# Patient Record
Sex: MALE | Race: WHITE | Employment: OTHER | URBAN - METROPOLITAN AREA
[De-identification: names, ages, dates, MRNs, and addresses within clinical notes are randomized per-mention and may not be internally consistent; named-entity substitution may affect disease eponyms.]

---

## 2022-01-01 ENCOUNTER — APPOINTMENT (OUTPATIENT)
Dept: CT IMAGING | Age: 82
DRG: 870 | End: 2022-01-01
Payer: COMMERCIAL

## 2022-01-01 ENCOUNTER — APPOINTMENT (OUTPATIENT)
Dept: GENERAL RADIOLOGY | Age: 82
DRG: 870 | End: 2022-01-01
Payer: COMMERCIAL

## 2022-01-01 ENCOUNTER — HOSPITAL ENCOUNTER (INPATIENT)
Age: 82
LOS: 28 days | DRG: 870 | End: 2022-02-05
Attending: EMERGENCY MEDICINE | Admitting: INTERNAL MEDICINE
Payer: COMMERCIAL

## 2022-01-01 ENCOUNTER — ANESTHESIA EVENT (OUTPATIENT)
Dept: ICU | Age: 82
DRG: 870 | End: 2022-01-01
Payer: COMMERCIAL

## 2022-01-01 ENCOUNTER — ANESTHESIA (OUTPATIENT)
Dept: ICU | Age: 82
DRG: 870 | End: 2022-01-01
Payer: COMMERCIAL

## 2022-01-01 ENCOUNTER — APPOINTMENT (OUTPATIENT)
Dept: ULTRASOUND IMAGING | Age: 82
DRG: 870 | End: 2022-01-01
Payer: COMMERCIAL

## 2022-01-01 VITALS
RESPIRATION RATE: 28 BRPM | DIASTOLIC BLOOD PRESSURE: 87 MMHG | WEIGHT: 168.87 LBS | HEART RATE: 75 BPM | SYSTOLIC BLOOD PRESSURE: 145 MMHG | HEIGHT: 70 IN | BODY MASS INDEX: 24.18 KG/M2 | TEMPERATURE: 97.7 F | OXYGEN SATURATION: 93 %

## 2022-01-01 DIAGNOSIS — R77.8 ELEVATED TROPONIN: ICD-10-CM

## 2022-01-01 DIAGNOSIS — R09.02 HYPOXIA: ICD-10-CM

## 2022-01-01 DIAGNOSIS — N17.9 ACUTE KIDNEY INJURY (HCC): ICD-10-CM

## 2022-01-01 DIAGNOSIS — U07.1 COVID-19: Primary | ICD-10-CM

## 2022-01-01 LAB
-: ABNORMAL
-: NORMAL
-: NORMAL
ABO/RH: NORMAL
ABSOLUTE EOS #: 0 K/UL (ref 0–0.4)
ABSOLUTE EOS #: 0 K/UL (ref 0–0.44)
ABSOLUTE EOS #: 0 K/UL (ref 0–0.44)
ABSOLUTE EOS #: 0.03 K/UL (ref 0–0.44)
ABSOLUTE EOS #: 0.13 K/UL (ref 0–0.44)
ABSOLUTE EOS #: 0.15 K/UL (ref 0–0.44)
ABSOLUTE EOS #: 0.17 K/UL (ref 0–0.4)
ABSOLUTE EOS #: 0.22 K/UL (ref 0–0.4)
ABSOLUTE EOS #: 0.23 K/UL (ref 0–0.4)
ABSOLUTE EOS #: 0.29 K/UL (ref 0–0.4)
ABSOLUTE EOS #: 0.29 K/UL (ref 0–0.4)
ABSOLUTE EOS #: 0.31 K/UL (ref 0–0.44)
ABSOLUTE EOS #: 0.34 K/UL (ref 0–0.4)
ABSOLUTE EOS #: 0.35 K/UL (ref 0–0.4)
ABSOLUTE EOS #: 0.42 K/UL (ref 0–0.44)
ABSOLUTE EOS #: 0.44 K/UL (ref 0–0.44)
ABSOLUTE EOS #: 0.81 K/UL (ref 0–0.4)
ABSOLUTE IMMATURE GRANULOCYTE: 0 K/UL (ref 0–0.3)
ABSOLUTE IMMATURE GRANULOCYTE: 0.06 K/UL (ref 0–0.3)
ABSOLUTE IMMATURE GRANULOCYTE: 0.11 K/UL (ref 0–0.3)
ABSOLUTE IMMATURE GRANULOCYTE: 0.12 K/UL (ref 0–0.3)
ABSOLUTE IMMATURE GRANULOCYTE: 0.13 K/UL (ref 0–0.3)
ABSOLUTE IMMATURE GRANULOCYTE: 0.15 K/UL (ref 0–0.3)
ABSOLUTE IMMATURE GRANULOCYTE: 0.21 K/UL (ref 0–0.3)
ABSOLUTE IMMATURE GRANULOCYTE: 0.22 K/UL (ref 0–0.3)
ABSOLUTE IMMATURE GRANULOCYTE: 0.26 K/UL (ref 0–0.3)
ABSOLUTE IMMATURE GRANULOCYTE: 0.34 K/UL (ref 0–0.3)
ABSOLUTE IMMATURE GRANULOCYTE: 0.37 K/UL (ref 0–0.3)
ABSOLUTE IMMATURE GRANULOCYTE: 0.41 K/UL (ref 0–0.3)
ABSOLUTE IMMATURE GRANULOCYTE: 0.46 K/UL (ref 0–0.3)
ABSOLUTE IMMATURE GRANULOCYTE: 0.48 K/UL (ref 0–0.3)
ABSOLUTE IMMATURE GRANULOCYTE: 1.28 K/UL (ref 0–0.3)
ABSOLUTE IMMATURE GRANULOCYTE: ABNORMAL K/UL (ref 0–0.3)
ABSOLUTE LYMPH #: 0 K/UL (ref 1–4.8)
ABSOLUTE LYMPH #: 0.25 K/UL (ref 1.1–3.7)
ABSOLUTE LYMPH #: 0.29 K/UL (ref 1.1–3.7)
ABSOLUTE LYMPH #: 0.29 K/UL (ref 1–4.8)
ABSOLUTE LYMPH #: 0.3 K/UL (ref 1.1–3.7)
ABSOLUTE LYMPH #: 0.34 K/UL (ref 1–4.8)
ABSOLUTE LYMPH #: 0.35 K/UL (ref 1–4.8)
ABSOLUTE LYMPH #: 0.36 K/UL (ref 1–4.8)
ABSOLUTE LYMPH #: 0.37 K/UL (ref 1–4.8)
ABSOLUTE LYMPH #: 0.38 K/UL (ref 1.1–3.7)
ABSOLUTE LYMPH #: 0.41 K/UL (ref 1.1–3.7)
ABSOLUTE LYMPH #: 0.44 K/UL (ref 1.1–3.7)
ABSOLUTE LYMPH #: 0.48 K/UL (ref 1–4.8)
ABSOLUTE LYMPH #: 0.52 K/UL (ref 1.1–3.7)
ABSOLUTE LYMPH #: 0.56 K/UL (ref 1–4.8)
ABSOLUTE LYMPH #: 0.57 K/UL (ref 1–4.8)
ABSOLUTE LYMPH #: 0.64 K/UL (ref 1.1–3.7)
ABSOLUTE LYMPH #: 0.65 K/UL (ref 1–4.8)
ABSOLUTE LYMPH #: 0.9 K/UL (ref 1–4.8)
ABSOLUTE LYMPH #: 0.93 K/UL (ref 1–4.8)
ABSOLUTE MONO #: 0 K/UL (ref 0.1–0.8)
ABSOLUTE MONO #: 0.15 K/UL (ref 0.1–0.8)
ABSOLUTE MONO #: 0.23 K/UL (ref 0.1–0.8)
ABSOLUTE MONO #: 0.23 K/UL (ref 0.1–0.8)
ABSOLUTE MONO #: 0.34 K/UL (ref 0.1–0.8)
ABSOLUTE MONO #: 0.34 K/UL (ref 0.1–0.8)
ABSOLUTE MONO #: 0.37 K/UL (ref 0.1–0.8)
ABSOLUTE MONO #: 0.37 K/UL (ref 0.1–1.2)
ABSOLUTE MONO #: 0.43 K/UL (ref 0.1–0.8)
ABSOLUTE MONO #: 0.46 K/UL (ref 0.1–1.2)
ABSOLUTE MONO #: 0.51 K/UL (ref 0.1–0.8)
ABSOLUTE MONO #: 0.58 K/UL (ref 0.1–0.8)
ABSOLUTE MONO #: 0.58 K/UL (ref 0.1–0.8)
ABSOLUTE MONO #: 0.62 K/UL (ref 0.1–1.2)
ABSOLUTE MONO #: 0.62 K/UL (ref 0.1–1.2)
ABSOLUTE MONO #: 0.64 K/UL (ref 0.1–1.2)
ABSOLUTE MONO #: 0.67 K/UL (ref 0.1–0.8)
ABSOLUTE MONO #: 0.77 K/UL (ref 0.1–1.2)
ABSOLUTE MONO #: 0.82 K/UL (ref 0.1–1.2)
ABSOLUTE MONO #: 0.9 K/UL (ref 0.1–0.8)
ABSOLUTE MONO #: 0.9 K/UL (ref 0.1–1.2)
ABSOLUTE MONO #: 1.04 K/UL (ref 0.1–0.8)
ACTION: NORMAL
ALBUMIN SERPL-MCNC: 2.7 G/DL (ref 3.5–5.2)
ALBUMIN SERPL-MCNC: 3.3 G/DL (ref 3.5–5.2)
ALBUMIN/GLOBULIN RATIO: 0.8 (ref 1–2.5)
ALBUMIN/GLOBULIN RATIO: 0.8 (ref 1–2.5)
ALLEN TEST: ABNORMAL
ALLEN TEST: POSITIVE
ALLEN TEST: POSITIVE
ALP BLD-CCNC: 103 U/L (ref 40–129)
ALP BLD-CCNC: 89 U/L (ref 40–129)
ALT SERPL-CCNC: 48 U/L (ref 5–41)
ALT SERPL-CCNC: 61 U/L (ref 5–41)
AMORPHOUS: ABNORMAL
ANION GAP SERPL CALCULATED.3IONS-SCNC: 10 MMOL/L (ref 9–17)
ANION GAP SERPL CALCULATED.3IONS-SCNC: 10 MMOL/L (ref 9–17)
ANION GAP SERPL CALCULATED.3IONS-SCNC: 11 MMOL/L (ref 9–17)
ANION GAP SERPL CALCULATED.3IONS-SCNC: 12 MMOL/L (ref 9–17)
ANION GAP SERPL CALCULATED.3IONS-SCNC: 13 MMOL/L (ref 9–17)
ANION GAP SERPL CALCULATED.3IONS-SCNC: 13 MMOL/L (ref 9–17)
ANION GAP SERPL CALCULATED.3IONS-SCNC: 14 MMOL/L (ref 9–17)
ANION GAP SERPL CALCULATED.3IONS-SCNC: 15 MMOL/L (ref 9–17)
ANION GAP SERPL CALCULATED.3IONS-SCNC: 16 MMOL/L (ref 9–17)
ANION GAP SERPL CALCULATED.3IONS-SCNC: 17 MMOL/L (ref 9–17)
ANION GAP SERPL CALCULATED.3IONS-SCNC: 17 MMOL/L (ref 9–17)
ANION GAP SERPL CALCULATED.3IONS-SCNC: 18 MMOL/L (ref 9–17)
ANION GAP SERPL CALCULATED.3IONS-SCNC: 19 MMOL/L (ref 9–17)
ANION GAP SERPL CALCULATED.3IONS-SCNC: 7 MMOL/L (ref 9–17)
ANION GAP SERPL CALCULATED.3IONS-SCNC: 8 MMOL/L (ref 9–17)
ANION GAP SERPL CALCULATED.3IONS-SCNC: 9 MMOL/L (ref 9–17)
ANTI DNA DOUBLE STRANDED: 0.8 IU/ML
ANTI-NUCLEAR ANTIBODY (ANA): NEGATIVE
ANTIBODY SCREEN: NEGATIVE
ARM BAND NUMBER: NORMAL
AST SERPL-CCNC: 39 U/L
AST SERPL-CCNC: 53 U/L
BACTERIA: ABNORMAL
BASOPHILS # BLD: 0 % (ref 0–2)
BASOPHILS # BLD: 1 % (ref 0–2)
BASOPHILS # BLD: 2 % (ref 0–2)
BASOPHILS ABSOLUTE: 0 K/UL (ref 0–0.2)
BASOPHILS ABSOLUTE: 0.09 K/UL (ref 0–0.2)
BASOPHILS ABSOLUTE: 0.23 K/UL (ref 0–0.2)
BASOPHILS ABSOLUTE: <0.03 K/UL (ref 0–0.2)
BILIRUB SERPL-MCNC: 0.36 MG/DL (ref 0.3–1.2)
BILIRUB SERPL-MCNC: 0.5 MG/DL (ref 0.3–1.2)
BILIRUBIN DIRECT: 0.2 MG/DL
BILIRUBIN URINE: NEGATIVE
BILIRUBIN, INDIRECT: 0.3 MG/DL (ref 0–1)
BLD PROD TYP BPU: NORMAL
BUN BLDV-MCNC: 101 MG/DL (ref 8–23)
BUN BLDV-MCNC: 50 MG/DL (ref 8–23)
BUN BLDV-MCNC: 54 MG/DL (ref 8–23)
BUN BLDV-MCNC: 54 MG/DL (ref 8–23)
BUN BLDV-MCNC: 56 MG/DL (ref 8–23)
BUN BLDV-MCNC: 56 MG/DL (ref 8–23)
BUN BLDV-MCNC: 58 MG/DL (ref 8–23)
BUN BLDV-MCNC: 58 MG/DL (ref 8–23)
BUN BLDV-MCNC: 60 MG/DL (ref 8–23)
BUN BLDV-MCNC: 60 MG/DL (ref 8–23)
BUN BLDV-MCNC: 63 MG/DL (ref 8–23)
BUN BLDV-MCNC: 63 MG/DL (ref 8–23)
BUN BLDV-MCNC: 66 MG/DL (ref 8–23)
BUN BLDV-MCNC: 68 MG/DL (ref 8–23)
BUN BLDV-MCNC: 74 MG/DL (ref 8–23)
BUN BLDV-MCNC: 75 MG/DL (ref 8–23)
BUN BLDV-MCNC: 76 MG/DL (ref 8–23)
BUN BLDV-MCNC: 77 MG/DL (ref 8–23)
BUN BLDV-MCNC: 79 MG/DL (ref 8–23)
BUN BLDV-MCNC: 80 MG/DL (ref 8–23)
BUN BLDV-MCNC: 87 MG/DL (ref 8–23)
BUN BLDV-MCNC: 89 MG/DL (ref 8–23)
BUN BLDV-MCNC: 91 MG/DL (ref 8–23)
BUN BLDV-MCNC: 93 MG/DL (ref 8–23)
BUN BLDV-MCNC: 95 MG/DL (ref 8–23)
BUN BLDV-MCNC: 95 MG/DL (ref 8–23)
BUN BLDV-MCNC: 99 MG/DL (ref 8–23)
BUN/CREAT BLD: ABNORMAL (ref 9–20)
C-REACTIVE PROTEIN: 131.8 MG/L (ref 0–5)
C-REACTIVE PROTEIN: 160 MG/L (ref 0–5)
C-REACTIVE PROTEIN: 21.5 MG/L (ref 0–5)
C-REACTIVE PROTEIN: 52.3 MG/L (ref 0–5)
CALCIUM IONIZED: 1.21 MMOL/L (ref 1.13–1.33)
CALCIUM SERPL-MCNC: 7.3 MG/DL (ref 8.6–10.4)
CALCIUM SERPL-MCNC: 7.3 MG/DL (ref 8.6–10.4)
CALCIUM SERPL-MCNC: 7.5 MG/DL (ref 8.6–10.4)
CALCIUM SERPL-MCNC: 7.7 MG/DL (ref 8.6–10.4)
CALCIUM SERPL-MCNC: 7.8 MG/DL (ref 8.6–10.4)
CALCIUM SERPL-MCNC: 7.8 MG/DL (ref 8.6–10.4)
CALCIUM SERPL-MCNC: 7.9 MG/DL (ref 8.6–10.4)
CALCIUM SERPL-MCNC: 8 MG/DL (ref 8.6–10.4)
CALCIUM SERPL-MCNC: 8.1 MG/DL (ref 8.6–10.4)
CALCIUM SERPL-MCNC: 8.2 MG/DL (ref 8.6–10.4)
CALCIUM SERPL-MCNC: 8.3 MG/DL (ref 8.6–10.4)
CALCIUM SERPL-MCNC: 8.4 MG/DL (ref 8.6–10.4)
CALCIUM SERPL-MCNC: 8.4 MG/DL (ref 8.6–10.4)
CALCIUM SERPL-MCNC: 8.5 MG/DL (ref 8.6–10.4)
CALCIUM SERPL-MCNC: 8.7 MG/DL (ref 8.6–10.4)
CALCIUM SERPL-MCNC: 8.8 MG/DL (ref 8.6–10.4)
CALCIUM SERPL-MCNC: 9.2 MG/DL (ref 8.6–10.4)
CARBOXYHEMOGLOBIN: 0.6 % (ref 0–5)
CASTS UA: ABNORMAL /LPF
CASTS UA: ABNORMAL /LPF (ref 0–2)
CHLORIDE BLD-SCNC: 100 MMOL/L (ref 98–107)
CHLORIDE BLD-SCNC: 102 MMOL/L (ref 98–107)
CHLORIDE BLD-SCNC: 103 MMOL/L (ref 98–107)
CHLORIDE BLD-SCNC: 103 MMOL/L (ref 98–107)
CHLORIDE BLD-SCNC: 104 MMOL/L (ref 98–107)
CHLORIDE BLD-SCNC: 105 MMOL/L (ref 98–107)
CHLORIDE BLD-SCNC: 105 MMOL/L (ref 98–107)
CHLORIDE BLD-SCNC: 107 MMOL/L (ref 98–107)
CHLORIDE BLD-SCNC: 108 MMOL/L (ref 98–107)
CHLORIDE BLD-SCNC: 109 MMOL/L (ref 98–107)
CHLORIDE BLD-SCNC: 110 MMOL/L (ref 98–107)
CHLORIDE BLD-SCNC: 110 MMOL/L (ref 98–107)
CHLORIDE BLD-SCNC: 111 MMOL/L (ref 98–107)
CO2: 11 MMOL/L (ref 20–31)
CO2: 15 MMOL/L (ref 20–31)
CO2: 16 MMOL/L (ref 20–31)
CO2: 16 MMOL/L (ref 20–31)
CO2: 17 MMOL/L (ref 20–31)
CO2: 17 MMOL/L (ref 20–31)
CO2: 19 MMOL/L (ref 20–31)
CO2: 20 MMOL/L (ref 20–31)
CO2: 20 MMOL/L (ref 20–31)
CO2: 21 MMOL/L (ref 20–31)
CO2: 21 MMOL/L (ref 20–31)
CO2: 22 MMOL/L (ref 20–31)
CO2: 22 MMOL/L (ref 20–31)
CO2: 23 MMOL/L (ref 20–31)
CO2: 24 MMOL/L (ref 20–31)
CO2: 24 MMOL/L (ref 20–31)
CO2: 26 MMOL/L (ref 20–31)
CO2: 29 MMOL/L (ref 20–31)
CO2: 30 MMOL/L (ref 20–31)
CO2: 31 MMOL/L (ref 20–31)
CO2: 32 MMOL/L (ref 20–31)
COLOR: YELLOW
COMMENT UA: ABNORMAL
COMMENT UA: ABNORMAL
COMPLEMENT C3: 134 MG/DL (ref 90–180)
COMPLEMENT C4: 31 MG/DL (ref 10–40)
CREAT SERPL-MCNC: 2.04 MG/DL (ref 0.7–1.2)
CREAT SERPL-MCNC: 2.08 MG/DL (ref 0.7–1.2)
CREAT SERPL-MCNC: 2.09 MG/DL (ref 0.7–1.2)
CREAT SERPL-MCNC: 2.16 MG/DL (ref 0.7–1.2)
CREAT SERPL-MCNC: 2.18 MG/DL (ref 0.7–1.2)
CREAT SERPL-MCNC: 2.19 MG/DL (ref 0.7–1.2)
CREAT SERPL-MCNC: 2.2 MG/DL (ref 0.7–1.2)
CREAT SERPL-MCNC: 2.25 MG/DL (ref 0.7–1.2)
CREAT SERPL-MCNC: 2.29 MG/DL (ref 0.7–1.2)
CREAT SERPL-MCNC: 2.31 MG/DL (ref 0.7–1.2)
CREAT SERPL-MCNC: 2.32 MG/DL (ref 0.7–1.2)
CREAT SERPL-MCNC: 2.32 MG/DL (ref 0.7–1.2)
CREAT SERPL-MCNC: 2.35 MG/DL (ref 0.7–1.2)
CREAT SERPL-MCNC: 2.38 MG/DL (ref 0.7–1.2)
CREAT SERPL-MCNC: 2.44 MG/DL (ref 0.7–1.2)
CREAT SERPL-MCNC: 2.45 MG/DL (ref 0.7–1.2)
CREAT SERPL-MCNC: 2.52 MG/DL (ref 0.7–1.2)
CREAT SERPL-MCNC: 2.52 MG/DL (ref 0.7–1.2)
CREAT SERPL-MCNC: 2.53 MG/DL (ref 0.7–1.2)
CREAT SERPL-MCNC: 2.54 MG/DL (ref 0.7–1.2)
CREAT SERPL-MCNC: 2.56 MG/DL (ref 0.7–1.2)
CREAT SERPL-MCNC: 2.67 MG/DL (ref 0.7–1.2)
CREAT SERPL-MCNC: 2.96 MG/DL (ref 0.7–1.2)
CREAT SERPL-MCNC: 2.98 MG/DL (ref 0.7–1.2)
CREAT SERPL-MCNC: 3.21 MG/DL (ref 0.7–1.2)
CREAT SERPL-MCNC: 3.31 MG/DL (ref 0.7–1.2)
CREAT SERPL-MCNC: 3.69 MG/DL (ref 0.7–1.2)
CREATININE URINE: 90.8 MG/DL (ref 39–259)
CROSSMATCH RESULT: NORMAL
CRYSTALS, UA: ABNORMAL /HPF
CULTURE: ABNORMAL
CULTURE: NO GROWTH
CULTURE: NORMAL
DATE AND TIME: NORMAL
DATE, STOOL #1: ABNORMAL
DATE, STOOL #2: ABNORMAL
DATE, STOOL #3: ABNORMAL
DIFFERENTIAL TYPE: ABNORMAL
DIRECT EXAM: ABNORMAL
DISPENSE STATUS BLOOD BANK: NORMAL
EKG ATRIAL RATE: 106 BPM
EKG ATRIAL RATE: 129 BPM
EKG ATRIAL RATE: 33 BPM
EKG ATRIAL RATE: 85 BPM
EKG P AXIS: 14 DEGREES
EKG P AXIS: 38 DEGREES
EKG P-R INTERVAL: 124 MS
EKG P-R INTERVAL: 126 MS
EKG Q-T INTERVAL: 296 MS
EKG Q-T INTERVAL: 322 MS
EKG Q-T INTERVAL: 356 MS
EKG Q-T INTERVAL: 394 MS
EKG QRS DURATION: 104 MS
EKG QRS DURATION: 94 MS
EKG QRS DURATION: 94 MS
EKG QRS DURATION: 96 MS
EKG QTC CALCULATION (BAZETT): 438 MS
EKG QTC CALCULATION (BAZETT): 468 MS
EKG QTC CALCULATION (BAZETT): 472 MS
EKG QTC CALCULATION (BAZETT): 493 MS
EKG R AXIS: -43 DEGREES
EKG R AXIS: -46 DEGREES
EKG R AXIS: -48 DEGREES
EKG R AXIS: -53 DEGREES
EKG T AXIS: 108 DEGREES
EKG T AXIS: 123 DEGREES
EKG T AXIS: 141 DEGREES
EKG T AXIS: 40 DEGREES
EKG VENTRICULAR RATE: 106 BPM
EKG VENTRICULAR RATE: 132 BPM
EKG VENTRICULAR RATE: 141 BPM
EKG VENTRICULAR RATE: 85 BPM
ENA ANTIBODIES SCREEN: 0.3 U/ML
EOSINOPHILS RELATIVE PERCENT: 0 % (ref 1–4)
EOSINOPHILS RELATIVE PERCENT: 1 % (ref 1–4)
EOSINOPHILS RELATIVE PERCENT: 2 % (ref 1–4)
EOSINOPHILS RELATIVE PERCENT: 2 % (ref 1–4)
EOSINOPHILS RELATIVE PERCENT: 3 % (ref 1–4)
EOSINOPHILS RELATIVE PERCENT: 4 % (ref 1–4)
EOSINOPHILS RELATIVE PERCENT: 5 % (ref 1–4)
EOSINOPHILS RELATIVE PERCENT: 7 % (ref 1–4)
EPITHELIAL CELLS UA: ABNORMAL /HPF (ref 0–5)
EXPIRATION DATE: NORMAL
FERRITIN: 804 UG/L (ref 30–400)
FIO2: 30
FIO2: 40
FIO2: 50
FIO2: 60
FIO2: 60
FIO2: 90
FIO2: ABNORMAL
FIO2: ABNORMAL
GFR AFRICAN AMERICAN: 19 ML/MIN
GFR AFRICAN AMERICAN: 22 ML/MIN
GFR AFRICAN AMERICAN: 23 ML/MIN
GFR AFRICAN AMERICAN: 25 ML/MIN
GFR AFRICAN AMERICAN: 25 ML/MIN
GFR AFRICAN AMERICAN: 28 ML/MIN
GFR AFRICAN AMERICAN: 29 ML/MIN
GFR AFRICAN AMERICAN: 30 ML/MIN
GFR AFRICAN AMERICAN: 31 ML/MIN
GFR AFRICAN AMERICAN: 31 ML/MIN
GFR AFRICAN AMERICAN: 32 ML/MIN
GFR AFRICAN AMERICAN: 33 ML/MIN
GFR AFRICAN AMERICAN: 34 ML/MIN
GFR AFRICAN AMERICAN: 35 ML/MIN
GFR AFRICAN AMERICAN: 36 ML/MIN
GFR AFRICAN AMERICAN: 37 ML/MIN
GFR AFRICAN AMERICAN: 37 ML/MIN
GFR AFRICAN AMERICAN: 38 ML/MIN
GFR NON-AFRICAN AMERICAN: 16 ML/MIN
GFR NON-AFRICAN AMERICAN: 18 ML/MIN
GFR NON-AFRICAN AMERICAN: 19 ML/MIN
GFR NON-AFRICAN AMERICAN: 20 ML/MIN
GFR NON-AFRICAN AMERICAN: 21 ML/MIN
GFR NON-AFRICAN AMERICAN: 23 ML/MIN
GFR NON-AFRICAN AMERICAN: 24 ML/MIN
GFR NON-AFRICAN AMERICAN: 25 ML/MIN
GFR NON-AFRICAN AMERICAN: 26 ML/MIN
GFR NON-AFRICAN AMERICAN: 27 ML/MIN
GFR NON-AFRICAN AMERICAN: 28 ML/MIN
GFR NON-AFRICAN AMERICAN: 29 ML/MIN
GFR NON-AFRICAN AMERICAN: 30 ML/MIN
GFR NON-AFRICAN AMERICAN: 31 ML/MIN
GFR NON-AFRICAN AMERICAN: 31 ML/MIN
GFR NON-AFRICAN AMERICAN: 32 ML/MIN
GFR SERPL CREATININE-BSD FRML MDRD: ABNORMAL ML/MIN/{1.73_M2}
GLOBULIN: ABNORMAL G/DL (ref 1.5–3.8)
GLUCOSE BLD-MCNC: 100 MG/DL (ref 75–110)
GLUCOSE BLD-MCNC: 101 MG/DL (ref 75–110)
GLUCOSE BLD-MCNC: 105 MG/DL (ref 75–110)
GLUCOSE BLD-MCNC: 106 MG/DL (ref 75–110)
GLUCOSE BLD-MCNC: 106 MG/DL (ref 75–110)
GLUCOSE BLD-MCNC: 107 MG/DL (ref 75–110)
GLUCOSE BLD-MCNC: 108 MG/DL (ref 75–110)
GLUCOSE BLD-MCNC: 109 MG/DL (ref 74–100)
GLUCOSE BLD-MCNC: 110 MG/DL (ref 70–99)
GLUCOSE BLD-MCNC: 111 MG/DL (ref 75–110)
GLUCOSE BLD-MCNC: 113 MG/DL (ref 75–110)
GLUCOSE BLD-MCNC: 114 MG/DL (ref 75–110)
GLUCOSE BLD-MCNC: 116 MG/DL (ref 75–110)
GLUCOSE BLD-MCNC: 116 MG/DL (ref 75–110)
GLUCOSE BLD-MCNC: 117 MG/DL (ref 75–110)
GLUCOSE BLD-MCNC: 119 MG/DL (ref 75–110)
GLUCOSE BLD-MCNC: 120 MG/DL (ref 75–110)
GLUCOSE BLD-MCNC: 123 MG/DL (ref 75–110)
GLUCOSE BLD-MCNC: 124 MG/DL (ref 70–99)
GLUCOSE BLD-MCNC: 127 MG/DL (ref 75–110)
GLUCOSE BLD-MCNC: 129 MG/DL (ref 70–99)
GLUCOSE BLD-MCNC: 129 MG/DL (ref 70–99)
GLUCOSE BLD-MCNC: 129 MG/DL (ref 75–110)
GLUCOSE BLD-MCNC: 129 MG/DL (ref 75–110)
GLUCOSE BLD-MCNC: 130 MG/DL (ref 75–110)
GLUCOSE BLD-MCNC: 131 MG/DL (ref 75–110)
GLUCOSE BLD-MCNC: 132 MG/DL (ref 70–99)
GLUCOSE BLD-MCNC: 132 MG/DL (ref 70–99)
GLUCOSE BLD-MCNC: 133 MG/DL (ref 75–110)
GLUCOSE BLD-MCNC: 133 MG/DL (ref 75–110)
GLUCOSE BLD-MCNC: 134 MG/DL (ref 70–99)
GLUCOSE BLD-MCNC: 134 MG/DL (ref 75–110)
GLUCOSE BLD-MCNC: 134 MG/DL (ref 75–110)
GLUCOSE BLD-MCNC: 135 MG/DL (ref 70–99)
GLUCOSE BLD-MCNC: 135 MG/DL (ref 75–110)
GLUCOSE BLD-MCNC: 136 MG/DL (ref 75–110)
GLUCOSE BLD-MCNC: 137 MG/DL (ref 75–110)
GLUCOSE BLD-MCNC: 138 MG/DL (ref 75–110)
GLUCOSE BLD-MCNC: 139 MG/DL (ref 75–110)
GLUCOSE BLD-MCNC: 139 MG/DL (ref 75–110)
GLUCOSE BLD-MCNC: 140 MG/DL (ref 75–110)
GLUCOSE BLD-MCNC: 141 MG/DL (ref 75–110)
GLUCOSE BLD-MCNC: 143 MG/DL (ref 75–110)
GLUCOSE BLD-MCNC: 144 MG/DL (ref 75–110)
GLUCOSE BLD-MCNC: 144 MG/DL (ref 75–110)
GLUCOSE BLD-MCNC: 145 MG/DL (ref 70–99)
GLUCOSE BLD-MCNC: 146 MG/DL (ref 75–110)
GLUCOSE BLD-MCNC: 146 MG/DL (ref 75–110)
GLUCOSE BLD-MCNC: 147 MG/DL (ref 75–110)
GLUCOSE BLD-MCNC: 148 MG/DL (ref 70–99)
GLUCOSE BLD-MCNC: 148 MG/DL (ref 75–110)
GLUCOSE BLD-MCNC: 149 MG/DL (ref 74–100)
GLUCOSE BLD-MCNC: 150 MG/DL (ref 75–110)
GLUCOSE BLD-MCNC: 152 MG/DL (ref 75–110)
GLUCOSE BLD-MCNC: 153 MG/DL (ref 70–99)
GLUCOSE BLD-MCNC: 153 MG/DL (ref 75–110)
GLUCOSE BLD-MCNC: 155 MG/DL (ref 75–110)
GLUCOSE BLD-MCNC: 156 MG/DL (ref 75–110)
GLUCOSE BLD-MCNC: 157 MG/DL (ref 75–110)
GLUCOSE BLD-MCNC: 158 MG/DL (ref 75–110)
GLUCOSE BLD-MCNC: 159 MG/DL (ref 75–110)
GLUCOSE BLD-MCNC: 160 MG/DL (ref 70–99)
GLUCOSE BLD-MCNC: 160 MG/DL (ref 70–99)
GLUCOSE BLD-MCNC: 160 MG/DL (ref 74–100)
GLUCOSE BLD-MCNC: 160 MG/DL (ref 75–110)
GLUCOSE BLD-MCNC: 161 MG/DL (ref 70–99)
GLUCOSE BLD-MCNC: 161 MG/DL (ref 70–99)
GLUCOSE BLD-MCNC: 161 MG/DL (ref 75–110)
GLUCOSE BLD-MCNC: 163 MG/DL (ref 75–110)
GLUCOSE BLD-MCNC: 163 MG/DL (ref 75–110)
GLUCOSE BLD-MCNC: 164 MG/DL (ref 75–110)
GLUCOSE BLD-MCNC: 166 MG/DL (ref 75–110)
GLUCOSE BLD-MCNC: 166 MG/DL (ref 75–110)
GLUCOSE BLD-MCNC: 167 MG/DL (ref 70–99)
GLUCOSE BLD-MCNC: 167 MG/DL (ref 75–110)
GLUCOSE BLD-MCNC: 167 MG/DL (ref 75–110)
GLUCOSE BLD-MCNC: 168 MG/DL (ref 75–110)
GLUCOSE BLD-MCNC: 168 MG/DL (ref 75–110)
GLUCOSE BLD-MCNC: 169 MG/DL (ref 75–110)
GLUCOSE BLD-MCNC: 170 MG/DL (ref 75–110)
GLUCOSE BLD-MCNC: 170 MG/DL (ref 75–110)
GLUCOSE BLD-MCNC: 171 MG/DL (ref 75–110)
GLUCOSE BLD-MCNC: 172 MG/DL (ref 70–99)
GLUCOSE BLD-MCNC: 172 MG/DL (ref 75–110)
GLUCOSE BLD-MCNC: 174 MG/DL (ref 74–100)
GLUCOSE BLD-MCNC: 174 MG/DL (ref 74–100)
GLUCOSE BLD-MCNC: 176 MG/DL (ref 70–99)
GLUCOSE BLD-MCNC: 176 MG/DL (ref 75–110)
GLUCOSE BLD-MCNC: 177 MG/DL (ref 70–99)
GLUCOSE BLD-MCNC: 178 MG/DL (ref 70–99)
GLUCOSE BLD-MCNC: 178 MG/DL (ref 75–110)
GLUCOSE BLD-MCNC: 179 MG/DL (ref 70–99)
GLUCOSE BLD-MCNC: 181 MG/DL (ref 75–110)
GLUCOSE BLD-MCNC: 182 MG/DL (ref 75–110)
GLUCOSE BLD-MCNC: 183 MG/DL (ref 75–110)
GLUCOSE BLD-MCNC: 185 MG/DL (ref 75–110)
GLUCOSE BLD-MCNC: 186 MG/DL (ref 75–110)
GLUCOSE BLD-MCNC: 188 MG/DL (ref 70–99)
GLUCOSE BLD-MCNC: 188 MG/DL (ref 74–100)
GLUCOSE BLD-MCNC: 188 MG/DL (ref 75–110)
GLUCOSE BLD-MCNC: 189 MG/DL (ref 75–110)
GLUCOSE BLD-MCNC: 189 MG/DL (ref 75–110)
GLUCOSE BLD-MCNC: 191 MG/DL (ref 70–99)
GLUCOSE BLD-MCNC: 191 MG/DL (ref 75–110)
GLUCOSE BLD-MCNC: 192 MG/DL (ref 75–110)
GLUCOSE BLD-MCNC: 194 MG/DL (ref 70–99)
GLUCOSE BLD-MCNC: 195 MG/DL (ref 70–99)
GLUCOSE BLD-MCNC: 196 MG/DL (ref 75–110)
GLUCOSE BLD-MCNC: 199 MG/DL (ref 74–100)
GLUCOSE BLD-MCNC: 200 MG/DL (ref 75–110)
GLUCOSE BLD-MCNC: 200 MG/DL (ref 75–110)
GLUCOSE BLD-MCNC: 201 MG/DL (ref 75–110)
GLUCOSE BLD-MCNC: 201 MG/DL (ref 75–110)
GLUCOSE BLD-MCNC: 202 MG/DL (ref 74–100)
GLUCOSE BLD-MCNC: 203 MG/DL (ref 75–110)
GLUCOSE BLD-MCNC: 203 MG/DL (ref 75–110)
GLUCOSE BLD-MCNC: 204 MG/DL (ref 75–110)
GLUCOSE BLD-MCNC: 207 MG/DL (ref 74–100)
GLUCOSE BLD-MCNC: 207 MG/DL (ref 75–110)
GLUCOSE BLD-MCNC: 219 MG/DL (ref 70–99)
GLUCOSE BLD-MCNC: 229 MG/DL (ref 70–99)
GLUCOSE BLD-MCNC: 230 MG/DL (ref 75–110)
GLUCOSE BLD-MCNC: 231 MG/DL (ref 75–110)
GLUCOSE BLD-MCNC: 233 MG/DL (ref 74–100)
GLUCOSE BLD-MCNC: 244 MG/DL (ref 75–110)
GLUCOSE BLD-MCNC: 261 MG/DL (ref 75–110)
GLUCOSE BLD-MCNC: 34 MG/DL (ref 75–110)
GLUCOSE BLD-MCNC: 36 MG/DL (ref 75–110)
GLUCOSE BLD-MCNC: 36 MG/DL (ref 75–110)
GLUCOSE BLD-MCNC: 368 MG/DL (ref 70–99)
GLUCOSE BLD-MCNC: 42 MG/DL (ref 75–110)
GLUCOSE BLD-MCNC: 44 MG/DL (ref 75–110)
GLUCOSE BLD-MCNC: 48 MG/DL (ref 75–110)
GLUCOSE BLD-MCNC: 56 MG/DL (ref 75–110)
GLUCOSE BLD-MCNC: 58 MG/DL (ref 74–100)
GLUCOSE BLD-MCNC: 58 MG/DL (ref 75–110)
GLUCOSE BLD-MCNC: 63 MG/DL (ref 75–110)
GLUCOSE BLD-MCNC: 73 MG/DL (ref 75–110)
GLUCOSE BLD-MCNC: 84 MG/DL (ref 75–110)
GLUCOSE BLD-MCNC: 87 MG/DL (ref 75–110)
GLUCOSE BLD-MCNC: 89 MG/DL (ref 70–99)
GLUCOSE BLD-MCNC: 91 MG/DL (ref 75–110)
GLUCOSE BLD-MCNC: 92 MG/DL (ref 75–110)
GLUCOSE BLD-MCNC: 93 MG/DL (ref 75–110)
GLUCOSE BLD-MCNC: 94 MG/DL (ref 75–110)
GLUCOSE BLD-MCNC: 96 MG/DL (ref 75–110)
GLUCOSE BLD-MCNC: 97 MG/DL (ref 75–110)
GLUCOSE BLD-MCNC: 99 MG/DL (ref 75–110)
GLUCOSE URINE: ABNORMAL
HCO3 VENOUS: 15.4 MMOL/L (ref 24–30)
HCT VFR BLD CALC: 17.5 % (ref 40.7–50.3)
HCT VFR BLD CALC: 21.1 % (ref 40.7–50.3)
HCT VFR BLD CALC: 21.2 % (ref 40.7–50.3)
HCT VFR BLD CALC: 21.6 % (ref 40.7–50.3)
HCT VFR BLD CALC: 21.7 % (ref 40.7–50.3)
HCT VFR BLD CALC: 21.7 % (ref 40.7–50.3)
HCT VFR BLD CALC: 21.9 % (ref 40.7–50.3)
HCT VFR BLD CALC: 23.1 % (ref 40.7–50.3)
HCT VFR BLD CALC: 24 % (ref 40.7–50.3)
HCT VFR BLD CALC: 24.4 % (ref 40.7–50.3)
HCT VFR BLD CALC: 24.5 % (ref 40.7–50.3)
HCT VFR BLD CALC: 24.5 % (ref 40.7–50.3)
HCT VFR BLD CALC: 25.1 % (ref 40.7–50.3)
HCT VFR BLD CALC: 25.6 % (ref 40.7–50.3)
HCT VFR BLD CALC: 25.7 % (ref 40.7–50.3)
HCT VFR BLD CALC: 26.3 % (ref 40.7–50.3)
HCT VFR BLD CALC: 26.9 % (ref 40.7–50.3)
HCT VFR BLD CALC: 27.4 % (ref 40.7–50.3)
HCT VFR BLD CALC: 27.5 % (ref 40.7–50.3)
HCT VFR BLD CALC: 27.5 % (ref 40.7–50.3)
HCT VFR BLD CALC: 27.7 % (ref 40.7–50.3)
HCT VFR BLD CALC: 28.1 % (ref 40.7–50.3)
HCT VFR BLD CALC: 28.4 % (ref 40.7–50.3)
HCT VFR BLD CALC: 30.4 % (ref 40.7–50.3)
HCT VFR BLD CALC: 31 % (ref 40.7–50.3)
HCT VFR BLD CALC: 31.5 % (ref 40.7–50.3)
HCT VFR BLD CALC: 33 % (ref 40.7–50.3)
HCT VFR BLD CALC: 34.1 % (ref 40.7–50.3)
HCT VFR BLD CALC: 35.7 % (ref 40.7–50.3)
HCT VFR BLD CALC: 36.3 % (ref 40.7–50.3)
HCT VFR BLD CALC: 36.5 % (ref 40.7–50.3)
HCT VFR BLD CALC: 40.3 % (ref 41–53)
HEMOCCULT SP1 STL QL: POSITIVE
HEMOCCULT SP2 STL QL: ABNORMAL
HEMOCCULT SP3 STL QL: ABNORMAL
HEMOGLOBIN: 10 G/DL (ref 13–17)
HEMOGLOBIN: 10 G/DL (ref 13–17)
HEMOGLOBIN: 10.5 G/DL (ref 13–17)
HEMOGLOBIN: 10.7 G/DL (ref 13–17)
HEMOGLOBIN: 10.9 G/DL (ref 13–17)
HEMOGLOBIN: 12.5 G/DL (ref 13–17)
HEMOGLOBIN: 12.8 G/DL (ref 13–17)
HEMOGLOBIN: 13.1 G/DL (ref 13–17)
HEMOGLOBIN: 14.1 G/DL (ref 13.5–17.5)
HEMOGLOBIN: 5.7 G/DL (ref 13–17)
HEMOGLOBIN: 6.7 G/DL (ref 13–17)
HEMOGLOBIN: 6.7 G/DL (ref 13–17)
HEMOGLOBIN: 6.9 G/DL (ref 13–17)
HEMOGLOBIN: 7 G/DL (ref 13–17)
HEMOGLOBIN: 7.3 G/DL (ref 13–17)
HEMOGLOBIN: 7.4 G/DL (ref 13–17)
HEMOGLOBIN: 7.7 G/DL (ref 13–17)
HEMOGLOBIN: 7.8 G/DL (ref 13–17)
HEMOGLOBIN: 7.9 G/DL (ref 13–17)
HEMOGLOBIN: 7.9 G/DL (ref 13–17)
HEMOGLOBIN: 8.1 G/DL (ref 13–17)
HEMOGLOBIN: 8.2 G/DL (ref 13–17)
HEMOGLOBIN: 8.3 G/DL (ref 13–17)
HEMOGLOBIN: 8.4 G/DL (ref 13–17)
HEMOGLOBIN: 8.7 G/DL (ref 13–17)
HEMOGLOBIN: 8.9 G/DL (ref 13–17)
HEMOGLOBIN: 9.1 G/DL (ref 13–17)
HEMOGLOBIN: 9.3 G/DL (ref 13–17)
IMMATURE GRANULOCYTES: 0 %
IMMATURE GRANULOCYTES: 1 %
IMMATURE GRANULOCYTES: 2 %
IMMATURE GRANULOCYTES: 3 %
IMMATURE GRANULOCYTES: 4 %
IMMATURE GRANULOCYTES: 5 %
IMMATURE GRANULOCYTES: ABNORMAL %
INR BLD: 1.1
KETONES, URINE: NEGATIVE
LACTATE DEHYDROGENASE: 563 U/L (ref 135–225)
LACTIC ACID, WHOLE BLOOD: 0.9 MMOL/L (ref 0.7–2.1)
LACTIC ACID: 2.1 MMOL/L (ref 0.5–2.2)
LEUKOCYTE ESTERASE, URINE: NEGATIVE
LIPASE: 54 U/L (ref 13–60)
LV EF: 55 %
LVEF MODALITY: NORMAL
LYMPHOCYTES # BLD: 0 % (ref 24–44)
LYMPHOCYTES # BLD: 1 % (ref 24–44)
LYMPHOCYTES # BLD: 2 % (ref 24–43)
LYMPHOCYTES # BLD: 2 % (ref 24–44)
LYMPHOCYTES # BLD: 3 % (ref 24–43)
LYMPHOCYTES # BLD: 3 % (ref 24–44)
LYMPHOCYTES # BLD: 4 % (ref 24–43)
LYMPHOCYTES # BLD: 5 % (ref 24–43)
LYMPHOCYTES # BLD: 5 % (ref 24–43)
LYMPHOCYTES # BLD: 5 % (ref 24–44)
LYMPHOCYTES # BLD: 5 % (ref 24–44)
LYMPHOCYTES # BLD: 8 % (ref 24–44)
LYMPHOCYTES # BLD: 8 % (ref 24–44)
Lab: ABNORMAL
Lab: NORMAL
MAGNESIUM: 2.2 MG/DL (ref 1.6–2.6)
MAGNESIUM: 2.3 MG/DL (ref 1.6–2.6)
MAGNESIUM: 2.4 MG/DL (ref 1.6–2.6)
MAGNESIUM: 2.5 MG/DL (ref 1.6–2.6)
MAGNESIUM: 2.5 MG/DL (ref 1.6–2.6)
MAGNESIUM: 2.6 MG/DL (ref 1.6–2.6)
MAGNESIUM: 2.6 MG/DL (ref 1.6–2.6)
MCH RBC QN AUTO: 29.3 PG (ref 25.2–33.5)
MCH RBC QN AUTO: 29.8 PG (ref 25.2–33.5)
MCH RBC QN AUTO: 30.1 PG (ref 25.2–33.5)
MCH RBC QN AUTO: 30.2 PG (ref 25.2–33.5)
MCH RBC QN AUTO: 30.2 PG (ref 25.2–33.5)
MCH RBC QN AUTO: 30.3 PG (ref 25.2–33.5)
MCH RBC QN AUTO: 30.4 PG (ref 25.2–33.5)
MCH RBC QN AUTO: 30.4 PG (ref 26–34)
MCH RBC QN AUTO: 30.6 PG (ref 25.2–33.5)
MCH RBC QN AUTO: 30.8 PG (ref 25.2–33.5)
MCH RBC QN AUTO: 30.8 PG (ref 25.2–33.5)
MCH RBC QN AUTO: 30.9 PG (ref 25.2–33.5)
MCH RBC QN AUTO: 31 PG (ref 25.2–33.5)
MCH RBC QN AUTO: 31.1 PG (ref 25.2–33.5)
MCH RBC QN AUTO: 31.1 PG (ref 25.2–33.5)
MCH RBC QN AUTO: 31.2 PG (ref 25.2–33.5)
MCH RBC QN AUTO: 31.2 PG (ref 25.2–33.5)
MCH RBC QN AUTO: 31.3 PG (ref 25.2–33.5)
MCH RBC QN AUTO: 31.5 PG (ref 25.2–33.5)
MCH RBC QN AUTO: 31.5 PG (ref 25.2–33.5)
MCH RBC QN AUTO: 31.7 PG (ref 25.2–33.5)
MCH RBC QN AUTO: 32.1 PG (ref 25.2–33.5)
MCHC RBC AUTO-ENTMCNC: 30.8 G/DL (ref 28.4–34.8)
MCHC RBC AUTO-ENTMCNC: 31.2 G/DL (ref 28.4–34.8)
MCHC RBC AUTO-ENTMCNC: 31.3 G/DL (ref 28.4–34.8)
MCHC RBC AUTO-ENTMCNC: 31.4 G/DL (ref 28.4–34.8)
MCHC RBC AUTO-ENTMCNC: 31.5 G/DL (ref 28.4–34.8)
MCHC RBC AUTO-ENTMCNC: 31.6 G/DL (ref 28.4–34.8)
MCHC RBC AUTO-ENTMCNC: 31.7 G/DL (ref 28.4–34.8)
MCHC RBC AUTO-ENTMCNC: 31.7 G/DL (ref 28.4–34.8)
MCHC RBC AUTO-ENTMCNC: 31.8 G/DL (ref 28.4–34.8)
MCHC RBC AUTO-ENTMCNC: 31.8 G/DL (ref 28.4–34.8)
MCHC RBC AUTO-ENTMCNC: 32 G/DL (ref 28.4–34.8)
MCHC RBC AUTO-ENTMCNC: 32 G/DL (ref 28.4–34.8)
MCHC RBC AUTO-ENTMCNC: 32.3 G/DL (ref 28.4–34.8)
MCHC RBC AUTO-ENTMCNC: 32.3 G/DL (ref 28.4–34.8)
MCHC RBC AUTO-ENTMCNC: 32.6 G/DL (ref 28.4–34.8)
MCHC RBC AUTO-ENTMCNC: 33.2 G/DL (ref 28.4–34.8)
MCHC RBC AUTO-ENTMCNC: 33.3 G/DL (ref 28.4–34.8)
MCHC RBC AUTO-ENTMCNC: 33.6 G/DL (ref 28.4–34.8)
MCHC RBC AUTO-ENTMCNC: 34 G/DL (ref 28.4–34.8)
MCHC RBC AUTO-ENTMCNC: 34.3 G/DL (ref 28.4–34.8)
MCHC RBC AUTO-ENTMCNC: 34.4 G/DL (ref 28.4–34.8)
MCHC RBC AUTO-ENTMCNC: 34.6 G/DL (ref 28.4–34.8)
MCHC RBC AUTO-ENTMCNC: 35.1 G/DL (ref 31–37)
MCHC RBC AUTO-ENTMCNC: 35.2 G/DL (ref 28.4–34.8)
MCHC RBC AUTO-ENTMCNC: 35.9 G/DL (ref 28.4–34.8)
MCHC RBC AUTO-ENTMCNC: 35.9 G/DL (ref 28.4–34.8)
MCHC RBC AUTO-ENTMCNC: 37.1 G/DL (ref 28.4–34.8)
MCV RBC AUTO: 100.4 FL (ref 82.6–102.9)
MCV RBC AUTO: 84 FL (ref 82.6–102.9)
MCV RBC AUTO: 84.7 FL (ref 82.6–102.9)
MCV RBC AUTO: 85.4 FL (ref 82.6–102.9)
MCV RBC AUTO: 86.1 FL (ref 82.6–102.9)
MCV RBC AUTO: 86.2 FL (ref 82.6–102.9)
MCV RBC AUTO: 86.4 FL (ref 82.6–102.9)
MCV RBC AUTO: 86.6 FL (ref 80–100)
MCV RBC AUTO: 87.7 FL (ref 82.6–102.9)
MCV RBC AUTO: 91.1 FL (ref 82.6–102.9)
MCV RBC AUTO: 91.6 FL (ref 82.6–102.9)
MCV RBC AUTO: 91.7 FL (ref 82.6–102.9)
MCV RBC AUTO: 92.6 FL (ref 82.6–102.9)
MCV RBC AUTO: 94.8 FL (ref 82.6–102.9)
MCV RBC AUTO: 95 FL (ref 82.6–102.9)
MCV RBC AUTO: 95.3 FL (ref 82.6–102.9)
MCV RBC AUTO: 96.8 FL (ref 82.6–102.9)
MCV RBC AUTO: 97.2 FL (ref 82.6–102.9)
MCV RBC AUTO: 97.7 FL (ref 82.6–102.9)
MCV RBC AUTO: 98.4 FL (ref 82.6–102.9)
MCV RBC AUTO: 98.6 FL (ref 82.6–102.9)
MCV RBC AUTO: 98.9 FL (ref 82.6–102.9)
MCV RBC AUTO: 99.3 FL (ref 82.6–102.9)
MCV RBC AUTO: 99.5 FL (ref 82.6–102.9)
MCV RBC AUTO: 99.5 FL (ref 82.6–102.9)
METHEMOGLOBIN: ABNORMAL % (ref 0–1.5)
MODE: ABNORMAL
MONOCYTES # BLD: 0 % (ref 1–7)
MONOCYTES # BLD: 1 % (ref 1–7)
MONOCYTES # BLD: 2 % (ref 1–7)
MONOCYTES # BLD: 2 % (ref 3–12)
MONOCYTES # BLD: 3 % (ref 1–7)
MONOCYTES # BLD: 3 % (ref 3–12)
MONOCYTES # BLD: 3 % (ref 3–12)
MONOCYTES # BLD: 4 % (ref 1–7)
MONOCYTES # BLD: 5 % (ref 1–7)
MONOCYTES # BLD: 6 % (ref 1–7)
MONOCYTES # BLD: 6 % (ref 1–7)
MONOCYTES # BLD: 6 % (ref 3–12)
MONOCYTES # BLD: 6 % (ref 3–12)
MONOCYTES # BLD: 7 % (ref 3–12)
MONOCYTES # BLD: 7 % (ref 3–12)
MONOCYTES # BLD: 8 % (ref 3–12)
MORPHOLOGY: ABNORMAL
MORPHOLOGY: NORMAL
MRSA, DNA, NASAL: NEGATIVE
MUCUS: ABNORMAL
NEGATIVE BASE EXCESS, ART: 1 (ref 0–2)
NEGATIVE BASE EXCESS, ART: 2 (ref 0–2)
NEGATIVE BASE EXCESS, ART: 5 (ref 0–2)
NEGATIVE BASE EXCESS, ART: 9 (ref 0–2)
NEGATIVE BASE EXCESS, ART: ABNORMAL (ref 0–2)
NEGATIVE BASE EXCESS, VEN: 7.3 MMOL/L (ref 0–2)
NITRITE, URINE: NEGATIVE
NOTIFICATION TIME: ABNORMAL
NOTIFICATION: ABNORMAL
NOTIFY: NORMAL
NRBC AUTOMATED: 0 PER 100 WBC
NRBC AUTOMATED: 0.1 PER 100 WBC
NRBC AUTOMATED: 0.1 PER 100 WBC
NRBC AUTOMATED: 0.2 PER 100 WBC
NRBC AUTOMATED: 0.8 PER 100 WBC
NRBC AUTOMATED: 1.3 PER 100 WBC
NRBC AUTOMATED: 1.7 PER 100 WBC
NRBC AUTOMATED: ABNORMAL PER 100 WBC
NUCLEATED RED BLOOD CELLS: 1 PER 100 WBC
O2 DEVICE/FLOW/%: ABNORMAL
O2 SAT, VEN: 81.6 % (ref 60–85)
OTHER OBSERVATIONS UA: ABNORMAL
OXYHEMOGLOBIN: ABNORMAL % (ref 95–98)
PARTIAL THROMBOPLASTIN TIME: 117.9 SEC (ref 20.5–30.5)
PARTIAL THROMBOPLASTIN TIME: 22.1 SEC (ref 20.5–30.5)
PARTIAL THROMBOPLASTIN TIME: 23 SEC (ref 20.5–30.5)
PARTIAL THROMBOPLASTIN TIME: 24 SEC (ref 20.5–30.5)
PARTIAL THROMBOPLASTIN TIME: 25.2 SEC (ref 20.5–30.5)
PARTIAL THROMBOPLASTIN TIME: 31.8 SEC (ref 20.5–30.5)
PARTIAL THROMBOPLASTIN TIME: 37.8 SEC (ref 20.5–30.5)
PARTIAL THROMBOPLASTIN TIME: 38.9 SEC (ref 20.5–30.5)
PARTIAL THROMBOPLASTIN TIME: 46.5 SEC (ref 20.5–30.5)
PARTIAL THROMBOPLASTIN TIME: 47.2 SEC (ref 20.5–30.5)
PARTIAL THROMBOPLASTIN TIME: 48.8 SEC (ref 20.5–30.5)
PARTIAL THROMBOPLASTIN TIME: 51.1 SEC (ref 20.5–30.5)
PARTIAL THROMBOPLASTIN TIME: 57.7 SEC (ref 20.5–30.5)
PARTIAL THROMBOPLASTIN TIME: 62.7 SEC (ref 20.5–30.5)
PARTIAL THROMBOPLASTIN TIME: 62.8 SEC (ref 20.5–30.5)
PARTIAL THROMBOPLASTIN TIME: 66.4 SEC (ref 20.5–30.5)
PARTIAL THROMBOPLASTIN TIME: 67.6 SEC (ref 20.5–30.5)
PARTIAL THROMBOPLASTIN TIME: 69.8 SEC (ref 20.5–30.5)
PARTIAL THROMBOPLASTIN TIME: 71.4 SEC (ref 20.5–30.5)
PARTIAL THROMBOPLASTIN TIME: 74.9 SEC (ref 20.5–30.5)
PARTIAL THROMBOPLASTIN TIME: 75.8 SEC (ref 20.5–30.5)
PARTIAL THROMBOPLASTIN TIME: 89 SEC (ref 20.5–30.5)
PARTIAL THROMBOPLASTIN TIME: 94.8 SEC (ref 20.5–30.5)
PATIENT TEMP: 34.9
PATIENT TEMP: 37
PATIENT TEMP: ABNORMAL
PCO2, VEN, TEMP ADJ: ABNORMAL MMHG (ref 39–55)
PCO2, VEN: 24.5 (ref 39–55)
PDW BLD-RTO: 13.2 % (ref 11.8–14.4)
PDW BLD-RTO: 13.3 % (ref 11.8–14.4)
PDW BLD-RTO: 13.3 % (ref 11.8–14.4)
PDW BLD-RTO: 13.4 % (ref 11.8–14.4)
PDW BLD-RTO: 13.5 % (ref 11.8–14.4)
PDW BLD-RTO: 14.1 % (ref 12.5–15.4)
PDW BLD-RTO: 14.5 % (ref 11.8–14.4)
PDW BLD-RTO: 15.2 % (ref 11.8–14.4)
PDW BLD-RTO: 16 % (ref 11.8–14.4)
PDW BLD-RTO: 16.1 % (ref 11.8–14.4)
PDW BLD-RTO: 16.3 % (ref 11.8–14.4)
PDW BLD-RTO: 16.7 % (ref 11.8–14.4)
PDW BLD-RTO: 16.8 % (ref 11.8–14.4)
PDW BLD-RTO: 17 % (ref 11.8–14.4)
PDW BLD-RTO: 17.2 % (ref 11.8–14.4)
PDW BLD-RTO: 17.3 % (ref 11.8–14.4)
PDW BLD-RTO: 17.4 % (ref 11.8–14.4)
PDW BLD-RTO: 17.6 % (ref 11.8–14.4)
PDW BLD-RTO: 17.6 % (ref 11.8–14.4)
PDW BLD-RTO: 17.7 % (ref 11.8–14.4)
PDW BLD-RTO: 17.8 % (ref 11.8–14.4)
PDW BLD-RTO: 18 % (ref 11.8–14.4)
PDW BLD-RTO: 18.2 % (ref 11.8–14.4)
PDW BLD-RTO: 18.3 % (ref 11.8–14.4)
PDW BLD-RTO: 18.4 % (ref 11.8–14.4)
PEEP/CPAP: ABNORMAL
PH UA: 5 (ref 5–8)
PH VENOUS: 7.42 (ref 7.32–7.42)
PH, VEN, TEMP ADJ: ABNORMAL (ref 7.32–7.42)
PHOSPHORUS: 5.3 MG/DL (ref 2.5–4.5)
PLATELET # BLD: 103 K/UL (ref 138–453)
PLATELET # BLD: 130 K/UL (ref 138–453)
PLATELET # BLD: 135 K/UL (ref 138–453)
PLATELET # BLD: 139 K/UL (ref 138–453)
PLATELET # BLD: 139 K/UL (ref 138–453)
PLATELET # BLD: 143 K/UL (ref 138–453)
PLATELET # BLD: 144 K/UL (ref 138–453)
PLATELET # BLD: 153 K/UL (ref 138–453)
PLATELET # BLD: 175 K/UL (ref 138–453)
PLATELET # BLD: 190 K/UL (ref 138–453)
PLATELET # BLD: 211 K/UL (ref 138–453)
PLATELET # BLD: 233 K/UL (ref 138–453)
PLATELET # BLD: 237 K/UL (ref 138–453)
PLATELET # BLD: 253 K/UL (ref 138–453)
PLATELET # BLD: 256 K/UL (ref 138–453)
PLATELET # BLD: 273 K/UL (ref 138–453)
PLATELET # BLD: 284 K/UL (ref 138–453)
PLATELET # BLD: 295 K/UL (ref 140–450)
PLATELET # BLD: 302 K/UL (ref 138–453)
PLATELET # BLD: 309 K/UL (ref 138–453)
PLATELET # BLD: 348 K/UL (ref 138–453)
PLATELET # BLD: 406 K/UL (ref 138–453)
PLATELET # BLD: 81 K/UL (ref 138–453)
PLATELET # BLD: 82 K/UL (ref 138–453)
PLATELET # BLD: 85 K/UL (ref 138–453)
PLATELET # BLD: 89 K/UL (ref 138–453)
PLATELET # BLD: 89 K/UL (ref 138–453)
PLATELET ESTIMATE: ABNORMAL
PMV BLD AUTO: 10.1 FL (ref 8.1–13.5)
PMV BLD AUTO: 10.4 FL (ref 8.1–13.5)
PMV BLD AUTO: 10.6 FL (ref 8.1–13.5)
PMV BLD AUTO: 10.8 FL (ref 8.1–13.5)
PMV BLD AUTO: 10.9 FL (ref 8.1–13.5)
PMV BLD AUTO: 11 FL (ref 8.1–13.5)
PMV BLD AUTO: 11.1 FL (ref 8.1–13.5)
PMV BLD AUTO: 11.1 FL (ref 8.1–13.5)
PMV BLD AUTO: 11.4 FL (ref 8.1–13.5)
PMV BLD AUTO: 11.4 FL (ref 8.1–13.5)
PMV BLD AUTO: 8.4 FL (ref 6–12)
PO2, VEN, TEMP ADJ: ABNORMAL MMHG (ref 30–50)
PO2, VEN: 44.1 (ref 30–50)
POC HCO3: 15.5 MMOL/L (ref 21–28)
POC HCO3: 22.1 MMOL/L (ref 21–28)
POC HCO3: 23.7 MMOL/L (ref 21–28)
POC HCO3: 23.7 MMOL/L (ref 21–28)
POC HCO3: 24.3 MMOL/L (ref 21–28)
POC HCO3: 25.5 MMOL/L (ref 21–28)
POC HCO3: 25.9 MMOL/L (ref 21–28)
POC HCO3: 26.9 MMOL/L (ref 21–28)
POC HCO3: 29.7 MMOL/L (ref 21–28)
POC HCO3: 31.5 MMOL/L (ref 21–28)
POC HCO3: 31.8 MMOL/L (ref 21–28)
POC HCO3: 32.3 MMOL/L (ref 21–28)
POC HCO3: 32.4 MMOL/L (ref 21–28)
POC HCO3: 32.7 MMOL/L (ref 21–28)
POC HCO3: 32.7 MMOL/L (ref 21–28)
POC HCO3: 34.3 MMOL/L (ref 21–28)
POC HCO3: 35.1 MMOL/L (ref 21–28)
POC LACTIC ACID: 0.6 MMOL/L (ref 0.56–1.39)
POC O2 SATURATION: 86 % (ref 94–98)
POC O2 SATURATION: 88 % (ref 94–98)
POC O2 SATURATION: 89 % (ref 94–98)
POC O2 SATURATION: 91 % (ref 94–98)
POC O2 SATURATION: 91 % (ref 94–98)
POC O2 SATURATION: 92 % (ref 94–98)
POC O2 SATURATION: 92 % (ref 94–98)
POC O2 SATURATION: 93 % (ref 94–98)
POC O2 SATURATION: 94 % (ref 94–98)
POC O2 SATURATION: 95 % (ref 94–98)
POC O2 SATURATION: 95 % (ref 94–98)
POC O2 SATURATION: 97 % (ref 94–98)
POC O2 SATURATION: 99 % (ref 94–98)
POC PCO2 TEMP: 32 MM HG
POC PCO2 TEMP: ABNORMAL MM HG
POC PCO2: 24.9 MM HG (ref 35–48)
POC PCO2: 30.3 MM HG (ref 35–48)
POC PCO2: 31 MM HG (ref 35–48)
POC PCO2: 35.3 MM HG (ref 35–48)
POC PCO2: 37.8 MM HG (ref 35–48)
POC PCO2: 39.9 MM HG (ref 35–48)
POC PCO2: 40.8 MM HG (ref 35–48)
POC PCO2: 43.4 MM HG (ref 35–48)
POC PCO2: 43.9 MM HG (ref 35–48)
POC PCO2: 44.4 MM HG (ref 35–48)
POC PCO2: 45.6 MM HG (ref 35–48)
POC PCO2: 46.4 MM HG (ref 35–48)
POC PCO2: 48.7 MM HG (ref 35–48)
POC PCO2: 49.4 MM HG (ref 35–48)
POC PCO2: 49.6 MM HG (ref 35–48)
POC PCO2: 53.1 MM HG (ref 35–48)
POC PCO2: 79.9 MM HG (ref 35–48)
POC PH TEMP: 7.44
POC PH TEMP: ABNORMAL
POC PH: 7.11 (ref 7.35–7.45)
POC PH: 7.39 (ref 7.35–7.45)
POC PH: 7.4 (ref 7.35–7.45)
POC PH: 7.4 (ref 7.35–7.45)
POC PH: 7.41 (ref 7.35–7.45)
POC PH: 7.42 (ref 7.35–7.45)
POC PH: 7.42 (ref 7.35–7.45)
POC PH: 7.43 (ref 7.35–7.45)
POC PH: 7.44 (ref 7.35–7.45)
POC PH: 7.45 (ref 7.35–7.45)
POC PH: 7.47 (ref 7.35–7.45)
POC PH: 7.5 (ref 7.35–7.45)
POC PH: 7.5 (ref 7.35–7.45)
POC PH: 7.51 (ref 7.35–7.45)
POC PH: 7.51 (ref 7.35–7.45)
POC PO2 TEMP: 129 MM HG
POC PO2 TEMP: ABNORMAL MM HG
POC PO2: 141.3 MM HG (ref 83–108)
POC PO2: 48.6 MM HG (ref 83–108)
POC PO2: 49.3 MM HG (ref 83–108)
POC PO2: 51.3 MM HG (ref 83–108)
POC PO2: 53 MM HG (ref 83–108)
POC PO2: 54.5 MM HG (ref 83–108)
POC PO2: 55.3 MM HG (ref 83–108)
POC PO2: 56.8 MM HG (ref 83–108)
POC PO2: 57.7 MM HG (ref 83–108)
POC PO2: 61.3 MM HG (ref 83–108)
POC PO2: 64 MM HG (ref 83–108)
POC PO2: 68.2 MM HG (ref 83–108)
POC PO2: 69.3 MM HG (ref 83–108)
POC PO2: 71.5 MM HG (ref 83–108)
POC PO2: 71.7 MM HG (ref 83–108)
POC PO2: 73.3 MM HG (ref 83–108)
POC PO2: 86.2 MM HG (ref 83–108)
POSITIVE BASE EXCESS, ART: 1 (ref 0–3)
POSITIVE BASE EXCESS, ART: 10 (ref 0–3)
POSITIVE BASE EXCESS, ART: 11 (ref 0–3)
POSITIVE BASE EXCESS, ART: 2 (ref 0–3)
POSITIVE BASE EXCESS, ART: 4 (ref 0–3)
POSITIVE BASE EXCESS, ART: 7 (ref 0–3)
POSITIVE BASE EXCESS, ART: 8 (ref 0–3)
POSITIVE BASE EXCESS, ART: ABNORMAL (ref 0–3)
POSITIVE BASE EXCESS, VEN: ABNORMAL MMOL/L (ref 0–2)
POTASSIUM SERPL-SCNC: 3.3 MMOL/L (ref 3.7–5.3)
POTASSIUM SERPL-SCNC: 3.3 MMOL/L (ref 3.7–5.3)
POTASSIUM SERPL-SCNC: 3.5 MMOL/L (ref 3.7–5.3)
POTASSIUM SERPL-SCNC: 3.7 MMOL/L (ref 3.7–5.3)
POTASSIUM SERPL-SCNC: 3.7 MMOL/L (ref 3.7–5.3)
POTASSIUM SERPL-SCNC: 3.8 MMOL/L (ref 3.7–5.3)
POTASSIUM SERPL-SCNC: 3.9 MMOL/L (ref 3.7–5.3)
POTASSIUM SERPL-SCNC: 4 MMOL/L (ref 3.7–5.3)
POTASSIUM SERPL-SCNC: 4.1 MMOL/L (ref 3.7–5.3)
POTASSIUM SERPL-SCNC: 4.2 MMOL/L (ref 3.7–5.3)
POTASSIUM SERPL-SCNC: 4.4 MMOL/L (ref 3.7–5.3)
POTASSIUM SERPL-SCNC: 4.5 MMOL/L (ref 3.7–5.3)
POTASSIUM SERPL-SCNC: 4.6 MMOL/L (ref 3.7–5.3)
POTASSIUM SERPL-SCNC: 4.7 MMOL/L (ref 3.7–5.3)
POTASSIUM SERPL-SCNC: 4.8 MMOL/L (ref 3.7–5.3)
POTASSIUM SERPL-SCNC: 4.8 MMOL/L (ref 3.7–5.3)
POTASSIUM SERPL-SCNC: 4.9 MMOL/L (ref 3.7–5.3)
POTASSIUM SERPL-SCNC: 4.9 MMOL/L (ref 3.7–5.3)
POTASSIUM SERPL-SCNC: 5 MMOL/L (ref 3.7–5.3)
POTASSIUM SERPL-SCNC: 5.1 MMOL/L (ref 3.7–5.3)
POTASSIUM SERPL-SCNC: 5.1 MMOL/L (ref 3.7–5.3)
POTASSIUM SERPL-SCNC: 5.3 MMOL/L (ref 3.7–5.3)
PROTEIN UA: ABNORMAL
PROTHROMBIN TIME: 11.6 SEC (ref 9.1–12.3)
PSV: ABNORMAL
PT. POSITION: ABNORMAL
RBC # BLD: 1.89 M/UL (ref 4.21–5.77)
RBC # BLD: 2.13 M/UL (ref 4.21–5.77)
RBC # BLD: 2.18 M/UL (ref 4.21–5.77)
RBC # BLD: 2.22 M/UL (ref 4.21–5.77)
RBC # BLD: 2.22 M/UL (ref 4.21–5.77)
RBC # BLD: 2.37 M/UL (ref 4.21–5.77)
RBC # BLD: 2.37 M/UL (ref 4.21–5.77)
RBC # BLD: 2.52 M/UL (ref 4.21–5.77)
RBC # BLD: 2.62 M/UL (ref 4.21–5.77)
RBC # BLD: 2.62 M/UL (ref 4.21–5.77)
RBC # BLD: 2.71 M/UL (ref 4.21–5.77)
RBC # BLD: 2.79 M/UL (ref 4.21–5.77)
RBC # BLD: 2.81 M/UL (ref 4.21–5.77)
RBC # BLD: 2.83 M/UL (ref 4.21–5.77)
RBC # BLD: 2.84 M/UL (ref 4.21–5.77)
RBC # BLD: 2.86 M/UL (ref 4.21–5.77)
RBC # BLD: 2.89 M/UL (ref 4.21–5.77)
RBC # BLD: 2.93 M/UL (ref 4.21–5.77)
RBC # BLD: 3.19 M/UL (ref 4.21–5.77)
RBC # BLD: 3.2 M/UL (ref 4.21–5.77)
RBC # BLD: 3.41 M/UL (ref 4.21–5.77)
RBC # BLD: 3.53 M/UL (ref 4.21–5.77)
RBC # BLD: 3.58 M/UL (ref 4.21–5.77)
RBC # BLD: 4.2 M/UL (ref 4.21–5.77)
RBC # BLD: 4.25 M/UL (ref 4.21–5.77)
RBC # BLD: 4.31 M/UL (ref 4.21–5.77)
RBC # BLD: 4.66 M/UL (ref 4.5–5.9)
RBC # BLD: ABNORMAL 10*6/UL
RBC UA: ABNORMAL /HPF (ref 0–2)
READ BACK: YES
REASON FOR REJECTION: NORMAL
REASON FOR REJECTION: NORMAL
RENAL EPITHELIAL, UA: ABNORMAL /HPF
RESPIRATORY RATE: ABNORMAL
SAMPLE SITE: ABNORMAL
SARS-COV-2, RAPID: DETECTED
SEG NEUTROPHILS: 77 % (ref 36–66)
SEG NEUTROPHILS: 79 % (ref 36–65)
SEG NEUTROPHILS: 80 % (ref 36–66)
SEG NEUTROPHILS: 81 % (ref 36–65)
SEG NEUTROPHILS: 81 % (ref 36–66)
SEG NEUTROPHILS: 83 % (ref 36–65)
SEG NEUTROPHILS: 89 % (ref 36–65)
SEG NEUTROPHILS: 89 % (ref 36–66)
SEG NEUTROPHILS: 90 % (ref 36–65)
SEG NEUTROPHILS: 92 % (ref 36–65)
SEG NEUTROPHILS: 92 % (ref 36–66)
SEG NEUTROPHILS: 92 % (ref 36–66)
SEG NEUTROPHILS: 93 % (ref 36–65)
SEG NEUTROPHILS: 93 % (ref 36–66)
SEG NEUTROPHILS: 94 % (ref 36–65)
SEG NEUTROPHILS: 95 % (ref 36–66)
SEG NEUTROPHILS: 96 % (ref 36–66)
SEG NEUTROPHILS: 99 % (ref 36–66)
SEGMENTED NEUTROPHILS ABSOLUTE COUNT: 10.15 K/UL (ref 1.8–7.7)
SEGMENTED NEUTROPHILS ABSOLUTE COUNT: 10.49 K/UL (ref 1.8–7.7)
SEGMENTED NEUTROPHILS ABSOLUTE COUNT: 11.39 K/UL (ref 1.5–8.1)
SEGMENTED NEUTROPHILS ABSOLUTE COUNT: 11.49 K/UL (ref 1.5–8.1)
SEGMENTED NEUTROPHILS ABSOLUTE COUNT: 11.66 K/UL (ref 1.5–8.1)
SEGMENTED NEUTROPHILS ABSOLUTE COUNT: 13.34 K/UL (ref 1.8–7.7)
SEGMENTED NEUTROPHILS ABSOLUTE COUNT: 14.14 K/UL (ref 1.5–8.1)
SEGMENTED NEUTROPHILS ABSOLUTE COUNT: 14.75 K/UL (ref 1.8–7.7)
SEGMENTED NEUTROPHILS ABSOLUTE COUNT: 15.91 K/UL (ref 1.8–7.7)
SEGMENTED NEUTROPHILS ABSOLUTE COUNT: 16.35 K/UL (ref 1.8–7.7)
SEGMENTED NEUTROPHILS ABSOLUTE COUNT: 16.64 K/UL (ref 1.8–7.7)
SEGMENTED NEUTROPHILS ABSOLUTE COUNT: 17.67 K/UL (ref 1.8–7.7)
SEGMENTED NEUTROPHILS ABSOLUTE COUNT: 17.76 K/UL (ref 1.8–7.7)
SEGMENTED NEUTROPHILS ABSOLUTE COUNT: 20.18 K/UL (ref 1.8–7.7)
SEGMENTED NEUTROPHILS ABSOLUTE COUNT: 29.34 K/UL (ref 1.5–8.1)
SEGMENTED NEUTROPHILS ABSOLUTE COUNT: 32.76 K/UL (ref 1.8–7.7)
SEGMENTED NEUTROPHILS ABSOLUTE COUNT: 6.95 K/UL (ref 1.5–8.1)
SEGMENTED NEUTROPHILS ABSOLUTE COUNT: 7.77 K/UL (ref 1.8–7.7)
SEGMENTED NEUTROPHILS ABSOLUTE COUNT: 8.35 K/UL (ref 1.5–8.1)
SEGMENTED NEUTROPHILS ABSOLUTE COUNT: 8.63 K/UL (ref 1.5–8.1)
SEGMENTED NEUTROPHILS ABSOLUTE COUNT: 8.94 K/UL (ref 1.8–7.7)
SEGMENTED NEUTROPHILS ABSOLUTE COUNT: 8.95 K/UL (ref 1.8–7.7)
SET RATE: ABNORMAL
SODIUM BLD-SCNC: 130 MMOL/L (ref 135–144)
SODIUM BLD-SCNC: 134 MMOL/L (ref 135–144)
SODIUM BLD-SCNC: 134 MMOL/L (ref 135–144)
SODIUM BLD-SCNC: 135 MMOL/L (ref 135–144)
SODIUM BLD-SCNC: 136 MMOL/L (ref 135–144)
SODIUM BLD-SCNC: 137 MMOL/L (ref 135–144)
SODIUM BLD-SCNC: 138 MMOL/L (ref 135–144)
SODIUM BLD-SCNC: 140 MMOL/L (ref 135–144)
SODIUM BLD-SCNC: 140 MMOL/L (ref 135–144)
SODIUM BLD-SCNC: 141 MMOL/L (ref 135–144)
SODIUM BLD-SCNC: 142 MMOL/L (ref 135–144)
SODIUM BLD-SCNC: 142 MMOL/L (ref 135–144)
SODIUM BLD-SCNC: 143 MMOL/L (ref 135–144)
SODIUM BLD-SCNC: 144 MMOL/L (ref 135–144)
SODIUM BLD-SCNC: 144 MMOL/L (ref 135–144)
SODIUM BLD-SCNC: 145 MMOL/L (ref 135–144)
SODIUM BLD-SCNC: 146 MMOL/L (ref 135–144)
SODIUM BLD-SCNC: 147 MMOL/L (ref 135–144)
SODIUM BLD-SCNC: 148 MMOL/L (ref 135–144)
SODIUM BLD-SCNC: 149 MMOL/L (ref 135–144)
SODIUM BLD-SCNC: 150 MMOL/L (ref 135–144)
SODIUM,UR: 56 MMOL/L
SPECIFIC GRAVITY UA: 1.02 (ref 1–1.03)
SPECIMEN DESCRIPTION: ABNORMAL
SPECIMEN DESCRIPTION: NORMAL
TCO2 (CALC), ART: ABNORMAL MMOL/L (ref 22–29)
TEXT FOR RESPIRATORY: ABNORMAL
TIME, STOOL #1: ABNORMAL
TIME, STOOL #2: ABNORMAL
TIME, STOOL #3: ABNORMAL
TOTAL HB: ABNORMAL G/DL (ref 12–16)
TOTAL PROTEIN, URINE: 57 MG/DL
TOTAL PROTEIN: 6.1 G/DL (ref 6.4–8.3)
TOTAL PROTEIN: 7.3 G/DL (ref 6.4–8.3)
TOTAL RATE: ABNORMAL
TRANSFUSION STATUS: NORMAL
TRICHOMONAS: ABNORMAL
TRIGL SERPL-MCNC: 177 MG/DL
TROPONIN INTERP: ABNORMAL
TROPONIN T: ABNORMAL NG/ML
TROPONIN, HIGH SENSITIVITY: 131 NG/L (ref 0–22)
TROPONIN, HIGH SENSITIVITY: 138 NG/L (ref 0–22)
TROPONIN, HIGH SENSITIVITY: 148 NG/L (ref 0–22)
TROPONIN, HIGH SENSITIVITY: 189 NG/L (ref 0–22)
TROPONIN, HIGH SENSITIVITY: 194 NG/L (ref 0–22)
TROPONIN, HIGH SENSITIVITY: 64 NG/L (ref 0–22)
TURBIDITY: CLEAR
UNIT DIVISION: 0
UNIT NUMBER: NORMAL
URINE HGB: ABNORMAL
UROBILINOGEN, URINE: NORMAL
VT: ABNORMAL
WBC # BLD: 10.3 K/UL (ref 3.5–11.3)
WBC # BLD: 10.4 K/UL (ref 3.5–11.3)
WBC # BLD: 11.2 K/UL (ref 3.5–11.3)
WBC # BLD: 11.4 K/UL (ref 3.5–11.3)
WBC # BLD: 11.4 K/UL (ref 3.5–11.3)
WBC # BLD: 11.6 K/UL (ref 3.5–11.3)
WBC # BLD: 12.4 K/UL (ref 3.5–11.3)
WBC # BLD: 12.8 K/UL (ref 3.5–11.3)
WBC # BLD: 12.8 K/UL (ref 3.5–11.3)
WBC # BLD: 14.5 K/UL (ref 3.5–11.3)
WBC # BLD: 14.9 K/UL (ref 3.5–11.3)
WBC # BLD: 15.2 K/UL (ref 3.5–11.3)
WBC # BLD: 17.1 K/UL (ref 3.5–11.3)
WBC # BLD: 17.2 K/UL (ref 3.5–11.3)
WBC # BLD: 17.4 K/UL (ref 3.5–11.3)
WBC # BLD: 17.9 K/UL (ref 3.5–11)
WBC # BLD: 18.5 K/UL (ref 3.5–11.3)
WBC # BLD: 18.6 K/UL (ref 3.5–11.3)
WBC # BLD: 19.8 K/UL (ref 3.5–11.3)
WBC # BLD: 20.6 K/UL (ref 3.5–11.3)
WBC # BLD: 21.7 K/UL (ref 3.5–11.3)
WBC # BLD: 27.8 K/UL (ref 3.5–11.3)
WBC # BLD: 30.5 K/UL (ref 3.5–11.3)
WBC # BLD: 31.9 K/UL (ref 3.5–11.3)
WBC # BLD: 34.5 K/UL (ref 3.5–11.3)
WBC # BLD: 8.8 K/UL (ref 3.5–11.3)
WBC # BLD: 9.6 K/UL (ref 3.5–11.3)
WBC # BLD: ABNORMAL 10*3/UL
WBC UA: ABNORMAL /HPF (ref 0–5)
YEAST: ABNORMAL
ZZ NTE CLEAN UP: ORDERED TEST: NORMAL
ZZ NTE CLEAN UP: ORDERED TEST: NORMAL
ZZ NTE WITH NAME CLEAN UP: SPECIMEN SOURCE: NORMAL
ZZ NTE WITH NAME CLEAN UP: SPECIMEN SOURCE: NORMAL

## 2022-01-01 PROCEDURE — 6360000002 HC RX W HCPCS: Performed by: INTERNAL MEDICINE

## 2022-01-01 PROCEDURE — 6360000002 HC RX W HCPCS: Performed by: EMERGENCY MEDICINE

## 2022-01-01 PROCEDURE — 2060000000 HC ICU INTERMEDIATE R&B

## 2022-01-01 PROCEDURE — 97110 THERAPEUTIC EXERCISES: CPT

## 2022-01-01 PROCEDURE — 6370000000 HC RX 637 (ALT 250 FOR IP): Performed by: INTERNAL MEDICINE

## 2022-01-01 PROCEDURE — 36600 WITHDRAWAL OF ARTERIAL BLOOD: CPT

## 2022-01-01 PROCEDURE — 82947 ASSAY GLUCOSE BLOOD QUANT: CPT

## 2022-01-01 PROCEDURE — 6360000002 HC RX W HCPCS: Performed by: HOSPITALIST

## 2022-01-01 PROCEDURE — 94761 N-INVAS EAR/PLS OXIMETRY MLT: CPT

## 2022-01-01 PROCEDURE — 99291 CRITICAL CARE FIRST HOUR: CPT | Performed by: INTERNAL MEDICINE

## 2022-01-01 PROCEDURE — 99222 1ST HOSP IP/OBS MODERATE 55: CPT | Performed by: INTERNAL MEDICINE

## 2022-01-01 PROCEDURE — 99233 SBSQ HOSP IP/OBS HIGH 50: CPT | Performed by: INTERNAL MEDICINE

## 2022-01-01 PROCEDURE — 36620 INSERTION CATHETER ARTERY: CPT | Performed by: NURSE ANESTHETIST, CERTIFIED REGISTERED

## 2022-01-01 PROCEDURE — 85027 COMPLETE CBC AUTOMATED: CPT

## 2022-01-01 PROCEDURE — 86920 COMPATIBILITY TEST SPIN: CPT

## 2022-01-01 PROCEDURE — 87070 CULTURE OTHR SPECIMN AEROBIC: CPT

## 2022-01-01 PROCEDURE — 82803 BLOOD GASES ANY COMBINATION: CPT

## 2022-01-01 PROCEDURE — 80076 HEPATIC FUNCTION PANEL: CPT

## 2022-01-01 PROCEDURE — 6360000002 HC RX W HCPCS: Performed by: NURSE PRACTITIONER

## 2022-01-01 PROCEDURE — 71260 CT THORAX DX C+: CPT

## 2022-01-01 PROCEDURE — 87205 SMEAR GRAM STAIN: CPT

## 2022-01-01 PROCEDURE — 2500000003 HC RX 250 WO HCPCS: Performed by: STUDENT IN AN ORGANIZED HEALTH CARE EDUCATION/TRAINING PROGRAM

## 2022-01-01 PROCEDURE — 6370000000 HC RX 637 (ALT 250 FOR IP): Performed by: NURSE PRACTITIONER

## 2022-01-01 PROCEDURE — 99232 SBSQ HOSP IP/OBS MODERATE 35: CPT | Performed by: INTERNAL MEDICINE

## 2022-01-01 PROCEDURE — 2000000000 HC ICU R&B

## 2022-01-01 PROCEDURE — 6370000000 HC RX 637 (ALT 250 FOR IP): Performed by: FAMILY MEDICINE

## 2022-01-01 PROCEDURE — 76937 US GUIDE VASCULAR ACCESS: CPT

## 2022-01-01 PROCEDURE — 6370000000 HC RX 637 (ALT 250 FOR IP): Performed by: STUDENT IN AN ORGANIZED HEALTH CARE EDUCATION/TRAINING PROGRAM

## 2022-01-01 PROCEDURE — 74176 CT ABD & PELVIS W/O CONTRAST: CPT

## 2022-01-01 PROCEDURE — 82805 BLOOD GASES W/O2 SATURATION: CPT

## 2022-01-01 PROCEDURE — 6360000002 HC RX W HCPCS: Performed by: FAMILY MEDICINE

## 2022-01-01 PROCEDURE — 93306 TTE W/DOPPLER COMPLETE: CPT

## 2022-01-01 PROCEDURE — 80048 BASIC METABOLIC PNL TOTAL CA: CPT

## 2022-01-01 PROCEDURE — 86038 ANTINUCLEAR ANTIBODIES: CPT

## 2022-01-01 PROCEDURE — 2580000003 HC RX 258: Performed by: INTERNAL MEDICINE

## 2022-01-01 PROCEDURE — 97530 THERAPEUTIC ACTIVITIES: CPT

## 2022-01-01 PROCEDURE — 6360000002 HC RX W HCPCS: Performed by: STUDENT IN AN ORGANIZED HEALTH CARE EDUCATION/TRAINING PROGRAM

## 2022-01-01 PROCEDURE — A4216 STERILE WATER/SALINE, 10 ML: HCPCS | Performed by: FAMILY MEDICINE

## 2022-01-01 PROCEDURE — 99211 OFF/OP EST MAY X REQ PHY/QHP: CPT

## 2022-01-01 PROCEDURE — C9113 INJ PANTOPRAZOLE SODIUM, VIA: HCPCS | Performed by: FAMILY MEDICINE

## 2022-01-01 PROCEDURE — 99232 SBSQ HOSP IP/OBS MODERATE 35: CPT | Performed by: FAMILY MEDICINE

## 2022-01-01 PROCEDURE — 86901 BLOOD TYPING SEROLOGIC RH(D): CPT

## 2022-01-01 PROCEDURE — 85730 THROMBOPLASTIN TIME PARTIAL: CPT

## 2022-01-01 PROCEDURE — 96374 THER/PROPH/DIAG INJ IV PUSH: CPT

## 2022-01-01 PROCEDURE — 2580000003 HC RX 258: Performed by: STUDENT IN AN ORGANIZED HEALTH CARE EDUCATION/TRAINING PROGRAM

## 2022-01-01 PROCEDURE — 97161 PT EVAL LOW COMPLEX 20 MIN: CPT

## 2022-01-01 PROCEDURE — 2580000003 HC RX 258: Performed by: NURSE PRACTITIONER

## 2022-01-01 PROCEDURE — 37799 UNLISTED PX VASCULAR SURGERY: CPT

## 2022-01-01 PROCEDURE — 84484 ASSAY OF TROPONIN QUANT: CPT

## 2022-01-01 PROCEDURE — 87635 SARS-COV-2 COVID-19 AMP PRB: CPT

## 2022-01-01 PROCEDURE — 36415 COLL VENOUS BLD VENIPUNCTURE: CPT

## 2022-01-01 PROCEDURE — 86140 C-REACTIVE PROTEIN: CPT

## 2022-01-01 PROCEDURE — 93971 EXTREMITY STUDY: CPT

## 2022-01-01 PROCEDURE — 85025 COMPLETE CBC W/AUTO DIFF WBC: CPT

## 2022-01-01 PROCEDURE — 2700000000 HC OXYGEN THERAPY PER DAY

## 2022-01-01 PROCEDURE — 2580000003 HC RX 258: Performed by: FAMILY MEDICINE

## 2022-01-01 PROCEDURE — 99233 SBSQ HOSP IP/OBS HIGH 50: CPT | Performed by: STUDENT IN AN ORGANIZED HEALTH CARE EDUCATION/TRAINING PROGRAM

## 2022-01-01 PROCEDURE — 71045 X-RAY EXAM CHEST 1 VIEW: CPT

## 2022-01-01 PROCEDURE — 82330 ASSAY OF CALCIUM: CPT

## 2022-01-01 PROCEDURE — 05HY33Z INSERTION OF INFUSION DEVICE INTO UPPER VEIN, PERCUTANEOUS APPROACH: ICD-10-PCS | Performed by: FAMILY MEDICINE

## 2022-01-01 PROCEDURE — 94003 VENT MGMT INPAT SUBQ DAY: CPT

## 2022-01-01 PROCEDURE — 93005 ELECTROCARDIOGRAM TRACING: CPT | Performed by: STUDENT IN AN ORGANIZED HEALTH CARE EDUCATION/TRAINING PROGRAM

## 2022-01-01 PROCEDURE — 2500000003 HC RX 250 WO HCPCS: Performed by: INTERNAL MEDICINE

## 2022-01-01 PROCEDURE — 83690 ASSAY OF LIPASE: CPT

## 2022-01-01 PROCEDURE — 83735 ASSAY OF MAGNESIUM: CPT

## 2022-01-01 PROCEDURE — 83605 ASSAY OF LACTIC ACID: CPT

## 2022-01-01 PROCEDURE — 86900 BLOOD TYPING SEROLOGIC ABO: CPT

## 2022-01-01 PROCEDURE — 82270 OCCULT BLOOD FECES: CPT

## 2022-01-01 PROCEDURE — 85018 HEMOGLOBIN: CPT

## 2022-01-01 PROCEDURE — 85014 HEMATOCRIT: CPT

## 2022-01-01 PROCEDURE — G0378 HOSPITAL OBSERVATION PER HR: HCPCS

## 2022-01-01 PROCEDURE — 81001 URINALYSIS AUTO W/SCOPE: CPT

## 2022-01-01 PROCEDURE — 99233 SBSQ HOSP IP/OBS HIGH 50: CPT | Performed by: FAMILY MEDICINE

## 2022-01-01 PROCEDURE — 94660 CPAP INITIATION&MGMT: CPT

## 2022-01-01 PROCEDURE — 86850 RBC ANTIBODY SCREEN: CPT

## 2022-01-01 PROCEDURE — 76770 US EXAM ABDO BACK WALL COMP: CPT

## 2022-01-01 PROCEDURE — 2580000003 HC RX 258: Performed by: EMERGENCY MEDICINE

## 2022-01-01 PROCEDURE — 31500 INSERT EMERGENCY AIRWAY: CPT | Performed by: NURSE ANESTHETIST, CERTIFIED REGISTERED

## 2022-01-01 PROCEDURE — 84156 ASSAY OF PROTEIN URINE: CPT

## 2022-01-01 PROCEDURE — 5A1955Z RESPIRATORY VENTILATION, GREATER THAN 96 CONSECUTIVE HOURS: ICD-10-PCS | Performed by: INTERNAL MEDICINE

## 2022-01-01 PROCEDURE — 99222 1ST HOSP IP/OBS MODERATE 55: CPT | Performed by: SURGERY

## 2022-01-01 PROCEDURE — 87040 BLOOD CULTURE FOR BACTERIA: CPT

## 2022-01-01 PROCEDURE — 93010 ELECTROCARDIOGRAM REPORT: CPT | Performed by: INTERNAL MEDICINE

## 2022-01-01 PROCEDURE — 6370000000 HC RX 637 (ALT 250 FOR IP): Performed by: SURGERY

## 2022-01-01 PROCEDURE — 80053 COMPREHEN METABOLIC PANEL: CPT

## 2022-01-01 PROCEDURE — 1200000000 HC SEMI PRIVATE

## 2022-01-01 PROCEDURE — 96375 TX/PRO/DX INJ NEW DRUG ADDON: CPT

## 2022-01-01 PROCEDURE — 2500000003 HC RX 250 WO HCPCS: Performed by: NURSE PRACTITIONER

## 2022-01-01 PROCEDURE — 99232 SBSQ HOSP IP/OBS MODERATE 35: CPT | Performed by: STUDENT IN AN ORGANIZED HEALTH CARE EDUCATION/TRAINING PROGRAM

## 2022-01-01 PROCEDURE — 71250 CT THORAX DX C-: CPT

## 2022-01-01 PROCEDURE — 93005 ELECTROCARDIOGRAM TRACING: CPT | Performed by: INTERNAL MEDICINE

## 2022-01-01 PROCEDURE — P9016 RBC LEUKOCYTES REDUCED: HCPCS

## 2022-01-01 PROCEDURE — 02HV33Z INSERTION OF INFUSION DEVICE INTO SUPERIOR VENA CAVA, PERCUTANEOUS APPROACH: ICD-10-PCS | Performed by: INTERNAL MEDICINE

## 2022-01-01 PROCEDURE — 99222 1ST HOSP IP/OBS MODERATE 55: CPT | Performed by: STUDENT IN AN ORGANIZED HEALTH CARE EDUCATION/TRAINING PROGRAM

## 2022-01-01 PROCEDURE — 87641 MR-STAPH DNA AMP PROBE: CPT

## 2022-01-01 PROCEDURE — 87086 URINE CULTURE/COLONY COUNT: CPT

## 2022-01-01 PROCEDURE — 99238 HOSP IP/OBS DSCHRG MGMT 30/<: CPT | Performed by: NURSE PRACTITIONER

## 2022-01-01 PROCEDURE — 94002 VENT MGMT INPAT INIT DAY: CPT

## 2022-01-01 PROCEDURE — 36430 TRANSFUSION BLD/BLD COMPNT: CPT

## 2022-01-01 PROCEDURE — 86160 COMPLEMENT ANTIGEN: CPT

## 2022-01-01 PROCEDURE — 6360000002 HC RX W HCPCS

## 2022-01-01 PROCEDURE — 2500000003 HC RX 250 WO HCPCS

## 2022-01-01 PROCEDURE — 84100 ASSAY OF PHOSPHORUS: CPT

## 2022-01-01 PROCEDURE — 89220 SPUTUM SPECIMEN COLLECTION: CPT

## 2022-01-01 PROCEDURE — 0BH18EZ INSERTION OF ENDOTRACHEAL AIRWAY INTO TRACHEA, VIA NATURAL OR ARTIFICIAL OPENING ENDOSCOPIC: ICD-10-PCS | Performed by: ANESTHESIOLOGY

## 2022-01-01 PROCEDURE — C1751 CATH, INF, PER/CENT/MIDLINE: HCPCS

## 2022-01-01 PROCEDURE — 99223 1ST HOSP IP/OBS HIGH 75: CPT | Performed by: INTERNAL MEDICINE

## 2022-01-01 PROCEDURE — 82570 ASSAY OF URINE CREATININE: CPT

## 2022-01-01 PROCEDURE — 85610 PROTHROMBIN TIME: CPT

## 2022-01-01 PROCEDURE — 86225 DNA ANTIBODY NATIVE: CPT

## 2022-01-01 PROCEDURE — 2580000003 HC RX 258: Performed by: HOSPITALIST

## 2022-01-01 PROCEDURE — 83615 LACTATE (LD) (LDH) ENZYME: CPT

## 2022-01-01 PROCEDURE — 74018 RADEX ABDOMEN 1 VIEW: CPT

## 2022-01-01 PROCEDURE — XW033H5 INTRODUCTION OF TOCILIZUMAB INTO PERIPHERAL VEIN, PERCUTANEOUS APPROACH, NEW TECHNOLOGY GROUP 5: ICD-10-PCS | Performed by: INTERNAL MEDICINE

## 2022-01-01 PROCEDURE — 36591 DRAW BLOOD OFF VENOUS DEVICE: CPT

## 2022-01-01 PROCEDURE — 2500000003 HC RX 250 WO HCPCS: Performed by: EMERGENCY MEDICINE

## 2022-01-01 PROCEDURE — 96361 HYDRATE IV INFUSION ADD-ON: CPT

## 2022-01-01 PROCEDURE — 84300 ASSAY OF URINE SODIUM: CPT

## 2022-01-01 PROCEDURE — 93005 ELECTROCARDIOGRAM TRACING: CPT | Performed by: EMERGENCY MEDICINE

## 2022-01-01 PROCEDURE — 99285 EMERGENCY DEPT VISIT HI MDM: CPT

## 2022-01-01 PROCEDURE — 93931 UPPER EXTREMITY STUDY: CPT

## 2022-01-01 PROCEDURE — 6360000004 HC RX CONTRAST MEDICATION: Performed by: EMERGENCY MEDICINE

## 2022-01-01 PROCEDURE — 84478 ASSAY OF TRIGLYCERIDES: CPT

## 2022-01-01 PROCEDURE — 82728 ASSAY OF FERRITIN: CPT

## 2022-01-01 PROCEDURE — 36592 COLLECT BLOOD FROM PICC: CPT

## 2022-01-01 RX ORDER — SODIUM CHLORIDE 9 MG/ML
INJECTION, SOLUTION INTRAVENOUS PRN
Status: DISCONTINUED | OUTPATIENT
Start: 2022-01-01 | End: 2022-01-01 | Stop reason: HOSPADM

## 2022-01-01 RX ORDER — MORPHINE SULFATE 2 MG/ML
2 INJECTION, SOLUTION INTRAMUSCULAR; INTRAVENOUS ONCE
Status: COMPLETED | OUTPATIENT
Start: 2022-01-01 | End: 2022-01-01

## 2022-01-01 RX ORDER — FUROSEMIDE 10 MG/ML
40 INJECTION INTRAMUSCULAR; INTRAVENOUS 2 TIMES DAILY
Status: DISCONTINUED | OUTPATIENT
Start: 2022-01-01 | End: 2022-01-01

## 2022-01-01 RX ORDER — METOCLOPRAMIDE HYDROCHLORIDE 5 MG/ML
10 INJECTION INTRAMUSCULAR; INTRAVENOUS EVERY 6 HOURS PRN
Status: DISCONTINUED | OUTPATIENT
Start: 2022-01-01 | End: 2022-01-01 | Stop reason: HOSPADM

## 2022-01-01 RX ORDER — POTASSIUM CHLORIDE 20 MEQ/1
40 TABLET, EXTENDED RELEASE ORAL PRN
Status: DISCONTINUED | OUTPATIENT
Start: 2022-01-01 | End: 2022-01-01

## 2022-01-01 RX ORDER — HEPARIN SODIUM 1000 [USP'U]/ML
4000 INJECTION, SOLUTION INTRAVENOUS; SUBCUTANEOUS PRN
Status: DISCONTINUED | OUTPATIENT
Start: 2022-01-01 | End: 2022-01-01

## 2022-01-01 RX ORDER — HEPARIN SODIUM 1000 [USP'U]/ML
4000 INJECTION, SOLUTION INTRAVENOUS; SUBCUTANEOUS ONCE
Status: COMPLETED | OUTPATIENT
Start: 2022-01-01 | End: 2022-01-01

## 2022-01-01 RX ORDER — FUROSEMIDE 10 MG/ML
80 INJECTION INTRAMUSCULAR; INTRAVENOUS ONCE
Status: COMPLETED | OUTPATIENT
Start: 2022-01-01 | End: 2022-01-01

## 2022-01-01 RX ORDER — ASPIRIN 325 MG
81 TABLET ORAL DAILY
COMMUNITY
Start: 2021-01-01

## 2022-01-01 RX ORDER — DEXTROSE MONOHYDRATE 50 MG/ML
INJECTION, SOLUTION INTRAVENOUS CONTINUOUS
Status: DISCONTINUED | OUTPATIENT
Start: 2022-01-01 | End: 2022-01-01

## 2022-01-01 RX ORDER — CLOPIDOGREL BISULFATE 75 MG/1
75 TABLET ORAL DAILY
Status: DISCONTINUED | OUTPATIENT
Start: 2022-01-01 | End: 2022-01-01

## 2022-01-01 RX ORDER — DEXMEDETOMIDINE HYDROCHLORIDE 4 UG/ML
.2-1.4 INJECTION, SOLUTION INTRAVENOUS CONTINUOUS
Status: DISCONTINUED | OUTPATIENT
Start: 2022-01-01 | End: 2022-01-01

## 2022-01-01 RX ORDER — SODIUM CHLORIDE 9 MG/ML
INJECTION, SOLUTION INTRAVENOUS PRN
Status: DISCONTINUED | OUTPATIENT
Start: 2022-01-01 | End: 2022-01-01

## 2022-01-01 RX ORDER — SENNA PLUS 8.6 MG/1
2 TABLET ORAL NIGHTLY
Status: DISCONTINUED | OUTPATIENT
Start: 2022-01-01 | End: 2022-01-01 | Stop reason: HOSPADM

## 2022-01-01 RX ORDER — LABETALOL HYDROCHLORIDE 5 MG/ML
5 INJECTION, SOLUTION INTRAVENOUS ONCE
Status: DISCONTINUED | OUTPATIENT
Start: 2022-01-01 | End: 2022-01-01

## 2022-01-01 RX ORDER — FAMOTIDINE 20 MG/1
20 TABLET, FILM COATED ORAL DAILY
Status: DISCONTINUED | OUTPATIENT
Start: 2022-01-01 | End: 2022-01-01

## 2022-01-01 RX ORDER — SODIUM CHLORIDE 9 MG/ML
25 INJECTION, SOLUTION INTRAVENOUS PRN
Status: DISCONTINUED | OUTPATIENT
Start: 2022-01-01 | End: 2022-01-01

## 2022-01-01 RX ORDER — SODIUM CHLORIDE 0.9 % (FLUSH) 0.9 %
5-40 SYRINGE (ML) INJECTION EVERY 12 HOURS SCHEDULED
Status: DISCONTINUED | OUTPATIENT
Start: 2022-01-01 | End: 2022-01-01

## 2022-01-01 RX ORDER — MORPHINE SULFATE 2 MG/ML
INJECTION, SOLUTION INTRAMUSCULAR; INTRAVENOUS
Status: COMPLETED
Start: 2022-01-01 | End: 2022-01-01

## 2022-01-01 RX ORDER — DEXTROSE MONOHYDRATE 50 MG/ML
100 INJECTION, SOLUTION INTRAVENOUS PRN
Status: DISCONTINUED | OUTPATIENT
Start: 2022-01-01 | End: 2022-01-01 | Stop reason: HOSPADM

## 2022-01-01 RX ORDER — SODIUM CHLORIDE 9 MG/ML
10 INJECTION INTRAVENOUS DAILY
Status: DISCONTINUED | OUTPATIENT
Start: 2022-01-01 | End: 2022-01-01

## 2022-01-01 RX ORDER — METOPROLOL TARTRATE 50 MG/1
50 TABLET, FILM COATED ORAL DAILY
COMMUNITY
Start: 2021-01-01

## 2022-01-01 RX ORDER — CLONIDINE HYDROCHLORIDE 0.1 MG/1
0.1 TABLET ORAL ONCE
Status: COMPLETED | OUTPATIENT
Start: 2022-01-01 | End: 2022-01-01

## 2022-01-01 RX ORDER — FAMOTIDINE 20 MG/1
20 TABLET, FILM COATED ORAL DAILY
Status: DISCONTINUED | OUTPATIENT
Start: 2022-01-01 | End: 2022-01-01 | Stop reason: HOSPADM

## 2022-01-01 RX ORDER — HYDRALAZINE HYDROCHLORIDE 20 MG/ML
10 INJECTION INTRAMUSCULAR; INTRAVENOUS EVERY 6 HOURS PRN
Status: DISCONTINUED | OUTPATIENT
Start: 2022-01-01 | End: 2022-01-01 | Stop reason: HOSPADM

## 2022-01-01 RX ORDER — LISINOPRIL 30 MG/1
30 TABLET ORAL DAILY
COMMUNITY
Start: 2021-01-01

## 2022-01-01 RX ORDER — POLYETHYLENE GLYCOL 3350 17 G/17G
17 POWDER, FOR SOLUTION ORAL DAILY PRN
Status: DISCONTINUED | OUTPATIENT
Start: 2022-01-01 | End: 2022-01-01 | Stop reason: HOSPADM

## 2022-01-01 RX ORDER — CILOSTAZOL 100 MG/1
100 TABLET ORAL 2 TIMES DAILY
Status: DISCONTINUED | OUTPATIENT
Start: 2022-01-01 | End: 2022-01-01

## 2022-01-01 RX ORDER — DEXTROSE MONOHYDRATE 25 G/50ML
12.5 INJECTION, SOLUTION INTRAVENOUS PRN
Status: DISCONTINUED | OUTPATIENT
Start: 2022-01-01 | End: 2022-01-01 | Stop reason: HOSPADM

## 2022-01-01 RX ORDER — SODIUM BICARBONATE 650 MG/1
1300 TABLET ORAL 2 TIMES DAILY
Status: DISCONTINUED | OUTPATIENT
Start: 2022-01-01 | End: 2022-01-01

## 2022-01-01 RX ORDER — AMLODIPINE BESYLATE 10 MG/1
10 TABLET ORAL DAILY
Status: DISCONTINUED | OUTPATIENT
Start: 2022-01-01 | End: 2022-01-01 | Stop reason: HOSPADM

## 2022-01-01 RX ORDER — SODIUM CHLORIDE 450 MG/100ML
INJECTION, SOLUTION INTRAVENOUS CONTINUOUS
Status: DISCONTINUED | OUTPATIENT
Start: 2022-01-01 | End: 2022-01-01

## 2022-01-01 RX ORDER — HYDRALAZINE HYDROCHLORIDE 20 MG/ML
10 INJECTION INTRAMUSCULAR; INTRAVENOUS ONCE
Status: COMPLETED | OUTPATIENT
Start: 2022-01-01 | End: 2022-01-01

## 2022-01-01 RX ORDER — LORAZEPAM 2 MG/ML
0.5 INJECTION INTRAMUSCULAR ONCE
Status: COMPLETED | OUTPATIENT
Start: 2022-01-01 | End: 2022-01-01

## 2022-01-01 RX ORDER — ACETAMINOPHEN 325 MG/1
650 TABLET ORAL EVERY 6 HOURS PRN
Status: DISCONTINUED | OUTPATIENT
Start: 2022-01-01 | End: 2022-01-01 | Stop reason: HOSPADM

## 2022-01-01 RX ORDER — METOPROLOL TARTRATE 5 MG/5ML
5 INJECTION INTRAVENOUS ONCE
Status: COMPLETED | OUTPATIENT
Start: 2022-01-01 | End: 2022-01-01

## 2022-01-01 RX ORDER — ACETAMINOPHEN 650 MG/1
650 SUPPOSITORY RECTAL EVERY 6 HOURS PRN
Status: DISCONTINUED | OUTPATIENT
Start: 2022-01-01 | End: 2022-01-01 | Stop reason: HOSPADM

## 2022-01-01 RX ORDER — METOPROLOL TARTRATE 50 MG/1
50 TABLET, FILM COATED ORAL 2 TIMES DAILY
Status: DISCONTINUED | OUTPATIENT
Start: 2022-01-01 | End: 2022-01-01

## 2022-01-01 RX ORDER — AMIODARONE HYDROCHLORIDE 200 MG/1
200 TABLET ORAL 2 TIMES DAILY
Status: COMPLETED | OUTPATIENT
Start: 2022-01-01 | End: 2022-01-01

## 2022-01-01 RX ORDER — DEXAMETHASONE SODIUM PHOSPHATE 4 MG/ML
4 INJECTION, SOLUTION INTRA-ARTICULAR; INTRALESIONAL; INTRAMUSCULAR; INTRAVENOUS; SOFT TISSUE ONCE
Status: DISCONTINUED | OUTPATIENT
Start: 2022-01-01 | End: 2022-01-01

## 2022-01-01 RX ORDER — ALPRAZOLAM 0.25 MG/1
0.25 TABLET ORAL ONCE
Status: COMPLETED | OUTPATIENT
Start: 2022-01-01 | End: 2022-01-01

## 2022-01-01 RX ORDER — 0.9 % SODIUM CHLORIDE 0.9 %
1000 INTRAVENOUS SOLUTION INTRAVENOUS ONCE
Status: COMPLETED | OUTPATIENT
Start: 2022-01-01 | End: 2022-01-01

## 2022-01-01 RX ORDER — AMIODARONE HYDROCHLORIDE 200 MG/1
200 TABLET ORAL DAILY
Status: DISCONTINUED | OUTPATIENT
Start: 2022-01-01 | End: 2022-01-01

## 2022-01-01 RX ORDER — 0.9 % SODIUM CHLORIDE 0.9 %
500 INTRAVENOUS SOLUTION INTRAVENOUS ONCE
Status: COMPLETED | OUTPATIENT
Start: 2022-01-01 | End: 2022-01-01

## 2022-01-01 RX ORDER — POTASSIUM CHLORIDE 20 MEQ/1
40 TABLET, EXTENDED RELEASE ORAL 2 TIMES DAILY WITH MEALS
Status: COMPLETED | OUTPATIENT
Start: 2022-01-01 | End: 2022-01-01

## 2022-01-01 RX ORDER — FENTANYL CITRATE 50 UG/ML
INJECTION, SOLUTION INTRAMUSCULAR; INTRAVENOUS
Status: COMPLETED
Start: 2022-01-01 | End: 2022-01-01

## 2022-01-01 RX ORDER — AMLODIPINE BESYLATE 10 MG/1
10 TABLET ORAL DAILY
Status: DISCONTINUED | OUTPATIENT
Start: 2022-01-01 | End: 2022-01-01

## 2022-01-01 RX ORDER — POTASSIUM CHLORIDE 7.45 MG/ML
10 INJECTION INTRAVENOUS PRN
Status: DISCONTINUED | OUTPATIENT
Start: 2022-01-01 | End: 2022-01-01

## 2022-01-01 RX ORDER — INSULIN GLARGINE 100 [IU]/ML
20 INJECTION, SOLUTION SUBCUTANEOUS EVERY MORNING
Status: DISCONTINUED | OUTPATIENT
Start: 2022-01-01 | End: 2022-01-01

## 2022-01-01 RX ORDER — ONDANSETRON 4 MG/1
4 TABLET, ORALLY DISINTEGRATING ORAL EVERY 8 HOURS PRN
Status: DISCONTINUED | OUTPATIENT
Start: 2022-01-01 | End: 2022-01-01 | Stop reason: HOSPADM

## 2022-01-01 RX ORDER — BUSPIRONE HYDROCHLORIDE 10 MG/1
10 TABLET ORAL 3 TIMES DAILY
Status: DISCONTINUED | OUTPATIENT
Start: 2022-01-01 | End: 2022-01-01 | Stop reason: HOSPADM

## 2022-01-01 RX ORDER — FUROSEMIDE 10 MG/ML
20 INJECTION INTRAMUSCULAR; INTRAVENOUS ONCE
Status: COMPLETED | OUTPATIENT
Start: 2022-01-01 | End: 2022-01-01

## 2022-01-01 RX ORDER — FENTANYL CITRATE 50 UG/ML
50 INJECTION, SOLUTION INTRAMUSCULAR; INTRAVENOUS
Status: DISCONTINUED | OUTPATIENT
Start: 2022-01-01 | End: 2022-01-01

## 2022-01-01 RX ORDER — ATORVASTATIN CALCIUM 10 MG/1
20 TABLET, FILM COATED ORAL NIGHTLY
Status: DISCONTINUED | OUTPATIENT
Start: 2022-01-01 | End: 2022-01-01 | Stop reason: HOSPADM

## 2022-01-01 RX ORDER — PANTOPRAZOLE SODIUM 40 MG/1
40 TABLET, DELAYED RELEASE ORAL
Status: DISCONTINUED | OUTPATIENT
Start: 2022-01-01 | End: 2022-01-01

## 2022-01-01 RX ORDER — METOPROLOL TARTRATE 5 MG/5ML
5 INJECTION INTRAVENOUS EVERY 6 HOURS PRN
Status: DISCONTINUED | OUTPATIENT
Start: 2022-01-01 | End: 2022-01-01 | Stop reason: HOSPADM

## 2022-01-01 RX ORDER — DEXAMETHASONE SODIUM PHOSPHATE 10 MG/ML
6 INJECTION INTRAMUSCULAR; INTRAVENOUS ONCE
Status: COMPLETED | OUTPATIENT
Start: 2022-01-01 | End: 2022-01-01

## 2022-01-01 RX ORDER — POTASSIUM CHLORIDE 29.8 MG/ML
20 INJECTION INTRAVENOUS ONCE
Status: COMPLETED | OUTPATIENT
Start: 2022-01-01 | End: 2022-01-01

## 2022-01-01 RX ORDER — LORAZEPAM 2 MG/ML
1 INJECTION INTRAMUSCULAR
Status: DISCONTINUED | OUTPATIENT
Start: 2022-01-01 | End: 2022-01-01 | Stop reason: HOSPADM

## 2022-01-01 RX ORDER — OXYCODONE HYDROCHLORIDE 5 MG/1
7.5 TABLET ORAL EVERY 6 HOURS
Status: DISCONTINUED | OUTPATIENT
Start: 2022-01-01 | End: 2022-01-01

## 2022-01-01 RX ORDER — MORPHINE SULFATE 2 MG/ML
2 INJECTION, SOLUTION INTRAMUSCULAR; INTRAVENOUS
Status: DISCONTINUED | OUTPATIENT
Start: 2022-01-01 | End: 2022-01-01 | Stop reason: HOSPADM

## 2022-01-01 RX ORDER — HYDROCORTISONE 10 MG/1
20 TABLET ORAL 2 TIMES DAILY
Status: DISCONTINUED | OUTPATIENT
Start: 2022-01-01 | End: 2022-01-01

## 2022-01-01 RX ORDER — CILOSTAZOL 100 MG/1
100 TABLET ORAL 2 TIMES DAILY
COMMUNITY
Start: 2021-01-01

## 2022-01-01 RX ORDER — DILTIAZEM HYDROCHLORIDE 5 MG/ML
10 INJECTION INTRAVENOUS ONCE
Status: DISCONTINUED | OUTPATIENT
Start: 2022-01-01 | End: 2022-01-01

## 2022-01-01 RX ORDER — ONDANSETRON 2 MG/ML
4 INJECTION INTRAMUSCULAR; INTRAVENOUS EVERY 6 HOURS PRN
Status: DISCONTINUED | OUTPATIENT
Start: 2022-01-01 | End: 2022-01-01 | Stop reason: HOSPADM

## 2022-01-01 RX ORDER — SODIUM CHLORIDE 9 MG/ML
INJECTION, SOLUTION INTRAVENOUS CONTINUOUS
Status: DISCONTINUED | OUTPATIENT
Start: 2022-01-01 | End: 2022-01-01

## 2022-01-01 RX ORDER — TORSEMIDE 20 MG/1
40 TABLET ORAL DAILY
Status: DISCONTINUED | OUTPATIENT
Start: 2022-01-01 | End: 2022-01-01

## 2022-01-01 RX ORDER — GUAIFENESIN 600 MG/1
600 TABLET, EXTENDED RELEASE ORAL 2 TIMES DAILY
Status: DISCONTINUED | OUTPATIENT
Start: 2022-01-01 | End: 2022-01-01

## 2022-01-01 RX ORDER — PROPOFOL 10 MG/ML
5-50 INJECTION, EMULSION INTRAVENOUS
Status: DISCONTINUED | OUTPATIENT
Start: 2022-01-01 | End: 2022-01-01 | Stop reason: HOSPADM

## 2022-01-01 RX ORDER — FENTANYL CITRATE 50 UG/ML
25 INJECTION, SOLUTION INTRAMUSCULAR; INTRAVENOUS
Status: DISCONTINUED | OUTPATIENT
Start: 2022-01-01 | End: 2022-01-01

## 2022-01-01 RX ORDER — DIGOXIN 0.25 MG/ML
250 INJECTION INTRAMUSCULAR; INTRAVENOUS EVERY 4 HOURS
Status: COMPLETED | OUTPATIENT
Start: 2022-01-01 | End: 2022-01-01

## 2022-01-01 RX ORDER — HEPARIN SODIUM 5000 [USP'U]/ML
5000 INJECTION, SOLUTION INTRAVENOUS; SUBCUTANEOUS EVERY 8 HOURS SCHEDULED
Status: DISCONTINUED | OUTPATIENT
Start: 2022-01-01 | End: 2022-01-01

## 2022-01-01 RX ORDER — SODIUM BICARBONATE 650 MG/1
650 TABLET ORAL 2 TIMES DAILY
Status: DISCONTINUED | OUTPATIENT
Start: 2022-01-01 | End: 2022-01-01

## 2022-01-01 RX ORDER — CLOPIDOGREL BISULFATE 75 MG/1
75 TABLET ORAL DAILY
COMMUNITY
Start: 2021-01-01

## 2022-01-01 RX ORDER — HYDRALAZINE HYDROCHLORIDE 20 MG/ML
10 INJECTION INTRAMUSCULAR; INTRAVENOUS EVERY 6 HOURS PRN
Status: DISCONTINUED | OUTPATIENT
Start: 2022-01-01 | End: 2022-01-01

## 2022-01-01 RX ORDER — POLYETHYLENE GLYCOL 3350 17 G/17G
17 POWDER, FOR SOLUTION ORAL DAILY PRN
Status: DISCONTINUED | OUTPATIENT
Start: 2022-01-01 | End: 2022-01-01

## 2022-01-01 RX ORDER — NOREPINEPHRINE BIT/0.9 % NACL 16MG/250ML
2-100 INFUSION BOTTLE (ML) INTRAVENOUS CONTINUOUS
Status: DISCONTINUED | OUTPATIENT
Start: 2022-01-01 | End: 2022-01-01

## 2022-01-01 RX ORDER — FENTANYL CITRATE 50 UG/ML
100 INJECTION, SOLUTION INTRAMUSCULAR; INTRAVENOUS
Status: DISCONTINUED | OUTPATIENT
Start: 2022-01-01 | End: 2022-01-01

## 2022-01-01 RX ORDER — BUSPIRONE HYDROCHLORIDE 10 MG/1
10 TABLET ORAL 3 TIMES DAILY
Status: DISCONTINUED | OUTPATIENT
Start: 2022-01-01 | End: 2022-01-01

## 2022-01-01 RX ORDER — OXYCODONE HYDROCHLORIDE 5 MG/1
7.5 TABLET ORAL EVERY 6 HOURS
Status: DISCONTINUED | OUTPATIENT
Start: 2022-01-01 | End: 2022-01-01 | Stop reason: HOSPADM

## 2022-01-01 RX ORDER — HYDRALAZINE HYDROCHLORIDE 20 MG/ML
5 INJECTION INTRAMUSCULAR; INTRAVENOUS ONCE
Status: DISCONTINUED | OUTPATIENT
Start: 2022-01-01 | End: 2022-01-01

## 2022-01-01 RX ORDER — SODIUM CHLORIDE 0.9 % (FLUSH) 0.9 %
5-40 SYRINGE (ML) INJECTION PRN
Status: DISCONTINUED | OUTPATIENT
Start: 2022-01-01 | End: 2022-01-01 | Stop reason: HOSPADM

## 2022-01-01 RX ORDER — LIDOCAINE HYDROCHLORIDE 10 MG/ML
5 INJECTION, SOLUTION INFILTRATION; PERINEURAL ONCE
Status: DISCONTINUED | OUTPATIENT
Start: 2022-01-01 | End: 2022-01-01

## 2022-01-01 RX ORDER — 0.9 % SODIUM CHLORIDE 0.9 %
80 INTRAVENOUS SOLUTION INTRAVENOUS ONCE
Status: COMPLETED | OUTPATIENT
Start: 2022-01-01 | End: 2022-01-01

## 2022-01-01 RX ORDER — BENZONATATE 100 MG/1
200 CAPSULE ORAL 3 TIMES DAILY PRN
Status: DISCONTINUED | OUTPATIENT
Start: 2022-01-01 | End: 2022-01-01

## 2022-01-01 RX ORDER — ATORVASTATIN CALCIUM 10 MG/1
20 TABLET, FILM COATED ORAL NIGHTLY
Status: DISCONTINUED | OUTPATIENT
Start: 2022-01-01 | End: 2022-01-01

## 2022-01-01 RX ORDER — NITROGLYCERIN 0.4 MG/1
0.4 TABLET SUBLINGUAL ONCE
Status: DISCONTINUED | OUTPATIENT
Start: 2022-01-01 | End: 2022-01-01

## 2022-01-01 RX ORDER — LANOLIN ALCOHOL/MO/W.PET/CERES
3 CREAM (GRAM) TOPICAL NIGHTLY PRN
Status: DISCONTINUED | OUTPATIENT
Start: 2022-01-01 | End: 2022-01-01 | Stop reason: HOSPADM

## 2022-01-01 RX ORDER — ASPIRIN 81 MG/1
81 TABLET, CHEWABLE ORAL DAILY
Status: DISCONTINUED | OUTPATIENT
Start: 2022-01-01 | End: 2022-01-01

## 2022-01-01 RX ORDER — ATORVASTATIN CALCIUM 40 MG/1
20 TABLET, FILM COATED ORAL NIGHTLY
COMMUNITY
Start: 2021-01-01

## 2022-01-01 RX ORDER — 0.9 % SODIUM CHLORIDE 0.9 %
1000 INTRAVENOUS SOLUTION INTRAVENOUS ONCE
Status: DISCONTINUED | OUTPATIENT
Start: 2022-01-01 | End: 2022-01-01

## 2022-01-01 RX ORDER — HEPARIN SODIUM 1000 [USP'U]/ML
2000 INJECTION, SOLUTION INTRAVENOUS; SUBCUTANEOUS PRN
Status: DISCONTINUED | OUTPATIENT
Start: 2022-01-01 | End: 2022-01-01

## 2022-01-01 RX ORDER — SODIUM CHLORIDE 0.9 % (FLUSH) 0.9 %
10 SYRINGE (ML) INJECTION PRN
Status: DISCONTINUED | OUTPATIENT
Start: 2022-01-01 | End: 2022-01-01 | Stop reason: HOSPADM

## 2022-01-01 RX ORDER — MORPHINE SULFATE 4 MG/ML
4 INJECTION, SOLUTION INTRAMUSCULAR; INTRAVENOUS
Status: DISCONTINUED | OUTPATIENT
Start: 2022-01-01 | End: 2022-01-01 | Stop reason: HOSPADM

## 2022-01-01 RX ORDER — LORAZEPAM 2 MG/ML
0.5 INJECTION INTRAMUSCULAR
Status: DISCONTINUED | OUTPATIENT
Start: 2022-01-01 | End: 2022-01-01

## 2022-01-01 RX ORDER — DEXAMETHASONE SODIUM PHOSPHATE 10 MG/ML
6 INJECTION INTRAMUSCULAR; INTRAVENOUS EVERY 24 HOURS
Status: COMPLETED | OUTPATIENT
Start: 2022-01-01 | End: 2022-01-01

## 2022-01-01 RX ORDER — PANTOPRAZOLE SODIUM 40 MG/10ML
40 INJECTION, POWDER, LYOPHILIZED, FOR SOLUTION INTRAVENOUS DAILY
Status: DISCONTINUED | OUTPATIENT
Start: 2022-01-01 | End: 2022-01-01

## 2022-01-01 RX ORDER — HEPARIN SODIUM 10000 [USP'U]/100ML
5-30 INJECTION, SOLUTION INTRAVENOUS CONTINUOUS
Status: DISCONTINUED | OUTPATIENT
Start: 2022-01-01 | End: 2022-01-01

## 2022-01-01 RX ORDER — DIPHENHYDRAMINE HYDROCHLORIDE 50 MG/ML
25 INJECTION INTRAMUSCULAR; INTRAVENOUS ONCE
Status: COMPLETED | OUTPATIENT
Start: 2022-01-01 | End: 2022-01-01

## 2022-01-01 RX ORDER — HYDROCODONE BITARTRATE AND ACETAMINOPHEN 5; 325 MG/1; MG/1
0.5 TABLET ORAL ONCE
Status: COMPLETED | OUTPATIENT
Start: 2022-01-01 | End: 2022-01-01

## 2022-01-01 RX ORDER — FUROSEMIDE 10 MG/ML
80 INJECTION INTRAMUSCULAR; INTRAVENOUS EVERY 12 HOURS
Status: DISCONTINUED | OUTPATIENT
Start: 2022-01-01 | End: 2022-01-01

## 2022-01-01 RX ORDER — POTASSIUM CHLORIDE 20 MEQ/1
20 TABLET, EXTENDED RELEASE ORAL ONCE
Status: COMPLETED | OUTPATIENT
Start: 2022-01-01 | End: 2022-01-01

## 2022-01-01 RX ORDER — SODIUM CHLORIDE 9 MG/ML
25 INJECTION, SOLUTION INTRAVENOUS PRN
Status: DISCONTINUED | OUTPATIENT
Start: 2022-01-01 | End: 2022-01-01 | Stop reason: HOSPADM

## 2022-01-01 RX ORDER — CLOPIDOGREL BISULFATE 75 MG/1
75 TABLET ORAL DAILY
Status: DISCONTINUED | OUTPATIENT
Start: 2022-01-01 | End: 2022-01-01 | Stop reason: HOSPADM

## 2022-01-01 RX ORDER — SODIUM CHLORIDE 0.9 % (FLUSH) 0.9 %
5-40 SYRINGE (ML) INJECTION PRN
Status: DISCONTINUED | OUTPATIENT
Start: 2022-01-01 | End: 2022-01-01

## 2022-01-01 RX ORDER — ECHINACEA PURPUREA EXTRACT 125 MG
1 TABLET ORAL PRN
Status: DISCONTINUED | OUTPATIENT
Start: 2022-01-01 | End: 2022-01-01 | Stop reason: HOSPADM

## 2022-01-01 RX ORDER — METOPROLOL TARTRATE 50 MG/1
50 TABLET, FILM COATED ORAL DAILY
Status: DISCONTINUED | OUTPATIENT
Start: 2022-01-01 | End: 2022-01-01

## 2022-01-01 RX ORDER — SENNA PLUS 8.6 MG/1
2 TABLET ORAL NIGHTLY
Status: DISCONTINUED | OUTPATIENT
Start: 2022-01-01 | End: 2022-01-01

## 2022-01-01 RX ORDER — LANOLIN ALCOHOL/MO/W.PET/CERES
325 CREAM (GRAM) TOPICAL
Status: DISCONTINUED | OUTPATIENT
Start: 2022-01-01 | End: 2022-01-01

## 2022-01-01 RX ORDER — TORSEMIDE 20 MG/1
40 TABLET ORAL EVERY OTHER DAY
Status: DISCONTINUED | OUTPATIENT
Start: 2022-01-01 | End: 2022-01-01 | Stop reason: HOSPADM

## 2022-01-01 RX ORDER — AMIODARONE HYDROCHLORIDE 200 MG/1
200 TABLET ORAL DAILY
Status: DISCONTINUED | OUTPATIENT
Start: 2022-01-01 | End: 2022-01-01 | Stop reason: HOSPADM

## 2022-01-01 RX ORDER — LANOLIN ALCOHOL/MO/W.PET/CERES
1000 CREAM (GRAM) TOPICAL DAILY
COMMUNITY

## 2022-01-01 RX ORDER — BISACODYL 10 MG
10 SUPPOSITORY, RECTAL RECTAL DAILY PRN
Status: DISCONTINUED | OUTPATIENT
Start: 2022-01-01 | End: 2022-01-01 | Stop reason: HOSPADM

## 2022-01-01 RX ORDER — MAGNESIUM SULFATE 1 G/100ML
1000 INJECTION INTRAVENOUS ONCE
Status: COMPLETED | OUTPATIENT
Start: 2022-01-01 | End: 2022-01-01

## 2022-01-01 RX ORDER — NICOTINE POLACRILEX 4 MG
15 LOZENGE BUCCAL PRN
Status: DISCONTINUED | OUTPATIENT
Start: 2022-01-01 | End: 2022-01-01 | Stop reason: HOSPADM

## 2022-01-01 RX ORDER — CHOLECALCIFEROL (VITAMIN D3) 125 MCG
1000 CAPSULE ORAL DAILY
Status: DISCONTINUED | OUTPATIENT
Start: 2022-01-01 | End: 2022-01-01

## 2022-01-01 RX ADMIN — INSULIN LISPRO 1 UNITS: 100 INJECTION, SOLUTION INTRAVENOUS; SUBCUTANEOUS at 01:05

## 2022-01-01 RX ADMIN — INSULIN LISPRO 1 UNITS: 100 INJECTION, SOLUTION INTRAVENOUS; SUBCUTANEOUS at 12:22

## 2022-01-01 RX ADMIN — OXYCODONE HYDROCHLORIDE 7.5 MG: 5 TABLET ORAL at 17:59

## 2022-01-01 RX ADMIN — METOPROLOL TARTRATE 50 MG: 25 TABLET ORAL at 20:07

## 2022-01-01 RX ADMIN — BUSPIRONE HYDROCHLORIDE 10 MG: 10 TABLET ORAL at 13:45

## 2022-01-01 RX ADMIN — AMIODARONE HYDROCHLORIDE 200 MG: 200 TABLET ORAL at 09:09

## 2022-01-01 RX ADMIN — ATORVASTATIN CALCIUM 20 MG: 10 TABLET, FILM COATED ORAL at 20:15

## 2022-01-01 RX ADMIN — POTASSIUM CHLORIDE 20 MEQ: 29.8 INJECTION, SOLUTION INTRAVENOUS at 06:12

## 2022-01-01 RX ADMIN — Medication 3 MG: at 20:09

## 2022-01-01 RX ADMIN — DEXAMETHASONE SODIUM PHOSPHATE 6 MG: 10 INJECTION INTRAMUSCULAR; INTRAVENOUS at 00:51

## 2022-01-01 RX ADMIN — BUSPIRONE HYDROCHLORIDE 10 MG: 10 TABLET ORAL at 20:09

## 2022-01-01 RX ADMIN — SODIUM CHLORIDE, PRESERVATIVE FREE 10 ML: 5 INJECTION INTRAVENOUS at 20:59

## 2022-01-01 RX ADMIN — ANTI-FUNGAL POWDER MICONAZOLE NITRATE TALC FREE: 1.42 POWDER TOPICAL at 12:11

## 2022-01-01 RX ADMIN — HYDRALAZINE HYDROCHLORIDE 10 MG: 20 INJECTION INTRAMUSCULAR; INTRAVENOUS at 15:56

## 2022-01-01 RX ADMIN — INSULIN LISPRO 1 UNITS: 100 INJECTION, SOLUTION INTRAVENOUS; SUBCUTANEOUS at 16:36

## 2022-01-01 RX ADMIN — BUSPIRONE HYDROCHLORIDE 10 MG: 10 TABLET ORAL at 17:15

## 2022-01-01 RX ADMIN — ATORVASTATIN CALCIUM 20 MG: 10 TABLET, FILM COATED ORAL at 21:01

## 2022-01-01 RX ADMIN — SODIUM CHLORIDE, PRESERVATIVE FREE 10 ML: 5 INJECTION INTRAVENOUS at 20:07

## 2022-01-01 RX ADMIN — METOPROLOL TARTRATE 75 MG: 25 TABLET ORAL at 08:13

## 2022-01-01 RX ADMIN — SODIUM BICARBONATE: 84 INJECTION, SOLUTION INTRAVENOUS at 20:19

## 2022-01-01 RX ADMIN — INSULIN LISPRO 2 UNITS: 100 INJECTION, SOLUTION INTRAVENOUS; SUBCUTANEOUS at 16:41

## 2022-01-01 RX ADMIN — GUAIFENESIN 600 MG: 600 TABLET, EXTENDED RELEASE ORAL at 08:36

## 2022-01-01 RX ADMIN — FUROSEMIDE 40 MG: 10 INJECTION, SOLUTION INTRAMUSCULAR; INTRAVENOUS at 10:10

## 2022-01-01 RX ADMIN — AMIODARONE HYDROCHLORIDE 200 MG: 200 TABLET ORAL at 08:41

## 2022-01-01 RX ADMIN — SODIUM CHLORIDE 80 ML: 9 INJECTION, SOLUTION INTRAVENOUS at 12:10

## 2022-01-01 RX ADMIN — ATORVASTATIN CALCIUM 20 MG: 10 TABLET, FILM COATED ORAL at 21:05

## 2022-01-01 RX ADMIN — MEROPENEM 500 MG: 500 INJECTION, POWDER, FOR SOLUTION INTRAVENOUS at 02:37

## 2022-01-01 RX ADMIN — SODIUM CHLORIDE, PRESERVATIVE FREE 10 ML: 5 INJECTION INTRAVENOUS at 09:18

## 2022-01-01 RX ADMIN — CLOPIDOGREL 75 MG: 75 TABLET, FILM COATED ORAL at 09:03

## 2022-01-01 RX ADMIN — APIXABAN 2.5 MG: 2.5 TABLET, FILM COATED ORAL at 08:28

## 2022-01-01 RX ADMIN — Medication 8 MG/HR: at 10:51

## 2022-01-01 RX ADMIN — INSULIN LISPRO 1 UNITS: 100 INJECTION, SOLUTION INTRAVENOUS; SUBCUTANEOUS at 12:16

## 2022-01-01 RX ADMIN — SODIUM CHLORIDE, PRESERVATIVE FREE 10 ML: 5 INJECTION INTRAVENOUS at 08:47

## 2022-01-01 RX ADMIN — INSULIN LISPRO 1 UNITS: 100 INJECTION, SOLUTION INTRAVENOUS; SUBCUTANEOUS at 12:38

## 2022-01-01 RX ADMIN — MORPHINE SULFATE 2 MG: 2 INJECTION, SOLUTION INTRAMUSCULAR; INTRAVENOUS at 03:15

## 2022-01-01 RX ADMIN — BUSPIRONE HYDROCHLORIDE 10 MG: 10 TABLET ORAL at 20:01

## 2022-01-01 RX ADMIN — Medication 3 MG: at 20:30

## 2022-01-01 RX ADMIN — FAMOTIDINE 20 MG: 20 TABLET, FILM COATED ORAL at 10:10

## 2022-01-01 RX ADMIN — FENTANYL CITRATE 50 MCG: 50 INJECTION, SOLUTION INTRAMUSCULAR; INTRAVENOUS at 11:36

## 2022-01-01 RX ADMIN — INSULIN LISPRO 1 UNITS: 100 INJECTION, SOLUTION INTRAVENOUS; SUBCUTANEOUS at 01:46

## 2022-01-01 RX ADMIN — FAMOTIDINE 20 MG: 20 TABLET, FILM COATED ORAL at 08:18

## 2022-01-01 RX ADMIN — METOPROLOL TARTRATE 50 MG: 50 TABLET, FILM COATED ORAL at 20:41

## 2022-01-01 RX ADMIN — ATORVASTATIN CALCIUM 20 MG: 10 TABLET, FILM COATED ORAL at 19:41

## 2022-01-01 RX ADMIN — POLYETHYLENE GLYCOL 3350 17 G: 17 POWDER, FOR SOLUTION ORAL at 09:12

## 2022-01-01 RX ADMIN — Medication 1000 MCG: at 09:03

## 2022-01-01 RX ADMIN — FAMOTIDINE 20 MG: 20 TABLET, FILM COATED ORAL at 08:41

## 2022-01-01 RX ADMIN — APIXABAN 2.5 MG: 2.5 TABLET, FILM COATED ORAL at 20:17

## 2022-01-01 RX ADMIN — LORAZEPAM 0.5 MG: 2 INJECTION INTRAMUSCULAR at 22:33

## 2022-01-01 RX ADMIN — METOPROLOL TARTRATE 50 MG: 25 TABLET ORAL at 20:53

## 2022-01-01 RX ADMIN — Medication 1000 MCG: at 08:47

## 2022-01-01 RX ADMIN — ANTI-FUNGAL POWDER MICONAZOLE NITRATE TALC FREE: 1.42 POWDER TOPICAL at 21:20

## 2022-01-01 RX ADMIN — METOPROLOL TARTRATE 75 MG: 25 TABLET ORAL at 21:18

## 2022-01-01 RX ADMIN — BENZONATATE 200 MG: 100 CAPSULE ORAL at 23:39

## 2022-01-01 RX ADMIN — Medication 50 MCG/HR: at 00:33

## 2022-01-01 RX ADMIN — FENTANYL CITRATE 100 MCG: 50 INJECTION, SOLUTION INTRAMUSCULAR; INTRAVENOUS at 18:29

## 2022-01-01 RX ADMIN — OXYCODONE 7.5 MG: 5 TABLET ORAL at 17:45

## 2022-01-01 RX ADMIN — HYDRALAZINE HYDROCHLORIDE 10 MG: 20 INJECTION INTRAMUSCULAR; INTRAVENOUS at 19:51

## 2022-01-01 RX ADMIN — SODIUM BICARBONATE 1300 MG: 648 TABLET ORAL at 20:30

## 2022-01-01 RX ADMIN — PROPOFOL 20 MCG/KG/MIN: 10 INJECTION, EMULSION INTRAVENOUS at 17:12

## 2022-01-01 RX ADMIN — INSULIN LISPRO 2 UNITS: 100 INJECTION, SOLUTION INTRAVENOUS; SUBCUTANEOUS at 03:39

## 2022-01-01 RX ADMIN — METOPROLOL TARTRATE 50 MG: 25 TABLET ORAL at 20:29

## 2022-01-01 RX ADMIN — INSULIN LISPRO 1 UNITS: 100 INJECTION, SOLUTION INTRAVENOUS; SUBCUTANEOUS at 20:59

## 2022-01-01 RX ADMIN — ASPIRIN 81 MG 81 MG: 81 TABLET ORAL at 08:30

## 2022-01-01 RX ADMIN — INSULIN LISPRO 2 UNITS: 100 INJECTION, SOLUTION INTRAVENOUS; SUBCUTANEOUS at 08:31

## 2022-01-01 RX ADMIN — METOPROLOL TARTRATE 50 MG: 50 TABLET, FILM COATED ORAL at 08:24

## 2022-01-01 RX ADMIN — ACETAMINOPHEN 650 MG: 325 TABLET ORAL at 16:59

## 2022-01-01 RX ADMIN — HEPARIN SODIUM 2000 UNITS: 1000 INJECTION INTRAVENOUS; SUBCUTANEOUS at 06:05

## 2022-01-01 RX ADMIN — BUSPIRONE HYDROCHLORIDE 10 MG: 10 TABLET ORAL at 10:29

## 2022-01-01 RX ADMIN — CILOSTAZOL 100 MG: 100 TABLET ORAL at 08:24

## 2022-01-01 RX ADMIN — METOPROLOL TARTRATE 50 MG: 50 TABLET, FILM COATED ORAL at 21:16

## 2022-01-01 RX ADMIN — CLOPIDOGREL 75 MG: 75 TABLET, FILM COATED ORAL at 09:08

## 2022-01-01 RX ADMIN — SODIUM BICARBONATE 650 MG: 648 TABLET ORAL at 20:01

## 2022-01-01 RX ADMIN — APIXABAN 2.5 MG: 2.5 TABLET, FILM COATED ORAL at 20:27

## 2022-01-01 RX ADMIN — CLONIDINE HYDROCHLORIDE 0.1 MG: 0.1 TABLET ORAL at 00:00

## 2022-01-01 RX ADMIN — SODIUM CHLORIDE 25 ML/HR: 234 INJECTION INTRAMUSCULAR; INTRAVENOUS; SUBCUTANEOUS at 22:40

## 2022-01-01 RX ADMIN — BUSPIRONE HYDROCHLORIDE 10 MG: 10 TABLET ORAL at 14:27

## 2022-01-01 RX ADMIN — OXYCODONE 7.5 MG: 5 TABLET ORAL at 00:35

## 2022-01-01 RX ADMIN — SODIUM CHLORIDE, PRESERVATIVE FREE 10 ML: 5 INJECTION INTRAVENOUS at 20:53

## 2022-01-01 RX ADMIN — ASPIRIN 81 MG 81 MG: 81 TABLET ORAL at 08:05

## 2022-01-01 RX ADMIN — ATORVASTATIN CALCIUM 20 MG: 10 TABLET, FILM COATED ORAL at 20:20

## 2022-01-01 RX ADMIN — AMIODARONE HYDROCHLORIDE 200 MG: 200 TABLET ORAL at 09:02

## 2022-01-01 RX ADMIN — METOPROLOL TARTRATE 75 MG: 25 TABLET ORAL at 20:45

## 2022-01-01 RX ADMIN — HYDROCORTISONE SODIUM SUCCINATE 100 MG: 100 INJECTION, POWDER, FOR SOLUTION INTRAMUSCULAR; INTRAVENOUS at 12:00

## 2022-01-01 RX ADMIN — Medication 1000 MCG: at 08:57

## 2022-01-01 RX ADMIN — AMIODARONE HYDROCHLORIDE 200 MG: 200 TABLET ORAL at 20:15

## 2022-01-01 RX ADMIN — ANTI-FUNGAL POWDER MICONAZOLE NITRATE TALC FREE: 1.42 POWDER TOPICAL at 20:57

## 2022-01-01 RX ADMIN — SODIUM BICARBONATE 650 MG: 648 TABLET ORAL at 08:36

## 2022-01-01 RX ADMIN — SODIUM CHLORIDE, PRESERVATIVE FREE 10 ML: 5 INJECTION INTRAVENOUS at 20:09

## 2022-01-01 RX ADMIN — PANTOPRAZOLE SODIUM 40 MG: 40 TABLET, DELAYED RELEASE ORAL at 09:10

## 2022-01-01 RX ADMIN — INSULIN LISPRO 1 UNITS: 100 INJECTION, SOLUTION INTRAVENOUS; SUBCUTANEOUS at 21:15

## 2022-01-01 RX ADMIN — BUSPIRONE HYDROCHLORIDE 10 MG: 10 TABLET ORAL at 21:18

## 2022-01-01 RX ADMIN — METOPROLOL TARTRATE 50 MG: 50 TABLET, FILM COATED ORAL at 20:58

## 2022-01-01 RX ADMIN — OXYCODONE HYDROCHLORIDE 7.5 MG: 5 TABLET ORAL at 00:07

## 2022-01-01 RX ADMIN — HEPARIN SODIUM 4000 UNITS: 1000 INJECTION INTRAVENOUS; SUBCUTANEOUS at 18:07

## 2022-01-01 RX ADMIN — INSULIN LISPRO 1 UNITS: 100 INJECTION, SOLUTION INTRAVENOUS; SUBCUTANEOUS at 04:17

## 2022-01-01 RX ADMIN — FAMOTIDINE 20 MG: 20 TABLET, FILM COATED ORAL at 08:04

## 2022-01-01 RX ADMIN — AMLODIPINE BESYLATE 10 MG: 10 TABLET ORAL at 08:14

## 2022-01-01 RX ADMIN — BENZONATATE 200 MG: 100 CAPSULE ORAL at 02:25

## 2022-01-01 RX ADMIN — METOPROLOL TARTRATE 75 MG: 25 TABLET ORAL at 08:41

## 2022-01-01 RX ADMIN — SODIUM CHLORIDE, PRESERVATIVE FREE 10 ML: 5 INJECTION INTRAVENOUS at 20:32

## 2022-01-01 RX ADMIN — HEPARIN SODIUM 2000 UNITS: 1000 INJECTION INTRAVENOUS; SUBCUTANEOUS at 07:01

## 2022-01-01 RX ADMIN — BUSPIRONE HYDROCHLORIDE 10 MG: 10 TABLET ORAL at 09:00

## 2022-01-01 RX ADMIN — SODIUM CHLORIDE, PRESERVATIVE FREE 10 ML: 5 INJECTION INTRAVENOUS at 08:39

## 2022-01-01 RX ADMIN — HEPARIN SODIUM 5000 UNITS: 5000 INJECTION INTRAVENOUS; SUBCUTANEOUS at 08:23

## 2022-01-01 RX ADMIN — CLOPIDOGREL 75 MG: 75 TABLET, FILM COATED ORAL at 08:37

## 2022-01-01 RX ADMIN — CILOSTAZOL 100 MG: 100 TABLET ORAL at 09:57

## 2022-01-01 RX ADMIN — POLYETHYLENE GLYCOL 3350 17 G: 17 POWDER, FOR SOLUTION ORAL at 20:00

## 2022-01-01 RX ADMIN — ANTI-FUNGAL POWDER MICONAZOLE NITRATE TALC FREE: 1.42 POWDER TOPICAL at 08:13

## 2022-01-01 RX ADMIN — SODIUM BICARBONATE 1300 MG: 648 TABLET ORAL at 09:12

## 2022-01-01 RX ADMIN — METOPROLOL TARTRATE 50 MG: 25 TABLET ORAL at 20:20

## 2022-01-01 RX ADMIN — APIXABAN 2.5 MG: 2.5 TABLET, FILM COATED ORAL at 08:18

## 2022-01-01 RX ADMIN — INSULIN GLARGINE 20 UNITS: 100 INJECTION, SOLUTION SUBCUTANEOUS at 08:47

## 2022-01-01 RX ADMIN — SODIUM BICARBONATE 650 MG: 648 TABLET ORAL at 08:48

## 2022-01-01 RX ADMIN — AMLODIPINE BESYLATE 10 MG: 10 TABLET ORAL at 09:09

## 2022-01-01 RX ADMIN — PROPOFOL 10 MCG/KG/MIN: 10 INJECTION, EMULSION INTRAVENOUS at 14:14

## 2022-01-01 RX ADMIN — ATORVASTATIN CALCIUM 20 MG: 10 TABLET, FILM COATED ORAL at 21:28

## 2022-01-01 RX ADMIN — SODIUM BICARBONATE 650 MG: 648 TABLET ORAL at 20:08

## 2022-01-01 RX ADMIN — HEPARIN SODIUM 12 UNITS/KG/HR: 5000 INJECTION INTRAVENOUS; SUBCUTANEOUS at 02:26

## 2022-01-01 RX ADMIN — OXYCODONE HYDROCHLORIDE 7.5 MG: 5 TABLET ORAL at 06:07

## 2022-01-01 RX ADMIN — LORAZEPAM 0.5 MG: 2 INJECTION INTRAMUSCULAR at 09:35

## 2022-01-01 RX ADMIN — SODIUM CHLORIDE, PRESERVATIVE FREE 10 ML: 5 INJECTION INTRAVENOUS at 08:36

## 2022-01-01 RX ADMIN — ANTI-FUNGAL POWDER MICONAZOLE NITRATE TALC FREE: 1.42 POWDER TOPICAL at 21:01

## 2022-01-01 RX ADMIN — CLOPIDOGREL 75 MG: 75 TABLET, FILM COATED ORAL at 08:28

## 2022-01-01 RX ADMIN — FUROSEMIDE 80 MG: 10 INJECTION, SOLUTION INTRAMUSCULAR; INTRAVENOUS at 11:53

## 2022-01-01 RX ADMIN — Medication 100 MCG/HR: at 06:08

## 2022-01-01 RX ADMIN — BUSPIRONE HYDROCHLORIDE 10 MG: 10 TABLET ORAL at 08:49

## 2022-01-01 RX ADMIN — ANTI-FUNGAL POWDER MICONAZOLE NITRATE TALC FREE: 1.42 POWDER TOPICAL at 08:28

## 2022-01-01 RX ADMIN — OXYCODONE 7.5 MG: 5 TABLET ORAL at 06:30

## 2022-01-01 RX ADMIN — MEROPENEM 500 MG: 500 INJECTION, POWDER, FOR SOLUTION INTRAVENOUS at 15:36

## 2022-01-01 RX ADMIN — METOPROLOL TARTRATE 50 MG: 25 TABLET ORAL at 09:02

## 2022-01-01 RX ADMIN — CLOPIDOGREL 75 MG: 75 TABLET, FILM COATED ORAL at 08:05

## 2022-01-01 RX ADMIN — PROPOFOL 10 MCG/KG/MIN: 10 INJECTION, EMULSION INTRAVENOUS at 18:17

## 2022-01-01 RX ADMIN — FUROSEMIDE 20 MG: 10 INJECTION, SOLUTION INTRAMUSCULAR; INTRAVENOUS at 15:25

## 2022-01-01 RX ADMIN — HYDROCORTISONE 20 MG: 10 TABLET ORAL at 08:59

## 2022-01-01 RX ADMIN — CILOSTAZOL 100 MG: 100 TABLET ORAL at 01:38

## 2022-01-01 RX ADMIN — HEPARIN SODIUM 12.95 UNITS/KG/HR: 5000 INJECTION INTRAVENOUS; SUBCUTANEOUS at 09:34

## 2022-01-01 RX ADMIN — BUSPIRONE HYDROCHLORIDE 10 MG: 10 TABLET ORAL at 10:10

## 2022-01-01 RX ADMIN — GUAIFENESIN 600 MG: 600 TABLET, EXTENDED RELEASE ORAL at 08:37

## 2022-01-01 RX ADMIN — CLOPIDOGREL 75 MG: 75 TABLET, FILM COATED ORAL at 08:56

## 2022-01-01 RX ADMIN — METOPROLOL TARTRATE 50 MG: 25 TABLET ORAL at 20:39

## 2022-01-01 RX ADMIN — SODIUM CHLORIDE 500 ML: 0.9 INJECTION, SOLUTION INTRAVENOUS at 04:10

## 2022-01-01 RX ADMIN — Medication 3 MG: at 20:20

## 2022-01-01 RX ADMIN — DEXTROSE MONOHYDRATE 12.5 G: 25 INJECTION, SOLUTION INTRAVENOUS at 16:46

## 2022-01-01 RX ADMIN — MEROPENEM 500 MG: 500 INJECTION, POWDER, FOR SOLUTION INTRAVENOUS at 15:00

## 2022-01-01 RX ADMIN — OXYCODONE 7.5 MG: 5 TABLET ORAL at 07:00

## 2022-01-01 RX ADMIN — SODIUM BICARBONATE 650 MG: 648 TABLET ORAL at 08:19

## 2022-01-01 RX ADMIN — ANTI-FUNGAL POWDER MICONAZOLE NITRATE TALC FREE: 1.42 POWDER TOPICAL at 20:02

## 2022-01-01 RX ADMIN — METOPROLOL TARTRATE 50 MG: 25 TABLET ORAL at 08:05

## 2022-01-01 RX ADMIN — ANTI-FUNGAL POWDER MICONAZOLE NITRATE TALC FREE: 1.42 POWDER TOPICAL at 08:42

## 2022-01-01 RX ADMIN — BUSPIRONE HYDROCHLORIDE 10 MG: 10 TABLET ORAL at 13:59

## 2022-01-01 RX ADMIN — MORPHINE SULFATE 4 MG: 4 INJECTION INTRAVENOUS at 22:17

## 2022-01-01 RX ADMIN — Medication 10 MG: at 19:56

## 2022-01-01 RX ADMIN — AMIODARONE HYDROCHLORIDE 200 MG: 200 TABLET ORAL at 08:57

## 2022-01-01 RX ADMIN — METOPROLOL TARTRATE 5 MG: 5 INJECTION INTRAVENOUS at 10:53

## 2022-01-01 RX ADMIN — CLOPIDOGREL 75 MG: 75 TABLET, FILM COATED ORAL at 08:36

## 2022-01-01 RX ADMIN — HEPARIN SODIUM 5000 UNITS: 5000 INJECTION INTRAVENOUS; SUBCUTANEOUS at 15:21

## 2022-01-01 RX ADMIN — SODIUM CHLORIDE: 4.5 INJECTION, SOLUTION INTRAVENOUS at 22:09

## 2022-01-01 RX ADMIN — CILOSTAZOL 100 MG: 100 TABLET ORAL at 08:30

## 2022-01-01 RX ADMIN — OXYCODONE HYDROCHLORIDE 7.5 MG: 5 TABLET ORAL at 12:11

## 2022-01-01 RX ADMIN — SODIUM CHLORIDE, PRESERVATIVE FREE 5 ML: 5 INJECTION INTRAVENOUS at 09:00

## 2022-01-01 RX ADMIN — BUSPIRONE HYDROCHLORIDE 10 MG: 10 TABLET ORAL at 08:54

## 2022-01-01 RX ADMIN — PANTOPRAZOLE SODIUM 40 MG: 40 TABLET, DELAYED RELEASE ORAL at 08:24

## 2022-01-01 RX ADMIN — INSULIN LISPRO 1 UNITS: 100 INJECTION, SOLUTION INTRAVENOUS; SUBCUTANEOUS at 12:15

## 2022-01-01 RX ADMIN — PANTOPRAZOLE SODIUM 40 MG: 40 TABLET, DELAYED RELEASE ORAL at 10:30

## 2022-01-01 RX ADMIN — CILOSTAZOL 100 MG: 100 TABLET ORAL at 08:57

## 2022-01-01 RX ADMIN — CILOSTAZOL 100 MG: 100 TABLET ORAL at 20:17

## 2022-01-01 RX ADMIN — BUSPIRONE HYDROCHLORIDE 10 MG: 10 TABLET ORAL at 20:05

## 2022-01-01 RX ADMIN — BUSPIRONE HYDROCHLORIDE 10 MG: 10 TABLET ORAL at 14:30

## 2022-01-01 RX ADMIN — Medication 1000 MCG: at 08:36

## 2022-01-01 RX ADMIN — SODIUM CHLORIDE, PRESERVATIVE FREE 10 ML: 5 INJECTION INTRAVENOUS at 20:08

## 2022-01-01 RX ADMIN — APIXABAN 2.5 MG: 2.5 TABLET, FILM COATED ORAL at 21:17

## 2022-01-01 RX ADMIN — SODIUM BICARBONATE 1300 MG: 648 TABLET ORAL at 20:19

## 2022-01-01 RX ADMIN — BUSPIRONE HYDROCHLORIDE 10 MG: 10 TABLET ORAL at 08:13

## 2022-01-01 RX ADMIN — ATORVASTATIN CALCIUM 20 MG: 10 TABLET, FILM COATED ORAL at 01:38

## 2022-01-01 RX ADMIN — AMIODARONE HYDROCHLORIDE 200 MG: 200 TABLET ORAL at 09:00

## 2022-01-01 RX ADMIN — Medication 75 MCG/HR: at 00:49

## 2022-01-01 RX ADMIN — APIXABAN 2.5 MG: 2.5 TABLET, FILM COATED ORAL at 08:04

## 2022-01-01 RX ADMIN — INSULIN LISPRO 1 UNITS: 100 INJECTION, SOLUTION INTRAVENOUS; SUBCUTANEOUS at 11:56

## 2022-01-01 RX ADMIN — MEROPENEM 500 MG: 500 INJECTION, POWDER, FOR SOLUTION INTRAVENOUS at 00:14

## 2022-01-01 RX ADMIN — ANTI-FUNGAL POWDER MICONAZOLE NITRATE TALC FREE: 1.42 POWDER TOPICAL at 10:06

## 2022-01-01 RX ADMIN — POTASSIUM CHLORIDE 40 MEQ: 1500 TABLET, EXTENDED RELEASE ORAL at 17:05

## 2022-01-01 RX ADMIN — SODIUM CHLORIDE: 9 INJECTION, SOLUTION INTRAVENOUS at 00:00

## 2022-01-01 RX ADMIN — GUAIFENESIN 600 MG: 600 TABLET, EXTENDED RELEASE ORAL at 14:37

## 2022-01-01 RX ADMIN — ATORVASTATIN CALCIUM 20 MG: 10 TABLET, FILM COATED ORAL at 20:16

## 2022-01-01 RX ADMIN — MEROPENEM 500 MG: 500 INJECTION, POWDER, FOR SOLUTION INTRAVENOUS at 01:00

## 2022-01-01 RX ADMIN — AMIODARONE HYDROCHLORIDE 200 MG: 200 TABLET ORAL at 08:28

## 2022-01-01 RX ADMIN — Medication 100 MCG/HR: at 00:07

## 2022-01-01 RX ADMIN — HEPARIN SODIUM 5000 UNITS: 5000 INJECTION INTRAVENOUS; SUBCUTANEOUS at 06:02

## 2022-01-01 RX ADMIN — CILOSTAZOL 100 MG: 100 TABLET ORAL at 12:05

## 2022-01-01 RX ADMIN — PANTOPRAZOLE SODIUM 40 MG: 40 TABLET, DELAYED RELEASE ORAL at 09:53

## 2022-01-01 RX ADMIN — METOPROLOL TARTRATE 75 MG: 25 TABLET ORAL at 21:00

## 2022-01-01 RX ADMIN — BUSPIRONE HYDROCHLORIDE 10 MG: 10 TABLET ORAL at 14:12

## 2022-01-01 RX ADMIN — ATORVASTATIN CALCIUM 20 MG: 10 TABLET, FILM COATED ORAL at 20:00

## 2022-01-01 RX ADMIN — METOPROLOL TARTRATE 50 MG: 50 TABLET, FILM COATED ORAL at 08:37

## 2022-01-01 RX ADMIN — AMIODARONE HYDROCHLORIDE 1 MG/MIN: 50 INJECTION, SOLUTION INTRAVENOUS at 17:16

## 2022-01-01 RX ADMIN — HYDRALAZINE HYDROCHLORIDE 10 MG: 20 INJECTION INTRAMUSCULAR; INTRAVENOUS at 10:06

## 2022-01-01 RX ADMIN — Medication 1000 MCG: at 09:00

## 2022-01-01 RX ADMIN — SENNOSIDES 17.2 MG: 8.6 TABLET, COATED ORAL at 21:04

## 2022-01-01 RX ADMIN — ATORVASTATIN CALCIUM 20 MG: 10 TABLET, FILM COATED ORAL at 21:18

## 2022-01-01 RX ADMIN — CILOSTAZOL 100 MG: 100 TABLET ORAL at 09:03

## 2022-01-01 RX ADMIN — METOPROLOL TARTRATE 5 MG: 5 INJECTION INTRAVENOUS at 15:06

## 2022-01-01 RX ADMIN — ASPIRIN 81 MG 81 MG: 81 TABLET ORAL at 10:29

## 2022-01-01 RX ADMIN — CLOPIDOGREL 75 MG: 75 TABLET, FILM COATED ORAL at 09:54

## 2022-01-01 RX ADMIN — TORSEMIDE 40 MG: 20 TABLET ORAL at 12:13

## 2022-01-01 RX ADMIN — OXYCODONE HYDROCHLORIDE 7.5 MG: 5 TABLET ORAL at 12:00

## 2022-01-01 RX ADMIN — SODIUM CHLORIDE: 4.5 INJECTION, SOLUTION INTRAVENOUS at 11:59

## 2022-01-01 RX ADMIN — CILOSTAZOL 100 MG: 100 TABLET ORAL at 19:41

## 2022-01-01 RX ADMIN — FENTANYL CITRATE 25 MCG: 50 INJECTION, SOLUTION INTRAMUSCULAR; INTRAVENOUS at 12:33

## 2022-01-01 RX ADMIN — MEROPENEM 500 MG: 500 INJECTION, POWDER, FOR SOLUTION INTRAVENOUS at 17:15

## 2022-01-01 RX ADMIN — BUSPIRONE HYDROCHLORIDE 10 MG: 10 TABLET ORAL at 14:05

## 2022-01-01 RX ADMIN — INSULIN LISPRO 1 UNITS: 100 INJECTION, SOLUTION INTRAVENOUS; SUBCUTANEOUS at 12:30

## 2022-01-01 RX ADMIN — Medication 25 MCG/HR: at 12:19

## 2022-01-01 RX ADMIN — CILOSTAZOL 100 MG: 100 TABLET ORAL at 20:15

## 2022-01-01 RX ADMIN — AMIODARONE HYDROCHLORIDE 200 MG: 200 TABLET ORAL at 20:53

## 2022-01-01 RX ADMIN — INSULIN GLARGINE 20 UNITS: 100 INJECTION, SOLUTION SUBCUTANEOUS at 08:25

## 2022-01-01 RX ADMIN — Medication 50 MCG/HR: at 16:08

## 2022-01-01 RX ADMIN — SENNOSIDES 17.2 MG: 8.6 TABLET, COATED ORAL at 21:22

## 2022-01-01 RX ADMIN — PANTOPRAZOLE SODIUM 40 MG: 40 INJECTION, POWDER, LYOPHILIZED, FOR SOLUTION INTRAVENOUS at 11:53

## 2022-01-01 RX ADMIN — HEPARIN SODIUM 5000 UNITS: 5000 INJECTION INTRAVENOUS; SUBCUTANEOUS at 06:07

## 2022-01-01 RX ADMIN — HYDRALAZINE HYDROCHLORIDE 10 MG: 20 INJECTION INTRAMUSCULAR; INTRAVENOUS at 10:43

## 2022-01-01 RX ADMIN — LORAZEPAM 0.5 MG: 2 INJECTION INTRAMUSCULAR at 02:31

## 2022-01-01 RX ADMIN — DEXTROSE MONOHYDRATE 12.5 G: 25 INJECTION, SOLUTION INTRAVENOUS at 11:16

## 2022-01-01 RX ADMIN — BUSPIRONE HYDROCHLORIDE 10 MG: 10 TABLET ORAL at 09:54

## 2022-01-01 RX ADMIN — SENNOSIDES 17.2 MG: 8.6 TABLET, COATED ORAL at 20:31

## 2022-01-01 RX ADMIN — SODIUM CHLORIDE, PRESERVATIVE FREE 10 ML: 5 INJECTION INTRAVENOUS at 09:00

## 2022-01-01 RX ADMIN — BUSPIRONE HYDROCHLORIDE 10 MG: 10 TABLET ORAL at 20:39

## 2022-01-01 RX ADMIN — INSULIN LISPRO 1 UNITS: 100 INJECTION, SOLUTION INTRAVENOUS; SUBCUTANEOUS at 08:50

## 2022-01-01 RX ADMIN — AMIODARONE HYDROCHLORIDE 200 MG: 200 TABLET ORAL at 08:04

## 2022-01-01 RX ADMIN — ASPIRIN 81 MG 81 MG: 81 TABLET ORAL at 09:09

## 2022-01-01 RX ADMIN — SENNOSIDES 17.2 MG: 8.6 TABLET, COATED ORAL at 20:05

## 2022-01-01 RX ADMIN — AMIODARONE HYDROCHLORIDE 200 MG: 200 TABLET ORAL at 08:18

## 2022-01-01 RX ADMIN — ATORVASTATIN CALCIUM 20 MG: 10 TABLET, FILM COATED ORAL at 20:53

## 2022-01-01 RX ADMIN — SODIUM CHLORIDE, PRESERVATIVE FREE 10 ML: 5 INJECTION INTRAVENOUS at 20:25

## 2022-01-01 RX ADMIN — FUROSEMIDE 80 MG: 10 INJECTION, SOLUTION INTRAMUSCULAR; INTRAVENOUS at 13:45

## 2022-01-01 RX ADMIN — INSULIN LISPRO 2 UNITS: 100 INJECTION, SOLUTION INTRAVENOUS; SUBCUTANEOUS at 00:45

## 2022-01-01 RX ADMIN — CLOPIDOGREL 75 MG: 75 TABLET, FILM COATED ORAL at 08:57

## 2022-01-01 RX ADMIN — MEROPENEM 500 MG: 500 INJECTION, POWDER, FOR SOLUTION INTRAVENOUS at 00:29

## 2022-01-01 RX ADMIN — BUSPIRONE HYDROCHLORIDE 10 MG: 10 TABLET ORAL at 15:25

## 2022-01-01 RX ADMIN — SODIUM BICARBONATE 650 MG: 648 TABLET ORAL at 20:31

## 2022-01-01 RX ADMIN — MEROPENEM 500 MG: 500 INJECTION, POWDER, FOR SOLUTION INTRAVENOUS at 05:15

## 2022-01-01 RX ADMIN — TORSEMIDE 40 MG: 20 TABLET ORAL at 09:08

## 2022-01-01 RX ADMIN — BUSPIRONE HYDROCHLORIDE 10 MG: 10 TABLET ORAL at 20:29

## 2022-01-01 RX ADMIN — OXYCODONE 7.5 MG: 5 TABLET ORAL at 05:33

## 2022-01-01 RX ADMIN — ANTI-FUNGAL POWDER MICONAZOLE NITRATE TALC FREE: 1.42 POWDER TOPICAL at 10:00

## 2022-01-01 RX ADMIN — METOPROLOL TARTRATE 75 MG: 25 TABLET ORAL at 21:04

## 2022-01-01 RX ADMIN — METOPROLOL TARTRATE 50 MG: 25 TABLET ORAL at 08:30

## 2022-01-01 RX ADMIN — PROPOFOL 20 MCG/KG/MIN: 10 INJECTION, EMULSION INTRAVENOUS at 22:05

## 2022-01-01 RX ADMIN — GLUCAGON HYDROCHLORIDE 1 MG: KIT at 06:54

## 2022-01-01 RX ADMIN — OXYCODONE 7.5 MG: 5 TABLET ORAL at 12:11

## 2022-01-01 RX ADMIN — ANTI-FUNGAL POWDER MICONAZOLE NITRATE TALC FREE: 1.42 POWDER TOPICAL at 20:43

## 2022-01-01 RX ADMIN — DIPHENHYDRAMINE HYDROCHLORIDE 25 MG: 50 INJECTION, SOLUTION INTRAMUSCULAR; INTRAVENOUS at 23:56

## 2022-01-01 RX ADMIN — ATORVASTATIN CALCIUM 20 MG: 10 TABLET, FILM COATED ORAL at 20:56

## 2022-01-01 RX ADMIN — HYDROCORTISONE 20 MG: 10 TABLET ORAL at 09:13

## 2022-01-01 RX ADMIN — MEROPENEM 500 MG: 500 INJECTION, POWDER, FOR SOLUTION INTRAVENOUS at 05:45

## 2022-01-01 RX ADMIN — ATORVASTATIN CALCIUM 20 MG: 10 TABLET, FILM COATED ORAL at 20:10

## 2022-01-01 RX ADMIN — FAMOTIDINE 20 MG: 20 TABLET, FILM COATED ORAL at 08:11

## 2022-01-01 RX ADMIN — BUSPIRONE HYDROCHLORIDE 10 MG: 10 TABLET ORAL at 21:00

## 2022-01-01 RX ADMIN — BUSPIRONE HYDROCHLORIDE 10 MG: 10 TABLET ORAL at 09:09

## 2022-01-01 RX ADMIN — Medication 1000 MCG: at 09:12

## 2022-01-01 RX ADMIN — INSULIN LISPRO 1 UNITS: 100 INJECTION, SOLUTION INTRAVENOUS; SUBCUTANEOUS at 04:32

## 2022-01-01 RX ADMIN — INSULIN LISPRO 1 UNITS: 100 INJECTION, SOLUTION INTRAVENOUS; SUBCUTANEOUS at 21:24

## 2022-01-01 RX ADMIN — FUROSEMIDE 80 MG: 10 INJECTION, SOLUTION INTRAMUSCULAR; INTRAVENOUS at 23:22

## 2022-01-01 RX ADMIN — PROPOFOL 20 MCG/KG/MIN: 10 INJECTION, EMULSION INTRAVENOUS at 00:06

## 2022-01-01 RX ADMIN — Medication 1000 MCG: at 10:29

## 2022-01-01 RX ADMIN — AMIODARONE HYDROCHLORIDE 200 MG: 200 TABLET ORAL at 08:11

## 2022-01-01 RX ADMIN — SODIUM CHLORIDE, PRESERVATIVE FREE 40 ML: 5 INJECTION INTRAVENOUS at 10:34

## 2022-01-01 RX ADMIN — ANTI-FUNGAL POWDER MICONAZOLE NITRATE TALC FREE: 1.42 POWDER TOPICAL at 21:05

## 2022-01-01 RX ADMIN — BUSPIRONE HYDROCHLORIDE 10 MG: 10 TABLET ORAL at 14:59

## 2022-01-01 RX ADMIN — ATORVASTATIN CALCIUM 20 MG: 10 TABLET, FILM COATED ORAL at 20:39

## 2022-01-01 RX ADMIN — FERROUS SULFATE TAB EC 325 MG (65 MG FE EQUIVALENT) 325 MG: 325 (65 FE) TABLET DELAYED RESPONSE at 08:59

## 2022-01-01 RX ADMIN — LORAZEPAM 0.5 MG: 2 INJECTION INTRAMUSCULAR at 02:20

## 2022-01-01 RX ADMIN — SODIUM CHLORIDE, PRESERVATIVE FREE 10 ML: 5 INJECTION INTRAVENOUS at 20:42

## 2022-01-01 RX ADMIN — ATORVASTATIN CALCIUM 20 MG: 10 TABLET, FILM COATED ORAL at 20:31

## 2022-01-01 RX ADMIN — MEROPENEM 500 MG: 500 INJECTION, POWDER, FOR SOLUTION INTRAVENOUS at 03:38

## 2022-01-01 RX ADMIN — DEXAMETHASONE SODIUM PHOSPHATE 6 MG: 10 INJECTION INTRAMUSCULAR; INTRAVENOUS at 02:30

## 2022-01-01 RX ADMIN — AMLODIPINE BESYLATE 10 MG: 10 TABLET ORAL at 07:21

## 2022-01-01 RX ADMIN — OXYCODONE HYDROCHLORIDE 7.5 MG: 5 TABLET ORAL at 05:12

## 2022-01-01 RX ADMIN — AMLODIPINE BESYLATE 10 MG: 10 TABLET ORAL at 08:11

## 2022-01-01 RX ADMIN — AMIODARONE HYDROCHLORIDE 200 MG: 200 TABLET ORAL at 09:11

## 2022-01-01 RX ADMIN — PROPOFOL 15 MCG/KG/MIN: 10 INJECTION, EMULSION INTRAVENOUS at 05:46

## 2022-01-01 RX ADMIN — INSULIN LISPRO 1 UNITS: 100 INJECTION, SOLUTION INTRAVENOUS; SUBCUTANEOUS at 08:20

## 2022-01-01 RX ADMIN — METOPROLOL TARTRATE 50 MG: 25 TABLET ORAL at 19:41

## 2022-01-01 RX ADMIN — METOPROLOL TARTRATE 50 MG: 25 TABLET ORAL at 21:28

## 2022-01-01 RX ADMIN — AMLODIPINE BESYLATE 10 MG: 10 TABLET ORAL at 08:18

## 2022-01-01 RX ADMIN — ATORVASTATIN CALCIUM 20 MG: 10 TABLET, FILM COATED ORAL at 20:09

## 2022-01-01 RX ADMIN — ANTI-FUNGAL POWDER MICONAZOLE NITRATE TALC FREE: 1.42 POWDER TOPICAL at 07:22

## 2022-01-01 RX ADMIN — SODIUM CHLORIDE, PRESERVATIVE FREE 10 ML: 5 INJECTION INTRAVENOUS at 11:53

## 2022-01-01 RX ADMIN — POLYETHYLENE GLYCOL 3350 17 G: 17 POWDER, FOR SOLUTION ORAL at 16:35

## 2022-01-01 RX ADMIN — MEROPENEM 500 MG: 500 INJECTION, POWDER, FOR SOLUTION INTRAVENOUS at 18:13

## 2022-01-01 RX ADMIN — OXYCODONE HYDROCHLORIDE 7.5 MG: 5 TABLET ORAL at 00:49

## 2022-01-01 RX ADMIN — ASPIRIN 81 MG 81 MG: 81 TABLET ORAL at 18:50

## 2022-01-01 RX ADMIN — MEROPENEM 500 MG: 500 INJECTION, POWDER, FOR SOLUTION INTRAVENOUS at 00:52

## 2022-01-01 RX ADMIN — INSULIN LISPRO 1 UNITS: 100 INJECTION, SOLUTION INTRAVENOUS; SUBCUTANEOUS at 20:03

## 2022-01-01 RX ADMIN — CILOSTAZOL 100 MG: 100 TABLET ORAL at 20:55

## 2022-01-01 RX ADMIN — BUSPIRONE HYDROCHLORIDE 10 MG: 10 TABLET ORAL at 20:45

## 2022-01-01 RX ADMIN — OXYCODONE HYDROCHLORIDE 7.5 MG: 5 TABLET ORAL at 11:52

## 2022-01-01 RX ADMIN — SODIUM BICARBONATE 650 MG: 648 TABLET ORAL at 20:59

## 2022-01-01 RX ADMIN — HYDRALAZINE HYDROCHLORIDE 10 MG: 20 INJECTION INTRAMUSCULAR; INTRAVENOUS at 02:00

## 2022-01-01 RX ADMIN — BUSPIRONE HYDROCHLORIDE 10 MG: 10 TABLET ORAL at 21:16

## 2022-01-01 RX ADMIN — SODIUM BICARBONATE: 84 INJECTION, SOLUTION INTRAVENOUS at 08:51

## 2022-01-01 RX ADMIN — ATORVASTATIN CALCIUM 20 MG: 10 TABLET, FILM COATED ORAL at 20:41

## 2022-01-01 RX ADMIN — HEPARIN SODIUM 5000 UNITS: 5000 INJECTION INTRAVENOUS; SUBCUTANEOUS at 01:30

## 2022-01-01 RX ADMIN — METOPROLOL TARTRATE 50 MG: 25 TABLET ORAL at 09:53

## 2022-01-01 RX ADMIN — MEROPENEM 500 MG: 500 INJECTION, POWDER, FOR SOLUTION INTRAVENOUS at 01:02

## 2022-01-01 RX ADMIN — INSULIN LISPRO 1 UNITS: 100 INJECTION, SOLUTION INTRAVENOUS; SUBCUTANEOUS at 08:31

## 2022-01-01 RX ADMIN — Medication 1000 MCG: at 08:19

## 2022-01-01 RX ADMIN — AMIODARONE HYDROCHLORIDE 200 MG: 200 TABLET ORAL at 08:05

## 2022-01-01 RX ADMIN — HEPARIN SODIUM 2000 UNITS: 1000 INJECTION INTRAVENOUS; SUBCUTANEOUS at 21:54

## 2022-01-01 RX ADMIN — FENTANYL CITRATE 25 MCG: 50 INJECTION, SOLUTION INTRAMUSCULAR; INTRAVENOUS at 15:14

## 2022-01-01 RX ADMIN — PANTOPRAZOLE SODIUM 40 MG: 40 TABLET, DELAYED RELEASE ORAL at 20:29

## 2022-01-01 RX ADMIN — SODIUM CHLORIDE, PRESERVATIVE FREE 10 ML: 5 INJECTION INTRAVENOUS at 20:29

## 2022-01-01 RX ADMIN — LORAZEPAM 1 MG: 2 INJECTION INTRAMUSCULAR; INTRAVENOUS at 22:22

## 2022-01-01 RX ADMIN — DEXTROSE MONOHYDRATE: 50 INJECTION, SOLUTION INTRAVENOUS at 08:56

## 2022-01-01 RX ADMIN — ASPIRIN 81 MG 81 MG: 81 TABLET ORAL at 08:04

## 2022-01-01 RX ADMIN — Medication 1000 MCG: at 08:05

## 2022-01-01 RX ADMIN — BUSPIRONE HYDROCHLORIDE 10 MG: 10 TABLET ORAL at 08:18

## 2022-01-01 RX ADMIN — SENNOSIDES 17.2 MG: 8.6 TABLET, COATED ORAL at 20:17

## 2022-01-01 RX ADMIN — Medication 10 MG: at 16:35

## 2022-01-01 RX ADMIN — INSULIN LISPRO 1 UNITS: 100 INJECTION, SOLUTION INTRAVENOUS; SUBCUTANEOUS at 17:48

## 2022-01-01 RX ADMIN — SODIUM CHLORIDE, PRESERVATIVE FREE 10 ML: 5 INJECTION INTRAVENOUS at 20:16

## 2022-01-01 RX ADMIN — APIXABAN 2.5 MG: 2.5 TABLET, FILM COATED ORAL at 08:30

## 2022-01-01 RX ADMIN — METOPROLOL TARTRATE 50 MG: 25 TABLET ORAL at 20:16

## 2022-01-01 RX ADMIN — ASPIRIN 81 MG 81 MG: 81 TABLET ORAL at 08:13

## 2022-01-01 RX ADMIN — AMIODARONE HYDROCHLORIDE 0.5 MG/MIN: 50 INJECTION, SOLUTION INTRAVENOUS at 18:08

## 2022-01-01 RX ADMIN — INSULIN LISPRO 1 UNITS: 100 INJECTION, SOLUTION INTRAVENOUS; SUBCUTANEOUS at 09:31

## 2022-01-01 RX ADMIN — SODIUM CHLORIDE, PRESERVATIVE FREE 10 ML: 5 INJECTION INTRAVENOUS at 20:31

## 2022-01-01 RX ADMIN — PROPOFOL 20 MCG/KG/MIN: 10 INJECTION, EMULSION INTRAVENOUS at 00:59

## 2022-01-01 RX ADMIN — SODIUM CHLORIDE, PRESERVATIVE FREE 10 ML: 5 INJECTION INTRAVENOUS at 10:32

## 2022-01-01 RX ADMIN — BUSPIRONE HYDROCHLORIDE 10 MG: 10 TABLET ORAL at 21:30

## 2022-01-01 RX ADMIN — SODIUM BICARBONATE 650 MG: 648 TABLET ORAL at 20:29

## 2022-01-01 RX ADMIN — FAMOTIDINE 20 MG: 20 TABLET, FILM COATED ORAL at 07:22

## 2022-01-01 RX ADMIN — INSULIN LISPRO 1 UNITS: 100 INJECTION, SOLUTION INTRAVENOUS; SUBCUTANEOUS at 11:14

## 2022-01-01 RX ADMIN — TORSEMIDE 40 MG: 20 TABLET ORAL at 08:04

## 2022-01-01 RX ADMIN — SODIUM CHLORIDE, PRESERVATIVE FREE 10 ML: 5 INJECTION INTRAVENOUS at 08:25

## 2022-01-01 RX ADMIN — HEPARIN SODIUM 5000 UNITS: 5000 INJECTION INTRAVENOUS; SUBCUTANEOUS at 16:33

## 2022-01-01 RX ADMIN — PANTOPRAZOLE SODIUM 40 MG: 40 TABLET, DELAYED RELEASE ORAL at 09:03

## 2022-01-01 RX ADMIN — AMIODARONE HYDROCHLORIDE 200 MG: 200 TABLET ORAL at 08:49

## 2022-01-01 RX ADMIN — METOPROLOL TARTRATE 75 MG: 25 TABLET ORAL at 08:54

## 2022-01-01 RX ADMIN — APIXABAN 2.5 MG: 2.5 TABLET, FILM COATED ORAL at 21:18

## 2022-01-01 RX ADMIN — PANTOPRAZOLE SODIUM 40 MG: 40 TABLET, DELAYED RELEASE ORAL at 05:29

## 2022-01-01 RX ADMIN — INSULIN LISPRO 1 UNITS: 100 INJECTION, SOLUTION INTRAVENOUS; SUBCUTANEOUS at 08:58

## 2022-01-01 RX ADMIN — SODIUM CHLORIDE, PRESERVATIVE FREE 10 ML: 5 INJECTION INTRAVENOUS at 09:03

## 2022-01-01 RX ADMIN — CLOPIDOGREL 75 MG: 75 TABLET, FILM COATED ORAL at 08:04

## 2022-01-01 RX ADMIN — AMIODARONE HYDROCHLORIDE 200 MG: 200 TABLET ORAL at 10:31

## 2022-01-01 RX ADMIN — PROPOFOL 10 MCG/KG/MIN: 10 INJECTION, EMULSION INTRAVENOUS at 12:15

## 2022-01-01 RX ADMIN — PANTOPRAZOLE SODIUM 40 MG: 40 TABLET, DELAYED RELEASE ORAL at 15:52

## 2022-01-01 RX ADMIN — MORPHINE SULFATE 4 MG: 4 INJECTION INTRAVENOUS at 19:41

## 2022-01-01 RX ADMIN — METOPROLOL TARTRATE 5 MG: 5 INJECTION INTRAVENOUS at 09:48

## 2022-01-01 RX ADMIN — BUSPIRONE HYDROCHLORIDE 10 MG: 10 TABLET ORAL at 08:41

## 2022-01-01 RX ADMIN — Medication 3 MG: at 20:40

## 2022-01-01 RX ADMIN — METOPROLOL TARTRATE 50 MG: 50 TABLET, FILM COATED ORAL at 18:50

## 2022-01-01 RX ADMIN — OXYCODONE HYDROCHLORIDE 7.5 MG: 5 TABLET ORAL at 17:50

## 2022-01-01 RX ADMIN — BUSPIRONE HYDROCHLORIDE 10 MG: 10 TABLET ORAL at 13:39

## 2022-01-01 RX ADMIN — HYDRALAZINE HYDROCHLORIDE 10 MG: 20 INJECTION INTRAMUSCULAR; INTRAVENOUS at 04:00

## 2022-01-01 RX ADMIN — SENNOSIDES 17.2 MG: 8.6 TABLET, COATED ORAL at 20:00

## 2022-01-01 RX ADMIN — ASPIRIN 81 MG 81 MG: 81 TABLET ORAL at 10:11

## 2022-01-01 RX ADMIN — DEXAMETHASONE SODIUM PHOSPHATE 6 MG: 10 INJECTION INTRAMUSCULAR; INTRAVENOUS at 01:39

## 2022-01-01 RX ADMIN — DIGOXIN 250 MCG: 0.25 INJECTION INTRAMUSCULAR; INTRAVENOUS at 18:32

## 2022-01-01 RX ADMIN — SENNOSIDES 17.2 MG: 8.6 TABLET, COATED ORAL at 20:39

## 2022-01-01 RX ADMIN — INSULIN GLARGINE 20 UNITS: 100 INJECTION, SOLUTION SUBCUTANEOUS at 08:57

## 2022-01-01 RX ADMIN — ASPIRIN 81 MG 81 MG: 81 TABLET ORAL at 08:18

## 2022-01-01 RX ADMIN — AMIODARONE HYDROCHLORIDE 200 MG: 200 TABLET ORAL at 07:21

## 2022-01-01 RX ADMIN — POLYETHYLENE GLYCOL 3350 17 G: 17 POWDER, FOR SOLUTION ORAL at 09:01

## 2022-01-01 RX ADMIN — Medication 1000 MCG: at 09:52

## 2022-01-01 RX ADMIN — METOPROLOL TARTRATE 50 MG: 50 TABLET, FILM COATED ORAL at 08:28

## 2022-01-01 RX ADMIN — CLOPIDOGREL 75 MG: 75 TABLET, FILM COATED ORAL at 08:13

## 2022-01-01 RX ADMIN — OXYCODONE 7.5 MG: 5 TABLET ORAL at 18:33

## 2022-01-01 RX ADMIN — INSULIN LISPRO 2 UNITS: 100 INJECTION, SOLUTION INTRAVENOUS; SUBCUTANEOUS at 08:33

## 2022-01-01 RX ADMIN — AMIODARONE HYDROCHLORIDE 200 MG: 200 TABLET ORAL at 08:36

## 2022-01-01 RX ADMIN — METOPROLOL TARTRATE 75 MG: 25 TABLET ORAL at 09:08

## 2022-01-01 RX ADMIN — SENNOSIDES 17.2 MG: 8.6 TABLET, COATED ORAL at 20:16

## 2022-01-01 RX ADMIN — METOCLOPRAMIDE 10 MG: 5 INJECTION, SOLUTION INTRAMUSCULAR; INTRAVENOUS at 05:56

## 2022-01-01 RX ADMIN — ASPIRIN 81 MG 81 MG: 81 TABLET ORAL at 08:24

## 2022-01-01 RX ADMIN — ATORVASTATIN CALCIUM 20 MG: 10 TABLET, FILM COATED ORAL at 20:17

## 2022-01-01 RX ADMIN — OXYCODONE HYDROCHLORIDE 7.5 MG: 5 TABLET ORAL at 14:11

## 2022-01-01 RX ADMIN — OXYCODONE HYDROCHLORIDE 7.5 MG: 5 TABLET ORAL at 11:53

## 2022-01-01 RX ADMIN — HYDRALAZINE HYDROCHLORIDE 10 MG: 20 INJECTION INTRAMUSCULAR; INTRAVENOUS at 21:56

## 2022-01-01 RX ADMIN — HYDROCORTISONE SODIUM SUCCINATE 100 MG: 100 INJECTION, POWDER, FOR SOLUTION INTRAMUSCULAR; INTRAVENOUS at 20:05

## 2022-01-01 RX ADMIN — AMIODARONE HYDROCHLORIDE 200 MG: 200 TABLET ORAL at 14:11

## 2022-01-01 RX ADMIN — PROPOFOL 40 MCG/KG/MIN: 10 INJECTION, EMULSION INTRAVENOUS at 08:45

## 2022-01-01 RX ADMIN — BUSPIRONE HYDROCHLORIDE 10 MG: 10 TABLET ORAL at 15:21

## 2022-01-01 RX ADMIN — Medication 1000 MCG: at 09:56

## 2022-01-01 RX ADMIN — Medication 3 MG: at 20:18

## 2022-01-01 RX ADMIN — OXYCODONE 7.5 MG: 5 TABLET ORAL at 00:30

## 2022-01-01 RX ADMIN — SALINE NASAL SPRAY 1 SPRAY: 1.5 SOLUTION NASAL at 09:58

## 2022-01-01 RX ADMIN — DEXTROSE MONOHYDRATE 12.5 G: 25 INJECTION, SOLUTION INTRAVENOUS at 07:00

## 2022-01-01 RX ADMIN — FENTANYL CITRATE 50 MCG: 50 INJECTION, SOLUTION INTRAMUSCULAR; INTRAVENOUS at 09:27

## 2022-01-01 RX ADMIN — FERROUS SULFATE TAB EC 325 MG (65 MG FE EQUIVALENT) 325 MG: 325 (65 FE) TABLET DELAYED RESPONSE at 08:36

## 2022-01-01 RX ADMIN — OXYCODONE HYDROCHLORIDE 7.5 MG: 5 TABLET ORAL at 00:11

## 2022-01-01 RX ADMIN — Medication 25 MCG/HR: at 14:19

## 2022-01-01 RX ADMIN — SODIUM BICARBONATE 1300 MG: 648 TABLET ORAL at 14:54

## 2022-01-01 RX ADMIN — PROPOFOL 20 MCG/KG/MIN: 10 INJECTION, EMULSION INTRAVENOUS at 06:32

## 2022-01-01 RX ADMIN — INSULIN LISPRO 2 UNITS: 100 INJECTION, SOLUTION INTRAVENOUS; SUBCUTANEOUS at 17:57

## 2022-01-01 RX ADMIN — HYDROCORTISONE 20 MG: 10 TABLET ORAL at 20:20

## 2022-01-01 RX ADMIN — HEPARIN SODIUM 5000 UNITS: 5000 INJECTION INTRAVENOUS; SUBCUTANEOUS at 14:07

## 2022-01-01 RX ADMIN — CLOPIDOGREL 75 MG: 75 TABLET, FILM COATED ORAL at 08:18

## 2022-01-01 RX ADMIN — CILOSTAZOL 100 MG: 100 TABLET ORAL at 22:38

## 2022-01-01 RX ADMIN — SODIUM CHLORIDE: 4.5 INJECTION, SOLUTION INTRAVENOUS at 06:41

## 2022-01-01 RX ADMIN — Medication 2 MG/HR: at 10:00

## 2022-01-01 RX ADMIN — FAMOTIDINE 20 MG: 20 TABLET, FILM COATED ORAL at 08:13

## 2022-01-01 RX ADMIN — HYDRALAZINE HYDROCHLORIDE 10 MG: 20 INJECTION INTRAMUSCULAR; INTRAVENOUS at 20:45

## 2022-01-01 RX ADMIN — PANTOPRAZOLE SODIUM 40 MG: 40 TABLET, DELAYED RELEASE ORAL at 09:57

## 2022-01-01 RX ADMIN — Medication 50 MCG/HR: at 10:55

## 2022-01-01 RX ADMIN — SODIUM BICARBONATE: 84 INJECTION, SOLUTION INTRAVENOUS at 09:34

## 2022-01-01 RX ADMIN — GUAIFENESIN 600 MG: 600 TABLET, EXTENDED RELEASE ORAL at 20:31

## 2022-01-01 RX ADMIN — OXYCODONE 7.5 MG: 5 TABLET ORAL at 11:40

## 2022-01-01 RX ADMIN — ATORVASTATIN CALCIUM 20 MG: 10 TABLET, FILM COATED ORAL at 20:07

## 2022-01-01 RX ADMIN — TORSEMIDE 40 MG: 20 TABLET ORAL at 08:13

## 2022-01-01 RX ADMIN — SODIUM CHLORIDE, PRESERVATIVE FREE 20 ML: 5 INJECTION INTRAVENOUS at 20:30

## 2022-01-01 RX ADMIN — BUSPIRONE HYDROCHLORIDE 10 MG: 10 TABLET ORAL at 07:59

## 2022-01-01 RX ADMIN — OXYCODONE 7.5 MG: 5 TABLET ORAL at 00:21

## 2022-01-01 RX ADMIN — DEXTROSE MONOHYDRATE 12.5 G: 25 INJECTION, SOLUTION INTRAVENOUS at 12:41

## 2022-01-01 RX ADMIN — INSULIN LISPRO 1 UNITS: 100 INJECTION, SOLUTION INTRAVENOUS; SUBCUTANEOUS at 17:02

## 2022-01-01 RX ADMIN — MEROPENEM 500 MG: 500 INJECTION, POWDER, FOR SOLUTION INTRAVENOUS at 15:21

## 2022-01-01 RX ADMIN — BUSPIRONE HYDROCHLORIDE 10 MG: 10 TABLET ORAL at 20:58

## 2022-01-01 RX ADMIN — ATORVASTATIN CALCIUM 20 MG: 10 TABLET, FILM COATED ORAL at 21:00

## 2022-01-01 RX ADMIN — ANTI-FUNGAL POWDER MICONAZOLE NITRATE TALC FREE: 1.42 POWDER TOPICAL at 20:09

## 2022-01-01 RX ADMIN — CLOPIDOGREL 75 MG: 75 TABLET, FILM COATED ORAL at 09:57

## 2022-01-01 RX ADMIN — SODIUM CHLORIDE, PRESERVATIVE FREE 10 ML: 5 INJECTION INTRAVENOUS at 20:17

## 2022-01-01 RX ADMIN — INSULIN LISPRO 2 UNITS: 100 INJECTION, SOLUTION INTRAVENOUS; SUBCUTANEOUS at 05:16

## 2022-01-01 RX ADMIN — CILOSTAZOL 100 MG: 100 TABLET ORAL at 09:02

## 2022-01-01 RX ADMIN — INSULIN LISPRO 1 UNITS: 100 INJECTION, SOLUTION INTRAVENOUS; SUBCUTANEOUS at 08:26

## 2022-01-01 RX ADMIN — INSULIN LISPRO 1 UNITS: 100 INJECTION, SOLUTION INTRAVENOUS; SUBCUTANEOUS at 17:05

## 2022-01-01 RX ADMIN — Medication 50 MCG/HR: at 02:58

## 2022-01-01 RX ADMIN — PROPOFOL 20 MCG/KG/MIN: 10 INJECTION, EMULSION INTRAVENOUS at 02:06

## 2022-01-01 RX ADMIN — BUSPIRONE HYDROCHLORIDE 10 MG: 10 TABLET ORAL at 20:31

## 2022-01-01 RX ADMIN — PANTOPRAZOLE SODIUM 40 MG: 40 TABLET, DELAYED RELEASE ORAL at 09:00

## 2022-01-01 RX ADMIN — ATORVASTATIN CALCIUM 20 MG: 10 TABLET, FILM COATED ORAL at 20:29

## 2022-01-01 RX ADMIN — Medication 3 MG: at 21:01

## 2022-01-01 RX ADMIN — GUAIFENESIN 600 MG: 600 TABLET, EXTENDED RELEASE ORAL at 09:00

## 2022-01-01 RX ADMIN — HYDROCORTISONE SODIUM SUCCINATE 100 MG: 100 INJECTION, POWDER, FOR SOLUTION INTRAMUSCULAR; INTRAVENOUS at 03:38

## 2022-01-01 RX ADMIN — ASPIRIN 81 MG 81 MG: 81 TABLET ORAL at 08:57

## 2022-01-01 RX ADMIN — SENNOSIDES 17.2 MG: 8.6 TABLET, COATED ORAL at 20:27

## 2022-01-01 RX ADMIN — BUSPIRONE HYDROCHLORIDE 10 MG: 10 TABLET ORAL at 08:04

## 2022-01-01 RX ADMIN — SODIUM BICARBONATE 1300 MG: 648 TABLET ORAL at 21:29

## 2022-01-01 RX ADMIN — BUSPIRONE HYDROCHLORIDE 10 MG: 10 TABLET ORAL at 20:20

## 2022-01-01 RX ADMIN — INSULIN LISPRO 1 UNITS: 100 INJECTION, SOLUTION INTRAVENOUS; SUBCUTANEOUS at 08:00

## 2022-01-01 RX ADMIN — HYDROCODONE BITARTRATE AND ACETAMINOPHEN 0.5 TABLET: 5; 325 TABLET ORAL at 08:56

## 2022-01-01 RX ADMIN — HEPARIN SODIUM 11.9 UNITS/KG/HR: 5000 INJECTION INTRAVENOUS; SUBCUTANEOUS at 21:09

## 2022-01-01 RX ADMIN — MEROPENEM 500 MG: 500 INJECTION, POWDER, FOR SOLUTION INTRAVENOUS at 16:35

## 2022-01-01 RX ADMIN — SENNOSIDES 17.2 MG: 8.6 TABLET, COATED ORAL at 20:19

## 2022-01-01 RX ADMIN — GUAIFENESIN 600 MG: 600 TABLET, EXTENDED RELEASE ORAL at 20:29

## 2022-01-01 RX ADMIN — ASPIRIN 81 MG 81 MG: 81 TABLET ORAL at 08:37

## 2022-01-01 RX ADMIN — HEPARIN SODIUM 14 UNITS/KG/HR: 5000 INJECTION INTRAVENOUS; SUBCUTANEOUS at 09:00

## 2022-01-01 RX ADMIN — Medication 25 MCG/HR: at 03:40

## 2022-01-01 RX ADMIN — Medication 7 MG/HR: at 00:06

## 2022-01-01 RX ADMIN — METOPROLOL TARTRATE 50 MG: 25 TABLET ORAL at 08:57

## 2022-01-01 RX ADMIN — SODIUM BICARBONATE 650 MG: 648 TABLET ORAL at 08:28

## 2022-01-01 RX ADMIN — FERROUS SULFATE TAB EC 325 MG (65 MG FE EQUIVALENT) 325 MG: 325 (65 FE) TABLET DELAYED RESPONSE at 08:18

## 2022-01-01 RX ADMIN — Medication 50 MCG/HR: at 11:44

## 2022-01-01 RX ADMIN — OXYCODONE HYDROCHLORIDE 7.5 MG: 5 TABLET ORAL at 18:05

## 2022-01-01 RX ADMIN — LORAZEPAM 1 MG: 2 INJECTION INTRAMUSCULAR; INTRAVENOUS at 16:53

## 2022-01-01 RX ADMIN — MEROPENEM 500 MG: 500 INJECTION, POWDER, FOR SOLUTION INTRAVENOUS at 15:30

## 2022-01-01 RX ADMIN — AMIODARONE HYDROCHLORIDE 200 MG: 200 TABLET ORAL at 09:54

## 2022-01-01 RX ADMIN — MEROPENEM 500 MG: 500 INJECTION, POWDER, FOR SOLUTION INTRAVENOUS at 13:06

## 2022-01-01 RX ADMIN — FAMOTIDINE 20 MG: 20 TABLET, FILM COATED ORAL at 08:28

## 2022-01-01 RX ADMIN — INSULIN LISPRO 1 UNITS: 100 INJECTION, SOLUTION INTRAVENOUS; SUBCUTANEOUS at 16:42

## 2022-01-01 RX ADMIN — HEPARIN SODIUM 2000 UNITS: 1000 INJECTION INTRAVENOUS; SUBCUTANEOUS at 15:51

## 2022-01-01 RX ADMIN — AMIODARONE HYDROCHLORIDE 0.5 MG/MIN: 50 INJECTION, SOLUTION INTRAVENOUS at 00:50

## 2022-01-01 RX ADMIN — SODIUM CHLORIDE: 9 INJECTION, SOLUTION INTRAVENOUS at 18:52

## 2022-01-01 RX ADMIN — SODIUM CHLORIDE 10 ML: 9 INJECTION, SOLUTION INTRAVENOUS at 00:41

## 2022-01-01 RX ADMIN — OXYCODONE HYDROCHLORIDE 7.5 MG: 5 TABLET ORAL at 11:36

## 2022-01-01 RX ADMIN — Medication 10 MG: at 19:51

## 2022-01-01 RX ADMIN — HYDRALAZINE HYDROCHLORIDE 10 MG: 20 INJECTION INTRAMUSCULAR; INTRAVENOUS at 02:58

## 2022-01-01 RX ADMIN — DEXAMETHASONE SODIUM PHOSPHATE 6 MG: 10 INJECTION INTRAMUSCULAR; INTRAVENOUS at 00:15

## 2022-01-01 RX ADMIN — BUSPIRONE HYDROCHLORIDE 10 MG: 10 TABLET ORAL at 08:36

## 2022-01-01 RX ADMIN — METOPROLOL TARTRATE 50 MG: 25 TABLET ORAL at 09:57

## 2022-01-01 RX ADMIN — HYDRALAZINE HYDROCHLORIDE 10 MG: 20 INJECTION INTRAMUSCULAR; INTRAVENOUS at 03:00

## 2022-01-01 RX ADMIN — HYDRALAZINE HYDROCHLORIDE 10 MG: 20 INJECTION INTRAMUSCULAR; INTRAVENOUS at 06:32

## 2022-01-01 RX ADMIN — DEXAMETHASONE SODIUM PHOSPHATE 6 MG: 10 INJECTION INTRAMUSCULAR; INTRAVENOUS at 01:30

## 2022-01-01 RX ADMIN — TORSEMIDE 40 MG: 20 TABLET ORAL at 14:12

## 2022-01-01 RX ADMIN — SODIUM CHLORIDE, PRESERVATIVE FREE 10 ML: 5 INJECTION INTRAVENOUS at 12:10

## 2022-01-01 RX ADMIN — MEROPENEM 500 MG: 500 INJECTION, POWDER, FOR SOLUTION INTRAVENOUS at 12:24

## 2022-01-01 RX ADMIN — IOPAMIDOL 75 ML: 755 INJECTION, SOLUTION INTRAVENOUS at 12:10

## 2022-01-01 RX ADMIN — CLOPIDOGREL 75 MG: 75 TABLET, FILM COATED ORAL at 18:50

## 2022-01-01 RX ADMIN — POTASSIUM BICARBONATE 40 MEQ: 782 TABLET, EFFERVESCENT ORAL at 12:11

## 2022-01-01 RX ADMIN — METOPROLOL TARTRATE 50 MG: 25 TABLET ORAL at 09:00

## 2022-01-01 RX ADMIN — HEPARIN SODIUM 2000 UNITS: 1000 INJECTION INTRAVENOUS; SUBCUTANEOUS at 09:32

## 2022-01-01 RX ADMIN — BUSPIRONE HYDROCHLORIDE 10 MG: 10 TABLET ORAL at 12:59

## 2022-01-01 RX ADMIN — PROPOFOL 10 MCG/KG/MIN: 10 INJECTION, EMULSION INTRAVENOUS at 00:22

## 2022-01-01 RX ADMIN — INSULIN GLARGINE 20 UNITS: 100 INJECTION, SOLUTION SUBCUTANEOUS at 09:51

## 2022-01-01 RX ADMIN — INSULIN LISPRO 2 UNITS: 100 INJECTION, SOLUTION INTRAVENOUS; SUBCUTANEOUS at 05:45

## 2022-01-01 RX ADMIN — INSULIN LISPRO 3 UNITS: 100 INJECTION, SOLUTION INTRAVENOUS; SUBCUTANEOUS at 12:14

## 2022-01-01 RX ADMIN — APIXABAN 2.5 MG: 2.5 TABLET, FILM COATED ORAL at 21:05

## 2022-01-01 RX ADMIN — Medication 50 MCG/HR: at 19:13

## 2022-01-01 RX ADMIN — ANTI-FUNGAL POWDER MICONAZOLE NITRATE TALC FREE: 1.42 POWDER TOPICAL at 08:18

## 2022-01-01 RX ADMIN — TORSEMIDE 40 MG: 20 TABLET ORAL at 08:17

## 2022-01-01 RX ADMIN — AMIODARONE HYDROCHLORIDE 200 MG: 200 TABLET ORAL at 08:30

## 2022-01-01 RX ADMIN — HYDRALAZINE HYDROCHLORIDE 10 MG: 20 INJECTION INTRAMUSCULAR; INTRAVENOUS at 16:18

## 2022-01-01 RX ADMIN — Medication 3 MG: at 20:29

## 2022-01-01 RX ADMIN — AMIODARONE HYDROCHLORIDE 200 MG: 200 TABLET ORAL at 08:54

## 2022-01-01 RX ADMIN — INSULIN LISPRO 2 UNITS: 100 INJECTION, SOLUTION INTRAVENOUS; SUBCUTANEOUS at 17:16

## 2022-01-01 RX ADMIN — CILOSTAZOL 100 MG: 100 TABLET ORAL at 20:53

## 2022-01-01 RX ADMIN — SODIUM CHLORIDE, PRESERVATIVE FREE 10 ML: 5 INJECTION INTRAVENOUS at 11:45

## 2022-01-01 RX ADMIN — Medication 1000 MCG: at 09:02

## 2022-01-01 RX ADMIN — INSULIN LISPRO 1 UNITS: 100 INJECTION, SOLUTION INTRAVENOUS; SUBCUTANEOUS at 12:43

## 2022-01-01 RX ADMIN — MEROPENEM 500 MG: 500 INJECTION, POWDER, FOR SOLUTION INTRAVENOUS at 03:02

## 2022-01-01 RX ADMIN — ASPIRIN 81 MG 81 MG: 81 TABLET ORAL at 09:00

## 2022-01-01 RX ADMIN — CILOSTAZOL 100 MG: 100 TABLET ORAL at 08:05

## 2022-01-01 RX ADMIN — METOPROLOL TARTRATE 50 MG: 50 TABLET, FILM COATED ORAL at 08:56

## 2022-01-01 RX ADMIN — SODIUM BICARBONATE 1300 MG: 648 TABLET ORAL at 20:39

## 2022-01-01 RX ADMIN — FAMOTIDINE 20 MG: 20 TABLET, FILM COATED ORAL at 09:08

## 2022-01-01 RX ADMIN — SENNOSIDES 17.2 MG: 8.6 TABLET, COATED ORAL at 20:59

## 2022-01-01 RX ADMIN — METOPROLOL TARTRATE 50 MG: 50 TABLET, FILM COATED ORAL at 11:54

## 2022-01-01 RX ADMIN — AMIODARONE HYDROCHLORIDE 150 MG: 50 INJECTION, SOLUTION INTRAVENOUS at 10:47

## 2022-01-01 RX ADMIN — CILOSTAZOL 100 MG: 100 TABLET ORAL at 22:28

## 2022-01-01 RX ADMIN — OXYCODONE HYDROCHLORIDE 7.5 MG: 5 TABLET ORAL at 05:47

## 2022-01-01 RX ADMIN — ATORVASTATIN CALCIUM 20 MG: 10 TABLET, FILM COATED ORAL at 20:45

## 2022-01-01 RX ADMIN — SODIUM CHLORIDE: 4.5 INJECTION, SOLUTION INTRAVENOUS at 13:00

## 2022-01-01 RX ADMIN — OXYCODONE 7.5 MG: 5 TABLET ORAL at 05:59

## 2022-01-01 RX ADMIN — PANTOPRAZOLE SODIUM 40 MG: 40 TABLET, DELAYED RELEASE ORAL at 09:02

## 2022-01-01 RX ADMIN — DIGOXIN 250 MCG: 0.25 INJECTION INTRAMUSCULAR; INTRAVENOUS at 23:24

## 2022-01-01 RX ADMIN — AMIODARONE HYDROCHLORIDE 200 MG: 200 TABLET ORAL at 08:37

## 2022-01-01 RX ADMIN — BUSPIRONE HYDROCHLORIDE 10 MG: 10 TABLET ORAL at 21:38

## 2022-01-01 RX ADMIN — SODIUM CHLORIDE 500 ML: 9 INJECTION, SOLUTION INTRAVENOUS at 12:13

## 2022-01-01 RX ADMIN — POLYETHYLENE GLYCOL 3350 17 G: 17 POWDER, FOR SOLUTION ORAL at 08:13

## 2022-01-01 RX ADMIN — HEPARIN SODIUM 12 UNITS/KG/HR: 5000 INJECTION INTRAVENOUS; SUBCUTANEOUS at 18:08

## 2022-01-01 RX ADMIN — MEROPENEM 500 MG: 500 INJECTION, POWDER, FOR SOLUTION INTRAVENOUS at 03:45

## 2022-01-01 RX ADMIN — ASPIRIN 81 MG 81 MG: 81 TABLET ORAL at 09:02

## 2022-01-01 RX ADMIN — PROPOFOL 35 MCG/KG/MIN: 10 INJECTION, EMULSION INTRAVENOUS at 17:55

## 2022-01-01 RX ADMIN — ATORVASTATIN CALCIUM 20 MG: 10 TABLET, FILM COATED ORAL at 22:28

## 2022-01-01 RX ADMIN — OXYCODONE HYDROCHLORIDE 7.5 MG: 5 TABLET ORAL at 17:55

## 2022-01-01 RX ADMIN — CLOPIDOGREL 75 MG: 75 TABLET, FILM COATED ORAL at 09:12

## 2022-01-01 RX ADMIN — PROPOFOL 10 MCG/KG/MIN: 10 INJECTION, EMULSION INTRAVENOUS at 09:26

## 2022-01-01 RX ADMIN — MEROPENEM 500 MG: 500 INJECTION, POWDER, FOR SOLUTION INTRAVENOUS at 14:27

## 2022-01-01 RX ADMIN — Medication 1000 MCG: at 01:39

## 2022-01-01 RX ADMIN — APIXABAN 2.5 MG: 2.5 TABLET, FILM COATED ORAL at 10:06

## 2022-01-01 RX ADMIN — AMIODARONE HYDROCHLORIDE 1 MG/MIN: 50 INJECTION, SOLUTION INTRAVENOUS at 11:08

## 2022-01-01 RX ADMIN — DEXAMETHASONE SODIUM PHOSPHATE 6 MG: 10 INJECTION INTRAMUSCULAR; INTRAVENOUS at 00:02

## 2022-01-01 RX ADMIN — OXYCODONE 7.5 MG: 5 TABLET ORAL at 17:38

## 2022-01-01 RX ADMIN — CILOSTAZOL 100 MG: 100 TABLET ORAL at 20:25

## 2022-01-01 RX ADMIN — SODIUM CHLORIDE, PRESERVATIVE FREE 10 ML: 5 INJECTION INTRAVENOUS at 08:37

## 2022-01-01 RX ADMIN — PANTOPRAZOLE SODIUM 40 MG: 40 TABLET, DELAYED RELEASE ORAL at 08:59

## 2022-01-01 RX ADMIN — Medication 1000 MCG: at 12:05

## 2022-01-01 RX ADMIN — METOPROLOL TARTRATE 75 MG: 25 TABLET ORAL at 07:21

## 2022-01-01 RX ADMIN — MEROPENEM 500 MG: 500 INJECTION, POWDER, FOR SOLUTION INTRAVENOUS at 03:42

## 2022-01-01 RX ADMIN — PANTOPRAZOLE SODIUM 40 MG: 40 TABLET, DELAYED RELEASE ORAL at 08:48

## 2022-01-01 RX ADMIN — SENNOSIDES 17.2 MG: 8.6 TABLET, COATED ORAL at 20:41

## 2022-01-01 RX ADMIN — DEXTROSE MONOHYDRATE 100 ML/HR: 50 INJECTION, SOLUTION INTRAVENOUS at 00:10

## 2022-01-01 RX ADMIN — INSULIN LISPRO 1 UNITS: 100 INJECTION, SOLUTION INTRAVENOUS; SUBCUTANEOUS at 09:00

## 2022-01-01 RX ADMIN — PROPOFOL 25 MCG/KG/MIN: 10 INJECTION, EMULSION INTRAVENOUS at 10:51

## 2022-01-01 RX ADMIN — ASPIRIN 81 MG 81 MG: 81 TABLET ORAL at 08:56

## 2022-01-01 RX ADMIN — HYDRALAZINE HYDROCHLORIDE 10 MG: 20 INJECTION INTRAMUSCULAR; INTRAVENOUS at 10:56

## 2022-01-01 RX ADMIN — MEROPENEM 500 MG: 500 INJECTION, POWDER, FOR SOLUTION INTRAVENOUS at 14:12

## 2022-01-01 RX ADMIN — PROPOFOL 20 MCG/KG/MIN: 10 INJECTION, EMULSION INTRAVENOUS at 10:43

## 2022-01-01 RX ADMIN — HYDROCORTISONE SODIUM SUCCINATE 100 MG: 100 INJECTION, POWDER, FOR SOLUTION INTRAMUSCULAR; INTRAVENOUS at 20:31

## 2022-01-01 RX ADMIN — OXYCODONE HYDROCHLORIDE 7.5 MG: 5 TABLET ORAL at 11:44

## 2022-01-01 RX ADMIN — ANTI-FUNGAL POWDER MICONAZOLE NITRATE TALC FREE: 1.42 POWDER TOPICAL at 21:23

## 2022-01-01 RX ADMIN — FAMOTIDINE 20 MG: 20 TABLET, FILM COATED ORAL at 08:54

## 2022-01-01 RX ADMIN — PROPOFOL 30 MCG/KG/MIN: 10 INJECTION, EMULSION INTRAVENOUS at 04:00

## 2022-01-01 RX ADMIN — SENNOSIDES 17.2 MG: 8.6 TABLET, COATED ORAL at 19:41

## 2022-01-01 RX ADMIN — AMIODARONE HYDROCHLORIDE 200 MG: 200 TABLET ORAL at 09:58

## 2022-01-01 RX ADMIN — MEROPENEM 500 MG: 500 INJECTION, POWDER, FOR SOLUTION INTRAVENOUS at 03:49

## 2022-01-01 RX ADMIN — INSULIN LISPRO 1 UNITS: 100 INJECTION, SOLUTION INTRAVENOUS; SUBCUTANEOUS at 00:31

## 2022-01-01 RX ADMIN — TOCILIZUMAB 600 MG: 20 INJECTION, SOLUTION, CONCENTRATE INTRAVENOUS at 01:40

## 2022-01-01 RX ADMIN — SODIUM CHLORIDE, PRESERVATIVE FREE 10 ML: 5 INJECTION INTRAVENOUS at 17:21

## 2022-01-01 RX ADMIN — METOPROLOL TARTRATE 75 MG: 25 TABLET ORAL at 08:10

## 2022-01-01 RX ADMIN — INSULIN LISPRO 1 UNITS: 100 INJECTION, SOLUTION INTRAVENOUS; SUBCUTANEOUS at 13:35

## 2022-01-01 RX ADMIN — HEPARIN SODIUM 5000 UNITS: 5000 INJECTION INTRAVENOUS; SUBCUTANEOUS at 01:39

## 2022-01-01 RX ADMIN — SODIUM CHLORIDE, PRESERVATIVE FREE 10 ML: 5 INJECTION INTRAVENOUS at 08:56

## 2022-01-01 RX ADMIN — BUSPIRONE HYDROCHLORIDE 10 MG: 10 TABLET ORAL at 15:00

## 2022-01-01 RX ADMIN — MEROPENEM 500 MG: 500 INJECTION, POWDER, FOR SOLUTION INTRAVENOUS at 03:28

## 2022-01-01 RX ADMIN — INSULIN LISPRO 1 UNITS: 100 INJECTION, SOLUTION INTRAVENOUS; SUBCUTANEOUS at 00:28

## 2022-01-01 RX ADMIN — INSULIN GLARGINE 20 UNITS: 100 INJECTION, SOLUTION SUBCUTANEOUS at 09:00

## 2022-01-01 RX ADMIN — PROPOFOL 10 MCG/KG/MIN: 10 INJECTION, EMULSION INTRAVENOUS at 01:19

## 2022-01-01 RX ADMIN — HYDRALAZINE HYDROCHLORIDE 10 MG: 20 INJECTION INTRAMUSCULAR; INTRAVENOUS at 15:15

## 2022-01-01 RX ADMIN — BUSPIRONE HYDROCHLORIDE 10 MG: 10 TABLET ORAL at 14:07

## 2022-01-01 RX ADMIN — SODIUM CHLORIDE 1000 ML: 9 INJECTION, SOLUTION INTRAVENOUS at 10:35

## 2022-01-01 RX ADMIN — POLYETHYLENE GLYCOL 3350 17 G: 17 POWDER, FOR SOLUTION ORAL at 19:56

## 2022-01-01 RX ADMIN — INSULIN LISPRO 1 UNITS: 100 INJECTION, SOLUTION INTRAVENOUS; SUBCUTANEOUS at 21:16

## 2022-01-01 RX ADMIN — SODIUM BICARBONATE 1300 MG: 648 TABLET ORAL at 09:53

## 2022-01-01 RX ADMIN — Medication 50 MCG/HR: at 08:34

## 2022-01-01 RX ADMIN — HYDRALAZINE HYDROCHLORIDE 10 MG: 20 INJECTION INTRAMUSCULAR; INTRAVENOUS at 11:50

## 2022-01-01 RX ADMIN — Medication 2 MCG/MIN: at 09:30

## 2022-01-01 RX ADMIN — OXYCODONE 7.5 MG: 5 TABLET ORAL at 11:33

## 2022-01-01 RX ADMIN — INSULIN LISPRO 1 UNITS: 100 INJECTION, SOLUTION INTRAVENOUS; SUBCUTANEOUS at 09:51

## 2022-01-01 RX ADMIN — SENNOSIDES 17.2 MG: 8.6 TABLET, COATED ORAL at 20:29

## 2022-01-01 RX ADMIN — PROPOFOL 20 MCG/KG/MIN: 10 INJECTION, EMULSION INTRAVENOUS at 01:49

## 2022-01-01 RX ADMIN — ASPIRIN 81 MG 81 MG: 81 TABLET ORAL at 09:03

## 2022-01-01 RX ADMIN — DEXTROSE MONOHYDRATE 100 ML/HR: 50 INJECTION, SOLUTION INTRAVENOUS at 13:26

## 2022-01-01 RX ADMIN — MAGNESIUM SULFATE HEPTAHYDRATE 1000 MG: 1 INJECTION, SOLUTION INTRAVENOUS at 11:12

## 2022-01-01 RX ADMIN — INSULIN LISPRO 1 UNITS: 100 INJECTION, SOLUTION INTRAVENOUS; SUBCUTANEOUS at 20:43

## 2022-01-01 RX ADMIN — PROPOFOL 20 MCG/KG/MIN: 10 INJECTION, EMULSION INTRAVENOUS at 13:04

## 2022-01-01 RX ADMIN — METOPROLOL TARTRATE 50 MG: 50 TABLET, FILM COATED ORAL at 08:04

## 2022-01-01 RX ADMIN — PANTOPRAZOLE SODIUM 40 MG: 40 TABLET, DELAYED RELEASE ORAL at 08:05

## 2022-01-01 RX ADMIN — INSULIN LISPRO 1 UNITS: 100 INJECTION, SOLUTION INTRAVENOUS; SUBCUTANEOUS at 16:15

## 2022-01-01 RX ADMIN — ASPIRIN 81 MG 81 MG: 81 TABLET ORAL at 09:12

## 2022-01-01 RX ADMIN — INSULIN LISPRO 1 UNITS: 100 INJECTION, SOLUTION INTRAVENOUS; SUBCUTANEOUS at 20:45

## 2022-01-01 RX ADMIN — METOPROLOL TARTRATE 75 MG: 25 TABLET ORAL at 20:09

## 2022-01-01 RX ADMIN — SENNOSIDES 17.2 MG: 8.6 TABLET, COATED ORAL at 20:44

## 2022-01-01 RX ADMIN — APIXABAN 2.5 MG: 2.5 TABLET, FILM COATED ORAL at 20:45

## 2022-01-01 RX ADMIN — ANTI-FUNGAL POWDER MICONAZOLE NITRATE TALC FREE: 1.42 POWDER TOPICAL at 09:08

## 2022-01-01 RX ADMIN — MEROPENEM 500 MG: 500 INJECTION, POWDER, FOR SOLUTION INTRAVENOUS at 15:04

## 2022-01-01 RX ADMIN — ASPIRIN 81 MG: 81 TABLET, CHEWABLE ORAL at 07:22

## 2022-01-01 RX ADMIN — SODIUM CHLORIDE, PRESERVATIVE FREE 10 ML: 5 INJECTION INTRAVENOUS at 23:30

## 2022-01-01 RX ADMIN — ASPIRIN 81 MG 81 MG: 81 TABLET ORAL at 08:36

## 2022-01-01 RX ADMIN — DEXAMETHASONE SODIUM PHOSPHATE 6 MG: 10 INJECTION INTRAMUSCULAR; INTRAVENOUS at 01:02

## 2022-01-01 RX ADMIN — LORAZEPAM 0.5 MG: 2 INJECTION INTRAMUSCULAR; INTRAVENOUS at 00:28

## 2022-01-01 RX ADMIN — PANTOPRAZOLE SODIUM 40 MG: 40 INJECTION, POWDER, LYOPHILIZED, FOR SOLUTION INTRAVENOUS at 17:20

## 2022-01-01 RX ADMIN — BUSPIRONE HYDROCHLORIDE 10 MG: 10 TABLET ORAL at 14:36

## 2022-01-01 RX ADMIN — HYDROCORTISONE SODIUM SUCCINATE 100 MG: 100 INJECTION, POWDER, FOR SOLUTION INTRAMUSCULAR; INTRAVENOUS at 03:42

## 2022-01-01 RX ADMIN — APIXABAN 2.5 MG: 2.5 TABLET, FILM COATED ORAL at 09:08

## 2022-01-01 RX ADMIN — INSULIN LISPRO 2 UNITS: 100 INJECTION, SOLUTION INTRAVENOUS; SUBCUTANEOUS at 09:12

## 2022-01-01 RX ADMIN — CLOPIDOGREL 75 MG: 75 TABLET, FILM COATED ORAL at 09:02

## 2022-01-01 RX ADMIN — INSULIN LISPRO 2 UNITS: 100 INJECTION, SOLUTION INTRAVENOUS; SUBCUTANEOUS at 03:45

## 2022-01-01 RX ADMIN — OXYCODONE HYDROCHLORIDE 7.5 MG: 5 TABLET ORAL at 18:04

## 2022-01-01 RX ADMIN — SENNOSIDES 17.2 MG: 8.6 TABLET, COATED ORAL at 21:16

## 2022-01-01 RX ADMIN — SODIUM CHLORIDE, PRESERVATIVE FREE 10 ML: 5 INJECTION INTRAVENOUS at 08:04

## 2022-01-01 RX ADMIN — OXYCODONE HYDROCHLORIDE 7.5 MG: 5 TABLET ORAL at 18:32

## 2022-01-01 RX ADMIN — SENNOSIDES 17.2 MG: 8.6 TABLET, COATED ORAL at 20:09

## 2022-01-01 RX ADMIN — INSULIN LISPRO 1 UNITS: 100 INJECTION, SOLUTION INTRAVENOUS; SUBCUTANEOUS at 07:22

## 2022-01-01 RX ADMIN — LORAZEPAM 0.5 MG: 2 INJECTION INTRAMUSCULAR at 19:41

## 2022-01-01 RX ADMIN — AMIODARONE HYDROCHLORIDE 200 MG: 200 TABLET ORAL at 08:14

## 2022-01-01 RX ADMIN — BUSPIRONE HYDROCHLORIDE 10 MG: 10 TABLET ORAL at 16:14

## 2022-01-01 RX ADMIN — POLYETHYLENE GLYCOL 3350 17 G: 17 POWDER, FOR SOLUTION ORAL at 00:17

## 2022-01-01 RX ADMIN — OXYCODONE 7.5 MG: 5 TABLET ORAL at 12:39

## 2022-01-01 RX ADMIN — FUROSEMIDE 20 MG: 10 INJECTION, SOLUTION INTRAMUSCULAR; INTRAVENOUS at 11:59

## 2022-01-01 RX ADMIN — Medication 8 MG/HR: at 18:29

## 2022-01-01 RX ADMIN — HYDRALAZINE HYDROCHLORIDE 10 MG: 20 INJECTION INTRAMUSCULAR; INTRAVENOUS at 23:21

## 2022-01-01 RX ADMIN — OXYCODONE HYDROCHLORIDE 7.5 MG: 5 TABLET ORAL at 05:50

## 2022-01-01 RX ADMIN — SODIUM CHLORIDE, PRESERVATIVE FREE 10 ML: 5 INJECTION INTRAVENOUS at 19:41

## 2022-01-01 RX ADMIN — METOPROLOL TARTRATE 50 MG: 50 TABLET, FILM COATED ORAL at 20:01

## 2022-01-01 RX ADMIN — ALPRAZOLAM 0.25 MG: 0.25 TABLET ORAL at 20:16

## 2022-01-01 RX ADMIN — OXYCODONE HYDROCHLORIDE 7.5 MG: 5 TABLET ORAL at 00:16

## 2022-01-01 RX ADMIN — ATORVASTATIN CALCIUM 20 MG: 10 TABLET, FILM COATED ORAL at 20:58

## 2022-01-01 RX ADMIN — FERROUS SULFATE TAB EC 325 MG (65 MG FE EQUIVALENT) 325 MG: 325 (65 FE) TABLET DELAYED RESPONSE at 08:48

## 2022-01-01 RX ADMIN — BUSPIRONE HYDROCHLORIDE 10 MG: 10 TABLET ORAL at 09:12

## 2022-01-01 RX ADMIN — SODIUM BICARBONATE: 84 INJECTION, SOLUTION INTRAVENOUS at 14:41

## 2022-01-01 RX ADMIN — PANTOPRAZOLE SODIUM 40 MG: 40 TABLET, DELAYED RELEASE ORAL at 06:28

## 2022-01-01 RX ADMIN — AMLODIPINE BESYLATE 10 MG: 10 TABLET ORAL at 08:41

## 2022-01-01 RX ADMIN — HYDROCORTISONE 20 MG: 10 TABLET ORAL at 10:40

## 2022-01-01 RX ADMIN — POTASSIUM CHLORIDE 40 MEQ: 1500 TABLET, EXTENDED RELEASE ORAL at 09:03

## 2022-01-01 RX ADMIN — ANTI-FUNGAL POWDER MICONAZOLE NITRATE TALC FREE: 1.42 POWDER TOPICAL at 22:30

## 2022-01-01 RX ADMIN — Medication 75 MCG/HR: at 10:50

## 2022-01-01 RX ADMIN — OXYCODONE 7.5 MG: 5 TABLET ORAL at 18:01

## 2022-01-01 RX ADMIN — ANTI-FUNGAL POWDER MICONAZOLE NITRATE TALC FREE: 1.42 POWDER TOPICAL at 10:12

## 2022-01-01 RX ADMIN — BUSPIRONE HYDROCHLORIDE 10 MG: 10 TABLET ORAL at 08:11

## 2022-01-01 RX ADMIN — DEXAMETHASONE SODIUM PHOSPHATE 6 MG: 10 INJECTION INTRAMUSCULAR; INTRAVENOUS at 00:29

## 2022-01-01 RX ADMIN — PROPOFOL 10 MCG/KG/MIN: 10 INJECTION, EMULSION INTRAVENOUS at 18:57

## 2022-01-01 RX ADMIN — METOPROLOL TARTRATE 75 MG: 25 TABLET ORAL at 20:17

## 2022-01-01 RX ADMIN — GUAIFENESIN 600 MG: 600 TABLET, EXTENDED RELEASE ORAL at 08:48

## 2022-01-01 RX ADMIN — INSULIN LISPRO 2 UNITS: 100 INJECTION, SOLUTION INTRAVENOUS; SUBCUTANEOUS at 00:07

## 2022-01-01 RX ADMIN — AMIODARONE HYDROCHLORIDE 200 MG: 200 TABLET ORAL at 11:31

## 2022-01-01 RX ADMIN — APIXABAN 2.5 MG: 2.5 TABLET, FILM COATED ORAL at 21:00

## 2022-01-01 RX ADMIN — INSULIN LISPRO 1 UNITS: 100 INJECTION, SOLUTION INTRAVENOUS; SUBCUTANEOUS at 12:40

## 2022-01-01 RX ADMIN — SODIUM CHLORIDE, PRESERVATIVE FREE 10 ML: 5 INJECTION INTRAVENOUS at 20:56

## 2022-01-01 RX ADMIN — LORAZEPAM 0.5 MG: 2 INJECTION INTRAMUSCULAR at 23:43

## 2022-01-01 RX ADMIN — PANTOPRAZOLE SODIUM 40 MG: 40 INJECTION, POWDER, LYOPHILIZED, FOR SOLUTION INTRAVENOUS at 08:39

## 2022-01-01 RX ADMIN — MEROPENEM 500 MG: 500 INJECTION, POWDER, FOR SOLUTION INTRAVENOUS at 15:03

## 2022-01-01 RX ADMIN — ASPIRIN 81 MG 81 MG: 81 TABLET ORAL at 08:28

## 2022-01-01 RX ADMIN — CLOPIDOGREL 75 MG: 75 TABLET, FILM COATED ORAL at 08:24

## 2022-01-01 RX ADMIN — MORPHINE SULFATE 2 MG: 2 INJECTION, SOLUTION INTRAMUSCULAR; INTRAVENOUS at 16:54

## 2022-01-01 RX ADMIN — HYDROCORTISONE 20 MG: 10 TABLET ORAL at 22:07

## 2022-01-01 RX ADMIN — SENNOSIDES 17.2 MG: 8.6 TABLET, COATED ORAL at 20:30

## 2022-01-01 RX ADMIN — APIXABAN 2.5 MG: 2.5 TABLET, FILM COATED ORAL at 20:41

## 2022-01-01 RX ADMIN — AMLODIPINE BESYLATE 10 MG: 10 TABLET ORAL at 08:54

## 2022-01-01 RX ADMIN — INSULIN LISPRO 2 UNITS: 100 INJECTION, SOLUTION INTRAVENOUS; SUBCUTANEOUS at 20:35

## 2022-01-01 RX ADMIN — HEPARIN SODIUM 5000 UNITS: 5000 INJECTION INTRAVENOUS; SUBCUTANEOUS at 08:56

## 2022-01-01 RX ADMIN — SODIUM CHLORIDE, PRESERVATIVE FREE 10 ML: 5 INJECTION INTRAVENOUS at 21:29

## 2022-01-01 RX ADMIN — APIXABAN 2.5 MG: 2.5 TABLET, FILM COATED ORAL at 10:11

## 2022-01-01 RX ADMIN — DEXAMETHASONE SODIUM PHOSPHATE 6 MG: 10 INJECTION INTRAMUSCULAR; INTRAVENOUS at 10:43

## 2022-01-01 RX ADMIN — METOPROLOL TARTRATE 50 MG: 25 TABLET ORAL at 20:31

## 2022-01-01 RX ADMIN — METOPROLOL TARTRATE 50 MG: 25 TABLET ORAL at 20:55

## 2022-01-01 RX ADMIN — Medication 50 MCG/HR: at 21:24

## 2022-01-01 RX ADMIN — HEPARIN SODIUM 5000 UNITS: 5000 INJECTION INTRAVENOUS; SUBCUTANEOUS at 21:30

## 2022-01-01 RX ADMIN — ASPIRIN 81 MG 81 MG: 81 TABLET ORAL at 09:54

## 2022-01-01 RX ADMIN — MEROPENEM 500 MG: 500 INJECTION, POWDER, FOR SOLUTION INTRAVENOUS at 15:52

## 2022-01-01 RX ADMIN — DEXAMETHASONE SODIUM PHOSPHATE 6 MG: 10 INJECTION INTRAMUSCULAR; INTRAVENOUS at 23:45

## 2022-01-01 RX ADMIN — SENNOSIDES 17.2 MG: 8.6 TABLET, COATED ORAL at 21:28

## 2022-01-01 RX ADMIN — METOPROLOL TARTRATE 50 MG: 25 TABLET ORAL at 20:27

## 2022-01-01 RX ADMIN — MEROPENEM 500 MG: 500 INJECTION, POWDER, FOR SOLUTION INTRAVENOUS at 14:21

## 2022-01-01 RX ADMIN — POTASSIUM CHLORIDE 20 MEQ: 1500 TABLET, EXTENDED RELEASE ORAL at 13:59

## 2022-01-01 RX ADMIN — ASPIRIN 81 MG 81 MG: 81 TABLET ORAL at 09:58

## 2022-01-01 RX ADMIN — Medication 1000 MCG: at 10:59

## 2022-01-01 RX ADMIN — INSULIN LISPRO 1 UNITS: 100 INJECTION, SOLUTION INTRAVENOUS; SUBCUTANEOUS at 04:31

## 2022-01-01 RX ADMIN — SODIUM CHLORIDE, PRESERVATIVE FREE 10 ML: 5 INJECTION INTRAVENOUS at 11:44

## 2022-01-01 RX ADMIN — Medication 3 MG: at 22:09

## 2022-01-01 RX ADMIN — MEROPENEM 500 MG: 500 INJECTION, POWDER, FOR SOLUTION INTRAVENOUS at 15:31

## 2022-01-01 RX ADMIN — FUROSEMIDE 80 MG: 10 INJECTION, SOLUTION INTRAMUSCULAR; INTRAVENOUS at 12:23

## 2022-01-01 RX ADMIN — BUSPIRONE HYDROCHLORIDE 10 MG: 10 TABLET ORAL at 21:04

## 2022-01-01 RX ADMIN — HYDROCORTISONE SODIUM SUCCINATE 100 MG: 100 INJECTION, POWDER, FOR SOLUTION INTRAMUSCULAR; INTRAVENOUS at 12:58

## 2022-01-01 RX ADMIN — OXYCODONE 7.5 MG: 5 TABLET ORAL at 23:58

## 2022-01-01 RX ADMIN — CLOPIDOGREL 75 MG: 75 TABLET, FILM COATED ORAL at 09:39

## 2022-01-01 RX ADMIN — METOPROLOL TARTRATE 50 MG: 25 TABLET ORAL at 10:37

## 2022-01-01 RX ADMIN — ATORVASTATIN CALCIUM 20 MG: 10 TABLET, FILM COATED ORAL at 21:16

## 2022-01-01 RX ADMIN — BUSPIRONE HYDROCHLORIDE 10 MG: 10 TABLET ORAL at 20:43

## 2022-01-01 RX ADMIN — SENNOSIDES 17.2 MG: 8.6 TABLET, COATED ORAL at 21:00

## 2022-01-01 RX ADMIN — METOPROLOL TARTRATE 75 MG: 25 TABLET ORAL at 08:18

## 2022-01-01 RX ADMIN — METOPROLOL TARTRATE 50 MG: 50 TABLET, FILM COATED ORAL at 10:10

## 2022-01-01 RX ADMIN — CLOPIDOGREL 75 MG: 75 TABLET, FILM COATED ORAL at 07:22

## 2022-01-01 RX ADMIN — SODIUM BICARBONATE 1300 MG: 648 TABLET ORAL at 08:59

## 2022-01-01 RX ADMIN — METOPROLOL TARTRATE 50 MG: 25 TABLET ORAL at 09:12

## 2022-01-01 RX ADMIN — MEROPENEM 500 MG: 500 INJECTION, POWDER, FOR SOLUTION INTRAVENOUS at 04:29

## 2022-01-01 RX ADMIN — SALINE NASAL SPRAY 1 SPRAY: 1.5 SOLUTION NASAL at 20:56

## 2022-01-01 RX ADMIN — SODIUM CHLORIDE, PRESERVATIVE FREE 10 ML: 5 INJECTION INTRAVENOUS at 10:02

## 2022-01-01 RX ADMIN — SALINE NASAL SPRAY 1 SPRAY: 1.5 SOLUTION NASAL at 19:41

## 2022-01-01 RX ADMIN — CLOPIDOGREL 75 MG: 75 TABLET, FILM COATED ORAL at 10:10

## 2022-01-01 RX ADMIN — ATORVASTATIN CALCIUM 20 MG: 10 TABLET, FILM COATED ORAL at 20:25

## 2022-01-01 RX ADMIN — OXYCODONE HYDROCHLORIDE 7.5 MG: 5 TABLET ORAL at 05:46

## 2022-01-01 RX ADMIN — ANTI-FUNGAL POWDER MICONAZOLE NITRATE TALC FREE: 1.42 POWDER TOPICAL at 08:00

## 2022-01-01 RX ADMIN — SODIUM CHLORIDE, PRESERVATIVE FREE 10 ML: 5 INJECTION INTRAVENOUS at 10:33

## 2022-01-01 RX ADMIN — BUSPIRONE HYDROCHLORIDE 10 MG: 10 TABLET ORAL at 08:28

## 2022-01-01 RX ADMIN — HYDROCORTISONE 20 MG: 10 TABLET ORAL at 20:40

## 2022-01-01 RX ADMIN — SENNOSIDES 17.2 MG: 8.6 TABLET, COATED ORAL at 20:56

## 2022-01-01 RX ADMIN — Medication 1000 MCG: at 08:29

## 2022-01-01 RX ADMIN — INSULIN LISPRO 1 UNITS: 100 INJECTION, SOLUTION INTRAVENOUS; SUBCUTANEOUS at 20:00

## 2022-01-01 RX ADMIN — SODIUM BICARBONATE 1300 MG: 648 TABLET ORAL at 10:30

## 2022-01-01 RX ADMIN — INSULIN LISPRO 1 UNITS: 100 INJECTION, SOLUTION INTRAVENOUS; SUBCUTANEOUS at 00:12

## 2022-01-01 RX ADMIN — INSULIN LISPRO 2 UNITS: 100 INJECTION, SOLUTION INTRAVENOUS; SUBCUTANEOUS at 04:00

## 2022-01-01 RX ADMIN — INSULIN LISPRO 1 UNITS: 100 INJECTION, SOLUTION INTRAVENOUS; SUBCUTANEOUS at 04:33

## 2022-01-01 RX ADMIN — OXYCODONE HYDROCHLORIDE 7.5 MG: 5 TABLET ORAL at 23:38

## 2022-01-01 RX ADMIN — PROPOFOL 20 MCG/KG/MIN: 10 INJECTION, EMULSION INTRAVENOUS at 11:47

## 2022-01-01 RX ADMIN — SODIUM CHLORIDE, PRESERVATIVE FREE 10 ML: 5 INJECTION INTRAVENOUS at 09:52

## 2022-01-01 RX ADMIN — APIXABAN 2.5 MG: 2.5 TABLET, FILM COATED ORAL at 07:59

## 2022-01-01 RX ADMIN — INSULIN LISPRO 1 UNITS: 100 INJECTION, SOLUTION INTRAVENOUS; SUBCUTANEOUS at 00:22

## 2022-01-01 RX ADMIN — INSULIN LISPRO 2 UNITS: 100 INJECTION, SOLUTION INTRAVENOUS; SUBCUTANEOUS at 08:48

## 2022-01-01 ASSESSMENT — ENCOUNTER SYMPTOMS
SHORTNESS OF BREATH: 1
GASTROINTESTINAL NEGATIVE: 1
GASTROINTESTINAL NEGATIVE: 1
SHORTNESS OF BREATH: 1
NAUSEA: 0
GASTROINTESTINAL NEGATIVE: 1
COLOR CHANGE: 1
COUGH: 0
GASTROINTESTINAL NEGATIVE: 1
DIARRHEA: 0
GASTROINTESTINAL NEGATIVE: 1
BLOOD IN STOOL: 0
DIARRHEA: 0
COLOR CHANGE: 1
BLOOD IN STOOL: 0
COLOR CHANGE: 1
SHORTNESS OF BREATH: 1
GASTROINTESTINAL NEGATIVE: 1
GASTROINTESTINAL NEGATIVE: 1
VOMITING: 0
COLOR CHANGE: 1
CHEST TIGHTNESS: 0
VOMITING: 0
COLOR CHANGE: 1
SHORTNESS OF BREATH: 1
SHORTNESS OF BREATH: 1
BLOOD IN STOOL: 0
GASTROINTESTINAL NEGATIVE: 1
CHEST TIGHTNESS: 0
GASTROINTESTINAL NEGATIVE: 1
SHORTNESS OF BREATH: 1
COLOR CHANGE: 1
WHEEZING: 0
COLOR CHANGE: 1
DIARRHEA: 0
SHORTNESS OF BREATH: 1
SHORTNESS OF BREATH: 1
GASTROINTESTINAL NEGATIVE: 1
VOMITING: 0
CHEST TIGHTNESS: 0
DIARRHEA: 0
CONSTIPATION: 0
COUGH: 0
GASTROINTESTINAL NEGATIVE: 1
DIARRHEA: 1
SHORTNESS OF BREATH: 1
GASTROINTESTINAL NEGATIVE: 1
BLOOD IN STOOL: 0
CHEST TIGHTNESS: 0
NAUSEA: 1
ABDOMINAL PAIN: 0
SHORTNESS OF BREATH: 1
ABDOMINAL PAIN: 0
CONSTIPATION: 0
SHORTNESS OF BREATH: 1
GASTROINTESTINAL NEGATIVE: 1
NAUSEA: 0
NAUSEA: 0
VOMITING: 0
COLOR CHANGE: 1
WHEEZING: 0
WHEEZING: 0
GASTROINTESTINAL NEGATIVE: 1
CONSTIPATION: 0
COUGH: 0
NAUSEA: 0
SHORTNESS OF BREATH: 1
SHORTNESS OF BREATH: 1
GASTROINTESTINAL NEGATIVE: 1
COLOR CHANGE: 1
GASTROINTESTINAL NEGATIVE: 1
SHORTNESS OF BREATH: 1
COUGH: 1
SHORTNESS OF BREATH: 1
GASTROINTESTINAL NEGATIVE: 1
SHORTNESS OF BREATH: 1
WHEEZING: 0
ABDOMINAL PAIN: 0
COLOR CHANGE: 1
CONSTIPATION: 0
COLOR CHANGE: 1
SHORTNESS OF BREATH: 1
TACHYPNEA: 1
GASTROINTESTINAL NEGATIVE: 1
ABDOMINAL PAIN: 0
COLOR CHANGE: 1
COUGH: 0
COLOR CHANGE: 1
SHORTNESS OF BREATH: 1
SHORTNESS OF BREATH: 1

## 2022-01-01 ASSESSMENT — PULMONARY FUNCTION TESTS
PIF_VALUE: 31
PIF_VALUE: 12
PIF_VALUE: 29
PIF_VALUE: 26
PIF_VALUE: 29
PIF_VALUE: 30
PIF_VALUE: 12
PIF_VALUE: 26
PIF_VALUE: 27
PIF_VALUE: 14
PIF_VALUE: 20
PIF_VALUE: 31
PIF_VALUE: 12
PIF_VALUE: 33
PIF_VALUE: 12
PIF_VALUE: 29
PIF_VALUE: 26
PIF_VALUE: 13
PIF_VALUE: 27
PIF_VALUE: 28
PIF_VALUE: 26
PIF_VALUE: 23
PIF_VALUE: 26
PIF_VALUE: 12
PIF_VALUE: 28
PIF_VALUE: 28
PIF_VALUE: 17
PIF_VALUE: 25
PIF_VALUE: 12
PIF_VALUE: 27
PIF_VALUE: 29
PIF_VALUE: 33
PIF_VALUE: 25
PIF_VALUE: 21
PIF_VALUE: 12
PIF_VALUE: 29
PIF_VALUE: 21
PIF_VALUE: 26
PIF_VALUE: 29
PIF_VALUE: 15
PIF_VALUE: 34
PIF_VALUE: 24
PIF_VALUE: 23
PIF_VALUE: 29
PIF_VALUE: 24
PIF_VALUE: 31
PIF_VALUE: 28
PIF_VALUE: 26
PIF_VALUE: 34
PIF_VALUE: 33
PIF_VALUE: 29
PIF_VALUE: 12
PIF_VALUE: 33
PIF_VALUE: 32
PIF_VALUE: 32
PIF_VALUE: 30
PIF_VALUE: 30
PIF_VALUE: 32
PIF_VALUE: 28
PIF_VALUE: 30
PIF_VALUE: 12
PIF_VALUE: 31
PIF_VALUE: 12
PIF_VALUE: 24
PIF_VALUE: 12
PIF_VALUE: 30
PIF_VALUE: 27
PIF_VALUE: 30
PIF_VALUE: 28
PIF_VALUE: 33
PIF_VALUE: 29
PIF_VALUE: 30
PIF_VALUE: 33
PIF_VALUE: 12
PIF_VALUE: 38
PIF_VALUE: 28
PIF_VALUE: 33
PIF_VALUE: 33
PIF_VALUE: 34
PIF_VALUE: 26
PIF_VALUE: 26
PIF_VALUE: 22
PIF_VALUE: 27
PIF_VALUE: 15
PIF_VALUE: 27
PIF_VALUE: 12
PIF_VALUE: 28
PIF_VALUE: 29
PIF_VALUE: 31
PIF_VALUE: 29
PIF_VALUE: 12
PIF_VALUE: 29
PIF_VALUE: 35
PIF_VALUE: 24
PIF_VALUE: 30
PIF_VALUE: 31
PIF_VALUE: 22
PIF_VALUE: 28
PIF_VALUE: 29
PIF_VALUE: 14
PIF_VALUE: 43
PIF_VALUE: 30
PIF_VALUE: 31
PIF_VALUE: 30
PIF_VALUE: 12
PIF_VALUE: 28
PIF_VALUE: 27
PIF_VALUE: 30
PIF_VALUE: 32
PIF_VALUE: 29
PIF_VALUE: 29
PIF_VALUE: 26
PIF_VALUE: 12
PIF_VALUE: 12
PIF_VALUE: 13
PIF_VALUE: 29
PIF_VALUE: 29
PIF_VALUE: 27
PIF_VALUE: 12
PIF_VALUE: 24
PIF_VALUE: 28
PIF_VALUE: 30
PIF_VALUE: 29
PIF_VALUE: 30
PIF_VALUE: 24
PIF_VALUE: 29
PIF_VALUE: 31
PIF_VALUE: 30
PIF_VALUE: 32
PIF_VALUE: 12
PIF_VALUE: 12
PIF_VALUE: 29
PIF_VALUE: 29
PIF_VALUE: 12
PIF_VALUE: 28
PIF_VALUE: 12
PIF_VALUE: 29
PIF_VALUE: 28
PIF_VALUE: 30
PIF_VALUE: 31
PIF_VALUE: 12
PIF_VALUE: 30
PIF_VALUE: 26
PIF_VALUE: 24
PIF_VALUE: 26
PIF_VALUE: 30
PIF_VALUE: 33
PIF_VALUE: 29
PIF_VALUE: 27
PIF_VALUE: 30
PIF_VALUE: 32
PIF_VALUE: 26
PIF_VALUE: 15
PIF_VALUE: 29
PIF_VALUE: 31
PIF_VALUE: 12
PIF_VALUE: 27
PIF_VALUE: 29
PIF_VALUE: 26
PIF_VALUE: 33
PIF_VALUE: 12
PIF_VALUE: 31
PIF_VALUE: 32
PIF_VALUE: 27
PIF_VALUE: 30
PIF_VALUE: 26
PIF_VALUE: 29
PIF_VALUE: 12
PIF_VALUE: 12
PIF_VALUE: 30
PIF_VALUE: 34
PIF_VALUE: 12
PIF_VALUE: 36
PIF_VALUE: 29
PIF_VALUE: 12
PIF_VALUE: 33
PIF_VALUE: 29
PIF_VALUE: 12
PIF_VALUE: 12
PIF_VALUE: 26
PIF_VALUE: 29
PIF_VALUE: 27
PIF_VALUE: 32
PIF_VALUE: 27
PIF_VALUE: 31
PIF_VALUE: 17
PIF_VALUE: 12
PIF_VALUE: 29
PIF_VALUE: 12
PIF_VALUE: 12
PIF_VALUE: 28
PIF_VALUE: 33
PIF_VALUE: 12
PIF_VALUE: 32
PIF_VALUE: 29
PIF_VALUE: 26
PIF_VALUE: 30
PIF_VALUE: 31
PIF_VALUE: 12
PIF_VALUE: 28
PIF_VALUE: 26
PIF_VALUE: 12
PIF_VALUE: 28
PIF_VALUE: 29
PIF_VALUE: 12
PIF_VALUE: 25
PIF_VALUE: 33
PIF_VALUE: 29
PIF_VALUE: 12
PIF_VALUE: 33
PIF_VALUE: 30
PIF_VALUE: 12
PIF_VALUE: 12
PIF_VALUE: 27
PIF_VALUE: 27
PIF_VALUE: 29
PIF_VALUE: 29
PIF_VALUE: 12
PIF_VALUE: 40
PIF_VALUE: 28
PIF_VALUE: 12
PIF_VALUE: 12
PIF_VALUE: 20
PIF_VALUE: 32
PIF_VALUE: 31
PIF_VALUE: 12
PIF_VALUE: 22
PIF_VALUE: 35
PIF_VALUE: 28
PIF_VALUE: 12
PIF_VALUE: 28
PIF_VALUE: 31
PIF_VALUE: 28
PIF_VALUE: 12
PIF_VALUE: 30
PIF_VALUE: 34
PIF_VALUE: 30
PIF_VALUE: 32
PIF_VALUE: 28
PIF_VALUE: 26
PIF_VALUE: 34
PIF_VALUE: 33
PIF_VALUE: 29
PIF_VALUE: 26
PIF_VALUE: 32
PIF_VALUE: 12
PIF_VALUE: 29
PIF_VALUE: 28
PIF_VALUE: 27
PIF_VALUE: 29
PIF_VALUE: 25
PIF_VALUE: 35
PIF_VALUE: 23
PIF_VALUE: 29
PIF_VALUE: 25
PIF_VALUE: 12
PIF_VALUE: 28
PIF_VALUE: 12
PIF_VALUE: 30
PIF_VALUE: 29
PIF_VALUE: 29

## 2022-01-01 ASSESSMENT — PAIN SCALES - GENERAL
PAINLEVEL_OUTOF10: 0
PAINLEVEL_OUTOF10: 0
PAINLEVEL_OUTOF10: 7
PAINLEVEL_OUTOF10: 0
PAINLEVEL_OUTOF10: 10
PAINLEVEL_OUTOF10: 0
PAINLEVEL_OUTOF10: 7
PAINLEVEL_OUTOF10: 2
PAINLEVEL_OUTOF10: 0
PAINLEVEL_OUTOF10: 8
PAINLEVEL_OUTOF10: 0
PAINLEVEL_OUTOF10: 8
PAINLEVEL_OUTOF10: 0
PAINLEVEL_OUTOF10: 8
PAINLEVEL_OUTOF10: 0
PAINLEVEL_OUTOF10: 3
PAINLEVEL_OUTOF10: 0
PAINLEVEL_OUTOF10: 2
PAINLEVEL_OUTOF10: 0
PAINLEVEL_OUTOF10: 0
PAINLEVEL_OUTOF10: 8
PAINLEVEL_OUTOF10: 0
PAINLEVEL_OUTOF10: 8
PAINLEVEL_OUTOF10: 0
PAINLEVEL_OUTOF10: 3
PAINLEVEL_OUTOF10: 0
PAINLEVEL_OUTOF10: 5
PAINLEVEL_OUTOF10: 4
PAINLEVEL_OUTOF10: 0
PAINLEVEL_OUTOF10: 3
PAINLEVEL_OUTOF10: 0
PAINLEVEL_OUTOF10: 7
PAINLEVEL_OUTOF10: 4
PAINLEVEL_OUTOF10: 0
PAINLEVEL_OUTOF10: 3
PAINLEVEL_OUTOF10: 0

## 2022-01-01 ASSESSMENT — PAIN DESCRIPTION - LOCATION
LOCATION: ABDOMEN;CHEST
LOCATION: ARM

## 2022-01-01 ASSESSMENT — PAIN DESCRIPTION - PAIN TYPE
TYPE: OTHER (COMMENT)
TYPE: ACUTE PAIN
TYPE: ACUTE PAIN

## 2022-01-01 ASSESSMENT — PAIN DESCRIPTION - ORIENTATION
ORIENTATION: LEFT;RIGHT;LOWER
ORIENTATION: LEFT;RIGHT;UPPER
ORIENTATION: RIGHT

## 2022-01-01 ASSESSMENT — PAIN DESCRIPTION - DESCRIPTORS: DESCRIPTORS: DISCOMFORT

## 2022-01-08 PROBLEM — E78.5 HYPERLIPIDEMIA: Status: ACTIVE | Noted: 2022-01-01

## 2022-01-08 PROBLEM — N18.32 STAGE 3B CHRONIC KIDNEY DISEASE (HCC): Status: ACTIVE | Noted: 2022-01-01

## 2022-01-08 PROBLEM — E11.42 TYPE 2 DIABETES MELLITUS WITH DIABETIC POLYNEUROPATHY (HCC): Status: ACTIVE | Noted: 2022-01-01

## 2022-01-08 PROBLEM — J96.01 ACUTE HYPOXEMIC RESPIRATORY FAILURE DUE TO COVID-19 (HCC): Status: ACTIVE | Noted: 2022-01-01

## 2022-01-08 PROBLEM — E11.9 DIABETES MELLITUS (HCC): Status: ACTIVE | Noted: 2022-01-01

## 2022-01-08 PROBLEM — U07.1 PNEUMONIA DUE TO COVID-19 VIRUS: Status: ACTIVE | Noted: 2022-01-01

## 2022-01-08 PROBLEM — U07.1 ACUTE HYPOXEMIC RESPIRATORY FAILURE DUE TO COVID-19 (HCC): Status: ACTIVE | Noted: 2022-01-01

## 2022-01-08 PROBLEM — N17.9 ACUTE KIDNEY INJURY SUPERIMPOSED ON CHRONIC KIDNEY DISEASE (HCC): Status: ACTIVE | Noted: 2022-01-01

## 2022-01-08 PROBLEM — N18.9 ACUTE KIDNEY INJURY SUPERIMPOSED ON CHRONIC KIDNEY DISEASE (HCC): Status: ACTIVE | Noted: 2022-01-01

## 2022-01-08 PROBLEM — I10 HYPERTENSION: Status: ACTIVE | Noted: 2022-01-01

## 2022-01-08 PROBLEM — J12.82 PNEUMONIA DUE TO COVID-19 VIRUS: Status: ACTIVE | Noted: 2022-01-01

## 2022-01-08 NOTE — ED NOTES
DNR-CCA paperwork signed by Dr. Luiza Bell and patient. Paper form attached to chart.          Bernardo Granda RN  01/08/22 4092

## 2022-01-08 NOTE — ED TRIAGE NOTES
Patient brought to ED from home by his son-in-law. Patient has been exposed to other family members that have tested positive for covid-19. Patient reports that he has been feeling short of breath that is worse with exertion, family states that he is unable to walk to bathroom without becoming severely SOB and dizzy. Patient reports having a cough, loss of appetite, nausea, vomiting, and diarrhea. Family member reports that when they checked patient's oxygen yesterday at home with a home pulse ox, the reading was 84%, but patient did not want to come to ED at that time. Patient arrives to ED alert and oriented x3, Spo2: 80% on room air, tachypneic, mottling noted to torso and extremities. Patient reports having chest and abdominal discomfort.

## 2022-01-08 NOTE — H&P
[facility]  Office: [address], DO, Meseret Matta, DO, [facility], DO, Yared Catalania Blood, DO, Yordan Patterson MD, Steven Fields MD, Darci Garcia MD, Gregory Ybarra MD, Regis Eugene MD, Damián Correia MD, Raven Grace MD, America Martin, DO, Bulmaro Stokes, DO, Chelsea Sabillon MD,  Bear Martines, DO, Davon Aguirre MD, Kimmie Sanchez MD, Tasha Williamson MD, Wil Rubin MD, Sun Haynes MD, Delores Roberto MD, Kevon Garcia MD, [facility], [facility], CNP, Graciela Mondragon, CNP, Eugene Lopez, CNS, Tyree Jacobsen, CNP, Deric Moffett, CNP, Cassandra Howard, CNP, Francisca Epley, CNP, Michael Barrera, CNP, Baljit Hammonds PA-C, Melani Arenas, DNP, Pedro Luis Rueda, DNP, Angi Lorenzana, CNP, Venancio Cho, CNP, Colin Rodgers, CNP, Mario Del Rio, CNP, Georgiana Bowles, CNP, Deisi Lorenzo, CNP         [address]    HISTORY AND PHYSICAL EXAMINATION            Date:   1/8/2022  Patient name:  Yancy Simms  Date of admission:  1/8/2022  9:49 AM  MRN:   [id number]  Account:  [de-identified]  YOB: 1940  PCP:    MOHAMUD Cardenas CNP  Room:   ER11/ER11  Code Status:    No Order    Chief Complaint:     Chief Complaint   Patient presents with    Shortness of Breath    Nausea & Vomiting       History Obtained From:     patient    History of Present Illness:     Yancy Simms is a [de-identified] y.o. Non- / non  male who presents with Shortness of Breath and Nausea & Vomiting   and is admitted to the hospital for the management of Pneumonia due to COVID-19 virus. This is an 80-year-old male that presents with increasing shortness of breath with associated nausea vomiting over the last 7 to 10 days. Shortly after Ludwig time him and his wife both became ill testing positive for COVID.   She rapidly recovered but he has been progressively sick with increasing shortness of breath, cough, fatigue and intolerance of activity. He becomes profoundly short of breath and worn out just walking approximately 10 feet. He has had cough with intermittent sputum duction associate with the symptoms. Over the last 24 to 40 hours she had minimal urine output and ultimately presented to the emergency room for evaluation today. He has been without evidence of COVID-19 pneumonia with hypoxia with initial oxygen saturations less than 80%. He has been placed on high flow oxygen and started on Decadron and has Actemra ordered here in the emergency room. He denies any further fevers or chills, had low-grade fever at the onset of symptoms. No treatments have been tried other than over-the-counter cough medications. He is unvaccinated. Past Medical History:     Past Medical History:   Diagnosis Date    Chronic lower back pain     Cobalamin deficiency     [facility]     Diabetes mellitus ([facility]     Hyperlipidemia     Hypertension     Malignant neoplasm of colon ([facility]     [facility]         Past Surgical History:     Past Surgical History:   Procedure Laterality Date    ARTERY SURGERY          Medications Prior to Admission:     Prior to Admission medications    Medication Sig Start Date End Date Taking?  Authorizing Provider   aspirin 325 MG tablet Take 81 mg by mouth daily  3/31/21  Yes Historical Provider, MD   atorvastatin (LIPITOR) 40 MG tablet Take 20 mg by mouth nightly 10/25/21  Yes Historical Provider, MD   cilostazol (PLETAL) 100 MG tablet Take 100 mg by mouth 2 times daily 10/25/21  Yes Historical Provider, MD   clopidogrel (PLAVIX) 75 MG tablet Take 75 mg by mouth daily 10/25/21  Yes Historical Provider, MD   lisinopril (PRINIVIL;ZESTRIL) 30 MG tablet Take 30 mg by mouth daily 10/25/21  Yes Historical Provider, MD   metoprolol tartrate (LOPRESSOR) 50 MG tablet Take 50 mg by mouth daily 10/25/21  Yes Historical Provider, MD   vitamin B-12 (CYANOCOBALAMIN) 1000 MCG tablet Take 1,000 mcg by mouth daily   Yes Historical Provider, MD        Allergies:     Patient has no known allergies. Social History:     Tobacco:    has no history on file for tobacco use. Alcohol:      has no history on file for alcohol use. Drug Use:  has no history on file for drug use. Family History:     History reviewed. No pertinent family history. Both parents are , believed to be old age    Review of Systems:     Positive and Negative as described in HPI. CONSTITUTIONAL:  negative for sweats,  weight loss, positive for fevers, chills and fatigue  HEENT:  negative for vision, hearing changes, runny nose, throat pain  RESPIRATORY: Positive for shortness of breath, cough, congestion, negative for wheezing  CARDIOVASCULAR:  negative for chest pain, palpitations, positive for exertional dyspnea  GASTROINTESTINAL: Positive for nausea, vomiting, diarrhea, negative for constipation, change in bowel habits, abdominal pain   GENITOURINARY:  negative for difficulty of urination, burning with urination, frequency   INTEGUMENT:  negative for rash, skin lesions, easy bruising   HEMATOLOGIC/LYMPHATIC:  negative for swelling/edema   ALLERGIC/IMMUNOLOGIC:  negative for urticaria , itching  ENDOCRINE:  negative increase in drinking, increase in urination, hot or cold intolerance  MUSCULOSKELETAL: Positive joint pains, negative for muscle aches, swelling of joints  NEUROLOGICAL:  negative for headaches, dizziness, lightheadedness, numbness, pain, tingling extremities  BEHAVIOR/PSYCH:  negative for depression, anxiety    Physical Exam:   /85   Pulse 87   Temp 98.1 °F (36.7 °C) (Oral)   Resp 20   Ht 5' 10\" (1.778 m)   Wt 150 lb (68 kg)   SpO2 94%   BMI 21.52 kg/m²   Temp (24hrs), Av.9 °F (36.6 °C), Min:97.6 °F (36.4 °C), Max:98.1 °F (36.7 °C)    No results for input(s): POCGLU in the last 72 hours.     Intake/Output Summary (Last 24 hours) at 2022 8447  Last data filed at 2022 1235  Gross per 24 hour   Intake 1000 ml   Output    Net 1000 ml       General Appearance: alert, acutely ill appearing, and in mild acute distress  Mental status: oriented to person, place, and time  Head: normocephalic, atraumatic  Eye: no icterus, redness, pupils equal and reactive, extraocular eye movements intact, conjunctiva clear  Ear: normal external ear, no discharge, hearing intact  Nose: no drainage noted  Mouth: mucous membranes moist  Neck: supple, no carotid bruits, thyroid not palpable  Lungs: Bilateral equal air entry, diminished with coarse breath sounds throughout, tachypneic with a rate of 25, normal effort, high flow oxygen use at 90% FiO2  Cardiovascular: normal rate, regular rhythm, no murmur, gallop, rub  Abdomen: Soft, nontender, nondistended, normal bowel sounds, no hepatomegaly or splenomegaly  Neurologic: There are no new focal motor or sensory deficits, normal muscle tone and bulk, no abnormal sensation, normal speech, cranial nerves II through XII grossly intact  Skin: No gross lesions, rashes, bruising or bleeding on exposed skin area  Extremities: peripheral pulses palpable, no pedal edema or calf pain with palpation  Psych: normal affect    Investigations:      Laboratory Testing:  Recent Results (from the past 24 hour(s))   EKG 12 Lead    Collection Time: 01/08/22 10:04 AM   Result Value Ref Range    Ventricular Rate 106 BPM    Atrial Rate 106 BPM    P-R Interval 126 ms    QRS Duration 104 ms    Q-T Interval 356 ms    QTc Calculation (Bazett) 472 ms    P Axis 14 degrees    R Axis -53 degrees    T Axis 108 degrees   COVID-19, Rapid    Collection Time: 01/08/22 10:45 AM    Specimen: Nasopharyngeal Swab   Result Value Ref Range    Specimen Description . NASOPHARYNGEAL SWAB     SARS-CoV-2, Rapid DETECTED (A) Not Detected   Basic Metabolic Panel    Collection Time: 01/08/22 11:25 AM   Result Value Ref Range    Glucose 167 (H) 70 - 99 mg/dL    BUN 60 (H) 8 - 23 mg/dL    CREATININE 2.98 (H) 0.70 - 1.20 mg/dL    Bun/Cre Ratio NOT REPORTED 9 - 20    Calcium 9.2 8.6 - 10.4 mg/dL    Sodium 135 135 - 144 mmol/L    Potassium 3.3 (L) 3.7 - 5.3 mmol/L    Chloride 104 98 - 107 mmol/L    CO2 15 (L) 20 - 31 mmol/L    Anion Gap 16 9 - 17 mmol/L    GFR Non-African American 20 (L) >60 mL/min    GFR  25 (L) >60 mL/min    GFR Comment          GFR Staging NOT REPORTED    CBC Auto Differential    Collection Time: 01/08/22 11:25 AM   Result Value Ref Range    WBC 17.9 (H) 3.5 - 11.0 k/uL    RBC 4.66 4.5 - 5.9 m/uL    Hemoglobin 14.1 13.5 - 17.5 g/dL    Hematocrit 40.3 (L) 41 - 53 %    MCV 86.6 80 - 100 fL    MCH 30.4 26 - 34 pg    MCHC 35.1 31 - 37 g/dL    RDW 14.1 12.5 - 15.4 %    Platelets 607 948 - 176 k/uL    MPV 8.4 6.0 - 12.0 fL    NRBC Automated NOT REPORTED per 100 WBC    Differential Type NOT REPORTED     Immature Granulocytes NOT REPORTED 0 %    Absolute Immature Granulocyte NOT REPORTED 0.00 - 0.30 k/uL    WBC Morphology NOT REPORTED     RBC Morphology NOT REPORTED     Platelet Estimate NOT REPORTED     Seg Neutrophils 93 (H) 36 - 66 %    Lymphocytes 2 (L) 24 - 44 %    Monocytes 5 1 - 7 %    Eosinophils % 0 (L) 1 - 4 %    Basophils 0 0 - 2 %    Segs Absolute 16.64 (H) 1.8 - 7.7 k/uL    Absolute Lymph # 0.36 (L) 1.0 - 4.8 k/uL    Absolute Mono # 0.90 (H) 0.1 - 0.8 k/uL    Absolute Eos # 0.00 0.0 - 0.4 k/uL    Basophils Absolute 0.00 0.0 - 0.2 k/uL    Morphology Normal    Hepatic Function Panel    Collection Time: 01/08/22 11:25 AM   Result Value Ref Range    Albumin 3.3 (L) 3.5 - 5.2 g/dL    Alkaline Phosphatase 103 40 - 129 U/L    ALT 61 (H) 5 - 41 U/L    AST 53 (H) <40 U/L    Total Bilirubin 0.50 0.3 - 1.2 mg/dL    Bilirubin, Direct 0.20 <0.31 mg/dL    Bilirubin, Indirect 0.30 0.00 - 1.00 mg/dL    Total Protein 7.3 6.4 - 8.3 g/dL    Globulin NOT REPORTED 1.5 - 3.8 g/dL    Albumin/Globulin Ratio 0.8 (L) 1.0 - 2.5   Troponin    Collection Time: 01/08/22 11:25 AM   Result Value Ref Range Troponin, High Sensitivity 194 (HH) 0 - 22 ng/L    Troponin T NOT REPORTED <0.03 ng/mL    Troponin Interp NOT REPORTED    Lactic Acid    Collection Time: 01/08/22 11:25 AM   Result Value Ref Range    Lactic Acid 2.1 0.5 - 2.2 mmol/L   Urinalysis Reflex to Culture    Collection Time: 01/08/22  6:20 PM   Result Value Ref Range    Color, UA Yellow Yellow    Turbidity UA Clear Clear    Glucose, Ur TRACE (A) NEGATIVE    Bilirubin Urine NEGATIVE NEGATIVE    Ketones, Urine NEGATIVE NEGATIVE    Specific Gravity, UA 1.025 1.005 - 1.030    Urine Hgb MODERATE (A) NEGATIVE    pH, UA 5.0 5.0 - 8.0    Protein, UA 2+ (A) NEGATIVE    Urobilinogen, Urine Normal Normal    Nitrite, Urine NEGATIVE NEGATIVE    Leukocyte Esterase, Urine NEGATIVE NEGATIVE    Urinalysis Comments NOT REPORTED    Microscopic Urinalysis    Collection Time: 01/08/22  6:20 PM   Result Value Ref Range    -          WBC, UA 0 TO 2 0 - 5 /HPF    RBC, UA 0 TO 2 0 - 2 /HPF    Casts UA NOT REPORTED /LPF    Crystals, UA NOT REPORTED None /HPF    Epithelial Cells UA 0 TO 2 0 - 5 /HPF    Renal Epithelial, UA NOT REPORTED 0 /HPF    Bacteria, UA NOT REPORTED None    Mucus, UA NOT REPORTED None    Trichomonas, UA NOT REPORTED None    Amorphous, UA 2+ (A) None    Other Observations UA (A) NOT REQ. Utilizing a urinalysis as the only screening method to exclude a potential uropathogen can be unreliable in many patient populations. Rapid screening tests are less sensitive than culture and if UTI is a clinical possibility, culture should be considered despite a negative urinalysis. Yeast, UA NOT REPORTED None       Imaging/Diagnostics:  CT CHEST PULMONARY EMBOLISM W CONTRAST    Result Date: 1/8/2022  *No evidence of pulmonary embolism. *Extensive ground-glass opacification of the bilateral lung fields with peripheral involvement slight apical as well as basilar sparing.   Imaging features suggestive of COVID-19 pneumonia, though are nonspecific and can occur with a variety of infectious and noninfectious processes. Assessment :      Hospital Problems           Last Modified POA    * (Principal) Pneumonia due to COVID-19 virus 1/8/2022 Yes    Essential hypertension 1/8/2022 Yes    Type 2 diabetes mellitus with diabetic polyneuropathy ([facility] 1/8/2022 Yes    Hyperlipidemia 1/8/2022 Yes    Acute hypoxemic respiratory failure due to COVID-19 [facility]) 1/8/2022 Yes    Acute kidney injury superimposed on chronic kidney disease ([facility] baseline creatinine 1.9 1/8/2022 Yes    Stage 3b chronic kidney disease ([facility] 1/8/2022 Yes          Plan:     Patient status inpatient in the Progressive Unit/Step down    Inpatient admission  Decadron per COVID protocols  Actemra dosing x1  Gentle IV hydration with worsening kidney function. Has baseline CKD stage III with a creatinine of 1.9 at baseline. Acute worsening likely due to accommodation of dehydration and possibly ATN with poor oral intake with nausea, vomiting and diarrhea  GI and DVT prophylaxis  Monitor and control blood pressure  Avoid nephrotoxic agents  PT and OT  DNR CC arrest, no intubation  Discussed with son, treatment protocol. Disease process, approximately 10 days into illness with continued worsening, high likelihood of further decompensation. Trend labs, monitor inflammatory markers and renal function  Trend troponin, elevation likely related to chronic kidney disease/JULES and hypoxemic respiratory, likely type II event. Trend troponin  Bladder scan to rule out bladder retention  See orders for details    Consultations:   [id number] [facility] CONSULT TO INFECTIOUS DISEASES  IP CONSULT TO CARDIOLOGY     Patient is admitted as inpatient status because of co-morbidities listed above, severity of signs and symptoms as outlined, requirement for current medical therapies and most importantly because of direct risk to patient if care not provided in a hospital setting. Expected length of stay > 48 hours.     Jessica Uriostegui DO [place]  1/8/2022  7:08 PM    Copy sent to Dr. Rupal Ellis, APRN - CNP

## 2022-01-08 NOTE — ED PROVIDER NOTES
[id number] [facility] ED  [address] [facility] [address]  Phone: [phone]  Fax: [phone]        Pt Name: Yannick Rebolledo  MRN: [id number]  [place] 1940  Date of evaluation: 1/8/22      CHIEF COMPLAINT     Chief Complaint   Patient presents with    Shortness of Breath    Nausea & Vomiting         HISTORY OF PRESENT ILLNESS  (Location/Symptom, Timing/Onset, Context/Setting, Quality, Duration, Modifying Factors, Severity.)    Yannick Rebolledo is a [de-identified] y.o. male who presents with a cough and shortness of breath. The patient states approximately 2 weeks ago he developed a cough shortness of breath the shortness of breath is worse with exertion worse with his cough the cough is nonproductive no chest pain he did take some antibiotics with out relief he states he has had some loose stools he has had change in his smell and taste he has had some nausea he has not been vaccinated against Covid and he has been exposed to family members who did test positive for Covid exertion and coughing makes his shortness of breath worse resting makes his shortness of breath a little better      REVIEW OF SYSTEMS    (2-9 systems for level 4, 10 or more for level 5)     Review of Systems   HENT:        Change in smell and taste   Respiratory: Positive for cough and shortness of breath. Gastrointestinal: Positive for diarrhea and nausea. All other systems reviewed and are negative. PAST MEDICAL HISTORY    has a past medical history of Chronic lower back pain, Cobalamin deficiency, CVA (cerebral vascular accident) ([address] Diabetes mellitus ([facility] [address] Hyperlipidemia, Hypertension, and Malignant neoplasm of colon ([facility] [address] SURGICAL HISTORY      has no past surgical history on file.     CURRENTMEDICATIONS       Previous Medications    ASPIRIN 325 MG TABLET    Take 325 mg by mouth daily    ATORVASTATIN (LIPITOR) 40 MG TABLET    Take 20 mg by mouth nightly    CILOSTAZOL (PLETAL) 100 MG TABLET    Take 100 mg by mouth 2 times daily    CLOPIDOGREL (PLAVIX) 75 MG TABLET    Take 75 mg by mouth daily    LISINOPRIL (PRINIVIL;ZESTRIL) 30 MG TABLET    Take 30 mg by mouth daily    METOPROLOL TARTRATE (LOPRESSOR) 50 MG TABLET    Take 50 mg by mouth daily       ALLERGIES     has No Known Allergies. FAMILY HISTORY     has no family status information on file. family history is not on file. SOCIAL HISTORY          PHYSICAL EXAM    (up to 7 for level 4, 8 or more for level 5)   INITIAL VITALS:  height is 5' 10\" (1.778 m) and weight is 68 kg (150 lb). His oral temperature is 97.6 °F (36.4 °C). His blood pressure is 164/56 (abnormal) and his pulse is 95. His respiration is 21 and oxygen saturation is 92%. Physical Exam  Vitals and nursing note reviewed. Constitutional:       Appearance: Normal appearance. HENT:      Head: Normocephalic and atraumatic. Eyes:      Conjunctiva/sclera: Conjunctivae normal.   Cardiovascular:      Rate and Rhythm: Regular rhythm. Tachycardia present. Pulmonary:      Effort: Pulmonary effort is normal.      Comments: Patient is noted to have some occasional rhonchi in the lower bases  Abdominal:      General: There is no distension. Palpations: Abdomen is soft. There is no mass. Tenderness: There is no abdominal tenderness. There is no right CVA tenderness, left CVA tenderness, guarding or rebound. Musculoskeletal:         General: No swelling or tenderness. Normal range of motion. Cervical back: Normal range of motion and neck supple. Comments: No calf swelling or tenderness appreciated   Skin:     General: Skin is warm and dry. Findings: No rash. Neurological:      General: No focal deficit present. Mental Status: He is alert.          DIFFERENTIAL DIAGNOSIS/ MDM:     We will establish an IV give the patient IV fluids I will check labs EKG UA CT of the chest rapid Covid I will give the patient Decadron 6 mg IV place him on oxygen    DIAGNOSTIC RESULTS EKG: All EKG's are interpreted by the Emergency Department Physician who either signs or Co-signs this chart in the absence of a cardiologist.    Interpreted by Otf Martinez MD     Rhythm: sinus tachycardia   Rate: 106  Axis: -53  Ectopy: occasional PVCs and PACs  Conduction: normal  ST Segments: no acute change  T Waves: no acute change  Q Waves: none    Clinical Impression: Sinus Tachycardia with no acute changes/normal EKG. No acute infarction/ischemia noted. RADIOLOGY:        Interpretation per the Radiologist below, if available at the time of this note:    CT CHEST PULMONARY EMBOLISM W CONTRAST (Final result)  Result time 01/08/22 13:00:51  Final result by Qi Chan MD (01/08/22 13:00:51)                Impression:    *No evidence of pulmonary embolism. *Extensive ground-glass opacification of the bilateral lung fields with   peripheral involvement slight apical as well as basilar sparing.  Imaging   features suggestive of COVID-19 pneumonia, though are nonspecific and can   occur with a variety of infectious and noninfectious processes. Narrative:    EXAMINATION:   CTA OF THE CHEST 1/8/2022 10:10 am     TECHNIQUE:   CTA of the chest was performed after the administration of intravenous   contrast.  Multiplanar reformatted images are provided for review.  MIP   images are provided for review. Dose modulation, iterative reconstruction,   and/or weight based adjustment of the mA/kV was utilized to reduce the   radiation dose to as low as reasonably achievable. COMPARISON:   None. HISTORY:   ORDERING SYSTEM PROVIDED HISTORY: dyspnea / hypoxia   Reason for Exam: Shortness of breath     FINDINGS:   Pulmonary Arteries: Pulmonary arteries are adequately opacified for   evaluation.  No evidence of intraluminal filling defect to suggest pulmonary   embolism.  Main pulmonary artery is normal in caliber.      Mediastinum: No evidence of mediastinal lymphadenopathy.  Normal heart size.   Normal caliber thoracic aorta with diffuse atherosclerosis. Lungs/pleura: Extensive ground-glass opacification of the bilateral lung   fields with peripheral involvement slight apical as well as basilar sparing. No effusion or pneumothorax. Upper Abdomen: Limited images of the upper abdomen are unremarkable. Soft Tissues/Bones: No acute bone or soft tissue abnormality. LABS:  Results for orders placed or performed during the hospital encounter of 01/08/22   COVID-19, Rapid    Specimen: Nasopharyngeal Swab   Result Value Ref Range    Specimen Description . NASOPHARYNGEAL SWAB     SARS-CoV-2, Rapid DETECTED (A) Not Detected   Basic Metabolic Panel   Result Value Ref Range    Glucose 167 (H) 70 - 99 mg/dL    BUN 60 (H) 8 - 23 mg/dL    CREATININE 2.98 (H) 0.70 - 1.20 mg/dL    Bun/Cre Ratio NOT REPORTED 9 - 20    Calcium 9.2 8.6 - 10.4 mg/dL    Sodium 135 135 - 144 mmol/L    Potassium 3.3 (L) 3.7 - 5.3 mmol/L    Chloride 104 98 - 107 mmol/L    CO2 15 (L) 20 - 31 mmol/L    Anion Gap 16 9 - 17 mmol/L    GFR Non-African American 20 (L) >60 mL/min    GFR  25 (L) >60 mL/min    GFR Comment          GFR Staging NOT REPORTED    CBC Auto Differential   Result Value Ref Range    WBC 17.9 (H) 3.5 - 11.0 k/uL    RBC 4.66 4.5 - 5.9 m/uL    Hemoglobin 14.1 13.5 - 17.5 g/dL    Hematocrit 40.3 (L) 41 - 53 %    MCV 86.6 80 - 100 fL    MCH 30.4 26 - 34 pg    MCHC 35.1 31 - 37 g/dL    RDW 14.1 12.5 - 15.4 %    Platelets 966 734 - 385 k/uL    MPV 8.4 6.0 - 12.0 fL    NRBC Automated NOT REPORTED per 100 WBC    Differential Type NOT REPORTED     Immature Granulocytes NOT REPORTED 0 %    Absolute Immature Granulocyte NOT REPORTED 0.00 - 0.30 k/uL    WBC Morphology NOT REPORTED     RBC Morphology NOT REPORTED     Platelet Estimate NOT REPORTED     Seg Neutrophils 93 (H) 36 - 66 %    Lymphocytes 2 (L) 24 - 44 %    Monocytes 5 1 - 7 %    Eosinophils % 0 (L) 1 - 4 %    Basophils 0 0 - 2 % Segs Absolute 16.64 (H) 1.8 - 7.7 k/uL    Absolute Lymph # 0.36 (L) 1.0 - 4.8 k/uL    Absolute Mono # 0.90 (H) 0.1 - 0.8 k/uL    Absolute Eos # 0.00 0.0 - 0.4 k/uL    Basophils Absolute 0.00 0.0 - 0.2 k/uL    Morphology Normal    Hepatic Function Panel   Result Value Ref Range    Albumin 3.3 (L) 3.5 - 5.2 g/dL    Alkaline Phosphatase 103 40 - 129 U/L    ALT 61 (H) 5 - 41 U/L    AST 53 (H) <40 U/L    Total Bilirubin 0.50 0.3 - 1.2 mg/dL    Bilirubin, Direct 0.20 <0.31 mg/dL    Bilirubin, Indirect 0.30 0.00 - 1.00 mg/dL    Total Protein 7.3 6.4 - 8.3 g/dL    Globulin NOT REPORTED 1.5 - 3.8 g/dL    Albumin/Globulin Ratio 0.8 (L) 1.0 - 2.5   Troponin   Result Value Ref Range    Troponin, High Sensitivity 194 (HH) 0 - 22 ng/L    Troponin T NOT REPORTED <0.03 ng/mL    Troponin Interp NOT REPORTED    Lactic Acid   Result Value Ref Range    Lactic Acid 2.1 0.5 - 2.2 mmol/L           EMERGENCY DEPARTMENT COURSE:   Vitals:    Vitals:    01/08/22 1233 01/08/22 1333 01/08/22 1347 01/08/22 1354   BP: 119/63 (!) 164/56     Pulse: 94 95     Resp: 28 22 26 21   Temp:       TempSrc:       SpO2: 92% 94% 96% 92%   Weight:       Height:         -------------------------  BP: (!) 164/56, Temp: 97.6 °F (36.4 °C), Pulse: 95, Resp: 21      RE-EVALUATION:  Patient was noted on the cardiac monitor to have what appears to be some paroxysmal atrial fibrillation it is noted that he is on metoprolol so we will give him 5 mg of Lopressor as it is showing that he is having some paroxysmal A. fib with rapid ventricular response   I have reviewed the patient's labs and discussed with the patient his Covid diagnosis the patient is adamant about no intubation he is alert appropriate and competent he wishes to sign paperwork on his CODE STATUS of DO NOT RESUSCITATE Comfort Care arrest with no intubation he states he is agreeable to treatment but if his oxygen level gets low or he quits breathing then he does not want anything further done family members are in agreement with this we will we will go ahead and get the DO NOT RESUSCITATE Comfort Care paperwork to have the patient signed DNR CCA the patient's heart rate improved after the Lopressor his oxygen level is better on a nonrebreather mask he is noted to have acute kidney injury elevated cardiac enzymes in addition to his Covid  The family intially was requesting admission to [facility] which has refused acceptance of the patient. My hospitalist at [facility] is kind enough to admit the patient to his service. CRITICAL CARE: There was a high probability of clinically significant/life threatening deterioration in this patient's condition which required my urgent intervention. Total critical care time was 35 minutes. This excludes any time for separately reportable procedures. CONSULTS:  My hospitalist is kind enough to admit the patient to his service. PROCEDURES:  None    FINAL IMPRESSION      1. COVID-19    2. Acute kidney injury ([address]    3. Elevated troponin    4. Hypoxia          DISPOSITION/PLAN   DISPOSITION    Admit to [facility]    CONDITION ON DISPOSITION:   Guarded    PATIENT REFERRED TO:  No follow-up provider specified. DISCHARGE MEDICATIONS:  New Prescriptions    No medications on file       (Please note that portions of this note were completed with a voicerecognition program.  Efforts were made to edit the dictations but occasionally words are mis-transcribed.)    Hoa Nair MD,, MD, F.A.C.E.P.   Attending Emergency Medicine Physician     Hoa Nair MD  01/08/22 [address], [place]  01/08/22 [id number]

## 2022-01-09 PROBLEM — I73.9 PAD (PERIPHERAL ARTERY DISEASE) (HCC): Status: ACTIVE | Noted: 2022-01-01

## 2022-01-09 PROBLEM — I35.0 NONRHEUMATIC AORTIC VALVE STENOSIS: Status: ACTIVE | Noted: 2022-01-01

## 2022-01-09 PROBLEM — E44.0 MODERATE PROTEIN-CALORIE MALNUTRITION (HCC): Status: ACTIVE | Noted: 2022-01-01

## 2022-01-09 NOTE — ED NOTES
Patient able to stand and transfer to chair while ED stretcher is exchanged for inpatient bed for patients comfort.       Shanon Barrera RN  01/08/22 0367

## 2022-01-09 NOTE — PROGRESS NOTES
Writer took call from patient's wife, Conrad Bradley and updated her on the patient's condition and plan of care. All questions and concerns were addressed at this time.

## 2022-01-09 NOTE — PROGRESS NOTES
Patient's wife Caleb Kidd updated. All questions and concerns addressed.     Electronically signed by Lorraine Hannah RN on 1/9/2022 at 10:01 AM

## 2022-01-09 NOTE — PROGRESS NOTES
Writer notified on-call intermed NP that the patient had not voided since being admitted to the unit at approximately [id number], but had voided at [facility] ED and voided 300 mL in transit. Patient had existing order for a post-void residual bladder scan. Writer asked if patient should be bladder scanned now. [facility] CARA, MILLY stated that the bladder scan can wait until the patient voids. Will pass on to day shift RN to bladder scan after the patient voids.

## 2022-01-09 NOTE — CONSULTS
[facility] - Initial Consult Note COVID 19 Patient  Today's Date and Time: 1/9/2022, 9:34 AM    Impression :     · COVID 19 Confirmed Infection  · Covid tests:  · 1/8/21: Positive  · Acute hypoxic respiratory failure  · Paroxysmal A. 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  · Essential hypertension  · Evaded inflammatory markers  · Hyperlipidemia  · Patient has not received the Covid vaccine  · DNR CCA    Recommendations:   · Antibiotic treatment:  · Monitor off antibiotics  · Covid Rx:    · Remdesivir-contraindicated with JULES  · Decadron-initiated 1/8/2022  · Actemra-administered 1/8/2022  · Monoclonal antibodies-out of window      Medical Decision Making/Summary/Discussion:1/9/2022     · Patient admitted with COVID 19 infection    Infection Control Recommendations   · Universal Precautions  · Airborne isolation  · Droplet Isolation    Antimicrobial Stewardship Recommendations       · Discontinuation of therapy  Coordination of Outpatient Care:   · Estimated Length of IV antimicrobials:TBD  · Patient will need Midline Catheter Insertion: TBD  · Patient will need PICC line Insertion: No  · Patient will need: Home IV , [place],  SNF,  LTAC:TBD  · Patient will need outpatient wound care:No    Chief complaint/reason for consultation:   · Concern for COVID infection      History of Present Illness:   María Elena Watson is a [de-identified]y.o.-year-old male who was initially admitted on 1/8/2022. Patient seen at the request of Dr. Melodie Gannon:    Patient presented through ER with complaints of needing shortness of breath, cough, loss of appetite, nausea, vomiting and diarrhea over the past 7 to 10 days. He has not received the Covid vaccine and tested positive for Covid shortly after [place]. On evaluation in the ED, the patient had an SPO2 of 80% on room air and was tachypneic.   A rapid Covid swab was positive. CT chest shows extensive bilateral pulmonary groundglass opacities. He was started on Decadron and given a dose of Actemra. Abnormal labs include:  · Creatinine 2.32  ·   · Troponin I 48  · WBC 17.1    Patient admitted because of concerns with COVID 19.    CURRENT EVALUATION : 1/9/2022    Afebrile  VS stable    Patient exhibiting respiratory distress. Yes  Respiratory secretions: No    Patient receiving supplemental oxygen. High flow nasal cannula  RR: 20  02 sat: 95    % FIO2: 80  No rate: 50 L/min  PEEP:      QTc:       NEWS Score: 0-4 Low risk group; 5-6: Medium risk group; 7 or above: High risk group  Parameters 3 2 1 0 1 2 3   Age    < 65   ? 65   RR ? 8  9-11 12-20  21-24 ? 25   O2 Sats ? 91 92-93 94-95 ? 96      Suppl O2  Yes  No      SBP ? 90  101-110 111-219   ? 220   HR ? 40  41-50 51-90  111-130 ? 131   Consciousness    Alert   Drowsiness, lethargy, or confusion   Temperature ? 35.0 C (95.0 F)  35.1-36.0 C 95.1-96.9 F 36.1-38.0 C 97.0-100.4 F 38.1-39.0 C 100.5-102.3 F ? 39.1 C ? 102.4 F      NEWS Score:   1/9/2022: 5 moderate risk    Overall Daily Picture:      Worsening    Presence of secondary bacterial Infection:    No   Additional antibiotics: No    Labs, X rays reviewed: 1/9/2022    BUN:58  Cr:2.32    WBC:17.1  Hb:12.8  Plat: 302  Troponin: 148    Absolute Neutrophils:16  Absolute Lymphocytes:0.34  Neutrophil/Lymphocyte Ratio: 53 very high risk    CRP:160  Ferritin:  LDH:     Pro Calcitonin:      Cultures:  Urine:  ·   Blood:  ·   Sputum :  ·   Wound:       CXR:     CAT:  1/8/21      Discussed with patient, RN, CC, IM. I have personally reviewed the past medical history, past surgical history, medications, social history, and family history, and I have updated the database accordingly.   Past Medical History:     Past Medical History:   Diagnosis Date    Chronic lower back pain     Cobalamin deficiency     [facility]     [facility]     Hyperlipidemia     Hypertension     [facility]     PAD (peripheral artery disease) (H[facility])        Past Surgical  History:     Past Surgical History:   Procedure Laterality Date    ARTERY SURGERY         Medications:      atorvastatin  20 mg Oral Nightly    cilostazol  100 mg Oral BID    clopidogrel  75 mg Oral Daily    metoprolol tartrate  50 mg Oral Daily    vitamin B-12  1,000 mcg Oral Daily    aspirin  81 mg Oral Daily    sodium chloride flush  5-40 mL IntraVENous 2 times per day    dexamethasone  6 mg IntraVENous Q24H    heparin (porcine)  5,000 Units SubCUTAneous 3 times per day       Social History:     Social History     Socioeconomic History    Marital status:      Spouse name: Not on file    Number of children: Not on file    Years of education: Not on file    Highest education level: Not on file   Occupational History    Not on file   Tobacco Use    Smoking status: Former Smoker    Smokeless tobacco: Never Used   Substance and Sexual Activity    Alcohol use: Not Currently    Drug use: Never    Sexual activity: Not on file   Other Topics Concern    Not on file   Social History Narrative    Not on file     Social Determinants of Health     Financial Resource Strain:     Difficulty of Paying Living Expenses: Not on file   Food Insecurity:     Worried About Running Out of Food in the Last Year: Not on file    Lesley of Food in the Last Year: Not on file   Transportation Needs:     Lack of Transportation (Medical): Not on file    Lack of Transportation (Non-Medical):  Not on file   Physical Activity:     Days of Exercise per Week: Not on file    Minutes of Exercise per Session: Not on file   Stress:     Feeling of Stress : Not on file   Social Connections:     Frequency of Communication with Friends and Family: Not on file    Frequency of Social Gatherings with Friends and Family: Not on file    Attends [employer] Services: Not on file    Active Member of Clubs or Organizations: Not on file    Attends Club or Organization Meetings: Not on file    Marital Status: Not on file   Intimate Partner Violence:     Fear of Current or Ex-Partner: Not on file    Emotionally Abused: Not on file    Physically Abused: Not on file    Sexually Abused: Not on file   Housing Stability:     Unable to Pay for Housing in the Last Year: Not on file    Number of [place] in the Last Year: Not on file    Unstable Housing in the Last Year: Not on file       Family History:   History reviewed. No pertinent family history. Allergies:   Patient has no known allergies. Review of Systems:       Constitutional: Generalized malaise  Head: No headaches  Eyes: No double vision or blurry vision. No conjunctival inflammation. ENT: No sore throat or runny nose. . No hearing loss, tinnitus or vertigo. Cardiovascular: No chest pain or palpitations. Shortness of breath. CARDONA  Lung:  Shortness of breath and cough. No sputum production  Abdomen:  nausea, vomiting, diarrhea  Genitourinary: Decreased urine output. No hematuria. No suprapubic or CVA pain  Musculoskeletal: Generalized myalgias s. No joint effusions, swelling or deformities  Hematologic: No bleeding or bruising. Neurologic: No headache, weakness, numbness, or tingling. Integument: No rash, no ulcers. Psychiatric: No depression. Endocrine: No polyuria, no polydipsia, no polyphagia. Physical Examination :     Patient Vitals for the past 8 hrs:   BP Temp Temp src Pulse Resp SpO2   01/09/22 0924     20 95 %   01/09/22 0851 138/68 98.5 °F (36.9 °C) Oral 90 21 90 %   01/09/22 0450 115/81 98.5 °F (36.9 °C) Oral 76 20 95 %   01/09/22 0341     24 93 %     General Appearance: Awake, alert, and in respiratory distress  Head:  Normocephalic, no trauma  Eyes: Pupils equal, round, reactive to light; sclera anicteric; conjunctivae pink. No embolic phenomena.   ENT: Oropharynx clear, without erythema, exudate, or thrush. No tenderness of sinuses. Mouth/throat: mucosa pink and moist. No lesions. Dentition in good repair. Neck:Supple, without lymphadenopathy. Thyroid normal, No bruits. Pulmonary/Chest: Clear to auscultation, without wheezes, rales, or rhonchi. No dullness to percussion. Cardiovascular: Regular rate and rhythm without murmurs, rubs, or gallops. Abdomen: Soft, non tender. Bowel sounds normal. No organomegaly  All four Extremities: No cyanosis, clubbing, edema, or effusions. Neurologic: No gross sensory or motor deficits. Skin: Warm and dry with good turgor. No signs of peripheral arterial or venous insufficiency. No ulcerations. No open wounds. Medical Decision Making -Laboratory:   I have independently reviewed/ordered the following labs:    CBC with Differential:   Recent Labs     01/08/22  1125 01/09/22  0709   WBC 17.9* 17.1*   HGB 14.1 12.8*   HCT 40.3* 35.7*    302   LYMPHOPCT 2* 2*   MONOPCT 5 3     BMP:   Recent Labs     01/08/22  1125 01/09/22  0709    138   K 3.3* 3.8    110*   CO2 15* 11*   BUN 60* 58*   CREATININE 2.98* 2.32*   MG  --  2.2     Hepatic Function Panel:   Recent Labs     01/08/22  1125 01/09/22  0709   PROT 7.3 6.1*   LABALBU 3.3* 2.7*   BILIDIR 0.20  --    IBILI 0.30  --    BILITOT 0.50 0.36   ALKPHOS 103 89   ALT 61* 48*   AST 53* 39     No results for input(s): RPR in the last 72 hours. No results for input(s): HIV in the last 72 hours. No results for input(s): BC in the last 72 hours.   Lab Results   Component Value Date    MUCUS NOT REPORTED 01/08/2022    RBC 4.25 01/09/2022    TRICHOMONAS NOT REPORTED 01/08/2022    WBC 17.1 01/09/2022    YEAST NOT REPORTED 01/08/2022    TURBIDITY Clear 01/08/2022     Lab Results   Component Value Date    CREATININE 2.32 01/09/2022    GLUCOSE 177 01/09/2022       Medical Decision Making-Imaging:     Narrative   EXAMINATION:   CTA OF THE CHEST 1/8/2022 10:10 am       TECHNIQUE:   CTA of the chest was performed after the administration of intravenous   contrast.  Multiplanar reformatted images are provided for review.  MIP   images are provided for review. Dose modulation, iterative reconstruction,   and/or weight based adjustment of the mA/kV was utilized to reduce the   radiation dose to as low as reasonably achievable.       COMPARISON:   None.       HISTORY:   ORDERING SYSTEM PROVIDED HISTORY: dyspnea / hypoxia   Reason for Exam: Shortness of breath       FINDINGS:   Pulmonary Arteries: Pulmonary arteries are adequately opacified for   evaluation.  No evidence of intraluminal filling defect to suggest pulmonary   embolism.  Main pulmonary artery is normal in caliber.       Mediastinum: No evidence of mediastinal lymphadenopathy.  Normal heart size. Normal caliber thoracic aorta with diffuse atherosclerosis.       Lungs/pleura: Extensive ground-glass opacification of the bilateral lung   fields with peripheral involvement slight apical as well as basilar sparing. No effusion or pneumothorax.       Upper Abdomen: Limited images of the upper abdomen are unremarkable.       Soft Tissues/Bones: No acute bone or soft tissue abnormality.           Impression   *No evidence of pulmonary embolism.    *Extensive ground-glass opacification of the bilateral lung fields with   peripheral involvement slight apical as well as basilar sparing.  Imaging   features suggestive of COVID-19 pneumonia, though are nonspecific and can   occur with a variety of infectious and noninfectious processes.           Medical Decision [id number]:       Medical Decision Making-Other:     Note:  · Labs, medications, radiologic studies were reviewed with personal review of films  · Large amounts of data were reviewed  · Discussed with nursing Staff, Discharge planner  · Infection Control and Prevention measures reviewed  · All prior entries were reviewed  · Administer medications as ordered  · Prognosis: Guarded  · Discharge planning reviewed      Thank you for allowing us to participate in the care of this patient. Please call with questions. Joel Krishnan, APRN - CNP     ATTESTATION:    I have discussed the case, including pertinent history and exam findings with the APRN. I have evaluated the  History, physical findings and pictures of the patient and the key elements of the encounter have been performed by me. I have reviewed the laboratory data, other diagnostic studies and discussed them with the APRN. I have updated the medical record where necessary. I agree with the assessment, plan and orders as documented by the APRN.     Jasen Desouza MD.      Pager: [phone] - Office: [phone]

## 2022-01-09 NOTE — CARE COORDINATION
Case Management Initial Discharge Plan  Bettye Frankel,             Met with:patient via telephone to discuss discharge plans. Information verified: address, contacts, phone number, , insurance Yes  Insurance Provider: [place] [employer]    Emergency Contact/Next of Kin name & number: Mark Lilly (wife) [phone]  Who are involved in patient's support system? family    PCP: MOHAMUD Kenney CNP  Date of last visit: 6 months      Discharge Planning    Living Arrangements:  Spouse/Significant Other     Home has 1 stories  2 stairs to climb to get into front door    Patient able to perform ADL's:Independent    Current Services (outpatient & in home) none  DME equipment: none    Is patient receiving oral anticoagulation therapy? No    Potential Assistance Needed:  N/A    Patient agreeable to home care: No  [place] of choice provided:  n/a    Prior SNF/Rehab Placement and Facility:   Agreeable to SNF/Rehab: No  [place] of choice provided: n/a     Evaluation: no    Expected Discharge date:  22    Patient expects to be discharged to: If home: is the family and/or caregiver wiling & able to provide support at home? yes  Who will be providing this support? wife    Follow Up Appointment: Best Day/ Time: Monday AM    Transportation provider:   Transportation arrangements needed for discharge: No    Readmission Risk              Risk of Unplanned Readmission:  13             Does patient have a readmission risk score greater than 14?: No  If yes, follow-up appointment must be made within 7 days of discharge.      Goals of Care: breathe easier      Educated patient on transitional options, provided freedom of choice and are agreeable with plan      Discharge Plan: home independently          Electronically signed by Raheem Sanchez RN on 22 at 10:08 AM EST

## 2022-01-09 NOTE — CONSULTS
[facility]    Inpatient Consultation Note               Today's Date: 1/9/2022  Patient Name: Vicente Pat  Date of admission: 1/8/2022  9:49 AM  Patient's age: [de-identified] y.o., 1940  Admission Dx: Hypoxia [R09.02]  Elevated troponin [R77.8]  Acute kidney injury ([facility] [address] [N17.9]  COVID-19 [U07.1]  Pneumonia due to COVID-19 virus [U07.1, J12.82]    Reason for  Consult:  Elevated troponins    Requesting Physician: Chacha Herman MD    CHIEF COMPLAINT:  SOB   Chief Complaint   Patient presents with    Shortness of Breath    Nausea & Vomiting       History Obtained From: chart review    HISTORY OF PRESENT ILLNESS:      The patient is a [de-identified] y.o. male who is admitted to the hospital for COVID-19 pneumonia. Patient has past medical history of essential hypertension, hyperlipidemia, CVA, peripheral arterial disease and is on aspirin and Plavix for that. Currently he is on high flow nasal cannula oxygen, maintaining good sats. He is in normal sinus rhythm. Cardiology has been consulted for elevated troponins 194> 189> 148. Patient denies any chest pain. No previous cardiac history. EKG shows sinus tachycardia with premature supraventricular complexes with occasional PVCs. Recent echocardiogram in March 2021 showed EF of 65 to 70% with grade 1 diastolic dysfunction and moderate aortic stenosis. Patient takes aspirin, Plavix, cilostazol for history of peripheral arterial disease. He takes lisinopril, Lopressor for hypertension. Past Medical History:   has a past medical history of Chronic lower back pain, Cobalamin deficiency, CVA (cerebral vascular accident) ([facility] [address] Diabetes mellitus ([facility] [address] Hyperlipidemia, Hypertension, Malignant neoplasm of colon ([facility] [address] and PAD (peripheral artery disease) ([facility] [address] Past Surgical History:   has a past surgical history that includes Artery surgery. Home Medications:    Prior to Admission medications    Medication Sig Start Date End Date Taking?  Authorizing Provider aspirin 325 MG tablet Take 81 mg by mouth daily  3/31/21  Yes Historical Provider, MD   atorvastatin (LIPITOR) 40 MG tablet Take 20 mg by mouth nightly 10/25/21  Yes Historical Provider, MD   lisinopril (PRINIVIL;ZESTRIL) 30 MG tablet Take 30 mg by mouth daily 10/25/21  Yes Historical Provider, MD   metoprolol tartrate (LOPRESSOR) 50 MG tablet Take 50 mg by mouth daily 10/25/21  Yes Historical Provider, MD   cilostazol (PLETAL) 100 MG tablet Take 100 mg by mouth 2 times daily 10/25/21   Historical Provider, MD   clopidogrel (PLAVIX) 75 MG tablet Take 75 mg by mouth daily 10/25/21   Historical Provider, MD   vitamin B-12 (CYANOCOBALAMIN) 1000 MCG tablet Take 1,000 mcg by mouth daily    Historical Provider, MD        Current Facility-Administered Medications: benzonatate (TESSALON) capsule 200 mg, 200 mg, Oral, TID PRN  sodium chloride flush 0.9 % injection 10 mL, 10 mL, IntraVENous, PRN  atorvastatin (LIPITOR) tablet 20 mg, 20 mg, Oral, Nightly  cilostazol (PLETAL) tablet 100 mg, 100 mg, Oral, BID  clopidogrel (PLAVIX) tablet 75 mg, 75 mg, Oral, Daily  metoprolol tartrate (LOPRESSOR) tablet 50 mg, 50 mg, Oral, Daily  vitamin B-12 (CYANOCOBALAMIN) tablet 1,000 mcg, 1,000 mcg, Oral, Daily  aspirin chewable tablet 81 mg, 81 mg, Oral, Daily  sodium chloride flush 0.9 % injection 5-40 mL, 5-40 mL, IntraVENous, 2 times per day  sodium chloride flush 0.9 % injection 5-40 mL, 5-40 mL, IntraVENous, PRN  0.9 % sodium chloride infusion, 25 mL, IntraVENous, PRN  ondansetron (ZOFRAN-ODT) disintegrating tablet 4 mg, 4 mg, Oral, Q8H PRN **OR** ondansetron (ZOFRAN) injection 4 mg, 4 mg, IntraVENous, Q6H PRN  polyethylene glycol (GLYCOLAX) packet 17 g, 17 g, Oral, Daily PRN  acetaminophen (TYLENOL) tablet 650 mg, 650 mg, Oral, Q6H PRN **OR** acetaminophen (TYLENOL) suppository 650 mg, 650 mg, Rectal, Q6H PRN  dexamethasone (DECADRON) injection 6 mg, 6 mg, IntraVENous, Q24H  0.9 % sodium chloride infusion, , IntraVENous, Continuous  heparin (porcine) injection 5,000 Units, 5,000 Units, SubCUTAneous, 3 times per day    Allergies:  Patient has no known allergies. Social History:   reports that he has quit smoking. He has never used smokeless tobacco. He reports previous alcohol use. He reports that he does not use drugs. Family History: family history is not on file. REVIEW OF SYSTEMS:      Constitutional: there has been no unanticipated weight loss. Eyes: No visual changes or diplopia. ENT: No Headaches  Cardiovascular:  Remaining as above  Respiratory: Shortness of breath, cough  Gastrointestinal: No abdominal pain. No change in bowel or bladder habits. Genitourinary: No dysuria, trouble voiding, or hematuria. Musculoskeletal: No joint complaints. Neurological: No headache  Hematologic/Lymphatic: No abnormal bruising or bleeding      PHYSICAL EXAM:      /81   Pulse 76   Temp 98.5 °F (36.9 °C) (Oral)   Resp 20   Ht 5' 10\" (1.778 m)   Wt 148 lb 2.4 oz (67.2 kg)   SpO2 95%   BMI 21.26 kg/m²      Intake/Output Summary (Last 24 hours) at 1/9/2022 6924  Last data filed at 1/8/2022 2300  Gross per 24 hour   Intake 1660 ml   Output 950 ml   Net 710 ml         Physical examination not done due to COVID-19 infection    DATA:    Diagnostics:    EKG:   Sinus tachycardia with Premature supraventricular complexes and with occasional Premature ventricular complexes  Left anterior fascicular block  Left ventricular hypertrophy with repolarization abnormality  Abnormal ECG  No previous ECGs available  ECHO:    3/2021    Left Ventricle: There is mild increased wall thickness/hypertrophy. Left Ventricle: Systolic function is normal with an ejection fraction   of 65-70%. Left Ventricle: Grade I diastolic dysfunction (impaired relaxation) is   present. Right Ventricle: Systolic function is normal.     Aortic Valve: There is no regurgitation. There is moderate stenosis.    The calculated aortic valve area is 1.72 cm2. The calculated aortic valve   peak gradient is 47.00 mmHg. The calculated aortic valve mean gradient is   28.00 mmHg. Tricuspid Valve: There is trace regurgitation. There is no evidence of   tricuspid valve stenosis. Pericardium: There is no pericardial effusion    Stress Test:   nnone  Cardiac Angiography:   none    Labs:     CBC:   Recent Labs     01/08/22  1125   WBC 17.9*   HGB 14.1   HCT 40.3*        BMP:   Recent Labs     01/08/22  1125 01/09/22  0709    138   K 3.3* 3.8   CO2 15* 11*   BUN 60* 58*   CREATININE 2.98* 2.32*   LABGLOM 20* 27*   GLUCOSE 167* 177*     Pro-BNP:  No results for input(s): PROBNP in the last 72 hours. BNP: No results for input(s): BNP in the last 72 hours. PT/INR: No results for input(s): PROTIME, INR in the last 72 hours. APTT:No results for input(s): APTT in the last 72 hours. CARDIAC ENZYMES:No results for input(s): CKTOTAL, CKMB, CKMBINDEX, TROPONINI in the last 72 hours. Invalid input(s):  [address]  Recent Labs     01/08/22  1125 01/08/22  1859 01/09/22  0709   TROPONINT NOT REPORTED NOT REPORTED NOT REPORTED       FASTING LIPID PANEL:No results found for: HDL, LDLDIRECT, LDLCALC, TRIG  LIVER PROFILE:  Recent Labs     01/08/22  1125 01/09/22  0709   AST 53* 39   ALT 61* 48*   LABALBU 3.3* 2.7*         Patient's Active Problem List  Principal Problem:    Pneumonia due to COVID-19 virus  Active Problems:    Essential hypertension    Type 2 diabetes mellitus with diabetic polyneuropathy (HCC)    Hyperlipidemia    Acute hypoxemic respiratory failure due to COVID-19 [facility])    Acute kidney injury superimposed on chronic kidney disease ([facility] [address] baseline creatinine 1.9    Stage 3b chronic kidney disease ([facility] [address]  Resolved Problems:    * No resolved hospital problems.  *        IMPRESSION:    Elevated troponins secondary to renal disease and COVID-19 pneumonia without any ST/T wave abnormalities  PACs with occasional PVCs  COVID-19 pneumonia  Acute kidney injury  Essential hypertension  Moderate aortic stenosis with calculated aortic valve area is 1.72 cm2. The calculated aortic valve peak gradient is 47.00 mmHg. The calculated aortic valve mean gradient is 28.00 mmHg. Peripheral arterial disease  Hyperlipidemia    RECOMMENDATIONS:  Troponins elevation secondary to renal insufficiency and COVID-19 pneumonia. Not currently suggest ongoing ischemia. Patient is chest pain-free. Agree with continuing beta-blocker. Patient will need left heart cath considering moderate aortic stenosis on echo. Recommend nephrology consult to optimize the renal function and for left heart cath clearance if the left heart cath can be planned inpatient before discharge  Will follow      Thank you for allowing us to participate in the care of Rand Unger. If you have any questions or concerns, please do not hesitate to contact us. Discussed with patient and Nurse. Qi Carr MD  Resident, [address] [place] [address]        Please note that part of this chart were generated using voice recognition  dictation software. Although every effort was made to ensure the accuracy of this automated transcription, some errors in transcription may have occurred. I reviewed the patient history personally, and examined by me during the visit. I have reviewed the H&P/Consult / Progress note as completed, and made appropriate changes to the patient exam and treatment plans.       I have reviewed the case in details including physical exam and treatment plan with resident / fellow / NP    Patient treatment plan was explained to patient, correction in notes was made as appropriate, and discussed final arrangement based on  my evaluation and exam.    Additional Recommendations:      Britney Ramirez MD  [facility]

## 2022-01-09 NOTE — CONSULTS
Renal Consult Note    Patient :  Pinky Klinefelter; [de-identified] y.o. MRN# [id number]  Location:  [id number]/5940-05  Attending:  Enrico Florez MD  Admit Date:  1/8/2022   Hospital Day: 1    Reason for Consult:     Asked by Dr Enrico Florez MD to see for JULES/Elevated Creatinine. History Obtained From:     patient, electronic medical record    History of Present Illness:     Pinky Klinefelter; [de-identified] y.o. male with past medical history of Diabetes, CVA, HTN, PAD, who presented to the ER yesterday with increasing dyspnea associated with nausea, vomiting, and decreased urine output over 7 day period. He had recent positive COVID test, and his symptoms rapidly progressed which prompted the ER visit. He was placed on HighFlow, and started on Decadron, and Actemra. ECHO was ordered due to elevated troponin. This showed significant aortic stenosis, and cardiology is recommending eventual heart cath and will need clearance from nephrology. Nephrology is consulted due to abnormal renal function. On admission his creatinine was 2.98, this morning 2.32, with Bicarb of 11, potassium 3.8. Orders to start 0.45 with 75meq at 50/hr just given by primary. Early in March he was noted to have a creatinine of 2.5, but it appears his baseline may run 1.4-1.8. Home medications include Lisinopril 30mg daily. He denies ever being told he has kidney disease and has never seen a nephrologist.    Ye Reyes has been 600 thus far today. No history of recent contrast exposure  No h/o prolonged NSAIDs use in the past,   No h/o nephrolithiasis, No recent skin rashes or arthralgias   No hematuria or pyuria noticed in the recent past.   Doesn't report any reduction in the urine output recently. Non report of any obstructive urinary symptoms (urgency, frequency, weak stream, straining while urination). No h/o recurrent UTIs in the past.    Past History/Allergies? Social History:     Past Medical History:   Diagnosis Date    Chronic lower back pain     Places Lived in the Last Year: Not on file    Unstable Housing in the Last Year: Not on file       Family History:      History reviewed. No pertinent family history. Outpatient Medications:     Medications Prior to Admission: aspirin 325 MG tablet, Take 81 mg by mouth daily   atorvastatin (LIPITOR) 40 MG tablet, Take 20 mg by mouth nightly  lisinopril (PRINIVIL;ZESTRIL) 30 MG tablet, Take 30 mg by mouth daily  metoprolol tartrate (LOPRESSOR) 50 MG tablet, Take 50 mg by mouth daily  cilostazol (PLETAL) 100 MG tablet, Take 100 mg by mouth 2 times daily  clopidogrel (PLAVIX) 75 MG tablet, Take 75 mg by mouth daily  vitamin B-12 (CYANOCOBALAMIN) 1000 MCG tablet, Take 1,000 mcg by mouth daily    Current Medications:     Scheduled Meds:    [START ON 1/10/2022] pantoprazole  40 mg Oral QAM AC    atorvastatin  20 mg Oral Nightly    cilostazol  100 mg Oral BID    clopidogrel  75 mg Oral Daily    metoprolol tartrate  50 mg Oral Daily    vitamin B-12  1,000 mcg Oral Daily    aspirin  81 mg Oral Daily    sodium chloride flush  5-40 mL IntraVENous 2 times per day    dexamethasone  6 mg IntraVENous Q24H    heparin (porcine)  5,000 Units SubCUTAneous 3 times per day     Continuous Infusions:    IV infusion builder      sodium chloride       PRN Meds:  benzonatate, sodium chloride flush, sodium chloride flush, sodium chloride, ondansetron **OR** ondansetron, polyethylene glycol, acetaminophen **OR** acetaminophen    Review of Systems:     Constitutional: No fever, no chills, + lethargy, + weakness. HEENT:  No headache, otalgia, itchy eyes, nasal discharge or sore throat. Cardiac:  No chest pain, ++dyspnea, +orthopnea+PND. Chest:              + cough, phlegm or wheezing. Abdomen:  No abdominal pain, nausea or vomiting. + diarrhea  Neuro:  No focal weakness, abnormal movements or seizure like activity. Skin:   No rashes, no itching. :   No hematuria, no pyuria, no dysuria, no flank pain.   Extremities:  No swelling or joint pains. ROS was otherwise negative except as mentioned in the [address] Input/Output:       I/O last 3 completed shifts: In: 1660 [P.O.:660; IV Piggyback:1000]  Out: 950 [Urine:950]    Vital Signs:   Temperature:  Temp: 97.6 °F (36.4 °C)  TMax:   Temp (24hrs), Av.2 °F (36.8 °C), Min:97.6 °F (36.4 °C), Max:98.5 °F (36.9 °C)    Respirations:  Resp: 30  Pulse:   Pulse: 75  BP:    BP: 138/77  BP Range: Systolic (81HFP), EYZ:611 , Min:115 , TUW:631       Diastolic (96HJX), CONSUELO:97, Min:63, Max:85      Physical Examination:     General:  AAO x 3, speaking in full sentences, no accessory muscle use. HEENT: Atraumatic, normocephalic, no throat congestion, moist mucosa. Eyes:   Pupils equal, round and reactive to light, EOMI. Neck:   No JVD, no thyromegaly, no lymphadenopathy. Chest:   Coarse rhonchi  Cardiac:  S1 S2 RR, + murmur. Abdomen: Soft, non-tender, non distended, BS audible. :   No suprapubic or flank tenderness. Neuro:  AAO x 3, No FND. SKIN:  No rashes, good skin turgor. Extremities:  No edema, No clubbing, No cyanosis    Labs:       Recent Labs     22  1125 22  0709   WBC 17.9* 17.1*   RBC 4.66 4.25   HGB 14.1 12.8*   HCT 40.3* 35.7*   MCV 86.6 84.0   MCH 30.4 30.1   MCHC 35.1 35.9*   RDW 14.1 13.4    302   MPV 8.4 10.6      BMP:   Recent Labs     22  1125 22  0709    138   K 3.3* 3.8    110*   CO2 15* 11*   BUN 60* 58*   CREATININE 2.98* 2.32*   GLUCOSE 167* 177*   CALCIUM 9.2 8.7      Phosphorus:   No results for input(s): PHOS in the last 72 hours.   Magnesium:    Recent Labs     22  0709   MG 2.2     Albumin:    Recent Labs     22  1125 22  0709   LABALBU 3.3* 2.7*     SPEP:  Lab Results   Component Value Date    PROT 6.1 2022       Urinalysis/Chemistries:      Lab Results   Component Value Date    NITRU NEGATIVE 2022    COLORU Yellow 2022    PHUR 5.0 2022    WBCUA 0 TO 2 2022    RBCUA 0 TO 2 01/08/2022    MUCUS NOT REPORTED 01/08/2022    TRICHOMONAS NOT REPORTED 01/08/2022    YEAST NOT REPORTED 01/08/2022    BACTERIA NOT REPORTED 01/08/2022    SPECGRAV 1.025 01/08/2022    LEUKOCYTESUR NEGATIVE 01/08/2022    UROBILINOGEN Normal 01/08/2022    BILIRUBINUR NEGATIVE 01/08/2022    GLUCOSEU TRACE 01/08/2022    KETUA NEGATIVE 01/08/2022    AMORPHOUS 2+ 01/08/2022       Radiology:     CXR:     Assessment:     1. Acute Kidney Injury: likely Pre Renal due to GI loss/diarrhea, and poor oral intake  2. CKD III with baseline around 1.4-1.8. Never established with nephrology  3. COVID 19 pneumonia  4. Reported Diabetes II but not on medications at home  5. Moderate to severe aortic stenosis  6. HTN  7. Hypoxia and respiratory failure due to COVID  8. Acidosis. No ABG available    Plan:   1. Agree with Bicarb drip 75meq but increase rate to 75ml /hr  2. Hold Lisinopril  3. Will Check Renal Ultrasound to r/o element of obstruction and to assess the kidney size/echotexture. 4. Comprehensive urine testing including Urinalysis, Urine protein and creatinine to quantify the proteinuria if any at all. Will check urinary eosinophils as well. 5. Will Order serum and urine protein electrophoresis to r/o element to occult paraprotein disease. 6. Will order  SUMMER, Complement levels. Nutrition   Please ensure that patient is on a renal diet/TF. Avoid nephrotoxic drugs/contrast exposure. Thank you for the consultation. Please do not hesitate to contact us for any further questions/concerns. We will continue to follow along with you. Piyush Pena CNP  [facility]  Attending Physician Statement  I have discussed the care of Nia Pitts, including pertinent history and exam findings with the NP I have reviewed the key elements of all parts of the encounter with the np. Note was updated and recorded changes were made I have seen and examined the patient with the np.   I agree with the assessment and plan and status of the problem list as documented. In summary 81-year-old gentleman who has a past medical history significant for hypertension and on lisinopril presented to hospital with 2 weeks history of diarrhea and progressive increase in shortness of breath. His chest x-ray was suggestive of Covid pneumonia. Patient was also significant hypoxemic. Patient has a history of chronic kidney disease with a baseline creatinine 1.4-1.5 but when he presented to hospital his creatinine was 2.9. At present he is on high flow oxygen and barely saturating 90%  With hydration creatinine is slightly better and 2.3 mg/dL  Patient has metabolic acidosis  .   Addiitionally I recommend to continue bicarb containing fluid at 75 mL/h  Labs as ordered  We will follow with you  Monique Harrison MD

## 2022-01-10 PROBLEM — I48.91 ATRIAL FIBRILLATION WITH RVR (HCC): Status: ACTIVE | Noted: 2022-01-01

## 2022-01-10 NOTE — PLAN OF CARE
Problem: Falls - Risk of:  Goal: Will remain free from falls  Description: Will remain free from falls  Outcome: Ongoing  Goal: Absence of physical injury  Description: Absence of physical injury  Outcome: Ongoing     Problem: Airway Clearance - Ineffective  Goal: Achieve or maintain patent airway  Outcome: Ongoing     Problem: Gas Exchange - Impaired  Goal: Absence of hypoxia  Outcome: Ongoing  Goal: Promote optimal lung function  Outcome: Ongoing     Problem: Breathing Pattern - Ineffective  Goal: Ability to achieve and maintain a regular respiratory rate  Outcome: Ongoing     Problem:  Body Temperature -  Risk of, Imbalanced  Goal: Ability to maintain a body temperature within defined limits  Outcome: Ongoing  Goal: Will regain or maintain usual level of consciousness  Outcome: Ongoing  Goal: Complications related to the disease process, condition or treatment will be avoided or minimized  Outcome: Ongoing     Problem: Isolation Precautions - Risk of Spread of Infection  Goal: Prevent transmission of infection  Outcome: Ongoing     Problem: Nutrition Deficits  Goal: Optimize nutritional status  Outcome: Ongoing     Problem: Risk for Fluid Volume Deficit  Goal: Maintain normal heart rhythm  Outcome: Ongoing  Goal: Maintain absence of muscle cramping  Outcome: Ongoing  Goal: Maintain normal serum potassium, sodium, calcium, phosphorus, and pH  Outcome: Ongoing     Problem: Loneliness or Risk for Loneliness  Goal: Demonstrate positive use of time alone when socialization is not possible  Outcome: Ongoing     Problem: Fatigue  Goal: Verbalize increase energy and improved vitality  Outcome: Ongoing     Problem: Patient Education: Go to Patient Education Activity  Goal: Patient/Family Education  Outcome: Ongoing     Problem: Skin Integrity:  Goal: Will show no infection signs and symptoms  Description: Will show no infection signs and symptoms  Outcome: Ongoing  Goal: Absence of new skin breakdown  Description: Absence of new skin breakdown  Outcome: Ongoing     Problem: Nutrition  Goal: Optimal nutrition therapy  1/10/2022 1831 by Jose Manuel Amato RN  Outcome: Ongoing  1/10/2022 1606 by Cornel Rodríguez RD, LD  Outcome: Ongoing  Note: Nutrition Problem #1: Inadequate oral intake  Intervention: Food and/or Nutrient Delivery: Continue Current Diet,Start Oral Nutrition Supplement  Nutritional Goals: Oral intakes to meet at least 75% of estimated nutrition needs.

## 2022-01-10 NOTE — PROGRESS NOTES
[facility] [address] Patient  Today's Date and Time: 1/10/2022, 10:59 AM    Impression :     · COVID 19 Confirmed Infection  · Covid tests:  · 1/8/21: Positive  · Acute hypoxic respiratory failure  · Paroxysmal A. 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  · Essential hypertension  · Evaded inflammatory markers  · Hyperlipidemia  · Patient has not received the Covid vaccine  · DNR CCA    Recommendations:   · Antibiotic treatment:  · Monitor off antibiotics  · Covid Rx:    · Remdesivir-contraindicated with JULES  · Decadron-initiated 1/8/2022  · Actemra-administered 1/8/2022  · Monoclonal antibodies-out of window      Medical Decision Making/Summary/Discussion:1/10/2022     · Patient admitted with COVID 19 infection    Infection Control Recommendations   · Universal Precautions  · Airborne isolation  · Droplet Isolation    Antimicrobial Stewardship Recommendations       · Discontinuation of therapy  Coordination of Outpatient Care:   · Estimated Length of IV antimicrobials:TBD  · Patient will need Midline Catheter Insertion: TBD  · Patient will need PICC line Insertion: No  · Patient will need: Home IV , [place],  SNF,  LTAC:TBD  · Patient will need outpatient wound care:No    Chief complaint/reason for consultation:   · Concern for COVID infection      History of Present Illness:   Alex Lucas is a [de-identified]y.o.-year-old male who was initially admitted on 1/8/2022. Patient seen at the request of Dr. Aguilar Friend:    Patient presented through ER with complaints of needing shortness of breath, cough, loss of appetite, nausea, vomiting and diarrhea over the past 7 to 10 days. He has not received the Covid vaccine and tested positive for Covid shortly after [place]. On evaluation in the ED, the patient had an SPO2 of 80% on room air and was tachypneic.   A rapid Covid swab was positive. CT chest shows extensive bilateral pulmonary groundglass opacities. He was started on Decadron and given a dose of Actemra. Abnormal labs include:  · Creatinine 2.32  ·   · Troponin I 48  · WBC 17.1    Patient admitted because of concerns with COVID 19.    CURRENT EVALUATION : 1/10/2022    Afebrile  VS stable    Amiodarone drip initiated for atrial fibrillation with RVR on 1/10/2022    CRP is trending down    JULES continues with hypokalemia    Patient exhibiting respiratory distress. Yes  Respiratory secretions: No    Patient receiving supplemental oxygen. High flow nasal cannula  RR: 20-->25   02 sat: 95-->96    % FIO2: 87  No rate: 50 L/min  PEEP:      QTc:       NEWS Score: 0-4 Low risk group; 5-6: Medium risk group; 7 or above: High risk group  Parameters 3 2 1 0 1 2 3   Age    < 65   ? 65   RR ? 8  9-11 12-20  21-24 ? 25   O2 Sats ? 91 92-93 94-95 ? 96      Suppl O2  Yes  No      SBP ? 90  101-110 111-219   ? 220   HR ? 40  41-50 51-90  111-130 ? 131   Consciousness    Alert   Drowsiness, lethargy, or confusion   Temperature ? 35.0 C (95.0 F)  35.1-36.0 C 95.1-96.9 F 36.1-38.0 C 97.0-100.4 F 38.1-39.0 C 100.5-102.3 F ? 39.1 C ? 102.4 F      NEWS Score:   1/9/2022: 5 moderate risk    Overall Daily Picture:      Unchanged    Presence of secondary bacterial Infection:    No   Additional antibiotics: No    Labs, X rays reviewed: 1/10/2022    BUN:58-->58  Cr:2.32-->2.31    WBC:17.1  Hb:12.8  Plat: 302  Troponin: 148    Absolute Neutrophils:16  Absolute Lymphocytes:0.34  Neutrophil/Lymphocyte Ratio: 53 very high risk    CRP:160-->131-->52.3  Ferritin:804  LDH: 563    Pro Calcitonin:      Cultures:  Urine:  ·   Blood:  ·   Sputum :  ·   Wound:       CXR:     CAT:  1/8/21      Discussed with patient, RN, CC, IM.     I have personally reviewed the past medical history, past surgical history, medications, social history, and family history, and I have updated the database accordingly. Past Medical History:     Past Medical History:   Diagnosis Date    Chronic lower back pain     Cobalamin deficiency     [facility]A [facility]rebral vascular [facility]) [address] [address]     Diabetes mellitus [facility]     Hyperlipidemia     Hypertension     Malignant neoplasm of colon ([facility]     PAD (peripheral artery disease) ([facility])        Past Surgical  History:     Past Surgical History:   Procedure Laterality Date    ARTERY SURGERY         Medications:      metoprolol tartrate  50 mg Oral BID    heparin (porcine)  4,000 Units IntraVENous Once    pantoprazole  40 mg Oral QAM AC    atorvastatin  20 mg Oral Nightly    cilostazol  100 mg Oral BID    clopidogrel  75 mg Oral Daily    vitamin B-12  1,000 mcg Oral Daily    aspirin  81 mg Oral Daily    sodium chloride flush  5-40 mL IntraVENous 2 times per day    dexamethasone  6 mg IntraVENous Q24H       Social History:     Social History     Socioeconomic History    Marital status:      Spouse name: Not on file    Number of children: Not on file    Years of education: Not on file    Highest education level: Not on file   Occupational History    Not on file   Tobacco Use    Smoking status: Former Smoker    Smokeless tobacco: Never Used   Substance and Sexual Activity    Alcohol use: Not Currently    Drug use: Never    Sexual activity: Not on file   Other Topics Concern    Not on file   Social History Narrative    Not on file     Social Determinants of Health     Financial Resource Strain:     Difficulty of Paying Living Expenses: Not on file   Food Insecurity:     Worried About Running Out of Food in the Last Year: Not on file    Lesley of Food in the Last Year: Not on file   Transportation Needs:     Lack of Transportation (Medical): Not on file    Lack of Transportation (Non-Medical):  Not on file   Physical Activity:     Days of Exercise per Week: Not on file    Minutes of Exercise per Session: Not on file   Stress:     Feeling of Stress : Not on file   Social Connections:     Frequency of Communication with Friends and Family: Not on file    Frequency of Social Gatherings with Friends and Family: Not on file    Attends Yarsanism Services: Not on file    Active Member of Clubs or Organizations: Not on file    Attends Club or Organization Meetings: Not on file    Marital Status: Not on file   Intimate Partner Violence:     Fear of Current or Ex-Partner: Not on file    Emotionally Abused: Not on file    Physically Abused: Not on file    Sexually Abused: Not on file   Housing Stability:     Unable to Pay for Housing in the Last Year: Not on file    Number of [place] in the Last Year: Not on file    Unstable Housing in the Last Year: Not on file       Family History:   History reviewed. No pertinent family history. Allergies:   Patient has no known allergies. Review of Systems:       Constitutional: Generalized malaise  Head: No headaches  Eyes: No double vision or blurry vision. No conjunctival inflammation. ENT: No sore throat or runny nose. . No hearing loss, tinnitus or vertigo. Cardiovascular: No chest pain or palpitations. Shortness of breath. CARDONA  Lung:  Shortness of breath and cough. No sputum production  Abdomen:  nausea, vomiting, diarrhea  Genitourinary: Decreased urine output. No hematuria. No suprapubic or CVA pain  Musculoskeletal: Generalized myalgias s. No joint effusions, swelling or deformities  Hematologic: No bleeding or bruising. Neurologic: No headache, weakness, numbness, or tingling. Integument: No rash, no ulcers. Psychiatric: No depression. Endocrine: No polyuria, no polydipsia, no polyphagia.     Physical Examination :     Patient Vitals for the past 8 hrs:   BP Temp Temp src Pulse Resp SpO2   01/10/22 1000 115/74    25    01/10/22 0745 (!) 152/59 97.6 °F (36.4 °C) Oral  23 96 %   01/10/22 0416 136/67 98 °F (36.7 °C) Oral 99 24 96 %   01/10/22 0341     26 93 %     General Appearance: Awake, alert, and in respiratory distress  Head:  Normocephalic, no trauma  Eyes: Pupils equal, round, reactive to light; sclera anicteric; conjunctivae pink. No embolic phenomena. ENT: Oropharynx clear, without erythema, exudate, or thrush. No tenderness of sinuses. Mouth/throat: mucosa pink and moist. No lesions. Dentition in good repair. Neck:Supple, without lymphadenopathy. Thyroid normal, No bruits. Pulmonary/Chest: Clear to auscultation, without wheezes, rales, or rhonchi. No dullness to percussion. Cardiovascular: Regular rate and rhythm without murmurs, rubs, or gallops. Abdomen: Soft, non tender. Bowel sounds normal. No organomegaly  All four Extremities: No cyanosis, clubbing, edema, or effusions. Neurologic: No gross sensory or motor deficits. Skin: Warm and dry with good turgor. No signs of peripheral arterial or venous insufficiency. No ulcerations. No open wounds. Medical Decision Making -Laboratory:   I have independently reviewed/ordered the following labs:    CBC with Differential:   Recent Labs     01/08/22  1125 01/09/22  0709   WBC 17.9* 17.1*   HGB 14.1 12.8*   HCT 40.3* 35.7*    302   LYMPHOPCT 2* 2*   MONOPCT 5 3     BMP:   Recent Labs     01/09/22  0709 01/10/22  0600    137   K 3.8 3.3*   * 108*   CO2 11* 15*   BUN 58* 58*   CREATININE 2.32* 2.31*   MG 2.2 2.3     Hepatic Function Panel:   Recent Labs     01/08/22  1125 01/09/22  0709   PROT 7.3 6.1*   LABALBU 3.3* 2.7*   BILIDIR 0.20  --    IBILI 0.30  --    BILITOT 0.50 0.36   ALKPHOS 103 89   ALT 61* 48*   AST 53* 39     No results for input(s): RPR in the last 72 hours. No results for input(s): HIV in the last 72 hours. No results for input(s): BC in the last 72 hours.   Lab Results   Component Value Date    MUCUS NOT REPORTED 01/08/2022    RBC 4.25 01/09/2022    TRICHOMONAS NOT REPORTED 01/08/2022    WBC 17.1 01/09/2022    YEAST NOT REPORTED 01/08/2022    TURBIDITY Clear 01/08/2022     Lab Results   Component Value Date    CREATININE 2.31 01/10/2022    GLUCOSE 153 01/10/2022       Medical Decision Making-Imaging:     Narrative   EXAMINATION:   CTA OF THE CHEST 1/8/2022 10:10 am       TECHNIQUE:   CTA of the chest was performed after the administration of intravenous   contrast.  Multiplanar reformatted images are provided for review.  MIP   images are provided for review. Dose modulation, iterative reconstruction,   and/or weight based adjustment of the mA/kV was utilized to reduce the   radiation dose to as low as reasonably achievable.       COMPARISON:   None.       HISTORY:   ORDERING SYSTEM PROVIDED HISTORY: dyspnea / hypoxia   Reason for Exam: Shortness of breath       FINDINGS:   Pulmonary Arteries: Pulmonary arteries are adequately opacified for   evaluation.  No evidence of intraluminal filling defect to suggest pulmonary   embolism.  Main pulmonary artery is normal in caliber.       Mediastinum: No evidence of mediastinal lymphadenopathy.  Normal heart size. Normal caliber thoracic aorta with diffuse atherosclerosis.       Lungs/pleura: Extensive ground-glass opacification of the bilateral lung   fields with peripheral involvement slight apical as well as basilar sparing. No effusion or pneumothorax.       Upper Abdomen: Limited images of the upper abdomen are unremarkable.       Soft Tissues/Bones: No acute bone or soft tissue abnormality.           Impression   *No evidence of pulmonary embolism.    *Extensive ground-glass opacification of the bilateral lung fields with   peripheral involvement slight apical as well as basilar sparing.  Imaging   features suggestive of COVID-19 pneumonia, though are nonspecific and can   occur with a variety of infectious and noninfectious processes.           Medical Decision Kjghys-Dosaqvkd-Cxkbt:       Medical Decision Making-Other:     Note:  · Labs, medications, radiologic studies were reviewed with personal review of films  · Large amounts of data were reviewed  · Discussed with nursing Staff, Discharge planner  · Infection Control and Prevention measures reviewed  · All prior entries were reviewed  · Administer medications as ordered  · Prognosis: Guarded  · Discharge planning reviewed      Thank you for allowing us to participate in the care of this patient. Please call with questions. Ayan Michel, MOHAMUD - CNP     ATTESTATION:    I have discussed the case, including pertinent history and exam findings with the APRN. I have evaluated the  History, physical findings and pictures of the patient and the key elements of the encounter have been performed by me. I have reviewed the laboratory data, other diagnostic studies and discussed them with the APRN. I have updated the medical record where necessary. I agree with the assessment, plan and orders as documented by the APRN.     Natalia Yusuf MD.          Pager: [phone] - Office: [phone]

## 2022-01-10 NOTE — PROGRESS NOTES
[facility]  Progress Note                   Date:   1/10/2022  Patient name: Pinky Klinefelter  Date of admission:  1/8/2022  9:49 AM  MRN:   [id number]  YOB: 1940  PCP: Darius Castro, APRN - CNP    Reason for Admission: Hypoxia [R09.02]  Elevated troponin [R77.8]  Acute kidney injury ([address] [N17.9]  COVID-19 [U07.1]  Pneumonia due to COVID-19 virus [U07.1, J12.82]    Subjective:       Clinical Changes /Abnormalities: Went into Afib RVR this AM. Currently Afib 120-140s. Labs, vitals, & tele reviewed. Review of Systems    Medications:   Scheduled Meds:   metoprolol tartrate  50 mg Oral BID    pantoprazole  40 mg Oral QAM AC    atorvastatin  20 mg Oral Nightly    cilostazol  100 mg Oral BID    clopidogrel  75 mg Oral Daily    vitamin B-12  1,000 mcg Oral Daily    aspirin  81 mg Oral Daily    sodium chloride flush  5-40 mL IntraVENous 2 times per day    dexamethasone  6 mg IntraVENous Q24H    heparin (porcine)  5,000 Units SubCUTAneous 3 times per day     Continuous Infusions:   IV infusion builder 100 mL/hr at 01/09/22 1441    sodium chloride       CBC:   Recent Labs     01/08/22  1125 01/09/22  0709   WBC 17.9* 17.1*   HGB 14.1 12.8*    302     BMP:    Recent Labs     01/08/22  1125 01/09/22  0709 01/10/22  0600    138 137   K 3.3* 3.8 3.3*    110* 108*   CO2 15* 11* 15*   BUN 60* 58* 58*   CREATININE 2.98* 2.32* 2.31*   GLUCOSE 167* 177* 153*     Hepatic:  Recent Labs     01/08/22  1125 01/09/22  0709   AST 53* 39   ALT 61* 48*   BILITOT 0.50 0.36   ALKPHOS 103 89     Troponin:   Recent Labs     01/09/22  0709 01/09/22  1540 01/10/22  0008   TROPHS 148* 138* 131*     BNP: No results for input(s): BNP in the last 72 hours. Lipids: No results for input(s): CHOL, HDL in the last 72 hours. Invalid input(s): LDLCALCU  INR: No results for input(s): INR in the last 72 hours.     Objective:   Vitals: /74   Pulse 99   Temp 97.6 °F (36.4 °C) (Oral) Resp 25   Ht 5' 10\" (1.778 m)   Wt 148 lb 2.4 oz (67.2 kg)   SpO2 96%   BMI 21.26 kg/m²     For careful stewardship of limited PPE during COVID-19 pandemic my physical exam was deferred. For physical exam, please see today's physical from primary team or ICU team.       Echo 3/29/21    Left Ventricle: There is mild increased wall thickness/hypertrophy.   Left Ventricle: Systolic function is normal with an ejection fraction   of 65-70%.   Left Ventricle: Grade I diastolic dysfunction (impaired relaxation) is   present.   Right Ventricle: Systolic function is normal.     Aortic Valve: There is no regurgitation. There is moderate stenosis. The calculated aortic valve area is 1.72 cm2. The calculated aortic valve   peak gradient is 47.00 mmHg. The calculated aortic valve mean gradient is   28.00 mmHg.   Tricuspid Valve: There is trace regurgitation. There is no evidence of   tricuspid valve stenosis.   Pericardium: There is no pericardial effusion. Tele presently Afib 120-140s      Echo pending      Assessment / Acute Cardiac Problems:   1. COVID-19 PNA  2. New onset Afib RVR  3. Type I vs Type II MI likely secondary to JULES/COVID  4. HTN  5. JULES on CKD (baseline creat 1.4-1.5)  6. Mod AS on Echo 3/2021  7. Preserved LVEF on prior echo  8. Acute hypoxic resp failure    Patient Active Problem List:     Essential hypertension     Hyperlipidemia     Pneumonia due to COVID-19 virus     Acute hypoxemic respiratory failure due to COVID-19 [facility])     Acute kidney injury superimposed on chronic kidney disease ([facility] [address] baseline creatinine 1.9     Stage 3b chronic kidney disease (HCC)     Elevated troponin     Nonrheumatic aortic valve stenosis     PAD (peripheral artery disease) (HCC)     Moderate protein-calorie malnutrition ([facility]      Plan of Treatment:   1. Afib with RVR this AM with minimal response after IV Lopressor. Will give IV Amio bolus/gtt and start on IV Heparin gtt.    2. Keep K >4 and Mg

## 2022-01-10 NOTE — PROGRESS NOTES
[facility]  Office: [address], DO, Amelie Goodman, DO, Soheila Galvan, DO, Naty Jonesge Blood, DO, Jersey Heard MD, Bettie Armendariz MD, Beronica Chapman MD, Becca Hu MD, Amy Ortiz MD, Migdalia Abernathy MD, Carol Jay MD, Cindy Arriaza, DO, Mattie Graff, DO, Maria A Osorio MD,  Merissa Nagel DO, Amy Arguello MD, Cheryl Phillips MD, Lexus Florentino MD, Alexi Trinidad MD, Anastasia Dean MD, Rosalinda Perkins MD, Tatyana Kumari MD, Earl Dominguez [facility], [facility], [facility], Vidhi Jacobs, [facility], Ronn Husain, CNS, Xuan Zhong, CNP, Kip Bernstein, CNP, Berry Cosme, CNP, Jhonatan Santos, CNP, Riana Simms CNP, SY MoserC, Artemio Primrose, [facility], Darrel Vizcarra, [facility], Danyelle Sebastian, CNP, Leonora Potts, CNP, Kalyn Ellis, CNP, Madiha Weeks, [facility], Lazarus Mais, CNP, Jo Ann Goodrich, [address] [place] [address]    Progress Note    1/10/2022    2:49 PM    Name:   Rabia Law  MRN:     [id number]     Acct:      [de-identified]   Room:   [address] [place] [address] Day:  2  Admit Date:  1/8/2022  9:49 AM    PCP:   MOHAMUD Melendez CNP  Code Status:  DNR-CCA    Subjective:     C/C:   Chief Complaint   Patient presents with    Shortness of Breath    Nausea & Vomiting     Interval History Status: worsened. On my evaluation this morning patient was noted to be tachycardic with heart rate jumping up to 150  Stat EKG was done which showed A. fib with RVR  Patient complains of cough and shortness of breath  Patient is diaphoretic  Continues on high flow 87% FiO2 and 50 L/min  Critically sick  Brief History:   31-year-old male started symptoms of cough and shortness of breath around 2 weeks ago when he had a positive Covid contact. His symptoms kept on worsening and he had worsening shortness of breath, poor appetite, increased fatigue and weakness which prompted him to come to the ER.   Patient was noted to be in JULES with elevated creatinine of 2.98, elevated troponins 194, 189, elevated CRP of 160. Patient had elevated leukocytosis of 17. Review of Systems:     Constitutional:  negative for chills, fevers, sweats, poor appetite, increased fatigue  Respiratory: Positive for cough, dyspnea on exertion, shortness of breath, wheezing  Cardiovascular:  negative for chest pain, chest pressure/discomfort, lower extremity edema, does not report palpitations  Gastrointestinal:  negative for abdominal pain, constipation, diarrhea, nausea, vomiting  Neurological:  negative for dizziness, headache    Medications: Allergies:  No Known Allergies    Current Meds:   Scheduled Meds:    metoprolol tartrate  50 mg Oral BID    heparin (porcine)  4,000 Units IntraVENous Once    pantoprazole  40 mg Oral QAM AC    atorvastatin  20 mg Oral Nightly    cilostazol  100 mg Oral BID    clopidogrel  75 mg Oral Daily    vitamin B-12  1,000 mcg Oral Daily    aspirin  81 mg Oral Daily    sodium chloride flush  5-40 mL IntraVENous 2 times per day    dexamethasone  6 mg IntraVENous Q24H     Continuous Infusions:    amiodarone 1 mg/min (01/10/22 1108)    Followed by   [facility] amiodarone      heparin (PORCINE) Infusion      IV infusion builder 100 mL/hr at 01/09/22 1441    sodium chloride       PRN Meds: metoprolol, heparin (porcine), heparin (porcine), potassium chloride **OR** potassium alternative oral replacement **OR** potassium chloride, benzonatate, sodium chloride flush, sodium chloride flush, sodium chloride, ondansetron **OR** ondansetron, polyethylene glycol, acetaminophen **OR** acetaminophen    Data:     Past Medical History:   has a past medical history of Chronic lower back pain, Cobalamin deficiency, CVA (cerebral vascular accident) ([facility] Diabetes mellitus ([facility] Hyperlipidemia, Hypertension, Malignant neoplasm of colon ([facility] and PAD (peripheral artery disease) ([facility] Social History:   reports that he has quit smoking.  He has never used smokeless tobacco. He reports previous alcohol use. He reports that he does not use drugs. Family History: History reviewed. No pertinent family history. Vitals:  /74   Pulse 99   Temp 97.6 °F (36.4 °C) (Oral)   Resp 22   Ht 5' 10\" (1.778 m)   Wt 148 lb 2.4 oz (67.2 kg)   SpO2 94%   BMI 21.26 kg/m²   Temp (24hrs), Av.7 °F (36.5 °C), Min:97.5 °F (36.4 °C), Max:98 °F (36.7 °C)    No results for input(s): POCGLU in the last 72 hours. I/O (24Hr): Intake/Output Summary (Last 24 hours) at 1/10/2022 1449  Last data filed at 1/10/2022 0600  Gross per 24 hour   Intake 1699.75 ml   Output 1100 ml   Net 599.75 ml       Labs:  Hematology:  Recent Labs     22  0709 01/10/22  0600 01/10/22  1038   WBC 17.9*  --  17.1*  --  20.6*   RBC 4.66  --  4.25  --  4.20*   HGB 14.1  --  12.8*  --  12.5*   HCT 40.3*  --  35.7*  --  36.3*   MCV 86.6  --  84.0  --  86.4   MCH 30.4  --  30.1  --  29.8   MCHC 35.1  --  35.9*  --  34.4   RDW 14.1  --  13.4  --  13.5     --  302  --  406   MPV 8.4  --  10.6  --  10.4   CRP  --  160.0* 131.8* 52.3*  --      Chemistry:  Recent Labs     22  1125 22  18522  0709 22  1540 01/10/22  0008 01/10/22  0600     --  138  --   --  137   K 3.3*  --  3.8  --   --  3.3*     --  110*  --   --  108*   CO2 15*  --  11*  --   --  15*   GLUCOSE 167*  --  177*  --   --  153*   BUN 60*  --  58*  --   --  58*   CREATININE 2.98*  --  2.32*  --   --  2.31*   MG  --   --  2.2  --   --  2.3   ANIONGAP 16  --  17  --   --  14   LABGLOM 20*  --  27*  --   --  27*   GFRAA 25*  --  33*  --   --  33*   CALCIUM 9.2  --  8.7  --   --  8.3*   TROPHS 194*   < > 148* 138* 131*  --     < > = values in this interval not displayed.      Recent Labs     22  1125 22  0709 22  1155   PROT 7.3 6.1*  --    LABALBU 3.3* 2.7*  --    AST 53* 39  --    ALT 61* 48*  --    LDH  --   --  563*   ALKPHOS 103 89  --    BILITOT 0.50 0.36 --    BILIDIR 0.20  --   --      ABG:  Lab Results   Component Value Date    FIO2 INFORMATION NOT PROVIDED 01/10/2022     Lab Results   Component Value Date/Time    SPECIAL NOT REPORTED 01/09/2022 11:55 AM    SPECIAL NOT REPORTED 01/09/2022 11:55 AM     Lab Results   Component Value Date/Time    CULTURE NO GROWTH 1 DAY 01/09/2022 11:55 AM    CULTURE NO GROWTH 1 DAY 01/09/2022 11:55 AM       Radiology:  CT CHEST PULMONARY EMBOLISM W CONTRAST    Result Date: 1/8/2022  *No evidence of pulmonary embolism. *Extensive ground-glass opacification of the bilateral lung fields with peripheral involvement slight apical as well as basilar sparing. Imaging features suggestive of COVID-19 pneumonia, though are nonspecific and can occur with a variety of infectious and noninfectious processes.        Physical Examination:        General appearance:  alert, cooperative and moderate distress  Mental Status:  oriented to person, place and time and normal affect  Lungs: Decreased breath sounds bilaterally, slightly increased effort, on high flow oxygen  Heart:  Irregular afib with rvr, murmur present  Abdomen:  soft, nontender, nondistended, normal bowel sounds, no masses, hepatomegaly, splenomegaly  Extremities:  no edema, redness, tenderness in the calves  Skin:  no gross lesions, rashes, induration    Assessment:        Hospital Problems           Last Modified POA    * (Principal) Pneumonia due to COVID-19 virus 1/8/2022 Yes    Essential hypertension 1/8/2022 Yes    Hyperlipidemia 1/8/2022 Yes    Acute hypoxemic respiratory failure due to COVID-19 ([address] 1/8/2022 Yes    Acute kidney injury superimposed on chronic kidney disease ([address] baseline creatinine 1.9 1/8/2022 Yes    Stage 3b chronic kidney disease ([address] 1/8/2022 Yes    Elevated troponin 1/9/2022 Yes    Nonrheumatic aortic valve stenosis 1/9/2022 Yes    PAD (peripheral artery disease) ([address] 1/9/2022 Yes    Moderate protein-calorie malnutrition ([address] 1/9/2022 Yes    Atrial fibrillation with RVR ([facility] 1/10/2022 Yes          Plan:        Sepsis with acute respiratory failure with COVID-19 pneumonia s/p Actemra 1/8, patient is DNR CCA and does not want intubation. Continue Decadron 6 mg daily IV for 10 days and monitor for progress. CRP trending down and patient has not clinically improved. Blood cultures have been negative till now. Atrial fibrillation with RVRstarted on amiodarone drip by cardiology, patient not responded to iv Lopressor, heparin drip started.   Oral Lopressor increased to 50 mg twice daily, echo ordered, cardiology plan for ischemic work-up once acute issues resolve    JULES on CKD with metabolic acidosis likely secondary to poor oral intake and dehydration, baseline creatinine is 1.9, continues to have elevated creatinine, decrease bicarb containing fluids to 75 mL/h, nephrology following, hold diuretics    Elevated troponin-cardio following, patient will likely need ischemic work-up once acute issues resolve    Hypertension on Lopressor    Moderate aortic stenosiscardiology following, might plan on heart cath    Peripheral arterial diseaseon cilostazol, Plavix    Hyperlipidemia on Lipitor    High risk of progression given prolonged illness   nutritional supplements  heparin for DVT prophylaxis   Protonix for GI prophylaxis    Angi Ferreira MD  1/10/2022  2:49 PM

## 2022-01-10 NOTE — PROGRESS NOTES
Comprehensive Nutrition Assessment    Type and Reason for Visit:  Initial,Positive Nutrition Screen (Weight Loss; Poor Intakes/Appetite)    Nutrition Recommendations/Plan: Continue current diet. Will provide Glucerna oral supplements x 2 per day. Encourage/monitor PO intakes as tolerated. Will monitor labs, weights, and plan of care. Nutrition Assessment:  Admitted with c/o worsening shortness of breath, poor appetite, increased fatigue and weakness. Pt positive for covid. PMH includes: HTN, DM, CVA. Pt continues to report having a decreased appetite and eating less than normally. Consuming about 50% of meals. Will provide oral supplements to boost PO intakes. Labs reviewed; K 3.3 mmol/L. Meds include: Decadron. Malnutrition Assessment:  Malnutrition Status:  Insufficient data    Context:  Acute Illness     Findings of the 6 clinical characteristics of malnutrition:  Energy Intake:  1 - 75% or less of estimated energy requirements for 7 or more days  Weight Loss:  Unable to assess - No weight history avaliable per chart review. Body Fat Loss:  Unable to assess   Muscle Mass Loss:  Unable to assess  Fluid Accumulation:  No significant fluid accumulation   Strength:  Not Performed    Estimated Daily Nutrient Needs:  Energy (kcal):  25-28 kcal/kg = 6716-1634 kcals/day; Weight Used for Energy Requirements:  Current     Protein (g):  1.2-1.5 gm /kg =  gm pro/day; Weight Used for Protein Requirements:  Current     Fluid (ml/day):  25 mL/kg = 1700 mL/day or per MD; Method Used for Fluid Requirements:  ml/Kg      Nutrition Related Findings:  Labs/Meds reviewed. Last BM 1/8. Wounds:  None       Current Nutrition Therapies:    ADULT DIET;  Regular    Anthropometric Measures:  · Height: 5' 10\" (177.8 cm)  · Current Body Weight: 148 lb 2.4 oz (67.2 kg)   · Admission Body Weight: 148 lb 2.4 oz (67.2 kg)    · Usual Body Weight: 150 lb (68 kg)     · Ideal Body Weight: 166 lbs; % Ideal Body Weight 89.2 %   · BMI: 21.3  · BMI Categories: Normal Weight (BMI 18.5-24. 9)       Nutrition Diagnosis:   · Inadequate oral intake related to  (decreased appetite; current medical condition) as evidenced by intake 26-50%,intake 51-75%,poor intake prior to admission (variable PO intakes; need for ONS)    Nutrition Interventions:   Food and/or Nutrient Delivery:  Continue Current Diet,Start Oral Nutrition Supplement  Nutrition Education/Counseling:  No recommendation at this time   Coordination of Nutrition Care:  Continue to monitor while inpatient    Goals:  Oral intakes to meet at least 75% of estimated nutrition needs. Nutrition Monitoring and Evaluation:   Behavioral-Environmental Outcomes:  None Identified   Food/Nutrient Intake Outcomes:  Food and Nutrient Intake,Supplement Intake  Physical Signs/Symptoms Outcomes:  Biochemical Data,GI Status,Fluid Status or Edema,Hemodynamic Status,Nutrition Focused Physical Findings,Skin,Weight     Discharge Planning:     Too soon to determine     Electronically signed by Concepcion Gong RD, LD on 1/10/22 at 4:05 PM EST    Contact: [phone]

## 2022-01-10 NOTE — PLAN OF CARE
Problem: Falls - Risk of:  Goal: Will remain free from falls  Description: Will remain free from falls  1/10/2022 0111 by Jerica Pulliam RN  Outcome: Ongoing  1/9/2022 1132 by Steven Bauer RN  Outcome: Ongoing  Goal: Absence of physical injury  Description: Absence of physical injury  1/10/2022 0111 by Jerica Pulliam RN  Outcome: Ongoing  1/9/2022 1132 by Steven Bauer RN  Outcome: Ongoing     Problem: Airway Clearance - Ineffective  Goal: Achieve or maintain patent airway  1/10/2022 0111 by Jerica Pulliam RN  Outcome: Ongoing  1/9/2022 1132 by Steven Bauer RN  Outcome: Ongoing     Problem: Gas Exchange - Impaired  Goal: Absence of hypoxia  1/10/2022 0111 by Jerica Pulliam RN  Outcome: Ongoing  1/9/2022 1132 by Steven Bauer RN  Outcome: Ongoing  Goal: Promote optimal lung function  1/10/2022 0111 by Jerica Pulliam RN  Outcome: Ongoing  1/9/2022 1132 by Steven Bauer RN  Outcome: Ongoing     Problem: Breathing Pattern - Ineffective  Goal: Ability to achieve and maintain a regular respiratory rate  1/10/2022 0111 by Jerica Pulliam RN  Outcome: Ongoing  1/9/2022 1132 by Steven Bauer RN  Outcome: Ongoing     Problem:  Body Temperature -  Risk of, Imbalanced  Goal: Ability to maintain a body temperature within defined limits  1/10/2022 0111 by Jerica Pulliam RN  Outcome: Ongoing  1/9/2022 1132 by Steven Bauer RN  Outcome: Ongoing  Goal: Will regain or maintain usual level of consciousness  1/10/2022 0111 by Jerica Pulliam RN  Outcome: Ongoing  1/9/2022 1132 by Steven Bauer RN  Outcome: Ongoing  Goal: Complications related to the disease process, condition or treatment will be avoided or minimized  1/10/2022 0111 by Jerica Pulliam RN  Outcome: Ongoing  1/9/2022 1132 by Steven Bauer RN  Outcome: Ongoing     Problem: Isolation Precautions - Risk of Spread of Infection  Goal: Prevent transmission of infection  1/10/2022

## 2022-01-10 NOTE — PROGRESS NOTES
NEPHROLOGY PROGRESS NOTE      SUBJECTIVE     Patient currently on high flow oxygen to maintain saturation because of COVID-19 pneumonia. On IV fluids containing bicarb. Of diuretic  Decent diuresis. Renal function continues to improve  Blood pressure is stable. Run into atrial fibrillation with rapid ventricular rate currently on amiodarone. OBJECTIVE     Vitals:    01/10/22 0416 01/10/22 0745 01/10/22 1000 01/10/22 1214   BP: 136/67 (!) 152/59 115/74    Pulse: 99      Resp: 24 23 25 22   Temp: 98 °F (36.7 °C) 97.6 °F (36.4 °C)     TempSrc: Oral Oral     SpO2: 96% 96%  94%   Weight:       Height:         24HR INTAKE/OUTPUT:      Intake/Output Summary (Last 24 hours) at 1/10/2022 1251  Last data filed at 1/10/2022 0600  Gross per 24 hour   Intake 1699.75 ml   Output 1100 ml   Net 599.75 ml       General appearance: On high flow oxygen  HEENT: PERRLA  Respiratory::vesicular breath sounds,  Cardiovascular:S1 S2 normal,no gallop or organic murmur. Abdomen:Non tender/non distended. Bowel sounds present  Extremities: No Cyanosis or Clubbing,Lower extremity edema  Neurological:Alert and oriented. No abnormalities of mood, affect, memory, mentation, or behavior are noted      MEDICATIONS     Scheduled Meds:    metoprolol tartrate  50 mg Oral BID    heparin (porcine)  4,000 Units IntraVENous Once    pantoprazole  40 mg Oral QAM AC    atorvastatin  20 mg Oral Nightly    cilostazol  100 mg Oral BID    clopidogrel  75 mg Oral Daily    vitamin B-12  1,000 mcg Oral Daily    aspirin  81 mg Oral Daily    sodium chloride flush  5-40 mL IntraVENous 2 times per day    dexamethasone  6 mg IntraVENous Q24H     Continuous Infusions:    amiodarone 1 mg/min (01/10/22 1108)    Followed by   [facility] amiodarone      heparin (PORCINE) Infusion      IV infusion builder 100 mL/hr at 01/09/22 1441    sodium chloride       PRN Meds:  metoprolol, heparin (porcine), heparin (porcine), potassium chloride **OR** potassium alternative oral replacement **OR** potassium chloride, benzonatate, sodium chloride flush, sodium chloride flush, sodium chloride, ondansetron **OR** ondansetron, polyethylene glycol, acetaminophen **OR** acetaminophen  Home Meds:                Medications Prior to Admission: aspirin 325 MG tablet, Take 81 mg by mouth daily   atorvastatin (LIPITOR) 40 MG tablet, Take 20 mg by mouth nightly  lisinopril (PRINIVIL;ZESTRIL) 30 MG tablet, Take 30 mg by mouth daily  metoprolol tartrate (LOPRESSOR) 50 MG tablet, Take 50 mg by mouth daily  cilostazol (PLETAL) 100 MG tablet, Take 100 mg by mouth 2 times daily  clopidogrel (PLAVIX) 75 MG tablet, Take 75 mg by mouth daily  vitamin B-12 (CYANOCOBALAMIN) 1000 MCG tablet, Take 1,000 mcg by mouth daily    INVESTIGATIONS     Last 3 CMP:    Recent Labs     01/08/22  1125 01/09/22  0709 01/10/22  0600    138 137   K 3.3* 3.8 3.3*    110* 108*   CO2 15* 11* 15*   BUN 60* 58* 58*   CREATININE 2.98* 2.32* 2.31*   CALCIUM 9.2 8.7 8.3*   PROT 7.3 6.1*  --    LABALBU 3.3* 2.7*  --    BILITOT 0.50 0.36  --    ALKPHOS 103 89  --    AST 53* 39  --    ALT 61* 48*  --        Last 3 CBC:  Recent Labs     01/08/22  1125 01/09/22  0709 01/10/22  1038   WBC 17.9* 17.1* 20.6*   RBC 4.66 4.25 4.20*   HGB 14.1 12.8* 12.5*   HCT 40.3* 35.7* 36.3*   MCV 86.6 84.0 86.4   MCH 30.4 30.1 29.8   MCHC 35.1 35.9* 34.4   RDW 14.1 13.4 13.5    302 406   MPV 8.4 10.6 10.4       ASSESSMENT     #1 acute kidney injury nonoliguric secondary to prerenal injury from volume depletion related to GI losses/diarrhea/poor p.o. and infection -improving. Creatinine peaked to 2.9  #2 chronic kidney disease stage IIIb secondary nephrosclerosis Baseline creatinine 1.7-2.   Never seen nephrologist in the past  #3 COVID-19 pneumonia  #4 A. fib with RVR on amiodarone  #5 preserved ejection fraction/moderate aortic stenosis   #6 essential hypertension    PLAN     #1 reduce IV fluid rate  #2 hold diuretics  #3 Will follow    Please do not hesitate to call with questions    This note is created with the assistance of a speech-recognition program. While intending to generate a document that actually reflects the content of the visit, no guarantees can be provided that every mistake has been identified and corrected by editing    Rafael Holguin MD MD, [facility] [address]   1/10/2022 12:51 PM  [facility]

## 2022-01-11 NOTE — CARE COORDINATION
Transitional Planning- HF 60L 100%, Spoke with patient's wife and given LTAC choices, chose [facility].  Referral faxed and called to [facility], states pt needs to be 10 days out from diagnosis to transfer, but they will follow

## 2022-01-11 NOTE — PLAN OF CARE
Problem: Falls - Risk of:  Goal: Will remain free from falls  Description: Will remain free from falls  1/11/2022 0445 by Nina Palencia RN  Outcome: Ongoing  1/10/2022 1831 by Ela Parsons RN  Outcome: Ongoing  Goal: Absence of physical injury  Description: Absence of physical injury  1/11/2022 0445 by Nina Palencia RN  Outcome: Ongoing  1/10/2022 1831 by Ela Parsons RN  Outcome: Ongoing     Problem: Airway Clearance - Ineffective  Goal: Achieve or maintain patent airway  1/11/2022 0445 by Nina Palencia RN  Outcome: Ongoing  1/10/2022 1831 by Ela Parsons RN  Outcome: Ongoing     Problem: Gas Exchange - Impaired  Goal: Absence of hypoxia  1/11/2022 0445 by Nina Palencia RN  Outcome: Ongoing  1/10/2022 1831 by Ela Parsons RN  Outcome: Ongoing  Goal: Promote optimal lung function  1/11/2022 0445 by Nina Palencia RN  Outcome: Ongoing  1/10/2022 1831 by Ela Parsons RN  Outcome: Ongoing     Problem: Breathing Pattern - Ineffective  Goal: Ability to achieve and maintain a regular respiratory rate  1/11/2022 0445 by Nina Palencia RN  Outcome: Ongoing  1/10/2022 1831 by Ela Parsons RN  Outcome: Ongoing     Problem:  Body Temperature -  Risk of, Imbalanced  Goal: Ability to maintain a body temperature within defined limits  1/11/2022 0445 by Nina Palencia RN  Outcome: Ongoing  1/10/2022 1831 by Ela Parsons RN  Outcome: Ongoing  Goal: Will regain or maintain usual level of consciousness  1/11/2022 0445 by Nina Palencia RN  Outcome: Ongoing  1/10/2022 1831 by Ela Parsons RN  Outcome: Ongoing  Goal: Complications related to the disease process, condition or treatment will be avoided or minimized  1/11/2022 0445 by Nina Palencia RN  Outcome: Ongoing  1/10/2022 1831 by Ela Parsons RN  Outcome: Ongoing     Problem: Isolation Precautions - Risk of Spread of Infection  Goal: Prevent transmission of infection  1/11/2022 0445 by Nina Palencia RN  Outcome: Chance Pino RN  Outcome: Ongoing  1/10/2022 1831 by Leana Segundo RN  Outcome: Ongoing  1/10/2022 1606 by Poonam Cardenas RD, LD  Outcome: Ongoing  Note: Nutrition Problem #1: Inadequate oral intake  Intervention: Food and/or Nutrient Delivery: Continue Current Diet,Start Oral Nutrition Supplement  Nutritional Goals: Oral intakes to meet at least 75% of estimated nutrition needs.

## 2022-01-11 NOTE — PROGRESS NOTES
[facility] [address] Patient  Today's Date and Time: 1/11/2022, 12:12 PM    Impression :     · COVID 19 Confirmed Infection  · Covid tests:  · 1/8/21: Positive  · Acute hypoxic respiratory failure  · Paroxysmal A. 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  · Essential hypertension  · Elevated inflammatory markers  · Hyperlipidemia  · Patient has not received the Covid vaccine  · DNR CCA    Recommendations:   · Antibiotic treatment:  · Meropenem 500 mg BID IV for leukocytosis possible secondary bacterial pneumonia 1/11/21  · Covid Rx:    · Remdesivir-contraindicated with JULES  · Decadron-initiated 1/8/2022  · Actemra-administered 1/8/2022  · Monoclonal antibodies-out of window      Medical Decision Making/Summary/Discussion:1/11/2022     · Patient admitted with COVID 19 infection    Infection Control Recommendations   · Universal Precautions  · Airborne isolation  · Droplet Isolation    Antimicrobial Stewardship Recommendations       · Renal considerations  Coordination of Outpatient Care:   · Estimated Length of IV antimicrobials:TBD  · Patient will need Midline Catheter Insertion: TBD  · Patient will need PICC line Insertion: No  · Patient will need: Home IV , [place],  SNF,  LTAC:TBD  · Patient will need outpatient wound care:No    Chief complaint/reason for consultation:   · Concern for COVID infection      History of Present Illness:   Benji Valdez is a [de-identified]y.o.-year-old male who was initially admitted on 1/8/2022. Patient seen at the request of Dr. Consuelo Araiza:    Patient presented through ER with complaints of needing shortness of breath, cough, loss of appetite, nausea, vomiting and diarrhea over the past 7 to 10 days. He has not received the Covid vaccine and tested positive for Covid shortly after [place].     On evaluation in the ED, the patient had an SPO2 of 80% on room air and was tachypneic. A rapid Covid swab was positive. CT chest shows extensive bilateral pulmonary groundglass opacities. He was started on Decadron and given a dose of Actemra. Abnormal labs include:  · Creatinine 2.32  ·   · Troponin I 48  · WBC 17.1    Patient admitted because of concerns with COVID 19.    CURRENT EVALUATION : 1/11/2022    Afebrile  VS stable    The patient is on high flow nasal cannula with 100% FiO2 and 60 L/min    Amiodarone drip has been discontinued and patient is currently in a normal sinus rhythm    Meropenem has been initiated on 1/11/2022 for worsening respiratory distress and leukocytosis    CRP is trending down    JULES continues with hypokalemia    Patient exhibiting respiratory distress. Yes  Respiratory secretions: No    Patient receiving supplemental oxygen. High flow nasal cannula  RR: 20-->25>24  02 sat: 95-->96>95    % FIO2: 87>100  Flow rate: 50>60 L/min  PEEP:      QTc:       NEWS Score: 0-4 Low risk group; 5-6: Medium risk group; 7 or above: High risk group  Parameters 3 2 1 0 1 2 3   Age    < 65   ? 65   RR ? 8  9-11 12-20  21-24 ? 25   O2 Sats ?  91 92-93 94-95 ? 96      Suppl O2  Yes  No      SBP ? 90  101-110 111-219   ? 220   HR ? 40  41-50 51-90  111-130 ? 131   Consciousness    Alert   Drowsiness, lethargy, or confusion   Temperature ? 35.0 C (95.0 F)  35.1-36.0 C 95.1-96.9 F 36.1-38.0 C 97.0-100.4 F 38.1-39.0 C 100.5-102.3 F ? 39.1 C ? 102.4 F      NEWS Score:   1/9/2022: 5 moderate risk    Overall Daily Picture:      Worsened    Presence of secondary bacterial Infection:    Possible  Additional antibiotics: Meropenem 1/11/2022    Labs, X rays reviewed: 1/11/2022    BUN:58-->58>56  Cr:2.32-->2.31>2.38    WBC:17.1 ->20  Hb:12.8  Plat: 302  Troponin: 148    Absolute Neutrophils:16  Absolute Lymphocytes:0.34  Neutrophil/Lymphocyte Ratio: 53 very high risk    CRP:160-->131-->52.3  Ferritin:804  LDH: 563    Pro Calcitonin:      Cultures:  Urine:  ·   Blood:  ·   Sputum :  ·   Wound:       CXR:     CAT:  1/8/21      Discussed with patient, RN, CC, IM. I have personally reviewed the past medical history, past surgical history, medications, social history, and family history, and I have updated the database accordingly.   Past Medical History:     Past Medical History:   Diagnosis Date    Chronic lower back pain     Cobalamin deficiency     [facility]     Diabetes mellitus [facility]     Hyperlipidemia     Hypertension     Malignant neoplasm of colon ([facility]     [facility]        Past Surgical  History:     Past Surgical History:   Procedure Laterality Date    ARTERY SURGERY         Medications:      insulin lispro  0-6 Units SubCUTAneous TID WC    insulin lispro  0-3 Units SubCUTAneous Nightly    amiodarone  200 mg Oral BID    Followed by   Hiwot Salazar ON 1/14/2022] amiodarone  200 mg Oral Daily    metoprolol tartrate  50 mg Oral BID    pantoprazole  40 mg Oral QAM AC    atorvastatin  20 mg Oral Nightly    cilostazol  100 mg Oral BID    clopidogrel  75 mg Oral Daily    vitamin B-12  1,000 mcg Oral Daily    aspirin  81 mg Oral Daily    sodium chloride flush  5-40 mL IntraVENous 2 times per day    dexamethasone  6 mg IntraVENous Q24H       Social History:     Social History     Socioeconomic History    Marital status:      Spouse name: Not on file    Number of children: Not on file    Years of education: Not on file    Highest education level: Not on file   Occupational History    Not on file   Tobacco Use    Smoking status: Former Smoker    Smokeless tobacco: Never Used   Substance and Sexual Activity    Alcohol use: Not Currently    Drug use: Never    Sexual activity: Not on file   Other Topics Concern    Not on file   Social History Narrative    Not on file     Social Determinants of Health     Financial Resource Strain:     Difficulty of Paying Living Expenses: Not on file   Food Insecurity:     Worried About [address] in the Last Year: Not on file    Ran Out of Food in the Last Year: Not on file   Transportation Needs:     Lack of Transportation (Medical): Not on file    Lack of Transportation (Non-Medical): Not on file   Physical Activity:     Days of Exercise per Week: Not on file    Minutes of Exercise per Session: Not on file   Stress:     Feeling of Stress : Not on file   Social Connections:     Frequency of Communication with Friends and Family: Not on file    Frequency of Social Gatherings with Friends and Family: Not on file    Attends Latter-day Services: Not on file    Active Member of [address] [place] [address] or Organizations: Not on file    Attends Club or Organization Meetings: Not on file    Marital Status: Not on file   Intimate Partner Violence:     Fear of Current or Ex-Partner: Not on file    Emotionally Abused: Not on file    Physically Abused: Not on file    Sexually Abused: Not on file   Housing Stability:     Unable to Pay for Housing in the Last Year: Not on file    Number of [place] in the Last Year: Not on file    Unstable Housing in the Last Year: Not on file       Family History:   History reviewed. No pertinent family history. Allergies:   Patient has no known allergies. Review of Systems:       Constitutional: Generalized malaise  Head: No headaches  Eyes: No double vision or blurry vision. No conjunctival inflammation. ENT: No sore throat or runny nose. . No hearing loss, tinnitus or vertigo. Cardiovascular: No chest pain or palpitations. Shortness of breath. CARDONA  Lung:  Shortness of breath and cough. No sputum production  Abdomen:  nausea, vomiting, diarrhea  Genitourinary: Decreased urine output. No hematuria. No suprapubic or CVA pain  Musculoskeletal: Generalized myalgias s. No joint effusions, swelling or deformities  Hematologic: No bleeding or bruising.   Neurologic: No headache, weakness, numbness, or tingling. Integument: No rash, no ulcers. Psychiatric: No depression. Endocrine: No polyuria, no polydipsia, no polyphagia. Physical Examination :     Patient Vitals for the past 8 hrs:   BP Temp Temp src Pulse Resp SpO2   01/11/22 1108    91 24 95 %   01/11/22 0853    80 30 (!) 87 %   01/11/22 0850 92/77 98.2 °F (36.8 °C) Oral 80 29 (!) 88 %   01/11/22 0430 (!) 140/64 98 °F (36.7 °C) Axillary 88 30 90 %     General Appearance: Awake, alert, and in respiratory distress  Head:  Normocephalic, no trauma  Eyes: Pupils equal, round, reactive to light; sclera anicteric; conjunctivae pink. No embolic phenomena. ENT: Oropharynx clear, without erythema, exudate, or thrush. No tenderness of sinuses. Mouth/throat: mucosa pink and moist. No lesions. Dentition in good repair. Neck:Supple, without lymphadenopathy. Thyroid normal, No bruits. Pulmonary/Chest: Clear to auscultation, without wheezes, rales, or rhonchi. No dullness to percussion. Cardiovascular: Regular rate and rhythm without murmurs, rubs, or gallops. Abdomen: Soft, non tender. Bowel sounds normal. No organomegaly  All four Extremities: No cyanosis, clubbing, edema, or effusions. Neurologic: No gross sensory or motor deficits. Skin: Warm and dry with good turgor. No signs of peripheral arterial or venous insufficiency. No ulcerations. No open wounds.     Medical Decision Making -Laboratory:   I have independently reviewed/ordered the following labs:    CBC with Differential:   Recent Labs     01/09/22  0709 01/10/22  1038   WBC 17.1* 20.6*   HGB 12.8* 12.5*   HCT 35.7* 36.3*    406   LYMPHOPCT 2*  --    MONOPCT 3  --      BMP:   Recent Labs     01/09/22  0709 01/09/22  0709 01/10/22  0600 01/11/22  0519      < > 137 137   K 3.8   < > 3.3* 3.7   *   < > 108* 102   CO2 11*   < > 15* 16*   BUN 58*   < > 58* 56*   CREATININE 2.32*   < > 2.31* 2.38*   MG 2.2  --  2.3  --     < > = values in this interval not displayed. Hepatic Function Panel:   Recent Labs     01/09/22  0709   PROT 6.1*   LABALBU 2.7*   BILITOT 0.36   ALKPHOS 89   ALT 48*   AST 39     No results for input(s): RPR in the last 72 hours. No results for input(s): HIV in the last 72 hours. No results for input(s): BC in the last 72 hours. Lab Results   Component Value Date    MUCUS NOT REPORTED 01/09/2022    RBC 4.20 01/10/2022    TRICHOMONAS NOT REPORTED 01/09/2022    WBC 20.6 01/10/2022    YEAST NOT REPORTED 01/09/2022    TURBIDITY Clear 01/09/2022     Lab Results   Component Value Date    CREATININE 2.38 01/11/2022    GLUCOSE 229 01/11/2022       Medical Decision Making-Imaging:     Narrative   EXAMINATION:   CTA OF THE CHEST 1/8/2022 10:10 am       TECHNIQUE:   CTA of the chest was performed after the administration of intravenous   contrast.  Multiplanar reformatted images are provided for review.  MIP   images are provided for review. Dose modulation, iterative reconstruction,   and/or weight based adjustment of the mA/kV was utilized to reduce the   radiation dose to as low as reasonably achievable.       COMPARISON:   None.       HISTORY:   ORDERING SYSTEM PROVIDED HISTORY: dyspnea / hypoxia   Reason for Exam: Shortness of breath       FINDINGS:   Pulmonary Arteries: Pulmonary arteries are adequately opacified for   evaluation.  No evidence of intraluminal filling defect to suggest pulmonary   embolism.  Main pulmonary artery is normal in caliber.       Mediastinum: No evidence of mediastinal lymphadenopathy.  Normal heart size. Normal caliber thoracic aorta with diffuse atherosclerosis.       Lungs/pleura: Extensive ground-glass opacification of the bilateral lung   fields with peripheral involvement slight apical as well as basilar sparing.    No effusion or pneumothorax.       Upper Abdomen: Limited images of the upper abdomen are unremarkable.       Soft Tissues/Bones: No acute bone or soft tissue abnormality.           Impression   *No evidence of pulmonary embolism. *Extensive ground-glass opacification of the bilateral lung fields with   peripheral involvement slight apical as well as basilar sparing.  Imaging   features suggestive of COVID-19 pneumonia, though are nonspecific and can   occur with a variety of infectious and noninfectious processes.           Medical Decision Ixgyrk-Fouoqqra-Rcorc:       Medical Decision Making-Other:     Note:  · Labs, medications, radiologic studies were reviewed with personal review of films  · Large amounts of data were reviewed  · Discussed with nursing Staff, Discharge planner  · Infection Control and Prevention measures reviewed  · All prior entries were reviewed  · Administer medications as ordered  · Prognosis: Guarded  · Discharge planning reviewed      Thank you for allowing us to participate in the care of this patient. Please call with questions. Moni Mitchell, APRN - CNP     ATTESTATION:    I have discussed the case, including pertinent history and exam findings with the APRN. I have evaluated the  History, physical findings and pictures of the patient and the key elements of the encounter have been performed by me. I have reviewed the laboratory data, other diagnostic studies and discussed them with the APRN. I have updated the medical record where necessary. I agree with the assessment, plan and orders as documented by the APRN.     Raoul Momin MD.        Pager: [phone] - Office: [phone]

## 2022-01-11 NOTE — PROGRESS NOTES
[facility]  Office: [address], DO, María Wei, DO, Stuart Uribe, DO, Gianmonico Franco Blood, DO, Lloyd Smith MD, Nyasia Hernandez MD, Mirna Charles MD, Gagandeep Moreira MD, Cira Liriano MD, Queen Alpa MD, Alphonso Klinefelter, MD, Garfield Rios, DO, Genesis Ibarra, DO, Bobby Florentino MD,  Patrciia Bauer, DO, Bhavya Santana MD, Trupti Roberts MD, Kb Conde MD, Bri Grider MD, Marissa Patterson MD, Mike Jennings MD, Karina Beck MD, Giuliano Aguiar, [facility], [facility], CNP, Isaias Hernández, CNP, Benita Drew, CNS, Sidney Bird, CNP, Pedrito Rosenbaum, CNP, Ami Leos, CNP, Peyton Eid, CNP, Corey Ramos, CNP, Capo Holloway PA-C, Maycol Anderson, [facility], Partha Sargent, [facility], Michele Reed, CNP, Adin Darby, CNP, Ara Michel, CNP, Fabio Figueredo, CNP, Lara Elliott, CNP, Anneliese Ramirez, [facility]    Progress Note    1/11/2022    5:19 PM    Name:   Rand Unger  MRN:     [id number]     Acct:      [de-identified]   Room:   [address] [place] [address] Day:  3  Admit Date:  1/8/2022  9:49 AM    PCP:   MOHAMUD Lynn CNP  Code Status:  DNR-CCA    Subjective:     C/C:   Chief Complaint   Patient presents with    Shortness of Breath    Nausea & Vomiting     Interval History Status: not changed. Patient seen and examined. remians on high flow, urine output low, also admits to poor oral intake. Tells me \" I have alwas been a picky eater. \"    Brief History:   Per chart:  \"  69-year-old male started symptoms of cough and shortness of breath around 2 weeks ago when he had a positive Covid contact. His symptoms kept on worsening and he had worsening shortness of breath, poor appetite, increased fatigue and weakness which prompted him to come to the ER. Patient was noted to be in JULES with elevated creatinine of 2.98, elevated troponins 194, 189, elevated CRP of 160.   Patient had elevated leukocytosis of 17.  \"      Review of Systems:     Constitutional:  negative for chills, fevers, sweats  Respiratory: postiive for cough shortness of breath  negative for wheezing  Cardiovascular:  negative for chest pain, chest pressure/discomfort, lower extremity edema, palpitations  Gastrointestinal:  negative for abdominal pain, constipation, diarrhea, nausea, vomiting  Neurological:  negative for dizziness, headache    Medications: Allergies:  No Known Allergies    Current Meds:   Scheduled Meds:    insulin lispro  0-6 Units SubCUTAneous TID WC    insulin lispro  0-3 Units SubCUTAneous Nightly    amiodarone  200 mg Oral BID    Followed by   Omayra Arcos ON 1/14/2022] amiodarone  200 mg Oral Daily    [START ON 1/12/2022] meropenem  500 mg IntraVENous Q12H    metoprolol tartrate  50 mg Oral BID    pantoprazole  40 mg Oral QAM AC    atorvastatin  20 mg Oral Nightly    cilostazol  100 mg Oral BID    clopidogrel  75 mg Oral Daily    vitamin B-12  1,000 mcg Oral Daily    aspirin  81 mg Oral Daily    sodium chloride flush  5-40 mL IntraVENous 2 times per day    dexamethasone  6 mg IntraVENous Q24H     Continuous Infusions:    dextrose      heparin (PORCINE) Infusion 14 Units/kg/hr (01/11/22 2052)    IV infusion builder 75 mL/hr at 01/11/22 0851    sodium chloride       PRN Meds: glucose, dextrose, glucagon (rDNA), dextrose, metoprolol, heparin (porcine), heparin (porcine), potassium chloride **OR** potassium alternative oral replacement **OR** potassium chloride, benzonatate, sodium chloride flush, sodium chloride flush, sodium chloride, ondansetron **OR** ondansetron, polyethylene glycol, acetaminophen **OR** acetaminophen    Data:     Past Medical History:   has a past medical history of Chronic lower back pain, Cobalamin deficiency, CVA (cerebral vascular accident) ([facility] Diabetes mellitus ([facility] Hyperlipidemia, Hypertension, Malignant neoplasm of colon ([facility] and [facility]AD [facility]peripheral [facility] disease) [facility]     Social History:   reports that he has quit smoking. He has never used smokeless tobacco. He reports previous alcohol use. He reports that he does not use drugs. Family History: History reviewed. No pertinent family history. Vitals:  /67   Pulse 92   Temp 98.2 °F (36.8 °C) (Oral)   Resp 28   Ht 5' 10\" (1.778 m)   Wt 148 lb 2.4 oz (67.2 kg)   SpO2 92%   BMI 21.26 kg/m²   Temp (24hrs), Av.1 °F (36.7 °C), Min:97.7 °F (36.5 °C), Max:98.6 °F (37 °C)    Recent Labs     22  1142 22  1610   POCGLU 261* 150*       I/O (24Hr):     Intake/Output Summary (Last 24 hours) at 2022 1719  Last data filed at 2022 1603  Gross per 24 hour   Intake    Output 1400 ml   Net -1400 ml       Labs:  Hematology:  Recent Labs     22  1859 22  0709 01/10/22  0600 01/10/22  1038 22  0519   WBC  --  17.1*  --  20.6* 18.6*   RBC  --  4.25  --  4.20* 4.31   HGB  --  12.8*  --  12.5* 13.1   HCT  --  35.7*  --  36.3* 36.5*   MCV  --  84.0  --  86.4 84.7   MCH  --  30.1  --  29.8 30.4   MCHC  --  35.9*  --  34.4 35.9*   RDW  --  13.4  --  13.5 13.2   PLT  --  302  --  406 348   MPV  --  10.6  --  10.4 10.8   .0* 131.8* 52.3*  --   --      Chemistry:  Recent Labs     22  0709 22  1540 01/10/22  0008 01/10/22  0600 22  0519     --   --  137 137   K 3.8  --   --  3.3* 3.7   *  --   --  108* 102   CO2 11*  --   --  15* 16*   GLUCOSE 177*  --   --  153* 229*   BUN 58*  --   --  58* 56*   CREATININE 2.32*  --   --  2.31* 2.38*   MG 2.2  --   --  2.3  --    ANIONGAP 17  --   --  14 19*   LABGLOM 27*  --   --  27* 26*   GFRAA 33*  --   --  33* 32*   CALCIUM 8.7  --   --  8.3* 8.5*   TROPHS 148* 138* 131*  --   --      Recent Labs     22  0709 22  1155 22  1142 22  1610   PROT 6.1*  --   --   --    LABALBU 2.7*  --   --   --    AST 39  --   --   --    ALT 48*  --   --   --    LDH  --  563*  --   --    ALKPHOS 89  --   --   --    BILITOT 0.36  --   --   --    POCGLU  -- --  261* 150*     ABG:  Lab Results   Component Value Date    FIO2 INFORMATION NOT PROVIDED 01/10/2022     Lab Results   Component Value Date/Time    SPECIAL NOT REPORTED 01/09/2022 11:55 AM    SPECIAL NOT REPORTED 01/09/2022 11:55 AM     Lab Results   Component Value Date/Time    CULTURE NO GROWTH 2 DAYS 01/09/2022 11:55 AM    CULTURE NO GROWTH 2 DAYS 01/09/2022 11:55 AM       Radiology:  XR CHEST PORTABLE    Result Date: 1/11/2022  Unchanged bilateral airspace opacities     XR CHEST PORTABLE    Result Date: 1/9/2022  Scattered infiltrates consistent with pneumonia. CT CHEST PULMONARY EMBOLISM W CONTRAST    Result Date: 1/8/2022  *No evidence of pulmonary embolism. *Extensive ground-glass opacification of the bilateral lung fields with peripheral involvement slight apical as well as basilar sparing. Imaging features suggestive of COVID-19 pneumonia, though are nonspecific and can occur with a variety of infectious and noninfectious processes. US RETROPERITONEAL COMPLETE    Result Date: 1/10/2022  The right kidney could not be adequately visualized. Left kidney shows mild increased cortical echogenicity suggesting medical renal disease. No hydronephrosis.        Physical Examination:        General appearance:  alert, cooperative and mild respiratory distress  Mental Status:  oriented to person, place and time and normal affect  Lungs: decreased bilaterally, no wheezing no rhonchi  Heart:  regular rate and rhythm, no murmur  Abdomen:  soft, nontender, nondistended, normal bowel sounds  Extremities:  no edema, redness, tenderness in the calves  Skin:  no gross lesions, rashes, induration    Assessment:        Hospital Problems           Last Modified POA    * (Principal) Pneumonia due to COVID-19 virus 1/8/2022 Yes    Essential hypertension 1/8/2022 Yes    Hyperlipidemia 1/8/2022 Yes    Acute hypoxemic respiratory failure due to COVID-19 ([address] 1/8/2022 Yes    Acute kidney injury superimposed on chronic kidney disease ([facility] baseline creatinine 1.9 1/8/2022 Yes    Stage 3b chronic kidney disease ([facility] 1/8/2022 Yes    Elevated troponin 1/9/2022 Yes    Nonrheumatic aortic valve stenosis 1/9/2022 Yes    [facility]per[facility] 1/9/2022 Yes    Moderate protein-calorie malnutrition ([facility] 1/9/2022 Yes    Atrial fibrillation with RVR ([facility] 1/10/2022 Yes          Plan:        1. Sepsis with acute respiratory failure due to COVID-19 pneumonia s/p Actemra 1/8/2022, Continue Decadron 6 mg daily IV for 10 days and monitor for progress. 2. Atrial fibrillation with RVR, with HTN and aortic stenosis, HLDstarted on amiodarone drip by cardiology transitioned to PO, patient not responded to iv Lopressor, heparin drip started. Oral Lopressor 50 mg twice daily, echo ordered, cardiology plan for ischemic work-up once acute issues resolve, keep heparin drip, lipitor  3. JULES on CKD with metabolic acidosis likely secondary to poor oral intake and dehydration nephrology following, hold diuretics  4. Peripheral arterial diseaseon cilostazol, Plavix  5. Guarded prognosis  6. Encouraged patient to increase PO itake  7.  DNR-CCA no intubation  Shayna Matta MD  1/11/2022  5:19 PM

## 2022-01-11 NOTE — PROGRESS NOTES
NEPHROLOGY PROGRESS NOTE      SUBJECTIVE     Patient currently on high flow oxygen to maintain saturation because of COVID-19 pneumonia. On IV fluids containing bicarb. Off diuretic  Decent diuresis. Does not have Art catheter in place  Blood pressure is stable. Creatinine seems to have plateaued  Atrial fibrillation rate controlled now. Oral intake excellent. OBJECTIVE     Vitals:    01/11/22 0430 01/11/22 0850 01/11/22 0853 01/11/22 1108   BP: (!) 140/64 92/77     Pulse: 88 80 80 91   Resp: 30 29 30 24   Temp: 98 °F (36.7 °C) 98.2 °F (36.8 °C)     TempSrc: Axillary Oral     SpO2: 90% (!) 88% (!) 87% 95%   Weight:       Height:         24HR INTAKE/OUTPUT:      Intake/Output Summary (Last 24 hours) at 1/11/2022 1315  Last data filed at 1/11/2022 0439  Gross per 24 hour   Intake    Output 850 ml   Net -850 ml       General appearance: On high flow oxygen  HEENT: PERRLA  Respiratory::vesicular breath sounds,  Cardiovascular:S1 S2 normal,no gallop or organic murmur. Abdomen:Non tender/non distended. Bowel sounds present  Extremities: No Cyanosis or Clubbing,Lower extremity edema  Neurological:Alert and oriented. No abnormalities of mood, affect, memory, mentation, or behavior are noted      MEDICATIONS     Scheduled Meds:    insulin lispro  0-6 Units SubCUTAneous TID WC    insulin lispro  0-3 Units SubCUTAneous Nightly    amiodarone  200 mg Oral BID    Followed by   Hiwot Salazar ON 1/14/2022] amiodarone  200 mg Oral Daily    meropenem  500 mg IntraVENous Once    Followed by   Hiwot Salazar ON 1/12/2022] meropenem  500 mg IntraVENous Q12H    metoprolol tartrate  50 mg Oral BID    pantoprazole  40 mg Oral QAM AC    atorvastatin  20 mg Oral Nightly    cilostazol  100 mg Oral BID    clopidogrel  75 mg Oral Daily    vitamin B-12  1,000 mcg Oral Daily    aspirin  81 mg Oral Daily    sodium chloride flush  5-40 mL IntraVENous 2 times per day    dexamethasone  6 mg IntraVENous Q24H     Continuous Infusions:    dextrose      heparin (PORCINE) Infusion 14 Units/kg/hr (01/11/22 8869)    IV infusion builder 75 mL/hr at 01/11/22 0851    sodium chloride       PRN Meds:  glucose, dextrose, glucagon (rDNA), dextrose, metoprolol, heparin (porcine), heparin (porcine), potassium chloride **OR** potassium alternative oral replacement **OR** potassium chloride, benzonatate, sodium chloride flush, sodium chloride flush, sodium chloride, ondansetron **OR** ondansetron, polyethylene glycol, acetaminophen **OR** acetaminophen  Home Meds:                Medications Prior to Admission: aspirin 325 MG tablet, Take 81 mg by mouth daily   atorvastatin (LIPITOR) 40 MG tablet, Take 20 mg by mouth nightly  lisinopril (PRINIVIL;ZESTRIL) 30 MG tablet, Take 30 mg by mouth daily  metoprolol tartrate (LOPRESSOR) 50 MG tablet, Take 50 mg by mouth daily  cilostazol (PLETAL) 100 MG tablet, Take 100 mg by mouth 2 times daily  clopidogrel (PLAVIX) 75 MG tablet, Take 75 mg by mouth daily  vitamin B-12 (CYANOCOBALAMIN) 1000 MCG tablet, Take 1,000 mcg by mouth daily    INVESTIGATIONS     Last 3 CMP:    Recent Labs     01/09/22  0709 01/10/22  0600 01/11/22  0519    137 137   K 3.8 3.3* 3.7   * 108* 102   CO2 11* 15* 16*   BUN 58* 58* 56*   CREATININE 2.32* 2.31* 2.38*   CALCIUM 8.7 8.3* 8.5*   PROT 6.1*  --   --    LABALBU 2.7*  --   --    BILITOT 0.36  --   --    ALKPHOS 89  --   --    AST 39  --   --    ALT 48*  --   --        Last 3 CBC:  Recent Labs     01/09/22  0709 01/10/22  1038   WBC 17.1* 20.6*   RBC 4.25 4.20*   HGB 12.8* 12.5*   HCT 35.7* 36.3*   MCV 84.0 86.4   MCH 30.1 29.8   MCHC 35.9* 34.4   RDW 13.4 13.5    406   MPV 10.6 10.4       ASSESSMENT     #1 acute kidney injury nonoliguric secondary to prerenal injury from volume depletion related to GI losses/diarrhea/poor p.o. and infection -improving. Creatinine peaked to 2.9  #2 chronic kidney disease stage IIIb secondary nephrosclerosis Baseline creatinine 1.7-2.   Never seen nephrologist in the past  #3 COVID-19 pneumonia  #4 A. fib with RVR on amiodarone  #5 preserved ejection fraction/moderate aortic stenosis   #6 essential hypertension    PLAN     #1 reduce IV fluid rate  #2 hold diuretics  #3 Will follow    Please do not hesitate to call with questions    This note is created with the assistance of a speech-recognition program. While intending to generate a document that actually reflects the content of the visit, no guarantees can be provided that every mistake has been identified and corrected by editing    Darwin Nuñez MD MD, MRCP Sweta Sherwood, [place]   1/11/2022 1:15 PM  [facility] [place] [facility]

## 2022-01-11 NOTE — PROGRESS NOTES
[facility]  Progress Note                   Date:   1/11/2022  Patient name: Bony Sherman  Date of admission:  1/8/2022  9:49 AM  MRN:   [id number]  YOB: 1940  PCP: MOHAMUD Lorenz CNP    Reason for Admission: Hypoxia [R09.02]  Elevated troponin [R77.8]  Acute kidney injury ([address] [N17.9]  COVID-19 [U07.1]  Pneumonia due to COVID-19 virus [U07.1, J12.82]    Subjective:       Clinical Changes /Abnormalities:  IV site pulled out today. Afib rate controlled.       Review of Systems    Medications:   Scheduled Meds:   insulin lispro  0-6 Units SubCUTAneous TID WC    insulin lispro  0-3 Units SubCUTAneous Nightly    amiodarone  200 mg Oral BID    Followed by   Malathi Kenny ON 1/14/2022] amiodarone  200 mg Oral Daily    meropenem  500 mg IntraVENous Once    Followed by   Malathi Aguileras ON 1/12/2022] meropenem  500 mg IntraVENous Q12H    metoprolol tartrate  50 mg Oral BID    pantoprazole  40 mg Oral QAM AC    atorvastatin  20 mg Oral Nightly    cilostazol  100 mg Oral BID    clopidogrel  75 mg Oral Daily    vitamin B-12  1,000 mcg Oral Daily    aspirin  81 mg Oral Daily    sodium chloride flush  5-40 mL IntraVENous 2 times per day    dexamethasone  6 mg IntraVENous Q24H     Continuous Infusions:   dextrose      heparin (PORCINE) Infusion 14 Units/kg/hr (01/11/22 0605)    IV infusion builder 75 mL/hr at 01/11/22 0851    sodium chloride       CBC:   Recent Labs     01/09/22  0709 01/10/22  1038   WBC 17.1* 20.6*   HGB 12.8* 12.5*    406     BMP:    Recent Labs     01/09/22  0709 01/10/22  0600 01/11/22  0519    137 137   K 3.8 3.3* 3.7   * 108* 102   CO2 11* 15* 16*   BUN 58* 58* 56*   CREATININE 2.32* 2.31* 2.38*   GLUCOSE 177* 153* 229*     Hepatic:  Recent Labs     01/09/22  0709   AST 39   ALT 48*   BILITOT 0.36   ALKPHOS 89     Troponin:   Recent Labs     01/09/22  0709 01/09/22  1540 01/10/22  0008   TROPHS 148* 138* 131*     BNP: No results for input(s): BNP in the last 72 hours. Lipids: No results for input(s): CHOL, HDL in the last 72 hours. Invalid input(s): LDLCALCU  INR: No results for input(s): INR in the last 72 hours. Objective:   Vitals: BP 92/77   Pulse 91   Temp 98.2 °F (36.8 °C) (Oral)   Resp 24   Ht 5' 10\" (1.778 m)   Wt 148 lb 2.4 oz (67.2 kg)   SpO2 95%   BMI 21.26 kg/m²     For careful stewardship of limited PPE during COVID-19 pandemic my physical exam was deferred. For physical exam, please see today's physical from primary team or ICU team.       Echo 3/29/21    Left Ventricle: There is mild increased wall thickness/hypertrophy.   Left Ventricle: Systolic function is normal with an ejection fraction   of 65-70%.   Left Ventricle: Grade I diastolic dysfunction (impaired relaxation) is   present.   Right Ventricle: Systolic function is normal.     Aortic Valve: There is no regurgitation. There is moderate stenosis. The calculated aortic valve area is 1.72 cm2. The calculated aortic valve   peak gradient is 47.00 mmHg. The calculated aortic valve mean gradient is   28.00 mmHg.   Tricuspid Valve: There is trace regurgitation. There is no evidence of   tricuspid valve stenosis.   Pericardium: There is no pericardial effusion. Tele presently Afib 120-140s      Echo pending      Assessment / Acute Cardiac Problems:   1. COVID-19 PNA  2. New onset Afib RVR  3. Type I vs Type II MI likely secondary to JULES/COVID  4. HTN  5. JULES on CKD (baseline creat 1.4-1.5)  6. Mod AS on Echo 3/2021  7. Preserved LVEF on prior echo  8.  Acute hypoxic resp failure    Patient Active Problem List:     Essential hypertension     Hyperlipidemia     Pneumonia due to COVID-19 virus     Acute hypoxemic respiratory failure due to COVID-19 [facility])     Acute kidney injury superimposed on chronic kidney disease ([facility] baseline creatinine 1.9     Stage 3b chronic kidney disease (HCC)     Elevated troponin     Nonrheumatic aortic valve

## 2022-01-11 NOTE — PLAN OF CARE
Problem: Falls - Risk of:  Goal: Will remain free from falls  Description: Will remain free from falls  1/11/2022 1448 by Wendy Freire RN  Outcome: Ongoing  1/11/2022 0445 by Yonatan Escalante RN  Outcome: Ongoing  Goal: Absence of physical injury  Description: Absence of physical injury  1/11/2022 1448 by Wendy Freire RN  Outcome: Ongoing  1/11/2022 0445 by Yonatan Escalante RN  Outcome: Ongoing     Problem: Airway Clearance - Ineffective  Goal: Achieve or maintain patent airway  1/11/2022 1448 by Wendy Freire RN  Outcome: Ongoing  1/11/2022 0445 by Yonatan Escalante RN  Outcome: Ongoing     Problem: Gas Exchange - Impaired  Goal: Absence of hypoxia  1/11/2022 1448 by Wendy Freire RN  Outcome: Ongoing  1/11/2022 0445 by Yonatan Escalante RN  Outcome: Ongoing  Goal: Promote optimal lung function  1/11/2022 1448 by Wendy Freire RN  Outcome: Ongoing  1/11/2022 0445 by Yonatan Escalante RN  Outcome: Ongoing     Problem: Breathing Pattern - Ineffective  Goal: Ability to achieve and maintain a regular respiratory rate  1/11/2022 1448 by Wendy Freire RN  Outcome: Ongoing  1/11/2022 0445 by Yonatan Escalante RN  Outcome: Ongoing     Problem:  Body Temperature -  Risk of, Imbalanced  Goal: Ability to maintain a body temperature within defined limits  1/11/2022 1448 by Wendy Freire RN  Outcome: Ongoing  1/11/2022 0445 by Yonatan Escalante RN  Outcome: Ongoing  Goal: Will regain or maintain usual level of consciousness  1/11/2022 1448 by Wendy Freire RN  Outcome: Ongoing  1/11/2022 0445 by Yonatan Escalante RN  Outcome: Ongoing  Goal: Complications related to the disease process, condition or treatment will be avoided or minimized  1/11/2022 1448 by Wendy Freire RN  Outcome: Ongoing  1/11/2022 0445 by Yonatan Escalante RN  Outcome: Ongoing     Problem: Isolation Precautions - Risk of Spread of Infection  Goal: Prevent transmission of infection  1/11/2022 1448 by Wendy Freire RN  Outcome: Ongoing  1/11/2022 0445 by Yonatan Escalante RN  Outcome: Ongoing     Problem: Nutrition Deficits  Goal: Optimize nutritional status  1/11/2022 1448 by Sergio Martins RN  Outcome: Ongoing  1/11/2022 0445 by Carroll Duvall RN  Outcome: Ongoing     Problem: Risk for Fluid Volume Deficit  Goal: Maintain normal heart rhythm  1/11/2022 1448 by Sergio Martins RN  Outcome: Ongoing  1/11/2022 0445 by Carroll Duvall RN  Outcome: Ongoing  Goal: Maintain absence of muscle cramping  1/11/2022 1448 by Sergio Martins RN  Outcome: Ongoing  1/11/2022 0445 by Carroll Duvall RN  Outcome: Ongoing  Goal: Maintain normal serum potassium, sodium, calcium, phosphorus, and pH  1/11/2022 1448 by Sergio Martins RN  Outcome: Ongoing  1/11/2022 0445 by Carroll Duvall RN  Outcome: Ongoing     Problem: Loneliness or Risk for Loneliness  Goal: Demonstrate positive use of time alone when socialization is not possible  1/11/2022 1448 by Sergio Martins RN  Outcome: Ongoing  1/11/2022 0445 by Carroll Duvall RN  Outcome: Ongoing     Problem: Fatigue  Goal: Verbalize increase energy and improved vitality  1/11/2022 1448 by Sergio Martins RN  Outcome: Ongoing  1/11/2022 0445 by Carroll Duvall RN  Outcome: Ongoing     Problem: Patient Education: Go to Patient Education Activity  Goal: Patient/Family Education  1/11/2022 1448 by Sergio Martins RN  Outcome: Ongoing  1/11/2022 0445 by Carroll Duvall RN  Outcome: Ongoing     Problem: Skin Integrity:  Goal: Will show no infection signs and symptoms  Description: Will show no infection signs and symptoms  1/11/2022 1448 by Sergio Martins RN  Outcome: Ongoing  1/11/2022 0445 by Carroll Duvall RN  Outcome: Ongoing  Goal: Absence of new skin breakdown  Description: Absence of new skin breakdown  1/11/2022 1448 by Sergio Martins RN  Outcome: Ongoing  1/11/2022 0445 by Carroll Duvall RN  Outcome: Ongoing     Problem: Nutrition  Goal: Optimal nutrition therapy  1/11/2022 1448 by Sergio Martins RN  Outcome: Ongoing  1/11/2022 0445 by Carroll Duvall RN  Outcome: Ongoing

## 2022-01-11 NOTE — CARE COORDINATION
Call from [facility] is OON with S.DIOGENES's-he has [employer] [employer]. PS Dr. Kelby Cooley. Patient is on HIFLO 95% 55L.

## 2022-01-12 NOTE — PROGRESS NOTES
[facility]  Office: [address], DO, Amelie Goodman, DO, Soheila Galvan, DO, Naty Jonesge Blood, DO, Jersey Heard MD, Bettie Armendariz MD, Beronica Chapman MD, Becca Hu MD, Amy Ortiz MD, Migdalia Abernathy MD, Carol Jay MD, Cindy Arriaza, DO, Mattie Graff, DO, Maria A Osorio MD,  Merissa Nagel DO, Amy Arguello MD, Cheryl Phillips MD, Lexus Florentino MD, Alexi Trinidad MD, Anastasia Dean MD, Rosalinda Perkins MD, Tatyana Kumari MD, Earl Dominguez [facility], [facility], [facility], Vidhi Jacobs, [facility], Ronn Husain, CNS, Xuan Zhong, CNP, Kip Bernstein, CNP, Berry Cosme, CNP, Jhonatan Santos, CNP, Riana Simms CNP, SY MoserC, Artemio Primrose, [facility], Darrel Vizcarra [facility], Danyelle Sebastian, CNP, Leonora Potts, CNP, Kalyn Ellis, CNP, Madiha Weeks [facility], Lazarus Mais, CNP, Jo Ann Goodrich, [address] [place] [address]    Progress Note    1/12/2022    7:46 AM    Name:   Rabia Law  MRN:     [id number]     Acct:      [de-identified]   Room:   [address] [place] [id number] Day:  4  Admit Date:  1/8/2022  9:49 AM    PCP:   MOHAMUD Melendez CNP  Code Status:  DNR-CCA    Subjective:     C/C:   Chief Complaint   Patient presents with    Shortness of Breath    Nausea & Vomiting     Interval History Status: not changed.          Brief History:     Presented with cough shortness of breath on January 8 symptoms began 2 weeks prior to that he did have a COVID-positive test he was found to have COVID-pneumonia acute hypoxemic respiratory failure he has been treated for that    Also found to be in A. fib with RVR has a known history of peripheral artery disease and hyperlipidemia as well as aortic stenosis I cannot find an echo in the chart there is 1 that has been performed all of the results has not been released    He is currently on amiodarone drip getting Lopressor which she has not responded to very well had elevated troponins he is pending an ischemic work-up by cardiology they are waiting until he is further stabilized/convalescent    He also has an JULES on CKD but is believed to be prerenal with a possible component of intrarenal pathology related to COVID diagnosis    His blood sugars are currently elevated secondary to Decadron    He is currently on meropenem for bacterial pneumonia he could not receive remdesivir because of his JULES he has received Decadron and Actemra. Most recently he is on high flow nasal cannula with 100% FiO2 at 60 L/min  He has reverted from A. fib back to NSR he is off of his amiodarone drip although it still ordered under the [facility]  Inflammatory markers are trending down  JULES has plateaued/improving creatinine legs resolution phase    There was no BMP ordered for today I am awaiting results of that    Review of Systems:     Constitutional:  negative for chills, fevers, sweats  Respiratory:  negative for cough, dyspnea on exertion, shortness of breath, wheezing  Cardiovascular:  negative for chest pain, chest pressure/discomfort, lower extremity edema, palpitations  Gastrointestinal:  negative for abdominal pain, constipation, diarrhea, nausea, vomiting  Neurological:  negative for dizziness, headache    Medications:      Allergies:  No Known Allergies    Current Meds:   Scheduled Meds:    insulin glargine  20 Units SubCUTAneous QAM    insulin lispro  0-6 Units SubCUTAneous TID WC    insulin lispro  0-3 Units SubCUTAneous Nightly    amiodarone  200 mg Oral BID    Followed by   Alvaro Holter ON 1/14/2022] amiodarone  200 mg Oral Daily    meropenem  500 mg IntraVENous Q12H    metoprolol tartrate  50 mg Oral BID    pantoprazole  40 mg Oral QAM AC    atorvastatin  20 mg Oral Nightly    cilostazol  100 mg Oral BID    clopidogrel  75 mg Oral Daily    vitamin B-12  1,000 mcg Oral Daily    aspirin  81 mg Oral Daily    sodium chloride flush  5-40 mL IntraVENous 2 times per day    dexamethasone  6 mg IntraVENous Q24H     Continuous Infusions:    dextrose      heparin (PORCINE) Infusion 12 Units/kg/hr (22 0504)    IV infusion builder 75 mL/hr at 22 0851    sodium chloride       PRN Meds: glucose, dextrose, glucagon (rDNA), dextrose, metoprolol, heparin (porcine), heparin (porcine), potassium chloride **OR** potassium alternative oral replacement **OR** potassium chloride, benzonatate, sodium chloride flush, sodium chloride flush, sodium chloride, ondansetron **OR** ondansetron, polyethylene glycol, acetaminophen **OR** acetaminophen    Data:     Past Medical History:   has a past medical history of Chronic lower back pain, Cobalamin deficiency, CVA (cerebral vascular accident) ([facility] [facility] Diabetes mellitus ([facility] Hyperlipidemia, Hypertension, Malignant neoplasm of colon ([facility] [facility] and PAD (peripheral artery disease) [facility] Social History:   reports that he has quit smoking. He has never used smokeless tobacco. He reports previous alcohol use. He reports that he does not use drugs. Family History: History reviewed. No pertinent family history. Vitals:  /84   Pulse 107   Temp 97.9 °F (36.6 °C) (Oral)   Resp 26   Ht 5' 10\" (1.778 m)   Wt 148 lb 2.4 oz (67.2 kg)   SpO2 94%   BMI 21.26 kg/m²   Temp (24hrs), Av °F (36.7 °C), Min:97.7 °F (36.5 °C), Max:98.2 °F (36.8 °C)    Recent Labs     22  1142 22  1610 22  0659   POCGLU 261* 150* 163* 244*       I/O (24Hr):     Intake/Output Summary (Last 24 hours) at 2022 0746  Last data filed at 2022 0448  Gross per 24 hour   Intake    Output 870 ml   Net -870 ml       Labs:  Hematology:  Recent Labs     01/10/22  0600 01/10/22  1038 22  0519 22  0352   WBC  --  20.6* 18.6* 17.4*   RBC  --  4.20* 4.31 3.53*   HGB  --  12.5* 13.1 10.7*   HCT  --  36.3* 36.5* 30.4*   MCV  --  86.4 84.7 86.1   MCH  --  29.8 30.4 30.3   MCHC  --  34.4 35.9* 35.2*   RDW  --  13.5 13.2 13.3   PLT  --  406 348 309   MPV  --  10.4 10.8 10.4   CRP 52.3*  --   --   --      Chemistry:  Recent Labs     01/09/22  1540 01/10/22  0008 01/10/22  0600 01/11/22  0519   NA  --   --  137 137   K  --   --  3.3* 3.7   CL  --   --  108* 102   CO2  --   --  15* 16*   GLUCOSE  --   --  153* 229*   BUN  --   --  58* 56*   CREATININE  --   --  2.31* 2.38*   MG  --   --  2.3  --    ANIONGAP  --   --  14 19*   LABGLOM  --   --  27* 26*   GFRAA  --   --  33* 32*   CALCIUM  --   --  8.3* 8.5*   TROPHS 138* 131*  --   --      Recent Labs     01/09/22  1155 01/11/22  1142 01/11/22  1610 01/11/22 2011 01/12/22  0659   *  --   --   --   --    POCGLU  --  261* 150* 163* 244*     ABG:  Lab Results   Component Value Date    FIO2 INFORMATION NOT PROVIDED 01/10/2022     Lab Results   Component Value Date/Time    SPECIAL NOT REPORTED 01/09/2022 11:55 AM    SPECIAL NOT REPORTED 01/09/2022 11:55 AM     Lab Results   Component Value Date/Time    CULTURE NO GROWTH 2 DAYS 01/09/2022 11:55 AM    CULTURE NO GROWTH 2 DAYS 01/09/2022 11:55 AM       Radiology:  XR CHEST PORTABLE    Result Date: 1/11/2022  Unchanged bilateral airspace opacities     XR CHEST PORTABLE    Result Date: 1/9/2022  Scattered infiltrates consistent with pneumonia. CT CHEST PULMONARY EMBOLISM W CONTRAST    Result Date: 1/8/2022  *No evidence of pulmonary embolism. *Extensive ground-glass opacification of the bilateral lung fields with peripheral involvement slight apical as well as basilar sparing. Imaging features suggestive of COVID-19 pneumonia, though are nonspecific and can occur with a variety of infectious and noninfectious processes. US RETROPERITONEAL COMPLETE    Result Date: 1/10/2022  The right kidney could not be adequately visualized. Left kidney shows mild increased cortical echogenicity suggesting medical renal disease. No hydronephrosis.        Physical Examination:        General appearance:  alert, cooperative and no distress, wearing nasal BiPAP  Mental Status:  oriented to person, place and time and normal affect  Lungs:  clear to auscultation bilaterally, normal effort  Heart:  regular rate and rhythm, no murmur  Abdomen:  soft, nontender, nondistended, normal bowel sounds, no masses, hepatomegaly, splenomegaly  Extremities:  no edema, redness, tenderness in the calves  Skin:  no gross lesions, rashes, induration    Assessment:        Hospital Problems           Last Modified POA    * (Principal) Pneumonia due to COVID-19 virus 1/8/2022 Yes    Essential hypertension 1/8/2022 Yes    Hyperlipidemia 1/8/2022 Yes    Acute hypoxemic respiratory failure due to COVID-19 ([address] 1/8/2022 Yes    Acute kidney injury superimposed on chronic kidney disease ([address] baseline creatinine 1.9 1/8/2022 Yes    Stage 3b chronic kidney disease ([address] 1/8/2022 Yes    Elevated troponin 1/9/2022 Yes    Nonrheumatic aortic valve stenosis 1/9/2022 Yes    PAD (peripheral artery disease) ([address] 1/9/2022 Yes    Moderate protein-calorie malnutrition ([address] 1/9/2022 Yes    Atrial fibrillation with RVR ([address] 1/10/2022 Yes          Plan:          1. Sepsis with acute respiratory failure due to COVID-19 pneumonia s/p Actemra 1/8/2022, Continue Decadron 6 mg daily IV for 10 days and monitor for progress.   2. A. fib with RVR amiodarone drip Lopressor cardiology following severe aortic stenosis but preserved EF waiting on final report from echo; will undergo ischemic work-up while in hospital  3. prerenal JULES on CKD nephrology following no diuresis  4. Peripheral arterial diseaseon cilostazol, Plavix  5. Guarded prognosis  6. Encouraged patient to increase PO itake  7. DNR-CCA no intubation  8.  Clinically doing much better following completion of cardiac work-up would be suitable for discharge to facility    Soni Schneider MD  1/12/2022  7:46 AM

## 2022-01-12 NOTE — PROGRESS NOTES
NEPHROLOGY PROGRESS NOTE      SUBJECTIVE   Patient is seen and examined. Renal function continues to improve. Creatinine is down to 2.16 with baseline creatinine about 1.82. The patient continues on  high flow oxygen to maintain saturation because of COVID-19 pneumonia. On IV fluids containing sodium bicarbonate at 75 mL an hour  Off diuretic  Decent diuresis. Does not have Art catheter in place  Blood pressure is stable. Atrial fibrillation rate controlled now. Oral intake excellent. OBJECTIVE     Vitals:    01/12/22 0902 01/12/22 1145 01/12/22 1300 01/12/22 1515   BP: 134/64  102/64 (!) 140/56   Pulse: 105 105 84 98   Resp:  25 17 16   Temp:   97.9 °F (36.6 °C) 97.9 °F (36.6 °C)   TempSrc:   Oral Oral   SpO2:  95% 93% 91%   Weight:       Height:         24HR INTAKE/OUTPUT:      Intake/Output Summary (Last 24 hours) at 1/12/2022 1542  Last data filed at 1/12/2022 1300  Gross per 24 hour   Intake    Output 1070 ml   Net -1070 ml       General appearance: On high flow oxygen, awake and alert and relatively comfortable  HEENT: Conjunctiva normal, no scleral icterus  Respiratory: Bilateral air entry with bilateral rhonchi  Cardiovascular: Regular rate and rhythm with positive S1 and S2 and some ectopic beats without a rub positive systolic murmur  Abdomen: Soft and nontender and mildly distended with active bowel sounds   Extremities: No Cyanosis or Clubbing or lower extremity edema  Neurological:Alert and oriented. No abnormalities of mood, affect, memory, mentation, or behavior are noted      MEDICATIONS     Scheduled Meds:    insulin glargine  20 Units SubCUTAneous QAM    insulin lispro  0-6 Units SubCUTAneous TID     insulin lispro  0-3 Units SubCUTAneous Nightly    amiodarone  200 mg Oral BID    Followed by   Hiwot Salazar ON 1/14/2022] amiodarone  200 mg Oral Daily    meropenem  500 mg IntraVENous Q12H    metoprolol tartrate  50 mg Oral BID    pantoprazole  40 mg Oral QAM AC    atorvastatin 20 mg Oral Nightly    cilostazol  100 mg Oral BID    clopidogrel  75 mg Oral Daily    vitamin B-12  1,000 mcg Oral Daily    aspirin  81 mg Oral Daily    sodium chloride flush  5-40 mL IntraVENous 2 times per day    dexamethasone  6 mg IntraVENous Q24H     Continuous Infusions:    dextrose      heparin (PORCINE) Infusion 12 Units/kg/hr (01/12/22 0504)    IV infusion builder 75 mL/hr at 01/11/22 0851    sodium chloride       PRN Meds:  glucose, dextrose, glucagon (rDNA), dextrose, metoprolol, heparin (porcine), heparin (porcine), potassium chloride **OR** potassium alternative oral replacement **OR** potassium chloride, benzonatate, sodium chloride flush, sodium chloride flush, sodium chloride, ondansetron **OR** ondansetron, polyethylene glycol, acetaminophen **OR** acetaminophen  Home Meds:                Medications Prior to Admission: aspirin 325 MG tablet, Take 81 mg by mouth daily   atorvastatin (LIPITOR) 40 MG tablet, Take 20 mg by mouth nightly  lisinopril (PRINIVIL;ZESTRIL) 30 MG tablet, Take 30 mg by mouth daily  metoprolol tartrate (LOPRESSOR) 50 MG tablet, Take 50 mg by mouth daily  cilostazol (PLETAL) 100 MG tablet, Take 100 mg by mouth 2 times daily  clopidogrel (PLAVIX) 75 MG tablet, Take 75 mg by mouth daily  vitamin B-12 (CYANOCOBALAMIN) 1000 MCG tablet, Take 1,000 mcg by mouth daily    INVESTIGATIONS     Last 3 CMP:    Recent Labs     01/10/22  0600 01/11/22  0519 01/12/22  0352    137 135   K 3.3* 3.7 4.8   * 102 105   CO2 15* 16* 17*   BUN 58* 56* 56*   CREATININE 2.31* 2.38* 2.16*   CALCIUM 8.3* 8.5* 8.3*       Last 3 CBC:  Recent Labs     01/10/22  1038 01/11/22  0519 01/12/22  0352   WBC 20.6* 18.6* 17.4*   RBC 4.20* 4.31 3.53*   HGB 12.5* 13.1 10.7*   HCT 36.3* 36.5* 30.4*   MCV 86.4 84.7 86.1   MCH 29.8 30.4 30.3   MCHC 34.4 35.9* 35.2*   RDW 13.5 13.2 13.3    348 309   MPV 10.4 10.8 10.4       ASSESSMENT     #1 Acute kidney injury nonoliguric secondary to prerenal injury from volume depletion related to GI losses/diarrhea/poor p.o. and infection -improving. Creatinine peaked to 2.9 and is now down to 2.16 today  #2 Chronic kidney disease stage IIIb secondary nephrosclerosis Baseline creatinine 1.7-2.   Never seen nephrologist in the past  #3 COVID-19 pneumonia  #4 A. fib with RVR on amiodarone, now in normal sinus rhythm  #5 preserved ejection fraction/moderate aortic stenosis   #6 essential hypertension  #7  Metabolic acidosis, improving    PLAN     #1  Decrease rate on bicarbonate containing IV fluids to 50 mL an hour  #2  Monitor intake and output  #3  Follow labs as ordered    Please do not hesitate to call with questions    This note is created with the assistance of a speech-recognition program. While intending to generate a document that actually reflects the content of the visit, no guarantees can be provided that every mistake has been identified and corrected by editing    Diony Bai MD     1/12/2022 3:42 PM  [facility]

## 2022-01-12 NOTE — PROGRESS NOTES
[facility] [address] [facility] Patient  Today's Date and Time: 1/12/2022, 8:47 AM    Impression :     · COVID 19 Confirmed Infection  · Covid tests:  · 1/8/21: Positive  · Acute hypoxic respiratory failure  · Paroxysmal A. 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  · Essential hypertension  · Elevated inflammatory markers  · Hyperlipidemia  · Patient has not received the Covid vaccine  · DNR [facility]    Recommendations:   · Antibiotic treatment:  · Meropenem 500 mg BID IV for leukocytosis & possible secondary bacterial pneumonia 1/11/21  · Covid Rx:    · Remdesivir-contraindicated with JULES  · Decadron-initiated 1/8/2022  · Actemra-administered 1/8/2022  · Monoclonal antibodies-out of window      Medical Decision Making/Summary/Discussion:1/12/2022     · Patient admitted with COVID 19 infection    Infection Control Recommendations   · Universal Precautions  · Airborne isolation  · Droplet Isolation    Antimicrobial Stewardship Recommendations       · Renal considerations  Coordination of Outpatient Care:   · Estimated Length of IV antimicrobials:TBD  · Patient will need Midline Catheter Insertion: TBD  · Patient will need PICC line Insertion: No  · Patient will need: Home IV , [place],  SNF,  LTAC:TBD  · Patient will need outpatient wound care:No    Chief complaint/reason for consultation:   · Concern for COVID infection      History of Present Illness:   Riky Antony is a [de-identified]y.o.-year-old male who was initially admitted on 1/8/2022. Patient seen at the request of Dr. Petersen Members:    Patient presented through ER with complaints of needing shortness of breath, cough, loss of appetite, nausea, vomiting and diarrhea over the past 7 to 10 days. He has not received the Covid vaccine and tested positive for Covid shortly after [place].     On evaluation in the ED, the patient had an SPO2 of 80% on room air and was tachypneic. A rapid Covid swab was positive. CT chest shows extensive bilateral pulmonary groundglass opacities. He was started on Decadron and given a dose of Actemra. Abnormal labs include:  · Creatinine 2.32  ·   · Troponin I 48  · WBC 17.1    Patient admitted because of concerns with COVID 19.    CURRENT EVALUATION : 1/12/2022    Afebrile  Vital signs stable    The patient is on high flow nasal cannula with 100 >90 % FiO2 and 60 L/min    Meropenem has been initiated on 1/11/2022 for worsening respiratory distress and leukocytosis    Stable chest x-ray 1/11    CRP is trending down    Creatinine level is trending down    Patient exhibiting respiratory distress. Yes  Respiratory secretions: No    Patient receiving supplemental oxygen. High flow nasal cannula  RR: 24  02 sat: 92    % FIO2: 87>100>90  Flow rate: 50>60 L/min  PEEP:      QTc:       NEWS Score: 0-4 Low risk group; 5-6: Medium risk group; 7 or above: High risk group  Parameters 3 2 1 0 1 2 3   Age    < 65   ? 65   RR ? 8  9-11 12-20  21-24 ? 25   O2 Sats ?  91 92-93 94-95 ? 96      Suppl O2  Yes  No      SBP ? 90  101-110 111-219   ? 220   HR ? 40  41-50 51-90  111-130 ? 131   Consciousness    Alert   Drowsiness, lethargy, or confusion   Temperature ? 35.0 C (95.0 F)  35.1-36.0 C 95.1-96.9 F 36.1-38.0 C 97.0-100.4 F 38.1-39.0 C 100.5-102.3 F ? 39.1 C ? 102.4 F      NEWS Score:   1/9/2022: 5 moderate risk    Overall Daily Picture:      Unchanged    Presence of secondary bacterial Infection:    Possible  Additional antibiotics: Meropenem 1/11/2022    Labs, X rays reviewed: 1/12/2022    BUN:58-->58>56>56  Cr:2.32-->2.31>2.38>2.16    WBC:17.1 ->20>18.6>17.4  Hb:12.8>10.7  Plat: 302>309  Troponin: 148    Absolute Neutrophils:16  Absolute Lymphocytes:0.34  Neutrophil/Lymphocyte Ratio: 53 very high risk    CRP:160-->131-->52.3  Ferritin:804  LDH: 563    Pro Calcitonin:      Cultures:  Urine:  ·   Blood:  ·   Sputum :  ·   Wound:       CXR:     CAT:  1/8/21      Discussed with patient, RN, CC, IM. I have personally reviewed the past medical history, past surgical history, medications, social history, and family history, and I have updated the database accordingly.   Past Medical History:     Past Medical History:   Diagnosis Date    Chronic lower back pain     Cobalamin deficiency     [facility]     Diabetes mellitus ([facility]     Hyperlipidemia     Hypertension     Malignant neoplasm of colon ([facility]     [facility]ipheral artery disease[facility] ([facility])        Past Surgical  History:     Past Surgical History:   Procedure Laterality Date    ARTERY SURGERY         Medications:      insulin glargine  20 Units SubCUTAneous QAM    insulin lispro  0-6 Units SubCUTAneous TID WC    insulin lispro  0-3 Units SubCUTAneous Nightly    amiodarone  200 mg Oral BID    Followed by   Chani Lazaro ON 1/14/2022] amiodarone  200 mg Oral Daily    meropenem  500 mg IntraVENous Q12H    metoprolol tartrate  50 mg Oral BID    pantoprazole  40 mg Oral QAM AC    atorvastatin  20 mg Oral Nightly    cilostazol  100 mg Oral BID    clopidogrel  75 mg Oral Daily    vitamin B-12  1,000 mcg Oral Daily    aspirin  81 mg Oral Daily    sodium chloride flush  5-40 mL IntraVENous 2 times per day    dexamethasone  6 mg IntraVENous Q24H       Social History:     Social History     Socioeconomic History    Marital status:      Spouse name: Not on file    Number of children: Not on file    Years of education: Not on file    Highest education level: Not on file   Occupational History    Not on file   Tobacco Use    Smoking status: Former Smoker    Smokeless tobacco: Never Used   Substance and Sexual Activity    Alcohol use: Not Currently    Drug use: Never    Sexual activity: Not on file   Other Topics Concern    Not on file   Social History Narrative    Not on file     Social Determinants of Health     Financial Resource Strain:     Difficulty of Paying Living Expenses: Not on file   Food Insecurity:     Worried About Running Out of Food in the Last Year: Not on file    Lesley of Food in the Last Year: Not on file   Transportation Needs:     Lack of Transportation (Medical): Not on file    Lack of Transportation (Non-Medical): Not on file   Physical Activity:     Days of Exercise per Week: Not on file    Minutes of Exercise per Session: Not on file   Stress:     Feeling of Stress : Not on file   Social Connections:     Frequency of Communication with Friends and Family: Not on file    Frequency of Social Gatherings with Friends and Family: Not on file    Attends [employer] Services: Not on file    Active Member of [address] [place] [address] [employer] or Organizations: Not on file    Attends Club or Organization Meetings: Not on file    Marital Status: Not on file   Intimate Partner Violence:     Fear of Current or Ex-Partner: Not on file    Emotionally Abused: Not on file    Physically Abused: Not on file    Sexually Abused: Not on file   Housing Stability:     Unable to Pay for Housing in the Last Year: Not on file    Number of [place] in the Last Year: Not on file    Unstable Housing in the Last Year: Not on file       Family History:   History reviewed. No pertinent family history. Allergies:   Patient has no known allergies. Review of Systems:       Constitutional: Generalized malaise  Head: No headaches  Eyes: No double vision or blurry vision. No conjunctival inflammation. ENT: No sore throat or runny nose. . No hearing loss, tinnitus or vertigo. Cardiovascular: No chest pain or palpitations. Shortness of breath. CARDONA  Lung:  Shortness of breath and cough. No sputum production  Abdomen:  nausea, vomiting, diarrhea  Genitourinary: Decreased urine output. No hematuria. No suprapubic or CVA pain  Musculoskeletal: Generalized myalgias s.  No joint effusions, swelling or deformities  Hematologic: No bleeding or bruising. Neurologic: No headache, weakness, numbness, or tingling. Integument: No rash, no ulcers. Psychiatric: No depression. Endocrine: No polyuria, no polydipsia, no polyphagia. Physical Examination :     Patient Vitals for the past 8 hrs:   BP Temp Temp src Resp SpO2   01/12/22 0825    24 92 %   01/12/22 0508 123/84 97.9 °F (36.6 °C) Oral     01/12/22 0325    26      General Appearance: Awake, alert, and in respiratory distress  Head:  Normocephalic, no trauma  Eyes: Pupils equal, round, reactive to light; sclera anicteric; conjunctivae pink. No embolic phenomena. ENT: Oropharynx clear, without erythema, exudate, or thrush. No tenderness of sinuses. Mouth/throat: mucosa pink and moist. No lesions. Dentition in good repair. Neck:Supple, without lymphadenopathy. Thyroid normal, No bruits. Pulmonary/Chest: Clear to auscultation, without wheezes, rales, or rhonchi. No dullness to percussion. Cardiovascular: Regular rate and rhythm without murmurs, rubs, or gallops. Abdomen: Soft, non tender. Bowel sounds normal. No organomegaly  All four Extremities: No cyanosis, clubbing, edema, or effusions. Neurologic: No gross sensory or motor deficits. Skin: Warm and dry with good turgor. No signs of peripheral arterial or venous insufficiency. No ulcerations. No open wounds. Medical Decision Making -Laboratory:   I have independently reviewed/ordered the following labs:    CBC with Differential:   Recent Labs     01/11/22 0519 01/12/22  0352   WBC 18.6* 17.4*   HGB 13.1 10.7*   HCT 36.5* 30.4*    309   LYMPHOPCT 3*  --    MONOPCT 0*  --      BMP:   Recent Labs     01/10/22  0600 01/10/22  0600 01/11/22  0519 01/12/22  0352      < > 137 135   K 3.3*   < > 3.7 4.8   *   < > 102 105   CO2 15*   < > 16* 17*   BUN 58*   < > 56* 56*   CREATININE 2.31*   < > 2.38* 2.16*   MG 2.3  --   --   --     < > = values in this interval not displayed. Hepatic Function Panel:   No results for input(s): PROT, LABALBU, BILIDIR, IBILI, BILITOT, ALKPHOS, ALT, AST in the last 72 hours. No results for input(s): RPR in the last 72 hours. No results for input(s): HIV in the last 72 hours. No results for input(s): BC in the last 72 hours. Lab Results   Component Value Date    MUCUS NOT REPORTED 01/09/2022    RBC 3.53 01/12/2022    TRICHOMONAS NOT REPORTED 01/09/2022    WBC 17.4 01/12/2022    YEAST NOT REPORTED 01/09/2022    TURBIDITY Clear 01/09/2022     Lab Results   Component Value Date    CREATININE 2.16 01/12/2022    GLUCOSE 194 01/12/2022       Medical Decision Making-Imaging:     Narrative   EXAMINATION:   CTA OF THE CHEST 1/8/2022 10:10 am       TECHNIQUE:   CTA of the chest was performed after the administration of intravenous   contrast.  Multiplanar reformatted images are provided for review.  MIP   images are provided for review. Dose modulation, iterative reconstruction,   and/or weight based adjustment of the mA/kV was utilized to reduce the   radiation dose to as low as reasonably achievable.       COMPARISON:   None.       HISTORY:   ORDERING SYSTEM PROVIDED HISTORY: dyspnea / hypoxia   Reason for Exam: Shortness of breath       FINDINGS:   Pulmonary Arteries: Pulmonary arteries are adequately opacified for   evaluation.  No evidence of intraluminal filling defect to suggest pulmonary   embolism.  Main pulmonary artery is normal in caliber.       Mediastinum: No evidence of mediastinal lymphadenopathy.  Normal heart size. Normal caliber thoracic aorta with diffuse atherosclerosis.       Lungs/pleura: Extensive ground-glass opacification of the bilateral lung   fields with peripheral involvement slight apical as well as basilar sparing.    No effusion or pneumothorax.       Upper Abdomen: Limited images of the upper abdomen are unremarkable.       Soft Tissues/Bones: No acute bone or soft tissue abnormality.           Impression   *No evidence of pulmonary embolism. *Extensive ground-glass opacification of the bilateral lung fields with   peripheral involvement slight apical as well as basilar sparing.  Imaging   features suggestive of COVID-19 pneumonia, though are nonspecific and can   occur with a variety of infectious and noninfectious processes.           Medical Decision [id number]:       Medical Decision Making-Other:     Note:  · Labs, medications, radiologic studies were reviewed with personal review of films  · Large amounts of data were reviewed  · Discussed with nursing Staff, Discharge planner  · Infection Control and Prevention measures reviewed  · All prior entries were reviewed  · Administer medications as ordered  · Prognosis: Guarded  · Discharge planning reviewed      Thank you for allowing us to participate in the care of this patient. Please call with questions. MOHAMUD Khan - CNP     ATTESTATION:    I have discussed the case, including pertinent history and exam findings with the APRN. I have evaluated the  History, physical findings and pictures of the patient and the key elements of the encounter have been performed by me. I have reviewed the laboratory data, other diagnostic studies and discussed them with the APRN. I have updated the medical record where necessary. I agree with the assessment, plan and orders as documented by the APRN.     Yovana Macias MD.        Pager: [phone] - Office: [phone]

## 2022-01-12 NOTE — PROGRESS NOTES
hours. Lipids: No results for input(s): CHOL, HDL in the last 72 hours. Invalid input(s): LDLCALCU  INR: No results for input(s): INR in the last 72 hours. Objective:   Vitals: /64   Pulse 84   Temp 97.9 °F (36.6 °C) (Oral)   Resp 17   Ht 5' 10\" (1.778 m)   Wt 148 lb 2.4 oz (67.2 kg)   SpO2 93%   BMI 21.26 kg/m²     For careful stewardship of limited PPE during COVID-19 pandemic my physical exam was deferred. For physical exam, please see today's physical from primary team or ICU team.       Echo 3/29/21    Left Ventricle: There is mild increased wall thickness/hypertrophy.   Left Ventricle: Systolic function is normal with an ejection fraction   of 65-70%.   Left Ventricle: Grade I diastolic dysfunction (impaired relaxation) is   present.   Right Ventricle: Systolic function is normal.     Aortic Valve: There is no regurgitation. There is moderate stenosis. The calculated aortic valve area is 1.72 cm2. The calculated aortic valve   peak gradient is 47.00 mmHg. The calculated aortic valve mean gradient is   28.00 mmHg.   Tricuspid Valve: There is trace regurgitation. There is no evidence of   tricuspid valve stenosis.   Pericardium: There is no pericardial effusion. Tele presently Afib 120-140s      Echo 1/11/2022     Summary  Left ventricle is normal in size, global left ventricular systolic function  is normal.  Estimated ejection fraction is 55 % . Left atrium is at upper limits of normal.  Inter-atrial septum is intact with no evidence for an atrial septal defect. Right atrium is normal in size. Normal right ventricular size and function. Aortic valve is trileaflet, calcified with restriction of motion. Peak instantaneous gradient 60 mmHg and mean gradient 37 mmHg. Calculated ALF 1.0cm2 by VTI (image #57.)  Severe aortic stenosis. Trivial aortic insufficiency. Mitral valve sclerosis without significant stenosis. Mean gradient is 4mmHg .   Trivial mitral

## 2022-01-13 NOTE — PLAN OF CARE
Problem: Falls - Risk of:  Goal: Will remain free from falls  Description: Will remain free from falls  1/12/2022 1652 by Jw Bonner RN  Outcome: Ongoing  Goal: Absence of physical injury  Description: Absence of physical injury  1/12/2022 1652 by Jw Bonner RN  Outcome: Ongoing     Problem: Airway Clearance - Ineffective  Goal: Achieve or maintain patent airway  1/13/2022 0252 by Lyly Redd RN  Outcome: Ongoing  1/12/2022 1652 by Jw Bonner RN  Outcome: Ongoing     Problem: Gas Exchange - Impaired  Goal: Absence of hypoxia  1/13/2022 0252 by Lyly Redd RN  Outcome: Ongoing  1/12/2022 1652 by Jw Bonner RN  Outcome: Ongoing  Goal: Promote optimal lung function  1/13/2022 0252 by Lyly Redd RN  Outcome: Ongoing  1/12/2022 1652 by Jw Bonner RN  Outcome: Ongoing     Problem: Breathing Pattern - Ineffective  Goal: Ability to achieve and maintain a regular respiratory rate  1/13/2022 0252 by Lyly Redd RN  Outcome: Ongoing  1/12/2022 1652 by Jw Bonner RN  Outcome: Ongoing     Problem:  Body Temperature -  Risk of, Imbalanced  Goal: Ability to maintain a body temperature within defined limits  1/12/2022 1652 by Jw Bonner RN  Outcome: Ongoing  Goal: Will regain or maintain usual level of consciousness  1/12/2022 1652 by Jw Bonner RN  Outcome: Ongoing  Goal: Complications related to the disease process, condition or treatment will be avoided or minimized  1/12/2022 1652 by Jw Bonner RN  Outcome: Ongoing     Problem: Isolation Precautions - Risk of Spread of Infection  Goal: Prevent transmission of infection  1/12/2022 1652 by Jw Bonner RN  Outcome: Ongoing     Problem: Nutrition Deficits  Goal: Optimize nutritional status  1/12/2022 1652 by Jw Bonner RN  Outcome: Ongoing     Problem: Risk for Fluid Volume Deficit  Goal: Maintain normal heart rhythm  1/12/2022 1652 by Jw Bonner RN  Outcome: Ongoing  Goal: Maintain absence of muscle cramping  1/12/2022 1652 by Laura Palmer RN  Outcome: Ongoing  Goal: Maintain normal serum potassium, sodium, calcium, phosphorus, and pH  1/12/2022 1652 by Laura Palmer RN  Outcome: Ongoing     Problem: Loneliness or Risk for Loneliness  Goal: Demonstrate positive use of time alone when socialization is not possible  1/13/2022 0252 by Donavan Gentile RN  Outcome: Ongoing  1/12/2022 1652 by Laura Palmer RN  Outcome: Ongoing     Problem: Fatigue  Goal: Verbalize increase energy and improved vitality  1/12/2022 1652 by Laura Palmer RN  Outcome: Ongoing     Problem: Patient Education: Go to Patient Education Activity  Goal: Patient/Family Education  1/13/2022 0252 by Donavan Gentile RN  Outcome: Ongoing  1/12/2022 1652 by Laura Palmer RN  Outcome: Ongoing     Problem: Skin Integrity:  Goal: Will show no infection signs and symptoms  Description: Will show no infection signs and symptoms  1/12/2022 1652 by Laura Palmer RN  Outcome: Ongoing  Goal: Absence of new skin breakdown  Description: Absence of new skin breakdown  1/12/2022 1652 by Laura Palmer RN  Outcome: Ongoing     Problem: Nutrition  Goal: Optimal nutrition therapy  1/12/2022 1652 by Laura Palmer RN  Outcome: Ongoing

## 2022-01-13 NOTE — PROGRESS NOTES
NEPHROLOGY PROGRESS NOTE      SUBJECTIVE     No overnight issues reported as per the nursing staff. Still needing high flow oxygen 100% FiO2 with 50 L. Was on BiPAP overnight  On IV fluids containing bicarb at 50 mils an hour. Tolerating oral intake well. Urine output recorded 680 cc. Doubt accuracy. Tolerating oral intake well  Blood pressure is stable  Labs from today pending      OBJECTIVE     Vitals:    01/13/22 0345 01/13/22 0400 01/13/22 0800 01/13/22 0851   BP:  (!) 129/47 120/69    Pulse:  73 81    Resp: 20 19 19 20   Temp:  98.1 °F (36.7 °C) 98.1 °F (36.7 °C)    TempSrc:  Axillary Oral    SpO2:  94% 97% (!) 88%   Weight:       Height:         24HR INTAKE/OUTPUT:      Intake/Output Summary (Last 24 hours) at 1/13/2022 1257  Last data filed at 1/12/2022 1907  Gross per 24 hour   Intake 600 ml   Output 430 ml   Net 170 ml       General appearance:  On high flow oxygen, HEENT: Conjunctiva normal, no scleral icterus  Respiratory: Bilateral air entry with bilateral rhonchi  Cardiovascular: Regular rate and rhythm with positive S1 and S2   Abdomen: Soft and nontender and el   Extremities: No Cyanosis or Clubbing or lower extremity edema  Neurological: No focal deficit      MEDICATIONS     Scheduled Meds:    insulin glargine  20 Units SubCUTAneous QAM    senna  2 tablet Oral Nightly    insulin lispro  0-6 Units SubCUTAneous TID WC    insulin lispro  0-3 Units SubCUTAneous Nightly    [START ON 1/14/2022] amiodarone  200 mg Oral Daily    meropenem  500 mg IntraVENous Q12H    metoprolol tartrate  50 mg Oral BID    pantoprazole  40 mg Oral QAM AC    atorvastatin  20 mg Oral Nightly    cilostazol  100 mg Oral BID    clopidogrel  75 mg Oral Daily    vitamin B-12  1,000 mcg Oral Daily    aspirin  81 mg Oral Daily    sodium chloride flush  5-40 mL IntraVENous 2 times per day    dexamethasone  6 mg IntraVENous Q24H     Continuous Infusions:    dextrose      heparin (PORCINE) Infusion 12.946 Units/kg/hr (01/13/22 6009)    IV infusion builder 50 mL/hr at 01/13/22 0934    sodium chloride       PRN Meds:  sodium chloride, LORazepam, glucose, dextrose, glucagon (rDNA), dextrose, metoprolol, heparin (porcine), heparin (porcine), potassium chloride **OR** potassium alternative oral replacement **OR** potassium chloride, benzonatate, sodium chloride flush, sodium chloride flush, sodium chloride, ondansetron **OR** ondansetron, polyethylene glycol, acetaminophen **OR** acetaminophen  Home Meds:                Medications Prior to Admission: aspirin 325 MG tablet, Take 81 mg by mouth daily   atorvastatin (LIPITOR) 40 MG tablet, Take 20 mg by mouth nightly  lisinopril (PRINIVIL;ZESTRIL) 30 MG tablet, Take 30 mg by mouth daily  metoprolol tartrate (LOPRESSOR) 50 MG tablet, Take 50 mg by mouth daily  cilostazol (PLETAL) 100 MG tablet, Take 100 mg by mouth 2 times daily  clopidogrel (PLAVIX) 75 MG tablet, Take 75 mg by mouth daily  vitamin B-12 (CYANOCOBALAMIN) 1000 MCG tablet, Take 1,000 mcg by mouth daily    INVESTIGATIONS     Last 3 CMP:    Recent Labs     01/11/22  0519 01/12/22  0352    135   K 3.7 4.8    105   CO2 16* 17*   BUN 56* 56*   CREATININE 2.38* 2.16*   CALCIUM 8.5* 8.3*       Last 3 CBC:  Recent Labs     01/11/22  0519 01/12/22  0352   WBC 18.6* 17.4*   RBC 4.31 3.53*   HGB 13.1 10.7*   HCT 36.5* 30.4*   MCV 84.7 86.1   MCH 30.4 30.3   MCHC 35.9* 35.2*   RDW 13.2 13.3    309   MPV 10.8 10.4       ASSESSMENT     #1 Acute kidney injury nonoliguric secondary to prerenal injury from volume depletion related to GI losses/diarrhea/poor p.o. and infection -improving. Creatinine peaked to 2.9 and improving. Repeat BMP from today pending. 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   Never seen nephrologist in the past  #3 COVID-19 pneumonia  #4 A. fib with RVR on amiodarone, now in normal sinus rhythm  #5 preserved ejection fraction/moderate aortic stenosis   #6 essential hypertension  #7  Metabolic acidosis, improving    PLAN     #1  BMP stat and also tomorrow morning.   #2 we will adjust fluids after the results  #3 we will follow    Please do not hesitate to call with questions    This note is created with the assistance of a speech-recognition program. While intending to generate a document that actually reflects the content of the visit, no guarantees can be provided that every mistake has been identified and corrected by editing    Andreia Pitts MD     1/13/2022 12:57 PM  [facility]

## 2022-01-13 NOTE — PROGRESS NOTES
NP messaged at start of shift for medication to assist pt with tolerating Bipap. One time order given with note to consult critical care resident for further orders. Critical care MD notified of further need for medication to tolerate BiPAP.  Orders given per MD.

## 2022-01-13 NOTE — PROGRESS NOTES
[facility] [address] [facility] Patient  Today's Date and Time: 1/13/2022, 8:46 AM    Impression :     · COVID 19 Confirmed Infection  · Covid tests:  · 1/8/21: Positive  · Acute hypoxic respiratory failure  · Paroxysmal A. 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  · Essential hypertension  · Elevated inflammatory markers  · Hyperlipidemia  · Patient has not received the Covid vaccine  · DNR CCA    Recommendations:   · Antibiotic treatment:  · Meropenem 500 mg BID IV for leukocytosis & possible secondary bacterial pneumonia 1/11/21  · Covid Rx:    · Remdesivir-contraindicated with JULES  · Decadron-initiated 1/8/2022  · Actemra-administered 1/8/2022  · Monoclonal antibodies-out of window      Medical Decision Making/Summary/Discussion:1/13/2022     · Patient admitted with COVID 19 infection    Infection Control Recommendations   · Universal Precautions  · Airborne isolation  · Droplet Isolation    Antimicrobial Stewardship Recommendations       · Renal considerations  Coordination of Outpatient Care:   · Estimated Length of IV antimicrobials:TBD  · Patient will need Midline Catheter Insertion: TBD  · Patient will need PICC line Insertion: No  · Patient will need: Home IV , [place],  SNF,  LTAC:TBD  · Patient will need outpatient wound care:No    Chief complaint/reason for consultation:   · Concern for COVID infection      History of Present Illness:   Shankar Reyez is a [de-identified]y.o.-year-old male who was initially admitted on 1/8/2022. Patient seen at the request of Dr. Yen Bell:    Patient presented through ER with complaints of needing shortness of breath, cough, loss of appetite, nausea, vomiting and diarrhea over the past 7 to 10 days. He has not received the Covid vaccine and tested positive for Covid shortly after [place].     On evaluation in the ED, the patient had an SPO2 of 80% on room air and was tachypneic. A rapid Covid swab was positive. CT chest shows extensive bilateral pulmonary groundglass opacities. He was started on Decadron and given a dose of Actemra. Abnormal labs include:  · Creatinine 2.32  ·   · Troponin I 48  · WBC 17.1    Patient admitted because of concerns with COVID 19.    CURRENT EVALUATION : 1/13/2022    Afebrile  Vital signs stable    The patient is on BiPAP with 50% FiO2. Meropenem initiated on 1/11/2022 for worsening respiratory distress and leukocytosis    Stable chest x-ray 1/11    CRP is trending down    Creatinine level is trending down    Patient exhibiting respiratory distress. Yes  Respiratory secretions: No    Patient receiving supplemental oxygen. BiPAP  RR: 24-->19  02 sat: 92-->94    % FIO2: 50  PEEP:      QTc:       NEWS Score: 0-4 Low risk group; 5-6: Medium risk group; 7 or above: High risk group  Parameters 3 2 1 0 1 2 3   Age    < 65   ? 65   RR ? 8  9-11 12-20  21-24 ? 25   O2 Sats ?  91 92-93 94-95 ? 96      Suppl O2  Yes  No      SBP ? 90  101-110 111-219   ? 220   HR ? 40  41-50 51-90  111-130 ? 131   Consciousness    Alert   Drowsiness, lethargy, or confusion   Temperature ? 35.0 C (95.0 F)  35.1-36.0 C 95.1-96.9 F 36.1-38.0 C 97.0-100.4 F 38.1-39.0 C 100.5-102.3 F ? 39.1 C ? 102.4 F      NEWS Score:   1/9/2022: 5 moderate risk    Overall Daily Picture:      Unchanged    Presence of secondary bacterial Infection:    Possible  Additional antibiotics: Meropenem 1/11/2022    Labs, X rays reviewed: 1/13/2022    BUN:58-->58>56>56  Cr:2.32-->2.31>2.38>2.16    WBC:17.1 ->20>18.6>17.4  Hb:12.8>10.7  Plat: 578>148  Troponin: 148    Absolute Neutrophils:16  Absolute Lymphocytes:0.34  Neutrophil/Lymphocyte Ratio: 53 very high risk    CRP:160-->131-->52.3  Ferritin:804  LDH: 563    Pro Calcitonin:      Cultures:  Urine:  ·   Blood:  ·   Sputum :  ·   Wound:       CXR:     CAT:  1/8/21      Discussed with patient, RN, CC, IM. I have personally reviewed the past medical history, past surgical history, medications, social history, and family history, and I have updated the database accordingly.   Past Medical History:     Past Medical History:   Diagnosis Date    Chronic lower back pain     Cobalamin deficiency     [facility]     Diabetes mellitus ([facility]     Hyperlipidemia     Hypertension     Malignant neoplasm of colon ([facility]     PAD (peripheral artery disease) ([facility])        Past Surgical  History:     Past Surgical History:   Procedure Laterality Date    ARTERY SURGERY         Medications:      insulin glargine  20 Units SubCUTAneous QAM    senna  2 tablet Oral Nightly    insulin lispro  0-6 Units SubCUTAneous TID WC    insulin lispro  0-3 Units SubCUTAneous Nightly    amiodarone  200 mg Oral BID    Followed by   Britney Willingham ON 1/14/2022] amiodarone  200 mg Oral Daily    meropenem  500 mg IntraVENous Q12H    metoprolol tartrate  50 mg Oral BID    pantoprazole  40 mg Oral QAM AC    atorvastatin  20 mg Oral Nightly    cilostazol  100 mg Oral BID    clopidogrel  75 mg Oral Daily    vitamin B-12  1,000 mcg Oral Daily    aspirin  81 mg Oral Daily    sodium chloride flush  5-40 mL IntraVENous 2 times per day    dexamethasone  6 mg IntraVENous Q24H       Social History:     Social History     Socioeconomic History    Marital status:      Spouse name: Not on file    Number of children: Not on file    Years of education: Not on file    Highest education level: Not on file   Occupational History    Not on file   Tobacco Use    Smoking status: Former Smoker    Smokeless tobacco: Never Used   Substance and Sexual Activity    Alcohol use: Not Currently    Drug use: Never    Sexual activity: Not on file   Other Topics Concern    Not on file   Social History Narrative    Not on file     Social Determinants of Health     Financial Resource Strain:     Difficulty of Paying Living Expenses: Not on file   Food Insecurity:     Worried About [address] in the Last Year: Not on file    Lesley of Food in the Last Year: Not on file   Transportation Needs:     Lack of Transportation (Medical): Not on file    Lack of Transportation (Non-Medical): Not on file   Physical Activity:     Days of Exercise per Week: Not on file    Minutes of Exercise per Session: Not on file   Stress:     Feeling of Stress : Not on file   Social Connections:     Frequency of Communication with Friends and Family: Not on file    Frequency of Social Gatherings with Friends and Family: Not on file    Attends Synagogue Services: Not on file    Active Member of [address] [place] [address] or Organizations: Not on file    Attends Club or Organization Meetings: Not on file    Marital Status: Not on file   Intimate Partner Violence:     Fear of Current or Ex-Partner: Not on file    Emotionally Abused: Not on file    Physically Abused: Not on file    Sexually Abused: Not on file   Housing Stability:     Unable to Pay for Housing in the Last Year: Not on file    Number of [place] in the Last Year: Not on file    Unstable Housing in the Last Year: Not on file       Family History:   History reviewed. No pertinent family history. Allergies:   Patient has no known allergies. Review of Systems:       Constitutional: Generalized malaise  Head: No headaches  Eyes: No double vision or blurry vision. No conjunctival inflammation. ENT: No sore throat or runny nose. . No hearing loss, tinnitus or vertigo. Cardiovascular: No chest pain or palpitations. Shortness of breath. CARDONA  Lung:  Shortness of breath and cough. No sputum production  Abdomen:  nausea, vomiting, diarrhea  Genitourinary: Decreased urine output. No hematuria. No suprapubic or CVA pain  Musculoskeletal: Generalized myalgias s. No joint effusions, swelling or deformities  Hematologic: No bleeding or bruising.   Neurologic: No headache, weakness, numbness, or tingling. Integument: No rash, no ulcers. Psychiatric: No depression. Endocrine: No polyuria, no polydipsia, no polyphagia. Physical Examination :     Patient Vitals for the past 8 hrs:   BP Temp Temp src Pulse Resp SpO2   01/13/22 0400 (!) 129/47 98.1 °F (36.7 °C) Axillary 73 19 94 %   01/13/22 0345     20      General Appearance: Awake, alert, and in respiratory distress  Head:  Normocephalic, no trauma  Eyes: Pupils equal, round, reactive to light; sclera anicteric; conjunctivae pink. No embolic phenomena. ENT: Oropharynx clear, without erythema, exudate, or thrush. No tenderness of sinuses. Mouth/throat: mucosa pink and moist. No lesions. Dentition in good repair. Neck:Supple, without lymphadenopathy. Thyroid normal, No bruits. Pulmonary/Chest: Clear to auscultation, without wheezes, rales, or rhonchi. No dullness to percussion. Cardiovascular: Regular rate and rhythm without murmurs, rubs, or gallops. Abdomen: Soft, non tender. Bowel sounds normal. No organomegaly  All four Extremities: No cyanosis, clubbing, edema, or effusions. Neurologic: No gross sensory or motor deficits. Skin: Warm and dry with good turgor. No signs of peripheral arterial or venous insufficiency. No ulcerations. No open wounds. Medical Decision Making -Laboratory:   I have independently reviewed/ordered the following labs:    CBC with Differential:   Recent Labs     01/11/22  0519 01/12/22  0352   WBC 18.6* 17.4*   HGB 13.1 10.7*   HCT 36.5* 30.4*    309   LYMPHOPCT 3*  --    MONOPCT 0*  --      BMP:   Recent Labs     01/11/22  0519 01/12/22  0352    135   K 3.7 4.8    105   CO2 16* 17*   BUN 56* 56*   CREATININE 2.38* 2.16*     Hepatic Function Panel:   No results for input(s): PROT, LABALBU, BILIDIR, IBILI, BILITOT, ALKPHOS, ALT, AST in the last 72 hours. No results for input(s): RPR in the last 72 hours. No results for input(s): HIV in the last 72 hours.   No results for input(s): BC in the last 72 hours. Lab Results   Component Value Date    MUCUS NOT REPORTED 01/09/2022    RBC 3.53 01/12/2022    TRICHOMONAS NOT REPORTED 01/09/2022    WBC 17.4 01/12/2022    YEAST NOT REPORTED 01/09/2022    TURBIDITY Clear 01/09/2022     Lab Results   Component Value Date    CREATININE 2.16 01/12/2022    GLUCOSE 194 01/12/2022       Medical Decision Making-Imaging:     Narrative   EXAMINATION:   CTA OF THE CHEST 1/8/2022 10:10 am       TECHNIQUE:   CTA of the chest was performed after the administration of intravenous   contrast.  Multiplanar reformatted images are provided for review.  MIP   images are provided for review. Dose modulation, iterative reconstruction,   and/or weight based adjustment of the mA/kV was utilized to reduce the   radiation dose to as low as reasonably achievable.       COMPARISON:   None.       HISTORY:   ORDERING SYSTEM PROVIDED HISTORY: dyspnea / hypoxia   Reason for Exam: Shortness of breath       FINDINGS:   Pulmonary Arteries: Pulmonary arteries are adequately opacified for   evaluation.  No evidence of intraluminal filling defect to suggest pulmonary   embolism.  Main pulmonary artery is normal in caliber.       Mediastinum: No evidence of mediastinal lymphadenopathy.  Normal heart size. Normal caliber thoracic aorta with diffuse atherosclerosis.       Lungs/pleura: Extensive ground-glass opacification of the bilateral lung   fields with peripheral involvement slight apical as well as basilar sparing. No effusion or pneumothorax.       Upper Abdomen: Limited images of the upper abdomen are unremarkable.       Soft Tissues/Bones: No acute bone or soft tissue abnormality.           Impression   *No evidence of pulmonary embolism.    *Extensive ground-glass opacification of the bilateral lung fields with   peripheral involvement slight apical as well as basilar sparing.  Imaging   features suggestive of COVID-19 pneumonia, though are nonspecific and can   occur with a variety of infectious and noninfectious processes.           Medical Decision Resjlg-Jzkwpyji-Osusd:       Medical Decision Making-Other:     Note:  · Labs, medications, radiologic studies were reviewed with personal review of films  · Large amounts of data were reviewed  · Discussed with nursing Staff, Discharge planner  · Infection Control and Prevention measures reviewed  · All prior entries were reviewed  · Administer medications as ordered  · Prognosis: Guarded  · Discharge planning reviewed      Thank you for allowing us to participate in the care of this patient. Please call with questions. MOHAMUD Montana - CNP     ATTESTATION:    I have discussed the case, including pertinent history and exam findings with the APRN. I have evaluated the  History, physical findings and pictures of the patient and the key elements of the encounter have been performed by me. I have reviewed the laboratory data, other diagnostic studies and discussed them with the APRN. I have updated the medical record where necessary. I agree with the assessment, plan and orders as documented by the APRN.     Mariama Garcia MD.        Pager: [phone] - Office: [phone]

## 2022-01-13 NOTE — PROGRESS NOTES
Comprehensive Nutrition Assessment    Type and Reason for Visit:  Reassess    Nutrition Recommendations/Plan: Continue current diet with Glucerna oral supplements x 2 per day. Encourage/monitor PO intakes as tolerated. Will monitor need for additional ONS. Monitor labs, weights, and plan of care. Nutrition Assessment:  Pt reports he has always been \"a picky eater\". Reports eating some of his meals - recorded intakes of 1-25%. Pt has been taking fluids well - encouraged intakes of Glucerna oral supplements. Last recorded BM 1/11. Labs reviewed. Meds include: Decadron, Lantus, Humalog SS. Malnutrition Assessment:  Malnutrition Status:  Insufficient data    Context:  Acute Illness     Findings of the 6 clinical characteristics of malnutrition:  Energy Intake:  1 - 75% or less of estimated energy requirements for 7 or more days  Weight Loss:  Unable to assess - No weight history avaliable per chart review. Body Fat Loss:  Unable to assess   Muscle Mass Loss:  Unable to assess  Fluid Accumulation:  No significant fluid accumulation   Strength:  Not Performed    Estimated Daily Nutrient Needs:  Energy (kcal):  25-28 kcal/kg = 6483-9426 kcals/day; Weight Used for Energy Requirements:  Current     Protein (g):  1.2-1.5 gm /kg =  gm pro/day; Weight Used for Protein Requirements:  Current      Fluid (ml/day):  25 mL/kg = 1700 mL/day or per MD; Method Used for Fluid Requirements:  ml/Kg      Nutrition Related Findings:  Labs: BUN 63 mg/dL, CR 2.08 mg/dL. Meds reviewed. last BM 1/11. Wounds:  None       Current Nutrition Therapies:    ADULT DIET;  Regular  ADULT ORAL NUTRITION SUPPLEMENT; Breakfast, Dinner; Diabetic Oral Supplement    Anthropometric Measures:  · Height: 5' 10\" (177.8 cm)  · Current Body Weight: 148 lb 2.4 oz (67.2 kg)   · Admission Body Weight: 148 lb 2.4 oz (67.2 kg)    · Usual Body Weight: 150 lb (68 kg)     · Ideal Body Weight: 166 lbs; % Ideal Body Weight 89.2 % · BMI: 21.3  · BMI Categories: Normal Weight (BMI 18.5-24. 9)       Nutrition Diagnosis:   · Inadequate oral intake related to  (decreased appetite; current medical condition) as evidenced by intake 26-50%,intake 51-75%,poor intake prior to admission (variable PO intakes; need for ONS)    Nutrition Interventions:   Food and/or Nutrient Delivery:  Continue Current Diet,Continue Oral Nutrition Supplement  Nutrition Education/Counseling:  No recommendation at this time,Education not indicated   Coordination of Nutrition Care:  Continue to monitor while inpatient    Goals:  Oral intakes to meet at least 75% of estimated nutrition needs. Nutrition Monitoring and Evaluation:   Behavioral-Environmental Outcomes:  None Identified   Food/Nutrient Intake Outcomes:  Food and Nutrient Intake,Supplement Intake  Physical Signs/Symptoms Outcomes:  Biochemical Data,GI Status,Fluid Status or Edema,Hemodynamic Status,Nutrition Focused Physical Findings,Skin,Weight     Discharge Planning:     Too soon to determine     Electronically signed by Krystian Nichole RD, LD on 1/13/22 at 3:05 PM EST    Contact: [phone]

## 2022-01-13 NOTE — PROGRESS NOTES
[facility]  Progress Note                   Date:   1/13/2022  Patient name: Benji Valdez  Date of admission:  1/8/2022  9:49 AM  MRN:   [id number]  YOB: 1940  PCP: MOHAMUD Crawford CNP    Reason for Admission: Hypoxia [R09.02]  Elevated troponin [R77.8]  Acute kidney injury ([address] [N17.9]  COVID-19 [U07.1]  Pneumonia due to COVID-19 virus [U07.1, J12.82]    Subjective:       Clinical Changes /Abnormalities: Labs, medications, and vitals reviewed  Afib rate controlled. Review of Systems    Medications:   Scheduled Meds:   insulin glargine  20 Units SubCUTAneous QAM    senna  2 tablet Oral Nightly    insulin lispro  0-6 Units SubCUTAneous TID WC    insulin lispro  0-3 Units SubCUTAneous Nightly    [START ON 1/14/2022] amiodarone  200 mg Oral Daily    meropenem  500 mg IntraVENous Q12H    metoprolol tartrate  50 mg Oral BID    pantoprazole  40 mg Oral QAM AC    atorvastatin  20 mg Oral Nightly    cilostazol  100 mg Oral BID    clopidogrel  75 mg Oral Daily    vitamin B-12  1,000 mcg Oral Daily    aspirin  81 mg Oral Daily    sodium chloride flush  5-40 mL IntraVENous 2 times per day    dexamethasone  6 mg IntraVENous Q24H     Continuous Infusions:   dextrose      heparin (PORCINE) Infusion 12.946 Units/kg/hr (01/13/22 0934)    IV infusion builder 50 mL/hr at 01/13/22 0934    sodium chloride       CBC:   Recent Labs     01/11/22  0519 01/12/22  0352   WBC 18.6* 17.4*   HGB 13.1 10.7*    309     BMP:    Recent Labs     01/11/22  0519 01/12/22  0352    135   K 3.7 4.8    105   CO2 16* 17*   BUN 56* 56*   CREATININE 2.38* 2.16*   GLUCOSE 229* 194*     Hepatic:  No results for input(s): AST, ALT, ALB, BILITOT, ALKPHOS in the last 72 hours. Troponin:   No results for input(s): TROPHS in the last 72 hours. BNP: No results for input(s): BNP in the last 72 hours. Lipids: No results for input(s): CHOL, HDL in the last 72 hours.     Invalid input(s): LDLCALCU  INR: No results for input(s): INR in the last 72 hours. Objective:   Vitals: /69   Pulse 81   Temp 98.1 °F (36.7 °C) (Oral)   Resp 20   Ht 5' 10\" (1.778 m)   Wt 148 lb 2.4 oz (67.2 kg)   SpO2 (!) 88%   BMI 21.26 kg/m²     For careful stewardship of limited PPE during COVID-19 pandemic my physical exam was deferred. For physical exam, please see today's physical from primary team or ICU team.       Echo 3/29/21    Left Ventricle: There is mild increased wall thickness/hypertrophy.   Left Ventricle: Systolic function is normal with an ejection fraction   of 65-70%.   Left Ventricle: Grade I diastolic dysfunction (impaired relaxation) is   present.   Right Ventricle: Systolic function is normal.     Aortic Valve: There is no regurgitation. There is moderate stenosis. The calculated aortic valve area is 1.72 cm2. The calculated aortic valve   peak gradient is 47.00 mmHg. The calculated aortic valve mean gradient is   28.00 mmHg.   Tricuspid Valve: There is trace regurgitation. There is no evidence of   tricuspid valve stenosis.   Pericardium: There is no pericardial effusion. Tele presently Afib 120-140s      Echo 1/11/2022     Summary  Left ventricle is normal in size, global left ventricular systolic function  is normal.  Estimated ejection fraction is 55 % . Left atrium is at upper limits of normal.  Inter-atrial septum is intact with no evidence for an atrial septal defect. Right atrium is normal in size. Normal right ventricular size and function. Aortic valve is trileaflet, calcified with restriction of motion. Peak instantaneous gradient 60 mmHg and mean gradient 37 mmHg. Calculated ALF 1.0cm2 by VTI (image #57.)  Severe aortic stenosis. Trivial aortic insufficiency. Mitral valve sclerosis without significant stenosis. Mean gradient is 4mmHg . Trivial mitral regurgitation. Tricuspid valve was not well visualized.   Mild tricuspid regurgitation. No significant pericardial effusion is seen. Assessment / Acute Cardiac Problems:   1. COVID-19 PNA  2. New onset Afib RVR  3. Type I vs Type II MI likely secondary to JULES/COVID  4. HTN  5. JULES on CKD (baseline creat 1.4-1.5)  6. Mod AS on Echo 3/2021  7. Preserved LVEF on prior echo  8. Acute hypoxic resp failure    Patient Active Problem List:     Essential hypertension     Hyperlipidemia     Pneumonia due to COVID-19 virus     Acute hypoxemic respiratory failure due to COVID-19 [facility])     Acute kidney injury superimposed on chronic kidney disease ([facility] [address] baseline creatinine 1.9     Stage 3b chronic kidney disease (HCC)     Elevated troponin     Nonrheumatic aortic valve stenosis     PAD (peripheral artery disease) (HCC)     Moderate protein-calorie malnutrition (HCC)    [id number] Score for Atrial Fibrillation Stroke Risk   Risk   Factors  Component Value   C CHF No 0   H HTN Yes 1   A2 Age >= 76 Yes,  [de-identified] y.o.) 2   D DM No 0   S2 Prior Stroke/TIA No 0   V Vascular Disease No 0   A Age 74-69 No,  [de-identified] y.o.) 0   Sc Sex male 0    [id number]  Score  3   Score last updated 1/11/22 14:76 PM EST    Click here for a link to the UpToDate guideline \"Atrial Fibrillation: Anticoagulation therapy to prevent embolization    Disclaimer: Risk Score calculation is dependent on accuracy of patient problem list and past encounter diagnosis. Plan of Treatment:   1. Afib - stable. continue po amio, BB and heparin gtt. Will need AC on discharge. 2. Keep K >4 and Mg >2.   3. Echo reviewed per above  - preserved EF. Severe aortic stenosis. Will plan for further ischemic work-up once acute issues resolve. Will need R/L heart cath.       Electronically signed by MOHAMUD Morton CNP on 1/13/2022 at 12:42 PM  [address]  [phone]

## 2022-01-14 NOTE — PROGRESS NOTES
Writer spoke with lab updated them this patient need a ptt drawn lab states someone will be here to draw the ptt

## 2022-01-14 NOTE — PLAN OF CARE
Problem: Gas Exchange - Impaired  Goal: Absence of hypoxia  Outcome: Ongoing  Goal: Promote optimal lung function  Outcome: Ongoing     Problem: Breathing Pattern - Ineffective  Goal: Ability to achieve and maintain a regular respiratory rate  Outcome: Ongoing     Problem: Nutrition Deficits  Goal: Optimize nutritional status  Outcome: Ongoing     Problem: Risk for Fluid Volume Deficit  Goal: Maintain normal heart rhythm  Outcome: Ongoing  Goal: Maintain absence of muscle cramping  Outcome: Ongoing  Goal: Maintain normal serum potassium, sodium, calcium, phosphorus, and pH  Outcome: Ongoing     Problem: Fatigue  Goal: Verbalize increase energy and improved vitality  Outcome: Ongoing

## 2022-01-14 NOTE — PROGRESS NOTES
Renal Progress Note    Patient :  Remi Coronel; [de-identified] y.o. MRN# [id number]  Location:  [facility]/3178-46  Attending:  Herrera Rdz MD  Admit Date:  1/8/2022   Hospital Day: 6      Subjective:     Patient was seen and examined. Continues on high flow oxygen. He does get SOB with exertion. Continues in sodium bicarb infusion at 50 ml/hr and heparin infusion. Continues on BiPAP, FiO2 95, satting in the high 90s. Blood cultures no growth x5 days, WBC 19.8  1.2 L out in urine over the past 24 hours, net -1.4 L since admission  Blood pressure stable, most in the 488Y systolic    Labs reviewed: Sodium 134, potassium 4.5, chloride 103, CO2 20, BUN 68, creatinine 2.04 -peaked at 2.9.     Outpatient Medications:     Medications Prior to Admission: aspirin 325 MG tablet, Take 81 mg by mouth daily   atorvastatin (LIPITOR) 40 MG tablet, Take 20 mg by mouth nightly  lisinopril (PRINIVIL;ZESTRIL) 30 MG tablet, Take 30 mg by mouth daily  metoprolol tartrate (LOPRESSOR) 50 MG tablet, Take 50 mg by mouth daily  cilostazol (PLETAL) 100 MG tablet, Take 100 mg by mouth 2 times daily  clopidogrel (PLAVIX) 75 MG tablet, Take 75 mg by mouth daily  vitamin B-12 (CYANOCOBALAMIN) 1000 MCG tablet, Take 1,000 mcg by mouth daily    Current Medications:     Scheduled Meds:    insulin glargine  20 Units SubCUTAneous QAM    senna  2 tablet Oral Nightly    insulin lispro  0-6 Units SubCUTAneous TID WC    insulin lispro  0-3 Units SubCUTAneous Nightly    amiodarone  200 mg Oral Daily    meropenem  500 mg IntraVENous Q12H    metoprolol tartrate  50 mg Oral BID    pantoprazole  40 mg Oral QAM AC    atorvastatin  20 mg Oral Nightly    cilostazol  100 mg Oral BID    clopidogrel  75 mg Oral Daily    vitamin B-12  1,000 mcg Oral Daily    aspirin  81 mg Oral Daily    sodium chloride flush  5-40 mL IntraVENous 2 times per day    dexamethasone  6 mg IntraVENous Q24H     Continuous Infusions:    dextrose      heparin (PORCINE) Infusion 11.905 Units/kg/hr (22 2156)    IV infusion builder 50 mL/hr at 22 0934    sodium chloride       PRN Meds:  sodium chloride, LORazepam, glucose, dextrose, glucagon (rDNA), dextrose, metoprolol, heparin (porcine), heparin (porcine), potassium chloride **OR** potassium alternative oral replacement **OR** potassium chloride, benzonatate, sodium chloride flush, sodium chloride flush, sodium chloride, ondansetron **OR** ondansetron, polyethylene glycol, acetaminophen **OR** acetaminophen    Input/Output:       I/O last 3 completed shifts:  In: -   Out: 1380 [Urine:1380]. No data found. Vital Signs:   Temperature:  Temp: 97.3 °F (36.3 °C)  TMax:   Temp (24hrs), Av.6 °F (36.4 °C), Min:97 °F (36.1 °C), Max:98.6 °F (37 °C)    Respirations:  Resp: 22  Pulse:   Pulse: 86  BP:    BP: 137/70  BP Range: Systolic (97HRX), EQW:331 , Min:129 , UHZ:392       Diastolic (21SKF), JZN:58, Min:67, Max:89      Physical Examination:     General:  AAO x 3, speaking in full sentences, talkative, no distress. HEENT: Atraumatic, normocephalic, BiPAP in place. Neck:   No JVD. Some accessory muscle use  Chest:              Bilateral vesicular breath sounds, no rales or wheezes. Cardiac:  S1 S2 RR, no murmurs, gallops or rubs, JVP not raised. Abdomen: Soft, non-tender, BS audible. :   No suprapubic or flank tenderness. Neuro:  AAO x 3, No FND. SKIN:  No rashes, warm to touch, ecchymosis noted on both forearms. Extremities:  Mild edema bilaterally on both upper extremities, no lower extremity edema.     Labs:       Recent Labs     22  0352 22  0306   WBC 17.4* 19.8*   RBC 3.53* 2.93*   HGB 10.7* 8.9*   HCT 30.4* 25.7*   MCV 86.1 87.7   MCH 30.3 30.4   MCHC 35.2* 34.6   RDW 13.3 13.3    284   MPV 10.4 10.9      BMP:   Recent Labs     22  0352 22  1332 22  0306    135 134*   K 4.8 4.1 4.5    102 103   CO2 17* 23 20   BUN 56* 63* 68*   CREATININE 2.16* 2.08* 2.04* GLUCOSE 194* 145* 129*   CALCIUM 8.3* 8.7 8.1*        Magnesium:    Recent Labs     01/14/22  0306   MG 2.6     SUMMER:      Lab Results   Component Value Date    SUMMER NEGATIVE 01/09/2022     SPEP:  Lab Results   Component Value Date    PROT 6.1 01/09/2022     UPEP:   No results found for: LABPE  C3:     Lab Results   Component Value Date    C3 134 01/09/2022     C4:     Lab Results   Component Value Date    C4 31 01/09/2022       Urinalysis/Chemistries:      Lab Results   Component Value Date    NITRU NEGATIVE 01/09/2022    COLORU Yellow 01/09/2022    PHUR 5.0 01/09/2022    WBCUA 2 TO 5 01/09/2022    RBCUA 0 TO 2 01/09/2022    MUCUS NOT REPORTED 01/09/2022    TRICHOMONAS NOT REPORTED 01/09/2022    YEAST NOT REPORTED 01/09/2022    BACTERIA NOT REPORTED 01/09/2022    SPECGRAV 1.022 01/09/2022    LEUKOCYTESUR NEGATIVE 01/09/2022    UROBILINOGEN Normal 01/09/2022    BILIRUBINUR NEGATIVE 01/09/2022    GLUCOSEU TRACE 01/09/2022    KETUA NEGATIVE 01/09/2022    AMORPHOUS NOT REPORTED 01/09/2022     Urine Sodium:     Lab Results   Component Value Date    CHERYL 56 01/10/2022       Urine Creatinine:     Lab Results   Component Value Date    LABCREA 90.8 01/09/2022       Radiology:     CXR:     Assessment:     1. Acute kidney injury nonoliguric secondary to prerenal injury from volume depletion related to GI losses/diarrhea/poor p.o. and infection -improving. Creatinine peaked to 2.9 and improved to 2.04 today, back to about baseline. 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 Never seen nephrologist in the past  3 COVID-19 pneumonia  4 A. fib with RVR on amiodarone, now in normal sinus rhythm  5 preserved ejection fraction/moderate aortic stenosis   6 Essential hypertension  7 Metabolic acidosis, improving    Plan:   1. Continue to monitor intake output  2. Daily BMP  3. Discontinue IV maintenance bicarbonate containing fluids  4.  Nephrology Will SIGN OFF with patient back to baseline CKD level. Please have him come to our office post discharge. Nutrition    Avoid nephrotoxic drugs/contrast exposure.     Napoleon Hill MD  [facility]  1/14/2022

## 2022-01-14 NOTE — PROGRESS NOTES
Conversation held with pt and critical care MD Amor Sawant regarding pt's wish to change code status. Pt answered all questions appropriately, O2 sats 90-95% on Hi flow oxygen, Alert & oriented x4. Pt states he is okay with intubation and chest compressions if he were to need it. Code status updated in chart to reflect wishes. Wife updated, states she is okay with whatever he wants.

## 2022-01-14 NOTE — PROGRESS NOTES
[facility]  Progress Note                   Date:   1/14/2022  Patient name: Vira Andrews  Date of admission:  1/8/2022  9:49 AM  MRN:   [id number]  YOB: 1940  PCP: MOHAMUD Thomason CNP    Reason for Admission: Hypoxia [R09.02]  Elevated troponin [R77.8]  Acute kidney injury ([address] [N17.9]  COVID-19 [U07.1]  Pneumonia due to COVID-19 virus [U07.1, J12.82]    Subjective:       Clinical Changes /Abnormalities: Labs, medications, and vitals reviewed  Afib rate controlled. Review of Systems    Medications:   Scheduled Meds:   insulin glargine  20 Units SubCUTAneous QAM    senna  2 tablet Oral Nightly    insulin lispro  0-6 Units SubCUTAneous TID WC    insulin lispro  0-3 Units SubCUTAneous Nightly    amiodarone  200 mg Oral Daily    meropenem  500 mg IntraVENous Q12H    metoprolol tartrate  50 mg Oral BID    pantoprazole  40 mg Oral QAM AC    atorvastatin  20 mg Oral Nightly    cilostazol  100 mg Oral BID    clopidogrel  75 mg Oral Daily    vitamin B-12  1,000 mcg Oral Daily    aspirin  81 mg Oral Daily    sodium chloride flush  5-40 mL IntraVENous 2 times per day    dexamethasone  6 mg IntraVENous Q24H     Continuous Infusions:   dextrose      heparin (PORCINE) Infusion 11.905 Units/kg/hr (01/13/22 2153)    IV infusion builder 50 mL/hr at 01/13/22 0934    sodium chloride       CBC:   Recent Labs     01/12/22  0352 01/14/22  0306   WBC 17.4* 19.8*   HGB 10.7* 8.9*    284     BMP:    Recent Labs     01/12/22  0352 01/13/22  1332 01/14/22  0306    135 134*   K 4.8 4.1 4.5    102 103   CO2 17* 23 20   BUN 56* 63* 68*   CREATININE 2.16* 2.08* 2.04*   GLUCOSE 194* 145* 129*     Hepatic:  No results for input(s): AST, ALT, ALB, BILITOT, ALKPHOS in the last 72 hours. Troponin:   No results for input(s): TROPHS in the last 72 hours. BNP: No results for input(s): BNP in the last 72 hours.   Lipids: No results for input(s): CHOL, HDL in the last 72 hours. Invalid input(s): LDLCALCU  INR: No results for input(s): INR in the last 72 hours. Objective:   Vitals: /70   Pulse 86   Temp 97.3 °F (36.3 °C) (Axillary)   Resp 22   Ht 5' 10\" (1.778 m)   Wt 148 lb 2.4 oz (67.2 kg)   SpO2 92%   BMI 21.26 kg/m²     For careful stewardship of limited PPE during COVID-19 pandemic my physical exam was deferred. For physical exam, please see today's physical from primary team or ICU team.       Echo 3/29/21    Left Ventricle: There is mild increased wall thickness/hypertrophy.   Left Ventricle: Systolic function is normal with an ejection fraction   of 65-70%.   Left Ventricle: Grade I diastolic dysfunction (impaired relaxation) is   present.   Right Ventricle: Systolic function is normal.     Aortic Valve: There is no regurgitation. There is moderate stenosis. The calculated aortic valve area is 1.72 cm2. The calculated aortic valve   peak gradient is 47.00 mmHg. The calculated aortic valve mean gradient is   28.00 mmHg.   Tricuspid Valve: There is trace regurgitation. There is no evidence of   tricuspid valve stenosis.   Pericardium: There is no pericardial effusion. Tele presently Afib 120-140s      Echo 1/11/2022     Summary  Left ventricle is normal in size, global left ventricular systolic function  is normal.  Estimated ejection fraction is 55 % . Left atrium is at upper limits of normal.  Inter-atrial septum is intact with no evidence for an atrial septal defect. Right atrium is normal in size. Normal right ventricular size and function. Aortic valve is trileaflet, calcified with restriction of motion. Peak instantaneous gradient 60 mmHg and mean gradient 37 mmHg. Calculated ALF 1.0cm2 by VTI (image #57.)  Severe aortic stenosis. Trivial aortic insufficiency. Mitral valve sclerosis without significant stenosis. Mean gradient is 4mmHg . Trivial mitral regurgitation. Tricuspid valve was not well visualized.   Mild tricuspid regurgitation. No significant pericardial effusion is seen. Assessment / Acute Cardiac Problems:   1. COVID-19 PNA  2. New onset Afib RVR  3. Type I vs Type II MI likely secondary to JULES/COVID  4. HTN  5. JULES on CKD (baseline creat 1.4-1.5)  6. Mod AS on Echo 3/2021  7. Preserved LVEF on prior echo  8. Acute hypoxic resp failure    Patient Active Problem List:     Essential hypertension     Hyperlipidemia     Pneumonia due to COVID-19 virus     Acute hypoxemic respiratory failure due to COVID-19 [facility])     Acute kidney injury superimposed on chronic kidney disease ([facility] [address] baseline creatinine 1.9     Stage 3b chronic kidney disease (HCC)     Elevated troponin     Nonrheumatic aortic valve stenosis     PAD (peripheral artery disease) (HCC)     Moderate protein-calorie malnutrition (HCC)    [id number] Score for Atrial Fibrillation Stroke Risk   Risk   Factors  Component Value   C CHF No 0   H HTN Yes 1   A2 Age >= 76 Yes,  [de-identified] y.o.) 2   D DM No 0   S2 Prior Stroke/TIA No 0   V Vascular Disease No 0   A Age 74-69 No,  [de-identified] y.o.) 0   Sc Sex male 0    [id number]  Score  3   Score last updated 1/11/22 14:02 PM EST    Click here for a link to the UpToDate guideline \"Atrial Fibrillation: Anticoagulation therapy to prevent embolization    Disclaimer: Risk Score calculation is dependent on accuracy of patient problem list and past encounter diagnosis. Plan of Treatment:   1. Afib - stable. continue po amio, BB and heparin gtt. Will need AC on discharge. 2. Keep K >4 and Mg >2.   3. Echo reviewed per above  - preserved EF. 4. Severe aortic stenosis. need R/L heart cath once acute issues resolved . Will schedule follow up outpatient  5.  Cardiology will sign off, please call us with further questions     Electronically signed by Mahin Schroeder, MOHAMUD Cortez CNP on 1/14/2022 at 1:34 PM  [address]  [phone]

## 2022-01-14 NOTE — CODE DOCUMENTATION
Patient was admitted on 1/8/2022 for COVID-19 pneumonia. At that time patient indicated to the physician that he wanted to be DNR/CCA with no intubation. Throughout patient's hospital stay he maintained that he wanted to be DNR CCA with no intubation. Today patient did tell the day staff that he wished to change his CODE STATUS back to full code. CODE STATUS was changed in epic. I went to bedside to speak with the patient. At the time of evaluation patient was alert and oriented to person, place, time. He was able to answer questions appropriately GCS 15. During our conversation patient demonstrated that he did have the capacity to understand his current CODE STATUS. Patient indicated that he wished to change his CODE STATUS back to full code. Patient stated that he wished to be intubated if his condition deteriorated as well as he wished for CPR and full resuscitative efforts to be made if he were to suffer cardiac arrest.  Patient's CODE STATUS is accurately reflected in epic is full code. I also spoke with the patient's wife Mónica Tolentino to update her on the patient's decision. Gautam Renee was present for the CODE STATUS discussion.     Electronically signed by Nick Gastelum DO on 1/13/2022 at 11:54 PM

## 2022-01-14 NOTE — PROGRESS NOTES
[facility] [address] Patient  Today's Date and Time: 1/14/2022, 9:59 AM    Impression :     · COVID 19 Confirmed Infection  · Covid tests:  · 1/8/21: Positive  · Acute hypoxic respiratory failure  · Paroxysmal A. 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  · Essential hypertension  · Elevated inflammatory markers  · Hyperlipidemia  · Patient has not received the Covid vaccine    Recommendations:   · Antibiotic treatment:  · Meropenem 500 mg BID IV for leukocytosis & possible secondary bacterial pneumonia 1/11/21  · Covid Rx:    · Remdesivir-contraindicated with JULES  · Decadron-initiated 1/8/2022  · Actemra-administered 1/8/2022  · Monoclonal antibodies-out of window      Medical Decision Making/Summary/Discussion:1/14/2022     · Patient admitted with COVID 19 infection    Infection Control Recommendations   · Universal Precautions  · Airborne isolation  · Droplet Isolation    Antimicrobial Stewardship Recommendations       · Renal considerations  Coordination of Outpatient Care:   · Estimated Length of IV antimicrobials:TBD  · Patient will need Midline Catheter Insertion: TBD  · Patient will need PICC line Insertion: No  · Patient will need: Home IV , [place],  SNF,  LTAC:TBD  · Patient will need outpatient wound care:No    Chief complaint/reason for consultation:   · Concern for COVID infection      History of Present Illness:   Annita Esquivel is a [de-identified]y.o.-year-old male who was initially admitted on 1/8/2022. Patient seen at the request of Dr. Shantelle Weiner:    Patient presented through ER with complaints of needing shortness of breath, cough, loss of appetite, nausea, vomiting and diarrhea over the past 7 to 10 days. He has not received the Covid vaccine and tested positive for Covid shortly after [place].     On evaluation in the ED, the patient had an SPO2 of 80% on room air and was tachypneic. A rapid Covid swab was positive. CT chest shows extensive bilateral pulmonary groundglass opacities. He was started on Decadron and given a dose of Actemra. Abnormal labs include:  · Creatinine 2.32  ·   · Troponin I 48  · WBC 17.1    Patient admitted because of concerns with COVID 19.    CURRENT EVALUATION : 1/14/2022    Afebrile  Vital signs stable    The patient is on BiPAP with 50-->95% FiO2. Meropenem initiated on 1/11/2022 for worsening respiratory distress and leukocytosis    Creatinine level is trending down    Patient exhibiting respiratory distress. Yes  Respiratory secretions: No    Patient receiving supplemental oxygen. BiPAP  RR: 24-->19-->22  02 sat: 92-->94-->92    % FIO2: 50-->95  Flow Rate: 55 L/min  PEEP:      QTc:       NEWS Score: 0-4 Low risk group; 5-6: Medium risk group; 7 or above: High risk group  Parameters 3 2 1 0 1 2 3   Age    < 65   ? 65   RR ? 8  9-11 12-20  21-24 ? 25   O2 Sats ? 91 92-93 94-95 ? 96      Suppl O2  Yes  No      SBP ? 90  101-110 111-219   ? 220   HR ? 40  41-50 51-90  111-130 ? 131   Consciousness    Alert   Drowsiness, lethargy, or confusion   Temperature ? 35.0 C (95.0 F)  35.1-36.0 C 95.1-96.9 F 36.1-38.0 C 97.0-100.4 F 38.1-39.0 C 100.5-102.3 F ? 39.1 C ? 102.4 F      NEWS Score:   1/9/2022: 5 moderate risk    Overall Daily Picture:      Worsened    Presence of secondary bacterial Infection:    Possible  Additional antibiotics: Meropenem 1/11/2022    Labs, X rays reviewed: 1/14/2022    BUN:68  Cr:2.04    WBC:17.1 ->20>18.6>17.4-->19.8  Hb:12.8>10.7-->8.9  Plat: 302>309-->284  Troponin: 148    Absolute Neutrophils:16  Absolute Lymphocytes:0.34  Neutrophil/Lymphocyte Ratio: 53 very high risk    CRP:160-->131-->52.3  Ferritin:804  LDH: 563    Pro Calcitonin:      Cultures:  Urine:  ·   Blood:  ·   Sputum :  ·   Wound:       CXR:     CAT:  1/8/21      Discussed with patient, RN, CC, IM.     I have personally reviewed the past medical history, past surgical history, medications, social history, and family history, and I have updated the database accordingly.   Past Medical History:     Past Medical History:   Diagnosis Date    Chronic lower back pain     Cobalamin deficiency     [facility]     [facility]     Hyperlipidemia     Hypertension     [facility]     [facility]peripheral [facility]        Past Surgical  History:     Past Surgical History:   Procedure Laterality Date    ARTERY SURGERY         Medications:      insulin glargine  20 Units SubCUTAneous QAM    senna  2 tablet Oral Nightly    insulin lispro  0-6 Units SubCUTAneous TID WC    insulin lispro  0-3 Units SubCUTAneous Nightly    amiodarone  200 mg Oral Daily    meropenem  500 mg IntraVENous Q12H    metoprolol tartrate  50 mg Oral BID    pantoprazole  40 mg Oral QAM AC    atorvastatin  20 mg Oral Nightly    cilostazol  100 mg Oral BID    clopidogrel  75 mg Oral Daily    vitamin B-12  1,000 mcg Oral Daily    aspirin  81 mg Oral Daily    sodium chloride flush  5-40 mL IntraVENous 2 times per day    dexamethasone  6 mg IntraVENous Q24H       Social History:     Social History     Socioeconomic History    Marital status:      Spouse name: Not on file    Number of children: Not on file    Years of education: Not on file    Highest education level: Not on file   Occupational History    Not on file   Tobacco Use    Smoking status: Former Smoker    Smokeless tobacco: Never Used   Substance and Sexual Activity    Alcohol use: Not Currently    Drug use: Never    Sexual activity: Not on file   Other Topics Concern    Not on file   Social History Narrative    Not on file     Social Determinants of Health     Financial Resource Strain:     Difficulty of Paying Living Expenses: Not on file   Food Insecurity:     Worried About Running Out of Food in the Last polyuria, no polydipsia, no polyphagia. Physical Examination :     Patient Vitals for the past 8 hrs:   BP Temp Temp src Pulse Resp SpO2   01/14/22 0928      92 %   01/14/22 0400 137/70 97.3 °F (36.3 °C) Axillary 86 22 95 %   01/14/22 0344     25      General Appearance: Awake, alert, and in respiratory distress  Head:  Normocephalic, no trauma  Eyes: Pupils equal, round, reactive to light; sclera anicteric; conjunctivae pink. No embolic phenomena. ENT: Oropharynx clear, without erythema, exudate, or thrush. No tenderness of sinuses. Mouth/throat: mucosa pink and moist. No lesions. Dentition in good repair. Neck:Supple, without lymphadenopathy. Thyroid normal, No bruits. Pulmonary/Chest: Clear to auscultation, without wheezes, rales, or rhonchi. No dullness to percussion. Cardiovascular: Regular rate and rhythm without murmurs, rubs, or gallops. Abdomen: Soft, non tender. Bowel sounds normal. No organomegaly  All four Extremities: No cyanosis, clubbing, edema, or effusions. Neurologic: No gross sensory or motor deficits. Skin: Warm and dry with good turgor. No signs of peripheral arterial or venous insufficiency. No ulcerations. No open wounds. Medical Decision Making -Laboratory:   I have independently reviewed/ordered the following labs:    CBC with Differential:   Recent Labs     01/12/22  0352 01/14/22  0306   WBC 17.4* 19.8*   HGB 10.7* 8.9*   HCT 30.4* 25.7*    284     BMP:   Recent Labs     01/13/22  1332 01/14/22  0306    134*   K 4.1 4.5    103   CO2 23 20   BUN 63* 68*   CREATININE 2.08* 2.04*   MG  --  2.6     Hepatic Function Panel:   No results for input(s): PROT, LABALBU, BILIDIR, IBILI, BILITOT, ALKPHOS, ALT, AST in the last 72 hours. No results for input(s): RPR in the last 72 hours. No results for input(s): HIV in the last 72 hours. No results for input(s): BC in the last 72 hours.   Lab Results   Component Value Date    MUCUS NOT REPORTED 01/09/2022 RBC 2.93 01/14/2022    TRICHOMONAS NOT REPORTED 01/09/2022    WBC 19.8 01/14/2022    YEAST NOT REPORTED 01/09/2022    TURBIDITY Clear 01/09/2022     Lab Results   Component Value Date    CREATININE 2.04 01/14/2022    GLUCOSE 129 01/14/2022       Medical Decision Making-Imaging:     Narrative   EXAMINATION:   CTA OF THE CHEST 1/8/2022 10:10 am       TECHNIQUE:   CTA of the chest was performed after the administration of intravenous   contrast.  Multiplanar reformatted images are provided for review.  MIP   images are provided for review. Dose modulation, iterative reconstruction,   and/or weight based adjustment of the mA/kV was utilized to reduce the   radiation dose to as low as reasonably achievable.       COMPARISON:   None.       HISTORY:   ORDERING SYSTEM PROVIDED HISTORY: dyspnea / hypoxia   Reason for Exam: Shortness of breath       FINDINGS:   Pulmonary Arteries: Pulmonary arteries are adequately opacified for   evaluation.  No evidence of intraluminal filling defect to suggest pulmonary   embolism.  Main pulmonary artery is normal in caliber.       Mediastinum: No evidence of mediastinal lymphadenopathy.  Normal heart size. Normal caliber thoracic aorta with diffuse atherosclerosis.       Lungs/pleura: Extensive ground-glass opacification of the bilateral lung   fields with peripheral involvement slight apical as well as basilar sparing. No effusion or pneumothorax.       Upper Abdomen: Limited images of the upper abdomen are unremarkable.       Soft Tissues/Bones: No acute bone or soft tissue abnormality.           Impression   *No evidence of pulmonary embolism.    *Extensive ground-glass opacification of the bilateral lung fields with   peripheral involvement slight apical as well as basilar sparing.  Imaging   features suggestive of COVID-19 pneumonia, though are nonspecific and can   occur with a variety of infectious and noninfectious processes.           Medical Decision [id number]: Medical Decision Making-Other:     Note:  · Labs, medications, radiologic studies were reviewed with personal review of films  · Large amounts of data were reviewed  · Discussed with nursing Staff, Discharge planner  · Infection Control and Prevention measures reviewed  · All prior entries were reviewed  · Administer medications as ordered  · Prognosis: Guarded  · Discharge planning reviewed      Thank you for allowing us to participate in the care of this patient. Please call with questions. Allegra November, APRN - CNP     ATTESTATION:    I have discussed the case, including pertinent history and exam findings with the APRN. I have evaluated the  History, physical findings and pictures of the patient and the key elements of the encounter have been performed by me. I have reviewed the laboratory data, other diagnostic studies and discussed them with the APRN. I have updated the medical record where necessary. I agree with the assessment, plan and orders as documented by the APRN.     Araseli Iverson MD.        Pager: [phone] - Office: [phone]

## 2022-01-14 NOTE — PROGRESS NOTES
[facility]  Office: [address], DO, Minerva Diaz, DO, Gab Moctezuma, DO, [place] Dorotheahumera Blood, DO, Brannon Bach MD, Lexy Hu MD, Altaf Howell MD, Rand Fox MD, Shahram Crook MD, Duran Dawkins MD, Migdalia Barrios MD, Dallin De Luna, DO, Oliver Henderson, DO, Maddy Easley MD,  Johann Galeazzi, DO, Salinas Vaughan MD, Snow Albert MD, Kassi Coughlin MD, Lamont Pugh MD, Anisha Devries MD, Jim Nixon MD, Latisha Christine MD, Domenic Serrano CNP, [facility], CNP, Shen Gabriel, CNP, Goyo Keene, CNS, Trevin Edmondson CNP, Haley Weeks, CNP, Sara Vega, CNP, Jenny Valdez CNP, Becky Jacobs CNP, Arlene Reagan PA-C, Linda Reyes DNP, Syed Kennedy DNP, Rand Howell CNP, Berna Jimenez, CNP, Romario Gastelum CNP, Kasie Singer CNP, Tyree Flynn CNP, Devon Trivedi, [address] [place] [address]    Progress Note    1/14/2022    12:52 PM    Name:   Cheryl Cross  MRN:     [id number]     Acct:      [de-identified]   Room:   [address] [place] [id number] Day:  6  Admit Date:  1/8/2022  9:49 AM    PCP:   MOHAMUD Hauser CNP  Code Status:  Full Code    Subjective:     C/C:   Chief Complaint   Patient presents with    Shortness of Breath    Nausea & Vomiting     Interval History Status: improved.          Brief History:   Presented with cough shortness of breath on January 8 symptoms began 2 weeks prior to that he did have a COVID-positive test he was found to have COVID-pneumonia acute hypoxemic respiratory failure he has been treated for that     Also found to be in A. fib with RVR has a known history of peripheral artery disease and hyperlipidemia as well as aortic stenosis I cannot find an echo in the chart there is 1 that has been performed all of the results has not been released     He is currently on amiodarone drip getting Lopressor which she has not responded to very well had elevated troponins he is pending an ischemic work-up by cardiology they are waiting until he is further stabilized/convalescent     He also has an JULES on CKD but is believed to be prerenal with a possible component of intrarenal pathology related to COVID diagnosis     His blood sugars are currently elevated secondary to Decadron     He is currently on meropenem for bacterial pneumonia he could not receive remdesivir because of his JULES he has received Decadron and Actemra.     HE WAS INITIALLY NO CODE NOW BACK TO FULL CODE AFTER DISCUSSION WITH NURSING STAFF      Review of Systems:     Constitutional:  negative for chills, fevers, sweats  Respiratory:  negative for cough, (+) dyspnea on exertion, (+) shortness of breath, wheezing  Cardiovascular:  negative for chest pain, chest pressure/discomfort, lower extremity edema, palpitations  Gastrointestinal:  negative for abdominal pain, constipation, diarrhea, nausea, vomiting  Neurological:  negative for dizziness, headache    Medications:      Allergies:  No Known Allergies    Current Meds:   Scheduled Meds:    insulin glargine  20 Units SubCUTAneous QAM    senna  2 tablet Oral Nightly    insulin lispro  0-6 Units SubCUTAneous TID WC    insulin lispro  0-3 Units SubCUTAneous Nightly    amiodarone  200 mg Oral Daily    meropenem  500 mg IntraVENous Q12H    metoprolol tartrate  50 mg Oral BID    pantoprazole  40 mg Oral QAM AC    atorvastatin  20 mg Oral Nightly    cilostazol  100 mg Oral BID    clopidogrel  75 mg Oral Daily    vitamin B-12  1,000 mcg Oral Daily    aspirin  81 mg Oral Daily    sodium chloride flush  5-40 mL IntraVENous 2 times per day    dexamethasone  6 mg IntraVENous Q24H     Continuous Infusions:    dextrose      heparin (PORCINE) Infusion 11.905 Units/kg/hr (01/13/22 0533)    IV infusion builder 50 mL/hr at 01/13/22 0934    sodium chloride       PRN Meds: sodium chloride, LORazepam, glucose, dextrose, glucagon (rDNA), dextrose, metoprolol, heparin (porcine), heparin (porcine), potassium chloride **OR** potassium alternative oral replacement **OR** potassium chloride, benzonatate, sodium chloride flush, sodium chloride flush, sodium chloride, ondansetron **OR** ondansetron, polyethylene glycol, acetaminophen **OR** acetaminophen    Data:     Past Medical History:   has a past medical history of Chronic lower back pain, Cobalamin deficiency, [facility] Diabetes mellitus ([facility] Hyperlipidemia, Hypertension, Malignant neoplasm of colon ([facility] and PAD (peripheral artery disease) ([facility] Social History:   reports that he has quit smoking. He has never used smokeless tobacco. He reports previous alcohol use. He reports that he does not use drugs. Family History: History reviewed. No pertinent family history. Vitals:  /70   Pulse 86   Temp 97.3 °F (36.3 °C) (Axillary)   Resp 22   Ht 5' 10\" (1.778 m)   Wt 148 lb 2.4 oz (67.2 kg)   SpO2 92%   BMI 21.26 kg/m²   Temp (24hrs), Av.6 °F (36.4 °C), Min:97 °F (36.1 °C), Max:98.6 °F (37 °C)    Recent Labs     22  19422  0643 22  1107   POCGLU 191* 157* 189* 136*       I/O (24Hr):     Intake/Output Summary (Last 24 hours) at 2022 1252  Last data filed at 2022 0527  Gross per 24 hour   Intake    Output 850 ml   Net -850 ml       Labs:  Hematology:  Recent Labs     22  03522  0306   WBC 17.4* 19.8*   RBC 3.53* 2.93*   HGB 10.7* 8.9*   HCT 30.4* 25.7*   MCV 86.1 87.7   MCH 30.3 30.4   MCHC 35.2* 34.6   RDW 13.3 13.3    284   MPV 10.4 10.9     Chemistry:  Recent Labs     22  0352 22  1332 22  0306    135 134*   K 4.8 4.1 4.5    102 103   CO2 17* 23 20   GLUCOSE 194* 145* 129*   BUN 56* 63* 68*   CREATININE 2.16* 2.08* 2.04*   MG  --   --  2.6   ANIONGAP 13 10 11   LABGLOM 30* 31* 32*   GFRAA 36* 37* 38*   CALCIUM 8.3* 8.7 8.1*     Recent Labs     22  1100 22  1558 22  1946 22 01/14/22  0643 01/14/22  1107   POCGLU 164* 106 191* 157* 189* 136*     ABG:  Lab Results   Component Value Date    FIO2 INFORMATION NOT PROVIDED 01/10/2022     Lab Results   Component Value Date/Time    SPECIAL NOT REPORTED 01/09/2022 11:55 AM    SPECIAL NOT REPORTED 01/09/2022 11:55 AM     Lab Results   Component Value Date/Time    CULTURE NO GROWTH 5 DAYS 01/09/2022 11:55 AM    CULTURE NO GROWTH 5 DAYS 01/09/2022 11:55 AM       Radiology:  XR CHEST PORTABLE    Result Date: 1/11/2022  Unchanged bilateral airspace opacities     XR CHEST PORTABLE    Result Date: 1/9/2022  Scattered infiltrates consistent with pneumonia. CT CHEST PULMONARY EMBOLISM W CONTRAST    Result Date: 1/8/2022  *No evidence of pulmonary embolism. *Extensive ground-glass opacification of the bilateral lung fields with peripheral involvement slight apical as well as basilar sparing. Imaging features suggestive of COVID-19 pneumonia, though are nonspecific and can occur with a variety of infectious and noninfectious processes. US RETROPERITONEAL COMPLETE    Result Date: 1/10/2022  The right kidney could not be adequately visualized. Left kidney shows mild increased cortical echogenicity suggesting medical renal disease. No hydronephrosis. Physical Examination:        General appearance:  alert, cooperative and no distress.  WEARING NASAL BIPAP  Mental Status:  oriented to person, place and time and normal affect  Lungs:  clear to auscultation bilaterally, normal effort  Heart:  regular rate and rhythm, no murmur  Abdomen:  soft, nontender, nondistended, normal bowel sounds, no masses, hepatomegaly, splenomegaly  Extremities:  no edema, redness, tenderness in the calves  Skin:  no gross lesions, rashes, induration    Assessment:        Hospital Problems           Last Modified POA    * (Principal) Pneumonia due to COVID-19 virus 1/8/2022 Yes    Essential hypertension 1/8/2022 Yes    Hyperlipidemia 1/8/2022 Yes    Acute hypoxemic respiratory failure due to COVID-19 [facility]) 1/8/2022 Yes    Acute kidney injury superimposed on chronic kidney disease ([facility] baseline creatinine 1.9 1/8/2022 Yes    Stage 3b chronic kidney disease ([facility] 1/8/2022 Yes    Elevated troponin 1/9/2022 Yes    Nonrheumatic aortic valve stenosis 1/9/2022 Yes    PAD (peripheral artery disease) ([facility] 1/9/2022 Yes    Moderate protein-calorie malnutrition ([facility] 1/9/2022 Yes    Atrial fibrillation with RVR ([facility] 1/10/2022 Yes          Plan:        1. Sepsis with acute respiratory failure due to COVID-19 pneumonia s/p Actemra 1/8/2022, Continue Decadron 6 mg daily IV for 10 days and monitor for progress.   2. A. fib with RVR amiodarone drip Lopressor cardiology following severe aortic stenosis but preserved EF waiting on final report from echo; will undergo ischemic work-up while in hospital  3. HfPEF with dilated LA in setting of above  4. Severe AS based on mean gradient - ASA 1.0  5. prerenal JULES on CKD nephrology following no diuresis  6. NAGMA related to above  7. Peripheral arterial diseaseon cilostazol, Plavix  8. Guarded prognosis  9. Encouraged patient to increase PO itake  10.  Full Code    Overarching goal at this point is to wean O2 and dc patient safely  Ischemic work up may be performed outpatient, but will defer to CV colleagues    Viola Wood MD  1/14/2022  12:52 PM

## 2022-01-15 NOTE — PROGRESS NOTES
[facility]  Office: [address], DO, Chalo Shree, DO, Robert Althea, DO, Anne Moon, DO, Natacha Hair MD, Rabia Parnell MD, Janay Orantes MD, Fernandez Neff MD, Daren Klinefelter, MD, Omid Anderson MD, Norberto Becerril MD, Cher Collet, DO, Ricke Bosworth, DO, Trung Redd MD,  Brielle Bautista, DO, Bela Gallardo MD, Godwin Ferreira MD, Shiv Cheung MD, Aubrie Diaz MD, Sharona Rodríguez MD, Jenna Moscoso MD, Suad Sullivan MD, Becca Ayala, [facility], [facility], CNP, Ligia Camacho, CNP, Douglas Simmons, CNS, Kaci Pollock, CNP, Adair Pandey, CNP, Jazlyn Smith, CNP, Kuldip Resendiz, CNP, Minh Jeffries, CNP, Piedad Cheung PA-C, Irving Esteban, [facility], Le Carolina, [facility], Jayla Valencia, CNP, Larry Rubin, CNP, Shannan Conklin, CNP, Jorge Chavez, CNP, Avi Rodas, CNP, Linda Eugene, [address] [place] [address]    Progress Note    1/15/2022    7:25 AM    Name:   Zita Brice  MRN:     [id number]     Acct:      [de-identified]   Room:   [address] [place] [address] Day:  7  Admit Date:  1/8/2022  9:49 AM    PCP:   MOHAMUD Castano CNP  Code Status:  Full Code    Subjective:     C/C:   Chief Complaint   Patient presents with    Shortness of Breath    Nausea & Vomiting     Interval History Status: not changed. Brief History:     [de-identified] M Covid (+)  Acute hypoxemic respir failure on nasal bipap  Slightly cantankerous, but overall agreeable to our plan of care.     He is currently on high O2 requirements but otherwise would be suitable for discharge    Had Cr ~2.0 which is generally his baseline  Azotemic as well, with peak this AM 72    Overall doing much better     Review of Systems:     Constitutional:  negative for chills, fevers, sweats (+) fatigue  Respiratory:  negative for cough, dyspnea on exertion, shortness of breath, wheezing  Cardiovascular:  negative for chest pain, chest pressure/discomfort, lower extremity edema, palpitations  Gastrointestinal:  negative for abdominal pain, constipation, diarrhea, nausea, vomiting  Neurological:  negative for dizziness, headache    Medications: Allergies:  No Known Allergies    Current Meds:   Scheduled Meds:    insulin glargine  20 Units SubCUTAneous QAM    senna  2 tablet Oral Nightly    insulin lispro  0-6 Units SubCUTAneous TID WC    insulin lispro  0-3 Units SubCUTAneous Nightly    amiodarone  200 mg Oral Daily    meropenem  500 mg IntraVENous Q12H    metoprolol tartrate  50 mg Oral BID    pantoprazole  40 mg Oral QAM AC    atorvastatin  20 mg Oral Nightly    cilostazol  100 mg Oral BID    clopidogrel  75 mg Oral Daily    vitamin B-12  1,000 mcg Oral Daily    aspirin  81 mg Oral Daily    sodium chloride flush  5-40 mL IntraVENous 2 times per day    dexamethasone  6 mg IntraVENous Q24H     Continuous Infusions:    dextrose      heparin (PORCINE) Infusion 11.905 Units/kg/hr (01/14/22 2109)    sodium chloride       PRN Meds: sodium chloride, LORazepam, glucose, dextrose, glucagon (rDNA), dextrose, metoprolol, heparin (porcine), heparin (porcine), potassium chloride **OR** potassium alternative oral replacement **OR** potassium chloride, benzonatate, sodium chloride flush, sodium chloride flush, sodium chloride, ondansetron **OR** ondansetron, polyethylene glycol, acetaminophen **OR** acetaminophen    Data:     Past Medical History:   has a past medical history of Chronic lower back pain, Cobalamin deficiency, CVA (cerebral vascular accident) ([facility] [address] Diabetes mellitus ([facility] Hyperlipidemia, Hypertension, Malignant neoplasm of colon ([facility] and PAD (peripheral artery disease) ([facility] Social History:   reports that he has quit smoking. He has never used smokeless tobacco. He reports previous alcohol use. He reports that he does not use drugs. Family History: History reviewed. No pertinent family history.     Vitals:  BP (!) 112/59   Pulse 103   Temp 97.6 °F (36.4 °C) (Axillary)   Resp 25   Ht 5' 10\" (1.778 m)   Wt 148 lb 2.4 oz (67.2 kg)   SpO2 100%   BMI 21.26 kg/m²   Temp (24hrs), Av °F (36.7 °C), Min:97.6 °F (36.4 °C), Max:98.5 °F (36.9 °C)    Recent Labs     22  1107 22  1605 01/14/22  2028 01/15/22  0651   POCGLU 136* 139* 170* 170*       I/O (24Hr): Intake/Output Summary (Last 24 hours) at 1/15/2022 0725  Last data filed at 1/15/2022 0200  Gross per 24 hour   Intake    Output 600 ml   Net -600 ml       Labs:  Hematology:  Recent Labs     22  0306   WBC 19.8*   RBC 2.93*   HGB 8.9*   HCT 25.7*   MCV 87.7   MCH 30.4   MCHC 34.6   RDW 13.3      MPV 10.9     Chemistry:  Recent Labs     22  1332 22  0306    134*   K 4.1 4.5    103   CO2 23 20   GLUCOSE 145* 129*   BUN 63* 68*   CREATININE 2.08* 2.04*   MG  --  2.6   ANIONGAP 10 11   LABGLOM 31* 32*   GFRAA 37* 38*   CALCIUM 8.7 8.1*     Recent Labs     22  2018 22  0643 22  1107 22  1605 01/14/22  2028 01/15/22  0651   POCGLU 157* 189* 136* 139* 170* 170*     ABG:  Lab Results   Component Value Date    FIO2 INFORMATION NOT PROVIDED 01/10/2022     Lab Results   Component Value Date/Time    SPECIAL NOT REPORTED 2022 11:55 AM    SPECIAL NOT REPORTED 2022 11:55 AM     Lab Results   Component Value Date/Time    CULTURE NO GROWTH 5 DAYS 2022 11:55 AM    CULTURE NO GROWTH 5 DAYS 2022 11:55 AM       Radiology:  XR CHEST PORTABLE    Result Date: 2022  Patchy bilateral interstitial and ground-glass infiltrates most notably in the lung periphery. Findings have progressed from the prior exam compatible with patient's history of COVID pneumonia     XR CHEST PORTABLE    Result Date: 2022  Unchanged bilateral airspace opacities     XR CHEST PORTABLE    Result Date: 2022  Scattered infiltrates consistent with pneumonia.      CT CHEST PULMONARY EMBOLISM W CONTRAST    Result Date: 2022  *No evidence of pulmonary embolism. *Extensive ground-glass opacification of the bilateral lung fields with peripheral involvement slight apical as well as basilar sparing. Imaging features suggestive of COVID-19 pneumonia, though are nonspecific and can occur with a variety of infectious and noninfectious processes. US RETROPERITONEAL COMPLETE    Result Date: 1/10/2022  The right kidney could not be adequately visualized. Left kidney shows mild increased cortical echogenicity suggesting medical renal disease. No hydronephrosis. Physical Examination:        General appearance:  alert, cooperative and no distress, nasal bipap on  Mental Status:  oriented to person, place and time and normal affect  Lungs:  clear to auscultation bilaterally, normal effort  Heart:  regular rate and rhythm, no murmur  Abdomen:  soft, nontender, nondistended, normal bowel sounds, no masses, hepatomegaly, splenomegaly  Extremities:  no edema, redness, tenderness in the calves  Skin:  no gross lesions, rashes, induration    Assessment:        Hospital Problems           Last Modified POA    * (Principal) Pneumonia due to COVID-19 virus 1/8/2022 Yes    Essential hypertension 1/8/2022 Yes    Hyperlipidemia 1/8/2022 Yes    Acute hypoxemic respiratory failure due to COVID-19 ([address] 1/8/2022 Yes    Acute kidney injury superimposed on chronic kidney disease ([address] baseline creatinine 1.9 1/8/2022 Yes    Stage 3b chronic kidney disease ([address] 1/8/2022 Yes    Elevated troponin 1/9/2022 Yes    Nonrheumatic aortic valve stenosis 1/9/2022 Yes    PAD (peripheral artery disease) ([address] 1/9/2022 Yes    Moderate protein-calorie malnutrition ([address] 1/9/2022 Yes    Atrial fibrillation with RVR ([address] 1/10/2022 Yes          Plan:        1.   Acute hypoxemic respiratory failure on nasal BiPAP  2.  COVID-19 positive test (U07.1, COVID-19) with Acute Pneumonia (J12.89, Other viral pneumonia)  (If respiratory failure or sepsis present, add as separate assessment)    ID following    3. A. fib with RVR has resolved oral amiodarone  4. Severe aortic stenosis follow-up with cardiology outpatient or may undergo ischemic work-up once he is convalescent  5. Metabolic acidosis improving bicarb drip has been discontinued by nephrology  6. Renal diet  7. Full code  8. currently on heparin drip for A. Fib  9.  GI prophylaxis Pepcid  10.   Lopressor as needed for tachycardia heart rate greater than 120      Jim Nixon MD  1/15/2022  7:25 AM

## 2022-01-15 NOTE — PROGRESS NOTES
Patient's wife Yassine Handing updated. All questions and concerns addressed.      Electronically signed by Columbus Hamman, RN on 1/15/2022 at 9:48 AM

## 2022-01-15 NOTE — PROGRESS NOTES
Homar Vernon MD via perfect serve the following:     Patient's BUN/Cr trending up. This AM BUN 74, Cr 2.20. Also, spoke with patient and his wife and he would be okay with intubation. There is still a misc order for no intubation from 1/8/22 if you could discontinue that.  Many thanks    Electronically signed by Steven Bauer RN on 1/15/2022 at 9:50 AM

## 2022-01-15 NOTE — PROGRESS NOTES
PHARMACY NOTE:    The electrolyte replacement protocol for potassium has been discontinued per P&T guidelines because the patient has reduced renal function (CrCl < 30 mL/min). The patient's most recent potassium & magnesium levels are:  Recent Labs     01/13/22  1332 01/14/22  0306 01/15/22  0756   K 4.1 4.5 5.1   MG  --  2.6 2.6     Estimated Creatinine Clearance: 25 mL/min (A) (based on SCr of 2.2 mg/dL (H)). For patients with decreased renal function (below 30ml/min) needing potassium supplementation, please order individual bolus doses with appropriate monitoring. Please contact the inpatient pharmacy with any concerns. Thank you.   Keny Hoyt, PharmD, 1/15/2022 3:37 PM

## 2022-01-15 NOTE — PROGRESS NOTES
[facility] [address] Patient  Today's Date and Time: 1/15/2022, 8:54 AM    Impression :     · COVID 19 Confirmed Infection  · Covid tests:  · 1/8/21: Positive  · Acute hypoxic respiratory failure  · Paroxysmal A. 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 · He is insisting on trying to go home and does not quite understand that he is requiring a significant amount of respiratory support. · He did mention to me that he was happy to die at home but it is my understanding that the recently changed his CODE STATUS to comfort care arrest to full code.   · Continue supportive care      Medical Decision Making/Summary/Discussion:1/15/2022     · Patient admitted with COVID 19 infection    Infection Control Recommendations   · Universal Precautions  · Airborne isolation  · Droplet Isolation    Antimicrobial Stewardship Recommendations       · Renal considerations  Coordination of Outpatient Care:   · Estimated Length of IV antimicrobials:TBD  · Patient will need Midline Catheter Insertion: TBD  · Patient will need PICC line Insertion: No  · Patient will need: Home IV , [place],  SNF,  LTAC:TBD  · Patient will need outpatient wound care:No    Chief complaint/reason for consultation:   · Concern for COVID infection      History of Present Illness:   Remi Coronel is a [de-identified]y.o.-year-old male who was initially admitted on 1/8/2022. Patient seen at the request of Dr. Zo Frank:    Patient presented through ER with complaints of needing shortness of breath, cough, loss of appetite, nausea, vomiting and diarrhea over the past 7 to 10 days. He has not received the Covid vaccine and tested positive for Covid shortly after [place]. On evaluation in the ED, the patient had an SPO2 of 80% on room air and was tachypneic. A rapid Covid swab was positive. CT chest shows extensive bilateral pulmonary groundglass opacities. He was started on Decadron and given a dose of Actemra. Abnormal labs include:  · Creatinine 2.32  ·   · Troponin I 48  · WBC 17.1    Patient admitted because of concerns with COVID 19. Meropenem initiated on 1/11/2022 for worsening respiratory distress and leukocytosis    Stable chest x-ray 1/11    CRP is trending down      CURRENT EVALUATION : 1/15/2022    The patient is seen and evaluated at bedside he is awake and alert but currently requiring high flow at 35 L and 79%. He also has been using BiPAP at 50%. Is currently on heparin drip. He reports that he does not like the food at all has not really been eating and drinking very well. He has developed increasing creatinine. There is been concern for secondary bacterial infection. Afebrile  Vital signs stable    The patient is on BiPAP with 50% FiO2. Patient exhibiting respiratory distress. Yes  Respiratory secretions: No    Patient receiving supplemental oxygen. BiPAP  RR: 24-->19  02 sat: 92-->94    % FIO2: 50  PEEP:      QTc:       NEWS Score: 0-4 Low risk group; 5-6: Medium risk group; 7 or above: High risk group  Parameters 3 2 1 0 1 2 3   Age    < 65   ? 65   RR ? 8  9-11 12-20  21-24 ? 25   O2 Sats ?  91 92-93 94-95 ? 96      Suppl O2  Yes  No      SBP ? 90  101-110 111-219   ? 220   HR ? 40  41-50 51-90  111-130 ? 131   Consciousness    Alert   Drowsiness, lethargy, or confusion   Temperature ? 35.0 C (95.0 F)  35.1-36.0 C 95.1-96.9 F 36.1-38.0 C 97.0-100.4 F 38.1-39.0 C 100.5-102.3 F ? 39.1 C ? 102.4 F      NEWS Score:   1/9/2022: 5 moderate risk    Overall Daily Picture:      Unchanged    Presence of secondary bacterial Infection:    Possible  Additional antibiotics: Meropenem 1/11/2022    Labs, X rays reviewed: 1/15/2022    BUN:58-->58>56>56  Cr:2.32-->2.31>2.38>2.16    WBC:17.1 ->20>18.6>17.4  Hb:12.8>10.7  Plat: 424>316  Troponin: 148    Absolute Neutrophils:16  Absolute Lymphocytes:0.34  Neutrophil/Lymphocyte Ratio: 53 very high risk    CRP:160-->131-->52.3  Ferritin:804  LDH: 563    Pro Calcitonin:      Cultures:  Urine:  ·   Blood:  ·   Sputum :  ·   Wound:       CXR:     CAT:  1/8/21      Discussed with patient, RN, CC, IM. I have personally reviewed the past medical history, past surgical history, medications, social history, and family history, and I have updated the database accordingly.   Past Medical History:     Past Medical History:   Diagnosis Date    Chronic lower back pain     Cobalamin deficiency     [facility]     Diabetes mellitus ([facility]     Hyperlipidemia     Hypertension     Malignant neoplasm of colon ([facility]     [facility]        Past Surgical  History:     Past Surgical History:   Procedure Laterality Date    ARTERY SURGERY         Medications:      insulin glargine  20 Units SubCUTAneous QAM    senna  2 tablet Oral Nightly    insulin lispro  0-6 Units SubCUTAneous TID WC    insulin lispro  0-3 Units SubCUTAneous Nightly    amiodarone  200 mg Oral Daily    meropenem  500 mg IntraVENous Q12H    metoprolol tartrate  50 mg Oral BID    pantoprazole  40 mg Oral QAM AC    atorvastatin  20 mg Oral Nightly    cilostazol  100 mg Oral BID    clopidogrel  75 mg Oral Daily    vitamin B-12  1,000 mcg Oral Daily    aspirin  81 mg Oral Daily    sodium chloride flush  5-40 mL IntraVENous 2 times per day    dexamethasone  6 mg IntraVENous Q24H       Social History:     Social History     Socioeconomic History    Marital status:      Spouse name: Not on file    Number of children: Not on file    Years of education: Not on file    Highest education level: Not on file   Occupational History    Not on file   Tobacco Use    Smoking status: Former Smoker    Smokeless tobacco: Never Used   Substance and Sexual Activity    Alcohol use: Not Currently    Drug use: Never    Sexual activity: Not on file   Other Topics Concern    Not on file   Social History Narrative    Not on file     Social Determinants of Health     Financial Resource Strain:     Difficulty of Paying Living Expenses: Not on file   Food Insecurity:     Worried About [employer] [employer] in the Last Year: Not on file    Lesley of Food in the Last Year: Not on file   Transportation Needs:     Lack of Transportation (Medical): Not on file    Lack of Transportation (Non-Medical): Not on file   Physical Activity:     Days of Exercise per Week: Not on file    Minutes of Exercise per Session: Not on file   Stress:     Feeling of Stress : Not on file   Social Connections:     Frequency of Communication with Friends and Family: Not on file    Frequency of Social Gatherings with Friends and Family: Not on file    Attends Nondenominational Services: Not on file    Active Member of [address] [place] [address] [employer] or Organizations: Not on file    Attends Club or Organization Meetings: Not on file    Marital Status: Not on file   Intimate Partner Violence:     Fear of Current or Ex-Partner: Not on file    Emotionally Abused: Not on file    Physically Abused: Not on file    Sexually Abused: Not on file   Housing Stability:     Unable to Pay for Housing in the Last Year: Not on file    Number of [place] in the Last Year: Not on file    Unstable Housing in the Last Year: Not on file       Family History:   History reviewed.  No pertinent family history. Allergies:   Patient has no known allergies. Review of Systems:     Review of Systems   Constitutional: Negative. Respiratory: Positive for shortness of breath. Cardiovascular: Negative. Gastrointestinal: Negative. Genitourinary: Negative. Musculoskeletal: Negative. Skin: Positive for color change. Neurological: Negative. Physical Examination :     Patient Vitals for the past 8 hrs:   BP Temp Temp src Pulse Resp SpO2   01/15/22 0842    104 24 100 %   01/15/22 0801 (!) 121/58 98.5 °F (36.9 °C) Oral 112 24 95 %   01/15/22 0458    103 25 100 %   01/15/22 0400 (!) 112/59 97.6 °F (36.4 °C) Axillary 103 22 100 %     Physical Exam  Constitutional:       Appearance: He is well-developed. HENT:      Head: Normocephalic and atraumatic. Cardiovascular:      Rate and Rhythm: Normal rate. Heart sounds: Murmur heard. Pulmonary:      Effort: Pulmonary effort is normal.      Breath sounds: Normal breath sounds. No wheezing. Abdominal:      General: Bowel sounds are normal.      Palpations: Abdomen is soft. There is no mass. Tenderness: There is no abdominal tenderness. Musculoskeletal:         General: Swelling (In the bilateral upper extremities especially in the forearms.) present. Normal range of motion. Cervical back: Neck supple. Lymphadenopathy:      Cervical: No cervical adenopathy. Skin:     General: Skin is dry. Findings: Bruising (There is bruising and ecchymotic skin lesions in the forearms bilaterally right worse than left) present. Neurological:      Mental Status: He is alert and oriented to person, place, and time.          Medical Decision Making -Laboratory:   I have independently reviewed/ordered the following labs:    CBC with Differential:   Recent Labs     01/14/22  0306   WBC 19.8*   HGB 8.9*   HCT 25.7*        BMP:   Recent Labs     01/14/22  0306 01/15/22  0756   * 135   K 4.5 5.1    103   CO2 20 20   BUN 68* 74*   CREATININE 2.04* 2.20*   MG 2.6 2.6     Hepatic Function Panel:   No results for input(s): PROT, LABALBU, BILIDIR, IBILI, BILITOT, ALKPHOS, ALT, AST in the last 72 hours. No results for input(s): RPR in the last 72 hours. No results for input(s): HIV in the last 72 hours. No results for input(s): BC in the last 72 hours. Lab Results   Component Value Date    MUCUS NOT REPORTED 01/09/2022    RBC 2.93 01/14/2022    TRICHOMONAS NOT REPORTED 01/09/2022    WBC 19.8 01/14/2022    YEAST NOT REPORTED 01/09/2022    TURBIDITY Clear 01/09/2022     Lab Results   Component Value Date    CREATININE 2.20 01/15/2022    GLUCOSE 176 01/15/2022       Medical Decision Making-Imaging:     Narrative   EXAMINATION:   CTA OF THE CHEST 1/8/2022 10:10 am       TECHNIQUE:   CTA of the chest was performed after the administration of intravenous   contrast.  Multiplanar reformatted images are provided for review.  MIP   images are provided for review. Dose modulation, iterative reconstruction,   and/or weight based adjustment of the mA/kV was utilized to reduce the   radiation dose to as low as reasonably achievable.       COMPARISON:   None.       HISTORY:   ORDERING SYSTEM PROVIDED HISTORY: dyspnea / hypoxia   Reason for Exam: Shortness of breath       FINDINGS:   Pulmonary Arteries: Pulmonary arteries are adequately opacified for   evaluation.  No evidence of intraluminal filling defect to suggest pulmonary   embolism.  Main pulmonary artery is normal in caliber.       Mediastinum: No evidence of mediastinal lymphadenopathy.  Normal heart size. Normal caliber thoracic aorta with diffuse atherosclerosis.       Lungs/pleura: Extensive ground-glass opacification of the bilateral lung   fields with peripheral involvement slight apical as well as basilar sparing.    No effusion or pneumothorax.       Upper Abdomen: Limited images of the upper abdomen are unremarkable.       Soft Tissues/Bones: No acute bone or soft tissue abnormality.           Impression   *No evidence of pulmonary embolism. *Extensive ground-glass opacification of the bilateral lung fields with   peripheral involvement slight apical as well as basilar sparing.  Imaging   features suggestive of COVID-19 pneumonia, though are nonspecific and can   occur with a variety of infectious and noninfectious processes.           Medical Decision Lzhicd-Usiixhgz-Svaow:     Culture, Blood 1 [[id number]] Collected: 01/09/22 1155   Order Status: Completed Specimen: Blood Updated: 01/14/22 1300    Specimen Description . BLOOD    Special Requests NOT REPORTED    Culture NO GROWTH 5 DAYS   Culture, Blood 1 [[id number]] Collected: 01/09/22 1155   Order Status: Completed Specimen: Blood Updated: 01/14/22 1300    Specimen Description . BLOOD    Special Requests NOT REPORTED    Culture NO GROWTH 5 DAYS   COVID-19, Rapid [[id number]] (Abnormal) Collected: 01/08/22 1045   Order Status: Completed Specimen: Nasopharyngeal Swab Updated: 01/08/22 1109    Specimen Description . NASOPHARYNGEAL SWAB    SARS-CoV-2, Rapid DETECTED Abnormal     Comment:        Rapid NAAT: The specimen is POSITIVE for SARS-Cov-2, the novel coronavirus associated with   COVID-19.         This test has been authorized by the FDA under an Emergency Use Authorization (EUA) for use   by authorized laboratories.         The ID NOW COVID-19 assay is designed to detect the virus that causes COVID-19 in patients   with signs and symptoms of infection who are suspected of COVID-19. An individual without symptoms of COVID-19 and who is not shedding SARS-CoV-2 virus would   expect to have a negative (not detected) result in this assay.    Fact sheet for Healthcare Providers: Cecile   Fact sheet for Patients: [email or web address]           Methodology: Isothermal Nucleic Acid Amplification         Results reported to the appropriate Health Departmen

## 2022-01-16 NOTE — PROCEDURES
PROCEDURE NOTE - CENTRAL VENOUS LINE PLACEMENT    PATIENT NAME: [address] RECORD NO. [id number]  DATE: 1/16/2022  ATTENDING PHYSICIAN: Tno    PREOPERATIVE DIAGNOSIS:  vascular access and poor peripheral access  POSTOPERATIVE DIAGNOSIS:  Same  PROCEDURE PERFORMED:  Right Internal Jugular Vein Central Line Insertion  PERFORMING PHYSICIAN: Caesar Santos MD  ANESTHESIA:  Local utilizing 1% lidocaine  ESTIMATED BLOOD LOSS:  Less than 25 ml  COMPLICATIONS:  None immediately appreciated. DISCUSSION:  Vira Andrews is a [de-identified]y.o.-year-old male who requires central IV access vascular access. The history and physical examination were reviewed and confirmed. CONSENT: Unable to be obtained due to the emergent nature of this procedure. PROCEDURE:  A timeout was initiated by the bedside nurse and was confirmed by those present. The patient was placed in a supine position. The skin overlying the Right Internal Jugular Vein was prepped with chlorhexadine and draped in sterile fashion. The skin was infiltrated with local anesthetic. The vessel and surrounding anatomy was visualized using ultrasound. Through the anesthetized region, the introducer needle was inserted into the internal jugular vein returning dark red non pulsatile blood. A guidewire was placed through the center of the needle with no resistance. Ultrasound confirmed presence of wire in the vein. A small incision made in the skin with a #11 scalpel blade. The dilator was inserted into the skin and vein over guidewire using Seldinger technique. The dilator was then removed and the 7F 20cm catheter was placed in the vein over the guidewire using Seldinger technique. The guidewire was then removed and all ports aspirated and flushed appropriately. The catheter then secured using silk suture and a temporary sterile dressing was applied. No immediate complication was evident.   All sponge, instrument and needle counts were correct at the completion of the procedure. Postprocedural chest x-ray showed good position of the catheter with no evidence of hemothorax or pneumothorax. The patient tolerated the procedure well with no immediate complication evident.      Raz Santiago MD  1:38 AM, 1/16/22

## 2022-01-16 NOTE — PROGRESS NOTES
palpitations  Gastrointestinal:  negative for abdominal pain, constipation, diarrhea, nausea, vomiting  Neurological:  negative for dizziness, headache    Medications: Allergies:  No Known Allergies    Current Meds:   Scheduled Meds:    insulin glargine  20 Units SubCUTAneous QAM    senna  2 tablet Oral Nightly    insulin lispro  0-6 Units SubCUTAneous TID WC    insulin lispro  0-3 Units SubCUTAneous Nightly    amiodarone  200 mg Oral Daily    meropenem  500 mg IntraVENous Q12H    metoprolol tartrate  50 mg Oral BID    pantoprazole  40 mg Oral QAM AC    atorvastatin  20 mg Oral Nightly    cilostazol  100 mg Oral BID    clopidogrel  75 mg Oral Daily    vitamin B-12  1,000 mcg Oral Daily    aspirin  81 mg Oral Daily    sodium chloride flush  5-40 mL IntraVENous 2 times per day    dexamethasone  6 mg IntraVENous Q24H     Continuous Infusions:    sodium chloride      sodium chloride      dextrose      heparin (PORCINE) Infusion 12 Units/kg/hr (01/16/22 0226)    sodium chloride       PRN Meds: sodium chloride, sodium chloride, sodium chloride, LORazepam, glucose, dextrose, glucagon (rDNA), dextrose, metoprolol, heparin (porcine), heparin (porcine), benzonatate, sodium chloride flush, sodium chloride flush, sodium chloride, ondansetron **OR** ondansetron, polyethylene glycol, acetaminophen **OR** acetaminophen    Data:     Past Medical History:   has a past medical history of Chronic lower back pain, Cobalamin deficiency, CVA (cerebral vascular accident) ([facility] [address] Diabetes mellitus ([facility] Hyperlipidemia, Hypertension, Malignant neoplasm of colon ([facility] and PAD (peripheral artery disease) ([facility] Social History:   reports that he has quit smoking. He has never used smokeless tobacco. He reports previous alcohol use. He reports that he does not use drugs. Family History: History reviewed. No pertinent family history.     Vitals:  /75   Pulse 108   Temp 98 °F (36.7 °C) (Axillary)   Resp (!) 34   Ht 5' 10\" (1.778 m)   Wt 148 lb 2.4 oz (67.2 kg)   SpO2 100%   BMI 21.26 kg/m²   Temp (24hrs), Av.4 °F (36.9 °C), Min:98 °F (36.7 °C), Max:98.9 °F (37.2 °C)    Recent Labs     01/15/22  0651 01/15/22  1107 01/15/22  1604 01/15/22  2122   POCGLU 170* 185* 123* 117*       I/O (24Hr): Intake/Output Summary (Last 24 hours) at 2022 1030  Last data filed at 2022 0400  Gross per 24 hour   Intake 200 ml   Output 800 ml   Net -600 ml       Labs:  Hematology:  Recent Labs     22  0306 22  0534   WBC 19.8* 27.8*   RBC 2.93* 1.89*   HGB 8.9* 5.7*   HCT 25.7* 17.5*   MCV 87.7 92.6   MCH 30.4 30.2   MCHC 34.6 32.6   RDW 13.3 14.5*    253   MPV 10.9 11.4     Chemistry:  Recent Labs     22  0306 22  0306 01/15/22  0756 01/15/22  1619 22  0534   *   < > 135 136 136   K 4.5   < > 5.1 4.9 5.0      < > 103 104 102   CO2 20   < > 20 19* 17*   GLUCOSE 129*   < > 176* 132* 161*   BUN 68*   < > 74* 79* 95*   CREATININE 2.04*   < > 2.20* 2.56* 3.21*   MG 2.6  --  2.6  --   --    ANIONGAP 11   < > 12 13 17   LABGLOM 32*   < > 29* 24* 19*   GFRAA 38*   < > 35* 29* 23*   CALCIUM 8.1*   < > 8.0* 7.5* 7.7*    < > = values in this interval not displayed. Recent Labs     22  1605 01/14/22  2028 01/15/22  0651 01/15/22  1107 01/15/22  1604 01/15/22  2122   POCGLU 139* 170* 170* 185* 123* 117*     ABG:  Lab Results   Component Value Date    FIO2 INFORMATION NOT PROVIDED 01/10/2022     Lab Results   Component Value Date/Time    SPECIAL NOT REPORTED 2022 11:55 AM    SPECIAL NOT REPORTED 2022 11:55 AM     Lab Results   Component Value Date/Time    CULTURE NO GROWTH 5 DAYS 2022 11:55 AM    CULTURE NO GROWTH 5 DAYS 2022 11:55 AM       Radiology:  XR CHEST PORTABLE    Result Date: 2022  Patchy bilateral interstitial and ground-glass infiltrates most notably in the lung periphery.   Findings have progressed from the prior exam compatible with patient's history of COVID pneumonia     XR CHEST PORTABLE    Result Date: 1/11/2022  Unchanged bilateral airspace opacities     XR CHEST PORTABLE    Result Date: 1/9/2022  Scattered infiltrates consistent with pneumonia. CT CHEST PULMONARY EMBOLISM W CONTRAST    Result Date: 1/8/2022  *No evidence of pulmonary embolism. *Extensive ground-glass opacification of the bilateral lung fields with peripheral involvement slight apical as well as basilar sparing. Imaging features suggestive of COVID-19 pneumonia, though are nonspecific and can occur with a variety of infectious and noninfectious processes. US RETROPERITONEAL COMPLETE    Result Date: 1/10/2022  The right kidney could not be adequately visualized. Left kidney shows mild increased cortical echogenicity suggesting medical renal disease. No hydronephrosis.        Physical Examination:        General appearance:  alert, cooperative and no distress, nasal bipap on  Mental Status:  oriented to person, place and time and normal affect  Lungs:  clear to auscultation bilaterally, normal effort  Heart:  regular rate and rhythm, no murmur  Abdomen:  soft, nontender, nondistended, normal bowel sounds, no masses, hepatomegaly, splenomegaly  Extremities:  no edema, redness, tenderness in the calves  Skin:  no gross lesions, rashes, induration    Assessment:        Hospital Problems           Last Modified POA    * (Principal) Pneumonia due to COVID-19 virus 1/8/2022 Yes    Essential hypertension 1/8/2022 Yes    Hyperlipidemia 1/8/2022 Yes    Acute hypoxemic respiratory failure due to COVID-19 ([address] 1/8/2022 Yes    Acute kidney injury superimposed on chronic kidney disease ([address] baseline creatinine 1.9 1/8/2022 Yes    Stage 3b chronic kidney disease ([address] 1/8/2022 Yes    Elevated troponin 1/9/2022 Yes    Nonrheumatic aortic valve stenosis 1/9/2022 Yes    PAD (peripheral artery disease) ([address] 1/9/2022 Yes    Moderate protein-calorie malnutrition ([address] 1/9/2022 Yes Atrial fibrillation with RVR ([facility] [address] 1/10/2022 Yes          Plan:        Hgb drop today 5.7  Baseline around 10-12  Stop heparin and hold indef  Stat CT abd pelvis no contrast  Uremia can be explained by active bleeding  No evidence of UGIB or LGIB   Newly discoevered L. Gluteal abscess  He has minimal bruising around the left gluteus haider  This has been outlined with marker  PLEASE CHECK DAILY TO SEE IF SIZE CHANGE  If worsening can consult gen surg  H&H 3 PM  3rd unit can be given  Discussion with NP from GI possible endoleak? Will attempt conservative measures, and go from there    1. Acute hypoxemic respiratory failure on nasal BiPAP  COVID-19 positive test (U07.1, COVID-19) with Acute Pneumonia (J12.89, Other viral pneumonia)  (If respiratory failure or sepsis present, add as separate assessment)    ID following    3. A. fib with RVR has resolved oral amiodarone  4. Severe aortic stenosis follow-up with cardiology outpatient or may undergo ischemic work-up once he is convalescent  5. Metabolic acidosis improving bicarb drip has been discontinued by nephrology  6. Renal diet  7. Full code  8. currently on heparin drip for A. Fib  9.  GI prophylaxis Pepcid  10.   Lopressor as needed for tachycardia heart rate greater than 120    Full Code    Jim Nixon MD  1/16/2022  10:30 AM

## 2022-01-17 NOTE — PLAN OF CARE
Problem: Falls - Risk of:  Goal: Will remain free from falls  Description: Will remain free from falls  1/17/2022 1116 by Suri Arcos RN  Outcome: Ongoing  1/16/2022 2315 by Jennifer De La Garza RN  Outcome: Ongoing  Goal: Absence of physical injury  Description: Absence of physical injury  1/17/2022 1116 by Suri Arcos RN  Outcome: Ongoing  1/16/2022 2315 by Jennifer De La Garza RN  Outcome: Ongoing     Problem: Airway Clearance - Ineffective  Goal: Achieve or maintain patent airway  1/17/2022 1116 by Suri Arcos RN  Outcome: Ongoing  1/16/2022 2315 by Jennifer De La Garza RN  Outcome: Ongoing     Problem: Gas Exchange - Impaired  Goal: Absence of hypoxia  1/17/2022 1116 by Suri Arcos RN  Outcome: Ongoing  1/16/2022 2315 by Jennifer De La Garza RN  Outcome: Ongoing  Goal: Promote optimal lung function  1/17/2022 1116 by Suri Arcos RN  Outcome: Ongoing  1/16/2022 2315 by Jennifer De La Garza RN  Outcome: Ongoing     Problem: Breathing Pattern - Ineffective  Goal: Ability to achieve and maintain a regular respiratory rate  1/17/2022 1116 by Suri Arcos RN  Outcome: Ongoing  1/16/2022 2315 by Jennifer De La Garza RN  Outcome: Ongoing     Problem:  Body Temperature -  Risk of, Imbalanced  Goal: Ability to maintain a body temperature within defined limits  1/17/2022 1116 by Suri Arcos RN  Outcome: Ongoing  1/16/2022 2315 by Jennifer De La Garza RN  Outcome: Ongoing  Goal: Will regain or maintain usual level of consciousness  1/17/2022 1116 by Suri Arcos RN  Outcome: Ongoing  1/16/2022 2315 by Jennifer De La Garza RN  Outcome: Ongoing  Goal: Complications related to the disease process, condition or treatment will be avoided or minimized  1/17/2022 1116 by Suri Arcos RN  Outcome: Ongoing  1/16/2022 2315 by Jennifer De La Garza RN  Outcome: Ongoing     Problem: Isolation Precautions - Risk of Spread of Infection  Goal: Prevent transmission of infection  1/17/2022 1116 by Suri Arcos RN  Outcome: Ongoing  1/16/2022 2315 by Jennifer De La Garza RN  Outcome: Ongoing     Problem: Nutrition Deficits  Goal: Optimize nutritional status  1/17/2022 1116 by Salinas Chopra RN  Outcome: Ongoing  1/16/2022 2315 by Rupa Negron RN  Outcome: Ongoing     Problem: Risk for Fluid Volume Deficit  Goal: Maintain normal heart rhythm  1/17/2022 1116 by Salinas Chopra RN  Outcome: Ongoing  1/16/2022 2315 by Rupa Negron RN  Outcome: Ongoing  Goal: Maintain absence of muscle cramping  1/17/2022 1116 by Salinas Chopra RN  Outcome: Ongoing  1/16/2022 2315 by Rupa Negron RN  Outcome: Ongoing  Goal: Maintain normal serum potassium, sodium, calcium, phosphorus, and pH  1/17/2022 1116 by Salinas Chopra RN  Outcome: Ongoing  1/16/2022 2315 by Rupa Negron RN  Outcome: Ongoing     Problem: Loneliness or Risk for Loneliness  Goal: Demonstrate positive use of time alone when socialization is not possible  1/17/2022 1116 by Salinas Chopra RN  Outcome: Ongoing  1/16/2022 2315 by Rupa Negron RN  Outcome: Ongoing     Problem: Fatigue  Goal: Verbalize increase energy and improved vitality  1/17/2022 1116 by Salinas Chopra RN  Outcome: Ongoing  1/16/2022 2315 by Rupa Negron RN  Outcome: Ongoing     Problem: Patient Education: Go to Patient Education Activity  Goal: Patient/Family Education  1/17/2022 1116 by Salinas Chopra RN  Outcome: Ongoing  1/16/2022 2315 by Rupa Negron RN  Outcome: Ongoing     Problem: Skin Integrity:  Goal: Will show no infection signs and symptoms  Description: Will show no infection signs and symptoms  1/17/2022 1116 by Salinas Chopra RN  Outcome: Ongoing  1/16/2022 2315 by Rupa Negron RN  Outcome: Ongoing  Goal: Absence of new skin breakdown  Description: Absence of new skin breakdown  1/17/2022 1116 by Salinas Chopra RN  Outcome: Ongoing  1/16/2022 2315 by Rupa Negron RN  Outcome: Ongoing

## 2022-01-17 NOTE — PROGRESS NOTES
Physician Progress Note      PATIENT:               Lorraine Santo  CSN #:                  [id number]  :                       1940  ADMIT DATE:       2022 9:49 AM  DISCH DATE:  RESPONDING  PROVIDER #:        Cipriano Porter MD          QUERY TEXT:    Pt admitted with Covid pneumonia. Pt noted to have new gluteal abscess  PN   with HMG 8.9>5.7. If possible, please document in the progress notes and   discharge summary if you are evaluating and/or treating any of the following: The medical record reflects the following:  Risk Factors: COVID Pneumonia, Malnutrition, Sepsis, JULES with tubular necrosis  Clinical Indicators: Per IM PN  Hgb drop today 5.7 from 8.9  Baseline   around 10-12 Stat CT abd pelvis no contrast. Uremia can be explained by active   bleeding  No evidence of UGIB or LGIB Newly discovered L. Gluteal abscess He has minimal   bruising around the left gluteus haider. PN ID  Left gluteal hematoma. CT Abd   Left gluteal hematoma, partially visualized on this study without   obvious active bleeding, but limited without IV contrast.  No retroperitoneal   or rectus sheath bleed. Treatment :Labs, CT Abd, monitor, Stop Heparin,  IV bolus 500cc 1/15 followed   by continuous 100cc/Hr >45 NS  Options provided:  -- Acute blood loss anemia  -- Anemia of chronic disease due to CKD  -- Anemia due to new left gluteal abscess  -- Anemia due to hematoma left gluteal area  -- Dilutional anemia  -- Precipitous drop in Hemoglobin and Hematocrit  -- Other - I will add my own diagnosis  -- Disagree - Not applicable / Not valid  -- Disagree - Clinically unable to determine / Unknown  -- Refer to Clinical Documentation Reviewer    PROVIDER RESPONSE TEXT:    This patient has acute blood loss anemia. Query created by:  Virl Bio on 2022 4:05 PM      Electronically signed by:  Cipriano Porter MD 2022 4:12 PM

## 2022-01-17 NOTE — PROGRESS NOTES
Comprehensive Nutrition Assessment    Type and Reason for Visit:  Reassess    Nutrition Recommendations/Plan: Continue current diet. Will provide Glucerna shakes with all meals. Encourage/monitor intakes as tolerated. Provide/offer additional ONS as needed. Will monitor labs, weights, and plan of care. Nutrition Assessment:  Pt does not like the food here and has been eating poorly. RN reports she has been able to encourage pt to drink juices and Glucerna shakes. Will provide Glucerna shakes with all meals. Pt reports having a poor appetite. Noted pt with a left gluteal hematoma present on CT. Last BM 1/16. Labs reviewed: Glucose  mg/dL,  mg/dL, CR 3.69 mg/dL. Meds reviewed. Malnutrition Assessment:  Malnutrition Status:  Insufficient data    Context:  Acute Illness     Findings of the 6 clinical characteristics of malnutrition:  Energy Intake:  7 - 50% or less of estimated energy requirements for 5 or more days  Weight Loss:  Unable to assess - No weight history available per chart review. Body Fat Loss:  Unable to assess - Unable to see pt in room. Muscle Mass Loss:  Unable to assess  Fluid Accumulation:  1 - Mild (to Severe) Extremities,Generalized   Strength:  Not Performed    Estimated Daily Nutrient Needs:  Energy (kcal):  25-28 kcal/kg = 6491-8729 kcals/day; Weight Used for Energy Requirements:  Current     Protein (g):  1.2-1.5 gm /kg =  gm pro/day; Weight Used for Protein Requirements:  Current     Fluid (ml/day):  25 mL/kg = 1700 mL/day or per MD; Method Used for Fluid Requirements:  ml/Kg      Nutrition Related Findings:  Labs/Meds reviewed. Hypoactive bowel sounds. Last BM 1/16. Wounds:   (Left gluteal hematoma.)       Current Nutrition Therapies:    ADULT DIET;  Regular  ADULT ORAL NUTRITION SUPPLEMENT; Breakfast, Dinner; Diabetic Oral Supplement    Anthropometric Measures:  · Height: 5' 10\" (177.8 cm)  · Current Body Weight: 148 lb 2.4 oz (67.2 kg)   · Admission Body Weight: 148 lb 2.4 oz (67.2 kg)    · Usual Body Weight: 150 lb (68 kg)     · Ideal Body Weight: 166 lbs; % Ideal Body Weight 89.2 %   · BMI: 21.3  · BMI Categories: Normal Weight (BMI 18.5-24. 9)       Nutrition Diagnosis:   · Inadequate oral intake related to  (decreased appetite; dislike of foods avaliable) as evidenced by intake 0-25%,intake 26-50%,poor intake prior to admission (variable PO intakes; need for ONS)    Nutrition Interventions:   Food and/or Nutrient Delivery:  Continue Current Diet,Modify Oral Nutrition Supplement (Provide Glucerna ONS with all meals.)  Nutrition Education/Counseling:  Education not indicated   Coordination of Nutrition Care:  Continue to monitor while inpatient    Goals:  Oral intakes to meet at least 75% of estimated nutrition needs. Nutrition Monitoring and Evaluation:   Behavioral-Environmental Outcomes:  None Identified   Food/Nutrient Intake Outcomes:  Food and Nutrient Intake,Supplement Intake  Physical Signs/Symptoms Outcomes:  Biochemical Data,GI Status,Fluid Status or Edema,Hemodynamic Status,Nutrition Focused Physical Findings,Skin,Weight     Discharge Planning:     Too soon to determine     Electronically signed by Donta Earl RD, LD on 1/17/22 at 3:34 PM EST    Contact: [phone]

## 2022-01-17 NOTE — PROGRESS NOTES
[facility]  Office: [address] [facility], , Kay Black, DO, Maykel Freeman, DO, Rubi Moon, DO, Angela Dooley MD, Jackie Aguiar MD, Ronna Lipscomb MD, Kenny Mcarthur MD, Hillary Parrish MD, Jessica Christie MD, Lana Frazier MD, Dillon Mclean, DO, Connie Red DO, Reshma Lindsay MD,  Johnathan Qureshi DO, Vertell Moritz, MD, Taylor Chicas MD, Beryl Pulido MD, Ronal Reina MD, Anaid Aguilera MD, Jamey Son MD, Teodoro Hart MD, Anni Kemp, [facility], [facility], CNP, Eduardo Ferreira, CNP, Tatiana Ulloa, CNS, [employer], Frutoso Barban, CNP, Jamie Fresh, CNP, Bo Trotter, CNP, Dominique Moreno, CNP, Rachele Horne PA-C, Salvador Albrecht, [facility], Alpesh Kirkpatrick, [facility], Mau Cutler, CNP, Angelique Yan, CNP, Dawayne Oppenheim, CNP, Deena Jeff, CNP, Pauline Mata, [facility], [employer], [address] [place] [address]    Progress Note    1/17/2022    12:01 PM    Name:   Eufemia Edmondson  MRN:     [id number]     Acct:      [de-identified]   Room:   [address] [place] [address] Day:  9  Admit Date:  1/8/2022  9:49 AM    PCP:   MOHAMUD Hickman CNP  Code Status:  Full Code    Subjective:     C/C:   Chief Complaint   Patient presents with    Shortness of Breath    Nausea & Vomiting     Interval History Status: not changed. Patient seen and examined. Decreased amounts of Fi02 onhigh flow. Sitting in chair. Seems a little frustrated at the lack of progress. Has diffuse bruising on his arms bilaterally. Craetinine elevated. Appetitie decresaed    Brief History:     81-year-old male past medical history hypertension, hyperlipidemia, CKD stage IIIb, aortic valve stenosis, peripheral arterial disease presents with shortness of breath found to be in acute respiratory failure secondary to COVID-19 as well as A. fib with RVR. Patient being followed by cardiology, infectious disease, critical care, nephrology was also seen the patient.   Patient was originally started on heparin drip for A. fib with RVR however was discontinued due to acute blood loss anemia and bilateral hematomas on both upper extremities. Patient required 2 units of red blood cells on 1/16/2022. Patient was also evaluated by nephrology due to JULES on CKD stage III which improved with IV hydration. For COVID-19 patient status post Actemra on/8/2022, and Decadron 1/8/2022. Patient continues on high flow. Patient originally did change his CODE STATUS from DNR CCA to full code on 11/13/2022 and reverted back to DNR CCA on 11/17/2022. Review of Systems:     Constitutional: Positive for fatigue negative for chills, fevers, sweats  Respiratory: Positive for cough, positive shortness of breath, denies wheezing   Cardiovascular:  negative for chest pain, chest pressure/discomfort, lower extremity edema, palpitations  Gastrointestinal:  negative for abdominal pain, constipation, diarrhea, nausea, vomiting  Neurological:  negative for dizziness, headache    Medications:      Allergies:  No Known Allergies    Current Meds:   Scheduled Meds:    sodium bicarbonate  1,300 mg Oral BID    [Held by provider] insulin glargine  20 Units SubCUTAneous QAM    senna  2 tablet Oral Nightly    insulin lispro  0-6 Units SubCUTAneous TID WC    insulin lispro  0-3 Units SubCUTAneous Nightly    amiodarone  200 mg Oral Daily    metoprolol tartrate  50 mg Oral BID    pantoprazole  40 mg Oral QAM AC    atorvastatin  20 mg Oral Nightly    clopidogrel  75 mg Oral Daily    vitamin B-12  1,000 mcg Oral Daily    aspirin  81 mg Oral Daily    sodium chloride flush  5-40 mL IntraVENous 2 times per day     Continuous Infusions:    sodium chloride 100 mL/hr at 01/17/22 1159    sodium chloride      sodium chloride      sodium chloride      dextrose      sodium chloride       PRN Meds: sodium chloride, sodium chloride, sodium chloride, sodium chloride, LORazepam, glucose, dextrose, glucagon (rDNA), dextrose, metoprolol, benzonatate, sodium chloride flush, sodium chloride flush, sodium chloride, ondansetron **OR** ondansetron, polyethylene glycol, acetaminophen **OR** acetaminophen    Data:     Past Medical History:   has a past medical history of Chronic lower back pain, Cobalamin deficiency, CVA (cerebral vascular accident) ([facility] Diabetes mellitus ([facility] Hyperlipidemia, Hypertension, Malignant neoplasm of colon ([facility] and PAD (peripheral artery disease) ([facility] Social History:   reports that he has quit smoking. He has never used smokeless tobacco. He reports previous alcohol use. He reports that he does not use drugs. Family History: History reviewed. No pertinent family history. Vitals:  BP (!) 129/56   Pulse 99   Temp 97.9 °F (36.6 °C) (Oral)   Resp 20   Ht 5' 10\" (1.778 m)   Wt 148 lb 2.4 oz (67.2 kg)   SpO2 97%   BMI 21.26 kg/m²   Temp (24hrs), Av.1 °F (36.7 °C), Min:96 °F (35.6 °C), Max:98.9 °F (37.2 °C)    Recent Labs     22  0645 22  0707 22  0740 22  1104   POCGLU 36* 63* 131* 230*       I/O (24Hr):     Intake/Output Summary (Last 24 hours) at 2022 1201  Last data filed at 2022 0400  Gross per 24 hour   Intake 2198 ml   Output 700 ml   Net 1498 ml       Labs:  Hematology:  Recent Labs     22  0534 22  1430 22  0634   WBC 27.8* 30.5* 31.9*   RBC 1.89* 2.83* 2.81*   HGB 5.7* 8.3* 8.9*   HCT 17.5* 24.4* 24.0*   MCV 92.6 86.2 85.4   MCH 30.2 29.3 31.7   MCHC 32.6 34.0 37.1*   RDW 14.5* 15.2* 16.1*    211 153   MPV 11.4 11.1 11.4     Chemistry:  Recent Labs     01/15/22  0756 01/15/22  0756 01/15/22  1619 22  0534 22  0634      < > 136 136 135   K 5.1   < > 4.9 5.0 5.3      < > 104 102 102   CO2 20   < > 19* 17* 15*   GLUCOSE 176*   < > 132* 161* 89   BUN 74*   < > 79* 95* 101*   CREATININE 2.20*   < > 2.56* 3.21* 3.69*   MG 2.6  --   --   --   --    ANIONGAP 12   < > 13 17 18*   LABGLOM 29*   < > 24* 19* 16* GFRAA 35*   < > 29* 23* 19*   CALCIUM 8.0*   < > 7.5* 7.7* 7.5*    < > = values in this interval not displayed. Recent Labs     01/16/22  1609 01/16/22  1959 01/17/22  0645 01/17/22  0707 01/17/22  0740 01/17/22  1104   POCGLU 91 73* 36* 63* 131* 230*     ABG:  Lab Results   Component Value Date    FIO2 INFORMATION NOT PROVIDED 01/10/2022     Lab Results   Component Value Date/Time    SPECIAL NOT REPORTED 01/09/2022 11:55 AM    SPECIAL NOT REPORTED 01/09/2022 11:55 AM     Lab Results   Component Value Date/Time    CULTURE NO GROWTH 5 DAYS 01/09/2022 11:55 AM    CULTURE NO GROWTH 5 DAYS 01/09/2022 11:55 AM       Radiology:  CT ABDOMEN PELVIS WO CONTRAST Additional Contrast? None    Result Date: 1/16/2022  Left gluteal hematoma, partially visualized on this study without obvious active bleeding, but limited without IV contrast.  No retroperitoneal or rectus sheath bleed. Extensive findings in the visualized lungs compatible with known COVID-19 pneumonia; denser GGOs suggest worsening pneumonia/pneumonitis or developing consolidation. No pneumothorax. Multiple additional findings, as detailed in the body of the report above. RECOMMENDATIONS: Consider follow-up CT pelvis including the upper thighs, if the patient does not respond to resuscitation with blood products/fluids and other conservative measures including localized pressure. Findings were discussed with Adan Wilson at 12:24 pm on 1/16/2022. XR CHEST PORTABLE    Result Date: 1/16/2022  Right IJ line in satisfactory position. Unchanged or slightly worse bilateral diffuse infiltrates consistent with the patient's history of COVID pneumonia. XR CHEST PORTABLE    Result Date: 1/14/2022  Patchy bilateral interstitial and ground-glass infiltrates most notably in the lung periphery.   Findings have progressed from the prior exam compatible with patient's history of COVID pneumonia     XR CHEST PORTABLE    Result Date: 1/11/2022  Unchanged bilateral airspace opacities       Physical Examination:        General appearance:  alert, cooperative chronically ill appearing, on high flow nasal cannula  Mental Status:  oriented to person, place and time and normal affect  Lungs:  Decreased bilaterally, no wheezing  Heart:  regular rate and rhythm, no murmur  Abdomen:  soft, nontender, nondistended, normal bowel sounds  Extremities: erythema on biltarel upper extermity, pulses in radial arteries bilaterally, no pitting edema apprciaetd in upper or lower extremities  Skin:  Bilateral echymosis on upper extremities    Assessment:        Hospital Problems           Last Modified POA    * (Principal) Pneumonia due to COVID-19 virus 1/8/2022 Yes    Essential hypertension 1/8/2022 Yes    Hyperlipidemia 1/8/2022 Yes    Acute hypoxemic respiratory failure due to COVID-19 ([address] 1/8/2022 Yes    Acute kidney injury superimposed on chronic kidney disease ([address] baseline creatinine 1.9 1/8/2022 Yes    Stage 3b chronic kidney disease ([address] 1/8/2022 Yes    Elevated troponin 1/9/2022 Yes    Nonrheumatic aortic valve stenosis 1/9/2022 Yes    PAD (peripheral artery disease) ([address] 1/9/2022 Yes    Moderate protein-calorie malnutrition ([address] 1/9/2022 Yes    Atrial fibrillation with RVR ([address] 1/10/2022 Yes          Plan:        1. Sepsis with acute respiratory failure due to COVID-19 pneumonia s/p Actemra 1/8/2022, Continue Decadron 6 mg daily IV for 10 days, wean down high flow as tolerating  2. Atrial fibrillation with RVR, with HTN and aortic stenosis, HLDstarted on amiodarone drip by cardiology transitioned to PO, lipitor, heaprin drip stoppe ddue to acute blodo loss anemia from gernealized bruising and left gluteal hematoma  3. JULES on CKD with metabolic acidosis likely secondary to poor oral intake, gentle iv hdration, monitor BUN and creatinine, if not improving may need to reconsult nephrology  4. Diabetes. Hold lantus due to poor oral intake and increased in creatinine  5.  Acute blood loss anemia status post 2 units rbc 1/16/2022. Hold heparin drip  6. Peripheral arterial diseasePlavix  7. Guarded prognosis  8. Encouraged patient to increase PO itake  9. Discussed with patient about code status again. Discussed full code, DNR-CCA, DNR-CC. Wants to be DNR-CCA.  Updated in epic an will revert back to prior code      Brooklynn Valdez MD  1/17/2022  12:01 PM

## 2022-01-17 NOTE — PROGRESS NOTES
[facility] [address] [address] 19 Patient  Today's Date and Time: 1/17/2022, 9:40 AM    Impression :     · COVID 19 Confirmed Infection  · Covid tests:  · 1/8/21: Positive  · Acute hypoxic respiratory failure  · Paroxysmal A. 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 · He is insisting on trying to go home and does not quite understand that he is requiring a significant amount of respiratory support. · He did mention to me that he was happy to die at home but it is my understanding that the recently changed his CODE STATUS to comfort care arrest to full code.   · Continue supportive care      Medical Decision Making/Summary/Discussion:1/17/2022     · Patient admitted with COVID 19 infection    Infection Control Recommendations   · Universal Precautions  · Airborne isolation  · Droplet Isolation    Antimicrobial Stewardship Recommendations       · Renal considerations  Coordination of Outpatient Care:   · Estimated Length of IV antimicrobials:TBD  · Patient will need Midline Catheter Insertion: TBD  · Patient will need PICC line Insertion: No  · Patient will need: Home IV , [place],  SNF,  LTAC:TBD  · Patient will need outpatient wound care:No    Chief complaint/reason for consultation:   · Concern for COVID infection      History of Present Illness:   Catherine Dudley is a [de-identified]y.o.-year-old male who was initially admitted on 1/8/2022. Patient seen at the request of Dr. Glory Blake:    Patient presented through ER with complaints of needing shortness of breath, cough, loss of appetite, nausea, vomiting and diarrhea over the past 7 to 10 days. He has not received the Covid vaccine and tested positive for Covid shortly after [place]. On evaluation in the ED, the patient had an SPO2 of 80% on room air and was tachypneic. A rapid Covid swab was positive. CT chest shows extensive bilateral pulmonary groundglass opacities. He was started on Decadron and given a dose of Actemra. Abnormal labs include:  · Creatinine 2.32  ·   · Troponin I 48  · WBC 17.1    Patient admitted because of concerns with COVID 19. Meropenem initiated on 1/11/2022 for worsening respiratory distress and leukocytosis    Stable chest x-ray 1/11    CRP is trending down      CURRENT EVALUATION : 1/17/2022    Afebrile  VS stable    The patient is seen and evaluated at bedside he is awake and alert but currently requiring high flow at 35 L and 60%.     Worsening leukocytosis  JULES continues    Urine and blood cultures ordered    Hemoglobin dropped to 5.7 on 1/16/2022  Hemoglobin remained stable at this time after receiving infusions of PRBC  Left gluteal hematoma present on CT abdomen pelvis       Impression CT abdomen/pelvis 1/16/21   Left gluteal hematoma, partially visualized on this study without obvious   active bleeding, but limited without IV contrast.  No retroperitoneal or   rectus sheath bleed.       Extensive findings in the visualized lungs compatible with known COVID-19   pneumonia; denser GGOs suggest worsening pneumonia/pneumonitis or developing   consolidation.  No pneumothorax.       Multiple additional findings, as detailed in the body of the report above.       RECOMMENDATIONS:   Consider follow-up CT pelvis including the upper thighs, if the patient does   not respond to resuscitation with blood products/fluids and other   conservative measures including localized pressure     Discussed with RN    Patient exhibiting respiratory distress. Yes  Respiratory secretions: No    Patient receiving supplemental oxygen. BiPAP  RR: 20  02 sat: 98    % FIO2: 60  PEEP:      QTc:       NEWS Score: 0-4 Low risk group; 5-6: Medium risk group; 7 or above: High risk group  Parameters 3 2 1 0 1 2 3   Age    < 65   ? 65   RR ? 8  9-11 12-20  21-24 ? 25   O2 Sats ? 91 92-93 94-95 ? 96      Suppl O2  Yes  No      SBP ? 90  101-110 111-219   ? 220   HR ? 40  41-50 51-90  111-130 ? 131   Consciousness    Alert   Drowsiness, lethargy, or confusion   Temperature ? 35.0 C (95.0 F)  35.1-36.0 C 95.1-96.9 F 36.1-38.0 C 97.0-100.4 F 38.1-39.0 C 100.5-102.3 F ? 39.1 C ? 102.4 F      NEWS Score:   1/9/2022: 5 moderate risk    Overall Daily Picture:      worsened    Presence of secondary bacterial Infection:    Possible  Additional antibiotics: Meropenem 1/11/2022    Labs, X rays reviewed: 1/17/2022    BUN:101  Cr:3.69    WBC:27.8-->30.5-->31.9  Hb:8.9-->5.7-->8.3-->8.9  Plat: 153    Absolute Neutrophils:16  Absolute Lymphocytes:0.34  Neutrophil/Lymphocyte Ratio: 53 very high risk    CRP:160-->131-->52.3  Ferritin:804  LDH: 563    Pro Calcitonin:      Cultures:  Urine:  ·   Blood:  ·   Sputum :  ·   Wound:       CXR:     CAT:  1/8/21      Discussed with patient, RN, CC, IM. I have personally reviewed the past medical history, past surgical history, medications, social history, and family history, and I have updated the database accordingly.   Past Medical History:     Past Medical History:   Diagnosis Date    Chronic lower back pain     Cobalamin deficiency     [facility] [address] [facility]     Diabetes mellitus ([facility] [facility]     Hyperlipidemia     Hypertension     Malignant neoplasm of colon ([facility] [address]     PAD (peripheral artery disease) ([address]        Past Surgical  History:     Past Surgical History: Procedure Laterality Date    ARTERY SURGERY         Medications:      [Held by provider] insulin glargine  20 Units SubCUTAneous QAM    senna  2 tablet Oral Nightly    insulin lispro  0-6 Units SubCUTAneous TID WC    insulin lispro  0-3 Units SubCUTAneous Nightly    amiodarone  200 mg Oral Daily    meropenem  500 mg IntraVENous Q12H    metoprolol tartrate  50 mg Oral BID    pantoprazole  40 mg Oral QAM AC    atorvastatin  20 mg Oral Nightly    cilostazol  100 mg Oral BID    clopidogrel  75 mg Oral Daily    vitamin B-12  1,000 mcg Oral Daily    aspirin  81 mg Oral Daily    sodium chloride flush  5-40 mL IntraVENous 2 times per day       Social History:     Social History     Socioeconomic History    Marital status:      Spouse name: Not on file    Number of children: Not on file    Years of education: Not on file    Highest education level: Not on file   Occupational History    Not on file   Tobacco Use    Smoking status: Former Smoker    Smokeless tobacco: Never Used   Substance and Sexual Activity    Alcohol use: Not Currently    Drug use: Never    Sexual activity: Not on file   Other Topics Concern    Not on file   Social History Narrative    Not on file     Social Determinants of Health     Financial Resource Strain:     Difficulty of Paying Living Expenses: Not on file   Food Insecurity:     Worried About Running Out of Food in the Last Year: Not on file    Lesley of Food in the Last Year: Not on file   Transportation Needs:     Lack of Transportation (Medical): Not on file    Lack of Transportation (Non-Medical):  Not on file   Physical Activity:     Days of Exercise per Week: Not on file    Minutes of Exercise per Session: Not on file   Stress:     Feeling of Stress : Not on file   Social Connections:     Frequency of Communication with Friends and Family: Not on file    Frequency of Social Gatherings with Friends and Family: Not on file    Attends [employer] Services: Not on file    Active Member of Clubs or Organizations: Not on file    Attends Club or Organization Meetings: Not on file    Marital Status: Not on file   Intimate Partner Violence:     Fear of Current or Ex-Partner: Not on file    Emotionally Abused: Not on file    Physically Abused: Not on file    Sexually Abused: Not on file   Housing Stability:     Unable to Pay for Housing in the Last Year: Not on file    Number of [place] in the Last Year: Not on file    Unstable Housing in the Last Year: Not on file       Family History:   History reviewed. No pertinent family history. Allergies:   Patient has no known allergies. Review of Systems:     Review of Systems   Constitutional: Negative. Respiratory: Positive for shortness of breath. Cardiovascular: Negative. Gastrointestinal: Negative. Genitourinary: Negative. Musculoskeletal: Negative. Skin: Positive for color change. Neurological: Negative. Physical Examination :     Patient Vitals for the past 8 hrs:   BP Temp Temp src Pulse Resp SpO2   01/17/22 0400 (!) 122/58 98.7 °F (37.1 °C) Axillary 91 20 98 %     Physical Exam  Constitutional:       Appearance: He is well-developed. HENT:      Head: Normocephalic and atraumatic. Cardiovascular:      Rate and Rhythm: Normal rate. Heart sounds: Murmur heard. Pulmonary:      Effort: Pulmonary effort is normal.      Breath sounds: Normal breath sounds. No wheezing. Abdominal:      General: Bowel sounds are normal.      Palpations: Abdomen is soft. There is no mass. Tenderness: There is no abdominal tenderness. Musculoskeletal:         General: Swelling (In the bilateral upper extremities especially in the forearms.) present. Normal range of motion. Cervical back: Neck supple. Lymphadenopathy:      Cervical: No cervical adenopathy. Skin:     General: Skin is dry.       Findings: Bruising (There is bruising and ecchymotic skin lesions in the forearms bilaterally right worse than left) present. Neurological:      Mental Status: He is alert and oriented to person, place, and time. Medical Decision Making -Laboratory:   I have independently reviewed/ordered the following labs:    CBC with Differential:   Recent Labs     01/16/22  1430 01/17/22  0634   WBC 30.5* 31.9*   HGB 8.3* 8.9*   HCT 24.4* 24.0*    153   LYMPHOPCT  --  2*   MONOPCT  --  2*     BMP:   Recent Labs     01/15/22  0756 01/15/22  1619 01/16/22  0534 01/17/22  0634      < > 136 135   K 5.1   < > 5.0 5.3      < > 102 102   CO2 20   < > 17* 15*   BUN 74*   < > 95* 101*   CREATININE 2.20*   < > 3.21* 3.69*   MG 2.6  --   --   --     < > = values in this interval not displayed. Hepatic Function Panel:   No results for input(s): PROT, LABALBU, BILIDIR, IBILI, BILITOT, ALKPHOS, ALT, AST in the last 72 hours. No results for input(s): RPR in the last 72 hours. No results for input(s): HIV in the last 72 hours. No results for input(s): BC in the last 72 hours. Lab Results   Component Value Date    MUCUS NOT REPORTED 01/09/2022    RBC 2.81 01/17/2022    TRICHOMONAS NOT REPORTED 01/09/2022    WBC 31.9 01/17/2022    YEAST NOT REPORTED 01/09/2022    TURBIDITY Clear 01/09/2022     Lab Results   Component Value Date    CREATININE 3.69 01/17/2022    GLUCOSE 89 01/17/2022       Medical Decision Making-Imaging:     Narrative   EXAMINATION:   CTA OF THE CHEST 1/8/2022 10:10 am       TECHNIQUE:   CTA of the chest was performed after the administration of intravenous   contrast.  Multiplanar reformatted images are provided for review.  MIP   images are provided for review.  Dose modulation, iterative reconstruction,   and/or weight based adjustment of the mA/kV was utilized to reduce the   radiation dose to as low as reasonably achievable.       COMPARISON:   None.       HISTORY:   ORDERING SYSTEM PROVIDED HISTORY: dyspnea / hypoxia   Reason for Exam: Shortness of breath       FINDINGS:   Pulmonary Arteries: Pulmonary arteries are adequately opacified for   evaluation.  No evidence of intraluminal filling defect to suggest pulmonary   embolism.  Main pulmonary artery is normal in caliber.       Mediastinum: No evidence of mediastinal lymphadenopathy.  Normal heart size. Normal caliber thoracic aorta with diffuse atherosclerosis.       Lungs/pleura: Extensive ground-glass opacification of the bilateral lung   fields with peripheral involvement slight apical as well as basilar sparing. No effusion or pneumothorax.       Upper Abdomen: Limited images of the upper abdomen are unremarkable.       Soft Tissues/Bones: No acute bone or soft tissue abnormality.           Impression   *No evidence of pulmonary embolism. *Extensive ground-glass opacification of the bilateral lung fields with   peripheral involvement slight apical as well as basilar sparing.  Imaging   features suggestive of COVID-19 pneumonia, though are nonspecific and can   occur with a variety of infectious and noninfectious processes.         Narrative   EXAMINATION:   CT OF THE ABDOMEN AND PELVIS WITHOUT CONTRAST, 1/16/2022 10:42 am       TECHNIQUE:   CT of the abdomen and pelvis was performed without the administration of   intravenous contrast. Multiplanar reformatted images are provided for review.    Dose modulation, iterative reconstruction, and/or weight based adjustment of   the mA/kV was utilized to reduce the radiation dose to as low as reasonably   achievable.       COMPARISON:   CT of the chest from 01/08/2022, and retroperitoneal ultrasound from   01/10/2022.       HISTORY:   ORDERING SYSTEM PROVIDED HISTORY:  Retrop bleed r/o   TECHNOLOGIST PROVIDED HISTORY:   Retrop bleed r/o   Reason for Exam:  Retrop bleed r/o       FINDINGS:   Lower Chest: As demonstrated on recent CT chest, extensive and somewhat   denser confluent ground-glass airspace opacities re-identified mid-lower   lungs, sparing the bases with some associated crazy paving, air bronchograms   and bibasilar atelectatic appearing changes.  Main pulmonary artery caliber   31 mm.  Mild dilatation ascending aorta, with a maximal dimension of 41 mm.       Organs: Unopacified liver, spleen, adrenal glands and both kidneys show no   acute abnormality; without suspicious focal finding or hydronephrosis.    Significant right renal cortical thinning and some scarring suspected.  No   large stone.  Probable gallbladder sludge or vicarious contrast from recent   contrast CT; no calcified stones.       GI/Bowel: Small-moderate amount of retained stool, mostly right colon with   some fluid/levels; no abnormal dilatation, marked wall thickening or   pericolonic fat stranding.  Unremarkable small bowel.  Some fluid within a   mildly distended stomach.  Small hiatus hernia.       Pelvis: Partially fluid-filled urinary bladder without wall thickening or   mass.  No significant prostatic enlargement.  Symmetric seminal vesicles.  No   pelvic fluid collection or mass.  Partially visualized approximate 10 x 15 x   5.7 cm left gluteal mass with HU measurements/appearance most suggestive of   hematoma.  No high density finding compatible with active bleeding, but   assessment limited on this examination.  Small fat containing inguinal   hernias, larger on the left.       Peritoneum/Retroperitoneum: No retroperitoneal bleed or bulky   lymphadenopathy.  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  Probable limited intimal dissection   suprarenal aorta without marked aneurysmal dilatation.  Soft tissue stranding   flanks extending into the pelvis, suggesting some degree anasarca.  Slightly   greater prominence right proximal rectus musculature       Bones/Soft Tissues: No acute abnormality.  Mild scoliosis, multilevel   degenerative changes of spine but with DDD L5-S1 and bilateral mild hip joint   degenerative findings.           Impression   Left gluteal hematoma, partially visualized on this study without obvious   active bleeding, but limited without IV contrast.  No retroperitoneal or   rectus sheath bleed.       Extensive findings in the visualized lungs compatible with known COVID-19   pneumonia; denser GGOs suggest worsening pneumonia/pneumonitis or developing   consolidation.  No pneumothorax.       Multiple additional findings, as detailed in the body of the report above.       RECOMMENDATIONS:   Consider follow-up CT pelvis including the upper thighs, if the patient does   not respond to resuscitation with blood products/fluids and other   conservative measures including localized pressure.  Findings were discussed   with Josh Deleon at 12:24 pm on 1/16/2022.               Medical Decision [id number]:     Culture, Blood 1 [[id number]] Collected: 01/09/22 1155   Order Status: Completed Specimen: Blood Updated: 01/14/22 1300    Specimen Description . BLOOD    Special Requests NOT REPORTED    Culture NO GROWTH 5 DAYS   Culture, Blood 1 [[id number]] Collected: 01/09/22 1155   Order Status: Completed Specimen: Blood Updated: 01/14/22 1300    Specimen Description . BLOOD    Special Requests NOT REPORTED    Culture NO GROWTH 5 DAYS   COVID-19, Rapid [[id number]] (Abnormal) Collected: 01/08/22 1045   Order Status: Completed Specimen: Nasopharyngeal Swab Updated: 01/08/22 1109    Specimen Description . NASOPHARYNGEAL SWAB    SARS-CoV-2, Rapid DETECTED Abnormal     Comment:        Rapid NAAT: The specimen is POSITIVE for SARS-Cov-2, the novel coronavirus associated with   COVID-19.         This test has been authorized by the FDA under an Emergency Use Authorization (EUA) for use   by authorized laboratories.         The ID NOW COVID-19 assay is designed to detect the virus that causes COVID-19 in patients   with signs and symptoms of

## 2022-01-17 NOTE — PLAN OF CARE
Discussed with Pt need for nutrition and fluids- Pt still verbalizing lack of appetite but fluid intake has improved. O2 needs have decreased w/ Pt only using PAP briefly during rest times- HF NC needs have also continued to lesson, though pT still desats with any exertion.

## 2022-01-17 NOTE — CARE COORDINATION
[place] [facility] Quality Flow/Interdisciplinary Rounds Progress Note          Readmission Risk              Risk of Unplanned Readmission:  19           Discussed patient goal for the day, patient clinical progression, and barriers to discharge.   The following Goal(s) of the Day/Commitment(s) have been identified:    Hi zach 60%  Going to IR to r/o bleed  Updated Shara Valentine with paty Longoria RN  January 17, 2022

## 2022-01-18 NOTE — PROGRESS NOTES
[facility] [address] [address] Patient  Today's Date and Time: 1/18/2022, 9:22 AM    Impression :     · COVID 19 Confirmed Infection  · Covid tests:  · 1/8/21: Positive  · Acute hypoxic respiratory failure  · Paroxysmal A. 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 · He is insisting on trying to go home and does not quite understand that he is requiring a significant amount of respiratory support. · He did mention to me that he was happy to die at home but it is my understanding that the recently changed his CODE STATUS to comfort care arrest to full code.   · His code status has again been changed to [facility]  · Continue supportive care      Medical Decision Making/Summary/Discussion:1/18/2022     · Patient admitted with COVID 19 infection    Infection Control Recommendations   · Universal Precautions  · Airborne isolation  · Droplet Isolation    Antimicrobial Stewardship Recommendations       · Renal considerations  Coordination of Outpatient Care:   · Estimated Length of IV antimicrobials:TBD  · Patient will need Midline Catheter Insertion: TBD  · Patient will need PICC line Insertion: No  · Patient will need: Home IV , [place],  SNF,  LTAC:TBD  · Patient will need outpatient wound care:No    Chief complaint/reason for consultation:   · Concern for COVID infection      History of Present Illness:   Remi Coronel is a [de-identified]y.o.-year-old male who was initially admitted on 1/8/2022. Patient seen at the request of Dr. Piter Graham:    Patient presented through ER with complaints of needing shortness of breath, cough, loss of appetite, nausea, vomiting and diarrhea over the past 7 to 10 days. He has not received the Covid vaccine and tested positive for Covid shortly after [place]. On evaluation in the ED, the patient had an SPO2 of 80% on room air and was tachypneic. A rapid Covid swab was positive. CT chest shows extensive bilateral pulmonary groundglass opacities. He was started on Decadron and given a dose of Actemra. Abnormal labs include:  · Creatinine 2.32  ·   · Troponin I 48  · WBC 17.1    Patient admitted because of concerns with COVID 19. Meropenem initiated on 1/11/2022 for worsening respiratory distress and leukocytosis    Stable chest x-ray 1/11    CRP is trending down      CURRENT EVALUATION : 1/18/2022    Afebrile  VS stable    The patient is seen and evaluated at bedside he is awake and alert but currently requiring high flow at 35-->45 L and 60-->65%.     Worsening leukocytosis  JULES continues    Urine and blood cultures ordered    Hemoglobin dropped to 5.7 on 1/16/2022  Hemoglobin remained stable at this time after receiving infusions of PRBC  Left gluteal hematoma present on CT abdomen pelvis       Impression CT abdomen/pelvis 1/16/21   Left gluteal hematoma, partially visualized on this study without obvious   active bleeding, but limited without IV contrast.  No retroperitoneal or   rectus sheath bleed.       Extensive findings in the visualized lungs compatible with known COVID-19   pneumonia; denser GGOs suggest worsening pneumonia/pneumonitis or developing   consolidation.  No pneumothorax.       Multiple additional findings, as detailed in the body of the report above.       RECOMMENDATIONS:   Consider follow-up CT pelvis including the upper thighs, if the patient does   not respond to resuscitation with blood products/fluids and other   conservative measures including localized pressure     Discussed with RN    Patient exhibiting respiratory distress. Yes  Respiratory secretions: No    Patient receiving supplemental oxygen. BiPAP  RR: 20  02 sat: 98-->4    % FIO2: 60-->65  Flow Rate: 45 L/min  PEEP:      QTc:       NEWS Score: 0-4 Low risk group; 5-6: Medium risk group; 7 or above: High risk group  Parameters 3 2 1 0 1 2 3   Age    < 65   ? 65   RR ? 8  9-11 12-20  21-24 ? 25   O2 Sats ? 91 92-93 94-95 ? 96      Suppl O2  Yes  No      SBP ? 90  101-110 111-219   ? 220   HR ? 40  41-50 51-90  111-130 ? 131   Consciousness    Alert   Drowsiness, lethargy, or confusion   Temperature ? 35.0 C (95.0 F)  35.1-36.0 C 95.1-96.9 F 36.1-38.0 C 97.0-100.4 F 38.1-39.0 C 100.5-102.3 F ? 39.1 C ? 102.4 F      NEWS Score:   1/9/2022: 5 moderate risk    Overall Daily Picture:      worsened    Presence of secondary bacterial Infection:    Possible  Additional antibiotics: Meropenem 1/11/2022    Labs, X rays reviewed: 1/18/2022    BUN:101  Cr:3.69    WBC:27.8-->30.5-->31.9  Hb:8.9-->5.7-->8.3-->8.9  Plat: 153    Absolute Neutrophils:16  Absolute Lymphocytes:0.34  Neutrophil/Lymphocyte Ratio: 53 very high risk    CRP:160-->131-->52.3  Ferritin:804  LDH: 563    Pro Calcitonin:      Cultures:  Urine:  ·   Blood:  ·   Sputum :  ·   Wound:       CXR:     CAT:  1/8/21      Discussed with patient, RN, CC, IM. I have personally reviewed the past medical history, past surgical history, medications, social history, and family history, and I have updated the database accordingly.   Past Medical History:     Past Medical History:   Diagnosis Date    Chronic lower back pain     Cobalamin deficiency     [facility] [address]     Diabetes mellitus ([address]     Hyperlipidemia     Hypertension     Malignant neoplasm of colon ([facility] [address]     PAD (peripheral artery disease) (HCC)        Past Surgical  History:     Past Surgical History:   Procedure Laterality Date    ARTERY SURGERY         Medications:      sodium bicarbonate  1,300 mg Oral BID    busPIRone  10 mg Oral TID    [Held by provider] insulin glargine  20 Units SubCUTAneous QAM    senna  2 tablet Oral Nightly    insulin lispro  0-6 Units SubCUTAneous TID WC    insulin lispro  0-3 Units SubCUTAneous Nightly    amiodarone  200 mg Oral Daily    metoprolol tartrate  50 mg Oral BID    pantoprazole  40 mg Oral QAM AC    atorvastatin  20 mg Oral Nightly    clopidogrel  75 mg Oral Daily    vitamin B-12  1,000 mcg Oral Daily    aspirin  81 mg Oral Daily    sodium chloride flush  5-40 mL IntraVENous 2 times per day       Social History:     Social History     Socioeconomic History    Marital status:      Spouse name: Not on file    Number of children: Not on file    Years of education: Not on file    Highest education level: Not on file   Occupational History    Not on file   Tobacco Use    Smoking status: Former Smoker    Smokeless tobacco: Never Used   Substance and Sexual Activity    Alcohol use: Not Currently    Drug use: Never    Sexual activity: Not on file   Other Topics Concern    Not on file   Social History Narrative    Not on file     Social Determinants of Health     Financial Resource Strain:     Difficulty of Paying Living Expenses: Not on file   Food Insecurity:     Worried About Running Out of Food in the Last Year: Not on file    Lesley of Food in the Last Year: Not on file   Transportation Needs:     Lack of Transportation (Medical): Not on file    Lack of Transportation (Non-Medical):  Not on file   Physical Activity:     Days of Exercise per Week: Not on file    Minutes of Exercise per Session: Not on file   Stress:     Feeling of Stress : Not on file   Social Connections:     Frequency of Communication with Friends and Family: Not on file    Frequency of Social Gatherings with Friends and Family: Not on file    Attends Catholic Services: Not on file    Active Member of Clubs or Organizations: Not on file    Attends Club or Organization Meetings: Not on file    Marital Status: Not on file   Intimate Partner Violence:     Fear of Current or Ex-Partner: Not on file    Emotionally Abused: Not on file    Physically Abused: Not on file    Sexually Abused: Not on file   Housing Stability:     Unable to Pay for Housing in the Last Year: Not on file    Number of [place] in the Last Year: Not on file    Unstable Housing in the Last Year: Not on file       Family History:   History reviewed. No pertinent family history. Allergies:   Patient has no known allergies. Review of Systems:     Review of Systems   Constitutional: Negative. Respiratory: Positive for shortness of breath. Cardiovascular: Negative. Gastrointestinal: Negative. Genitourinary: Negative. Musculoskeletal: Negative. Skin: Positive for color change. Neurological: Negative. Physical Examination :     Patient Vitals for the past 8 hrs:   BP Temp Temp src Pulse Resp SpO2   01/18/22 0835    70 20 94 %   01/18/22 0800 (!) 110/56 98.5 °F (36.9 °C) Oral 75 20    01/18/22 0432     19    01/18/22 0410 (!) 141/62 97.7 °F (36.5 °C) Oral 70 14 99 %   01/18/22 0133     26      Physical Exam  Constitutional:       Appearance: He is well-developed. HENT:      Head: Normocephalic and atraumatic. Cardiovascular:      Rate and Rhythm: Normal rate. Heart sounds: Murmur heard. Pulmonary:      Effort: Pulmonary effort is normal.      Breath sounds: Normal breath sounds. No wheezing. Abdominal:      General: Bowel sounds are normal.      Palpations: Abdomen is soft. There is no mass. Tenderness: There is no abdominal tenderness.    Musculoskeletal:         General: Swelling (In the bilateral upper extremities especially in the forearms.) present. Normal range of motion. Cervical back: Neck supple. Lymphadenopathy:      Cervical: No cervical adenopathy. Skin:     General: Skin is dry. Findings: Bruising (There is bruising and ecchymotic skin lesions in the forearms bilaterally right worse than left) present. Neurological:      Mental Status: He is alert and oriented to person, place, and time. Medical Decision Making -Laboratory:   I have independently reviewed/ordered the following labs:    CBC with Differential:   Recent Labs     01/16/22  1430 01/17/22  0634   WBC 30.5* 31.9*   HGB 8.3* 8.9*   HCT 24.4* 24.0*    153   LYMPHOPCT  --  2*   MONOPCT  --  2*     BMP:   Recent Labs     01/16/22  0534 01/17/22  0634    135   K 5.0 5.3    102   CO2 17* 15*   BUN 95* 101*   CREATININE 3.21* 3.69*     Hepatic Function Panel:   No results for input(s): PROT, LABALBU, BILIDIR, IBILI, BILITOT, ALKPHOS, ALT, AST in the last 72 hours. No results for input(s): RPR in the last 72 hours. No results for input(s): HIV in the last 72 hours. No results for input(s): BC in the last 72 hours. Lab Results   Component Value Date    MUCUS 1+ 01/17/2022    RBC 2.81 01/17/2022    TRICHOMONAS NOT REPORTED 01/17/2022    WBC 31.9 01/17/2022    YEAST NOT REPORTED 01/17/2022    TURBIDITY Clear 01/17/2022     Lab Results   Component Value Date    CREATININE 3.69 01/17/2022    GLUCOSE 89 01/17/2022       Medical Decision Making-Imaging:     Narrative   EXAMINATION:   CTA OF THE CHEST 1/8/2022 10:10 am       TECHNIQUE:   CTA of the chest was performed after the administration of intravenous   contrast.  Multiplanar reformatted images are provided for review.  MIP   images are provided for review.  Dose modulation, iterative reconstruction,   and/or weight based adjustment of the mA/kV was utilized to reduce the   radiation dose to as low as reasonably achievable.       COMPARISON:   None.       HISTORY:   ORDERING SYSTEM PROVIDED HISTORY: dyspnea / hypoxia   Reason for Exam: Shortness of breath       FINDINGS:   Pulmonary Arteries: Pulmonary arteries are adequately opacified for   evaluation.  No evidence of intraluminal filling defect to suggest pulmonary   embolism.  Main pulmonary artery is normal in caliber.       Mediastinum: No evidence of mediastinal lymphadenopathy.  Normal heart size. Normal caliber thoracic aorta with diffuse atherosclerosis.       Lungs/pleura: Extensive ground-glass opacification of the bilateral lung   fields with peripheral involvement slight apical as well as basilar sparing. No effusion or pneumothorax.       Upper Abdomen: Limited images of the upper abdomen are unremarkable.       Soft Tissues/Bones: No acute bone or soft tissue abnormality.           Impression   *No evidence of pulmonary embolism. *Extensive ground-glass opacification of the bilateral lung fields with   peripheral involvement slight apical as well as basilar sparing.  Imaging   features suggestive of COVID-19 pneumonia, though are nonspecific and can   occur with a variety of infectious and noninfectious processes.         Narrative   EXAMINATION:   CT OF THE ABDOMEN AND PELVIS WITHOUT CONTRAST, 1/16/2022 10:42 am       TECHNIQUE:   CT of the abdomen and pelvis was performed without the administration of   intravenous contrast. Multiplanar reformatted images are provided for review.    Dose modulation, iterative reconstruction, and/or weight based adjustment of   the mA/kV was utilized to reduce the radiation dose to as low as reasonably   achievable.       COMPARISON:   CT of the chest from 01/08/2022, and retroperitoneal ultrasound from   01/10/2022.       HISTORY:   ORDERING SYSTEM PROVIDED HISTORY:  Retrop bleed r/o   TECHNOLOGIST PROVIDED HISTORY:   Retrop bleed r/o   Reason for Exam:  Retrop bleed r/o       FINDINGS:   Lower Chest: As demonstrated on recent CT chest, extensive and somewhat   denser confluent No acute abnormality.  Mild scoliosis, multilevel   degenerative changes of spine but with DDD L5-S1 and bilateral mild hip joint   degenerative findings.           Impression   Left gluteal hematoma, partially visualized on this study without obvious   active bleeding, but limited without IV contrast.  No retroperitoneal or   rectus sheath bleed.       Extensive findings in the visualized lungs compatible with known COVID-19   pneumonia; denser GGOs suggest worsening pneumonia/pneumonitis or developing   consolidation.  No pneumothorax.       Multiple additional findings, as detailed in the body of the report above.       RECOMMENDATIONS:   Consider follow-up CT pelvis including the upper thighs, if the patient does   not respond to resuscitation with blood products/fluids and other   conservative measures including localized pressure.  Findings were discussed   with Alen Catherine at 12:24 pm on 1/16/2022.               Medical Decision [id number]:     Culture, Blood 1 [[id number]] Collected: 01/09/22 1155   Order Status: Completed Specimen: Blood Updated: 01/14/22 1300    Specimen Description . BLOOD    Special Requests NOT REPORTED    Culture NO GROWTH 5 DAYS   Culture, Blood 1 [[id number]] Collected: 01/09/22 1155   Order Status: Completed Specimen: Blood Updated: 01/14/22 1300    Specimen Description . BLOOD    Special Requests NOT REPORTED    Culture NO GROWTH 5 DAYS   COVID-19, Rapid [[id number]] (Abnormal) Collected: 01/08/22 1045   Order Status: Completed Specimen: Nasopharyngeal Swab Updated: 01/08/22 1109    Specimen Description . NASOPHARYNGEAL SWAB    SARS-CoV-2, Rapid DETECTED Abnormal     Comment:        Rapid NAAT: The specimen is POSITIVE for SARS-Cov-2, the novel coronavirus associated with   COVID-19.         This test has been authorized by the FDA under an Emergency Use Authorization (EUA) for use   by authorized laboratories.         The ID NOW COVID-19 assay is designed to detect the virus that causes COVID-19 in patients   with signs and symptoms of infection who are suspected of COVID-19. An individual without symptoms of COVID-19 and who is not shedding SARS-CoV-2 virus would   expect to have a negative (not detected) result in this assay. Fact sheet for Healthcare Providers: Dionne   Fact sheet for Patients: Dionne           Methodology: Isothermal Nucleic Acid Amplification         Results reported to the appropriate [facility]            Medical Decision Making-Other:     Note:  · Labs, medications, radiologic studies were reviewed with personal review of films  · Large amounts of data were reviewed  · Discussed with nursing Staff, Discharge planner  · Infection Control and Prevention measures reviewed  · All prior entries were reviewed  · Administer medications as ordered  · Prognosis: Guarded  · Discharge planning reviewed      Thank you for allowing us to participate in the care of this patient. Please call with questions. Electronically signed by MOHAMUD Colón - CNP on 1/18/2022 at 9:22 AM    ATTESTATION:    I have discussed the case, including pertinent history and exam findings with the APRN. I have evaluated the  History, physical findings and pictures of the patient and the key elements of the encounter have been performed by me. I have reviewed the laboratory data, other diagnostic studies and discussed them with the APRN. I have updated the medical record where necessary. I agree with the assessment, plan and orders as documented by the APRN.     Joshua Gastelum MD.

## 2022-01-18 NOTE — PROGRESS NOTES
Lab reported critical BUN of 91 And Creatinine of 3.31. Reported critical Lab to the attending MD. No distress noted at this time.

## 2022-01-18 NOTE — PROGRESS NOTES
respiratory failure secondary to COVID-19 as well as A. fib with RVR. Patient being followed by cardiology, infectious disease, critical care, nephrology was also seen the patient. Patient was originally started on heparin drip for A. fib with RVR however was discontinued due to acute blood loss anemia and bilateral hematomas on both upper extremities. Patient required 2 units of red blood cells on 1/16/2022. Patient was also evaluated by nephrology due to JULES on CKD stage III which improved with IV hydration. For COVID-19 patient status post Actemra on/8/2022, and Decadron 1/8/2022. Patient continues on high flow. Patient originally did change his CODE STATUS from DNR CCA to full code on 11/13/2022 and reverted back to DNR CCA on 11/17/2022. Review of Systems:     Review of Systems   Constitutional: Positive for activity change and appetite change. Negative for chills, diaphoresis and fever. HENT: Negative for congestion. Eyes: Negative for visual disturbance. Respiratory: Positive for shortness of breath. Negative for cough, chest tightness and wheezing. Cardiovascular: Negative for chest pain, palpitations and leg swelling. Gastrointestinal: Negative for abdominal pain, blood in stool, constipation, diarrhea, nausea and vomiting. Genitourinary: Negative for difficulty urinating. Neurological: Positive for weakness. Negative for dizziness, light-headedness, numbness and headaches. All other systems reviewed and are negative. Medications:      Allergies:  No Known Allergies    Current Meds:   Scheduled Meds:    sodium bicarbonate  1,300 mg Oral BID    busPIRone  10 mg Oral TID    [Held by provider] insulin glargine  20 Units SubCUTAneous QAM    senna  2 tablet Oral Nightly    insulin lispro  0-6 Units SubCUTAneous TID WC    insulin lispro  0-3 Units SubCUTAneous Nightly    amiodarone  200 mg Oral Daily    metoprolol tartrate  50 mg Oral BID    pantoprazole  40 mg Oral QAM AC  atorvastatin  20 mg Oral Nightly    clopidogrel  75 mg Oral Daily    vitamin B-12  1,000 mcg Oral Daily    aspirin  81 mg Oral Daily    sodium chloride flush  5-40 mL IntraVENous 2 times per day     Continuous Infusions:    sodium chloride 100 mL/hr at 229    sodium chloride      sodium chloride      sodium chloride      dextrose      sodium chloride       PRN Meds: melatonin, sodium chloride, sodium chloride, sodium chloride, sodium chloride, LORazepam, glucose, dextrose, glucagon (rDNA), dextrose, metoprolol, benzonatate, sodium chloride flush, sodium chloride flush, sodium chloride, ondansetron **OR** ondansetron, polyethylene glycol, acetaminophen **OR** acetaminophen    Data:     Past Medical History:   has a past medical history of Chronic lower back pain, Cobalamin deficiency, CVA (cerebral vascular accident) ([facility] Diabetes mellitus ([facility] Hyperlipidemia, Hypertension, Malignant neoplasm of colon ([facility] and [facility]AD [facility]peripheral artery disease[facility] Social History:   reports that he has quit smoking. He has never used smokeless tobacco. He reports previous alcohol use. He reports that he does not use drugs. Family History: History reviewed. No pertinent family history. Vitals:  BP (!) 110/56   Pulse 70   Temp 98.5 °F (36.9 °C) (Oral)   Resp 20   Ht 5' 10\" (1.778 m)   Wt 148 lb 2.4 oz (67.2 kg)   SpO2 94%   BMI 21.26 kg/m²   Temp (24hrs), Av.1 °F (36.7 °C), Min:97.5 °F (36.4 °C), Max:98.7 °F (37.1 °C)    Recent Labs     22  2126 22  0632 22  0650   POCGLU 116* 116* 58* 93       I/O (24Hr):     Intake/Output Summary (Last 24 hours) at 2022 0921  Last data filed at 2022 1718  Gross per 24 hour   Intake 1696 ml   Output 600 ml   Net 1096 ml       Labs:  Hematology:  Recent Labs     22  0534 22  1430 22  0634   WBC 27.8* 30.5* 31.9*   RBC 1.89* 2.83* 2.81*   HGB 5.7* 8.3* 8.9*   HCT 17.5* 24.4* 24.0*   MCV 92.6 86.2 85.4   MCH 30.2 29.3 31.7   MCHC 32.6 34.0 37.1*   RDW 14.5* 15.2* 16.1*    211 153   MPV 11.4 11.1 11.4     Chemistry:  Recent Labs     01/15/22  1619 01/16/22  0534 01/17/22  0634    136 135   K 4.9 5.0 5.3    102 102   CO2 19* 17* 15*   GLUCOSE 132* 161* 89   BUN 79* 95* 101*   CREATININE 2.56* 3.21* 3.69*   ANIONGAP 13 17 18*   LABGLOM 24* 19* 16*   GFRAA 29* 23* 19*   CALCIUM 7.5* 7.7* 7.5*     Recent Labs     01/17/22  1104 01/17/22  1602 01/17/22  2032 01/17/22  2126 01/18/22  0632 01/18/22  0650   POCGLU 230* 167* 116* 116* 58* 93     ABG:  Lab Results   Component Value Date    FIO2 INFORMATION NOT PROVIDED 01/10/2022     Lab Results   Component Value Date/Time    SPECIAL NOT REPORTED 01/09/2022 11:55 AM    SPECIAL NOT REPORTED 01/09/2022 11:55 AM     Lab Results   Component Value Date/Time    CULTURE NO GROWTH 5 DAYS 01/09/2022 11:55 AM    CULTURE NO GROWTH 5 DAYS 01/09/2022 11:55 AM       Radiology:  CT ABDOMEN PELVIS WO CONTRAST Additional Contrast? None    Result Date: 1/16/2022  Left gluteal hematoma, partially visualized on this study without obvious active bleeding, but limited without IV contrast.  No retroperitoneal or rectus sheath bleed. Extensive findings in the visualized lungs compatible with known COVID-19 pneumonia; denser GGOs suggest worsening pneumonia/pneumonitis or developing consolidation. No pneumothorax. Multiple additional findings, as detailed in the body of the report above. RECOMMENDATIONS: Consider follow-up CT pelvis including the upper thighs, if the patient does not respond to resuscitation with blood products/fluids and other conservative measures including localized pressure. Findings were discussed with Kev Holloway at 12:24 pm on 1/16/2022. XR CHEST PORTABLE    Result Date: 1/16/2022  Right IJ line in satisfactory position. Unchanged or slightly worse bilateral diffuse infiltrates consistent with the patient's history of COVID pneumonia. XR CHEST PORTABLE    Result Date: 1/14/2022  Patchy bilateral interstitial and ground-glass infiltrates most notably in the lung periphery. Findings have progressed from the prior exam compatible with patient's history of COVID pneumonia     XR CHEST PORTABLE    Result Date: 1/11/2022  Unchanged bilateral airspace opacities       Physical Examination:        Physical Exam    Assessment:        Hospital Problems           Last Modified POA    * (Principal) Pneumonia due to COVID-19 virus 1/8/2022 Yes    Essential hypertension 1/8/2022 Yes    Hyperlipidemia 1/8/2022 Yes    Acute hypoxemic respiratory failure due to COVID-19 ([address] 1/8/2022 Yes    Acute kidney injury superimposed on chronic kidney disease ([address] baseline creatinine 1.9 1/8/2022 Yes    Stage 3b chronic kidney disease ([address] 1/8/2022 Yes    Elevated troponin 1/9/2022 Yes    Nonrheumatic aortic valve stenosis 1/9/2022 Yes    PAD (peripheral artery disease) ([address] 1/9/2022 Yes    Moderate protein-calorie malnutrition ([address] 1/9/2022 Yes    Atrial fibrillation with RVR ([address] 1/10/2022 Yes          Plan:        Sepsis : resolved     COVID 19 Pneumonia :   S/p Actemra 1/8. Finished course of Decadron  Vit C/D  Incentive spirometry  Titrate inflammatory markers  ID involved     Acute hypoxemic respiratory failure: Likely secondary to Covid 19 pneumonia, continue supplemental oxygen,  high flow currently alternating with BiPAP    JULES/CKD with metabolic acidosis: Slightly better yesterday but given worsening compared to admission we will add nephrology consult.   For now  we will stop Half-normal saline    Persistent hypoglycemia: Patient was given D50 overnight and this morning, we will go ahead and start D5 at 50 ml for couple of hours then reassess    DM2: As mentioned above was hypoglycemic currently, continue to hold all insulin and insulin sliding scale, hypoglycemia protocol  Likely after stopping IV Decadron, will give some p.o. steroids and reassess in a.m.    Acute blood loss anemia due to retroperitoneal bleed: Patient status post 2 unit of packed RBCs on 1/16, currently stable, heparin drip is on hold  Hemoglobin from this a.m. reflecting some drop, will monitor closely and if any further drop will consult vascular surgery team    YARON rojas with RVR: Currently rate controlled, anticoagulation was held secondary to retroperitoneal bleed    CODE STATUS is DNR CCA    Peripheral vascular disease: Continue Plavix    HTN: continue home medications    HPL: continue home medications    DVT prophylaxis : mechanical only given above         Lyubov Cosme MD  1/18/2022  9:21 AM

## 2022-01-18 NOTE — PROGRESS NOTES
Completed blood glucose check on patient, resiult is 48, !/2 amp. Of D50 admnistered. Rechecked it is 44, admnistered another 1/2 amp of D50. Patient is asymptomatic, alert, and oriented, conversating, a cup of orange juice administerd. Notified the attending MD, IVF changed to D5 W @ 50 ml/hr. Patient tolerating procedure well. Will continue to monitor patient.

## 2022-01-19 NOTE — CONSULTS
Renal Re-consult Note    Patient :  Cheryl Cross; [de-identified] y.o. MRN# [id number]  Location:  [facility]/[id number]  Attending:  Rand Fox MD  Admit Date:  1/8/2022   Hospital Day: 6    Reason for Consult:     Asked by Dr Rand Fox MD to see for JULES/Elevated Creatinine. History Obtained From:     electronic medical record    History of Present Illness:     Cheryl Cross; [de-identified] y.o. male with past medical history of CVA, diabetes mellitus, PAD, HTN, severe aortic valve stenosis, and CKD stage 3b was admitted on 1/8/22 for acute respiratory failure secondary to COVID-19 pneumonia, currently alternating between BiPap and HFNC. Nephrology was consulted on 1/9/22 for JULES on CKD stage 3b secondary to prerenal injury from volume depletion related to GI losses/diarrhea/poor p.o. and infection. Creatinine peaked at 2.98 but came back to baseline at 2.04 with gentle IV hydration containing bicarb. Baseline seems to be between 1.8-2.0. Patient denied ever being told he has kidney disease and has never seen a nephrologist.    Nephrology reconsulted today for JULES. Patient's creatinine has been uptrending since 1/14. Patient did receive 2 PRBCs on 1/16 after Hgb found to be 5.7. CT abdomen revealed left gluteal hematoma. Patient also received a dose of lasix on 1/16. Patient's blood pressure also dropped to systolics of mid 73P during that, later improved. Creatinine of 2.96 today, down from 3.31 yesterday. Patient was on half normal saline at 100 mL/hr from 1/17-1/18. UOP of 600 cc today.     Labs reviewed from today: Na 130, K 4.7, creatinine 2.96, BUN 77. Hgb stable at 7.2. No history of recent contrast exposure, No h/o prolonged NSAIDs use in the past, No h/o nephrolithiasis, No recent skin rashes or arthralgias, No hematuria or pyuria noticed in the recent past. Doesn't report any reduction in the urine output recently.  Non report of any obstructive urinary symptoms (urgency, frequency, weak stream, straining while urination). No h/o recurrent UTIs in the past.    Past History/Allergies? Social History:     Past Medical History:   Diagnosis Date    Chronic lower back pain     Cobalamin deficiency     [facility]     Diabetes mellitus ([facility]     Hyperlipidemia     Hypertension     Malignant neoplasm of colon ([facility]     PAD [facility]peripheral [facility] disease[facility]      Past Surgical History:   Procedure Laterality Date    ARTERY SURGERY       No Known Allergies    Social History     Socioeconomic History    Marital status:      Spouse name: Not on file    Number of children: Not on file    Years of education: Not on file    Highest education level: Not on file   Occupational History    Not on file   Tobacco Use    Smoking status: Former Smoker    Smokeless tobacco: Never Used   Substance and Sexual Activity    Alcohol use: Not Currently    Drug use: Never    Sexual activity: Not on file   Other Topics Concern    Not on file   Social History Narrative    Not on file     Social Determinants of Health     Financial Resource Strain:     Difficulty of Paying Living Expenses: Not on file   Food Insecurity:     Worried About Running Out of Food in the Last Year: Not on file    Lesley of Food in the Last Year: Not on file   Transportation Needs:     Lack of Transportation (Medical): Not on file    Lack of Transportation (Non-Medical):  Not on file   Physical Activity:     Days of Exercise per Week: Not on file    Minutes of Exercise per Session: Not on file   Stress:     Feeling of Stress : Not on file   Social Connections:     Frequency of Communication with Friends and Family: Not on file    Frequency of Social Gatherings with Friends and Family: Not on file    Attends [employer] Services: Not on file    Active Member of Clubs or Organizations: Not on file    Attends Club or Organization Meetings: Not on file    Marital Status: Not on file   Intimate Partner Violence:     Fear of Current or Ex-Partner: Not on file    Emotionally Abused: Not on file    Physically Abused: Not on file    Sexually Abused: Not on file   Housing Stability:     Unable to Pay for Housing in the Last Year: Not on file    Number of Places Lived in the Last Year: Not on file    Unstable Housing in the Last Year: Not on file     Family History:      History reviewed. No pertinent family history.     Outpatient Medications:     Medications Prior to Admission: aspirin 325 MG tablet, Take 81 mg by mouth daily   atorvastatin (LIPITOR) 40 MG tablet, Take 20 mg by mouth nightly  lisinopril (PRINIVIL;ZESTRIL) 30 MG tablet, Take 30 mg by mouth daily  metoprolol tartrate (LOPRESSOR) 50 MG tablet, Take 50 mg by mouth daily  cilostazol (PLETAL) 100 MG tablet, Take 100 mg by mouth 2 times daily  clopidogrel (PLAVIX) 75 MG tablet, Take 75 mg by mouth daily  vitamin B-12 (CYANOCOBALAMIN) 1000 MCG tablet, Take 1,000 mcg by mouth daily    Current Medications:     Scheduled Meds:    lidocaine 1 % injection  5 mL IntraDERmal Once    sodium chloride flush  5-40 mL IntraVENous 2 times per day    hydrocortisone  20 mg Oral BID    sodium bicarbonate  1,300 mg Oral BID    busPIRone  10 mg Oral TID    [Held by provider] insulin glargine  20 Units SubCUTAneous QAM    senna  2 tablet Oral Nightly    [Held by provider] insulin lispro  0-6 Units SubCUTAneous TID WC    [Held by provider] insulin lispro  0-3 Units SubCUTAneous Nightly    amiodarone  200 mg Oral Daily    metoprolol tartrate  50 mg Oral BID    pantoprazole  40 mg Oral QAM AC    atorvastatin  20 mg Oral Nightly    [Held by provider] clopidogrel  75 mg Oral Daily    vitamin B-12  1,000 mcg Oral Daily    aspirin  81 mg Oral Daily    sodium chloride flush  5-40 mL IntraVENous 2 times per day     Continuous Infusions:    sodium chloride      sodium chloride      sodium chloride      sodium chloride      dextrose 100 mL/hr (01/19/22 0010)    sodium chloride PRN Meds:  sodium chloride flush, sodium chloride, melatonin, sodium chloride, sodium chloride, sodium chloride, sodium chloride, LORazepam, glucose, dextrose, glucagon (rDNA), dextrose, metoprolol, benzonatate, sodium chloride flush, sodium chloride flush, sodium chloride, ondansetron **OR** ondansetron, polyethylene glycol, acetaminophen **OR** acetaminophen    Review of Systems:     Constitutional: No fever, no chills, no lethargy, no weakness. HEENT:  No headache, otalgia, itchy eyes, nasal discharge or sore throat. Cardiac:  No chest pain, dyspnea, orthopnea or PND. Chest:  No cough, phlegm or wheezing. Abdomen:  No abdominal pain, nausea or vomiting. +ve diarrhea. Neuro:  No focal weakness, abnormal movements orseizure like activity. Skin:   No rashes, no itching. :   No hematuria, no pyuria, no dysuria, no flank pain. Extremities:  No swelling or joint pains. ROS was otherwise negative except as mentioned in the [address] Input/Output:       I/O last 3 completed shifts:  In: -   Out: 1250 [Urine:1250]    Vital Signs:   Temperature:  Temp: 97.9 °F (36.6 °C)  TMax:   Temp (24hrs), Av.3 °F (36.8 °C), Min:97.7 °F (36.5 °C), Max:98.8 °F (37.1 °C)    Respirations:  Resp: (!) 34  Pulse:   Pulse: 71  BP:    BP: 122/68  BP Range: Systolic (98PLO), XVP:823 , Min:116 , XYT:621       Diastolic (62NEZ), WLW:61, Min:54, Max:68      Physical Examination:     General:  AAO x 3, speaking in full sentences, no accessory muscle use. On HFNC. HEENT: Atraumatic, normocephalic, no throat congestion, moist mucosa. Eyes:   Pupils equal, round and reactive to light, EOMI. Neck:   No JVD. Chest:   Bilateral vesicular breath sounds, no rales or wheezes. Cardiac:  Irregularly irregular rhythm, currently rate controlled. Abdomen: Soft, non-tender, BS audible. :   No suprapubic or flank tenderness. Neuro:  AAO x 3, No FND. SKIN:  No rashes, good skin turgor. Extremities:  No edema.     Labs:       Recent Labs 01/17/22  0634 01/18/22  1305 01/19/22  0600   WBC 31.9* 21.7* 18.5*   RBC 2.81* 2.37* 2.37*   HGB 8.9* 7.4* 7.3*   HCT 24.0* 21.6* 21.7*   MCV 85.4 91.1 91.6   MCH 31.7 31.2 30.8   MCHC 37.1* 34.3 33.6   RDW 16.1* 16.0* 16.3*    103* 89*   MPV 11.4 11.0 10.9      BMP:   Recent Labs     01/17/22  0634 01/18/22  1305 01/19/22  0600    136 130*   K 5.3 5.1 4.7    105 100   CO2 15* 16* 21   * 91* 77*   CREATININE 3.69* 3.31* 2.96*   GLUCOSE 89 368* 129*   CALCIUM 7.5* 7.5* 7.3*      SUMMER:      Lab Results   Component Value Date    SUMMER NEGATIVE 01/09/2022     SPEP:  Lab Results   Component Value Date    PROT 6.1 01/09/2022     C3:     Lab Results   Component Value Date    C3 134 01/09/2022     C4:     Lab Results   Component Value Date    C4 31 01/09/2022     Urinalysis/Chemistries:      Lab Results   Component Value Date    NITRU NEGATIVE 01/17/2022    COLORU Yellow 01/17/2022    PHUR 5.0 01/17/2022    WBCUA None 01/17/2022    RBCUA 2 TO 5 01/17/2022    MUCUS 1+ 01/17/2022    TRICHOMONAS NOT REPORTED 01/17/2022    YEAST NOT REPORTED 01/17/2022    BACTERIA NOT REPORTED 01/17/2022    SPECGRAV 1.018 01/17/2022    LEUKOCYTESUR NEGATIVE 01/17/2022    UROBILINOGEN Normal 01/17/2022    BILIRUBINUR NEGATIVE 01/17/2022    GLUCOSEU 1+ 01/17/2022    KETUA NEGATIVE 01/17/2022    AMORPHOUS NOT REPORTED 01/17/2022     Urine Sodium:     Lab Results   Component Value Date    CHERYL 56 01/10/2022     Urine Creatinine:     Lab Results   Component Value Date    LABCREA 90.8 01/09/2022     Radiology:     Renal U/S 1/10/22:  Impression   The right kidney could not be adequately visualized.       Left kidney shows mild increased cortical echogenicity suggesting medical   renal disease.  No hydronephrosis. Assessment:     1.  Acute Kidney Injury on CKD stage 3b initially secondary to prerenal injury due to decreased PO intake/infection and patient also received contrast on admission then creatinine started to improve but patient developed acute blood loss due to gluteal hematoma requiring blood transfusion along with some hypotension and hence got a second hit and creatinine started to increase again. Peak creatinine was 3.96 mg/DL and most recent creatinine did show some improvement to 2.96 mg/dl. Baseline creatinine seems to be around 1.8-2.0.  2. Acute hypoxemic respiratory failure, on high flow currently alternating with BiPap  3. COVID-19 infection  4. Persistent hypoglycemia  5. 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 Acute blood loss anemia due to left gluteal hematoma, received 2 PRBCs on 1/16/22, hemoglobin currently stable at 7.3  7. Afib with RVR, currently rate controlled  8. Peripheral vascular disease  9. Essential hypertension  10. Hyperlipidemia  11. 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 12. Diarrhea- ?Kathy. Plan:     1. Continue monitor off diuretics, change D5W which was started due to hypoglycemia to D10W with normal saline at 25 cc an hour. 2.  Continue monitor strict I's and O's and renal function. 3.  Avoid any further nephrotoxic agents as much as possible. 4.  Continue monitor hemoglobin and recommend blood transfusion if less than 7.  5.  Check occult blood to rule out GI bleed. 6.  Left gluteal hematoma management as per vascular surgery and primary. 7.  BMP in AM.  8.  Will follow. Nutrition   Please ensure that patient is on a renal diet/TF. Avoid nephrotoxic drugs/contrast exposure. Thank you for the consultation. Please do not hesitate to contact us for any further questions/concerns. We will continue to follow along with you. [address] Student    Attending Physician Statement  I have discussed the care of Mignon Robles, including pertinent history and exam findings, with the Resident/CNP/medical student. I also saw and examined the patient myself.  I have reviewed all the key elements of all parts of the encounter and edited all that was required. Car Little MD   Nephrology [address] [place] [address] Drive    This note is created with the assistance of a speech-recognition program. While intending to generate a document that actually reflects the content of the visit, no guarantees can be provided that every mistake has been identified and corrected by editing.

## 2022-01-19 NOTE — CARE COORDINATION
Pt currently on high flow 75% 45L. New vascular surgery consult and nephrology consult.  [facility] following

## 2022-01-19 NOTE — CONSULTS
Division of Vascular Surgery        New Consult      Physician Requesting Consult:  Dr. Ordonez    Reason for Consult:   Gluteal hematoma    Chief Complaint:      Down trending Hgb    History of Present Illness:      Caleb Carnes is a [de-identified] y.o. gentleman who presented with shortness of breath was found to be in acute respiratory failure secondary to COVID-19 as well as A. fib with RVR. He was admitted to the hospital and started on heparin drip for his A. fib with RVR. His dropped and he required transfusion of 2 units of pRBC 1/16. CT scan obtained at that time showed a left gluteal hematoma without active extravasation no IV contrast was given. Patient has a medical history consistent with hypertension, hyperlipidemia, CKD, aortic valve stenosis peripheral artery disease status post aortobifem. Over the past 3 days patient's hemoglobin has gone from 8.9-7.4-7.3. Vascular surgery was consulted due to concern for hemoglobin drop in the setting of a gluteal hematoma. Medical History:     Past Medical History:   Diagnosis Date    Chronic lower back pain     Cobalamin deficiency     [address]A (cerebral vascular accident) ([address]     Diabetes mellitus ([address]     Hyperlipidemia     Hypertension     Malignant neoplasm of colon ([address]     PAD (peripheral artery disease) ([address]        Surgical History:     Past Surgical History:   Procedure Laterality Date    ARTERY SURGERY         Family History:     History reviewed. No pertinent family history. Allergies:       Patient has no known allergies.     Medications:      Current Facility-Administered Medications   Medication Dose Route Frequency Provider Last Rate Last Admin    lidocaine 1 % injection 5 mL  5 mL IntraDERmal Once Hector Cleary MD        sodium chloride flush 0.9 % injection 5-40 mL  5-40 mL IntraVENous 2 times per day Hector Cleary MD        sodium chloride flush 0.9 % injection 5-40 mL  5-40 mL IntraVENous PRN Hector Cleary MD   10 mL at 01/19/22 1145    0.9 % sodium chloride infusion  25 mL IntraVENous PRN Lyubov Cosme MD        hydrocortisone (CORTEF) tablet 20 mg  20 mg Oral BID Lyubov Cosme MD   20 mg at 01/19/22 0913    sodium bicarbonate tablet 1,300 mg  1,300 mg Oral BID Robert Mcdonough MD   1,300 mg at 01/19/22 0912    busPIRone (BUSPAR) tablet 10 mg  10 mg Oral TID Robert Mcdonough MD   10 mg at 01/19/22 1339    melatonin tablet 3 mg  3 mg Oral Nightly PRN Carmen Diesel, APRN - CNP   3 mg at 01/18/22 2040    0.9 % sodium chloride infusion   IntraVENous PRN Carol Ann Young APRN - CNP        0.9 % sodium chloride infusion   IntraVENous PRN Lauren Davis MD        0.9 % sodium chloride infusion   IntraVENous PRN Lauren Davis MD        sodium chloride (OCEAN) 0.65 % nasal spray 1 spray  1 spray Each Nostril PRN Carmen Diesel, APRN - CNP   1 spray at 01/14/22 2056    LORazepam (ATIVAN) injection 0.5 mg  0.5 mg IntraVENous Q2H PRN Hortencia Brown DO   0.5 mg at 01/14/22 2233    [Held by provider] insulin glargine (LANTUS) injection vial 20 Units  20 Units SubCUTAneous QAM Lauren Davis MD   20 Units at 01/16/22 0857    senna (SENOKOT) tablet 17.2 mg  2 tablet Oral Nightly Carmen Diesel, APRN - CNP   17.2 mg at 01/18/22 2039    [Held by provider] insulin lispro (HUMALOG) injection vial 0-6 Units  0-6 Units SubCUTAneous TID WC Robert Mcdonough MD   1 Units at 01/16/22 0858    [Held by provider] insulin lispro (HUMALOG) injection vial 0-3 Units  0-3 Units SubCUTAneous Nightly Robert Mcdonough MD   1 Units at 01/14/22 2059    glucose (GLUTOSE) 40 % oral gel 15 g  15 g Oral PRN Robert Mcdonough MD        dextrose 50 % IV solution  12.5 g IntraVENous PRN Robert Mcdonough MD   12.5 g at 01/18/22 1241    glucagon (rDNA) injection 1 mg  1 mg IntraMUSCular PRN Robert Mcdonough MD   1 mg at 01/17/22 0654    dextrose 5 % solution  100 mL/hr IntraVENous PRN Chelsea Sabillon  mL/hr at 01/19/22 0010 100 mL/hr at 01/19/22 0010    amiodarone (CORDARONE) tablet 200 mg  200 mg Oral Daily Fabio Montilla APRN - CNP   200 mg at 01/19/22 0911    metoprolol (LOPRESSOR) injection 5 mg  5 mg IntraVENous Q6H PRN Tasha Williamson MD   5 mg at 01/10/22 0948    metoprolol tartrate (LOPRESSOR) tablet 50 mg  50 mg Oral BID Tasha Williamson MD   50 mg at 01/19/22 0912    benzonatate (TESSALON) capsule 200 mg  200 mg Oral TID PRN Tressie Najjar, APRN - CNP   200 mg at 01/10/22 2339    pantoprazole (PROTONIX) tablet 40 mg  40 mg Oral QAM AC Tasha Williamson MD   40 mg at 01/19/22 0910    sodium chloride flush 0.9 % injection 10 mL  10 mL IntraVENous PRN Cristina Santos MD   10 mL at 01/08/22 1210    atorvastatin (LIPITOR) tablet 20 mg  20 mg Oral Nightly Peter P Blood, DO   20 mg at 01/18/22 2039    [Held by provider] clopidogrel (PLAVIX) tablet 75 mg  75 mg Oral Daily Peter P Blood, DO   75 mg at 01/19/22 0912    vitamin B-12 (CYANOCOBALAMIN) tablet 1,000 mcg  1,000 mcg Oral Daily Peter P Blood, DO   1,000 mcg at 01/19/22 0912    aspirin chewable tablet 81 mg  81 mg Oral Daily Peter P Blood, DO   81 mg at 01/19/22 0912    sodium chloride flush 0.9 % injection 5-40 mL  5-40 mL IntraVENous 2 times per day Peter P Blood, DO   10 mL at 01/19/22 0918    sodium chloride flush 0.9 % injection 5-40 mL  5-40 mL IntraVENous PRN Peter P Blood, DO        0.9 % sodium chloride infusion  25 mL IntraVENous PRN Peter P Blood, DO        ondansetron (ZOFRAN-ODT) disintegrating tablet 4 mg  4 mg Oral Q8H PRN Peter P Blood, DO        Or    ondansetron (ZOFRAN) injection 4 mg  4 mg IntraVENous Q6H PRN Peter P Blood, DO        polyethylene glycol (GLYCOLAX) packet 17 g  17 g Oral Daily PRN Peter P Blood, DO   17 g at 01/12/22 0901    acetaminophen (TYLENOL) tablet 650 mg  650 mg Oral Q6H PRN Peter VILLARREAL Blood, DO   650 mg at 01/15/22 1659    Or    acetaminophen (TYLENOL) suppository 650 mg  650 mg Rectal Q6H PRN Peter VILLARREAL Blood, DO         Social History:     Tobacco:    reports that he has quit smoking. He has never used smokeless tobacco.  Alcohol:      reports previous alcohol use. Drug Use:  reports no history of drug use. Occupation:     Review of Systems:     Review of Systems   Constitutional: Positive for activity change and appetite change. Negative for chills, diaphoresis and fever. HENT: Negative for congestion. Eyes: Negative for visual disturbance. Respiratory: Positive for shortness of breath. Negative for cough, chest tightness and wheezing. Cardiovascular: Negative for chest pain, palpitations and leg swelling. Gastrointestinal: Negative for abdominal pain, blood in stool, constipation, diarrhea, nausea and vomiting. Genitourinary: Negative for difficulty urinating. Neurological: Positive for weakness. Negative for dizziness, light-headedness, numbness and headaches. All other systems reviewed and are negative. Physical Exam:     Vitals:  /68   Pulse 71   Temp 97.9 °F (36.6 °C) (Oral)   Resp (!) 34   Ht 5' 10\" (1.778 m)   Wt 148 lb 2.4 oz (67.2 kg)   SpO2 100%   BMI 21.26 kg/m²     Physical Exam  HENT:      Head: Normocephalic and atraumatic. Right Ear: External ear normal.      Left Ear: External ear normal.      Mouth/Throat:      Pharynx: Oropharynx is clear. Eyes:      Pupils: Pupils are equal, round, and reactive to light. Cardiovascular:      Rate and Rhythm: Normal rate. Rhythm irregular. Pulmonary:      Comments: On high flow nasal cannula  Chest:      Chest wall: Tenderness present. Abdominal:      General: There is no distension. Palpations: Abdomen is soft. Skin:     General: Skin is warm. Findings: Bruising present. Comments: Left gluteus ecchymosis   Neurological:      Mental Status: He is alert.        Imaging/Labs:     CT ABDOMEN PELVIS WO CONTRAST Additional Contrast? None    Result Date: 1/16/2022  Left gluteal hematoma, partially visualized on this study without obvious active bleeding, but limited without IV contrast.  No retroperitoneal or rectus sheath bleed. Extensive findings in the visualized lungs compatible with known COVID-19 pneumonia; denser GGOs suggest worsening pneumonia/pneumonitis or developing consolidation. No pneumothorax. Multiple additional findings, as detailed in the body of the report above. RECOMMENDATIONS: Consider follow-up CT pelvis including the upper thighs, if the patient does not respond to resuscitation with blood products/fluids and other conservative measures including localized pressure. Findings were discussed with [facility] at 12:24 pm on 1/16/2022. Assessment and Plan:     Patient is an 45-year-old gentleman with respiratory failure secondary to COVID-19 who has a left gluteal hematoma    Continue medical management per primary team  Would recommending continuing to hold anticoagulation at this time  If hemoglobin continues to drop would recommend repeat CT scan  If extravasation would recommend interventional radiology consult  No vascular surgery intervention at this time  Please call back with any further questions or concerns      Electronically signed by Erika Cunningham DO on 1/19/22 at 3:03 PM [place] [address]  Office: [phone]  Cell: [phone]  Email: [email or web address]

## 2022-01-19 NOTE — PROGRESS NOTES
[facility]  Office: [address], DO, Amelie Rex, DO, Soheila Galvan, DO, Naty Jonesge Blood, DO, Jersey Heard MD, Bettie Armendariz MD, Beronica Chapman MD, Becca Hu MD, Amy Ortiz MD, Migdalia Abernathy MD, Carol Jay MD, Cindy Arriaza, DO, Mattie Graff, DO, Maria A Osorio MD,  Merissa Nagel DO, Amy Arguello MD, Cheryl Phillips MD, Lexus Florentino MD, Alexi Trinidad MD, Anastasia Dean MD, Rosalinda Perkins MD, Tatyana Kumari MD, Earl Dominguez [facility], [facility], [facility], Vidhi Jacobs, [facility], Ronn Husain, CNS, Xuan Zhong, CNP, Kip Bernstein, CNP, Berry Cosme, CNP, Jhonatan Santos, CNP, Riana Simms CNP, SY MoserC, Artemio Primrose, DNP, Darrel Vizcarra [facility], Danyelle Sebastian, CNP, Leonora Potts CNP, Kalyn Ellis, CNP, Madiha Weeks [facility], Lazarus Mais, CNP, Jo Ann Goodrich, [address] [place] [address]    Progress Note    1/19/2022    10:31 AM    Name:   Rabia Law  MRN:     [id number]     Acct:      [de-identified]   Room:   [address] [place] [address] Day:  11  Admit Date:  1/8/2022  9:49 AM    PCP:   MOHAMUD Melendez CNP  Code Status:  DNR-CCA    Subjective:     C/C:   Chief Complaint   Patient presents with    Shortness of Breath    Nausea & Vomiting     Interval History Status: not changed. Patient seen and examined at bedside. Issues with hypoglycemia this morning and overnight  Patient sitting comfortable.,  He is on high flow nasal cannula    On some D5, BS are slightly better   Patient vitals, labs and all providers notes were reviewed,from overnight shift and morning updates were noted and discussed with the nurse      Brief History:     26-year-old male past medical history hypertension, hyperlipidemia, CKD stage IIIb, aortic valve stenosis, peripheral arterial disease presents with shortness of breath found to be in acute respiratory failure secondary to COVID-19 as well as A. fib with RVR.   Patient being followed by cardiology, infectious disease, critical care, nephrology was also seen the patient. Patient was originally started on heparin drip for A. fib with RVR however was discontinued due to acute blood loss anemia and bilateral hematomas on both upper extremities. Patient required 2 units of red blood cells on 1/16/2022. Patient was also evaluated by nephrology due to JULES on CKD stage III which improved with IV hydration. For COVID-19 patient status post Actemra on/8/2022, and Decadron 1/8/2022. Patient continues on high flow. Patient originally did change his CODE STATUS from DNR CCA to full code on 11/13/2022 and reverted back to DNR CCA on 11/17/2022. Review of Systems:     Review of Systems   Constitutional: Positive for activity change and appetite change. Negative for chills, diaphoresis and fever. HENT: Negative for congestion. Eyes: Negative for visual disturbance. Respiratory: Positive for shortness of breath. Negative for cough, chest tightness and wheezing. Cardiovascular: Negative for chest pain, palpitations and leg swelling. Gastrointestinal: Negative for abdominal pain, blood in stool, constipation, diarrhea, nausea and vomiting. Genitourinary: Negative for difficulty urinating. Neurological: Positive for weakness. Negative for dizziness, light-headedness, numbness and headaches. Psychiatric/Behavioral: Positive for decreased concentration. All other systems reviewed and are negative. Medications:      Allergies:  No Known Allergies    Current Meds:   Scheduled Meds:    lidocaine 1 % injection  5 mL IntraDERmal Once    sodium chloride flush  5-40 mL IntraVENous 2 times per day    hydrocortisone  20 mg Oral BID    sodium bicarbonate  1,300 mg Oral BID    busPIRone  10 mg Oral TID    [Held by provider] insulin glargine  20 Units SubCUTAneous QAM    senna  2 tablet Oral Nightly    [Held by provider] insulin lispro  0-6 Units SubCUTAneous TID     [Held by provider] insulin lispro  0-3 Units SubCUTAneous Nightly    amiodarone  200 mg Oral Daily    metoprolol tartrate  50 mg Oral BID    pantoprazole  40 mg Oral QAM AC    atorvastatin  20 mg Oral Nightly    clopidogrel  75 mg Oral Daily    vitamin B-12  1,000 mcg Oral Daily    aspirin  81 mg Oral Daily    sodium chloride flush  5-40 mL IntraVENous 2 times per day     Continuous Infusions:    sodium chloride      sodium chloride      sodium chloride      sodium chloride      dextrose 100 mL/hr (22 0010)    sodium chloride       PRN Meds: sodium chloride flush, sodium chloride, melatonin, sodium chloride, sodium chloride, sodium chloride, sodium chloride, LORazepam, glucose, dextrose, glucagon (rDNA), dextrose, metoprolol, benzonatate, sodium chloride flush, sodium chloride flush, sodium chloride, ondansetron **OR** ondansetron, polyethylene glycol, acetaminophen **OR** acetaminophen    Data:     Past Medical History:   has a past medical history of Chronic lower back pain, Cobalamin deficiency, [facility] (ce[facility]) ([facility] Diabetes mellitus ([facility] Hyperlipidemia, Hypertension, Malignant neoplasm of colon ([facility] and PAD (peripheral artery disease) ([facility] Social History:   reports that he has quit smoking. He has never used smokeless tobacco. He reports previous alcohol use. He reports that he does not use drugs. Family History: History reviewed. No pertinent family history. Vitals:  /68   Pulse 71   Temp 97.9 °F (36.6 °C) (Oral)   Resp (!) 34   Ht 5' 10\" (1.778 m)   Wt 148 lb 2.4 oz (67.2 kg)   SpO2 96%   BMI 21.26 kg/m²   Temp (24hrs), Av.3 °F (36.8 °C), Min:97.7 °F (36.5 °C), Max:98.8 °F (37.1 °C)    Recent Labs     22  1713 22  0411 22  0700   POCGLU 100 140* 143* 106       I/O (24Hr):     Intake/Output Summary (Last 24 hours) at 2022 1031  Last data filed at 2022 0846  Gross per 24 hour   Intake 300 ml   Output 1850 ml Net -1550 ml       Labs:  Hematology:  Recent Labs     01/17/22  0634 01/18/22  1305 01/19/22  0600   WBC 31.9* 21.7* 18.5*   RBC 2.81* 2.37* 2.37*   HGB 8.9* 7.4* 7.3*   HCT 24.0* 21.6* 21.7*   MCV 85.4 91.1 91.6   MCH 31.7 31.2 30.8   MCHC 37.1* 34.3 33.6   RDW 16.1* 16.0* 16.3*    103* 89*   MPV 11.4 11.0 10.9     Chemistry:  Recent Labs     01/17/22  0634 01/18/22  1305 01/19/22  0600    136 130*   K 5.3 5.1 4.7    105 100   CO2 15* 16* 21   GLUCOSE 89 368* 129*   * 91* 77*   CREATININE 3.69* 3.31* 2.96*   ANIONGAP 18* 15 9   LABGLOM 16* 18* 21*   GFRAA 19* 22* 25*   CALCIUM 7.5* 7.5* 7.3*     Recent Labs     01/18/22  1316 01/18/22  1602 01/18/22  1713 01/18/22  2006 01/19/22  0411 01/19/22  0700   POCGLU 36* 178* 100 140* 143* 106     ABG:  Lab Results   Component Value Date    FIO2 INFORMATION NOT PROVIDED 01/10/2022     Lab Results   Component Value Date/Time    SPECIAL NOT REPORTED 01/17/2022 03:32 PM     Lab Results   Component Value Date/Time    CULTURE NO GROWTH 01/17/2022 03:32 PM       Radiology:  CT ABDOMEN PELVIS WO CONTRAST Additional Contrast? None    Result Date: 1/16/2022  Left gluteal hematoma, partially visualized on this study without obvious active bleeding, but limited without IV contrast.  No retroperitoneal or rectus sheath bleed. Extensive findings in the visualized lungs compatible with known COVID-19 pneumonia; denser GGOs suggest worsening pneumonia/pneumonitis or developing consolidation. No pneumothorax. Multiple additional findings, as detailed in the body of the report above. RECOMMENDATIONS: Consider follow-up CT pelvis including the upper thighs, if the patient does not respond to resuscitation with blood products/fluids and other conservative measures including localized pressure. Findings were discussed with Teagan Ton at 12:24 pm on 1/16/2022. XR CHEST PORTABLE    Result Date: 1/16/2022  Right IJ line in satisfactory position.  Unchanged or slightly worse bilateral diffuse infiltrates consistent with the patient's history of COVID pneumonia. XR CHEST PORTABLE    Result Date: 1/14/2022  Patchy bilateral interstitial and ground-glass infiltrates most notably in the lung periphery. Findings have progressed from the prior exam compatible with patient's history of COVID pneumonia     XR CHEST PORTABLE    Result Date: 1/11/2022  Unchanged bilateral airspace opacities       Physical Examination:        Physical Exam  Vitals and nursing note reviewed. Constitutional:       General: He is not in acute distress. HENT:      Head: Normocephalic and atraumatic. Eyes:      Conjunctiva/sclera: Conjunctivae normal.      Pupils: Pupils are equal, round, and reactive to light. Cardiovascular:      Rate and Rhythm: Normal rate and regular rhythm. Heart sounds: No murmur heard. Pulmonary:      Effort: Pulmonary effort is normal. No accessory muscle usage or respiratory distress. Breath sounds: No stridor. No decreased breath sounds, wheezing, rhonchi or rales. Abdominal:      General: Bowel sounds are normal. There is no distension. Palpations: Abdomen is soft. Abdomen is not rigid. Tenderness: There is no abdominal tenderness. There is no guarding. Musculoskeletal:         General: No tenderness. Skin:     General: Skin is warm and dry. Findings: No erythema, lesion or rash. Neurological:      Mental Status: He is alert. Cranial Nerves: No cranial nerve deficit. Motor: No seizure activity. Psychiatric:         Speech: Speech normal.         Behavior: Behavior normal. Behavior is cooperative.          Assessment:        Hospital Problems           Last Modified POA    * (Principal) Pneumonia due to COVID-19 virus 1/8/2022 Yes    Essential hypertension 1/8/2022 Yes    Hyperlipidemia 1/8/2022 Yes    Acute hypoxemic respiratory failure due to COVID-19 [facility]) 1/8/2022 Yes    Acute kidney injury superimposed on chronic kidney disease ([facility] baseline creatinine 1.9 1/8/2022 Yes    Stage 3b chronic kidney disease ([facility] 1/8/2022 Yes    Elevated troponin 1/9/2022 Yes    Nonrheumatic aortic valve stenosis 1/9/2022 Yes    PAD (peripheral artery disease) ([facility] 1/9/2022 Yes    Moderate protein-calorie malnutrition ([facility] 1/9/2022 Yes    Atrial fibrillation with RVR ([facility] 1/10/2022 Yes          Plan:        Sepsis : resolved     COVID 19 Pneumonia :   S/p Actemra 1/8  Steroids   Vit C/D  Incentive spirometry  Titrate inflammatory markers  ID involved     Acute hypoxemic respiratory failure: Likely secondary to Covid 19 pneumonia, continue supplemental oxygen,  high flow currently alternating with BiPAP    JULES/CKD with metabolic acidosis: Slightly better yesterday but given worsening compared to admission we will add nephrology consult. For now  we will stop Half-normal saline    Persistent hypoglycemia: Patient currently on  D5 at 50 ml , steroids started as well, encourage PO intake , will switch boost to regular one     DM2: As mentioned above was hypoglycemic currently, continue to hold all insulin and insulin sliding scale, hypoglycemia protocol  Likely after stopping IV Decadron, will give some p.o. steroids and reassess in a.m.     Acute blood loss anemia due to retroperitoneal bleed: Patient status post 2 unit of packed RBCs on 1/16, currently stable, heparin drip is on hold  Hemoglobin from this a.m. reflecting some drop, will monitor closely , discussed with vascular    AMelvin rojas with RVR: Currently rate controlled, anticoagulation was held secondary to retroperitoneal bleed    CODE STATUS is DNR CCA    Peripheral vascular disease: will hold plavix for now      HTN: continue home medications    HPL: continue home medications    DVT prophylaxis : mechanical only given above     Discussed with the patient and the nurse    Called wife La Nena Scott and discussed with her, updated her regarding current plan and the patient condition and she expressed understanding       Hector Cleary MD  1/19/2022  10:31 AM

## 2022-01-19 NOTE — PROGRESS NOTES
[facility] [address] [address] Patient  Today's Date and Time: 1/19/2022, 1:13 PM    Impression :     · COVID 19 Confirmed Infection  · Covid tests:  · 1/8/21: Positive  · Acute hypoxic respiratory failure  · Paroxysmal A. 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 · He is insisting on trying to go home and does not quite understand that he is requiring a significant amount of respiratory support. · He did mention to me that he was happy to die at home but it is my understanding that the recently changed his CODE STATUS to comfort care arrest to full code.   · His code status has again been changed to [facility]  · Continue supportive care      Medical Decision Making/Summary/Discussion:1/19/2022     · Patient admitted with COVID 19 infection  · Isolation until 1/28/22    Infection Control Recommendations   · Universal Precautions  · Airborne isolation  · Droplet Isolation    Antimicrobial Stewardship Recommendations       · Renal considerations  Coordination of Outpatient Care:   · Estimated Length of IV antimicrobials:TBD  · Patient will need Midline Catheter Insertion: TBD  · Patient will need PICC line Insertion: No  · Patient will need: Home IV , [place],  SNF,  LTAC:TBD  · Patient will need outpatient wound care:No    Chief complaint/reason for consultation:   · Concern for COVID infection      History of Present Illness:   Bk Villa is a [de-identified]y.o.-year-old male who was initially admitted on 1/8/2022. Patient seen at the request of Dr. Garfield Burroughs:    Patient presented through ER with complaints of needing shortness of breath, cough, loss of appetite, nausea, vomiting and diarrhea over the past 7 to 10 days. He has not received the Covid vaccine and tested positive for Covid shortly after [place]. On evaluation in the ED, the patient had an SPO2 of 80% on room air and was tachypneic. A rapid Covid swab was positive. CT chest shows extensive bilateral pulmonary groundglass opacities. He was started on Decadron and given a dose of Actemra. Abnormal labs include:  · Creatinine 2.32  ·   · Troponin I 48  · WBC 17.1    Patient admitted because of concerns with COVID 19. Meropenem initiated on 1/11/2022 for worsening respiratory distress and leukocytosis    Stable chest x-ray 1/11    CRP is trending down    Hemoglobin dropped to 5.7 on 1/16/2022  Hemoglobin remained stable at this time after receiving infusions of PRBC  Left gluteal hematoma present on CT abdomen pelvis      CURRENT EVALUATION : 1/19/2022    Afebrile  VS stable    High flow nasal cannula at 90% FiO2  Patient was on BiPAP overnight    Leukocytosis is trending down  JULES continues    Urine and blood cultures ordered    Discussed with RN    Patient exhibiting respiratory distress. Yes  Respiratory secretions: No    Patient receiving supplemental oxygen. BiPAP  RR: 20>34  02 sat: 98--> 96    % FIO2: 60-->65>90  Flow Rate: 45 L/min  PEEP:      QTc:       NEWS Score: 0-4 Low risk group; 5-6: Medium risk group; 7 or above: High risk group  Parameters 3 2 1 0 1 2 3   Age    < 65   ? 65   RR ? 8  9-11 12-20  21-24 ? 25   O2 Sats ?  91 92-93 94-95 ? 96      Suppl O2  Yes  No      SBP ? 90  101-110 111-219   ? 220   HR ? 40  41-50 51-90  111-130 ? 131   Consciousness    Alert Drowsiness, lethargy, or confusion   Temperature ? 35.0 C (95.0 F)  35.1-36.0 C 95.1-96.9 F 36.1-38.0 C 97.0-100.4 F 38.1-39.0 C 100.5-102.3 F ? 39.1 C ? 102.4 F      NEWS Score:   1/9/2022: 5 moderate risk    Overall Daily Picture:      worsened    Presence of secondary bacterial Infection:    Possible  Additional antibiotics: Meropenem 1/11/2022    Labs, X rays reviewed: 1/19/2022    BUN:101>77  Cr:3.69>2.96    WBC:27.8-->30.5-->31.9>21.7>18.5  Hb: 7.3  Plat: 89    Absolute Neutrophils:16  Absolute Lymphocytes:0.34  Neutrophil/Lymphocyte Ratio: 53 very high risk    CRP:160-->131-->52.3  Ferritin:804  LDH: 563    Pro Calcitonin:      Cultures:  Urine:  ·   Blood:  ·   Sputum :  ·   Wound:       CXR:     CAT:  1/8/21      Discussed with patient, RN, CC, IM. I have personally reviewed the past medical history, past surgical history, medications, social history, and family history, and I have updated the database accordingly.   Past Medical History:     Past Medical History:   Diagnosis Date    Chronic lower back pain     Cobalamin deficiency     [facility]     Diabetes mellitus [facility]     Hyperlipidemia     Hypertension     Malignant neoplasm of colon ([facility]     [facility]ipheral [facility]        Past Surgical  History:     Past Surgical History:   Procedure Laterality Date    ARTERY SURGERY         Medications:      lidocaine 1 % injection  5 mL IntraDERmal Once    sodium chloride flush  5-40 mL IntraVENous 2 times per day    hydrocortisone  20 mg Oral BID    sodium bicarbonate  1,300 mg Oral BID    busPIRone  10 mg Oral TID    [Held by provider] insulin glargine  20 Units SubCUTAneous QAM    senna  2 tablet Oral Nightly    [Held by provider] insulin lispro  0-6 Units SubCUTAneous TID WC    [Held by provider] insulin lispro  0-3 Units SubCUTAneous Nightly    amiodarone  200 mg Oral Daily    metoprolol tartrate  50 mg Oral BID    pantoprazole  40 mg Oral QAM AC    atorvastatin  20 mg Oral Nightly    [Held by provider] clopidogrel  75 mg Oral Daily    vitamin B-12  1,000 mcg Oral Daily    aspirin  81 mg Oral Daily    sodium chloride flush  5-40 mL IntraVENous 2 times per day       Social History:     Social History     Socioeconomic History    Marital status:      Spouse name: Not on file    Number of children: Not on file    Years of education: Not on file    Highest education level: Not on file   Occupational History    Not on file   Tobacco Use    Smoking status: Former Smoker    Smokeless tobacco: Never Used   Substance and Sexual Activity    Alcohol use: Not Currently    Drug use: Never    Sexual activity: Not on file   Other Topics Concern    Not on file   Social History Narrative    Not on file     Social Determinants of Health     Financial Resource Strain:     Difficulty of Paying Living Expenses: Not on file   Food Insecurity:     Worried About Running Out of Food in the Last Year: Not on file    Lesley of Food in the Last Year: Not on file   Transportation Needs:     Lack of Transportation (Medical): Not on file    Lack of Transportation (Non-Medical):  Not on file   Physical Activity:     Days of Exercise per Week: Not on file    Minutes of Exercise per Session: Not on file   Stress:     Feeling of Stress : Not on file   Social Connections:     Frequency of Communication with Friends and Family: Not on file    Frequency of Social Gatherings with Friends and Family: Not on file    Attends Sikh Services: Not on file    Active Member of Clubs or Organizations: Not on file    Attends Club or Organization Meetings: Not on file    Marital Status: Not on file   Intimate Partner Violence:     Fear of Current or Ex-Partner: Not on file    Emotionally Abused: Not on file    Physically Abused: Not on file    Sexually Abused: Not on file   Housing Stability:     Unable to Pay for Housing in the Last Year: Not on file    Number of Places Lived in the Last Year: Not on file    Unstable Housing in the Last Year: Not on file       Family History:   History reviewed. No pertinent family history. Allergies:   Patient has no known allergies. Review of Systems:     Review of Systems   Constitutional: Negative. Respiratory: Positive for shortness of breath. Cardiovascular: Negative. Gastrointestinal: Negative. Genitourinary: Negative. Musculoskeletal: Negative. Skin: Positive for color change. Neurological: Negative. Physical Examination :     Patient Vitals for the past 8 hrs:   BP Temp Temp src Pulse Resp SpO2   01/19/22 0846     (!) 34    01/19/22 0800 122/68 97.9 °F (36.6 °C) Oral 71 16 96 %     Physical Exam  Constitutional:       Appearance: He is well-developed. HENT:      Head: Normocephalic and atraumatic. Cardiovascular:      Rate and Rhythm: Normal rate. Heart sounds: Murmur heard. Pulmonary:      Effort: Pulmonary effort is normal.      Breath sounds: Normal breath sounds. No wheezing. Abdominal:      General: Bowel sounds are normal.      Palpations: Abdomen is soft. There is no mass. Tenderness: There is no abdominal tenderness. Musculoskeletal:         General: Swelling (In the bilateral upper extremities especially in the forearms.) present. Normal range of motion. Cervical back: Neck supple. Lymphadenopathy:      Cervical: No cervical adenopathy. Skin:     General: Skin is dry. Findings: Bruising (There is bruising and ecchymotic skin lesions in the forearms bilaterally right worse than left) present. Neurological:      Mental Status: He is alert and oriented to person, place, and time.          Medical Decision Making -Laboratory:   I have independently reviewed/ordered the following labs:    CBC with Differential:   Recent Labs     01/18/22  1305 01/19/22  0600   WBC 21.7* 18.5*   HGB 7.4* 7.3*   HCT 21.6* 21.7*   * 89* LYMPHOPCT 3* 2*   MONOPCT 2 2     BMP:   Recent Labs     01/18/22  1305 01/19/22  0600    130*   K 5.1 4.7    100   CO2 16* 21   BUN 91* 77*   CREATININE 3.31* 2.96*     Hepatic Function Panel:   No results for input(s): PROT, LABALBU, BILIDIR, IBILI, BILITOT, ALKPHOS, ALT, AST in the last 72 hours. No results for input(s): RPR in the last 72 hours. No results for input(s): HIV in the last 72 hours. No results for input(s): BC in the last 72 hours. Lab Results   Component Value Date    MUCUS 1+ 01/17/2022    RBC 2.37 01/19/2022    TRICHOMONAS NOT REPORTED 01/17/2022    WBC 18.5 01/19/2022    YEAST NOT REPORTED 01/17/2022    TURBIDITY Clear 01/17/2022     Lab Results   Component Value Date    CREATININE 2.96 01/19/2022    GLUCOSE 129 01/19/2022       Medical Decision Making-Imaging:     Narrative   EXAMINATION:   CTA OF THE CHEST 1/8/2022 10:10 am       TECHNIQUE:   CTA of the chest was performed after the administration of intravenous   contrast.  Multiplanar reformatted images are provided for review.  MIP   images are provided for review. Dose modulation, iterative reconstruction,   and/or weight based adjustment of the mA/kV was utilized to reduce the   radiation dose to as low as reasonably achievable.       COMPARISON:   None.       HISTORY:   ORDERING SYSTEM PROVIDED HISTORY: dyspnea / hypoxia   Reason for Exam: Shortness of breath       FINDINGS:   Pulmonary Arteries: Pulmonary arteries are adequately opacified for   evaluation.  No evidence of intraluminal filling defect to suggest pulmonary   embolism.  Main pulmonary artery is normal in caliber.       Mediastinum: No evidence of mediastinal lymphadenopathy.  Normal heart size. Normal caliber thoracic aorta with diffuse atherosclerosis.       Lungs/pleura: Extensive ground-glass opacification of the bilateral lung   fields with peripheral involvement slight apical as well as basilar sparing.    No effusion or pneumothorax.       Upper Abdomen: Limited images of the upper abdomen are unremarkable.       Soft Tissues/Bones: No acute bone or soft tissue abnormality.           Impression   *No evidence of pulmonary embolism. *Extensive ground-glass opacification of the bilateral lung fields with   peripheral involvement slight apical as well as basilar sparing.  Imaging   features suggestive of COVID-19 pneumonia, though are nonspecific and can   occur with a variety of infectious and noninfectious processes.         Narrative   EXAMINATION:   CT OF THE ABDOMEN AND PELVIS WITHOUT CONTRAST, 1/16/2022 10:42 am       TECHNIQUE:   CT of the abdomen and pelvis was performed without the administration of   intravenous contrast. Multiplanar reformatted images are provided for review. Dose modulation, iterative reconstruction, and/or weight based adjustment of   the mA/kV was utilized to reduce the radiation dose to as low as reasonably   achievable.       COMPARISON:   CT of the chest from 01/08/2022, and retroperitoneal ultrasound from   01/10/2022.       HISTORY:   ORDERING SYSTEM PROVIDED HISTORY:  Retrop bleed r/o   TECHNOLOGIST PROVIDED HISTORY:   Retrop bleed r/o   Reason for Exam:  Retrop bleed r/o       FINDINGS:   Lower Chest: As demonstrated on recent CT chest, extensive and somewhat   denser confluent ground-glass airspace opacities re-identified mid-lower   lungs, sparing the bases with some associated crazy paving, air bronchograms   and bibasilar atelectatic appearing changes.  Main pulmonary artery caliber   31 mm.  Mild dilatation ascending aorta, with a maximal dimension of 41 mm.       Organs: Unopacified liver, spleen, adrenal glands and both kidneys show no   acute abnormality; without suspicious focal finding or hydronephrosis.    Significant right renal cortical thinning and some scarring suspected.  No   large stone.  Probable gallbladder sludge or vicarious contrast from recent   contrast CT; no calcified stones.       GI/Bowel: Small-moderate amount of retained stool, mostly right colon with   some fluid/levels; no abnormal dilatation, marked wall thickening or   pericolonic fat stranding.  Unremarkable small bowel.  Some fluid within a   mildly distended stomach.  Small hiatus hernia.       Pelvis: Partially fluid-filled urinary bladder without wall thickening or   mass.  No significant prostatic enlargement.  Symmetric seminal vesicles.  No   pelvic fluid collection or mass.  Partially visualized approximate 10 x 15 x   5.7 cm left gluteal mass with HU measurements/appearance most suggestive of   hematoma.  No high density finding compatible with active bleeding, but   assessment limited on this examination.  Small fat containing inguinal   hernias, larger on the left.       Peritoneum/Retroperitoneum: No retroperitoneal bleed or bulky   lymphadenopathy.  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  Probable limited intimal dissection   suprarenal aorta without marked aneurysmal dilatation.  Soft tissue stranding   flanks extending into the pelvis, suggesting some degree anasarca.  Slightly   greater prominence right proximal rectus musculature       Bones/Soft Tissues: No acute abnormality.  Mild scoliosis, multilevel   degenerative changes of spine but with DDD L5-S1 and bilateral mild hip joint   degenerative findings.           Impression   Left gluteal hematoma, partially visualized on this study without obvious   active bleeding, but limited without IV contrast.  No retroperitoneal or   rectus sheath bleed.       Extensive findings in the visualized lungs compatible with known COVID-19   pneumonia; denser GGOs suggest worsening pneumonia/pneumonitis or developing   consolidation.  No pneumothorax.       Multiple additional findings, as detailed in the body of the report above.       RECOMMENDATIONS: Consider follow-up CT pelvis including the upper thighs, if the patient does   not respond to resuscitation with blood products/fluids and other   conservative measures including localized pressure.  Findings [facility]   [facility] at 12:24 pm on 1/16/2022.               Medical Decision [id number]-[id number]-Pqvdg:     Culture, Blood 1 [[id number]] Collected: 01/09/22 1155   Order Status: Completed Specimen: Blood Updated: 01/14/22 1300    Specimen Description . BLOOD    Special Requests NOT REPORTED    Culture NO GROWTH 5 DAYS   Culture, Blood 1 [[id number]] Collected: 01/09/22 1155   Order Status: Completed Specimen: Blood Updated: 01/14/22 1300    Specimen Description . BLOOD    Special Requests NOT REPORTED    Culture NO GROWTH 5 DAYS   COVID-19, Rapid [[id number]] (Abnormal) Collected: 01/08/22 1045   Order Status: Completed Specimen: Nasopharyngeal Swab Updated: 01/08/22 1109    Specimen Description . NASOPHARYNGEAL SWAB    SARS-CoV-2, Rapid DETECTED Abnormal     Comment:        Rapid NAAT: The specimen is POSITIVE for SARS-Cov-2, the novel coronavirus associated with   COVID-19.         This test has been authorized by the FDA under an Emergency Use Authorization (EUA) for use   by authorized laboratories.         The ID NOW COVID-19 assay is designed to detect the virus that causes COVID-19 in patients   with signs and symptoms of infection who are suspected of COVID-19. An individual without symptoms of COVID-19 and who is not shedding SARS-CoV-2 virus would   expect to have a negative (not detected) result in this assay.    Fact sheet for Healthcare Providers: Dionne   Fact sheet for Patients: Dionne           Methodology: Isothermal Nucleic Acid Amplification         Results reported to the appropriate [facility]            Medical Decision Making-Other:     Note:  · Labs, medications, radiologic studies were reviewed

## 2022-01-20 NOTE — PROGRESS NOTES
Renal Progress Note    Patient :  Caleb Carnes; [de-identified] y.o. MRN# [id number]  Location:  4986/7715-77  Attending:  Hector Cleary MD  Admit Date:  1/8/2022   Hospital Day: 12      Subjective:     Nephrology reconsulted for JULES. Patient's creatinine has been uptrending since 1/14, peaked up to 3.7. Patient did receive 2 PRBCs on 1/16 after Hgb found to be 5.7. CT abdomen revealed left gluteal hematoma. Patient also received a dose of lasix on 1/16. Patient's blood pressure also dropped to systolics of mid 34N during that, later improved. Baseline seems to be between 1.8-2.0. Patient seen and examined. No acute events overnight. Serum creatine continues to improve. 2.52 today (down from 2.96). UOP of 1650 cc over the last 24 hours. D10NS running at 25 mL/hr for hypoglycemia. Glucose 124. Labs reviewed from today: Na 134, K 4.4, chloride 104, bicarb 22. Hgb 7.7. Admitted initially with COVID-19 pneumonia. Previously has history of CVA and aortic stenosis. Baseline creatinine 2.02.2. Developed acute kidney injury initially creatinine peaked at 2.4, then resolved nephrology signed off on 1/14/2021 reconsulted 1/16/2021 for an acute rise in creatinine in the setting of acute drop in hemoglobin suspected left thigh hematoma of unclear etiology. Transfused 2 units packed cells now doing better.     Outpatient Medications:     Medications Prior to Admission: aspirin 325 MG tablet, Take 81 mg by mouth daily   atorvastatin (LIPITOR) 40 MG tablet, Take 20 mg by mouth nightly  lisinopril (PRINIVIL;ZESTRIL) 30 MG tablet, Take 30 mg by mouth daily  metoprolol tartrate (LOPRESSOR) 50 MG tablet, Take 50 mg by mouth daily  cilostazol (PLETAL) 100 MG tablet, Take 100 mg by mouth 2 times daily  clopidogrel (PLAVIX) 75 MG tablet, Take 75 mg by mouth daily  vitamin B-12 (CYANOCOBALAMIN) 1000 MCG tablet, Take 1,000 mcg by mouth daily    Current Medications:     Scheduled Meds:    lidocaine 1 % injection  5 mL IntraDERmal Once  sodium chloride flush  5-40 mL IntraVENous 2 times per day    hydrocortisone  20 mg Oral BID    sodium bicarbonate  1,300 mg Oral BID    busPIRone  10 mg Oral TID    [Held by provider] insulin glargine  20 Units SubCUTAneous QAM    senna  2 tablet Oral Nightly    [Held by provider] insulin lispro  0-6 Units SubCUTAneous TID WC    [Held by provider] insulin lispro  0-3 Units SubCUTAneous Nightly    amiodarone  200 mg Oral Daily    metoprolol tartrate  50 mg Oral BID    pantoprazole  40 mg Oral QAM AC    atorvastatin  20 mg Oral Nightly    [Held by provider] clopidogrel  75 mg Oral Daily    vitamin B-12  1,000 mcg Oral Daily    aspirin  81 mg Oral Daily    sodium chloride flush  5-40 mL IntraVENous 2 times per day     Continuous Infusions:    sodium chloride       IV fluid builder 25 mL/hr (22 2240)    sodium chloride      sodium chloride      sodium chloride      dextrose 100 mL/hr (22 0010)    sodium chloride       PRN Meds:  sodium chloride flush, sodium chloride, melatonin, sodium chloride, sodium chloride, sodium chloride, sodium chloride, LORazepam, glucose, dextrose, glucagon (rDNA), dextrose, metoprolol, benzonatate, sodium chloride flush, sodium chloride flush, sodium chloride, ondansetron **OR** ondansetron, polyethylene glycol, acetaminophen **OR** acetaminophen    Input/Output:       I/O last 3 completed shifts: In: 620 [P.O.:600; I.V.:20]  Out: 1950 [Urine:1950]. No data found. No data found. Vital Signs:   Temperature:  Temp: 98 °F (36.7 °C)  TMax:   Temp (24hrs), Av °F (36.7 °C), Min:97.9 °F (36.6 °C), Max:98.1 °F (36.7 °C)    Respirations:  Resp: 17  Pulse:   Pulse: 66  BP:    BP: (!) 150/74  BP Range: Systolic (56YQM), XNL:004 , Min:133 , ITD:464       Diastolic (40JNK), WVK:44, Min:51, Max:74      Physical Examination:     General:  AAO x 3, speaking in full sentences, no accessory muscle use. On HFNC. HEENT: Atraumatic, normocephalic.   Neck: Supple  Chest:   Bilateral vesicular breath sounds, no rales or wheezes. Cardiac:  Irregularly irregular rhythm, systolic ejection murmur at the apex, second sound barely audible crescendo decrescendo radiating to the base no gallops or rubs. Abdomen: Soft, non-tender, BS audible. :   No suprapubic or flank tenderness. Neuro:  AAO x 3, No FND. SKIN:  No rashes, good skin turgor. Extremities:  No edema.   Bruising evident in the right upper extremity especially the antecubital fossa extending proximally    Labs:       Recent Labs     01/18/22  1305 01/19/22  0600 01/20/22  0626   WBC 21.7* 18.5* 15.2*   RBC 2.37* 2.37* 2.52*   HGB 7.4* 7.3* 7.7*   HCT 21.6* 21.7* 23.1*   MCV 91.1 91.6 91.7   MCH 31.2 30.8 30.6   MCHC 34.3 33.6 33.3   RDW 16.0* 16.3* 16.8*   * 89* 82*   MPV 11.0 10.9 10.6      BMP:   Recent Labs     01/18/22  1305 01/19/22  0600 01/20/22  0626    130* 134*   K 5.1 4.7 4.4    100 104   CO2 16* 21 22   BUN 91* 77* 60*   CREATININE 3.31* 2.96* 2.52*   GLUCOSE 368* 129* 124*   CALCIUM 7.5* 7.3* 7.3*      SUMMER:      Lab Results   Component Value Date    SUMMER NEGATIVE 01/09/2022     SPEP:  Lab Results   Component Value Date    PROT 6.1 01/09/2022     UPEP:   No results found for: LABPE  C3:     Lab Results   Component Value Date    C3 134 01/09/2022     C4:     Lab Results   Component Value Date    C4 31 01/09/2022     Urinalysis/Chemistries:      Lab Results   Component Value Date    NITRU NEGATIVE 01/17/2022    COLORU Yellow 01/17/2022    PHUR 5.0 01/17/2022    WBCUA None 01/17/2022    RBCUA 2 TO 5 01/17/2022    MUCUS 1+ 01/17/2022    TRICHOMONAS NOT REPORTED 01/17/2022    YEAST NOT REPORTED 01/17/2022    BACTERIA NOT REPORTED 01/17/2022    SPECGRAV 1.018 01/17/2022    LEUKOCYTESUR NEGATIVE 01/17/2022    UROBILINOGEN Normal 01/17/2022    BILIRUBINUR NEGATIVE 01/17/2022    GLUCOSEU 1+ 01/17/2022    KETUA NEGATIVE 01/17/2022    AMORPHOUS NOT REPORTED 01/17/2022     Urine Sodium: Lab Results   Component Value Date    CHERYL 56 01/10/2022     Urine Creatinine:     Lab Results   Component Value Date    LABCREA 90.8 01/09/2022     Radiology:     Reviewed. Assessment:     1. Acute Kidney Injury on CKD stage 3b-4 initially secondary to ATN due to decreased PO intake/infection and patient also received contrast on admission then creatinine started to improve but patient developed acute blood loss due to gluteal hematoma requiring blood transfusion along with some hypotension and hence got a second hit and creatinine started to increase again. Peak creatinine was 3.96 mg/DL and most recent creatinine did show some improvement to 2.52 mg/dl. Baseline creatinine seems to be around 1.8-2.0.  2. Acute hypoxemic respiratory failure, on high flow currently alternating with BiPap  3. COVID-19 infection  4. Persistent hypoglycemia, on D10NS  5. 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 Acute blood loss anemia due to left gluteal hematoma, received 2 PRBCs on 1/16/22, hemoglobin currently stable at 7.7  7. Afib with RVR, currently rate controlled  8. Peripheral vascular disease  9. Essential hypertension  10. Severe aortic stenosis valve area 1.0, second heart sound not audible the gradient 57 mean 37  11. 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 12. Diarrhea- melena, FOBT positive    Plan:   1. Continue to monitor off diuretics, continue D10 with normal saline at 25 cc an hour for hypoglycemia. 2. Continue monitor strict I's and O's and renal function. 3. Avoid any further nephrotoxic agents as much as possible. 4. Continue monitor hemoglobin and recommend blood transfusion if less than 7.  5. BMP in AM.  6. Will follow. Nutrition   Please ensure that patient is on a renal diet/TF. Avoid nephrotoxic drugs/contrast exposure. We will continue to follow along with you.      [address] Student    Attending Physician Statement  I have discussed the care of Maryam Ellis, including pertinent history and exam findings with the resident/fellow. I have reviewed the key elements of all parts of the encounter with the resident/fellow. I have seen and examined the patient with the resident/fellow. I agree with the assessment and plan and status of the problem list as documented.       .  Electronically signed by René Martinez MD on 1/20/2022 at 12:23 PM

## 2022-01-20 NOTE — FLOWSHEET NOTE
SPIRITUAL CARE DEPARTMENT - [facility]  PROGRESS NOTE    Shift date: 1.19.2022  Shift day: Wednesday   Shift # 2    Room # 3230/2181-81   Name: Angelica Massey            Age: [de-identified] y.o. Gender: male          Church: unknown   Place of [employer]ism: unknown    Referral: Routine Visit    Admit Date & Time: 1/8/2022  9:49 AM    PATIENT/EVENT DESCRIPTION:  Angelica Massey is a [de-identified] y.o. male   Zoom:  [email or web address]    [phone]  Zoom on 1.22.22 @ 3:00 pm    SPIRITUAL ASSESSMENT/INTERVENTION:  Daughter Raphael Pandey) appears to be calm and coping. States that the patient is feeling depressed and would like to schedule a zoom for Friday at 3:00 pm.  Daughter states that patient is hard of hearing and not sure if the Zoom will work. Daughter is concerned about the patient not eating and is hoping that having a Zoom will help the patient feel a bit better and not alone.  provided space for the daughter to express feelings, thoughts, and concerns. Determined support to be available. SPIRITUAL CARE FOLLOW-UP PLAN:  Chaplains will remain available to offer spiritual and emotional support as needed. Electronically signed by Mateus Sorenson on 1/19/2022 at 8:52 PM.  [address]  [phone]       01/19/22 2051   Encounter Summary   Services provided to: Family   Referral/Consult From: Other    Support System Children   Continue Visiting   ([phone])   Complexity of Encounter Moderate   Length of Encounter 15 minutes   Spiritual Assessment Completed Yes   Routine   Type Initial   Assessment Calm; Approachable;Coping   Intervention Active listening;Explored feelings, thoughts, concerns;Explored coping resources; Discussed illness/injury and it's impact;Sustaining presence/ Ministry of presence   Outcome Expressed gratitude;Expressed feelings/needs/concerns;Engaged in conversation     Electronically signed by Arsalan De Leon on 1/19/2022 at 8:52 PM

## 2022-01-20 NOTE — PROGRESS NOTES
[facility]  Office: [address], DO, Meseret Paxton, DO, [facility], DO, Yared Chanda Blood, DO, Yordan Patterson MD, Steven Fields MD, Darci Garcia MD, Gregory Ybarra MD, Regis Eugene MD, Damián Correia MD, Raven Grace MD, America Martin, DO, Bulmaro Stokes, DO, Chelsea Sabillon MD,  Bear Martines, DO, Davon Aguirre MD, Kimmie Sanchez MD, Tasha Williamson MD, Wil Rubin MD, Sun Haynes MD, Delores Roberto MD, Kevon Garcia MD, G[facility], [facility], CNP, Graciela Mondragon, CNP, Eugene Lopez, CNS, Tyree Jacobsen, Rosas Russ, CNP, Cassandra Howard, CNP, Francisca Epley, CNP, Michael Barrera, CNP, Baljit Hammonds PA-C, Melani Arenas, DNP, Pedro Luis Rueda, DNP, Angi Lorenzana, CNP, Venancio Cho, CNP, Colin Rodgers, CNP, Mario Del Rio, CNP, Georgiana Bowles, [facility], Deisi Lorenzo, [address] [place] [address]    Progress Note    1/20/2022    12:45 PM    Name:   Yancy Simms  MRN:     [id number]     Acct:      [de-identified]   Room:   [address] [place] [address] Day:  12  Admit Date:  1/8/2022  9:49 AM    PCP:   MOHAMUD Cardenas CNP  Code Status:  DNR-CCA    Subjective:     C/C:   Chief Complaint   Patient presents with    Shortness of Breath    Nausea & Vomiting     Interval History Status: not changed. Patient seen and examined at bedside. Continue to be about the same   Fluids now are D10 with normal saline as the sodium is going down  Patient sitting comfortable.,  He is on high flow nasal cannula 90% FiO2  Patient vitals, labs and all providers notes were reviewed,from overnight shift and morning updates were noted and discussed with the nurse      Brief History:     81-year-old male past medical history hypertension, hyperlipidemia, CKD stage IIIb, aortic valve stenosis, peripheral arterial disease presents with shortness of breath found to be in acute respiratory failure secondary to COVID-19 as well as A. fib with RVR. Patient being followed by cardiology, infectious disease, critical care, nephrology was also seen the patient. Patient was originally started on heparin drip for A. fib with RVR however was discontinued due to acute blood loss anemia and bilateral hematomas on both upper extremities. Patient required 2 units of red blood cells on 1/16/2022. Patient was also evaluated by nephrology due to JULES on CKD stage III which improved with IV hydration. For COVID-19 patient status post Actemra on/8/2022, and Decadron 1/8/2022. Patient continues on high flow. Patient originally did change his CODE STATUS from DNR CCA to full code on 11/13/2022 and reverted back to DNR CCA on 11/17/2022. Review of Systems:     Review of Systems   Constitutional: Positive for activity change and appetite change. Negative for chills, diaphoresis and fever. HENT: Negative for congestion. Eyes: Negative for visual disturbance. Respiratory: Positive for shortness of breath. Negative for cough, chest tightness and wheezing. Cardiovascular: Negative for chest pain, palpitations and leg swelling. Gastrointestinal: Negative for abdominal pain, blood in stool, constipation, diarrhea, nausea and vomiting. Genitourinary: Negative for difficulty urinating. Neurological: Positive for weakness. Negative for dizziness, light-headedness, numbness and headaches. Psychiatric/Behavioral: Positive for decreased concentration. All other systems reviewed and are negative. Medications:      Allergies:  No Known Allergies    Current Meds:   Scheduled Meds:    [START ON 1/21/2022] ferrous sulfate  325 mg Oral Daily with breakfast    lidocaine 1 % injection  5 mL IntraDERmal Once    sodium chloride flush  5-40 mL IntraVENous 2 times per day    hydrocortisone  20 mg Oral BID    sodium bicarbonate  1,300 mg Oral BID    busPIRone  10 mg Oral TID    [Held by provider] insulin glargine  20 Units SubCUTAneous QAM    senna  2 tablet Oral I/O (24Hr): Intake/Output Summary (Last 24 hours) at 1/20/2022 1245  Last data filed at 1/20/2022 0235  Gross per 24 hour   Intake 320 ml   Output 1050 ml   Net -730 ml       Labs:  Hematology:  Recent Labs     01/18/22  1305 01/19/22  0600 01/20/22  0626   WBC 21.7* 18.5* 15.2*   RBC 2.37* 2.37* 2.52*   HGB 7.4* 7.3* 7.7*   HCT 21.6* 21.7* 23.1*   MCV 91.1 91.6 91.7   MCH 31.2 30.8 30.6   MCHC 34.3 33.6 33.3   RDW 16.0* 16.3* 16.8*   * 89* 82*   MPV 11.0 10.9 10.6     Chemistry:  Recent Labs     01/18/22  1305 01/19/22  0600 01/20/22  0626    130* 134*   K 5.1 4.7 4.4    100 104   CO2 16* 21 22   GLUCOSE 368* 129* 124*   BUN 91* 77* 60*   CREATININE 3.31* 2.96* 2.52*   ANIONGAP 15 9 8*   LABGLOM 18* 21* 25*   GFRAA 22* 25* 30*   CALCIUM 7.5* 7.3* 7.3*     Recent Labs     01/19/22  1051 01/19/22  1557 01/19/22  2043 01/20/22  0130 01/20/22  0659 01/20/22  1054   POCGLU 84 92 105 114* 92 99     ABG:  Lab Results   Component Value Date    FIO2 INFORMATION NOT PROVIDED 01/10/2022     Lab Results   Component Value Date/Time    SPECIAL NOT REPORTED 01/17/2022 03:32 PM     Lab Results   Component Value Date/Time    CULTURE NO GROWTH 01/17/2022 03:32 PM       Radiology:  CT ABDOMEN PELVIS WO CONTRAST Additional Contrast? None    Result Date: 1/16/2022  Left gluteal hematoma, partially visualized on this study without obvious active bleeding, but limited without IV contrast.  No retroperitoneal or rectus sheath bleed. Extensive findings in the visualized lungs compatible with known COVID-19 pneumonia; denser GGOs suggest worsening pneumonia/pneumonitis or developing consolidation. No pneumothorax. Multiple additional findings, as detailed in the body of the report above. RECOMMENDATIONS: Consider follow-up CT pelvis including the upper thighs, if the patient does not respond to resuscitation with blood products/fluids and other conservative measures including localized pressure.   Findings were discussed with Teagan Camilo at 12:24 pm on 1/16/2022. XR CHEST PORTABLE    Result Date: 1/16/2022  Right IJ line in satisfactory position. Unchanged or slightly worse bilateral diffuse infiltrates consistent with the patient's history of COVID pneumonia. XR CHEST PORTABLE    Result Date: 1/14/2022  Patchy bilateral interstitial and ground-glass infiltrates most notably in the lung periphery. Findings have progressed from the prior exam compatible with patient's history of COVID pneumonia     XR CHEST PORTABLE    Result Date: 1/11/2022  Unchanged bilateral airspace opacities       Physical Examination:        Physical Exam  Vitals and nursing note reviewed. Constitutional:       General: He is not in acute distress. HENT:      Head: Normocephalic and atraumatic. Eyes:      Conjunctiva/sclera: Conjunctivae normal.      Pupils: Pupils are equal, round, and reactive to light. Cardiovascular:      Rate and Rhythm: Normal rate and regular rhythm. Heart sounds: No murmur heard. Pulmonary:      Effort: Pulmonary effort is normal. No accessory muscle usage or respiratory distress. Breath sounds: No stridor. No decreased breath sounds, wheezing, rhonchi or rales. Abdominal:      General: Bowel sounds are normal. There is no distension. Palpations: Abdomen is soft. Abdomen is not rigid. Tenderness: There is no abdominal tenderness. There is no guarding. Musculoskeletal:         General: No tenderness. Skin:     General: Skin is warm and dry. Findings: No erythema, lesion or rash. Neurological:      Mental Status: He is alert. Cranial Nerves: No cranial nerve deficit. Motor: No seizure activity. Psychiatric:         Speech: Speech normal.         Behavior: Behavior normal. Behavior is cooperative.          Assessment:        Hospital Problems           Last Modified POA    * (Principal) Pneumonia due to COVID-19 virus 1/8/2022 Yes    Essential hypertension 1/8/2022 Yes    Hyperlipidemia 1/8/2022 Yes    Acute hypoxemic respiratory failure due to COVID-19 [facility]) 1/8/2022 Yes    Acute kidney injury superimposed on chronic kidney disease ([facility] [address] baseline creatinine 1.9 1/8/2022 Yes    Stage 3b chronic kidney disease ([facility] [address] 1/8/2022 Yes    Elevated troponin 1/9/2022 Yes    Nonrheumatic aortic valve stenosis 1/9/2022 Yes    PAD (peripheral artery disease) ([facility] [address] 1/9/2022 Yes    Moderate protein-calorie malnutrition ([facility] [address] 1/9/2022 Yes    Atrial fibrillation with RVR ([facility] [address] 1/10/2022 Yes    COVID-19 1/19/2022 Yes    Traumatic hematoma of buttock 1/19/2022 Yes          Plan:        Sepsis : resolved     COVID 19 Pneumonia :   S/p Actemra 1/8  Steroids   Vit C/D  Incentive spirometry  Titrate inflammatory markers  ID involved     Acute hypoxemic respiratory failure: Likely secondary to Covid 19 pneumonia, continue supplemental oxygen,  high flow currently alternating with BiPAP    JULES/CKD with metabolic acidosis: Slightly better, continue to avoid for toxic agent, nephrology recommendations    Persistent hypoglycemia: Patient currently on  D10 NS @ 25 ml , steroids started as well, encourage PO intake , will switch boost to regular one given his very poor PO intake     DM2: As mentioned above was hypoglycemic currently, continue to hold all insulin and insulin sliding scale, hypoglycemia protocol  Likely after stopping IV Decadron, will give some p.o. steroids and reassess in a.m.     Acute blood loss anemia due to retroperitoneal bleed: Patient status post 2 unit of packed RBCs on 1/16, currently stable, heparin drip is on hold  Hemoglobin from this a.m. reflecting some drop, will monitor closely , discussed with vascular    AMelvin rojas with RVR: Currently rate controlled, anticoagulation was held secondary to retroperitoneal bleed    CODE STATUS is DNR CCA    Peripheral vascular disease: will hold plavix for now      HTN: continue home medications    HPL: continue home medications    DVT prophylaxis : mechanical only given above     Patient has significant bilateral bruising, he was on anticoagulation and dual antiplatelets and this was held to help with days ago for gluteal hematoma and hemoglobin drop, this likely patient is having DVTs upper extremity Doppler was negative, PICC line team given significant swelling and bruising won't be able to place PICC or midline.   Difficulty to place peripheral line given the significant swelling and bruising as well  For now we'll keep the central line and reassess in 1 to 2 days possibility to have midline or peripheral      Discussed with the patient and the nurse charge nurse and PICC line team    Called wife Agustina Lazar and discussed with her, updated her regarding current plan and the patient condition and she expressed understanding       Brandon Dexter MD  1/20/2022  12:45 PM

## 2022-01-20 NOTE — PROGRESS NOTES
[facility] [address] Patient  Today's Date and Time: 1/20/2022, 11:30 AM    Impression :     · COVID 19 Confirmed Infection  · Covid tests:  · 1/8/21: Positive  · Acute hypoxic respiratory failure  · Paroxysmal A. 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 · He is insisting on trying to go home and does not quite understand that he is requiring a significant amount of respiratory support. · He did mention to me that he was happy to die at home but it is my understanding that the recently changed his CODE STATUS to comfort care arrest to full code.   · His code status has again been changed to [facility]  · Continue supportive care      Medical Decision Making/Summary/Discussion:1/20/2022     · Patient admitted with COVID 19 infection  · Isolation until 1/28/22    Infection Control Recommendations   · Universal Precautions  · Airborne isolation  · Droplet Isolation    Antimicrobial Stewardship Recommendations       · Renal considerations  Coordination of Outpatient Care:   · Estimated Length of IV antimicrobials:TBD  · Patient will need Midline Catheter Insertion: TBD  · Patient will need PICC line Insertion: No  · Patient will need: Home IV , [place],  SNF,  LTAC:TBD  · Patient will need outpatient wound care:No    Chief complaint/reason for consultation:   · Concern for COVID infection      History of Present Illness:   Vira Andrews is a [de-identified]y.o.-year-old male who was initially admitted on 1/8/2022. Patient seen at the request of Dr. Burce Mendoza:    Patient presented through ER with complaints of needing shortness of breath, cough, loss of appetite, nausea, vomiting and diarrhea over the past 7 to 10 days. He has not received the Covid vaccine and tested positive for Covid shortly after [place]. On evaluation in the ED, the patient had an SPO2 of 80% on room air and was tachypneic. A rapid Covid swab was positive. CT chest shows extensive bilateral pulmonary groundglass opacities. He was started on Decadron and given a dose of Actemra. Abnormal labs include:  · Creatinine 2.32  ·   · Troponin I 48  · WBC 17.1    Patient admitted because of concerns with COVID 19. Meropenem initiated on 1/11/2022 for worsening respiratory distress and leukocytosis    Stable chest x-ray 1/11    CRP is trending down    Hemoglobin dropped to 5.7 on 1/16/2022  Hemoglobin remained stable at this time after receiving infusions of PRBC  Left gluteal hematoma present on CT abdomen pelvis      CURRENT EVALUATION : 1/20/2022    Afebrile  VS stable    High flow nasal cannula at 90% FiO2 with 45 L/min    Leukocytosis is trending down  JULES continues, but is improving    Urine and blood cultures ordered-No growth    Occult blood noted on stool  Anemia continues but is stable  Anticoagulation on hold s/p gluteal hematoma    Patient exhibiting respiratory distress. Yes  Respiratory secretions: No    Patient receiving supplemental oxygen. BiPAP& Hi-flow NC  RR: 20>34-->17  02 sat: 98--> 96-->99    % FIO2: 60-->65>90  Flow Rate: 45 L/min  PEEP:      QTc:       NEWS Score: 0-4 Low risk group; 5-6: Medium risk group; 7 or above: High risk group  Parameters 3 2 1 0 1 2 3   Age    < 65   ? 65   RR ? 8  9-11 12-20  21-24 ? 25   O2 Sats ?  91 92-93 94-95 ? 96      Suppl O2  Yes  No SBP ? 90  101-110 111-219   ? 220   HR ? 40  41-50 51-90  111-130 ? 131   Consciousness    Alert   Drowsiness, lethargy, or confusion   Temperature ? 35.0 C (95.0 F)  35.1-36.0 C 95.1-96.9 F 36.1-38.0 C 97.0-100.4 F 38.1-39.0 C 100.5-102.3 F ? 39.1 C ? 102.4 F      NEWS Score:   1/9/2022: 5 moderate risk    Overall Daily Picture:      Unchanged    Presence of secondary bacterial Infection:    Possible  Additional antibiotics: Meropenem 1/11/2022    Labs, X rays reviewed: 1/20/2022    BUN:101>77-->60  Cr:3.69>2.96-->2.52    WBC:27.8-->30.5-->31.9>21.7>18.5-->15.2  Hb: 7.3-->7.7  Plat: 89-->93    Absolute Neutrophils:16  Absolute Lymphocytes:0.34  Neutrophil/Lymphocyte Ratio: 53 very high risk    CRP:160-->131-->52.3  Ferritin:804  LDH: 563    Pro Calcitonin:      Cultures:  Urine:  ·   Blood:  ·   Sputum :  ·   Wound:       CXR:     CAT:  1/8/21      Discussed with patient, RN, CC, IM. I have personally reviewed the past medical history, past surgical history, medications, social history, and family history, and I have updated the database accordingly.   Past Medical History:     Past Medical History:   Diagnosis Date    Chronic lower back pain     Cobalamin deficiency     [facility] [address]     Diabetes mellitus [facility] [address]     Hyperlipidemia     Hypertension     Malignant neoplasm of colon ([facility] [address] [facility]     PAD (peripheral artery disease) ([facility])        Past Surgical  History:     Past Surgical History:   Procedure Laterality Date    ARTERY SURGERY         Medications:      lidocaine 1 % injection  5 mL IntraDERmal Once    sodium chloride flush  5-40 mL IntraVENous 2 times per day    hydrocortisone  20 mg Oral BID    sodium bicarbonate  1,300 mg Oral BID    busPIRone  10 mg Oral TID    [Held by provider] insulin glargine  20 Units SubCUTAneous QAM    senna  2 tablet Oral Nightly    [Held by provider] insulin lispro  0-6 Units SubCUTAneous TID WC    [Held by provider] insulin lispro  0-3 Units SubCUTAneous Nightly    amiodarone  200 mg Oral Daily    metoprolol tartrate  50 mg Oral BID    pantoprazole  40 mg Oral QAM AC    atorvastatin  20 mg Oral Nightly    [Held by provider] clopidogrel  75 mg Oral Daily    vitamin B-12  1,000 mcg Oral Daily    aspirin  81 mg Oral Daily    sodium chloride flush  5-40 mL IntraVENous 2 times per day       Social History:     Social History     Socioeconomic History    Marital status:      Spouse name: Not on file    Number of children: Not on file    Years of education: Not on file    Highest education level: Not on file   Occupational History    Not on file   Tobacco Use    Smoking status: Former Smoker    Smokeless tobacco: Never Used   Substance and Sexual Activity    Alcohol use: Not Currently    Drug use: Never    Sexual activity: Not on file   Other Topics Concern    Not on file   Social History Narrative    Not on file     Social Determinants of Health     Financial Resource Strain:     Difficulty of Paying Living Expenses: Not on file   Food Insecurity:     Worried About Running Out of Food in the Last Year: Not on file    Lesley of Food in the Last Year: Not on file   Transportation Needs:     Lack of Transportation (Medical): Not on file    Lack of Transportation (Non-Medical):  Not on file   Physical Activity:     Days of Exercise per Week: Not on file    Minutes of Exercise per Session: Not on file   Stress:     Feeling of Stress : Not on file   Social Connections:     Frequency of Communication with Friends and Family: Not on file    Frequency of Social Gatherings with Friends and Family: Not on file    Attends Religion Services: Not on file    Active Member of Clubs or Organizations: Not on file    Attends Club or Organization Meetings: Not on file    Marital Status: Not on file   Intimate Partner Violence:     Fear of Current or Ex-Partner: Not on file    Emotionally Abused: Not on file  Physically Abused: Not on file    Sexually Abused: Not on file   Housing Stability:     Unable to Pay for Housing in the Last Year: Not on file    Number of Places Lived in the Last Year: Not on file    Unstable Housing in the Last Year: Not on file       Family History:   History reviewed. No pertinent family history. Allergies:   Patient has no known allergies. Review of Systems:     Review of Systems   Constitutional: Negative. Respiratory: Positive for shortness of breath. Cardiovascular: Negative. Gastrointestinal: Negative. Genitourinary: Negative. Musculoskeletal: Negative. Skin: Positive for color change. Neurological: Negative. Physical Examination :     Patient Vitals for the past 8 hrs:   BP Temp Temp src Pulse Resp SpO2   01/20/22 0545 (!) 150/74 98 °F (36.7 °C) Oral 66 17 98 %     Physical Exam  Constitutional:       Appearance: He is well-developed. HENT:      Head: Normocephalic and atraumatic. Cardiovascular:      Rate and Rhythm: Normal rate. Heart sounds: Murmur heard. Pulmonary:      Effort: Pulmonary effort is normal.      Breath sounds: Normal breath sounds. No wheezing. Abdominal:      General: Bowel sounds are normal.      Palpations: Abdomen is soft. There is no mass. Tenderness: There is no abdominal tenderness. Musculoskeletal:         General: Swelling (In the bilateral upper extremities especially in the forearms.) present. Normal range of motion. Cervical back: Neck supple. Lymphadenopathy:      Cervical: No cervical adenopathy. Skin:     General: Skin is dry. Findings: Bruising (There is bruising and ecchymotic skin lesions in the forearms bilaterally right worse than left) present. Neurological:      Mental Status: He is alert and oriented to person, place, and time.          Medical Decision Making -Laboratory:   I have independently reviewed/ordered the following labs:    CBC with Differential: Recent Labs     01/19/22  0600 01/20/22  0626   WBC 18.5* 15.2*   HGB 7.3* 7.7*   HCT 21.7* 23.1*   PLT 89* 82*   LYMPHOPCT 2* 2*   MONOPCT 2 3     BMP:   Recent Labs     01/19/22  0600 01/20/22  0626   * 134*   K 4.7 4.4    104   CO2 21 22   BUN 77* 60*   CREATININE 2.96* 2.52*     Hepatic Function Panel:   No results for input(s): PROT, LABALBU, BILIDIR, IBILI, BILITOT, ALKPHOS, ALT, AST in the last 72 hours. No results for input(s): RPR in the last 72 hours. No results for input(s): HIV in the last 72 hours. No results for input(s): BC in the last 72 hours. Lab Results   Component Value Date    MUCUS 1+ 01/17/2022    RBC 2.52 01/20/2022    TRICHOMONAS NOT REPORTED 01/17/2022    WBC 15.2 01/20/2022    YEAST NOT REPORTED 01/17/2022    TURBIDITY Clear 01/17/2022     Lab Results   Component Value Date    CREATININE 2.52 01/20/2022    GLUCOSE 124 01/20/2022       Medical Decision Making-Imaging:     Narrative   EXAMINATION:   CTA OF THE CHEST 1/8/2022 10:10 am       TECHNIQUE:   CTA of the chest was performed after the administration of intravenous   contrast.  Multiplanar reformatted images are provided for review.  MIP   images are provided for review. Dose modulation, iterative reconstruction,   and/or weight based adjustment of the mA/kV was utilized to reduce the   radiation dose to as low as reasonably achievable.       COMPARISON:   None.       HISTORY:   ORDERING SYSTEM PROVIDED HISTORY: dyspnea / hypoxia   Reason for Exam: Shortness of breath       FINDINGS:   Pulmonary Arteries: Pulmonary arteries are adequately opacified for   evaluation.  No evidence of intraluminal filling defect to suggest pulmonary   embolism.  Main pulmonary artery is normal in caliber.       Mediastinum: No evidence of mediastinal lymphadenopathy.  Normal heart size.    Normal caliber thoracic aorta with diffuse atherosclerosis.       Lungs/pleura: Extensive ground-glass opacification of the bilateral lung   fields with peripheral involvement slight apical as well as basilar sparing. No effusion or pneumothorax.       Upper Abdomen: Limited images of the upper abdomen are unremarkable.       Soft Tissues/Bones: No acute bone or soft tissue abnormality.           Impression   *No evidence of pulmonary embolism. *Extensive ground-glass opacification of the bilateral lung fields with   peripheral involvement slight apical as well as basilar sparing.  Imaging   features suggestive of COVID-19 pneumonia, though are nonspecific and can   occur with a variety of infectious and noninfectious processes.         Narrative   EXAMINATION:   CT OF THE ABDOMEN AND PELVIS WITHOUT CONTRAST, 1/16/2022 10:42 am       TECHNIQUE:   CT of the abdomen and pelvis was performed without the administration of   intravenous contrast. Multiplanar reformatted images are provided for review. Dose modulation, iterative reconstruction, and/or weight based adjustment of   the mA/kV was utilized to reduce the radiation dose to as low as reasonably   achievable.       COMPARISON:   CT of the chest from 01/08/2022, and retroperitoneal ultrasound from   01/10/2022.       HISTORY:   ORDERING SYSTEM PROVIDED HISTORY:  Retrop bleed r/o   TECHNOLOGIST PROVIDED HISTORY:   Retrop bleed r/o   Reason for Exam:  Retrop bleed r/o       FINDINGS:   Lower Chest: As demonstrated on recent CT chest, extensive and somewhat   denser confluent ground-glass airspace opacities re-identified mid-lower   lungs, sparing the bases with some associated crazy paving, air bronchograms   and bibasilar atelectatic appearing changes.  Main pulmonary artery caliber   31 mm.  Mild dilatation ascending aorta, with a maximal dimension of 41 mm.       Organs: Unopacified liver, spleen, adrenal glands and both kidneys show no   acute abnormality; without suspicious focal finding or hydronephrosis. Significant right renal cortical thinning and some scarring suspected.  No   large stone.  Probable gallbladder sludge or vicarious contrast from recent   contrast CT; no calcified stones.       GI/Bowel: Small-moderate amount of retained stool, mostly right colon with   some fluid/levels; no abnormal dilatation, marked wall thickening or   pericolonic fat stranding.  Unremarkable small bowel.  Some fluid within a   mildly distended stomach.  Small hiatus hernia.       Pelvis: Partially fluid-filled urinary bladder without wall thickening or   mass.  No significant prostatic enlargement.  Symmetric seminal vesicles.  No   pelvic fluid collection or mass.  Partially visualized approximate 10 x 15 x   5.7 cm left gluteal mass with HU measurements/appearance most suggestive of   hematoma.  No high density finding compatible with active bleeding, but   assessment limited on this examination.  Small fat containing inguinal   hernias, larger on the left.       Peritoneum/Retroperitoneum: No retroperitoneal bleed or bulky   lymphadenopathy.  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  Probable limited intimal dissection   suprarenal aorta without marked aneurysmal dilatation.  Soft tissue stranding   flanks extending into the pelvis, suggesting some degree anasarca.  Slightly   greater prominence right proximal rectus musculature       Bones/Soft Tissues: No acute abnormality.  Mild scoliosis, multilevel   degenerative changes of spine but with DDD L5-S1 and bilateral mild hip joint   degenerative findings.           Impression   Left gluteal hematoma, partially visualized on this study without obvious   active bleeding, but limited without IV contrast.  No retroperitoneal or   rectus sheath bleed.       Extensive findings in the visualized lungs compatible with known COVID-19   pneumonia; denser GGOs suggest worsening pneumonia/pneumonitis or developing   consolidation.  No pneumothorax.       Multiple additional findings, as detailed in the body of the report above.       RECOMMENDATIONS:   Consider follow-up CT pelvis including the upper thighs, if the patient does   not respond to resuscitation with blood products/fluids and other   conservative measures including localized pressure.  Findings were discussed   with Lilly Pate at 12:24 pm on 1/16/2022.               Medical Decision Yeqwof-Gkuqgtee-Uaxrt:     Culture, Blood 1 [[id number]] Collected: 01/09/22 1155   Order Status: Completed Specimen: Blood Updated: 01/14/22 1300    Specimen Description . BLOOD    Special Requests NOT REPORTED    Culture NO GROWTH 5 DAYS   Culture, Blood 1 [[id number]] Collected: 01/09/22 1155   Order Status: Completed Specimen: Blood Updated: 01/14/22 1300    Specimen Description . BLOOD    Special Requests NOT REPORTED    Culture NO GROWTH 5 DAYS   COVID-19, Rapid [[id number]] (Abnormal) Collected: 01/08/22 1045   Order Status: Completed Specimen: Nasopharyngeal Swab Updated: 01/08/22 1109    Specimen Description . NASOPHARYNGEAL SWAB    SARS-CoV-2, Rapid DETECTED Abnormal     Comment:        Rapid NAAT: The specimen is POSITIVE for SARS-Cov-2, the novel coronavirus associated with   COVID-19.         This test has been authorized by the FDA under an Emergency Use Authorization (EUA) for use   by authorized laboratories.         The ID NOW COVID-19 assay is designed to detect the virus that causes COVID-19 in patients   with signs and symptoms of infection who are suspected of COVID-19. An individual without symptoms of COVID-19 and who is not shedding SARS-CoV-2 virus would   expect to have a negative (not detected) result in this assay.    Fact sheet for Healthcare Providers: Dionne   Fact sheet for Patients: [email or web address]           Methodology: Isothermal Nucleic Acid Amplification         Results reported to the appropriate [facility]            Medical Decision Making-Other:     Note:  · Labs, medications, radiologic studies were reviewed with personal review of films  · Large amounts of data were reviewed  · Discussed with nursing Staff, Discharge planner  · Infection Control and Prevention measures reviewed  · All prior entries were reviewed  · Administer medications as ordered  · Prognosis: Guarded  · Discharge planning reviewed      Thank you for allowing us to participate in the care of this patient. Please call with questions. Electronically signed by MOHAMUD Moreland CNP on 1/20/2022 at 11:30 AM      ATTESTATION:    I have discussed the case, including pertinent history and exam findings with the APRN. I have evaluated the  History, physical findings and pictures of the patient and the key elements of the encounter have been performed by me. I have reviewed the laboratory data, other diagnostic studies and discussed them with the APRN. I have updated the medical record where necessary. I agree with the assessment, plan and orders as documented by the APRN.     Brenda Salter MD.

## 2022-01-20 NOTE — PROGRESS NOTES
Per Miguel Bello, given significant bruising and swelling, will wait a few days to place a midline. MD Elena is aware that we are unable to get a peripheral IV.      Electronically signed by Pradeep Kim RN on 1/20/2022 at 12:41 PM

## 2022-01-20 NOTE — PROGRESS NOTES
Write spoke to the patient's wife, Marisela Ribeiro, on the status of the patient. All questions and concerns addressed at this time.

## 2022-01-20 NOTE — PROGRESS NOTES
Comprehensive Nutrition Assessment    Type and Reason for Visit:  Reassess    Nutrition Recommendations/Plan: Continue current diet with Ensure Enlive oral supplements at all meals. Encourage/monitor PO intakes as tolerated. Will monitor labs, weights, and plan of care. Nutrition Assessment:  Pt continues to have a poor appetite and is eating small amounts of meals. Observed breakfast tray which pt ate some scrambled eggs, sausage, and pancakes. Pt has been drinking Glucerna shakes - noted switched to Ensure Enlive oral supplements due to issues with hypoglycemia. Last BM 1/19. Labs reviewed: Na 124 mmol/L, Glucose  mg/dL, BUN 60 mg/dL, CR 2.52 mg/dL. Meds reviewed. IV fluids: receiving D10%. Malnutrition Assessment:  Malnutrition Status:  Insufficient data    Context:  Acute Illness     Findings of the 6 clinical characteristics of malnutrition:  Energy Intake:  7 - 50% or less of estimated energy requirements for 5 or more days  Weight Loss:  Unable to assess - No weight history available per chart review. Body Fat Loss:  Unable to assess (Unable to see pt in room.)   Muscle Mass Loss:  Unable to assess  Fluid Accumulation:  1 - Mild (to Severe) Extremities,Generalized   Strength:  Not Performed    Estimated Daily Nutrient Needs:  Energy (kcal):  25-28 kcal/kg = 6435-0745 kcals/day; Weight Used for Energy Requirements:  Current     Protein (g):  1.2-1.5 gm /kg =  gm pro/day; Weight Used for Protein Requirements:  Current     Fluid (ml/day):  25 mL/kg = 1700 mL/day or per MD; Method Used for Fluid Requirements:  ml/Kg      Nutrition Related Findings:  Labs/Meds reviewed. Last BM 1/19. Wounds:   (Left gluteal hematoma.)       Current Nutrition Therapies:    ADULT DIET;  Regular  ADULT ORAL NUTRITION SUPPLEMENT; Breakfast, Lunch, Dinner; Standard High Calorie/High Protein Oral Supplement    Anthropometric Measures:  · Height: 5' 10\" (177.8 cm)  · Current Body Weight: 148 lb 2.4 oz (67.2 kg)   · Admission Body Weight: 148 lb 2.4 oz (67.2 kg)    · Usual Body Weight: 150 lb (68 kg)     · Ideal Body Weight: 166 lbs; % Ideal Body Weight 89.2 %   · BMI: 21.3  · BMI Categories: Normal Weight (BMI 18.5-24. 9)       Nutrition Diagnosis:   · Inadequate oral intake related to  (decreased appetite; dislike of foods avaliable) as evidenced by intake 0-25%,intake 26-50%,poor intake prior to admission (variable PO intakes; need for ONS)    Nutrition Interventions:   Food and/or Nutrient Delivery:  Continue Current Diet,Continue Oral Nutrition Supplement  Nutrition Education/Counseling:  No recommendation at this time   Coordination of Nutrition Care:  Continue to monitor while inpatient    Goals:  Oral intakes to meet at least 50% of estimated nutrition needs. Nutrition Monitoring and Evaluation:   Behavioral-Environmental Outcomes:  None Identified   Food/Nutrient Intake Outcomes:  Food and Nutrient Intake,Supplement Intake  Physical Signs/Symptoms Outcomes:  Biochemical Data,GI Status,Hemodynamic Status,Fluid Status or Edema,Nutrition Focused Physical Findings,Skin,Weight     Discharge Planning:     Too soon to determine     Electronically signed by Richie Boast, RD, LD on 1/20/22 at 1:41 PM EST    Contact: [phone]

## 2022-01-20 NOTE — PLAN OF CARE
PROVIDE ADEQUATE OXYGENATION WITH ACCEPTABLE SP02/ABG'S    [x]  IDENTIFY APPROPRIATE OXYGEN THERAPY  [x]   MONITOR SP02/ABG'S AS NEEDED   [x]   PATIENT EDUCATION AS NEEDED   NON INVASIVE VENTILATION  PROVIDE OPTIMAL VENTILATION/ACCEPTABLE SP02  IMPLEMENT NON INVASIVE VENTILATION PROTOCOL  ASSESSMENT SKIN INTEGRITY  PATIENT EDUCATION AS NEEDED  BIPAP AS NEEDED

## 2022-01-21 NOTE — PLAN OF CARE
energy and improved vitality  Outcome: Ongoing     Problem: Patient Education: Go to Patient Education Activity  Goal: Patient/Family Education  Outcome: Ongoing     Problem: Skin Integrity:  Goal: Will show no infection signs and symptoms  Description: Will show no infection signs and symptoms  Outcome: Ongoing  Goal: Absence of new skin breakdown  Description: Absence of new skin breakdown  Outcome: Ongoing     Problem: Nutrition  Goal: Optimal nutrition therapy  Outcome: Ongoing     Problem: Pain:  Goal: Pain level will decrease  Description: Pain level will decrease  Outcome: Ongoing  Goal: Control of acute pain  Description: Control of acute pain  Outcome: Ongoing  Goal: Control of chronic pain  Description: Control of chronic pain  Outcome: Ongoing

## 2022-01-21 NOTE — CARE COORDINATION
Pt currently on high flow 80% 45L. Hemoglobin low this morning requiring blood transfusion.  [facility] following

## 2022-01-21 NOTE — PLAN OF CARE
Problem: Falls - Risk of:  Goal: Will remain free from falls  Description: Will remain free from falls  1/21/2022 1721 by Karri Barksdale RN  Outcome: Ongoing  1/21/2022 0443 by Nile Ferrara RN  Outcome: Ongoing  Goal: Absence of physical injury  Description: Absence of physical injury  1/21/2022 1721 by Karri Barksdale RN  Outcome: Ongoing  1/21/2022 0443 by Nile Ferrara RN  Outcome: Ongoing     Problem: Airway Clearance - Ineffective  Goal: Achieve or maintain patent airway  1/21/2022 1721 by Karri Barksdale RN  Outcome: Ongoing  1/21/2022 0443 by Nile Ferrara RN  Outcome: Ongoing     Problem: Gas Exchange - Impaired  Goal: Absence of hypoxia  1/21/2022 1721 by Karri Barksdale RN  Outcome: Ongoing  1/21/2022 0443 by Nile Ferrara RN  Outcome: Ongoing  Goal: Promote optimal lung function  1/21/2022 1721 by Karri Barksdale RN  Outcome: Ongoing  1/21/2022 0443 by Nile Ferrara RN  Outcome: Ongoing     Problem: Breathing Pattern - Ineffective  Goal: Ability to achieve and maintain a regular respiratory rate  1/21/2022 1721 by Karri Barksdale RN  Outcome: Ongoing  1/21/2022 0443 by Nile Ferrara RN  Outcome: Ongoing     Problem:  Body Temperature -  Risk of, Imbalanced  Goal: Ability to maintain a body temperature within defined limits  1/21/2022 1721 by Karri Barksdale RN  Outcome: Ongoing  1/21/2022 0443 by Nile Ferrara RN  Outcome: Ongoing  Goal: Will regain or maintain usual level of consciousness  1/21/2022 1721 by Karri Barksdale RN  Outcome: Ongoing  1/21/2022 0443 by Nile Ferrara RN  Outcome: Ongoing  Goal: Complications related to the disease process, condition or treatment will be avoided or minimized  1/21/2022 1721 by Karri Barksdale RN  Outcome: Ongoing  1/21/2022 0443 by Nile Ferrara RN  Outcome: Ongoing     Problem: Isolation Precautions - Risk of Spread of Infection  Goal: Prevent transmission of infection  1/21/2022 1721 by Karri Barksdale RN  Outcome: Ongoing  1/21/2022 0443 by Kunal Ivory RN  Outcome: Ongoing     Problem: Nutrition Deficits  Goal: Optimize nutritional status  1/21/2022 1721 by Berny Hanna RN  Outcome: Ongoing  1/21/2022 0443 by Kunal Ivory RN  Outcome: Ongoing     Problem: Risk for Fluid Volume Deficit  Goal: Maintain normal heart rhythm  1/21/2022 1721 by Berny Hanna RN  Outcome: Ongoing  1/21/2022 0443 by Kunal Ivory RN  Outcome: Ongoing  Goal: Maintain absence of muscle cramping  1/21/2022 1721 by Berny Hanna RN  Outcome: Ongoing  1/21/2022 0443 by Kunal Ivory RN  Outcome: Ongoing  Goal: Maintain normal serum potassium, sodium, calcium, phosphorus, and pH  1/21/2022 1721 by Berny Hanna RN  Outcome: Ongoing  1/21/2022 0443 by Kunal Ivory RN  Outcome: Ongoing     Problem: Loneliness or Risk for Loneliness  Goal: Demonstrate positive use of time alone when socialization is not possible  1/21/2022 1721 by Berny Hanna RN  Outcome: Ongoing  1/21/2022 0443 by Kunal Ivory RN  Outcome: Ongoing     Problem: Fatigue  Goal: Verbalize increase energy and improved vitality  1/21/2022 1721 by Berny Hanna RN  Outcome: Ongoing  1/21/2022 0443 by Kunal Ivory RN  Outcome: Ongoing     Problem: Patient Education: Go to Patient Education Activity  Goal: Patient/Family Education  1/21/2022 1721 by Berny Hanna RN  Outcome: Ongoing  1/21/2022 0443 by Kunal Ivory RN  Outcome: Ongoing     Problem: Skin Integrity:  Goal: Will show no infection signs and symptoms  Description: Will show no infection signs and symptoms  1/21/2022 1721 by Berny Hanna RN  Outcome: Ongoing  1/21/2022 0443 by Kunal Ivory RN  Outcome: Ongoing  Goal: Absence of new skin breakdown  Description: Absence of new skin breakdown  1/21/2022 1721 by Berny Hanna RN  Outcome: Ongoing  1/21/2022 0443 by Kunal Ivory RN  Outcome: Ongoing     Problem: Nutrition  Goal: Optimal nutrition therapy  1/21/2022 1721 by Domo Goodrich RN  Outcome: Ongoing  1/21/2022 0443 by Cha Negron RN  Outcome: Ongoing     Problem: Pain:  Goal: Pain level will decrease  Description: Pain level will decrease  1/21/2022 1721 by Domo Goodrich RN  Outcome: Ongoing  1/21/2022 0443 by Cha Negron RN  Outcome: Ongoing  Goal: Control of acute pain  Description: Control of acute pain  1/21/2022 1721 by Domo Goodrich RN  Outcome: Ongoing  1/21/2022 0443 by Cha Negron RN  Outcome: Ongoing  Goal: Control of chronic pain  Description: Control of chronic pain  1/21/2022 1721 by Domo Goodrich RN  Outcome: Ongoing  1/21/2022 0443 by Cha Negron RN  Outcome: Ongoing

## 2022-01-21 NOTE — PROGRESS NOTES
[facility] [address] Patient  Today's Date and Time: 1/21/2022, 10:44 AM    Impression :     · COVID 19 Confirmed Infection  · Covid tests:  · 1/8/21: Positive  · Acute hypoxic respiratory failure  · Paroxysmal A. 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  · Essential hypertension  · Elevated inflammatory markers  · Hyperlipidemia  · Patient has not received the Covid vaccine      Recommendations:   · Antibiotic treatment:  · Meropenem 500 mg BID IV for leukocytosis & possible secondary bacterial pneumonia 1/11/21-discontinued 1/17/21  · Meropenem Re-started 1-21-22 because of residual dense consolidation of the lungs with air bronchograms  · Covid Rx:    · Remdesivir-contraindicated with JULES  · Monoclonal antibodies-out of window  · Decadron-initiated 1/8/2022  · Actemra-administered 1/8/2022    · The patient still has significant hypoxia is currently requiring high flow/BiPAP. · He is insisting on trying to go home and does not quite understand that he is requiring a significant amount of respiratory support. · He did mention to me that he was happy to die at home but it is my understanding that the recently changed his CODE STATUS to comfort care arrest to full code.   · His code status has again been changed to [facility]  · Continue supportive care      Medical Decision Making/Summary/Discussion:1/21/2022     · Patient admitted with COVID 19 infection  · Isolation until 1/28/22    Infection Control Recommendations   · Universal Precautions  · Airborne isolation  · Droplet Isolation    Antimicrobial Stewardship Recommendations       · Renal considerations  Coordination of Outpatient Care:   · Estimated Length of IV antimicrobials:TBD  · Patient will need Midline Catheter Insertion: TBD  · Patient will need PICC line Insertion: No  · Patient will need: Home IV , [facility]am[facility]itriaarti,  [facility], LTAC:TBD  · Patient will need outpatient wound care:No    Chief complaint/reason for consultation:   · Concern for COVID infection      History of Present Illness:   Sera Maguire is a [de-identified]y.o.-year-old male who was initially admitted on 1/8/2022. Patient seen at the request of Dr. Laura Rivera:    Patient presented through ER with complaints of needing shortness of breath, cough, loss of appetite, nausea, vomiting and diarrhea over the past 7 to 10 days. He has not received the Covid vaccine and tested positive for Covid shortly after [place]. On evaluation in the ED, the patient had an SPO2 of 80% on room air and was tachypneic. A rapid Covid swab was positive. CT chest shows extensive bilateral pulmonary groundglass opacities. He was started on Decadron and given a dose of Actemra. Abnormal labs include:  · Creatinine 2.32  ·   · Troponin I 48  · WBC 17.1    Patient admitted because of concerns with COVID 19. Meropenem initiated on 1/11/2022 for worsening respiratory distress and leukocytosis    Stable chest x-ray 1/11    CRP is trending down    Hemoglobin dropped to 5.7 on 1/16/2022  Hemoglobin remained stable at this time after receiving infusions of PRBC  Left gluteal hematoma present on CT abdomen pelvis      CURRENT EVALUATION : 1/21/2022    Afebrile  VS stable with some hypertension    High flow nasal cannula at 90-->80% FiO2 with 45 L/min    Leukocytosis is trending down  JULES continues, but is improving    Urine and blood cultures ordered-No growth    Occult blood noted on stool  Anemia continues in the program patient required another blood transfusion on 1/21/2022  Anticoagulation on hold s/p gluteal hematoma    Impression CT Abdomen/Pelvis 1/21/22   1.  New small bilateral pleural effusions with extensive bilateral airspace   consolidation, increased from 01/08/2022.       2.  Decreased AP dimension of the distal trachea, suggesting tracheomalacia.       3.  Previously noted hematoma in the left gluteal musculature is not as well   visualized on today's study, but appears overall slightly decreased in size. No new areas of hematoma.       4.  Mild distention of the stomach, mild prominence of proximal small bowel   loops, and mild nonspecific stranding in the proximal mesentery.  No definite   evidence of high-grade bowel obstruction at this time.  Enteritis or early   developing obstruction are considered in the differential.           Patient exhibiting respiratory distress. Yes  Respiratory secretions: No    Patient receiving supplemental oxygen. BiPAP & Hi-flow NC  RR: 16  02 sat: 95    % FIO2: 60-->65>90-->85  Flow Rate: 45 L/min  PEEP:      QTc:       NEWS Score: 0-4 Low risk group; 5-6: Medium risk group; 7 or above: High risk group  Parameters 3 2 1 0 1 2 3   Age    < 65   ? 65   RR ? 8  9-11 12-20  21-24 ? 25   O2 Sats ? 91 92-93 94-95 ? 96      Suppl O2  Yes  No      SBP ? 90  101-110 111-219   ? 220   HR ? 40  41-50 51-90  111-130 ? 131   Consciousness    Alert   Drowsiness, lethargy, or confusion   Temperature ? 35.0 C (95.0 F)  35.1-36.0 C 95.1-96.9 F 36.1-38.0 C 97.0-100.4 F 38.1-39.0 C 100.5-102.3 F ? 39.1 C ? 102.4 F      NEWS Score:   1/9/2022: 5 moderate risk    Overall Daily Picture:      Unchanged    Presence of secondary bacterial Infection:    Possible  Additional antibiotics: Meropenem 1/11/2022    Labs, X rays reviewed: 1/21/2022    BUN:54  Cr:2.45    WBC:27.8-->30.5-->31.9>21.7>18.5-->15.2-->14.5  Hb: 7.3-->7.7-->6.7  Plat: 89-->93-->81    Absolute Neutrophils:16  Absolute Lymphocytes:0.34  Neutrophil/Lymphocyte Ratio: 53 very high risk    CRP:160-->131-->52.3  Ferritin:804  LDH: 563    Pro Calcitonin:      Cultures:  Urine:  ·   Blood:  ·   Sputum :  ·   Wound:       CXR:     CAT:  1/8/21      Discussed with patient, RN, CC, IM.     I have personally reviewed the past medical history, past surgical history, medications, social history, and family history, and I have updated the database accordingly.   Past Medical History:     Past Medical History:   Diagnosis Date    Chronic lower back pain     Cobalamin deficiency     [facility]     Diabetes mellitus ([facility]     Hyperlipidemia     Hypertension     [facility]     PAD (peripheral artery disease) (HCC)        Past Surgical  History:     Past Surgical History:   Procedure Laterality Date    ARTERY SURGERY         Medications:      sodium bicarbonate  650 mg Oral BID    ferrous sulfate  325 mg Oral Daily with breakfast    guaiFENesin  600 mg Oral BID    lidocaine 1 % injection  5 mL IntraDERmal Once    sodium chloride flush  5-40 mL IntraVENous 2 times per day    hydrocortisone  20 mg Oral BID    busPIRone  10 mg Oral TID    [Held by provider] insulin glargine  20 Units SubCUTAneous QAM    senna  2 tablet Oral Nightly    [Held by provider] insulin lispro  0-6 Units SubCUTAneous TID WC    [Held by provider] insulin lispro  0-3 Units SubCUTAneous Nightly    amiodarone  200 mg Oral Daily    metoprolol tartrate  50 mg Oral BID    pantoprazole  40 mg Oral QAM AC    atorvastatin  20 mg Oral Nightly    [Held by provider] clopidogrel  75 mg Oral Daily    vitamin B-12  1,000 mcg Oral Daily    aspirin  81 mg Oral Daily    sodium chloride flush  5-40 mL IntraVENous 2 times per day       Social History:     Social History     Socioeconomic History    Marital status:      Spouse name: Not on file    Number of children: Not on file    Years of education: Not on file    Highest education level: Not on file   Occupational History    Not on file   Tobacco Use    Smoking status: Former Smoker    Smokeless tobacco: Never Used   Substance and Sexual Activity    Alcohol use: Not Currently    Drug use: Never    Sexual activity: Not on file   Other Topics Concern    Not on file   Social History Narrative    Not on 64 18 99 %     Physical Exam  Constitutional:       Appearance: He is well-developed. HENT:      Head: Normocephalic and atraumatic. Cardiovascular:      Rate and Rhythm: Normal rate. Heart sounds: Murmur heard. Pulmonary:      Effort: Pulmonary effort is normal.      Breath sounds: Normal breath sounds. No wheezing. Abdominal:      General: Bowel sounds are normal.      Palpations: Abdomen is soft. There is no mass. Tenderness: There is no abdominal tenderness. Musculoskeletal:         General: Swelling (In the bilateral upper extremities especially in the forearms.) present. Normal range of motion. Cervical back: Neck supple. Lymphadenopathy:      Cervical: No cervical adenopathy. Skin:     General: Skin is dry. Findings: Bruising (There is bruising and ecchymotic skin lesions in the forearms bilaterally right worse than left) present. Neurological:      Mental Status: He is alert and oriented to person, place, and time. Medical Decision Making -Laboratory:   I have independently reviewed/ordered the following labs:    CBC with Differential:   Recent Labs     01/20/22  0626 01/21/22  0717   WBC 15.2* 14.5*   HGB 7.7* 6.7*   HCT 23.1* 21.1*   PLT 82* 81*   LYMPHOPCT 2* 2*   MONOPCT 3 4     BMP:   Recent Labs     01/20/22  0626 01/21/22  0717   * 137   K 4.4 4.6    104   CO2 22 24   BUN 60* 54*   CREATININE 2.52* 2.45*     Hepatic Function Panel:   No results for input(s): PROT, LABALBU, BILIDIR, IBILI, BILITOT, ALKPHOS, ALT, AST in the last 72 hours. No results for input(s): RPR in the last 72 hours. No results for input(s): HIV in the last 72 hours. No results for input(s): BC in the last 72 hours.   Lab Results   Component Value Date    MUCUS 1+ 01/17/2022    RBC 2.22 01/21/2022    TRICHOMONAS NOT REPORTED 01/17/2022    WBC 14.5 01/21/2022    YEAST NOT REPORTED 01/17/2022    TURBIDITY Clear 01/17/2022     Lab Results   Component Value Date CREATININE 2.45 01/21/2022    GLUCOSE 135 01/21/2022       Medical Decision Making-Imaging:     Narrative   EXAMINATION:   CTA OF THE CHEST 1/8/2022 10:10 am       TECHNIQUE:   CTA of the chest was performed after the administration of intravenous   contrast.  Multiplanar reformatted images are provided for review.  MIP   images are provided for review. Dose modulation, iterative reconstruction,   and/or weight based adjustment of the mA/kV was utilized to reduce the   radiation dose to as low as reasonably achievable.       COMPARISON:   None.       HISTORY:   ORDERING SYSTEM PROVIDED HISTORY: dyspnea / hypoxia   Reason for Exam: Shortness of breath       FINDINGS:   Pulmonary Arteries: Pulmonary arteries are adequately opacified for   evaluation.  No evidence of intraluminal filling defect to suggest pulmonary   embolism.  Main pulmonary artery is normal in caliber.       Mediastinum: No evidence of mediastinal lymphadenopathy.  Normal heart size. Normal caliber thoracic aorta with diffuse atherosclerosis.       Lungs/pleura: Extensive ground-glass opacification of the bilateral lung   fields with peripheral involvement slight apical as well as basilar sparing. No effusion or pneumothorax.       Upper Abdomen: Limited images of the upper abdomen are unremarkable.       Soft Tissues/Bones: No acute bone or soft tissue abnormality.           Impression   *No evidence of pulmonary embolism.    *Extensive ground-glass opacification of the bilateral lung fields with   peripheral involvement slight apical as well as basilar sparing.  Imaging   features suggestive of COVID-19 pneumonia, though are nonspecific and can   occur with a variety of infectious and noninfectious processes.         Narrative   EXAMINATION:   CT OF THE ABDOMEN AND PELVIS WITHOUT CONTRAST, 1/16/2022 10:42 am       TECHNIQUE:   CT of the abdomen and pelvis was performed without the administration of   intravenous contrast. Multiplanar reformatted inguinal   hernias, larger on the left.       Peritoneum/Retroperitoneum: No retroperitoneal bleed or bulky   lymphadenopathy.  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  Probable limited intimal dissection   suprarenal aorta without marked aneurysmal dilatation.  Soft tissue stranding   flanks extending into the pelvis, suggesting some degree anasarca.  Slightly   greater prominence right proximal rectus musculature       Bones/Soft Tissues: No acute abnormality.  Mild scoliosis, multilevel   degenerative changes of spine but with DDD L5-S1 and bilateral mild hip joint   degenerative findings.           Impression   Left gluteal hematoma, partially visualized on this study without obvious   active bleeding, but limited without IV contrast.  No retroperitoneal or   rectus sheath bleed.       Extensive findings in the visualized lungs compatible with known COVID-19   pneumonia; denser GGOs suggest worsening pneumonia/pneumonitis or developing   consolidation.  No pneumothorax.       Multiple additional findings, as detailed in the body of the report above.       RECOMMENDATIONS:   Consider follow-up CT pelvis including the upper thighs, if the patient does   not respond to resuscitation with blood products/fluids and other   conservative measures including localized pressure.  Findings were discussed   with Herb Baez at 12:24 pm on 1/16/2022.             Narrative   EXAMINATION:   CT OF THE CHEST, ABDOMEN, AND PELVIS WITHOUT CONTRAST 1/21/2022 11:09 am       TECHNIQUE:   CT of the chest, abdomen and pelvis was performed without the administration   of intravenous contrast. Multiplanar reformatted images are provided for   review.  Dose modulation, iterative reconstruction, and/or weight based   adjustment of the mA/kV was utilized to reduce the radiation dose to as low   as reasonably achievable.       COMPARISON:   CT abdomen and pelvis dated 01/16/2022.  CT chest dated 01/08/2022       HISTORY:   ORDERING SYSTEM PROVIDED HISTORY: blood loss anemia , gluteal hematoma and   worsening HB   TECHNOLOGIST PROVIDED HISTORY:   blood loss anemia , gluteal hematoma and worsening HB           FINDINGS:       Chest:       Mediastinum: Heart size is within normal limits.  Ascending thoracic aorta is   mildly dilated, measuring 4 cm in AP dimension, similar to the previous   study.  Main pulmonary artery is stable in caliber as well.  No mediastinal   hematoma or lymphadenopathy. [place] is a catheter in the SVC with distal tip   in the distal SVC.       Lungs/pleura: There are small bilateral pleural effusions, which are new from   the previous study. [place] is extensive dense airspace consolidation   throughout both lungs, with mild sparing at the bases, increased from   01/08/2022. [place] is decreased AP dimension of the lower trachea, although   tracheobronchial tree remains patent.       Soft Tissues/Bones: There is no acute or suspicious osseous abnormality. Visualized superficial soft tissues are within normal limits.           Abdomen/Pelvis:       Organs: Limited unenhanced liver is within normal limits.  There is a tiny   amount of free fluid adjacent to the hepatic dome.  Gallbladder, spleen,   pancreas, and adrenal glands are unremarkable.       There is bilateral renal atrophy, more so on the right, unchanged.  No   hydronephrosis or perinephric fluid collection. [place] is mild bilateral   perinephric stranding, which is unchanged and likely physiologic.       GI/Bowel:  The stomach is mildly distended with air-fluid level noted.  No   abnormal bowel distention.  There is mild increased prominence of the   proximal small bowel loops.  There is mild stranding in the proximal   mesentery.  No focal pericolonic inflammation.  No free air.       Pelvis: Urinary bladder is within normal limits.  No evidence of pelvic   lymphadenopathy.       Peritoneum/Retroperitoneum: Distal aortobifemoral graft noted.  Caliber of   the abdominal aorta is unchanged.  There is moderate to severe scattered   atherosclerosis, which is unchanged.  No retroperitoneal lymphadenopathy or   hematoma.  Slit-like IVC is noted.       Bones/Soft Tissues: There is no acute or suspicious osseous abnormality.  The   hematoma previously seen in the left gluteal muscle is not as well visualized   on today's study due to isoattenuation.  There is asymmetric swelling of the   left gluteal muscle, although slightly improved when compared to 01/16/2022. AP dimension in the area of suspected hematoma is 4.4 cm (previously 5.7 cm). Transverse dimension is approximately 9.3 cm (previously 9.7 cm).  There is   stranding in the subcutaneous fat of the upper thighs bilaterally.           Impression   1.  New small bilateral pleural effusions with extensive bilateral airspace   consolidation, increased from 01/08/2022.       2.  Decreased AP dimension of the distal trachea, suggesting tracheomalacia.       3.  Previously noted hematoma in the left gluteal musculature is not as well   visualized on today's study, but appears overall slightly decreased in size. No new areas of hematoma.       4.  Mild distention of the stomach, mild prominence of proximal small bowel   loops, and mild nonspecific stranding in the proximal mesentery.  No definite   evidence of high-grade bowel obstruction at this time.  Enteritis or early   developing obstruction are considered in the differential.         Medical Decision [id number]:     Culture, Blood 1 [[id number]] Collected: 01/09/22 1155   Order Status: Completed Specimen: Blood Updated: 01/14/22 1300    Specimen Description . BLOOD    Special Requests NOT REPORTED    Culture NO GROWTH 5 DAYS   Culture, Blood 1 [[id number]] Collected: 01/09/22 6959   Order Status: Completed Specimen: Blood Updated: 01/14/22 1300    Specimen Description . BLOOD    Special Requests NOT REPORTED    Culture NO GROWTH 5 DAYS   COVID-19, Rapid [[id number]] (Abnormal) Collected: 01/08/22 1045   Order Status: Completed Specimen: Nasopharyngeal Swab Updated: 01/08/22 1109    Specimen Description . NASOPHARYNGEAL SWAB    SARS-CoV-2, Rapid DETECTED Abnormal     Comment:        Rapid NAAT: The specimen is POSITIVE for SARS-Cov-2, the novel coronavirus associated with   COVID-19.         This test has been authorized by the FDA under an Emergency Use Authorization (EUA) for use   by authorized laboratories.         The ID NOW COVID-19 assay is designed to detect the virus that causes COVID-19 in patients   with signs and symptoms of infection who are suspected of COVID-19. An individual without symptoms of COVID-19 and who is not shedding SARS-CoV-2 virus would   expect to have a negative (not detected) result in this assay. Fact sheet for Healthcare Providers: Dionne   Fact sheet for Patients: [email or web address]           Methodology: Isothermal Nucleic Acid Amplification         Results reported to the appropriate Health [facility]            Medical Decision Making-Other:     Note:  · Labs, medications, radiologic studies were reviewed with personal review of films  · Large amounts of data were reviewed  · Discussed with nursing Staff, Discharge planner  · Infection Control and Prevention measures reviewed  · All prior entries were reviewed  · Administer medications as ordered  · Prognosis: Guarded  · Discharge planning reviewed      Thank you for allowing us to participate in the care of this patient. Please call with questions. Electronically signed by MOHAMUD Nice CNP on 1/21/2022 at 10:44 AM    ATTESTATION:    I have discussed the case, including pertinent history and exam findings with the APRN.  I have evaluated the  History, physical findings and pictures of the patient and the key elements of the encounter have been performed by me. I have reviewed the laboratory data, other diagnostic studies and discussed them with the APRN. I have updated the medical record where necessary. I agree with the assessment, plan and orders as documented by the APRN.     Jasen Desouza MD.

## 2022-01-21 NOTE — PLAN OF CARE
Problem: Airway Clearance - Ineffective  Goal: Achieve or maintain patent airway  Outcome: Ongoing     Problem: Gas Exchange - Impaired  Goal: Absence of hypoxia  Outcome: Ongoing  Goal: Promote optimal lung function  Outcome: Ongoing     Problem: Breathing Pattern - Ineffective  Goal: Ability to achieve and maintain a regular respiratory rate  Outcome: Ongoing     NON INVASIVE VENTILATION    PROVIDE OPTIMAL VENTILATION/ACCEPTABLE SP02  IMPLEMENT NON INVASIVE VENTILATION PROTOCOL  ASSESSMENT SKIN INTEGRITY  PATIENT EDUCATION AS NEEDED  BIPAP AS NEEDED

## 2022-01-21 NOTE — CONSULTS
(PRINIVIL;ZESTRIL) 30 MG tablet Take 30 mg by mouth daily 10/25/21  Yes Historical Provider, MD   metoprolol tartrate (LOPRESSOR) 50 MG tablet Take 50 mg by mouth daily 10/25/21  Yes Historical Provider, MD   cilostazol (PLETAL) 100 MG tablet Take 100 mg by mouth 2 times daily 10/25/21   Historical Provider, MD   clopidogrel (PLAVIX) 75 MG tablet Take 75 mg by mouth daily 10/25/21   Historical Provider, MD   vitamin B-12 (CYANOCOBALAMIN) 1000 MCG tablet Take 1,000 mcg by mouth daily    Historical Provider, MD       Family History:   History reviewed. No pertinent family history. Social History:   TOBACCO:   reports that he has quit smoking. He has never used smokeless tobacco.  ETOH:   reports previous alcohol use. DRUGS:  reports no history of drug use.   OCCUPATION:   Noncontributory          REVIEW OF SYSTEMS:    Review of Systems -   General ROS: Completed and except as mentioned above were negative   Psychological ROS:  Completed and except as mentioned above were negative  Ophthalmic ROS:  Completed and except as mentioned above were negative  ENT ROS:  Completed and except as mentioned above were negative  Allergy and Immunology ROS:  Completed and except as mentioned above were negative  Hematological and Lymphatic ROS:  Completed and except as mentioned above were negative  Endocrine ROS: Completed and except as mentioned above were negative  Breast ROS:  Completed and except as mentioned above were negative  Respiratory ROS:  Completed and except as mentioned above were negative  Cardiovascular ROS:  Completed and except as mentioned above were negative  Gastrointestinal ROS: Completed and except as mentioned above were negative  Genito-Urinary ROS:  Completed and except as mentioned above were negative  Musculoskeletal ROS:  Completed and except as mentioned above were negative  Neurological ROS:  Completed and except as mentioned above were negative  Dermatological ROS:  Completed and except as mentioned above were negative          Physical Exam:    Vitals: BP (!) 147/56   Pulse 67   Temp 98.1 °F (36.7 °C) (Oral)   Resp 24   Ht 5' 10\" (1.778 m)   Wt 148 lb 2.4 oz (67.2 kg)   SpO2 93%   BMI 21.26 kg/m²     Last Body weight:   Wt Readings from Last 3 Encounters:   01/08/22 148 lb 2.4 oz (67.2 kg)       Body Mass Index : Body mass index is 21.26 kg/m². Intake and Output summary:     Intake/Output Summary (Last 24 hours) at 1/21/2022 1417  Last data filed at 1/21/2022 1301  Gross per 24 hour   Intake 1277 ml   Output 1825 ml   Net -548 ml       Physical Examination:   PHYSICAL EXAMINATION:  Vitals:    01/21/22 1237 01/21/22 1242 01/21/22 1247 01/21/22 1252   BP: (!) 149/63 (!) 157/61 134/70 (!) 147/56   Pulse: 65 67 71 67   Resp: 25 20 20 24   Temp: 97.5 °F (36.4 °C) 97.5 °F (36.4 °C) 98.2 °F (36.8 °C) 98.1 °F (36.7 °C)   TempSrc: Oral Axillary Oral Oral   SpO2: 100% 100% 97% 93%   Weight:       Height:         Constitutional: This is a well developed, well nourished, 18.5-24.9 - Normal [de-identified]y.o. year old male who is alert, oriented, cooperative and in no apparent distress. Head:normocephalic and atraumatic. EENT:   ALEXANDREA. No conjunctival injections. Septum was midline, mucosa was without erythema, exudates or cobblestoning. No thrush was noted. Mallampati II (soft palate, uvula, fauces visible)  Neck: Supple without thyromegaly. No elevated JVP. Trachea was midline. Respiratory: Chest was symmetrical without dullness to percussion. Breath sounds bilaterally were clear to auscultation. There were no wheezes, rhonchi or rales. There is no intercostal retraction or use of accessory muscles. No egophony noted. Cardiovascular: Regular without murmur, clicks, gallops or rubs. Abdomen: Slightly rounded and soft without organomegaly. No rebound tenderness, rigidity or guarding was appreciated. Lymphatic: No lymphadenopathy. Musculoskeletal: Normal curvature of the spine.   No gross muscle weakness. Extremities:  No lower extremity edema, ulcerations, tenderness, varicosities or erythema. No involuntary movements are noted. Skin:  Warm and dry. Good color, turgor and pigmentation. No lesions or scars. No cyanosis or clubbing  Neurological/Psychiatric: The patient's general behavior, level of consciousness, thought content and emotional status is normal.            Laboratory findings:-    CBC:   Recent Labs     01/21/22  0717   WBC 14.5*   HGB 6.7*   PLT 81*     BMP:    Recent Labs     01/19/22  0600 01/19/22  0600 01/20/22  0626 01/20/22  0626 01/21/22  0717   *   < > 134*   < > 137   K 4.7   < > 4.4   < > 4.6      < > 104   < > 104   CO2 21   < > 22   < > 24   BUN 77*   < > 60*   < > 54*   CREATININE 2.96*  --  2.52*  --  2.45*   GLUCOSE 129*   < > 124*   < > 135*    < > = values in this interval not displayed. S. Calcium:  Recent Labs     01/21/22  0717   CALCIUM 7.5*     S. Ionized Calcium:No results for input(s): IONCA in the last 72 hours. S. Magnesium:No results for input(s): MG in the last 72 hours. S. Phosphorus:No results for input(s): PHOS in the last 72 hours. S. Glucose:  Recent Labs     01/20/22  1606 01/20/22  2045 01/21/22  0700   POCGLU 231* 150* 130*     Glycosylated hemoglobin A1C: No results for input(s): LABA1C in the last 72 hours. INR: No results for input(s): INR in the last 72 hours. Hepatic functions: No results for input(s): ALKPHOS, ALT, AST, PROT, BILITOT, BILIDIR, LABALBU in the last 72 hours. Pancreatic functions:No results for input(s): LACTA, AMYLASE in the last 72 hours. S. Lactic Acid: No results for input(s): LACTA in the last 72 hours. Cardiac enzymes:No results for input(s): CKTOTAL, CKMB, CKMBINDEX, TROPONINI in the last 72 hours. BNP:No results for input(s): BNP in the last 72 hours. Lipid profile: No results for input(s): CHOL, TRIG, HDL, LDL, LDLCALC in the last 72 hours.   Blood Gases: No results found for: PH, PCO2, PO2, HCO3, O2SAT  Thyroid functions: No results found for: TSH         Microbiology:    Cultures during this admission:     Blood cultures:      [] None drawn      [x] Negative             []  Positive (Details:  )  Urine Culture:        [] None drawn      [] Negative             []  Positive (Details:  )  Sputum Culture:   [] None drawn       [] Negative             []  Positive (Details:  )     SARS-CoV-2 antigen test was positive on 1/8/2022    Radiological reports:  XR CHEST PORTABLE    Result Date: 1/16/2022  Right IJ line in satisfactory position. Unchanged or slightly worse bilateral diffuse infiltrates consistent with the patient's history of COVID pneumonia. XR CHEST PORTABLE    Result Date: 1/14/2022  Patchy bilateral interstitial and ground-glass infiltrates most notably in the lung periphery. Findings have progressed from the prior exam compatible with patient's history of COVID pneumonia     XR CHEST PORTABLE    Result Date: 1/11/2022  Unchanged bilateral airspace opacities     XR CHEST PORTABLE    Result Date: 1/9/2022  Scattered infiltrates consistent with pneumonia. CT CHEST PULMONARY EMBOLISM W CONTRAST    Result Date: 1/8/2022  *No evidence of pulmonary embolism. *Extensive ground-glass opacification of the bilateral lung fields with peripheral involvement slight apical as well as basilar sparing. Imaging features suggestive of COVID-19 pneumonia, though are nonspecific and can occur with a variety of infectious and noninfectious processes. CT CHEST ABDOMEN PELVIS WO CONTRAST    Result Date: 1/21/2022  1. New small bilateral pleural effusions with extensive bilateral airspace consolidation, increased from 01/08/2022. 2.  Decreased AP dimension of the distal trachea, suggesting tracheomalacia. 3.  Previously noted hematoma in the left gluteal musculature is not as well visualized on today's study, but appears overall slightly decreased in size. No new areas of hematoma.  4.  Mild distention of the stomach, mild prominence of proximal small bowel loops, and mild nonspecific stranding in the proximal mesentery. No definite evidence of high-grade bowel obstruction at this time. Enteritis or early developing obstruction are considered in the differential. RECOMMENDATIONS: Unavailable           Assessment and Plan       IMPRESSION:   1. COVID-19    2. Acute kidney [facility] ([facility]    3. Elevated troponin    4. Hypoxia        Principal Problem:    Pneumonia due to COVID-19 virus  Active Problems:    Essential hypertension    Hyperlipidemia    Acute hypoxemic respiratory failure due to COVID-19 [facility])    Acute kidney injury superimposed on chronic kidney disease ([facility] baseline creatinine 1.9    Stage 3b chronic kidney disease (HCC)    Elevated troponin    Nonrheumatic aortic valve stenosis    PAD (peripheral artery disease) (HCC)    Moderate protein-calorie malnutrition (HCC)    Atrial fibrillation with RVR ([facility]    COVID-19    Traumatic hematoma of buttock  Resolved Problems:    * No resolved hospital problems. *  Tracheomalacia  Type 2 diabetes mellitus  Previous history of cigarette smoking  Amiodarone use        Plan:     Continue Airborne isolation   Continue high flow oxygen   Use BiPAP, if needed   Obtain X-ray chest as needed    Monitor input/output, with a goal of even/negative fluid balance   Monitor CRP, LDH, AST/ALT/ferritin/ D-dimer   Continue supportive care   GI/DVT prophylaxis   Glycemic control per primary service   On nutrition via oral diet   Patient is on meropenem for pneumonia? Bacterial infection   Completed Actemra and Decadron   CODE STATUS is DNR CCA    Management as per guidance provided by hospital policy and prevailing evidence based medicine, during the COVID-19 pandemic emergency.   This patient was evaluated in the context of the global SARS-CoV-2 (COVID-19) pandemic, which necessitated considerations that the patient either has COVID-19 infection or is at risk of infection with COVID-19. Institutional protocols and algorithms that pertain to the evaluation & management of patients with COVID-19 or those at risk for COVID-19 are in a state of rapid changes based on information released by regulatory bodies including the CDC and federal and state organizations. These policies and algorithms were followed during the patient's care. Please note that this chart was generated using voice recognition Dragon dictation software. Although every effort was made to ensure the accuracy of this automated transcription, some errors in transcription may have occurred. Thank you for having us involved in the care of your patient. Please call us if you have any questions or concerns. Total critical care time caring for this patient with life threatening, unstable organ failure, including direct patient contact, management of life support systems, review of data including imaging and labs, discussions with other team members and physicians at least 27  Min so far today, excluding procedures.       Osman Payan MD, M.D.            1/21/2022, 2:17 PM

## 2022-01-21 NOTE — PROGRESS NOTES
Elevated PSA:     Elevated PSA can come from a number of reasons. These reasons include prostate infection, prostate inflammation, or prostate cancer. If you have a family history of prostate cancer, we should follow your PSA even closer. In order to rule out prostate cancer, we have recommended a prostate biopsy. Here is more information below on this procedure. It is done in our clinic and does require an enema prior to the procedure and antibiotics for 3 days (that you start the evening prior to the procedure).     We know that as men get older, their prostate naturally gets larger. A larger prostate will make more PSA. To account for this gradual enlargement, we follow an age-specific reference range for PSA:  Age 40-49 years: <2.50 ng/mL  Age 50-59 years: <3.50 ng/mL  Age 60-69 years: <4.50 ng/mL  Age 70-79 years: <6.50 ng/mL    Here are the risk factors for prostate cancer:   • Age: age over 50 years. More than 80% of prostate cancers are diagnosed in men >65 years.   • Race:  men have a higher risk of prostate cancer. They are more likely to develop cancer at a younger age and have more aggressive tumors.   • Family history: familial prostate cancer can occur about 20% of the time. If your father or brother had prostate cancer, your risk of developing prostate cancer is 2-3 times higher than your average risk.   • Hereditary breast and ovarian cancer (HBOC) syndrome: Genetic testing can be completed if there is strong family history of breast and ovarian cancer. Men who have these genetic markers are at higher risk of developing familial prostate cancer.   • Other genetic changes: other genetic markers canc increase the risk of prostate cancer.   • Agent Orange exposure: The [place] [employer] lists prostate cancer as a disease associated with exposure to Agent Orange, a chemical used during the [place] War. If you are a  who may have been exposed to Agent Orange,  Renal Progress Note    Patient :  Desiree Ledezma; [de-identified] y.o. MRN# [id number]  Location:  [facility]/3524-85  Attending:  Otilio Foster MD  Admit Date:  1/8/2022   Hospital Day: 13      Subjective:     Known history of chronic kidney disease stage IIIb baseline 1.82.0. Previous history of type 2 diabetes aortic stenosis. Admitted to the hospital on 8 January with COVID-19 pneumonia. Received IV contrast.  Developed ATN, creatinine peaked at 3.0. Thereafter recovered renal function creatinine was down to 2.0. Nephrology signed off. Reconsulted 1/19/2022 for progressive decline in kidney function, creatinine had peaked at 3.7. Patient had developed spontaneous left gluteal area hematoma with drop in hemoglobin from 11 down to 5.7. Since then renal function improving steadily. Nonoliguric. Hemodynamically stable. Creatinine down to 2.4 today. Urine output good. Continues on high flow oxygen by nasal cannula maintaining adequate oxygen saturation. Hemoglobin still running low receiving packed cells. Plan for transfusion again today. Oral intake still suboptimal.  Episodic hypoglycemia requiring D10.   Echocardiogram done this admission shows severe aortic stenosis with a valve area of 1.0 cm², peak gradient 57 mean 37  Outpatient Medications:     Medications Prior to Admission: aspirin 325 MG tablet, Take 81 mg by mouth daily   atorvastatin (LIPITOR) 40 MG tablet, Take 20 mg by mouth nightly  lisinopril (PRINIVIL;ZESTRIL) 30 MG tablet, Take 30 mg by mouth daily  metoprolol tartrate (LOPRESSOR) 50 MG tablet, Take 50 mg by mouth daily  cilostazol (PLETAL) 100 MG tablet, Take 100 mg by mouth 2 times daily  clopidogrel (PLAVIX) 75 MG tablet, Take 75 mg by mouth daily  vitamin B-12 (CYANOCOBALAMIN) 1000 MCG tablet, Take 1,000 mcg by mouth daily    Current Medications:     Scheduled Meds:    sodium bicarbonate  650 mg Oral BID    ferrous sulfate  325 mg Oral Daily with breakfast    guaiFENesin  600 mg Oral BID    lidocaine 1 % injection  5 mL IntraDERmal Once    sodium chloride flush  5-40 mL IntraVENous 2 times per day    hydrocortisone  20 mg Oral BID    busPIRone  10 mg Oral TID    [Held by provider] insulin glargine  20 Units SubCUTAneous QAM    senna  2 tablet Oral Nightly    [Held by provider] insulin lispro  0-6 Units SubCUTAneous TID WC    [Held by provider] insulin lispro  0-3 Units SubCUTAneous Nightly    amiodarone  200 mg Oral Daily    metoprolol tartrate  50 mg Oral BID    pantoprazole  40 mg Oral QAM AC    atorvastatin  20 mg Oral Nightly    [Held by provider] clopidogrel  75 mg Oral Daily    vitamin B-12  1,000 mcg Oral Daily    aspirin  81 mg Oral Daily    sodium chloride flush  5-40 mL IntraVENous 2 times per day     Continuous Infusions:    sodium chloride      sodium chloride       IV fluid builder 25 mL/hr (22 2240)    sodium chloride      sodium chloride      sodium chloride      dextrose 100 mL/hr (22 0010)    sodium chloride       PRN Meds:  sodium chloride, sodium chloride flush, sodium chloride, melatonin, sodium chloride, sodium chloride, sodium chloride, sodium chloride, LORazepam, glucose, dextrose, glucagon (rDNA), dextrose, metoprolol, benzonatate, sodium chloride flush, sodium chloride flush, sodium chloride, ondansetron **OR** ondansetron, polyethylene glycol, acetaminophen **OR** acetaminophen    Input/Output:       I/O last 3 completed shifts: In:  [P.O.:1460; I.V.:617]  Out: 2600 [Urine:2600]. No data found. No data found.     Vital Signs:   Temperature:  Temp: 97.6 °F (36.4 °C)  TMax:   Temp (24hrs), Av.8 °F (36.6 °C), Min:97.5 °F (36.4 °C), Max:98.2 °F (36.8 °C)    Respirations:  Resp: 16  Pulse:   Pulse: 77  BP:    BP: (!) 157/57  BP Range: Systolic (85UBT), LST:177 , Min:112 , PIY:616       Diastolic (19LWW), KGU:02, Min:53, Max:73      Physical Examination:     General:  AAO x 3, still has HFO, no accessory muscle please [facility].   • Eating habits: no study has proven that diet and nutrition can directly cause or prevent the development of prostate cancer. However, many studies that look at links between certain eating behaviors and cancer suggest there may be a connection. For example, obesity is associated with many cancers, including prostate cancer.     Here's your PSA lab values.   PSA, Total (ng/mL)   Date Value   08/15/2011 2.14     Prostate Specific Antigen (ng/mL)   Date Value   12/15/2020 5.28 (H)   11/12/2020 4.98 (H)       Plan:   1. We'll call you with rectal culture results. This will determine what antibiotics we prescribe for you for the prostate biopsy. You will start these the night before the procedure.     Prostate Biopsy    Cancer occurs when abnormal cells form a tumor. A tumor is a lump of cells that grow uncontrolled. Initial tests that may indicate cancer of the prostate include a digital rectal exam, a PSA (prostate specific antigen blood test), ultrasound, and others. A core needle biopsy will be done if your healthcare provider thinks you have prostate cancer. A thin needle is used to remove small samples of prostate tissue. Usually multiple biopsies are taken. These samples are checked for cancer.    Taking tissue samples  A biopsy takes about 15 to 20 minutes. You may be given an enema or suppository before the biopsy to clear the bowels. Antibiotics are given at least an hour before the biopsy. During the procedure:  · You will be given antibiotics to prevent infection.  · You may be given a sedative, local pain killer, or pain medicine.  · A small, ultrasound  probe is put into the rectum as you lie on your side. A picture of your prostate can then be seen on a monitor. This is called a transrectal ultrasound (TRUS).  · Your healthcare provider will use the TRUS picture as a guide. He or she will use a thin needle to remove tiny tissue samples from some sites in the  prostate.  · These tissue samples are sent to the pathology department. They are looked at under a microscope so a diagnosis can be made.     Risk and complications:  • Infection in the prostate (prostatitis)  • Infection that travels through the blood stream and may require hospitalization (sepsis)  • Pain  • Blood in the semen, urine or stool (this is usually normal and  gets better over a week or two)    Home care:   • Your biopsy will be performed in the rectum so you may have some soreness for a  few days.   • We typically recommend tylenol after the biopsy for discomfort. We'd like you to wait on taking medication like ibuprofen or aleve. You may also take warm baths to help with the discomfort.   • You'll take antibiotics for 3 days (starting the night before the procedure). It's important that you finish this prescription. This will help prevent an infection. Signs of an infection include chills, pain, or fever.  • Drink fluids the day of the procedure and a couple days after. Drink six to eight 8-ounce glasses of water to provide a healthy flow of urine.   • You may see minor bleeding after the procedure. This is normal and usually needs no treatment. You may see blood in your urine or semen (rust color). You may also have light bleeding from your rectum if you have hemorrhoids.  • Your provider will tell you when you can go back to your normal activities. This includes sex, exercise, and straining physically. Discuss these with your provider.    When to seek medical advice  Call your healthcare provider right away if any of these occur:  · You aren’t able to urinate, or see that you have less flow of urine  · Chills and fever of 100.4°F (38°C)    · Severe pain  · Signs of an infection that is getting worse. These include worsening pain, pain in your side under the rib cage or in the low back, or foul-smelling urine.  · Blood clots or bright red blood in your stool or urine  · You feel confused or very  use.  HEENT: Atraumatic, normocephalic, no throat congestion, moist mucosa. Eyes:   Pupils equal, round and reactive to light, EOMI. Neck:   No JVD, no thyromegaly, no lymphadenopathy. Chest:  Bilateral vesicular breath sounds, basal crackles  Cardiac:  S1 S2 RR, systolic ejection murmur at the apex, second sound barely audible, murmurs crescendo decrescendo quality radiating to the base. No gallops or rubs. , gallops or rubs, JVP not raised. Abdomen: Soft, non-tender, no masses or organomegaly, BS audible. :   No suprapubic or flank tenderness. Neuro:  AAO x 3, No FND. SKIN:  No rashes, good skin turgor. Extremities:  No edema, no calf tenderness. Bruising in the antecubital fossa on the right side  Labs:       Recent Labs     01/19/22  0600 01/20/22  0626 01/21/22  0717   WBC 18.5* 15.2* 14.5*   RBC 2.37* 2.52* 2.22*   HGB 7.3* 7.7* 6.7*   HCT 21.7* 23.1* 21.1*   MCV 91.6 91.7 95.0   MCH 30.8 30.6 30.2   MCHC 33.6 33.3 31.8   RDW 16.3* 16.8* 17.4*   PLT 89* 82* 81*   MPV 10.9 10.6 10.1      BMP:   Recent Labs     01/19/22  0600 01/20/22  0626 01/21/22  0717   * 134* 137   K 4.7 4.4 4.6    104 104   CO2 21 22 24   BUN 77* 60* 54*   CREATININE 2.96* 2.52* 2.45*   GLUCOSE 129* 124* 135*   CALCIUM 7.3* 7.3* 7.5*      Phosphorus:   No results for input(s): PHOS in the last 72 hours. Magnesium:  No results for input(s): MG in the last 72 hours. Albumin:  No results for input(s): LABALBU in the last 72 hours.   BNP:    No results found for: BNP  SUMMER:      Lab Results   Component Value Date    SUMMER NEGATIVE 01/09/2022     SPEP:  Lab Results   Component Value Date    PROT 6.1 01/09/2022     UPEP:   No results found for: LABPE  C3:     Lab Results   Component Value Date    C3 134 01/09/2022     C4:     Lab Results   Component Value Date    C4 31 01/09/2022     MPO ANCA:   No results found for: MPO  PR3 ANCA:   No results found for: PR3  Anti-GBM:   No results found for: GBMABIGG  Hep BsAg:       No results found for: HEPBSAG  Hep C AB:        No results found for: HEPCAB    Urinalysis/Chemistries:      Lab Results   Component Value Date    NITRU NEGATIVE 01/17/2022    COLORU Yellow 01/17/2022    PHUR 5.0 01/17/2022    WBCUA None 01/17/2022    RBCUA 2 TO 5 01/17/2022    MUCUS 1+ 01/17/2022    TRICHOMONAS NOT REPORTED 01/17/2022    YEAST NOT REPORTED 01/17/2022    BACTERIA NOT REPORTED 01/17/2022    SPECGRAV 1.018 01/17/2022    LEUKOCYTESUR NEGATIVE 01/17/2022    UROBILINOGEN Normal 01/17/2022    BILIRUBINUR NEGATIVE 01/17/2022    GLUCOSEU 1+ 01/17/2022    KETUA NEGATIVE 01/17/2022    AMORPHOUS NOT REPORTED 01/17/2022     Urine Sodium:     Lab Results   Component Value Date    CHERYL 56 01/10/2022     Urine Potassium:  No results found for: KUR  Urine Chloride:  No results found for: CLUR  Urine Osmolarity: No results found for: OSMOU  Urine Protein:   No components found for: TOTALPROTEIN, URINE   Urine Creatinine:     Lab Results   Component Value Date    LABCREA 90.8 01/09/2022     Urine Eosinophils:  No components found for: UEOS    Radiology:     CXR:     Assessment:     1. Acute Kidney Injury: Secondary to ischemic ATN and nephrotoxic ATN initially from COVID-pneumonia, systemic inflammation, contrast exposure. Baseline 2.0 peaked at 3.0. Thereafter developed left gluteal area hematoma with drop in hemoglobin down to 5.7, decreased renal perfusion, had a secondary ATN creatinine peaked at 3.96. Now resolving with improved renal perfusion, creatinine down to 2.4 today. Nonoliguric. 2. persistent anemia, requiring multiple transfusions likely from left gluteal area hematoma  3. COVID-19 pneumonia with respiratory failure requiring high flow oxygen needs  4. chronic kidney disease stage IIIb4 from diabetic nephrosclerosis baseline 2.0  5.   Severe aortic stenosis valve area 1.0 cm squares, second heart sound audible, peak gradient 57 mean 37    Plan:   1. wean off D10 as soon as patient's oral intake tired    Date Last Reviewed: 1/1/2017  © 7193-7607 [employer], [employer]. [address] [place] [address], [place] [address]. All rights reserved. This information is not intended as a substitute for professional medical care. Always follow your healthcare professional's instructions.       appropriate and blood sugars well controlled  2. keep systolic pressure more than 110  3. avoid nephrotoxic agents  4. agree with transfusion  5. we will follow  Nutrition   Please ensure that patient is on a renal diet/TF. Avoid nephrotoxic drugs/contrast exposure. We will continue to follow along with you.

## 2022-01-21 NOTE — PROGRESS NOTES
[facility]  Office: [address], DO, María Wei, DO, Stuart Jojo, DO, Araseli Franco Blood, DO, Lloyd Smith MD, Nysaia Hernandez MD, Mirna Charles MD, Gagandeep Moreira MD, Cira Liriano MD, Queen Alpa MD, Alphonso Klinefelter, MD, Garfield Rios, DO, Genesis Ibarra, DO, Bobby Florentino MD,  Patricia Bauer, DO, Bhavya Santana MD, Trupti Roberts MD, Kb Conde MD, Bri Grider MD, Marissa Patterson MD, Mike Jennings MD, Karina Beck MD, Giuliano Aguiar, [facility], [facility], CNP, Isaias Hernández, CNP, Benita Drew, CNS, Sidney Bird, CNP, Pedrito Rosenbaum, CNP, Ami Leos, CNP, Peyton Eid, CNP, Corey Ramos, CNP, Capo Holloway PA-C, Maycol Anderson, [facility], Partha Sargent, [facility], Michele Reed, CNP, Adin Darby, CNP, Ara Michel, CNP, Fabio Figueredo, CNP, Lara Elliott, CNP, Anneliese Ramirez, [facility]    Progress Note    1/21/2022    2:12 PM    Name:   Rand Unger  MRN:     [id number]     Acct:      [de-identified]   Room:   [address] [place] [address] Day:  15  Admit Date:  1/8/2022  9:49 AM    PCP:   MOHAMUD Lynn CNP  Code Status:  DNR-CCA    Subjective:     C/C:   Chief Complaint   Patient presents with    Shortness of Breath    Nausea & Vomiting     Interval History Status: Worsened    Patient seen and examined at bedside. Despite patient is feeling slightly better today his hemoglobin dropped, will need transfusion. Still needing D10 running at 25.   Still on high flow nasal cannula but FiO2 is is down to 70%-80 %  Patient vitals, labs and all providers notes were reviewed,from overnight shift and morning updates were noted and discussed with the nurse      Brief History:     60-year-old male past medical history hypertension, hyperlipidemia, CKD stage IIIb, aortic valve stenosis, peripheral arterial disease presents with shortness of breath found to be in acute respiratory failure secondary to COVID-19 as well as A. fib with RVR. Patient being followed by cardiology, infectious disease, critical care, nephrology was also seen the patient. Patient was originally started on heparin drip for A. fib with RVR however was discontinued due to acute blood loss anemia and bilateral hematomas on both upper extremities. Patient required 2 units of red blood cells on 1/16/2022. Patient was also evaluated by nephrology due to JULES on CKD stage III which improved with IV hydration. For COVID-19 patient status post Actemra on/8/2022, and Decadron 1/8/2022. Patient continues on high flow. Patient originally did change his CODE STATUS from DNR CCA to full code on 11/13/2022 and reverted back to DNR CCA on 11/17/2022. Review of Systems:     Review of Systems   Constitutional: Positive for activity change and appetite change. Negative for chills, diaphoresis and fever. HENT: Negative for congestion. Eyes: Negative for visual disturbance. Respiratory: Positive for shortness of breath. Negative for cough, chest tightness and wheezing. Cardiovascular: Negative for chest pain, palpitations and leg swelling. Gastrointestinal: Negative for abdominal pain, blood in stool, constipation, diarrhea, nausea and vomiting. Genitourinary: Negative for difficulty urinating. Neurological: Positive for weakness. Negative for dizziness, light-headedness, numbness and headaches. Psychiatric/Behavioral: Positive for decreased concentration. All other systems reviewed and are negative. Medications:      Allergies:  No Known Allergies    Current Meds:   Scheduled Meds:    sodium bicarbonate  650 mg Oral BID    furosemide  20 mg IntraVENous Once    ferrous sulfate  325 mg Oral Daily with breakfast    guaiFENesin  600 mg Oral BID    lidocaine 1 % injection  5 mL IntraDERmal Once    sodium chloride flush  5-40 mL IntraVENous 2 times per day    busPIRone  10 mg Oral TID    [Held by provider] insulin glargine  20 Units SubCUTAneous QAM    senna  2 tablet Oral Nightly    [Held by provider] insulin lispro  0-6 Units SubCUTAneous TID WC    [Held by provider] insulin lispro  0-3 Units SubCUTAneous Nightly    amiodarone  200 mg Oral Daily    metoprolol tartrate  50 mg Oral BID    pantoprazole  40 mg Oral QAM AC    atorvastatin  20 mg Oral Nightly    [Held by provider] clopidogrel  75 mg Oral Daily    vitamin B-12  1,000 mcg Oral Daily    [Held by provider] aspirin  81 mg Oral Daily    sodium chloride flush  5-40 mL IntraVENous 2 times per day     Continuous Infusions:    sodium chloride      sodium chloride       IV fluid builder 25 mL/hr (22 2240)    sodium chloride      sodium chloride      sodium chloride      dextrose 100 mL/hr (22 0010)    sodium chloride       PRN Meds: sodium chloride, sodium chloride flush, sodium chloride, melatonin, sodium chloride, sodium chloride, sodium chloride, sodium chloride, LORazepam, glucose, dextrose, glucagon (rDNA), dextrose, metoprolol, benzonatate, sodium chloride flush, sodium chloride flush, sodium chloride, ondansetron **OR** ondansetron, polyethylene glycol, acetaminophen **OR** acetaminophen    Data:     Past Medical History:   has a past medical history of Chronic lower back pain, Cobalamin deficiency, CVA (cerebral vascular accident) ([facility] [address] Diabetes mellitus ([facility] [address] Hyperlipidemia, Hypertension, Malignant neoplasm of colon ([facility] [address] and [facility]per[facility] Social History:   reports that he has quit smoking. He has never used smokeless tobacco. He reports previous alcohol use. He reports that he does not use drugs. Family History: History reviewed. No pertinent family history.     Vitals:  BP (!) 147/56   Pulse 67   Temp 98.1 °F (36.7 °C) (Oral)   Resp 24   Ht 5' 10\" (1.778 m)   Wt 148 lb 2.4 oz (67.2 kg)   SpO2 93%   BMI 21.26 kg/m²   Temp (24hrs), Av.8 °F (36.6 °C), Min:97.5 °F (36.4 °C), Max:98.2 °F (36.8 °C)    Recent Labs 01/20/22  1054 01/20/22  1606 01/20/22 2045 01/21/22  0700   POCGLU 99 231* 150* 130*       I/O (24Hr): Intake/Output Summary (Last 24 hours) at 1/21/2022 1412  Last data filed at 1/21/2022 1301  Gross per 24 hour   Intake 1277 ml   Output 1825 ml   Net -548 ml       Labs:  Hematology:  Recent Labs     01/19/22  0600 01/20/22  0626 01/21/22  0717   WBC 18.5* 15.2* 14.5*   RBC 2.37* 2.52* 2.22*   HGB 7.3* 7.7* 6.7*   HCT 21.7* 23.1* 21.1*   MCV 91.6 91.7 95.0   MCH 30.8 30.6 30.2   MCHC 33.6 33.3 31.8   RDW 16.3* 16.8* 17.4*   PLT 89* 82* 81*   MPV 10.9 10.6 10.1     Chemistry:  Recent Labs     01/19/22  0600 01/20/22  0626 01/21/22  0717   * 134* 137   K 4.7 4.4 4.6    104 104   CO2 21 22 24   GLUCOSE 129* 124* 135*   BUN 77* 60* 54*   CREATININE 2.96* 2.52* 2.45*   ANIONGAP 9 8* 9   LABGLOM 21* 25* 26*   GFRAA 25* 30* 31*   CALCIUM 7.3* 7.3* 7.5*     Recent Labs     01/20/22  0130 01/20/22  0659 01/20/22  1054 01/20/22  1606 01/20/22 2045 01/21/22  0700   POCGLU 114* 92 99 231* 150* 130*     ABG:  Lab Results   Component Value Date    FIO2 INFORMATION NOT PROVIDED 01/10/2022     Lab Results   Component Value Date/Time    SPECIAL NOT REPORTED 01/17/2022 03:32 PM     Lab Results   Component Value Date/Time    CULTURE NO GROWTH 01/17/2022 03:32 PM       Radiology:  CT ABDOMEN PELVIS WO CONTRAST Additional Contrast? None    Result Date: 1/16/2022  Left gluteal hematoma, partially visualized on this study without obvious active bleeding, but limited without IV contrast.  No retroperitoneal or rectus sheath bleed. Extensive findings in the visualized lungs compatible with known COVID-19 pneumonia; denser GGOs suggest worsening pneumonia/pneumonitis or developing consolidation. No pneumothorax. Multiple additional findings, as detailed in the body of the report above.  RECOMMENDATIONS: Consider follow-up CT pelvis including the upper thighs, if the patient does not respond to resuscitation with blood products/fluids and other conservative measures including localized pressure. Findings were discussed with Elisa Aguilar at 12:24 pm on 1/16/2022. XR CHEST PORTABLE    Result Date: 1/16/2022  Right IJ line in satisfactory position. Unchanged or slightly worse bilateral diffuse infiltrates consistent with the patient's history of COVID pneumonia. XR CHEST PORTABLE    Result Date: 1/14/2022  Patchy bilateral interstitial and ground-glass infiltrates most notably in the lung periphery. Findings have progressed from the prior exam compatible with patient's history of COVID pneumonia     XR CHEST PORTABLE    Result Date: 1/11/2022  Unchanged bilateral airspace opacities       Physical Examination:        Physical Exam  Vitals and nursing note reviewed. Constitutional:       General: He is not in acute distress. HENT:      Head: Normocephalic and atraumatic. Eyes:      Conjunctiva/sclera: Conjunctivae normal.      Pupils: Pupils are equal, round, and reactive to light. Cardiovascular:      Rate and Rhythm: Normal rate and regular rhythm. Heart sounds: No murmur heard. Pulmonary:      Effort: Pulmonary effort is normal. No accessory muscle usage or respiratory distress. Breath sounds: No stridor. No decreased breath sounds, wheezing, rhonchi or rales. Abdominal:      General: Bowel sounds are normal. There is no distension. Palpations: Abdomen is soft. Abdomen is not rigid. Tenderness: There is no abdominal tenderness. There is no guarding. Musculoskeletal:         General: No tenderness. Skin:     General: Skin is warm and dry. Findings: No erythema, lesion or rash. Neurological:      Mental Status: He is alert. Cranial Nerves: No cranial nerve deficit. Motor: No seizure activity. Psychiatric:         Speech: Speech normal.         Behavior: Behavior normal. Behavior is cooperative.          Assessment:        Hospital Problems           Last Modified anticoagulation was held secondary to retroperitoneal bleed    CODE STATUS is DNR CCA    Peripheral vascular disease: will hold plavix for now      HTN: continue home medications    HPL: continue home medications    DVT prophylaxis : mechanical only given above     Okay to remove central line in place midline      Discussed with the patient and the nurse charge nurse and PICC line team    Addendum: CT chest abdomen pelvis resulted reflecting worsening infiltrate, bilateral pleural effusion and evidence of tracheomalacia, patient is well has distended stomach and signs of early enteritis as well, and distention of small bowel. Given this development will stop ASA as well, I will involve gastroenterology, surgery and pulmonology  teams , I will start Protonix drip and give 1 dose of Lasix .     Mayela Barakat MD  1/21/2022  2:12 PM

## 2022-01-21 NOTE — CONSULTS
[address]    GASTROENTEROLOGY CONSULT    Patient:   Yadiel Quijano   :    1940   Facility:   [address] [place] [address]   Date:    2022  Admission Dx:  Hypoxia [R09.02]  Elevated troponin [R77.8]  Acute kidney injury ([address] [N17.9]  COVID-19 [U07.1]  Pneumonia due to COVID-19 virus [U07.1, J12.82]  Requesting physician: Brett Kessler MD  Reason for consult:  anemia    CC : \"Covid pneumonia\"    SUBJECTIVE     HISTORY OF PRESENT ILLNESS  This is a [de-identified] y.o.  male  pmh CVA, PAD, HLD, HTN who was admitted 2022 with Hypoxia [R09.02]  Elevated troponin [R77.8]  Acute kidney injury ([facility] [N17.9]  COVID-19 [U07.1]  Pneumonia due to COVID-19 virus [U07.1, J12.82]. We have been asked to see the patient in consultation by Brett Kessler MD for  Anemia. Patient was originally admitted on 21 for covid pneumonia. He is currently on high flow. GI consulted for anemia. hgb has been trending dowm to 6.7 today requiring prbc transfusion. Patient denies any recent bloody BMs or any hemetemesis. Denies any abdominal pain and is tolerating diet. Ct imaging show hematomas in bilateral UE and left gluteus along with stomach distention. Patient takes plavix for his afib which has been held. Abdominal exam was soft nontender nondistended. Patient reports he is former smoking, former alchol use and no illicit drugs. Currently HDS and afebrile.        Summary of imaging completed at this time:      Summary of labs completed at this time:        Previous GI history:         OBJECTIVE:     PAST MEDICAL/SURGICAL HISTORY  Past Medical History:   Diagnosis Date    Chronic lower back pain     Cobalamin deficiency     [facility]A (cerebral vascular accident) [facility]     Diabetes mellitus ([facility]     Hyperlipidemia     Hypertension     Malignant neoplasm of colon ([facility]     PAD (peripheral artery disease) ([facility]      Past Surgical History:   Procedure Laterality Date    ARTERY SURGERY ALLERGIES:  No Known Allergies    HOME MEDICATIONS:  Prior to Admission medications    Medication Sig Start Date End Date Taking?  Authorizing Provider   aspirin 325 MG tablet Take 81 mg by mouth daily  3/31/21  Yes Historical Provider, MD   atorvastatin (LIPITOR) 40 MG tablet Take 20 mg by mouth nightly 10/25/21  Yes Historical Provider, MD   lisinopril (PRINIVIL;ZESTRIL) 30 MG tablet Take 30 mg by mouth daily 10/25/21  Yes Historical Provider, MD   metoprolol tartrate (LOPRESSOR) 50 MG tablet Take 50 mg by mouth daily 10/25/21  Yes Historical Provider, MD   cilostazol (PLETAL) 100 MG tablet Take 100 mg by mouth 2 times daily 10/25/21   Historical Provider, MD   clopidogrel (PLAVIX) 75 MG tablet Take 75 mg by mouth daily 10/25/21   Historical Provider, MD   vitamin B-12 (CYANOCOBALAMIN) 1000 MCG tablet Take 1,000 mcg by mouth daily    Historical Provider, MD       CURRENT MEDICATIONS:  Scheduled Meds:   sodium bicarbonate  650 mg Oral BID    pantoprazole (PROTONIX) bolus  80 mg IntraVENous Once    meropenem  500 mg IntraVENous Q12H    ferrous sulfate  325 mg Oral Daily with breakfast    guaiFENesin  600 mg Oral BID    lidocaine 1 % injection  5 mL IntraDERmal Once    sodium chloride flush  5-40 mL IntraVENous 2 times per day    busPIRone  10 mg Oral TID    [Held by provider] insulin glargine  20 Units SubCUTAneous QAM    senna  2 tablet Oral Nightly    [Held by provider] insulin lispro  0-6 Units SubCUTAneous TID WC    [Held by provider] insulin lispro  0-3 Units SubCUTAneous Nightly    amiodarone  200 mg Oral Daily    metoprolol tartrate  50 mg Oral BID    atorvastatin  20 mg Oral Nightly    [Held by provider] clopidogrel  75 mg Oral Daily    vitamin B-12  1,000 mcg Oral Daily    [Held by provider] aspirin  81 mg Oral Daily    sodium chloride flush  5-40 mL IntraVENous 2 times per day     Continuous Infusions:   sodium chloride      pantoprazole      sodium chloride       IV fluid builder 25 mL/hr (01/19/22 2240)    sodium chloride      sodium chloride      sodium chloride      dextrose 100 mL/hr (01/19/22 0010)    sodium chloride       PRN Meds:sodium chloride, sodium chloride flush, sodium chloride, melatonin, sodium chloride, sodium chloride, sodium chloride, sodium chloride, LORazepam, glucose, dextrose, glucagon (rDNA), dextrose, metoprolol, benzonatate, sodium chloride flush, sodium chloride flush, sodium chloride, ondansetron **OR** ondansetron, polyethylene glycol, acetaminophen **OR** acetaminophen    SOCIAL HISTORY:     Tobacco:   reports that he has quit smoking. He has never used smokeless tobacco.  Alcohol:   reports previous alcohol use. Illicit drugs:  reports no history of drug use. FAMILY HISTORY:     History reviewed. No pertinent family history. REVIEW OF SYSTEMS:    Constitutional: No fever, no chills, no lethargy, no weakness. HEENT:  No headache, otalgia, itchy eyes, nasal discharge or sore throat. Cardiac:  No chest pain, dyspnea, orthopnea or PND. Chest:   SOB, wheezing  Abdomen:  No abdominal pain, nausea or vomiting. Neuro:  No focal weakness, abnormal movements or seizure like activity. Skin:   Multiple eccymosese. :   No hematuria, no pyuria, no dysuria, no flank pain. Extremities:  No swelling or joint pains. ROS was otherwise negative except as mentioned in the [address] PHYSICAL EXAM:    BP (!) 167/64   Pulse 65   Temp 97.5 °F (36.4 °C) (Axillary)   Resp 24   Ht 5' 10\" (1.778 m)   Wt 148 lb 2.4 oz (67.2 kg)   SpO2 (!) 88%   BMI 21.26 kg/m²     GENERAL:   Well developed, Well nourished, No apparent distress  HEAD:   Normocephalic, Atraumatic  EENT:   EOMI, Sclera not icteric, Oropharynx moist   NECK:   Supple, Trachea midline  LUNGS:  Diminished bilaterally  HEART:  RRR, No murmur  ABDOMEN:   Soft, Nontender, Nondistended, BS WNL  EXT:   No clubbing. No cyanosis. No edema.   SKIN:   Multiple ecchymoses   MUSC/SKEL:   Adequate muscle bulk for patient's age, No significant synovitis, No deformities  NEURO:  A&O x Three, CN II- XII grossly intact      LABS AND IMAGING:     CBC  Recent Labs     01/19/22  0600 01/20/22  0626 01/21/22  0717   WBC 18.5* 15.2* 14.5*   HGB 7.3* 7.7* 6.7*   HCT 21.7* 23.1* 21.1*   MCV 91.6 91.7 95.0   MCH 30.8 30.6 30.2   MCHC 33.6 33.3 31.8   PLT 89* 82* 81*       IMMATURE PLTs  No results found for: PLTFLUORE    BMP  Recent Labs     01/19/22  0600 01/20/22  0626 01/21/22  0717   * 134* 137   K 4.7 4.4 4.6    104 104   CO2 21 22 24   BUN 77* 60* 54*   CREATININE 2.96* 2.52* 2.45*   GLUCOSE 129* 124* 135*   CALCIUM 7.3* 7.3* 7.5*       LFTS  No results for input(s): ALKPHOS, ALT, AST, PROT, BILITOT, BILIDIR, LABALBU in the last 72 hours. AMYLASE/LIPASE/AMMONIA  No results for input(s): AMYLASE, LIPASE, AMMONIA in the last 72 hours. PT/INR  No results for input(s): PROTIME, INR in the last 72 hours. ANEMIA STUDIES  No results for input(s): IRON, LABIRON, TIBC, UIBC, FERRITIN, DYSOOGPW10, FOLATE, OCCULTBLD in the last 72 hours. IMAGING  ECHO Complete 2D W Doppler W Color    Result Date: 1/12/2022  Transthoracic Echocardiography Report (TTE)  Patient Name Omayra Bee     Date of Study                 01/11/2022               [facility]   Date of      1940  Gender                        Male  Birth   Age          [de-identified] year(s)  Race                             Room Number  3010   Corporate ID [id number]  #   Patient Connie Hickey [de-identified]  #   MR #         [id number]     Torres Ahn   Accession #  [id number]  Interpreting Physician        [address]   Fellow                   Referring Nurse Practitioner   Interpreting             Referring Physician           Elizabeth Tejada MD  Fellow  Type of Study   TTE procedure:2D Echocardiogram, M-Mode, Doppler, Color Doppler.   Procedure Date Date: 01/11/2022 Start: 02:49 PM Study Location: [facility] Technical Quality: Adequate visualization Indications:LV Function. History / Tech. Comments: Procedure explained to patient. Echo completed at the bedside. PMHx: Hypertension, Hyperlipidemia Atrial fibrillation, chronic kidney disease Patient Status: Inpatient CONCLUSIONS Summary Left ventricle is normal in size, global left ventricular systolic function is normal. Estimated ejection fraction is 55 % . Left atrium is at upper limits of normal. Inter-atrial septum is intact with no evidence for an atrial septal defect. Right atrium is normal in size. Normal right ventricular size and function. Aortic valve is trileaflet, calcified with restriction of motion. Peak instantaneous gradient 60 mmHg and mean gradient 37 mmHg. Calculated ALF 1.0cm2 by VTI (image #57.) Severe aortic stenosis. Trivial aortic insufficiency. Mitral valve sclerosis without significant stenosis. Mean gradient is 4mmHg . Trivial mitral regurgitation. Tricuspid valve was not well visualized. Mild tricuspid regurgitation. No significant pericardial effusion is seen. Signature ----------------------------------------------------------------------------  Electronically signed by Avis Lee(Sonographer) on 01/11/2022 05:16 PM ---------------------------------------------------------------------------- ----------------------------------------------------------------------------  Electronically signed by Hola Call(Interpreting physician) on 01/12/2022  11:08 AM ---------------------------------------------------------------------------- FINDINGS Left Atrium Left atrium is at upper limits of normal. Inter-atrial septum is intact with no evidence for an atrial septal defect. Left Ventricle Left ventricle is normal in size, global left ventricular systolic function is normal, estimated ejection fraction is 55 % . Right Atrium Right atrium is normal in size. Right Ventricle Normal right ventricular size and function.  Mitral Valve Mitral valve sclerosis without significant stenosis. Mean gradient is 4mmHg . Trivial mitral regurgitation. Aortic Valve Aortic valve is trileaflet, calcified with restriction of motion. Peak instantaneous gradient 60 mmHg and mean gradient 37 mmHg. Calculated ALF 1.0cm2 by VTI (image #57.) Severe aortic stenosis. Trivial aortic insufficiency. Tricuspid Valve Tricuspid valve was not well visualized. Mild tricuspid regurgitation. Pulmonic Valve Pulmonic valve not well visualized but Doppler velocities are normal. Pericardial Effusion No significant pericardial effusion is seen. Miscellaneous Normal aortic root dimension. E/E' average = 11.3. IVC normal diameter & inspiratory collapse indicating normal RA filling pressure .  M-mode / 2D Measurements & Calculations:   LVIDd:5.4 cm(3.7 - 5.6 cm)      Diastolic JZWYRH:569 ml  IVSd:1 cm(0.6 - 1.1 cm)         Aortic Root:3.2 cm(2.0 - 3.7 cm)  LVPWd:0.7 cm(0.6 - 1.1 cm)      LA Dimension: 3.3 cm(1.9 - 4.0 cm)                                  LA volume/Index: 47.8 ml                                  LVOT:2 cm                                  RVDd:3.3 cm   Mitral:                                 Aortic   Valve Area (P1/2-Time): 3.1 cm^2        Peak Velocity: 3.90 m/s  Peak E-Wave: 0.79 m/s                   Mean Velocity: 0.96 m/s  Peak A-Wave: 1.49 m/s                   Peak Gradient: 60.84 mmHg  E/A Ratio: 0.53                         Mean Gradient: 37 mmHg  Peak Gradient: 2.52 mmHg  Mean Gradient: 4 mmHg  Deceleration Time: 275 msec             Area (continuity): 1.64 cm^2  P1/2t: 71 msec                          AV VTI: 49 cm   Area (continuity): 2.41 cm^2  Mean Velocity: 0.90 m/s                                           Pulmonic:                                           Peak Velocity: 1.33 m/s                                          Peak Gradient: 7.08 mmHg  Diastology / Tissue Doppler Septal Wall E' velocity:0.07 m/s Septal Wall E/E':11.9 Lateral Wall E' velocity:0.07 m/s Lateral Wall E/E':10.7    CT ABDOMEN PELVIS WO CONTRAST Additional Contrast? None    Result Date: 1/16/2022  EXAMINATION: CT OF THE ABDOMEN AND PELVIS WITHOUT CONTRAST, 1/16/2022 10:42 am TECHNIQUE: CT of the abdomen and pelvis was performed without the administration of intravenous contrast. Multiplanar reformatted images are provided for review. Dose modulation, iterative reconstruction, and/or weight based adjustment of the mA/kV was utilized to reduce the radiation dose to as low as reasonably achievable. COMPARISON: CT of the chest from 01/08/2022, and retroperitoneal ultrasound from 01/10/2022. HISTORY: ORDERING SYSTEM PROVIDED HISTORY:  Retrop bleed r/o TECHNOLOGIST PROVIDED HISTORY: Retrop bleed r/o Reason for Exam:  Retrop bleed r/o FINDINGS: Lower Chest: As demonstrated on recent CT chest, extensive and somewhat denser confluent ground-glass airspace opacities re-identified mid-lower lungs, sparing the bases with some associated crazy paving, air bronchograms and bibasilar atelectatic appearing changes. Main pulmonary artery caliber 31 mm. Mild dilatation ascending aorta, with a maximal dimension of 41 mm. Organs: Unopacified liver, spleen, adrenal glands and both kidneys show no acute abnormality; without suspicious focal finding or hydronephrosis. Significant right renal cortical thinning and some scarring suspected. No large stone. Probable gallbladder sludge or vicarious contrast from recent contrast CT; no calcified stones. GI/Bowel: Small-moderate amount of retained stool, mostly right colon with some fluid/levels; no abnormal dilatation, marked wall thickening or pericolonic fat stranding. Unremarkable small bowel. Some fluid within a mildly distended stomach. Small hiatus hernia. Pelvis: Partially fluid-filled urinary bladder without wall thickening or mass. No significant prostatic enlargement. Symmetric seminal vesicles. No pelvic fluid collection or mass.   Partially visualized approximate 10 x 15 x 5.7 cm left gluteal mass with HU measurements/appearance most suggestive of hematoma. No high density finding compatible with active bleeding, but assessment limited on this examination. Small fat containing inguinal hernias, larger on the left. Peritoneum/Retroperitoneum: No retroperitoneal bleed or bulky lymphadenopathy. 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 Probable limited intimal dissection suprarenal aorta without marked aneurysmal dilatation. Soft tissue stranding flanks extending into the pelvis, suggesting some degree anasarca. Slightly greater prominence right proximal rectus musculature Bones/Soft Tissues: No acute abnormality. Mild scoliosis, multilevel degenerative changes of spine but with DDD L5-S1 and bilateral mild hip joint degenerative findings. Left gluteal hematoma, partially visualized on this study without obvious active bleeding, but limited without IV contrast.  No retroperitoneal or rectus sheath bleed. Extensive findings in the visualized lungs compatible with known COVID-19 pneumonia; denser GGOs suggest worsening pneumonia/pneumonitis or developing consolidation. No pneumothorax. Multiple additional findings, as detailed in the body of the report above. RECOMMENDATIONS: Consider follow-up CT pelvis including the upper thighs, if the patient does not respond to resuscitation with blood products/fluids and other conservative measures including localized pressure. Findings were discussed with Paola Chicas at 12:24 pm on 1/16/2022. XR CHEST PORTABLE    Result Date: 1/16/2022  EXAMINATION: ONE XRAY VIEW OF THE CHEST 1/16/2022 1:00 am COMPARISON: 01/14/2022 HISTORY: ORDERING SYSTEM PROVIDED HISTORY: right IJ placement TECHNOLOGIST PROVIDED HISTORY: right IJ placement Reason for Exam: uprt FINDINGS: Normal heart size.   Unchanged or slightly worse bilateral interstitial/ground-glass opacities diffusely. No pneumothorax or pleural effusion. Surrounding osseous and soft tissue structures show no acute or concerning abnormality. There has been interim placement of a right internal jugular line with distal tip at the inferior 3rd of the SVC. Right IJ line in satisfactory position. Unchanged or slightly worse bilateral diffuse infiltrates consistent with the patient's history of COVID pneumonia. XR CHEST PORTABLE    Result Date: 1/14/2022  EXAMINATION: ONE XRAY VIEW OF THE CHEST 1/14/2022 10:02 am COMPARISON: 01/11/2022 HISTORY: ORDERING SYSTEM PROVIDED HISTORY: worsening covid pneumonia TECHNOLOGIST PROVIDED HISTORY: worsening covid pneumonia FINDINGS: Patchy bilateral interstitial and ground-glass infiltrates most notably in the lung periphery. Findings appear more progressive when compared to the previous exam.The cardiac size is normal. No pleural effusions are seen. Pulmonary vascularity appears normal. There is mild ectasia of the thoracic aorta. There are degenerative changes in the spine . No acute bony abnormalities. The hilar structures are normal.     Patchy bilateral interstitial and ground-glass infiltrates most notably in the lung periphery. Findings have progressed from the prior exam compatible with patient's history of COVID pneumonia     XR CHEST PORTABLE    Result Date: 1/11/2022  EXAMINATION: ONE XRAY VIEW OF THE CHEST 1/11/2022 3:10 pm COMPARISON: 01/09/2022 HISTORY: ORDERING SYSTEM PROVIDED HISTORY: covid follow-up TECHNOLOGIST PROVIDED HISTORY: covid follow-up Reason for Exam: covid port upr at 209pm FINDINGS: Heart size stable. Bilateral airspace opacities persist.  No pneumothorax. No pleural effusion. Unchanged bilateral airspace opacities     XR CHEST PORTABLE    Result Date: 1/9/2022  EXAMINATION: ONE XRAY VIEW OF THE CHEST 1/9/2022 11:24 am COMPARISON: None.  HISTORY: ORDERING SYSTEM PROVIDED HISTORY: covid RIGHT    Result Date: 1/17/2022    [facility]  Vascular Upper Extremities Arterial Duplex Procedure   Patient Name  Kareem Dumont     Date of Study         01/17/2022                Colin Elizabeth   Date of Birth 1940  Gender                Male   Age           [de-identified] year(s)  Race                     Room Number   3010   Corporate ID  [id number]  #   Patient Андрей Hinton  [de-identified]  #   MR #          [id number]     Sonographer           Angela Slaughter RVT, RDMS   Accession #   [id number]  Interpreting          Chasity Cross                            Physician   Referring                 Referring Physician   Kalvin Spurling, MD  Nurse  Practitioner  Procedure Type of Study:   Extremities Arteries: Upper Extremities Arterial Duplex, Arterial Scan  Upper Right. Indications for Study:Cold sensitivity/discoloration of extremities or digits. Patient Status: In Patient. Technical Quality:Limited visualization. Limitation reason:edema. Conclusions   Summary   Simultaneous real time imaging utilizing B-Mode, color doppler and  spectral waveform analysis was performed on the right upper extremity for  arterial evaluation, study demonstrates:   Right:  Patent subclavian, axillary, brachial, radial and ulnar arteries without  any hemodynamically significant stenosis. Velocity and size measurements listed below. Signature   ----------------------------------------------------------------  Electronically signed by Angela Slaughter RVT, RDMS(Sonographer) on 01/17/2022 10:14 AM  ----------------------------------------------------------------   ----------------------------------------------------------------  Electronically signed by Alisha Prabhakar(Interpreting  physician) on 01/17/2022 12:52 PM  ----------------------------------------------------------------  Findings:   Right Impression:  Patent subclavian, axillary, brachial, radial and ulnar arteries with  multiphasic waveforms.   Risk Factors   - The patient's risk factor(s) include: diabetes mellitus, dyslipidemia     and arterial hypertension. Velocities are measured in cm/s ; Diameters are measured in cm Right Upper Extremities Duplex Measurements +------------------+----+-----+-------------------+---------+--------------+ ! Location          ! PSV ! Ratio! Wave Description   ! AP Diam  !Trans Diam    ! +------------------+----+-----+-------------------+---------+--------------+ ! Prox Subclavian   ! 166 ! ! Biphasic           !         !              ! +------------------+----+-----+-------------------+---------+--------------+ ! Dist Subclavian   ! 207 ! 1.25 ! Biphasic           !         !              ! +------------------+----+-----+-------------------+---------+--------------+ ! Axillary          ! 185 ! ! Biphasic           !         !              ! +------------------+----+-----+-------------------+---------+--------------+ ! Prox Brachial     !204 ! 0.99 ! Biphasic           !         !              ! +------------------+----+-----+-------------------+---------+--------------+ ! Dist Brachial     !138 ! 0.68 ! Biphasic           !         !              ! +------------------+----+-----+-------------------+---------+--------------+ ! Prox Radial       !100 !0.72 ! Biphasic           !         !              ! +------------------+----+-----+-------------------+---------+--------------+ ! Mid Radial        !107 ! 1.07 ! Biphasic           !         !              ! +------------------+----+-----+-------------------+---------+--------------+ ! Dist Radial       !63.4!0.59 ! Biphasic           !         !              ! +------------------+----+-----+-------------------+---------+--------------+ ! Prox Ulnar        !150 !1.09 ! Biphasic           !         !              ! +------------------+----+-----+-------------------+---------+--------------+ ! Mid Ulnar         !55. 9!0.37 ! Biphasic           !         !              ! +------------------+----+-----+-------------------+---------+--------------+ ! Dist Ulnar        !70.9!1.27 ! Biphasic           !         !              ! +------------------+----+-----+-------------------+---------+--------------+    VL DUP UPPER EXTREMITY VENOUS RIGHT    Result Date: 1/17/2022    [facility]  Vascular Upper Extremities Veins Procedure   Patient Name  Benitez Motta     Date of Study         01/17/2022                Trevor De Los Santos   Date of Birth 1940  Gender                Male   Age           [de-identified] year(s)  Race                     Room Number   3010   Corporate ID  [id number]  #   Patient [employer]  [de-identified]  #   MR #          [id number]     Sonographer           Angi Batres RVT, RDMS   Accession #   [id number]  Interpreting          Jillian Desai                            Physician   Referring                 Referring Physician   Leigh Juárez MD  Nurse  Practitioner  Procedure Type of Study:   Veins: Upper Extremities Veins, Venous Scan Upper Right. Indications for Study:Leg Swelling. Patient Status: In Patient. Technical Quality:Limited visualization. Limitation reason:bruising on entire arm; edema. Conclusions   Summary   Simultaneous real time imaging utilizing B-Mode, color doppler and  spectral waveform analysis was performed on the right upper extremity for  venous examination of the deep and superficial systems. Findings are:   Right:  No evidence of deep or superficial venous thrombosis.    Signature   ----------------------------------------------------------------  Electronically signed by Angi Batres RVT, RDMS(Sonographer) on 01/17/2022 09:35 AM  ----------------------------------------------------------------   ----------------------------------------------------------------  Electronically signed by Franco Charlton  physician) on 01/17/2022 12:53 PM  ----------------------------------------------------------------  Findings:   Right Impression: Internal jugular, subclavian, axillary, brachial, ulnar, radial, cephalic  and basilic veins are compressible with normal doppler responses. Risk Factors   - The patient's risk factor(s) include: diabetes mellitus, dyslipidemia     and arterial hypertension. Velocities are measured in cm/s ; Diameters are measured in cm Right UE Vein Measurements 2D Measurements +------------------------------------+----------+---------------+----------+ ! Location                            ! Visualized! Compressibility! Thrombosis! +------------------------------------+----------+---------------+----------+ ! Prox IJV                            ! Partial   !               !          ! +------------------------------------+----------+---------------+----------+ ! Dist IJV                            ! Partial   !               !          ! +------------------------------------+----------+---------------+----------+ ! Prox SCV                            ! No        !               !          ! +------------------------------------+----------+---------------+----------+ ! Dist SCV                            ! Yes       ! Yes            ! None      ! +------------------------------------+----------+---------------+----------+ ! Prox Axillary                       ! Yes       ! Yes            ! None      ! +------------------------------------+----------+---------------+----------+ ! Dist Axillary                       ! Yes       ! Yes            ! None      ! +------------------------------------+----------+---------------+----------+ ! Prox Brachial                       !Yes       ! Yes            ! None      ! +------------------------------------+----------+---------------+----------+ ! Dist Brachial                       !Yes       ! Yes            ! None      ! +------------------------------------+----------+---------------+----------+ ! Prox Radial                         !Yes       ! Yes            ! None      ! +------------------------------------+----------+---------------+----------+ ! Dist Radial                         !Yes       ! Yes            ! None      ! +------------------------------------+----------+---------------+----------+ ! Prox Ulnar                          ! Yes       ! Yes            ! None      ! +------------------------------------+----------+---------------+----------+ ! Dist Ulnar                          ! Yes       ! Yes            ! None      ! +------------------------------------+----------+---------------+----------+ ! Basilic at UA                       ! Partial   !Yes            ! None      ! +------------------------------------+----------+---------------+----------+ ! Basilic at AF                       ! Partial   !Yes            ! None      ! +------------------------------------+----------+---------------+----------+ ! Basilic at [address]                       ! No        !               !          ! +------------------------------------+----------+---------------+----------+ ! Cephalic at UA                      ! No        !               !          ! +------------------------------------+----------+---------------+----------+ ! Cephalic at AF                      ! No        !               !          ! +------------------------------------+----------+---------------+----------+ ! Cephalic at [address]                      ! Yes       ! Yes            ! None      ! +------------------------------------+----------+---------------+----------+ Doppler Measurements +-------------------------+-----------------------+------------------------+ ! Location                 ! Signal                 !Reflux                  ! +-------------------------+-----------------------+------------------------+ ! IJV                      ! Phasic                 !                        ! +-------------------------+-----------------------+------------------------+ ! SCV                      ! Phasic                 !                        ! +-------------------------+-----------------------+------------------------+ ! Axillary                 ! Phasic                 !                        ! +-------------------------+-----------------------+------------------------+ Left UE Vein Measurements Doppler Measurements +-------------------------+-----------------------+------------------------+ ! Location                 ! Signal                 !Reflux                  ! +-------------------------+-----------------------+------------------------+ ! SCV                      ! Phasic                 !                        ! +-------------------------+-----------------------+------------------------+    US RETROPERITONEAL COMPLETE    Result Date: 1/10/2022  EXAMINATION: RETROPERITONEAL ULTRASOUND OF THE KIDNEYS AND URINARY BLADDER 1/10/2022 COMPARISON: None HISTORY: ORDERING SYSTEM PROVIDED HISTORY: ren TECHNOLOGIST PROVIDED HISTORY: ren FINDINGS: The exam was performed portably. The right kidney could not be adequately visualized. Kidneys: The right kidney could not be visualized. The left kidney measures 10.3 x 5.3 x 4.1 cm without hydronephrosis. Cortical thickness measures 11.2 mm. The kidney is shows slightly increased echogenicity suggesting a component of medical renal disease. There are couple of tiny subcentimeter cortical cysts. No kidney stones by ultrasound. No perirenal fluid collections Bladder: Estimated bladder volume 170 mL with no discrete focal abnormality identified. The patient did not void to assess for a postvoid residual.     The right kidney could not be adequately visualized. Left kidney shows mild increased cortical echogenicity suggesting medical renal disease. No hydronephrosis.      CT CHEST ABDOMEN PELVIS WO CONTRAST    Result Date: 1/21/2022  EXAMINATION: CT OF THE CHEST, ABDOMEN, AND PELVIS WITHOUT CONTRAST 1/21/2022 11:09 am TECHNIQUE: CT of the chest, abdomen and pelvis was performed without the administration of intravenous contrast. Multiplanar reformatted images are provided for review. Dose modulation, iterative reconstruction, and/or weight based adjustment of the mA/kV was utilized to reduce the radiation dose to as low as reasonably achievable. COMPARISON: CT abdomen and pelvis dated 01/16/2022. CT chest dated 01/08/2022 HISTORY: ORDERING SYSTEM PROVIDED HISTORY: blood loss anemia , gluteal hematoma and worsening HB TECHNOLOGIST PROVIDED HISTORY: blood loss anemia , gluteal hematoma and worsening HB FINDINGS: Chest: Mediastinum: Heart size is within normal limits. Ascending thoracic aorta is mildly dilated, measuring 4 cm in AP dimension, similar to the previous study. Main pulmonary artery is stable in caliber as well. No mediastinal hematoma or lymphadenopathy. There is a catheter in the SVC with distal tip in the distal SVC. Lungs/pleura: There are small bilateral pleural effusions, which are new from the previous study. There is extensive dense airspace consolidation throughout both lungs, with mild sparing at the bases, increased from 01/08/2022. There is decreased AP dimension of the lower trachea, although tracheobronchial tree remains patent. Soft Tissues/Bones: There is no acute or suspicious osseous abnormality. Visualized superficial soft tissues are within normal limits. Abdomen/Pelvis: Organs: Limited unenhanced liver is within normal limits. There is a tiny amount of free fluid adjacent to the hepatic dome. Gallbladder, spleen, pancreas, and adrenal glands are unremarkable. There is bilateral renal atrophy, more so on the right, unchanged. No hydronephrosis or perinephric fluid collection. There is mild bilateral perinephric stranding, which is unchanged and likely physiologic. GI/Bowel: The stomach is mildly distended with air-fluid level noted. No abnormal bowel distention. There is mild increased prominence of the proximal small bowel loops.   There is mild stranding in the proximal mesentery. No focal pericolonic inflammation. No free air. Pelvis: Urinary bladder is within normal limits. No evidence of pelvic lymphadenopathy. Peritoneum/Retroperitoneum: Distal aortobifemoral graft noted. Caliber of the abdominal aorta is unchanged. There is moderate to severe scattered atherosclerosis, which is unchanged. No retroperitoneal lymphadenopathy or hematoma. Slit-like IVC is noted. Bones/Soft Tissues: There is no acute or suspicious osseous abnormality. The hematoma previously seen in the left gluteal muscle is not as well visualized on today's study due to isoattenuation. There is asymmetric swelling of the left gluteal muscle, although slightly improved when compared to 01/16/2022. AP dimension in the area of suspected hematoma is 4.4 cm (previously 5.7 cm). Transverse dimension is approximately 9.3 cm (previously 9.7 cm). There is stranding in the subcutaneous fat of the upper thighs bilaterally. 1.  New small bilateral pleural effusions with extensive bilateral airspace consolidation, increased from 01/08/2022. 2.  Decreased AP dimension of the distal trachea, suggesting tracheomalacia. 3.  Previously noted hematoma in the left gluteal musculature is not as well visualized on today's study, but appears overall slightly decreased in size. No new areas of hematoma. 4.  Mild distention of the stomach, mild prominence of proximal small bowel loops, and mild nonspecific stranding in the proximal mesentery. No definite evidence of high-grade bowel obstruction at this time. Enteritis or early developing obstruction are considered in the differential. RECOMMENDATIONS: Unavailable       IMPRESSION:     1. Anemia: most likely due to hematomas. Low concern for GI bleed. Stomach distension on CT likely due to high flow O2  2. covid 19  3. HTN  4. Hx of Afib      Old records, labs and imaging reviewed. PLAN   1. [facility] SYSTEM for diet  2. Start on protonix 40 BID  3.  No endoscopic intervention for now      This plan was formulated in collaboration with Dr. Bulmaro Wells . Thank you for allowing us to participate in the care of your patient. Electronically signed by: Dale Larose MD on 1/21/2022 at 4:49 PM     Please note that this note was generated using a voice recognition dictation software. Although every effort was made to ensure the accuracy of this automated transcription, some errors in transcription may have occurred.

## 2022-01-21 NOTE — CONSULTS
General Surgery:    Consult Note        PATIENT NAME: Vira Andrews   YOB: 1940    ADMISSION DATE: 1/8/2022  9:49 AM     Admitting Provider: Helen Renae    Consulted Physician: Summer Ching DATE: 1/21/2022    Chief Complaint:  COVID PNA  Consult Regarding:  Enteritis vs early obstruction     HISTORY OF PRESENT ILLNESS:  The patient is a [de-identified] y.o. male  who was admitted on 1/8 with shortness of breath, found to have Covid PNA. Not vaccinated. Now on high flow NC. Patient with downtrending Hb over the last few days, required 1u pRBCs today for Hb 6.7.  GI on board. Patient does admit to dark tarry stool a few days ago. CT A/P obtained today with ?enteritis vs early obstruction. Patient endorses BM yesterday and today, passing flatus. Tolerating regular diet without nausea or emesis. No abd pain. Hx of ex lap- patient uncertain of reason for surgery. Hx of colon cancer in chart which patient denies. Currently holding plavix. Past Medical History:        Diagnosis Date    Chronic lower back pain     Cobalamin deficiency     CVA (cerebral vascular accident) ([address]     Diabetes mellitus ([facility]     Hyperlipidemia     Hypertension     Malignant neoplasm of colon ([facility]     PAD (peripheral artery disease) ([facility]        Past Surgical History:        Procedure Laterality Date    ARTERY SURGERY         Medications Prior to Admission:   Medications Prior to Admission: aspirin 325 MG tablet, Take 81 mg by mouth daily   atorvastatin (LIPITOR) 40 MG tablet, Take 20 mg by mouth nightly  lisinopril (PRINIVIL;ZESTRIL) 30 MG tablet, Take 30 mg by mouth daily  metoprolol tartrate (LOPRESSOR) 50 MG tablet, Take 50 mg by mouth daily  cilostazol (PLETAL) 100 MG tablet, Take 100 mg by mouth 2 times daily  clopidogrel (PLAVIX) 75 MG tablet, Take 75 mg by mouth daily  vitamin B-12 (CYANOCOBALAMIN) 1000 MCG tablet, Take 1,000 mcg by mouth daily    Allergies:  Patient has no known allergies.     Social History:   Social History     Socioeconomic History    Marital status:      Spouse name: Not on file    Number of children: Not on file    Years of education: Not on file    Highest education level: Not on file   Occupational History    Not on file   Tobacco Use    Smoking status: Former Smoker    Smokeless tobacco: Never Used   Substance and Sexual Activity    Alcohol use: Not Currently    Drug use: Never    Sexual activity: Not on file   Other Topics Concern    Not on file   Social History Narrative    Not on file     Social Determinants of Health     Financial Resource Strain:     Difficulty of Paying Living Expenses: Not on file   Food Insecurity:     Worried About [address] in the Last Year: Not on file    Ran Out of Food in the Last Year: Not on file   Transportation Needs:     Lack of Transportation (Medical): Not on file    Lack of Transportation (Non-Medical): Not on file   Physical Activity:     Days of Exercise per Week: Not on file    Minutes of Exercise per Session: Not on file   Stress:     Feeling of Stress : Not on file   Social Connections:     Frequency of Communication with Friends and Family: Not on file    Frequency of Social Gatherings with Friends and Family: Not on file    Attends Jew Services: Not on file    Active Member of Clubs or Organizations: Not on file    Attends Club or Organization Meetings: Not on file    Marital Status: Not on file   Intimate Partner Violence:     Fear of Current or Ex-Partner: Not on file    Emotionally Abused: Not on file    Physically Abused: Not on file    Sexually Abused: Not on file   Housing Stability:     Unable to Pay for Housing in the Last Year: Not on file    Number of [place] in the Last Year: Not on file    Unstable Housing in the Last Year: Not on file       Family History:   History reviewed. No pertinent family history. REVIEW OF SYSTEMS:    CONSTITUTIONAL:  No recent weight gain/loss. Energy level normal for pt.   HEENT:  No nasal HISTORY: ORDERING SYSTEM PROVIDED HISTORY: covid TECHNOLOGIST PROVIDED HISTORY: covid Reason for Exam: [place] FINDINGS: There are scattered infiltrates. There is no effusion. There is no pneumothorax. The mediastinal structures are unremarkable. The upper abdomen is unremarkable. The extrathoracic soft tissues are unremarkable. Scattered infiltrates consistent with pneumonia. CT CHEST PULMONARY EMBOLISM W CONTRAST    Result Date: 1/8/2022  EXAMINATION: CTA OF THE CHEST 1/8/2022 10:10 am TECHNIQUE: CTA of the chest was performed after the administration of intravenous contrast.  Multiplanar reformatted images are provided for review. MIP images are provided for review. Dose modulation, iterative reconstruction, and/or weight based adjustment of the mA/kV was utilized to reduce the radiation dose to as low as reasonably achievable. COMPARISON: None. HISTORY: ORDERING SYSTEM PROVIDED HISTORY: dyspnea / hypoxia Reason for Exam: Shortness of breath FINDINGS: Pulmonary Arteries: Pulmonary arteries are adequately opacified for evaluation. No evidence of intraluminal filling defect to suggest pulmonary embolism. Main pulmonary artery is normal in caliber. Mediastinum: No evidence of mediastinal lymphadenopathy. Normal heart size. Normal caliber thoracic aorta with diffuse atherosclerosis. Lungs/pleura: Extensive ground-glass opacification of the bilateral lung fields with peripheral involvement slight apical as well as basilar sparing. No effusion or pneumothorax. Upper Abdomen: Limited images of the upper abdomen are unremarkable. Soft Tissues/Bones: No acute bone or soft tissue abnormality. *No evidence of pulmonary embolism. *Extensive ground-glass opacification of the bilateral lung fields with peripheral involvement slight apical as well as basilar sparing.   Imaging features suggestive of COVID-19 pneumonia, though are nonspecific and can occur with a variety of infectious and noninfectious processes. ASSESSMENT:  Active Hospital Problems    Diagnosis Date Noted    Acute distention of stomach [K31.0]     COVID-19 [U07.1]     Traumatic hematoma of buttock [S30. 0XXA]     Atrial fibrillation with RVR (HCC) [I48.91] 01/10/2022    Nonrheumatic aortic valve stenosis [I35.0] 01/09/2022    PAD (peripheral artery disease) ([facility] [I73.9] 01/09/2022    Moderate protein-calorie malnutrition ([facility] [E44.0] 01/09/2022    Pneumonia due to COVID-19 virus [U07.1, J12.82] 01/08/2022    Acute hypoxemic respiratory failure due to COVID-19 ([facility] [facility] [U07.1, J96.01] 01/08/2022    Essential hypertension [I10] 01/08/2022    Hyperlipidemia [E78.5] 01/08/2022    Acute kidney injury superimposed on chronic kidney disease ([facility] [facility] baseline creatinine 1.9 [N17.9, N18.9] 01/08/2022    Stage 3b chronic kidney disease ([facility] [N18.32] 01/08/2022    Elevated troponin [R77.8]        [de-identified] y.o. male with  Covid PNA, consult for obstruction vs enteritis. Plan:  Continue medical mgmt and supportive care per primary  Patient tolerating regular diet without nausea/vomiting. Had BM this AM. Passing flatus. No s/s of obstruction. Recommend daily bowel regimen. Abd exam not peritoneal.   Patient endorses bloody BM a few days ago. Hb down-trending. GI following. No indication for surgical intervention at this time. General surgery to sign off. Please contact us with further questions or concern. Electronically signed by Rand Cordero DO  on 1/21/2022 at 6:54 PM     Attending Physician Statement  I have discussed the case with Dr Sterling Leung, including pertinent history and exam findings with the resident. I have seen and examined the patient and the key elements of the encounter have been performed by me. I agree with the assessment, plan and orders as documented by the resident.       Electronically signed by Edith Fraire IV, DO  on 1/22/2022 at 6:11 AM

## 2022-01-22 NOTE — PLAN OF CARE
Problem: Falls - Risk of:  Goal: Will remain free from falls  Description: Will remain free from falls  1/22/2022 2306 by Ade العراقي RN  Outcome: Ongoing  1/21/2022 1721 by Amos Garcia RN  Outcome: Ongoing  Goal: Absence of physical injury  Description: Absence of physical injury  1/22/2022 1604 by Ade العراقي RN  Outcome: Ongoing  1/21/2022 1721 by Amos Garcia RN  Outcome: Ongoing     Problem: Airway Clearance - Ineffective  Goal: Achieve or maintain patent airway  1/22/2022 0614 by Ade العراقي RN  Outcome: Ongoing  1/21/2022 1721 by Amos Garcia RN  Outcome: Ongoing     Problem: Gas Exchange - Impaired  Goal: Absence of hypoxia  1/22/2022 0614 by Ade العراقي RN  Outcome: Ongoing  1/21/2022 1721 by Amos Garcia RN  Outcome: Ongoing  Goal: Promote optimal lung function  1/22/2022 0614 by Ade العراقي RN  Outcome: Ongoing  1/21/2022 1721 by Amos Garcia RN  Outcome: Ongoing     Problem: Breathing Pattern - Ineffective  Goal: Ability to achieve and maintain a regular respiratory rate  1/22/2022 0614 by Ade العراقي RN  Outcome: Ongoing  1/21/2022 1721 by Amos Garcia RN  Outcome: Ongoing     Problem:  Body Temperature -  Risk of, Imbalanced  Goal: Ability to maintain a body temperature within defined limits  1/22/2022 0614 by Ade العراقي RN  Outcome: Ongoing  1/21/2022 1721 by Amos Garcia RN  Outcome: Ongoing  Goal: Will regain or maintain usual level of consciousness  1/22/2022 0614 by Ade العراقي RN  Outcome: Ongoing  1/21/2022 1721 by Amos Garcia RN  Outcome: Ongoing  Goal: Complications related to the disease process, condition or treatment will be avoided or minimized  1/22/2022 0614 by Ade العراقي RN  Outcome: Ongoing  1/21/2022 1721 by Amos Garcia RN  Outcome: Ongoing     Problem: Isolation Precautions - Risk of Spread of Infection  Goal: Prevent transmission of infection  1/22/2022 0614 by Mouna Augustine MAYLIN Aparicio  Outcome: Ongoing  1/21/2022 1721 by Loretta Johnson RN  Outcome: Ongoing     Problem: Nutrition Deficits  Goal: Optimize nutritional status  1/22/2022 0614 by Christian Ratliff RN  Outcome: Ongoing  1/21/2022 1721 by Loretta Johnson RN  Outcome: Ongoing     Problem: Risk for Fluid Volume Deficit  Goal: Maintain normal heart rhythm  1/22/2022 0614 by Christian Ratliff RN  Outcome: Ongoing  1/21/2022 1721 by Loretta Johnson RN  Outcome: Ongoing  Goal: Maintain absence of muscle cramping  1/22/2022 0614 by Christian Ratliff RN  Outcome: Ongoing  1/21/2022 1721 by Loretta Johnson RN  Outcome: Ongoing  Goal: Maintain normal serum potassium, sodium, calcium, phosphorus, and pH  1/22/2022 0614 by Christian Ratliff RN  Outcome: Ongoing  1/21/2022 1721 by Loretta Johnson RN  Outcome: Ongoing     Problem: Loneliness or Risk for Loneliness  Goal: Demonstrate positive use of time alone when socialization is not possible  1/22/2022 0614 by Christian Ratliff RN  Outcome: Ongoing  1/21/2022 1721 by Loretta Johnson RN  Outcome: Ongoing     Problem: Fatigue  Goal: Verbalize increase energy and improved vitality  1/22/2022 0614 by Christian Ratliff RN  Outcome: Ongoing  1/21/2022 1721 by Loretta Johnson RN  Outcome: Ongoing     Problem: Patient Education: Go to Patient Education Activity  Goal: Patient/Family Education  1/22/2022 4064 by Christian Ratliff RN  Outcome: Ongoing  1/21/2022 1721 by Loretta Johnson RN  Outcome: Ongoing     Problem: Skin Integrity:  Goal: Will show no infection signs and symptoms  Description: Will show no infection signs and symptoms  1/22/2022 0614 by Christian Ratliff RN  Outcome: Ongoing  1/21/2022 1721 by Loretta Johnson RN  Outcome: Ongoing  Goal: Absence of new skin breakdown  Description: Absence of new skin breakdown  1/22/2022 0614 by Christian Ratliff RN  Outcome: Ongoing  1/21/2022 1721 by Loretta Johnson RN  Outcome: Ongoing     Problem: Nutrition  Goal: Optimal nutrition therapy  1/22/2022 0890 by Katerin Ruano RN  Outcome: Ongoing  1/21/2022 1721 by Mellissa Ramirez RN  Outcome: Ongoing     Problem: Pain:  Goal: Pain level will decrease  Description: Pain level will decrease  1/22/2022 0614 by Katerin Ruano RN  Outcome: Ongoing  1/21/2022 1721 by Mellissa Ramirez RN  Outcome: Ongoing  Goal: Control of acute pain  Description: Control of acute pain  1/22/2022 0614 by Katerin Ruano RN  Outcome: Ongoing  1/21/2022 1721 by Mellissa Ramirez RN  Outcome: Ongoing  Goal: Control of chronic pain  Description: Control of chronic pain  1/22/2022 0614 by Katerin Ruano RN  Outcome: Ongoing  1/21/2022 1721 by Mellissa Ramirez RN  Outcome: Ongoing     Problem: OXYGENATION/RESPIRATORY FUNCTION  Goal: Patient will maintain patent airway  1/22/2022 0614 by Katerin Ruano RN  Outcome: Ongoing  1/22/2022 0348 by Ronny Rai RCP  Outcome: Ongoing  Goal: Patient will achieve/maintain normal respiratory rate/effort  Description: Respiratory rate and effort will be within normal limits for the patient  1/22/2022 3701 by Katerin Ruano RN  Outcome: Ongoing  1/22/2022 0348 by Ronny Rai RCP  Outcome: Ongoing     Problem: MECHANICAL VENTILATION  Goal: Patient will maintain patent airway  1/22/2022 0614 by Katerin Ruano RN  Outcome: Ongoing  1/22/2022 0348 by Ronny Rai RCP  Outcome: Ongoing  Goal: Oral health is maintained or improved  1/22/2022 0614 by Katerin Ruano RN  Outcome: Ongoing  1/22/2022 0348 by Ronny Rai RCP  Outcome: Ongoing  Goal: ET tube will be managed safely  1/22/2022 0614 by Katerin Ruano RN  Outcome: Ongoing  1/22/2022 0348 by Ronny Rai RCP  Outcome: Ongoing  Goal: Ability to express needs and understand communication  1/22/2022 0614 by Katerin Ruano RN  Outcome: Ongoing  1/22/2022 0348 by Ronny Rai RCP  Outcome: Ongoing  Goal: Mobility/activity is maintained at optimum level for patient  1/22/2022 7340 by Sury Cruz RN  Outcome: Ongoing  1/22/2022 0348 by Toño De Los Santos RCP  Outcome: Ongoing     Problem: SKIN INTEGRITY  Goal: Skin integrity is maintained or improved  Outcome: Ongoing     Problem: Non-Violent Restraints  Goal: Removal from restraints as soon as assessed to be safe  Outcome: Ongoing  Goal: No harm/injury to patient while restraints in use  Outcome: Ongoing  Goal: Patient's dignity will be maintained  Outcome: Ongoing

## 2022-01-22 NOTE — PROGRESS NOTES
Renal Progress Note    Patient :  Remi Coronel; [de-identified] y.o. MRN# [id number]  Location:  0410/7780-08  Attending:  Jayla Pratt MD  Admit Date:  1/8/2022   Hospital Day: 14      Subjective: Following for JULES/ATN on CKD III with baseline 1.8-2.0, peaked at 3.0 in setting of contrast exposure and severe anemia from spontaneous left gluteal area hematoma; admitted with COVID. Intubated early this morning. FIO2 currently 65% PEEP 12  No pressor support required. Creatinine 2.19 this morning, down from 2.45. Urine output 2445, negative 3.4 liters. Received one dose of IV lasix 20mg yesterday afternoon  Also started on low dose Levophed 6mcq, however earlier received clonidine for elevated BP  Received 1 unit PRBC yesterday and now HgB stable.     Outpatient Medications:     Medications Prior to Admission: aspirin 325 MG tablet, Take 81 mg by mouth daily   atorvastatin (LIPITOR) 40 MG tablet, Take 20 mg by mouth nightly  lisinopril (PRINIVIL;ZESTRIL) 30 MG tablet, Take 30 mg by mouth daily  metoprolol tartrate (LOPRESSOR) 50 MG tablet, Take 50 mg by mouth daily  cilostazol (PLETAL) 100 MG tablet, Take 100 mg by mouth 2 times daily  clopidogrel (PLAVIX) 75 MG tablet, Take 75 mg by mouth daily  vitamin B-12 (CYANOCOBALAMIN) 1000 MCG tablet, Take 1,000 mcg by mouth daily    Current Medications:     Scheduled Meds:    nitroGLYCERIN  0.4 mg SubLINGual Once    sodium bicarbonate  650 mg Oral BID    meropenem  500 mg IntraVENous Q12H    pantoprazole  40 mg Oral BID AC    ferrous sulfate  325 mg Oral Daily with breakfast    guaiFENesin  600 mg Oral BID    lidocaine 1 % injection  5 mL IntraDERmal Once    sodium chloride flush  5-40 mL IntraVENous 2 times per day    busPIRone  10 mg Oral TID    [Held by provider] insulin glargine  20 Units SubCUTAneous QAM    senna  2 tablet Oral Nightly    [Held by provider] insulin lispro  0-6 Units SubCUTAneous TID WC    [Held by provider] insulin lispro  0-3 Units SubCUTAneous Nightly    amiodarone  200 mg Oral Daily    atorvastatin  20 mg Oral Nightly    [Held by provider] clopidogrel  75 mg Oral Daily    vitamin B-12  1,000 mcg Oral Daily    [Held by provider] aspirin  81 mg Oral Daily    sodium chloride flush  5-40 mL IntraVENous 2 times per day     Continuous Infusions:    propofol 40 mcg/kg/min (22 0845)    norepinephrine 8 mcg/min (22 0945)    midazolam      sodium chloride      sodium chloride       IV fluid builder Stopped (22)    sodium chloride      sodium chloride      sodium chloride      dextrose 100 mL/hr (22 0010)    sodium chloride       PRN Meds:  sodium chloride, sodium chloride flush, sodium chloride, melatonin, sodium chloride, sodium chloride, sodium chloride, sodium chloride, LORazepam, glucose, dextrose, glucagon (rDNA), dextrose, metoprolol, benzonatate, sodium chloride flush, sodium chloride flush, sodium chloride, ondansetron **OR** ondansetron, polyethylene glycol, acetaminophen **OR** acetaminophen    Input/Output:       I/O last 3 completed shifts: In: 2361 [P.O.:720; I.V.:1151; Blood:490]  Out: 5161 [Urine:3195]. No data found. Vital Signs:   Temperature:  Temp: 98.6 °F (37 °C)  TMax:   Temp (24hrs), Av °F (36.7 °C), Min:97.5 °F (36.4 °C), Max:98.8 °F (37.1 °C)    Respirations:  Resp: 28  Pulse:   Pulse: 81  BP:    BP: (!) 165/72  BP Range: Systolic (14ORZ), OZE:737 , Min:125 , UGY:593       Diastolic (34DLP), KJH:21, Min:56, Max:89      Physical Examination:     General:  Intubated, sedated  Neck:   No JVD, no thyromegaly, no lymphadenopathy. Chest:              Unlabored on vent. Cardiac:  S1 S2 RR, + murmur. Abdomen: Soft, non-tender, no masses or organomegaly, BS audible. :   No suprapubic or flank tenderness. Neuro:  Grimaces with pain  SKIN:  No rashes, good skin turgor. Extremities:  No edema, palpable peripheral pulses, no calf tenderness.     Labs:       Recent Labs 01/20/22  0626 01/20/22  0626 01/21/22  0717 01/21/22  1820 01/22/22  0551   WBC 15.2*  --  14.5*  --  34.5*   RBC 2.52*  --  2.22*  --  3.58*   HGB 7.7*   < > 6.7* 9.3* 10.9*   HCT 23.1*   < > 21.1* 27.5* 34.1*   MCV 91.7  --  95.0  --  95.3   MCH 30.6  --  30.2  --  30.4   MCHC 33.3  --  31.8  --  32.0   RDW 16.8*  --  17.4*  --  17.2*   PLT 82*  --  81*  --  130*   MPV 10.6  --  10.1  --  10.6    < > = values in this interval not displayed. BMP:   Recent Labs     01/20/22  0626 01/21/22  0717 01/22/22  0551   * 137 140   K 4.4 4.6 4.9    104 108*   CO2 22 24 21   BUN 60* 54* 50*   CREATININE 2.52* 2.45* 2.19*   GLUCOSE 124* 135* 219*   CALCIUM 7.3* 7.5* 7.8*      Phosphorus:     Recent Labs     01/22/22  0551   PHOS 5.3*     SUMMER:      Lab Results   Component Value Date    SUMMER NEGATIVE 01/09/2022     SPEP:  Lab Results   Component Value Date    PROT 6.1 01/09/2022     C3:     Lab Results   Component Value Date    C3 134 01/09/2022     C4:     Lab Results   Component Value Date    C4 31 01/09/2022       Urinalysis/Chemistries:      Lab Results   Component Value Date    NITRU NEGATIVE 01/17/2022    COLORU Yellow 01/17/2022    PHUR 5.0 01/17/2022    WBCUA None 01/17/2022    RBCUA 2 TO 5 01/17/2022    MUCUS 1+ 01/17/2022    TRICHOMONAS NOT REPORTED 01/17/2022    YEAST NOT REPORTED 01/17/2022    BACTERIA NOT REPORTED 01/17/2022    SPECGRAV 1.018 01/17/2022    LEUKOCYTESUR NEGATIVE 01/17/2022    UROBILINOGEN Normal 01/17/2022    BILIRUBINUR NEGATIVE 01/17/2022    GLUCOSEU 1+ 01/17/2022    KETUA NEGATIVE 01/17/2022    AMORPHOUS NOT REPORTED 01/17/2022     Urine Sodium:     Lab Results   Component Value Date    CHERYL 56 01/10/2022     Urine Creatinine:     Lab Results   Component Value Date    LABCREA 90.8 01/09/2022       Radiology:     CXR: 1/22/22  1. The endotracheal tube tip is located 5.3 cm above the zheng. 2. Worsening bilateral lung infiltrates, compatible with pneumonia, including   COVID. 3. Small bilateral pleural effusions       Assessment:     1. Acute Kidney Injury: Secondary to ischemic ATN and nephrotoxic ATN initially from COVID-pneumonia, systemic inflammation, contrast exposure. Baseline 2.0 peaked at 3.0. Thereafter developed left gluteal area hematoma with drop in hemoglobin down to 5.7, decreased renal perfusion, had a secondary ATN creatinine peaked at 3.96. Now resolving with improved renal perfusion, creatinine down to 2.1 today. Nonoliguric. Never established with nephrologist prior to admission  2. 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 persistent anemia, requiring multiple transfusions likely from left gluteal area hematoma  4. COVID-19 pneumonia with respiratory failure now intubated. 5.  Severe aortic stenosis valve area 1.0 cm squares, peak gradient 57 mean 37    Plan:   1. Wean Levophed as able. If unable will add Midodrine  2. Daily Labs  3. Avoid Contrast  4. Will continue to follow    Nutrition   Please ensure that patient is on a renal diet/TF. Avoid nephrotoxic drugs/contrast exposure. We will continue to follow along with you. Mina Bahnea CNP    Attending Physician Statement  I have discussed the care of Shankar Reyez, including pertinent history and exam findings with the CNP. I have reviewed the key elements of all parts of the encounter with the CNP   I have seen and examined the patient. I agree with the assessment and plan and status of the problem list as documented.       Mikey Mccarthy MD   Nephrology Attending Physician  [facility]  1/22/2022

## 2022-01-22 NOTE — ANESTHESIA PROCEDURE NOTES
Airway  Date/Time: 1/22/2022 3:30 AM  Urgency: emergent    Airway not difficult    General Information and Staff    Patient location during procedure: ICU  Resident/CRNA: Nataly Hilliard APRN - CRNA    Consent for Airway (if performed for an anesthetic, see related documentation for consents)  Patient identity confirmed: per hospital policy  Consent: Verbal consent obtained.   Consent given by: patient      Indications and Patient Condition  Indications for airway management: respiratory distress  Spontaneous ventilation: present  Sedation level: deep  Preoxygenated: yes  Patient position: sniffing  MILS not maintained throughout  Mask difficulty assessment: not attempted    Final Airway Details  Final airway type: endotracheal airway      Successful airway: ETT  Cuffed: yes   Successful intubation technique: video laryngoscopy  Endotracheal tube insertion site: oral  Blade size: #3  ETT size (mm): 8.0  Cormack-Lehane Classification: grade I - full view of glottis  Placement verified by: chest auscultation and capnometry   Measured from: lips  Number of attempts at approach: 1  Number of other approaches attempted: 0    no

## 2022-01-22 NOTE — PROGRESS NOTES
Writer notified on-call intermed NP that the patient was not tolerating the bipap and that he was removing the mask and desaturating. The patient's blood pressure was also elevated to the 864M systolic. Multiple providers were present on the unit and came to evaluate. Patient was transferred to ICU level of care. Handoff given to Arelis Fernandes RN. Writer notified the patient's wife of his change in status and all questions were answered at this time.

## 2022-01-22 NOTE — PLAN OF CARE
Problem: Falls - Risk of:  Goal: Will remain free from falls  Description: Will remain free from falls  1/22/2022 1742 by Jessica Mary RN  Outcome: Ongoing  1/22/2022 0285 by Allison Galeas RN  Outcome: Ongoing  Goal: Absence of physical injury  Description: Absence of physical injury  1/22/2022 1742 by Jessica Mary RN  Outcome: Ongoing  1/22/2022 0614 by Allison Galeas RN  Outcome: Ongoing     Problem: Airway Clearance - Ineffective  Goal: Achieve or maintain patent airway  1/22/2022 1742 by Jessica Mary RN  Outcome: Ongoing  1/22/2022 0614 by Allison Galeas RN  Outcome: Ongoing     Problem: Gas Exchange - Impaired  Goal: Absence of hypoxia  1/22/2022 0614 by Allison Galeas RN  Outcome: Ongoing  Goal: Promote optimal lung function  1/22/2022 0614 by Allison Galeas RN  Outcome: Ongoing     Problem: Breathing Pattern - Ineffective  Goal: Ability to achieve and maintain a regular respiratory rate  1/22/2022 0614 by Allison Galeas RN  Outcome: Ongoing     Problem:  Body Temperature -  Risk of, Imbalanced  Goal: Ability to maintain a body temperature within defined limits  1/22/2022 0614 by Allison Galeas RN  Outcome: Ongoing  Goal: Will regain or maintain usual level of consciousness  1/22/2022 0614 by Allison Galeas RN  Outcome: Ongoing  Goal: Complications related to the disease process, condition or treatment will be avoided or minimized  1/22/2022 0614 by Allison Galeas RN  Outcome: Ongoing     Problem: Isolation Precautions - Risk of Spread of Infection  Goal: Prevent transmission of infection  1/22/2022 0614 by Allison Galeas RN  Outcome: Ongoing     Problem: Nutrition Deficits  Goal: Optimize nutritional status  1/22/2022 0614 by Allison Galeas RN  Outcome: Ongoing     Problem: Risk for Fluid Volume Deficit  Goal: Maintain normal heart rhythm  1/22/2022 0614 by Allison Galeas RN  Outcome: Ongoing  Goal: Maintain absence of muscle cramping  1/22/2022 0614 by Corby Prater RN  Outcome: Ongoing  Goal: Maintain normal serum potassium, sodium, calcium, phosphorus, and pH  1/22/2022 0614 by Corby Prater RN  Outcome: Ongoing     Problem: Loneliness or Risk for Loneliness  Goal: Demonstrate positive use of time alone when socialization is not possible  1/22/2022 0614 by Corby Prater RN  Outcome: Ongoing     Problem: Fatigue  Goal: Verbalize increase energy and improved vitality  1/22/2022 0614 by Corby Prater RN  Outcome: Ongoing     Problem: Patient Education: Go to Patient Education Activity  Goal: Patient/Family Education  1/22/2022 6754 by Corby Prater RN  Outcome: Ongoing     Problem: Skin Integrity:  Goal: Will show no infection signs and symptoms  Description: Will show no infection signs and symptoms  1/22/2022 0614 by Corby Prater RN  Outcome: Ongoing  Goal: Absence of new skin breakdown  Description: Absence of new skin breakdown  1/22/2022 0614 by Corby Prater RN  Outcome: Ongoing     Problem: Nutrition  Goal: Optimal nutrition therapy  1/22/2022 1405 by Sury Mccauley RD, LD  Outcome: Ongoing  Note: Nutrition Problem #1: Inadequate oral intake  Intervention: Food and/or Nutrient Delivery: Continue NPO,Start Tube Feeding (As able, start feedings of Renal (Nepro with Carb Steady) formula goal rate 35 mL/hr + 1 protein modular.)  Nutritional Goals: Meet % of estimated nutrition needs with nutrition support/oral diet.   1/22/2022 4572 by Corby Prater RN  Outcome: Ongoing     Problem: Pain:  Goal: Pain level will decrease  Description: Pain level will decrease  1/22/2022 1665 by Corby Prater RN  Outcome: Ongoing  Goal: Control of acute pain  Description: Control of acute pain  1/22/2022 0614 by Corby Prater RN  Outcome: Ongoing  Goal: Control of chronic pain  Description: Control of chronic pain  1/22/2022 0614 by Corby Prater RN  Outcome: Ongoing     Problem: OXYGENATION/RESPIRATORY FUNCTION  Goal: Patient will maintain patent airway  1/22/2022 0614 by Cristela Nj RN  Outcome: Ongoing  1/22/2022 0348 by Verna Tomas RCP  Outcome: Ongoing  Goal: Patient will achieve/maintain normal respiratory rate/effort  Description: Respiratory rate and effort will be within normal limits for the patient  1/22/2022 3839 by Cristela Nj RN  Outcome: Ongoing  1/22/2022 0348 by Verna Tomas RCP  Outcome: Ongoing     Problem: MECHANICAL VENTILATION  Goal: Patient will maintain patent airway  1/22/2022 0614 by Cristela Nj RN  Outcome: Ongoing  1/22/2022 0348 by Verna Tomas RCP  Outcome: Ongoing  Goal: Oral health is maintained or improved  1/22/2022 0614 by Cristela Nj RN  Outcome: Ongoing  1/22/2022 0348 by Verna Tomas RCP  Outcome: Ongoing  Goal: ET tube will be managed safely  1/22/2022 0614 by Cristela Nj RN  Outcome: Ongoing  1/22/2022 0348 by Verna Tomas RCP  Outcome: Ongoing  Goal: Ability to express needs and understand communication  1/22/2022 0614 by Cristela Nj RN  Outcome: Ongoing  1/22/2022 0348 by Verna Tomas RCP  Outcome: Ongoing  Goal: Mobility/activity is maintained at optimum level for patient  1/22/2022 8204 by Cristela Nj RN  Outcome: Ongoing  1/22/2022 0348 by Verna Tomas RCP  Outcome: Ongoing     Problem: SKIN INTEGRITY  Goal: Skin integrity is maintained or improved  1/22/2022 0614 by Cristela Nj RN  Outcome: Ongoing     Problem: Non-Violent Restraints  Goal: Removal from restraints as soon as assessed to be safe  1/22/2022 1742 by Jelena Nettles RN  Outcome: Ongoing  1/22/2022 0614 by Cristela Nj RN  Outcome: Ongoing  Goal: No harm/injury to patient while restraints in use  1/22/2022 1742 by Jelena Nettles RN  Outcome: Ongoing  1/22/2022 0614 by Cristela Nj RN  Outcome: Ongoing  Goal: Patient's dignity will be maintained  1/22/2022 1742 by Jelena Nettles RN  Outcome: Ongoing  1/22/2022 0614 by Cristela Nj RN  Outcome: Ongoing

## 2022-01-22 NOTE — SIGNIFICANT EVENT
I had a long discussion with the patient in person, in presence of the RN Zeeshan Tian taking care of the patient. Patient was alert and oriented to time, place and person.l Patient's current clinical condition, laboratory and radiographic findings as well as recommendations of physicians consulted on the case were discussed with the patient's family in detail in simple [place]. All questions and concerns were addressed, and appropriate emotional support was provided. After understanding patient's current medical condition, patient decided to change his code status back to full code and stated that he wanted everything done including intubation and chest compressions incase his heart stops or he is not able to breath. Will change patient's code status to Full Code.     Denise Renae MD       1/21/2022, 9:18 PM

## 2022-01-22 NOTE — PROGRESS NOTES
Called to room by nurse STAT pertaining to patients increase of work of breathing and decreasing SPO2. Patient stated \"I can't breath, please help me. \" Patient did have increase work of breating and in acute respiratory distress. Writer made adjustments to Bi-Pap IPAP and EPAP settings. Nursing was giving IV medication to help patients anxiety and discomfort. Patient continued to state he couldn't breath and to hurry up with help. Patient agreed to intubation. Patient moved to [facility] room for intubation, CRNA at bedside, writer assisted  with intubation of patient. RT Noe Balderas also helping writer with intubation and supplies. Patient was intubated approximately 0330 with number 8 mm ETT, 26 cm at lips. Patient tolerated intubation well/fair and was placed on servoi.

## 2022-01-22 NOTE — ANESTHESIA PROCEDURE NOTES
Arterial Line:    An arterial line was placed using surface landmarks, in the ICU for the following indication(s): continuous blood pressure monitoring and blood sampling needed. A 20 gauge (size), 12 cm (length), Arrow (type) catheter was placed, Seldinger technique used, into the left radial artery, secured by Tegaderm, tape and suture. Anesthesia type: General    Events:  patient tolerated procedure well with no complications and EBL < 5mL.   1/22/2022 4:10 AM1/22/2022 4:20 AM  Resident/CRNA: MOHAMUD Jeter CRNA  Performed: Resident/CRNA   Preanesthetic Checklist  Completed: patient identified, IV checked, site marked, risks and benefits discussed, monitors and equipment checked, timeout performed, oxygen available and patient being monitored

## 2022-01-22 NOTE — PROGRESS NOTES
Writer called pt wife [facility] [place] [employer] and provided update to wife, dtr and son. Answered all questions to best of ability. All pleased with update.

## 2022-01-22 NOTE — PROGRESS NOTES
Pulmonary/Critical Care progress note    Patient's name:  Desiree Ledezma  Medical Record Number: [id number]  Patient's account/billing number: [de-identified]  Patient's YOB: 1940  Age: [de-identified] y.o. Date of Admission: 1/8/2022  9:49 AM  Date of Consult: 1/22/2022      Primary Care Physician: Lorella Schilder, APRN - CNP      Code Status: Full Code    Reason for consult: Acute hypoxemic respiratory failure secondary to COVID-19 pneumonia with ARDS      HISTORY OF PRESENT ILLNESS:   History was obtained from chart review and the patient. Desiree Ledezma is a [de-identified] y.o. white gentleman was admitted with increasing shortness of breath, cough, loss of appetite, nausea, vomiting and diarrhea of 7 to 10 days duration. Patient has not received COVID vaccination  Patient tested positive for COVID shortly after [place].   Patient found to be hypoxemic in the emergency room with tachypnea and rapid COVID test was positive  Radiologic imaging showed bilateral pulmonary infiltrates  Patient received Actemra and was also given Decadron  Patient continues to need high flow oxygen  On 45 L of oxygen at 80%  Afebrile  Hemodynamically stable    Interval history:    1/22/2022     Patient required intubation overnight  On the ventilator on 80% oxygen during melatonin  Subsequently was able to come down to 65% oxygen  Having small tracheal secretions  Patient is negative fluid balance of 3.4 L  Urine output has been good at 2.4 L  Requiring norepinephrine  Afebrile          Past Medical History:        Diagnosis Date    Chronic lower back pain     Cobalamin deficiency     [address]A (cerebral vascular accident) ([address]     Diabetes mellitus ([address]     Hyperlipidemia     Hypertension     Malignant neoplasm of colon ([address]     PAD (peripheral artery disease) ([address]        Past Surgical History:        Procedure Laterality Date    ARTERY SURGERY         Allergies:    No Known Allergies Home Meds:   Prior to Admission medications    Medication Sig Start Date End Date Taking? Authorizing Provider   aspirin 325 MG tablet Take 81 mg by mouth daily  3/31/21  Yes Historical Provider, MD   atorvastatin (LIPITOR) 40 MG tablet Take 20 mg by mouth nightly 10/25/21  Yes Historical Provider, MD   lisinopril (PRINIVIL;ZESTRIL) 30 MG tablet Take 30 mg by mouth daily 10/25/21  Yes Historical Provider, MD   metoprolol tartrate (LOPRESSOR) 50 MG tablet Take 50 mg by mouth daily 10/25/21  Yes Historical Provider, MD   cilostazol (PLETAL) 100 MG tablet Take 100 mg by mouth 2 times daily 10/25/21   Historical Provider, MD   clopidogrel (PLAVIX) 75 MG tablet Take 75 mg by mouth daily 10/25/21   Historical Provider, MD   vitamin B-12 (CYANOCOBALAMIN) 1000 MCG tablet Take 1,000 mcg by mouth daily    Historical Provider, MD       Family History:   History reviewed. No pertinent family history. Social History:   TOBACCO:   reports that he has quit smoking. He has never used smokeless tobacco.  ETOH:   reports previous alcohol use. DRUGS:  reports no history of drug use. OCCUPATION:   Noncontributory          REVIEW OF SYSTEMS:    Review of Systems -   Could not be obtained secondary to the patient being intubated and sedated          Physical Exam:    Vitals: BP (!) 165/72   Pulse 72   Temp 98.6 °F (37 °C) (Oral)   Resp 27   Ht 5' 10\" (1.778 m)   Wt 148 lb 2.4 oz (67.2 kg)   SpO2 94%   BMI 21.26 kg/m²     Last Body weight:   Wt Readings from Last 3 Encounters:   01/08/22 148 lb 2.4 oz (67.2 kg)       Body Mass Index : Body mass index is 21.26 kg/m².       Intake and Output summary:     Intake/Output Summary (Last 24 hours) at 1/22/2022 0817  Last data filed at 1/22/2022 0600  Gross per 24 hour   Intake 1394 ml   Output 2445 ml   Net -1051 ml       Physical Examination:   PHYSICAL EXAMINATION:  Vitals:    01/22/22 0615 01/22/22 0630 01/22/22 0645 01/22/22 0700   BP:       Pulse: 78 77 83 72   Resp: 28 28 28 27   Temp:       TempSrc:       SpO2: 94% 94% 95% 94%   Weight:       Height:         Constitutional: This is a well developed, well nourished, 18.5-24.9 - Normal [de-identified]y.o. year old male who is in no apparent distress. Head:normocephalic and atraumatic. EENT:   ALEXANDREA. No conjunctival injections. Septum was midline, mucosa was without erythema, exudates or cobblestoning. No thrush was noted. Mallampati II (soft palate, uvula, fauces visible)  Neck: Supple without thyromegaly. No elevated JVP. Trachea was midline. Respiratory: Chest was symmetrical without dullness to percussion. Breath sounds bilaterally were clear to auscultation. There were no wheezes, rhonchi or rales. There is no intercostal retraction or use of accessory muscles. No egophony noted. Cardiovascular: Regular without murmur, clicks, gallops or rubs. Abdomen: Slightly rounded and soft without organomegaly. No rebound tenderness, rigidity or guarding was appreciated. Lymphatic: No lymphadenopathy. Musculoskeletal: Normal curvature of the spine. No gross muscle weakness. Extremities:  No lower extremity edema, ulcerations, tenderness, varicosities or erythema. No involuntary movements are noted. Skin:  Warm and dry. Good color, turgor and pigmentation. No lesions or scars. No cyanosis or clubbing  Neurological/Psychiatric: The patient's general behavior, level of consciousness, thought content and emotional status is normal.            Laboratory findings:-    CBC:   Recent Labs     01/22/22  0551   WBC 34.5*   HGB 10.9*   *     BMP:    Recent Labs     01/20/22  0626 01/20/22  0626 01/21/22  0717   *   < > 137   K 4.4   < > 4.6      < > 104   CO2 22   < > 24   BUN 60*   < > 54*   CREATININE 2.52*  --  2.45*   GLUCOSE 124*   < > 135*    < > = values in this interval not displayed. S. Calcium:  Recent Labs     01/21/22  0717   CALCIUM 7.5*     S.  Ionized Calcium:No results for input(s): IONCA in the last 72 hours. S. Magnesium:No results for input(s): MG in the last 72 hours. S. Phosphorus:No results for input(s): PHOS in the last 72 hours. S. Glucose:  Recent Labs     01/22/22  0413 01/22/22  0607 01/22/22  0710   POCGLU 207* 233* 204*     Glycosylated hemoglobin A1C: No results for input(s): LABA1C in the last 72 hours. INR:   Recent Labs     01/22/22  0551   INR 1.1     Hepatic functions: No results for input(s): ALKPHOS, ALT, AST, PROT, BILITOT, BILIDIR, LABALBU in the last 72 hours. Pancreatic functions:No results for input(s): LACTA, AMYLASE in the last 72 hours. S. Lactic Acid: No results for input(s): LACTA in the last 72 hours. Cardiac enzymes:No results for input(s): CKTOTAL, CKMB, CKMBINDEX, TROPONINI in the last 72 hours. BNP:No results for input(s): BNP in the last 72 hours. Lipid profile: No results for input(s): CHOL, TRIG, HDL, LDL, LDLCALC in the last 72 hours. Blood Gases: No results found for: PH, PCO2, PO2, HCO3, O2SAT  Thyroid functions: No results found for: TSH         Microbiology:    Cultures during this admission:     Blood cultures:      [] None drawn      [x] Negative             []  Positive (Details:  )  Urine Culture:        [] None drawn      [x] Negative             []  Positive (Details:  )  Sputum Culture:   [] None drawn       [] Negative             []  Positive (Details:  )     SARS-CoV-2 antigen test was positive on 1/8/2022    Radiological reports:  XR CHEST PORTABLE    Result Date: 1/16/2022  Right IJ line in satisfactory position. Unchanged or slightly worse bilateral diffuse infiltrates consistent with the patient's history of COVID pneumonia. XR CHEST PORTABLE    Result Date: 1/14/2022  Patchy bilateral interstitial and ground-glass infiltrates most notably in the lung periphery.   Findings have progressed from the prior exam compatible with patient's history of COVID pneumonia     XR CHEST PORTABLE    Result Date: 1/11/2022  Unchanged bilateral airspace opacities     XR CHEST PORTABLE    Result Date: 1/9/2022  Scattered infiltrates consistent with pneumonia. CT CHEST PULMONARY EMBOLISM W CONTRAST    Result Date: 1/8/2022  *No evidence of pulmonary embolism. *Extensive ground-glass opacification of the bilateral lung fields with peripheral involvement slight apical as well as basilar sparing. Imaging features suggestive of COVID-19 pneumonia, though are nonspecific and can occur with a variety of infectious and noninfectious processes. CT CHEST ABDOMEN PELVIS WO CONTRAST    Result Date: 1/21/2022  1. New small bilateral pleural effusions with extensive bilateral airspace consolidation, increased from 01/08/2022. 2.  Decreased AP dimension of the distal trachea, suggesting tracheomalacia. 3.  Previously noted hematoma in the left gluteal musculature is not as well visualized on today's study, but appears overall slightly decreased in size. No new areas of hematoma. 4.  Mild distention of the stomach, mild prominence of proximal small bowel loops, and mild nonspecific stranding in the proximal mesentery. No definite evidence of high-grade bowel obstruction at this time. Enteritis or early developing obstruction are considered in the differential. RECOMMENDATIONS: Unavailable           Assessment and Plan       IMPRESSION:   1. COVID-19    2. Acute kidney injury ([facility]    3. Elevated troponin    4.  Hypoxia        Principal Problem:    Pneumonia due to COVID-19 virus  Active Problems:    Essential hypertension    Hyperlipidemia    Acute hypoxemic respiratory failure due to COVID-19 [facility])    Acute kidney injury superimposed on chronic kidney disease ([facility] baseline creatinine 1.9    Stage 3b chronic kidney disease (HCC)    Elevated troponin    Nonrheumatic aortic valve stenosis    PAD (peripheral artery disease) (HCC)    Moderate protein-calorie malnutrition (HCC)    Atrial fibrillation with RVR (HCC)    COVID-19    Traumatic hematoma of buttock

## 2022-01-22 NOTE — PROGRESS NOTES
[facility]  Office: [address], DO, Pilar Mills, DO, Phu Garcia, DO, Marlo Kanner Sarai, DO, Sophie Case MD, Jaja Thurman MD, April Carson MD, Rain Vasquez MD, Kay Zhong MD, Minerva Loza MD, Yehuda Bell MD, Елена Redd, DO, Angella Méndez, DO, Rober Swain MD,  Caridad Lancaster DO, Anetet Pickering MD, Janice Berg MD, Janet Alvarez MD, Lilly Braga MD, Jovana Andrade MD, Kristi Mccorimck MD, Houston Kaye MD, Danny Morton [facility], [facility], CNP, Christopher Baker, CNP, Penny Waller, CNS, Vielka Chavez, CNP, Jane Hamilton, CNP, Audrey Johnson, CNP, Cami Tran, CNP, Roldan Forbes, CNP, Angie Hartley PA-C, Jose Miguel Valdez DNP, Gilberto Ahumada DNP, Liliana Diallo, CNP, Monica Quinones, CNP, Davion Egan CNP, Kita Singh CNP, Earl Hickey CNP, Theo Holman, [address] [place] [address]    Progress Note    1/22/2022    8:39 AM    Name:   Sera Maguire  MRN:     [id number]     Acct:      [de-identified]   Room:   [address] [place] [address] Day:  15  Admit Date:  1/8/2022  9:49 AM    PCP:   MOHAMUD Valle CNP  Code Status:  Full Code    Subjective:     C/C:   Chief Complaint   Patient presents with    Shortness of Breath    Nausea & Vomiting     Interval History Status: Worsened    Patient seen and examined at bedside. Intubated overnight  Patient vitals, labs and all providers notes were reviewed,from overnight shift and morning updates were noted and discussed with the nurse    Brief History:     81-year-old male past medical history hypertension, hyperlipidemia, CKD stage IIIb, aortic valve stenosis, peripheral arterial disease presents with shortness of breath found to be in acute respiratory failure secondary to COVID-19 as well as A. fib with RVR. Patient being followed by cardiology, infectious disease, critical care, nephrology was also seen the patient.   Patient was originally started on heparin drip for A. fib with RVR however was discontinued due to acute blood loss anemia and bilateral hematomas on both upper extremities. Patient required 2 units of red blood cells on 2022. Patient was also evaluated by nephrology due to JULES on CKD stage III which improved with IV hydration. For COVID-19 patient status post Actemra , and Decadron 2022. Patient continues on high flow. Patient originally did change his CODE STATUS from DNR CCA to full code on 2022 and reverted back to DNR CCA on 2022. Review of Systems:     Review of Systems   Unable to perform ROS: Intubated       Medications:      Allergies:  No Known Allergies    Current Meds:   Scheduled Meds:    nitroGLYCERIN  0.4 mg SubLINGual Once    sodium bicarbonate  650 mg Oral BID    meropenem  500 mg IntraVENous Q12H    pantoprazole  40 mg Oral BID AC    ferrous sulfate  325 mg Oral Daily with breakfast    guaiFENesin  600 mg Oral BID    lidocaine 1 % injection  5 mL IntraDERmal Once    sodium chloride flush  5-40 mL IntraVENous 2 times per day    busPIRone  10 mg Oral TID    [Held by provider] insulin glargine  20 Units SubCUTAneous QAM    senna  2 tablet Oral Nightly    [Held by provider] insulin lispro  0-6 Units SubCUTAneous TID WC    [Held by provider] insulin lispro  0-3 Units SubCUTAneous Nightly    amiodarone  200 mg Oral Daily    atorvastatin  20 mg Oral Nightly    [Held by provider] clopidogrel  75 mg Oral Daily    vitamin B-12  1,000 mcg Oral Daily    [Held by provider] aspirin  81 mg Oral Daily    sodium chloride flush  5-40 mL IntraVENous 2 times per day     Continuous Infusions:    propofol 40 mcg/kg/min (22 0530)    sodium chloride      sodium chloride       IV fluid builder Stopped (22)    sodium chloride      sodium chloride      sodium chloride      dextrose 100 mL/hr (22 0010)    sodium chloride       PRN Meds: sodium chloride, sodium chloride flush, sodium chloride, melatonin, sodium chloride, sodium chloride, sodium chloride, sodium chloride, LORazepam, glucose, dextrose, glucagon (rDNA), dextrose, metoprolol, benzonatate, sodium chloride flush, sodium chloride flush, sodium chloride, ondansetron **OR** ondansetron, polyethylene glycol, acetaminophen **OR** acetaminophen    Data:     Past Medical History:   has a past medical history of Chronic lower back pain, Cobalamin deficiency, CVA (cerebral vascular accident) ([facility] Diabetes mellitus ([facility] Hyperlipidemia, Hypertension, Malignant neoplasm of colon ([facility] and PAD (peripheral artery disease) ([facility] Social History:   reports that he has quit smoking. He has never used smokeless tobacco. He reports previous alcohol use. He reports that he does not use drugs. Family History: History reviewed. No pertinent family history. Vitals:  BP (!) 165/72   Pulse 81   Temp 98.6 °F (37 °C) (Oral)   Resp 28   Ht 5' 10\" (1.778 m)   Wt 148 lb 2.4 oz (67.2 kg)   SpO2 99%   BMI 21.26 kg/m²   Temp (24hrs), Av °F (36.7 °C), Min:97.5 °F (36.4 °C), Max:98.8 °F (37.1 °C)    Recent Labs     22  0037 22  0413 22  0607 22  0710   POCGLU 149* 207* 233* 204*       I/O (24Hr): Intake/Output Summary (Last 24 hours) at 2022 0839  Last data filed at 2022 0600  Gross per 24 hour   Intake 1394 ml   Output 2445 ml   Net -1051 ml       Labs:  Hematology:  Recent Labs     22  0626 22  0626 22  0717 22  1820 22  0551   WBC 15.2*  --  14.5*  --  34.5*   RBC 2.52*  --  2.22*  --  3.58*   HGB 7.7*   < > 6.7* 9.3* 10.9*   HCT 23.1*   < > 21.1* 27.5* 34.1*   MCV 91.7  --  95.0  --  95.3   MCH 30.6  --  30.2  --  30.4   MCHC 33.3  --  31.8  --  32.0   RDW 16.8*  --  17.4*  --  17.2*   PLT 82*  --  81*  --  130*   MPV 10.6  --  10.1  --  10.6   INR  --   --   --   --  1.1    < > = values in this interval not displayed.      Chemistry:  Recent Labs     22  0003 01/21/22  0717 01/22/22  0551 01/22/22  0626   * 137  --   --    K 4.4 4.6  --   --     104  --   --    CO2 22 24  --   --    GLUCOSE 124* 135*  --   --    BUN 60* 54*  --   --    CREATININE 2.52* 2.45*  --   --    ANIONGAP 8* 9  --   --    LABGLOM 25* 26*  --   --    GFRAA 30* 31*  --   --    CALCIUM 7.3* 7.5*  --   --    CAION  --   --   --  1.21   TROPHS  --   --  64*  --      Recent Labs     01/21/22 2037 01/21/22  2138 01/22/22  0037 01/22/22  0413 01/22/22  0607 01/22/22  0710   POCGLU 201* 199* 149* 207* 233* 204*     ABG:  Lab Results   Component Value Date    POCPH 7.112 01/22/2022    POCPCO2 79.9 01/22/2022    POCPO2 71.5 01/22/2022    POCHCO3 25.5 01/22/2022    NBEA 5 01/22/2022    PBEA NOT REPORTED 01/22/2022    LEZ9VKD NOT REPORTED 01/22/2022    IGUN4AEV 86 01/22/2022    FIO2 NOT REPORTED 01/22/2022     Lab Results   Component Value Date/Time    SPECIAL NOT REPORTED 01/17/2022 03:32 PM     Lab Results   Component Value Date/Time    CULTURE NO GROWTH 01/17/2022 03:32 PM       Radiology:  CT ABDOMEN PELVIS WO CONTRAST Additional Contrast? None    Result Date: 1/16/2022  Left gluteal hematoma, partially visualized on this study without obvious active bleeding, but limited without IV contrast.  No retroperitoneal or rectus sheath bleed. Extensive findings in the visualized lungs compatible with known COVID-19 pneumonia; denser GGOs suggest worsening pneumonia/pneumonitis or developing consolidation. No pneumothorax. Multiple additional findings, as detailed in the body of the report above. RECOMMENDATIONS: Consider follow-up CT pelvis including the upper thighs, if the patient does not respond to resuscitation with blood products/fluids and other conservative measures including localized pressure. Findings were discussed with Adan Wilson at 12:24 pm on 1/16/2022. XR CHEST PORTABLE    Result Date: 1/16/2022  Right IJ line in satisfactory position.  Unchanged or slightly worse bilateral diffuse infiltrates consistent with the patient's history of COVID pneumonia. XR CHEST PORTABLE    Result Date: 1/14/2022  Patchy bilateral interstitial and ground-glass infiltrates most notably in the lung periphery. Findings have progressed from the prior exam compatible with patient's history of COVID pneumonia     XR CHEST PORTABLE    Result Date: 1/11/2022  Unchanged bilateral airspace opacities       Physical Examination:        Physical Exam  Vitals and nursing note reviewed. Constitutional:       General: He is not in acute distress. Interventions: He is sedated and intubated. HENT:      Head: Normocephalic and atraumatic. Eyes:      Conjunctiva/sclera: Conjunctivae normal.      Pupils: Pupils are equal, round, and reactive to light. Neck:      Comments: Central line noted  Cardiovascular:      Rate and Rhythm: Normal rate and regular rhythm. Heart sounds: No murmur heard. Pulmonary:      Effort: Pulmonary effort is normal. No accessory muscle usage or respiratory distress. He is intubated. Breath sounds: No stridor. Decreased breath sounds and rales present. No wheezing or rhonchi. Abdominal:      General: Bowel sounds are normal. There is no distension. Palpations: Abdomen is soft. Abdomen is not rigid. Tenderness: There is no abdominal tenderness. There is no guarding. Musculoskeletal:         General: No tenderness. Skin:     General: Skin is warm and dry. Findings: Bruising and ecchymosis present. No erythema, lesion or rash. Neurological:      Cranial Nerves: No cranial nerve deficit. Motor: No seizure activity.          Assessment:        Hospital Problems           Last Modified POA    * (Principal) Pneumonia due to COVID-19 virus 1/8/2022 Yes    Essential hypertension 1/8/2022 Yes    Hyperlipidemia 1/8/2022 Yes    Acute hypoxemic respiratory failure due to COVID-19 [facility]) 1/8/2022 Yes    Acute kidney injury superimposed on chronic kidney disease ([facility] baseline creatinine 1.9 1/8/2022 Yes    Stage 3b chronic kidney disease ([facility] 1/8/2022 Yes    Elevated troponin 1/9/2022 Yes    Nonrheumatic aortic valve stenosis 1/9/2022 Yes    PAD (peripheral artery disease) ([facility] 1/9/2022 Yes    Moderate protein-calorie malnutrition ([facility] 1/9/2022 Yes    Atrial fibrillation with RVR ([facility] 1/10/2022 Yes    COVID-19 1/19/2022 Yes    Traumatic hematoma of buttock 1/19/2022 Yes    Acute distention of stomach 1/21/2022 Yes          Plan:        Sepsis : resolved     COVID 19 Pneumonia :   S/p Actemra 1/8  Steroids   Vit C/D  Incentive spirometry  Titrate inflammatory markers  ID involved     Acute hypoxemic respiratory failure: Likely secondary to Covid 19 pneumonia, S/P intubation 1/22   Will start TF     JULES/CKD with metabolic acidosis: Slightly better, continue to avoid for toxic agent, nephrology recommendations    Persistent hypoglycemia: improving, will stop D10 NS @ 25 ml     DM2: Restart sliding scale as patient is going to start on tube feeds and blood sugars are up, continue with hypoglycemia protocol    Acute blood loss anemia due to retroperitoneal bleed: Patient status post 3 unit of packed RBCs since admission, currently stable, continue to hold all anticoagulation and antiplatelets. Stomach distention/Enteritis: GI consulted, appreciate their input. Proximal SI distension / possible early obstruction : patient exam is benign,Surgery evaluated the patient no intervention    A. fib with RVR: Currently rate controlled, anticoagulation was held     CODE STATUS is DNR CCA    Peripheral vascular disease:  All antiplatelets are on hold    HTN: continue home medications    HPL: continue home medications    DVT prophylaxis : mechanical only given above       Spent 40 + minutes  in examining, assessing and adjusting treatment / adding work up and discussing the plane with  with patient/staff and specialist       Kath Baez MD  1/22/2022  8:39 AM

## 2022-01-22 NOTE — PROGRESS NOTES
Writer notified patient's wife that the patient had been seen by Dr. Sandhya Palm and that he decided to change his code status. All questions and concerns were answered at this time.

## 2022-01-22 NOTE — PROGRESS NOTES
Comprehensive Nutrition Assessment    Type and Reason for Visit:  Consult (Tube Feedings Order and Management)    Nutrition Recommendations/Plan: As able, start Renal TF advancing to goal rate of 35 mL/hr + 1 protein modular per day. Will provide 1616 kcals, 94 gm pro/day. Monitor TF tolerance/adequacy of intakes - modify as needed. Will monitor bowel function, labs, weights, and plan of care. Nutrition Assessment:  Consulted for Tube Feedings. Pt intubated overnight. Discussed start of Tube Feedings with RN who reports MD requests Renal TF formula. Noted propofol off - RN reports propofol off as pt was bradycardic. Last BM 1/21. Labs reviewed: Phos 5.3 mg/dL, BUN 50 mg/dL, CR 2.19 mg/dL, Glucose 204-233 mg/dL. Meds include: Solu-Cortef, Senokot, Na Bicarbonate tablets. Malnutrition Assessment:  Malnutrition Status:  Insufficient data    Context:  Acute Illness       Estimated Daily Nutrient Needs:  Energy (kcal):  22-25 kcal/kg = 5416-7124 kcals/day; Weight Used for Energy Requirements:  Current     Protein (g):  1.2-1.5 gm /kg =  gm pro/day; Weight Used for Protein Requirements:  Current     Fluid (ml/day):  25 mL/kg = 1700 mL/day or per MD; Method Used for Fluid Requirements:  ml/Kg      Nutrition Related Findings:  Labs/Meds reviewed. Hypoactive bowel sounds. Last BM 1/21,      Wounds:   (Left gluteal hematoma.)       Current Nutrition Therapies:    ADULT ORAL NUTRITION SUPPLEMENT; Breakfast, Lunch, Dinner; Standard High Calorie/High Protein Oral Supplement  ADULT TUBE FEEDING; Orogastric; Renal Formula; Continuous; 10; Yes; 10; Q 4 hours; 35; 30; Other (specify); per MD; Protein; 1 Protein modular per day  Additional Calorie Sources:   Propofol and D10% off.     Anthropometric Measures:  · Height: 5' 10\" (177.8 cm)  · Current Body Weight: 148 lb 2.4 oz (67.2 kg)   · Admission Body Weight: 148 lb 2.4 oz (67.2 kg)    · Usual Body Weight: 150 lb (68 kg)     · Ideal Body Weight: 166 lbs; % Ideal Body Weight 89.2 %   · BMI: 21.3  · BMI Categories: Normal Weight (BMI 18.5-24. 9)       Nutrition Diagnosis:   · Inadequate oral intake related to impaired respiratory function (recent intubation) as evidenced by NPO or clear liquid status due to medical condition (need for enteral nutrition support; h/o poor PO intakes)    Nutrition Interventions:   Food and/or Nutrient Delivery:  Continue NPO,Start Tube Feeding (As able, start feedings of Renal (Nepro with Carb Steady) formula goal rate 35 mL/hr + 1 protein modular.)  Nutrition Education/Counseling:  No recommendation at this time   Coordination of Nutrition Care:  Continue to monitor while inpatient    Goals:  Meet % of estimated nutrition needs with nutrition support/oral diet. Nutrition Monitoring and Evaluation:   Behavioral-Environmental Outcomes:  None Identified   Food/Nutrient Intake Outcomes:  Enteral Nutrition Intake/Tolerance  Physical Signs/Symptoms Outcomes:  Biochemical Data,GI Status,Fluid Status or Edema,Hemodynamic Status,Nutrition Focused Physical Findings,Weight,Skin     Discharge Planning:     Too soon to determine     Electronically signed by Azalea Bunn RD, LD on 1/22/22 at 2:03 PM EST    Contact: [phone]

## 2022-01-22 NOTE — PROGRESS NOTES
Writer notified on-call intermed NP and in-house crit care that the patient was intermittently dropping his SpO2 and having episodes of labored breathing. MILLY Pereyra ordered bipap and an ABG. Dr. Kera Smith came to evaluate the patient at bedside. After speaking with Dr. Kera Smith about next steps in his care, the patient decided to change his code status from [facility] to full code, and stated that he would like to be intubated if it became necessary. Respiratory therapy placed the patient on bipap and obtained an ABG (see results). Writer notified Gina Burdick NP and Dr. Kera Smith of the results. Patient is resting comfortably on the bipap at this time.

## 2022-01-23 NOTE — PLAN OF CARE
Patient was agreeable to student participating in their medical care during this visit.       Problem: OXYGENATION/RESPIRATORY FUNCTION  Goal: Patient will maintain patent airway  1/23/2022 1003 by Primo Jones  Outcome: Ongoing     Problem: OXYGENATION/RESPIRATORY FUNCTION  Goal: Patient will achieve/maintain normal respiratory rate/effort  Description: Respiratory rate and effort will be within normal limits for the patient  1/23/2022 1003 by Primo Jones  Outcome: Ongoing     Problem: MECHANICAL VENTILATION  Goal: Patient will maintain patent airway  1/23/2022 1003 by Primo Jones  Outcome: Ongoing     Problem: MECHANICAL VENTILATION  Goal: Oral health is maintained or improved  1/23/2022 1003 by Primo Jones  Outcome: Ongoing     Problem: MECHANICAL VENTILATION  Goal: ET tube will be managed safely  1/23/2022 1003 by Primo Jones  Outcome: Ongoing     Problem: MECHANICAL VENTILATION  Goal: Ability to express needs and understand communication  1/23/2022 1003 by Primo Jones  Outcome: Ongoing     Problem: MECHANICAL VENTILATION  Goal: Mobility/activity is maintained at optimum level for patient  1/23/2022 1003 by Primo Jones  Outcome: Ongoing     Problem: SKIN INTEGRITY  Goal: Skin integrity is maintained or improved  1/23/2022 1003 by Primo Jones  Outcome: Ongoing

## 2022-01-23 NOTE — PROGRESS NOTES
Writer called wife, Maria T Andujar, to provide update. She was pleased with the update and answered all questions to best of my ability.

## 2022-01-23 NOTE — PLAN OF CARE
Problem: Pain:  Goal: Pain level will decrease  Description: Pain level will decrease  1/23/2022 3447 by Benigno Burkitt, RN  Outcome: Met This Shift  Goal: Control of acute pain  Description: Control of acute pain  1/23/2022 0638 by Benigno Burkitt, RN  Outcome: Met This Shift  Goal: Control of chronic pain  Description: Control of chronic pain  1/23/2022 0638 by Benigno Burkitt, RN  Outcome: Met This Shift

## 2022-01-23 NOTE — PROGRESS NOTES
[facility] [address] Patient  Today's Date and Time: 1/23/2022, 11:10 AM    Impression :     · COVID 19 Confirmed Infection  · Covid tests:  · 1/8/21: Positive  · Acute hypoxic respiratory failure  · Paroxysmal A. 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  · Essential hypertension  · Elevated inflammatory markers  · Hyperlipidemia  · Acute gluteal hematoma  · Patient has not received the Covid vaccine  · Intubated 1/22/22      Recommendations:   · Antibiotic treatment:  · Meropenem 500 mg BID IV for leukocytosis & possible secondary bacterial pneumonia 1/11/21-discontinued 1/17/21  · Meropenem Re-started 1-21-22 because of residual dense consolidation of the lungs with air bronchograms  · Covid Rx:    · Remdesivir-contraindicated with JULES  · Monoclonal antibodies-out of window  · Decadron-initiated 1/8/2022  · Actemra-administered 1/8/2022    · The patient still has significant hypoxia is currently requiring high flow/BiPAP. · He is insisting on trying to go home and does not quite understand that he is requiring a significant amount of respiratory support. · He did mention to me that he was happy to die at home but it is my understanding that the recently changed his CODE STATUS to comfort care arrest to full code.   · His code status has again been changed to [facility]  · His code status changed back to a full code after he agreed to intubation on 1/22/22  · Continue supportive care      Medical Decision Making/Summary/Discussion:1/23/2022     · Patient admitted with COVID 19 infection  · Isolation until 1/28/22    Infection Control Recommendations   · Universal Precautions  · Airborne isolation  · Droplet Isolation    Antimicrobial Stewardship Recommendations       · Renal considerations  Coordination of Outpatient Care:   · Estimated Length of IV antimicrobials:TBD  · Patient will need Midline Catheter Insertion: TBD  · Patient will need PICC line Insertion: No  · Patient will need: Home IV , [place],  SNF,  LTAC:TBD  · Patient will need outpatient wound care:No    Chief complaint/reason for consultation:   · Concern for COVID infection      History of Present Illness:   Esdras Desai is a [de-identified]y.o.-year-old male who was initially admitted on 1/8/2022. Patient seen at the request of Dr. María Tesfaye:    Patient presented through ER with complaints of needing shortness of breath, cough, loss of appetite, nausea, vomiting and diarrhea over the past 7 to 10 days. He has not received the Covid vaccine and tested positive for Covid shortly after [place]. On evaluation in the ED, the patient had an SPO2 of 80% on room air and was tachypneic. A rapid Covid swab was positive. CT chest shows extensive bilateral pulmonary groundglass opacities. He was started on Decadron and given a dose of Actemra. Abnormal labs include:  · Creatinine 2.32  ·   · Troponin I 48  · WBC 17.1    Patient admitted because of concerns with COVID 19.     Meropenem initiated on 1/11/2022 for worsening respiratory distress and leukocytosis    Stable chest x-ray 1/11    CRP is trending down    Hemoglobin dropped to 5.7 on 1/16/2022  Hemoglobin remained stable at this time after receiving infusions of PRBC  Left gluteal hematoma present on CT abdomen pelvis      Occult blood noted on stool  Anemia continues in the program patient required another blood transfusion on 1/21/2022  Anticoagulation on hold s/p gluteal hematoma      Impression CT Chest/Abdomen/Pelvis 1/21/22   1.  New small bilateral pleural effusions with extensive bilateral airspace   consolidation, increased from 01/08/2022.       2.  Decreased AP dimension of the distal trachea, suggesting tracheomalacia.       3.  Previously noted hematoma in the left gluteal musculature is not as well   visualized on today's study, but appears overall slightly decreased in size. No new areas of hematoma.       4.  Mild distention of the stomach, mild prominence of proximal small bowel   loops, and mild nonspecific stranding in the proximal mesentery.  No definite   evidence of high-grade bowel obstruction at this time.  Enteritis or early   developing obstruction are considered in the differential.         The patient required intubation for worsening respiratory failure on 1/22/2022. He is currently requiring 65% FiO2 with a PEEP of 12. Propofol is being given for sedation    CURRENT EVALUATION : 1/23/2022    Afebrile  VS stable    The patient remains on mechanical ventilation today with 50% FiO2 and PEEP of 8    Leukocytosis increased today  Repeat cultures ordered  Meropenem initiated 1/22/22    We will continue to monitor    Patient exhibiting respiratory distress. Yes  Respiratory secretions: No    Patient receiving supplemental oxygen. BiPAP & Hi-flow NC-->MV  RR: 16-->27-->14  02 sat: 95-->100-->100    % FIO2: 65-->50  PEEP:  12-->8    QTc:       NEWS Score: 0-4 Low risk group; 5-6: Medium risk group; 7 or above: High risk group  Parameters 3 2 1 0 1 2 3   Age    < 65   ? 65   RR ? 8  9-11 12-20  21-24 ? 25   O2 Sats ?  91 92-93 94-95 ? 96      Suppl O2  Yes  No      SBP ? 90  101-110 111-219   ? 220   HR ? 40  41-50 51-90  111-130 ? 131   Consciousness    Alert   Drowsiness, lethargy, or confusion   Temperature ? 35.0 C (95.0 F)  35.1-36.0 C 95.1-96.9 F 36.1-38.0 C 97.0-100.4 F 38.1-39.0 C 100.5-102.3 F ? 39.1 C ? 102.4 F      NEWS Score:   1/9/2022: 5 moderate risk    Overall Daily Picture:      Unchanged    Presence of secondary bacterial Infection:    Possible  Additional antibiotics: Meropenem 1/21/2022    Labs, X rays reviewed: 1/23/2022    BUN:54-->50-->54  Cr:2.45-->2.19-->2.32    WBC:18.5-->15.2-->14.5-->34.5-->14.9  Hb:9.1  Plat: 85    Absolute Neutrophils:16  Absolute Lymphocytes:0.34  Neutrophil/Lymphocyte Ratio: 53 very high risk    CRP:160-->131-->52.3-->21.5  Ferritin:804  LDH: 563    Pro Calcitonin:      Cultures:  Urine:  ·   Blood:  ·   Sputum :  · 1/22: Rare gram negative rods  Wound:       CXR:     CAT:  1/8/21      Discussed with patient, RN, CC, IM. I have personally reviewed the past medical history, past surgical history, medications, social history, and family history, and I have updated the database accordingly.   Past Medical History:     Past Medical History:   Diagnosis Date    Chronic lower back pain     Cobalamin deficiency     [facility]     Diabetes mellitus ([facility]     Hyperlipidemia     Hypertension     Malignant neoplasm of colon ([facility]     [facility]ipheral artery disease[facility])        Past Surgical  History:     Past Surgical History:   Procedure Laterality Date    ARTERY SURGERY         Medications:      nitroGLYCERIN  0.4 mg SubLINGual Once    hydrocortisone sodium succinate PF  100 mg IntraVENous Q8H    insulin lispro  0-12 Units SubCUTAneous Q4H    insulin lispro  0-6 Units SubCUTAneous Nightly    sodium bicarbonate  650 mg Oral BID    meropenem  500 mg IntraVENous Q12H    pantoprazole  40 mg Oral BID AC    ferrous sulfate  325 mg Oral Daily with breakfast    guaiFENesin  600 mg Oral BID    lidocaine 1 % injection  5 mL IntraDERmal Once    sodium chloride flush  5-40 mL IntraVENous 2 times per day    busPIRone  10 mg Oral TID    [Held by provider] insulin glargine  20 Units SubCUTAneous QAM    senna  2 tablet Oral Nightly    amiodarone  200 mg Oral Daily    atorvastatin  20 mg Oral Nightly    [Held by provider] clopidogrel  75 mg Oral Daily    vitamin B-12  1,000 mcg Oral Daily    [Held by provider] aspirin  81 mg Oral Daily    sodium chloride flush  5-40 mL IntraVENous 2 times per day       Social History:     Social History     Socioeconomic History    Marital status:      Spouse name: Not on file    Number of children: Not on file    Years of education: Not on file    Highest education level: Not on file   Occupational History    Not on file   Tobacco Use    Smoking status: Former Smoker    Smokeless tobacco: Never Used   Substance and Sexual Activity    Alcohol use: Not Currently    Drug use: Never    Sexual activity: Not on file   Other Topics Concern    Not on file   Social History Narrative    Not on file     Social Determinants of Health     Financial Resource Strain:     Difficulty of Paying Living Expenses: Not on file   Food Insecurity:     Worried About Running Out of Food in the Last Year: Not on file    Lesley of Food in the Last Year: Not on file   Transportation Needs:     Lack of Transportation (Medical): Not on file    Lack of Transportation (Non-Medical): Not on file   Physical Activity:     Days of Exercise per Week: Not on file    Minutes of Exercise per Session: Not on file   Stress:     Feeling of Stress : Not on file   Social Connections:     Frequency of Communication with Friends and Family: Not on file    Frequency of Social Gatherings with Friends and Family: Not on file    Attends Taoist Services: Not on file    Active Member of [address] [place] [address] or Organizations: Not on file    Attends Club or Organization Meetings: Not on file    Marital Status: Not on file   Intimate Partner Violence:     Fear of Current or Ex-Partner: Not on file    Emotionally Abused: Not on file    Physically Abused: Not on file    Sexually Abused: Not on file   Housing Stability:     Unable to Pay for Housing in the Last Year: Not on file    Number of [place] in the Last Year: Not on file    Unstable Housing in the Last Year: Not on file       Family History:   History reviewed. No pertinent family history. Allergies:   Patient has no known allergies. Review of Systems:     Review of Systems   Unable to perform ROS: Intubated   Respiratory: Positive for shortness of breath. Cardiovascular: Negative. Gastrointestinal: Negative. Genitourinary: Negative. Musculoskeletal: Negative. Skin: Positive for color change. Neurological: Negative. Physical Examination :     Patient Vitals for the past 8 hrs:   Temp Temp src Pulse Resp SpO2   01/23/22 0910   67 26 100 %   01/23/22 0900   67 21 100 %   01/23/22 0800   68 14 100 %   01/23/22 0600 98.4 °F (36.9 °C) Bladder 74 26 100 %   01/23/22 0500   79 17 99 %   01/23/22 0400 98.1 °F (36.7 °C) Bladder 82 23 98 %     Physical Exam  Vitals and nursing note reviewed. Constitutional:       Appearance: He is well-developed. Comments: Intubated and sedated   HENT:      Head: Normocephalic and atraumatic. Cardiovascular:      Rate and Rhythm: Normal rate. Heart sounds: Murmur heard. Pulmonary:      Effort: Respiratory distress present. Breath sounds: No wheezing. Abdominal:      General: Bowel sounds are normal.      Palpations: Abdomen is soft. There is no mass. Tenderness: There is no abdominal tenderness. Musculoskeletal:         General: Swelling (In the bilateral upper extremities especially in the forearms.) present. Normal range of motion. Cervical back: Neck supple. Lymphadenopathy:      Cervical: No cervical adenopathy. Skin:     General: Skin is dry. Findings: Bruising (There is bruising and ecchymotic skin lesions in the forearms bilaterally right worse than left) present.    Neurological:      Comments: FINA-intubated and sedated         Medical Decision Making -Laboratory:   I have independently reviewed/ordered the following labs:    CBC with Differential:   Recent Labs     01/22/22  0551 01/23/22  0405   WBC 34.5* 14.9*   HGB 10.9* 9.1*   HCT 34.1* 27.4*   * 85*   LYMPHOPCT 1* 0*   MONOPCT 3 1     BMP:   Recent Labs     01/22/22  0551 01/23/22  0405    140   K 4.9 4.8   * 109*   CO2 21 22   BUN 50* 54*   CREATININE 2.19* 2.32*     Hepatic Function Panel:   No results for input(s): PROT, LABALBU, BILIDIR, IBILI, BILITOT, ALKPHOS, ALT, AST in the last 72 hours. No results for input(s): RPR in the last 72 hours. No results for input(s): HIV in the last 72 hours. No results for input(s): BC in the last 72 hours. Lab Results   Component Value Date    MUCUS 1+ 01/17/2022    RBC 2.89 01/23/2022    TRICHOMONAS NOT REPORTED 01/17/2022    WBC 14.9 01/23/2022    YEAST NOT REPORTED 01/17/2022    TURBIDITY Clear 01/17/2022     Lab Results   Component Value Date    CREATININE 2.32 01/23/2022    GLUCOSE 178 01/23/2022       Medical Decision Making-Imaging:     Narrative   EXAMINATION:   CTA OF THE CHEST 1/8/2022 10:10 am       TECHNIQUE:   CTA of the chest was performed after the administration of intravenous   contrast.  Multiplanar reformatted images are provided for review.  MIP   images are provided for review. Dose modulation, iterative reconstruction,   and/or weight based adjustment of the mA/kV was utilized to reduce the   radiation dose to as low as reasonably achievable.       COMPARISON:   None.       HISTORY:   ORDERING SYSTEM PROVIDED HISTORY: dyspnea / hypoxia   Reason for Exam: Shortness of breath       FINDINGS:   Pulmonary Arteries: Pulmonary arteries are adequately opacified for   evaluation.  No evidence of intraluminal filling defect to suggest pulmonary   embolism.  Main pulmonary artery is normal in caliber.       Mediastinum: No evidence of mediastinal lymphadenopathy.  Normal heart size. Normal caliber thoracic aorta with diffuse atherosclerosis.       Lungs/pleura: Extensive ground-glass opacification of the bilateral lung   fields with peripheral involvement slight apical as well as basilar sparing. No effusion or pneumothorax.       Upper Abdomen: Limited images of the upper abdomen are unremarkable.       Soft Tissues/Bones: No acute bone or soft tissue abnormality.           Impression   *No evidence of pulmonary embolism.    *Extensive ground-glass opacification of the bilateral lung fields with   peripheral involvement slight apical as well as basilar sparing.  Imaging   features suggestive of COVID-19 pneumonia, though are nonspecific and can   occur with a variety of infectious and noninfectious processes.         Narrative   EXAMINATION:   CT OF THE ABDOMEN AND PELVIS WITHOUT CONTRAST, 1/16/2022 10:42 am       TECHNIQUE:   CT of the abdomen and pelvis was performed without the administration of   intravenous contrast. Multiplanar reformatted images are provided for review. Dose modulation, iterative reconstruction, and/or weight based adjustment of   the mA/kV was utilized to reduce the radiation dose to as low as reasonably   achievable.       COMPARISON:   CT of the chest from 01/08/2022, and retroperitoneal ultrasound from   01/10/2022.       HISTORY:   ORDERING SYSTEM PROVIDED HISTORY:  Retrop bleed r/o   TECHNOLOGIST PROVIDED HISTORY:   Retrop bleed r/o   Reason for Exam:  Retrop bleed r/o       FINDINGS:   Lower Chest: As demonstrated on recent CT chest, extensive and somewhat   denser confluent ground-glass airspace opacities re-identified mid-lower   lungs, sparing the bases with some associated crazy paving, air bronchograms   and bibasilar atelectatic appearing changes.  Main pulmonary artery caliber   31 mm.  Mild dilatation ascending aorta, with a maximal dimension of 41 mm.       Organs: Unopacified liver, spleen, adrenal glands and both kidneys show no   acute abnormality; without suspicious focal finding or hydronephrosis.    Significant right renal cortical thinning and some scarring suspected.  No   large stone.  Probable gallbladder sludge or vicarious contrast from recent   contrast CT; no calcified stones.       GI/Bowel: Small-moderate amount of retained stool, mostly right colon with   some fluid/levels; no abnormal dilatation, marked wall thickening or   pericolonic fat stranding.  Unremarkable small bowel.  Some fluid within a mildly distended stomach.  Small hiatus hernia.       Pelvis: Partially fluid-filled urinary bladder without wall thickening or   mass.  No significant prostatic enlargement.  Symmetric seminal vesicles.  No   pelvic fluid collection or mass.  Partially visualized approximate 10 x 15 x   5.7 cm left gluteal mass with HU measurements/appearance most suggestive of   hematoma.  No high density finding compatible with active bleeding, but   assessment limited on this examination.  Small fat containing inguinal   hernias, larger on the left.       Peritoneum/Retroperitoneum: No retroperitoneal bleed or bulky   lymphadenopathy.  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  Probable limited intimal dissection   suprarenal aorta without marked aneurysmal dilatation.  Soft tissue stranding   flanks extending into the pelvis, suggesting some degree anasarca.  Slightly   greater prominence right proximal rectus musculature       Bones/Soft Tissues: No acute abnormality.  Mild scoliosis, multilevel   degenerative changes of spine but with DDD L5-S1 and bilateral mild hip joint   degenerative findings.           Impression   Left gluteal hematoma, partially visualized on this study without obvious   active bleeding, but limited without IV contrast.  No retroperitoneal or   rectus sheath bleed.       Extensive findings in the visualized lungs compatible with known COVID-19   pneumonia; denser GGOs suggest worsening pneumonia/pneumonitis or developing   consolidation.  No pneumothorax.       Multiple additional findings, as detailed in the body of the report above.       RECOMMENDATIONS:   Consider follow-up CT pelvis including the upper thighs, if the patient does   not respond to resuscitation with blood products/fluids and other   conservative measures including localized pressure.  Findings were discussed   with Reji Samuel at 12:24 pm on 1/16/2022.             Narrative   EXAMINATION:   CT OF THE CHEST, ABDOMEN, AND PELVIS WITHOUT CONTRAST 1/21/2022 11:09 am       TECHNIQUE:   CT of the chest, abdomen and pelvis was performed without the administration   of intravenous contrast. Multiplanar reformatted images are provided for   review. Dose modulation, iterative reconstruction, and/or weight based   adjustment of the mA/kV was utilized to reduce the radiation dose to as low   as reasonably achievable.       COMPARISON:   CT abdomen and pelvis dated 01/16/2022.  CT chest dated 01/08/2022       HISTORY:   ORDERING SYSTEM PROVIDED HISTORY: blood loss anemia , gluteal hematoma and   worsening HB   TECHNOLOGIST PROVIDED HISTORY:   blood loss anemia , gluteal hematoma and worsening HB           FINDINGS:       Chest:       Mediastinum: Heart size is within normal limits.  Ascending thoracic aorta is   mildly dilated, measuring 4 cm in AP dimension, similar to the previous   study.  Main pulmonary artery is stable in caliber as well.  No mediastinal   hematoma or lymphadenopathy. Bishop Letters is a catheter in the SVC with distal tip   in the distal SVC.       Lungs/pleura: There are small bilateral pleural effusions, which are new from   the previous study. Bishop Letters is extensive dense airspace consolidation   throughout both lungs, with mild sparing at the bases, increased from   01/08/2022. Bishop Letters is decreased AP dimension of the lower trachea, although   tracheobronchial tree remains patent.       Soft Tissues/Bones: There is no acute or suspicious osseous abnormality.    Visualized superficial soft tissues are within normal limits.           Abdomen/Pelvis:       Organs: Limited unenhanced liver is within normal limits.  There is a tiny   amount of free fluid adjacent to the hepatic dome.  Gallbladder, spleen,   pancreas, and adrenal glands are unremarkable.       There is bilateral renal atrophy, more so on the right, unchanged.  No   hydronephrosis or perinephric fluid collection.  There is mild bilateral   perinephric stranding, which is unchanged and likely physiologic.       GI/Bowel: The stomach is mildly distended with air-fluid level noted.  No   abnormal bowel distention.  There is mild increased prominence of the   proximal small bowel loops.  There is mild stranding in the proximal   mesentery.  No focal pericolonic inflammation.  No free air.       Pelvis: Urinary bladder is within normal limits.  No evidence of pelvic   lymphadenopathy.       Peritoneum/Retroperitoneum: Distal aortobifemoral graft noted.  Caliber of   the abdominal aorta is unchanged.  There is moderate to severe scattered   atherosclerosis, which is unchanged.  No retroperitoneal lymphadenopathy or   hematoma.  Slit-like IVC is noted.       Bones/Soft Tissues: There is no acute or suspicious osseous abnormality.  The   hematoma previously seen in the left gluteal muscle is not as well visualized   on today's study due to isoattenuation.  There is asymmetric swelling of the   left gluteal muscle, although slightly improved when compared to 01/16/2022. AP dimension in the area of suspected hematoma is 4.4 cm (previously 5.7 cm). Transverse dimension is approximately 9.3 cm (previously 9.7 cm).  There is   stranding in the subcutaneous fat of the upper thighs bilaterally.           Impression   1.  New small bilateral pleural effusions with extensive bilateral airspace   consolidation, increased from 01/08/2022.       2.  Decreased AP dimension of the distal trachea, suggesting tracheomalacia.       3.  Previously noted hematoma in the left gluteal musculature is not as well   visualized on today's study, but appears overall slightly decreased in size.    No new areas of hematoma.       4.  Mild distention of the stomach, mild prominence of proximal small bowel   loops, and mild nonspecific stranding in the proximal mesentery.  No definite evidence of high-grade bowel obstruction at this time.  Enteritis or early   developing obstruction are considered in the differential.         Medical Decision [id number]:     Culture, Blood 1 [[id number]] Collected: 01/09/22 1155   Order Status: Completed Specimen: Blood Updated: 01/14/22 1300    Specimen Description . BLOOD    Special Requests NOT REPORTED    Culture NO GROWTH 5 DAYS   Culture, Blood 1 [[id number]] Collected: 01/09/22 1155   Order Status: Completed Specimen: Blood Updated: 01/14/22 1300    Specimen Description . BLOOD    Special Requests NOT REPORTED    Culture NO GROWTH 5 DAYS   COVID-19, Rapid [[id number]] (Abnormal) Collected: 01/08/22 1045   Order Status: Completed Specimen: Nasopharyngeal Swab Updated: 01/08/22 1109    Specimen Description . NASOPHARYNGEAL SWAB    SARS-CoV-2, Rapid DETECTED Abnormal     Comment:        Rapid NAAT: The specimen is POSITIVE for SARS-Cov-2, the novel coronavirus associated with   COVID-19.         This test has been authorized by the FDA under an Emergency Use Authorization (EUA) for use   by authorized laboratories.         The ID NOW COVID-19 assay is designed to detect the virus that causes COVID-19 in patients   with signs and symptoms of infection who are suspected of COVID-19. An individual without symptoms of COVID-19 and who is not shedding SARS-CoV-2 virus would   expect to have a negative (not detected) result in this assay.    Fact sheet for Healthcare Providers: Asael.lorena   Fact sheet for Patients: Asael.es           Methodology: Isothermal Nucleic Acid Amplification         Results reported to the appropriate Health [facility]            Medical Decision Making-Other:     Note:  · Labs, medications, radiologic studies were reviewed with personal review of films  · Large amounts of data were reviewed  · Discussed with nursing Staff, Discharge planner  · Infection Control and Prevention measures reviewed  · All prior entries were reviewed  · Administer medications as ordered  · Prognosis: Guarded  · Discharge planning reviewed      Thank you for allowing us to participate in the care of this patient. Please call with questions. Electronically signed by MOHAMUD Hylton CNP on 1/23/2022 at 11:10 AM        ATTESTATION:    I have discussed the case, including pertinent history and exam findings with the APRN. I have evaluated the  History, physical findings and pictures of the patient and the key elements of the encounter have been performed by me. I have reviewed the laboratory data, other diagnostic studies and discussed them with the APRN. I have updated the medical record where necessary. I agree with the assessment, plan and orders as documented by the APRN.     Trudy De La Rosa MD.

## 2022-01-23 NOTE — PROGRESS NOTES
Renal Progress Note    Patient :  Sera Maguire; [de-identified] y.o. MRN# [id number]  Location:  [address] [place] [address]  Attending:  Rain Vasquez MD  Admit Date:  1/8/2022   Hospital Day: 15      Subjective: Following for JULES/ATN on CKD III with baseline 1.8-2.0, peaked at 3.0 in setting of contrast exposure and severe anemia from spontaneous left gluteal area hematoma; admitted with COVID. Remains Intubated FIO2 50% PEEP 8  Levophed off since last night  Creatinine 2.32 this morning. Potassium 4.8  Urine output decreased over last 24 hours and only 1075, however remains negative 2.7 liters. Restarted on Meropenem per ID for increasing lung infiltrates.     Outpatient Medications:     Medications Prior to Admission: aspirin 325 MG tablet, Take 81 mg by mouth daily   atorvastatin (LIPITOR) 40 MG tablet, Take 20 mg by mouth nightly  lisinopril (PRINIVIL;ZESTRIL) 30 MG tablet, Take 30 mg by mouth daily  metoprolol tartrate (LOPRESSOR) 50 MG tablet, Take 50 mg by mouth daily  cilostazol (PLETAL) 100 MG tablet, Take 100 mg by mouth 2 times daily  clopidogrel (PLAVIX) 75 MG tablet, Take 75 mg by mouth daily  vitamin B-12 (CYANOCOBALAMIN) 1000 MCG tablet, Take 1,000 mcg by mouth daily    Current Medications:     Scheduled Meds:    nitroGLYCERIN  0.4 mg SubLINGual Once    hydrocortisone sodium succinate PF  100 mg IntraVENous Q8H    insulin lispro  0-12 Units SubCUTAneous Q4H    insulin lispro  0-6 Units SubCUTAneous Nightly    sodium bicarbonate  650 mg Oral BID    meropenem  500 mg IntraVENous Q12H    pantoprazole  40 mg Oral BID AC    ferrous sulfate  325 mg Oral Daily with breakfast    guaiFENesin  600 mg Oral BID    lidocaine 1 % injection  5 mL IntraDERmal Once    sodium chloride flush  5-40 mL IntraVENous 2 times per day    busPIRone  10 mg Oral TID    [Held by provider] insulin glargine  20 Units SubCUTAneous QAM    senna  2 tablet Oral Nightly    amiodarone  200 mg Oral Daily    atorvastatin  20 mg Oral Nightly  [Held by provider] clopidogrel  75 mg Oral Daily    vitamin B-12  1,000 mcg Oral Daily    [Held by provider] aspirin  81 mg Oral Daily    sodium chloride flush  5-40 mL IntraVENous 2 times per day     Continuous Infusions:    fentaNYL 75 mcg/hr (22 0503)    propofol Stopped (22 1100)    norepinephrine Stopped (22 0000)    midazolam 6 mg/hr (22 0500)    sodium chloride      sodium chloride       IV fluid builder Stopped (22 0410)    dextrose 100 mL/hr (22 0010)    sodium chloride       PRN Meds:  sodium chloride, sodium chloride flush, sodium chloride, melatonin, sodium chloride, LORazepam, glucose, dextrose, glucagon (rDNA), dextrose, metoprolol, benzonatate, sodium chloride flush, sodium chloride flush, sodium chloride, ondansetron **OR** ondansetron, polyethylene glycol, acetaminophen **OR** acetaminophen    Input/Output:       I/O last 3 completed shifts: In: 2813.6 [P.O.:360; I.V.:1253.8; NG/GT:279; IV Piggyback:920.8]  Out: 2195 [Urine:2195]. No data found. Vital Signs:   Temperature:  Temp: 98.4 °F (36.9 °C)  TMax:   Temp (24hrs), Av.1 °F (36.2 °C), Min:94.1 °F (34.5 °C), Max:98.6 °F (37 °C)    Respirations:  Resp: 26  Pulse:   Pulse: 67  BP:    BP: (!) 165/72  BP Range: No data recorded. No data recorded. Physical Examination:     General:  Intubated, sedated  Neck:   No JVD, no thyromegaly, no lymphadenopathy. Chest:              Unlabored on vent. Cardiac:  S1 S2 RR, + murmur. Abdomen: Soft, non-tender, no masses or organomegaly, BS audible. :   No suprapubic or flank tenderness. Neuro:  Grimaces with pain  SKIN:  No rashes, good skin turgor. Extremities:  No edema, palpable peripheral pulses, no calf tenderness.     Labs:       Recent Labs     22  0717 22  0717 22  1820 22  0551 22  0405   WBC 14.5*  --   --  34.5* 14.9*   RBC 2.22*  --   --  3.58* 2.89*   HGB 6.7*   < > 9.3* 10.9* 9.1* HCT 21.1*   < > 27.5* 34.1* 27.4*   MCV 95.0  --   --  95.3 94.8   MCH 30.2  --   --  30.4 31.5   MCHC 31.8  --   --  32.0 33.2   RDW 17.4*  --   --  17.2* 17.3*   PLT 81*  --   --  130* 85*   MPV 10.1  --   --  10.6 10.9    < > = values in this interval not displayed. BMP:   Recent Labs     01/21/22  0717 01/22/22  0551 01/23/22  0405    140 140   K 4.6 4.9 4.8    108* 109*   CO2 24 21 22   BUN 54* 50* 54*   CREATININE 2.45* 2.19* 2.32*   GLUCOSE 135* 219* 178*   CALCIUM 7.5* 7.8* 7.9*      Phosphorus:     Recent Labs     01/22/22  0551   PHOS 5.3*     SUMMER:      Lab Results   Component Value Date    SUMMER NEGATIVE 01/09/2022     SPEP:  Lab Results   Component Value Date    PROT 6.1 01/09/2022     C3:     Lab Results   Component Value Date    C3 134 01/09/2022     C4:     Lab Results   Component Value Date    C4 31 01/09/2022       Urinalysis/Chemistries:      Lab Results   Component Value Date    NITRU NEGATIVE 01/17/2022    COLORU Yellow 01/17/2022    PHUR 5.0 01/17/2022    WBCUA None 01/17/2022    RBCUA 2 TO 5 01/17/2022    MUCUS 1+ 01/17/2022    TRICHOMONAS NOT REPORTED 01/17/2022    YEAST NOT REPORTED 01/17/2022    BACTERIA NOT REPORTED 01/17/2022    SPECGRAV 1.018 01/17/2022    LEUKOCYTESUR NEGATIVE 01/17/2022    UROBILINOGEN Normal 01/17/2022    BILIRUBINUR NEGATIVE 01/17/2022    GLUCOSEU 1+ 01/17/2022    KETUA NEGATIVE 01/17/2022    AMORPHOUS NOT REPORTED 01/17/2022     Urine Sodium:     Lab Results   Component Value Date    CHERYL 56 01/10/2022     Urine Creatinine:     Lab Results   Component Value Date    LABCREA 90.8 01/09/2022       Radiology:     CXR: 1/22/22  1. The endotracheal tube tip is located 5.3 cm above the zheng. 2. Worsening bilateral lung infiltrates, compatible with pneumonia, including   COVID. 3. Small bilateral pleural effusions       Assessment:     1.  Acute Kidney Injury: Secondary to ischemic ATN and nephrotoxic ATN initially from COVID-pneumonia, systemic inflammation, contrast exposure. Baseline 2.0 peaked at 3.0. Thereafter developed left gluteal area hematoma with drop in hemoglobin down to 5.7, decreased renal perfusion, had a secondary ATN creatinine peaked at 3.96. Never established with nephrologist prior to admission  2. 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 Persistent anemia, requiring multiple transfusions likely from left gluteal area hematoma  4. COVID-19 pneumonia with respiratory failure now intubated. 5.  Severe aortic stenosis valve area 1.0 cm squares, peak gradient 57 mean 37    Plan:   1. Labs in am    2. Continue sodium bicarb 650mg BID. 3. Hold diuretics  4. Will continue to follow    Nutrition   Please ensure that patient is on a renal diet/TF. Avoid nephrotoxic drugs/contrast exposure. We will continue to follow along with you. Gale Chinchilla CNP    Attending Physician Statement  I have discussed the care of Mignon Robles, including pertinent history and exam findings with the CNP. I have reviewed the key elements of all parts of the encounter with the CNP. I have seen and examined the patient. I agree with the assessment and plan and status of the problem list as documented.        Darion Coronel MD   Nephrology Attending Physician  [facility]  1/23/2022

## 2022-01-23 NOTE — PROGRESS NOTES
[facility]  Office: [address], DO, Meredith Pearce, DO, Glendy Gutierrez, DO, Vika Watson Blood, DO, Harish Pearce MD, Candis Heath MD, Josselin Swain MD, Brandon Dexter MD, Brannon Sherman MD, Yousuf Christiansen MD, Leila Kong MD, Brittney Fox, DO, Shari Flores, DO, Maykel Roque MD,  Alethea Oerllana, DO, Gulshan Leslie MD, Amara Morrison MD, Josi Ray MD, Flakita Damon MD, Nehemiah Judd MD, Becky Morton MD, Rosario Onofre MD, Collette Goodwin, [facility], [facility], CNP, Lulu Chen, CNP, Ngoc Soliz, CNS, Liudmila Swain, CNP, Stephanie Gomez, CNP, Lara Manzano, CNP, Khurram Silva, CNP, Betina Willingham, CNP, Jose Khan PA-C, Kavita Arreguin, [facility], Sydnee Rivera [facility], Indio Li, CNP, Christiano Bello, CNP, Fabián Costello, CNP, Perla Serrato, CNP, Gladys White, [facility], Ingrid Chamebrs, [address] [place] [address]    Progress Note    1/23/2022    2:07 PM    Name:   Shankar Reyez  MRN:     [id number]     Acct:      [de-identified]   Room:   [address] [place] [address] Day:  13  Admit Date:  1/8/2022  9:49 AM    PCP:   MOHAMUD Ramires CNP  Code Status:  Full Code    Subjective:     C/C:   Chief Complaint   Patient presents with    Shortness of Breath    Nausea & Vomiting     Interval History Status: improving  Patient seen and examined at bedside. He is improving with less oxygen support needs, off pressors and afebrile overnight. Patient vitals, labs and all providers notes were reviewed,from overnight shift and morning updates were noted and discussed with the nurse    Brief History:     81-year-old male past medical history hypertension, hyperlipidemia, CKD stage IIIb, aortic valve stenosis, peripheral arterial disease presents with shortness of breath found to be in acute respiratory failure secondary to COVID-19 as well as A. fib with RVR.   Patient being followed by cardiology, infectious disease, critical care, nephrology was also seen the patient. Patient was originally started on heparin drip for A. fib with RVR however was discontinued due to acute blood loss anemia and bilateral hematomas on both upper extremities. Patient required 2 units of red blood cells on 1/16/2022. Patient was also evaluated by nephrology due to JULES on CKD stage III which improved with IV hydration. For COVID-19 patient status post Actemra on/8/2022, and Decadron 1/8/2022. Patient continues on high flow. Patient originally did change his CODE STATUS from DNR CCA to full code on 11/13/2022 and reverted back to DNR CCA on 11/17/2022. Review of Systems:     Review of Systems   Unable to perform ROS: Intubated       Medications:      Allergies:  No Known Allergies    Current Meds:   Scheduled Meds:    pantoprazole  40 mg IntraVENous Daily    And    sodium chloride (PF)  10 mL IntraVENous Daily    nitroGLYCERIN  0.4 mg SubLINGual Once    hydrocortisone sodium succinate PF  100 mg IntraVENous Q8H    insulin lispro  0-12 Units SubCUTAneous Q4H    insulin lispro  0-6 Units SubCUTAneous Nightly    sodium bicarbonate  650 mg Oral BID    meropenem  500 mg IntraVENous Q12H    ferrous sulfate  325 mg Oral Daily with breakfast    guaiFENesin  600 mg Oral BID    lidocaine 1 % injection  5 mL IntraDERmal Once    sodium chloride flush  5-40 mL IntraVENous 2 times per day    busPIRone  10 mg Oral TID    [Held by provider] insulin glargine  20 Units SubCUTAneous QAM    senna  2 tablet Oral Nightly    amiodarone  200 mg Oral Daily    atorvastatin  20 mg Oral Nightly    [Held by provider] clopidogrel  75 mg Oral Daily    vitamin B-12  1,000 mcg Oral Daily    [Held by provider] aspirin  81 mg Oral Daily    sodium chloride flush  5-40 mL IntraVENous 2 times per day     Continuous Infusions:    fentaNYL 75 mcg/hr (01/23/22 1050)    propofol Stopped (01/22/22 1100)    norepinephrine Stopped (01/23/22 0000)    midazolam 8 mg/hr (01/23/22 1051)    sodium chloride      sodium chloride       IV fluid builder Stopped (22 0410)    dextrose 100 mL/hr (22 0010)    sodium chloride       PRN Meds: sodium chloride, sodium chloride flush, sodium chloride, melatonin, sodium chloride, LORazepam, glucose, dextrose, glucagon (rDNA), dextrose, metoprolol, benzonatate, sodium chloride flush, sodium chloride flush, sodium chloride, ondansetron **OR** ondansetron, polyethylene glycol, acetaminophen **OR** acetaminophen    Data:     Past Medical History:   has a past medical history of Chronic lower back pain, Cobalamin deficiency, CVA (cerebral vascular accident) ([facility] Diabetes mellitus ([facility] Hyperlipidemia, Hypertension, Malignant neoplasm of colon ([facility] and PAD (peripheral artery disease) ([facility] Social History:   reports that he has quit smoking. He has never used smokeless tobacco. He reports previous alcohol use. He reports that he does not use drugs. Family History: History reviewed. No pertinent family history. Vitals:  BP (!) 165/72   Pulse 73   Temp 98.4 °F (36.9 °C) (Bladder)   Resp 16   Ht 5' 10\" (1.778 m)   Wt 148 lb 2.4 oz (67.2 kg)   SpO2 96%   BMI 21.26 kg/m²   Temp (24hrs), Av.6 °F (36.4 °C), Min:94.5 °F (34.7 °C), Max:98.6 °F (37 °C)    Recent Labs     22  0008 22  0336 22  1149   POCGLU 150* 137* 166* 113*       I/O (24Hr):     Intake/Output Summary (Last 24 hours) at 2022 1407  Last data filed at 2022 0800  Gross per 24 hour   Intake 1661.6 ml   Output 850 ml   Net 811.6 ml       Labs:  Hematology:  Recent Labs     22  0717 22  0717 22  1820 22  0551 22  1130 22  0405   WBC 14.5*  --   --  34.5*  --  14.9*   RBC 2.22*  --   --  3.58*  --  2.89*   HGB 6.7*   < > 9.3* 10.9*  --  9.1*   HCT 21.1*   < > 27.5* 34.1*  --  27.4*   MCV 95.0  --   --  95.3  --  94.8   MCH 30.2  --   --  30.4  --  31.5   MCHC 31.8  --   --  32.0  --  33.2   RDW 17.4* --   --  17.2*  --  17.3*   PLT 81*  --   --  130*  --  85*   MPV 10.1  --   --  10.6  --  10.9   CRP  --   --   --   --  21.5*  --    INR  --   --   --  1.1  --   --     < > = values in this interval not displayed. Chemistry:  Recent Labs     01/21/22  0717 01/22/22  0551 01/22/22  0626 01/22/22  1130 01/23/22  0405    140  --   --  140   K 4.6 4.9  --   --  4.8    108*  --   --  109*   CO2 24 21  --   --  22   GLUCOSE 135* 219*  --   --  178*   BUN 54* 50*  --   --  54*   CREATININE 2.45* 2.19*  --   --  2.32*   ANIONGAP 9 11  --   --  9   LABGLOM 26* 29*  --   --  27*   GFRAA 31* 35*  --   --  33*   CALCIUM 7.5* 7.8*  --   --  7.9*   CAION  --   --  1.21  --   --    PHOS  --  5.3*  --   --   --    TROPHS  --  64*  --   --   --    LACTACIDWB  --   --   --  0.9  --      Recent Labs     01/22/22  0710 01/22/22  1130 01/22/22  1602 01/22/22  2000 01/23/22  0008 01/23/22  0336 01/23/22  1149   LIPASE  --  54  --   --   --   --   --    POCGLU 204*  --  56* 150* 137* 166* 113*     ABG:  Lab Results   Component Value Date    POCPH 7.404 01/22/2022    POCPCO2 35.3 01/22/2022    POCPO2 141.3 01/22/2022    POCHCO3 22.1 01/22/2022    NBEA 2 01/22/2022    PBEA NOT REPORTED 01/22/2022    NSN4IXT NOT REPORTED 01/22/2022    ZNPN1RKA 99 01/22/2022    FIO2 60.0 01/22/2022     Lab Results   Component Value Date/Time    SPECIAL L HAND 5 ML 01/22/2022 04:20 PM     Lab Results   Component Value Date/Time    CULTURE NO GROWTH 12 HOURS 01/22/2022 04:20 PM       Radiology:  CT ABDOMEN PELVIS WO CONTRAST Additional Contrast? None    Result Date: 1/16/2022  Left gluteal hematoma, partially visualized on this study without obvious active bleeding, but limited without IV contrast.  No retroperitoneal or rectus sheath bleed. Extensive findings in the visualized lungs compatible with known COVID-19 pneumonia; denser GGOs suggest worsening pneumonia/pneumonitis or developing consolidation. No pneumothorax.  Multiple additional findings, as detailed in the body of the report above. RECOMMENDATIONS: Consider follow-up CT pelvis including the upper thighs, if the patient does not respond to resuscitation with blood products/fluids and other conservative measures including localized pressure. Findings were discussed with Taty Boone at 12:24 pm on 1/16/2022. XR CHEST PORTABLE    Result Date: 1/16/2022  Right IJ line in satisfactory position. Unchanged or slightly worse bilateral diffuse infiltrates consistent with the patient's history of COVID pneumonia. XR CHEST PORTABLE    Result Date: 1/14/2022  Patchy bilateral interstitial and ground-glass infiltrates most notably in the lung periphery. Findings have progressed from the prior exam compatible with patient's history of COVID pneumonia     XR CHEST PORTABLE    Result Date: 1/11/2022  Unchanged bilateral airspace opacities       Physical Examination:        Physical Exam  Vitals and nursing note reviewed. Constitutional:       General: He is not in acute distress. Interventions: He is sedated and intubated. HENT:      Head: Normocephalic and atraumatic. Eyes:      Conjunctiva/sclera: Conjunctivae normal.      Pupils: Pupils are equal, round, and reactive to light. Neck:      Comments: Central line noted  Cardiovascular:      Rate and Rhythm: Normal rate and regular rhythm. Heart sounds: No murmur heard. Pulmonary:      Effort: Pulmonary effort is normal. No accessory muscle usage or respiratory distress. He is intubated. Breath sounds: No stridor. Decreased breath sounds and rales present. No wheezing or rhonchi. Abdominal:      General: Bowel sounds are normal. There is no distension. Palpations: Abdomen is soft. Abdomen is not rigid. Tenderness: There is no abdominal tenderness. There is no guarding. Musculoskeletal:         General: No tenderness. Skin:     General: Skin is warm and dry.       Findings: Bruising and ecchymosis present. No erythema, lesion or rash. Neurological:      Cranial Nerves: No cranial nerve deficit. Motor: No seizure activity. Assessment:        Hospital Problems           Last Modified POA    * (Principal) Pneumonia due to COVID-19 virus 1/8/2022 Yes    Essential hypertension 1/8/2022 Yes    Hyperlipidemia 1/8/2022 Yes    Acute hypoxemic respiratory failure due to COVID-19 [facility]) 1/8/2022 Yes    Acute kidney injury superimposed on chronic kidney disease ([facility] baseline creatinine 1.9 1/8/2022 Yes    Stage 3b chronic kidney disease ([facility] 1/8/2022 Yes    Elevated troponin 1/9/2022 Yes    Nonrheumatic aortic valve stenosis 1/9/2022 Yes    PAD (peripheral artery disease) ([facility] 1/9/2022 Yes    Moderate protein-calorie malnutrition ([facility] 1/9/2022 Yes    Atrial fibrillation with RVR ([facility] 1/10/2022 Yes    COVID-19 1/19/2022 Yes    Traumatic hematoma of buttock 1/19/2022 Yes    Acute distention of stomach 1/21/2022 Yes          Plan:        Sepsis : resolved     COVID 19 Pneumonia :   S/p Actemra 1/8  Steroids   Vit C/D  Titrate inflammatory markers  ID involved  Meropenem started 1/21 due to development of consolidative infiltrates on repeat CT chest.     Acute hypoxemic respiratory failure: Likely secondary to Covid 19 pneumonia , S/P intubation 1/22    Continue  TF     JULES/CKD with metabolic acidosis: improving,  continue to avoid for nephrotoxic agents, nephrology recommendations    Persistent hypoglycemia: improving, will stop D10 NS @ 25 ml     DM2: Restart sliding scale as patient is going to start on tube feeds and blood sugars are up, continue with hypoglycemia protocol    Acute blood loss anemia due to retroperitoneal bleed: Patient status post 3 unit of packed RBCs since admission, currently stable, continue to hold all anticoagulation and antiplatelets. Stomach distention/Enteritis: GI consulted, appreciate their input.     Proximal SI distension / possible early obstruction : patient exam is

## 2022-01-23 NOTE — PLAN OF CARE
Problem: Non-Violent Restraints  Goal: Removal from restraints as soon as assessed to be safe  1/22/2022 2049 by Florain Hart RN  Outcome: Ongoing  1/22/2022 1742 by Emily Perdue RN  Outcome: Ongoing     Problem: Non-Violent Restraints  Goal: No harm/injury to patient while restraints in use  1/22/2022 2049 by Florian Hart RN  Outcome: Ongoing  1/22/2022 1742 by Emily Perdue RN  Outcome: Ongoing     Problem: Non-Violent Restraints  Goal: Patient's dignity will be maintained  1/22/2022 2049 by Florian Hart RN  Outcome: Ongoing  1/22/2022 1742 by Emily Perdue RN  Outcome: Ongoing

## 2022-01-23 NOTE — PROGRESS NOTES
Pulmonary/Critical Care progress note    Patient's name:  Catherine Dudley  Medical Record Number: [id number]  Patient's account/billing number: [de-identified]  Patient's YOB: 1940  Age: [de-identified] y.o. Date of Admission: 1/8/2022  9:49 AM  Date of Consult: 1/23/2022      Primary Care Physician: MOHAMUD Jeronimo CNP      Code Status: Full Code    Reason for consult: Acute hypoxemic respiratory failure secondary to COVID-19 pneumonia with ARDS      HISTORY OF PRESENT ILLNESS:   History was obtained from chart review and the patient. Catherine Dudley is a [de-identified] y.o. white gentleman was admitted with increasing shortness of breath, cough, loss of appetite, nausea, vomiting and diarrhea of 7 to 10 days duration. Patient has not received COVID vaccination  Patient tested positive for COVID shortly after [place]. Patient found to be hypoxemic in the emergency room with tachypnea and rapid COVID test was positive  Radiologic imaging showed bilateral pulmonary infiltrates  Patient received Actemra and was also given Decadron  Patient continues to need high flow oxygen  On 45 L of oxygen at 80%  Afebrile  Hemodynamically stable    Interval history:    1/23/2022     Remains on the ventilator on 40% oxygen with a PEEP of 8  Having small tracheal secretions  Came off of the norepinephrine  Afebrile          Past Medical History:        Diagnosis Date    Chronic lower back pain     Cobalamin deficiency     CVA (cerebral vascular accident) ([address]     Diabetes mellitus ([address]     Hyperlipidemia     Hypertension     Malignant neoplasm of colon ([address]     PAD [facility]peripheral [facility]        Past Surgical History:        Procedure Laterality Date    ARTERY SURGERY         Allergies:    No Known Allergies      Home Meds:   Prior to Admission medications    Medication Sig Start Date End Date Taking?  Authorizing Provider   aspirin 325 MG tablet Take 81 mg by mouth daily  3/31/21  Yes Historical Provider, MD   atorvastatin (LIPITOR) 40 MG tablet Take 20 mg by mouth nightly 10/25/21  Yes Historical Provider, MD   lisinopril (PRINIVIL;ZESTRIL) 30 MG tablet Take 30 mg by mouth daily 10/25/21  Yes Historical Provider, MD   metoprolol tartrate (LOPRESSOR) 50 MG tablet Take 50 mg by mouth daily 10/25/21  Yes Historical Provider, MD   cilostazol (PLETAL) 100 MG tablet Take 100 mg by mouth 2 times daily 10/25/21   Historical Provider, MD   clopidogrel (PLAVIX) 75 MG tablet Take 75 mg by mouth daily 10/25/21   Historical Provider, MD   vitamin B-12 (CYANOCOBALAMIN) 1000 MCG tablet Take 1,000 mcg by mouth daily    Historical Provider, MD       Family History:   History reviewed. No pertinent family history. Social History:   TOBACCO:   reports that he has quit smoking. He has never used smokeless tobacco.  ETOH:   reports previous alcohol use. DRUGS:  reports no history of drug use. OCCUPATION:   Noncontributory          REVIEW OF SYSTEMS:    Review of Systems -   Could not be obtained secondary to the patient being intubated and sedated          Physical Exam:    Vitals: BP (!) 165/72   Pulse 74   Temp 98.4 °F (36.9 °C) (Bladder)   Resp 26 Comment: per vent  Ht 5' 10\" (1.778 m)   Wt 148 lb 2.4 oz (67.2 kg)   SpO2 100%   BMI 21.26 kg/m²     Last Body weight:   Wt Readings from Last 3 Encounters:   01/08/22 148 lb 2.4 oz (67.2 kg)       Body Mass Index : Body mass index is 21.26 kg/m².       Intake and Output summary:     Intake/Output Summary (Last 24 hours) at 1/23/2022 0805  Last data filed at 1/23/2022 2642  Gross per 24 hour   Intake 1356.6 ml   Output 900 ml   Net 456.6 ml       Physical Examination:   PHYSICAL EXAMINATION:  Vitals:    01/23/22 0302 01/23/22 0400 01/23/22 0500 01/23/22 0600   BP:       Pulse: 84 82 79 74   Resp: 27 23 17 26   Temp:  98.1 °F (36.7 °C)  98.4 °F (36.9 °C)   TempSrc:  Bladder  Bladder   SpO2: 96% 98% 99% 100%   Weight:       Height: Constitutional: This is a well developed, well nourished, 18.5-24.9 - Normal [de-identified]y.o. year old male who is in no apparent distress. Head:normocephalic and atraumatic. EENT:   ALEXANDREA. No conjunctival injections. Septum was midline, mucosa was without erythema, exudates or cobblestoning. No thrush was noted. Mallampati II (soft palate, uvula, fauces visible)  Neck: Supple without thyromegaly. No elevated JVP. Trachea was midline. Respiratory: Chest was symmetrical without dullness to percussion. Breath sounds bilaterally were clear to auscultation. There were no wheezes, rhonchi or rales. There is no intercostal retraction or use of accessory muscles. No egophony noted. Cardiovascular: Regular without murmur, clicks, gallops or rubs. Abdomen: Slightly rounded and soft without organomegaly. No rebound tenderness, rigidity or guarding was appreciated. Lymphatic: No lymphadenopathy. Musculoskeletal: Normal curvature of the spine. No gross muscle weakness. Extremities:  No lower extremity edema, ulcerations, tenderness, varicosities or erythema. No involuntary movements are noted. Skin:  Warm and dry. Good color, turgor and pigmentation. No lesions or scars. No cyanosis or clubbing  Neurological/Psychiatric: The patient's general behavior, level of consciousness, thought content and emotional status is normal.            Laboratory findings:-    CBC:   Recent Labs     01/23/22  0405   WBC 14.9*   HGB 9.1*   PLT 85*     BMP:    Recent Labs     01/21/22  0717 01/21/22  0717 01/22/22  0551 01/22/22  0551 01/23/22  0405      < > 140   < > 140   K 4.6   < > 4.9   < > 4.8      < > 108*   < > 109*   CO2 24   < > 21   < > 22   BUN 54*   < > 50*   < > 54*   CREATININE 2.45*  --  2.19*  --  2.32*   GLUCOSE 135*   < > 219*   < > 178*    < > = values in this interval not displayed. S. Calcium:  Recent Labs     01/23/22  0405   CALCIUM 7.9*     S.  Ionized Calcium:No results for input(s): IONCA in the last 72 hours. S. Magnesium:No results for input(s): MG in the last 72 hours. S. Phosphorus:  Recent Labs     01/22/22  0551   PHOS 5.3*     S. Glucose:  Recent Labs     01/22/22  2000 01/23/22  0008 01/23/22  0336   POCGLU 150* 137* 166*     Glycosylated hemoglobin A1C: No results for input(s): LABA1C in the last 72 hours. INR:   Recent Labs     01/22/22  0551   INR 1.1     Hepatic functions: No results for input(s): ALKPHOS, ALT, AST, PROT, BILITOT, BILIDIR, LABALBU in the last 72 hours. Pancreatic functions:No results for input(s): LACTA, AMYLASE in the last 72 hours. S. Lactic Acid: No results for input(s): LACTA in the last 72 hours. Cardiac enzymes:No results for input(s): CKTOTAL, CKMB, CKMBINDEX, TROPONINI in the last 72 hours. BNP:No results for input(s): BNP in the last 72 hours. Lipid profile: No results for input(s): CHOL, TRIG, HDL, LDL, LDLCALC in the last 72 hours. Blood Gases: No results found for: PH, PCO2, PO2, HCO3, O2SAT  Thyroid functions: No results found for: TSH         Microbiology:    Cultures during this admission:     Blood cultures:      [] None drawn      [x] Negative             []  Positive (Details:  )  Urine Culture:        [] None drawn      [x] Negative             []  Positive (Details:  )  Sputum Culture:   [] None drawn       [] Negative             []  Positive (Details:  )     SARS-CoV-2 antigen test was positive on 1/8/2022    Radiological reports:  XR CHEST PORTABLE    Result Date: 1/16/2022  Right IJ line in satisfactory position. Unchanged or slightly worse bilateral diffuse infiltrates consistent with the patient's history of COVID pneumonia. XR CHEST PORTABLE    Result Date: 1/14/2022  Patchy bilateral interstitial and ground-glass infiltrates most notably in the lung periphery.   Findings have progressed from the prior exam compatible with patient's history of COVID pneumonia     XR CHEST PORTABLE    Result Date: 1/11/2022  Unchanged bilateral airspace opacities     XR CHEST PORTABLE    Result Date: 1/9/2022  Scattered infiltrates consistent with pneumonia. CT CHEST PULMONARY EMBOLISM W CONTRAST    Result Date: 1/8/2022  *No evidence of pulmonary embolism. *Extensive ground-glass opacification of the bilateral lung fields with peripheral involvement slight apical as well as basilar sparing. Imaging features suggestive of COVID-19 pneumonia, though are nonspecific and can occur with a variety of infectious and noninfectious processes. CT CHEST ABDOMEN PELVIS WO CONTRAST    Result Date: 1/21/2022  1. New small bilateral pleural effusions with extensive bilateral airspace consolidation, increased from 01/08/2022. 2.  Decreased AP dimension of the distal trachea, suggesting tracheomalacia. 3.  Previously noted hematoma in the left gluteal musculature is not as well visualized on today's study, but appears overall slightly decreased in size. No new areas of hematoma. 4.  Mild distention of the stomach, mild prominence of proximal small bowel loops, and mild nonspecific stranding in the proximal mesentery. No definite evidence of high-grade bowel obstruction at this time. Enteritis or early developing obstruction are considered in the differential. RECOMMENDATIONS: Unavailable           Assessment and Plan       IMPRESSION:   1. COVID-19    2. Acute kidney injury ([address]    3. Elevated troponin    4.  Hypoxia        Principal Problem:    Pneumonia due to COVID-19 virus  Active Problems:    Essential hypertension    Hyperlipidemia    Acute hypoxemic respiratory failure due to COVID-19 [facility])    Acute kidney injury superimposed on chronic kidney disease ([address] baseline creatinine 1.9    Stage 3b chronic kidney disease (HCC)    Elevated troponin    Nonrheumatic aortic valve stenosis    PAD (peripheral artery disease) (HCC)    Moderate protein-calorie malnutrition (HCC)    Atrial fibrillation with RVR ([facility]    COVID-19    Traumatic hematoma of buttock    Acute distention of stomach  Resolved Problems:    * No resolved hospital problems. *  Tracheomalacia  Type 2 diabetes mellitus with blood sugars less than 200  Previous history of cigarette smoking  Amiodarone use  Acute kidney injury, slightly worse  Acute hypoxemic and hypercarbic respiratory failure  Worsening bilateral infiltrates? Bacterial pneumonia  Acute respiratory acidosis, resolved  Troponin elevation  Leukocytosis. Significantly better than yesterday  Shock, septic and possible adrenal insufficiency, resolved  Anemia, stable  Thrombocytopenia, though worse than yesterday, is almost at baseline          Plan:     Fluid balance-negative fluid balance of 1.4 L   Continue Airborne isolation   Continue ARDS ventilation   Chest x-ray and ABG to be obtained as needed   Tracheobronchial toilet   Keep oxygen saturation 88 to 90% as the patient is on amiodarone   Monitor input/output, with a goal of even/negative fluid balance   Monitor CRP, LDH, AST/ALT/ferritin/ D-dimer   Continue supportive care   GI/DVT prophylaxis-Protonix and heparin   Glycemic control per primary service   Nutrition-tube feeding   Patient is on meropenem for pneumonia? Bacterial infection   Tracheal aspirate for gram stain and culture   Completed Actemra and Decadron   CODE STATUS is DNR CCA   On stress dose corticosteroids   Discussed with the patient's care team and also Dr. Christo Ingram    Management as per guidance provided by hospital policy and prevailing evidence based medicine, during the COVID-19 pandemic emergency. This patient was evaluated in the context of the global SARS-CoV-2 (COVID-19) pandemic, which necessitated considerations that the patient either has COVID-19 infection or is at risk of infection with COVID-19.  Institutional protocols and algorithms that pertain to the evaluation & management of patients with COVID-19 or those at risk for COVID-19 are in a state of rapid changes based on information released by regulatory bodies including the CDC and federal and state organizations. These policies and algorithms were followed during the patient's care. Please note that this chart was generated using voice recognition Dragon dictation software. Although every effort was made to ensure the accuracy of this automated transcription, some errors in transcription may have occurred. Thank you for having us involved in the care of your patient. Please call us if you have any questions or concerns. Total critical care time caring for this patient with life threatening, unstable organ failure, including direct patient contact, management of life support systems, review of data including imaging and labs, discussions with other team members and physicians at least 27  Min so far today, excluding procedures.       Claudio Oropeza MD, M.D.            1/23/2022, 8:05 AM

## 2022-01-23 NOTE — PLAN OF CARE
Problem: Pain:  Goal: Pain level will decrease  Description: Pain level will decrease  1/23/2022 1641 by Yosi Duong RN  Outcome: Met This Shift  Goal: Control of acute pain  Description: Control of acute pain  1/23/2022 0638 by Yosi Duong RN  Outcome: Met This Shift  Goal: Control of chronic pain  Description: Control of chronic pain  1/23/2022 0638 by Yosi Duong RN  Outcome: Met This Shift

## 2022-01-23 NOTE — PLAN OF CARE
Problem: Pain:  Goal: Pain level will decrease  Description: Pain level will decrease  Outcome: Met This Shift  Goal: Control of acute pain  Description: Control of acute pain  Outcome: Met This Shift  Goal: Control of chronic pain  Description: Control of chronic pain  Outcome: Met This Shift     Problem: MECHANICAL VENTILATION  Goal: Patient will maintain patent airway  1/23/2022 0638 by David Yadav RN  Outcome: Met This Shift  1/22/2022 2206 by Tiffani Soto RCP  Outcome: Ongoing  Goal: Oral health is maintained or improved  1/23/2022 0638 by David Yadav RN  Outcome: Met This Shift  1/22/2022 2206 by Tiffani Soto RCP  Outcome: Ongoing  Goal: ET tube will be managed safely  1/23/2022 0638 by David Yadav RN  Outcome: Met This Shift  1/22/2022 2206 by Tiffani Soto RCP  Outcome: Ongoing  Goal: Ability to express needs and understand communication  1/23/2022 0638 by David Yadav RN  Outcome: Met This Shift  1/22/2022 2206 by Tiffani Soto RCP  Outcome: Ongoing

## 2022-01-24 NOTE — PLAN OF CARE
Problem: Falls - Risk of:  Goal: Will remain free from falls  Description: Will remain free from falls  1/24/2022 0744 by Collette Goodwin RN  Outcome: Ongoing  1/24/2022 0635 by Millie Barbosa RN  Outcome: Ongoing  Goal: Absence of physical injury  Description: Absence of physical injury  1/24/2022 0744 by Collette Goodwin RN  Outcome: Ongoing  1/24/2022 0635 by Millie Barbosa RN  Outcome: Ongoing     Problem: Airway Clearance - Ineffective  Goal: Achieve or maintain patent airway  1/24/2022 0744 by Collette Goodwin RN  Outcome: Ongoing  1/23/2022 2123 by Rosie Astorga RCP  Outcome: Ongoing     Problem: Gas Exchange - Impaired  Goal: Absence of hypoxia  1/24/2022 0744 by Collette Goodwin RN  Outcome: Ongoing  1/24/2022 0635 by Millie Barbosa RN  Outcome: Ongoing  1/23/2022 2123 by Rosie Astorga RCP  Outcome: Ongoing  Goal: Promote optimal lung function  1/24/2022 0744 by Collette Goodwin RN  Outcome: Ongoing  1/24/2022 0635 by Millie Barbosa RN  Outcome: Ongoing  1/23/2022 2123 by Rosie Astorga RCP  Outcome: Ongoing     Problem: Breathing Pattern - Ineffective  Goal: Ability to achieve and maintain a regular respiratory rate  1/24/2022 0744 by Collette Goodwin RN  Outcome: Ongoing  1/24/2022 0635 by Millie Barbosa RN  Outcome: Ongoing  1/23/2022 2123 by Rosie Astorga RCP  Outcome: Ongoing     Problem:  Body Temperature -  Risk of, Imbalanced  Goal: Ability to maintain a body temperature within defined limits  Outcome: Ongoing  Goal: Will regain or maintain usual level of consciousness  Outcome: Ongoing  Goal: Complications related to the disease process, condition or treatment will be avoided or minimized  Outcome: Ongoing     Problem: Isolation Precautions - Risk of Spread of Infection  Goal: Prevent transmission of infection  Outcome: Ongoing     Problem: Nutrition Deficits  Goal: Optimize nutritional status  Outcome: Ongoing     Problem: Risk for Fluid Volume Deficit  Goal: Maintain normal heart rhythm  Outcome: Ongoing  Goal: Maintain absence of muscle cramping  Outcome: Ongoing  Goal: Maintain normal serum potassium, sodium, calcium, phosphorus, and pH  Outcome: Ongoing     Problem: Loneliness or Risk for Loneliness  Goal: Demonstrate positive use of time alone when socialization is not possible  1/24/2022 0744 by Carisa Perdue RN  Outcome: Ongoing  1/24/2022 0635 by Imelda Garcia RN  Outcome: Ongoing     Problem: Fatigue  Goal: Verbalize increase energy and improved vitality  1/24/2022 0744 by Carisa Perdue RN  Outcome: Ongoing  1/24/2022 0635 by Imelda Garcia RN  Outcome: Ongoing     Problem: Patient Education: Go to Patient Education Activity  Goal: Patient/Family Education  Outcome: Ongoing     Problem: Skin Integrity:  Goal: Will show no infection signs and symptoms  Description: Will show no infection signs and symptoms  1/24/2022 0744 by Carisa Perdue RN  Outcome: Ongoing  1/24/2022 0635 by Imelda Garcia RN  Outcome: Ongoing  Goal: Absence of new skin breakdown  Description: Absence of new skin breakdown  1/24/2022 0744 by Carisa Perdue RN  Outcome: Ongoing  1/24/2022 0635 by Imelda Garcia RN  Outcome: Ongoing     Problem: Nutrition  Goal: Optimal nutrition therapy  1/24/2022 0744 by Carisa Perdue RN  Outcome: Ongoing  1/24/2022 0635 by Imelda Garcia RN  Outcome: Ongoing     Problem: Pain:  Description: Pain management should include both nonpharmacologic and pharmacologic interventions.   Goal: Pain level will decrease  Description: Pain level will decrease  1/24/2022 0744 by Carisa Perdue RN  Outcome: Ongoing  1/24/2022 7451 by Imelda Garcia RN  Outcome: Ongoing  Goal: Control of acute pain  Description: Control of acute pain  1/24/2022 0744 by Carisa Perdue RN  Outcome: Ongoing  1/24/2022 0635 by Imelda Garcia RN  Outcome: Ongoing  Goal: Control of chronic pain  Description: Control of chronic pain  1/24/2022 0744 by Maria Antonia Gonzales RN  Outcome: Ongoing  1/24/2022 9057 by Romana Blacksmith, RN  Outcome: Ongoing     Problem: OXYGENATION/RESPIRATORY FUNCTION  Goal: Patient will maintain patent airway  1/24/2022 0744 by Maria Antonia Gonzaels RN  Outcome: Ongoing  1/23/2022 2123 by Roxana Thompson RCP  Outcome: Ongoing  Goal: Patient will achieve/maintain normal respiratory rate/effort  Description: Respiratory rate and effort will be within normal limits for the patient  1/24/2022 0744 by Maria Antonia Gonzales RN  Outcome: Ongoing  1/23/2022 2123 by Roxana Thompson RCP  Outcome: Ongoing     Problem: MECHANICAL VENTILATION  Goal: Patient will maintain patent airway  1/24/2022 0744 by Maria Antonia Gonzales RN  Outcome: Ongoing  1/24/2022 0635 by Romana Blacksmith, RN  Outcome: Ongoing  1/23/2022 2123 by Roxana Thompson RCP  Outcome: Ongoing  Goal: Oral health is maintained or improved  1/24/2022 0744 by Maria Antonia Gonzales RN  Outcome: Ongoing  1/24/2022 0635 by Romana Blacksmith, RN  Outcome: Ongoing  1/23/2022 2123 by Roxana Thompson RCP  Outcome: Ongoing  Goal: ET tube will be managed safely  1/24/2022 0744 by Maria Antonia Gonzales RN  Outcome: Ongoing  1/24/2022 0635 by Romana Blacksmith, RN  Outcome: Ongoing  1/23/2022 2123 by Roxana Thompson RCP  Outcome: Ongoing  Goal: Ability to express needs and understand communication  1/24/2022 0744 by Maria Antonia Gonzales RN  Outcome: Ongoing  1/24/2022 0635 by Romana Blacksmith, RN  Outcome: Ongoing  1/23/2022 2123 by Roxana Thompson RCP  Outcome: Ongoing  Goal: Mobility/activity is maintained at optimum level for patient  1/24/2022 0744 by Maria Antonia Gonzales RN  Outcome: Ongoing  1/24/2022 0635 by Romana Blacksmith, RN  Outcome: Ongoing  1/23/2022 2123 by Roxana Thompson RCP  Outcome: Ongoing     Problem: SKIN INTEGRITY  Goal: Skin integrity is maintained or improved  1/24/2022 0744 by Maria Antonia Gonzales RN  Outcome: Ongoing  1/24/2022 4190 by Paula Gamez RN  Outcome: Met This Shift  1/23/2022 2123 by Helen Alvarez RCP  Outcome: Ongoing     Problem: Non-Violent Restraints  Goal: Removal from restraints as soon as assessed to be safe  1/24/2022 0744 by Radha Hassan RN  Outcome: Ongoing  1/24/2022 0635 by Paula Gamez RN  Outcome: Met This Shift  1/23/2022 2051 by Paula Gamez RN  Outcome: Ongoing  Goal: No harm/injury to patient while restraints in use  1/24/2022 0744 by Radha Hassan RN  Outcome: Ongoing  1/24/2022 0635 by Paula Gamez RN  Outcome: Met This Shift  1/23/2022 2051 by Paula Gamez RN  Outcome: Ongoing  Goal: Patient's dignity will be maintained  1/24/2022 0744 by Radha Hassan RN  Outcome: Ongoing  1/24/2022 0635 by Paula Gamez RN  Outcome: Met This Shift  1/23/2022 2051 by Paula Gamez RN  Outcome: Ongoing

## 2022-01-24 NOTE — PROGRESS NOTES
Comprehensive Nutrition Assessment    Type and Reason for Visit:  Reassess    Nutrition Recommendations/Plan:   - Continue Renal TF at 35 mL/hr + 1 protein modular per day. Monitor TF tolerance/adequacy of intakes - modify as needed. - If alternative TF formula requested, suggest Peptide Based at goal rate of 60 mL/hr.    - Will monitor labs, weights, and plan of care. Nutrition Assessment:  Pt remains on the vent. Renal TF running at goal rate of 35 mL/hr + 1 protein modular per day - RN reports pt tolerating feedings without issues. Noted per chart review, pt with residuals up to 350 mL overnight and TF was held. Last BM 1/24. Labs reviewed: BUN 63 mg/dL, CR 2.53 mg/dL, Glucose  mg/dL. Meds reviewed. Malnutrition Assessment:  Malnutrition Status:  Insufficient data    Context:  Acute Illness     Findings of the 6 clinical characteristics of malnutrition:  Energy Intake:  7 - 50% or less of estimated energy requirements for 5 or more days  Weight Loss:  Unable to assess (No weight history avaliable per chart review.)     Body Fat Loss:  Unable to assess (Unable to see pt in room.)   Muscle Mass Loss:  Unable to assess  Fluid Accumulation:  1 - Mild (to Severe) Extremities,Generalized   Strength:  Not Performed    Estimated Daily Nutrient Needs:  Energy (kcal):  22-25 kcal/kg = 3235-3020 kcals/day; Weight Used for Energy Requirements:  Current     Protein (g):  1.2-1.5 gm /kg =  gm pro/day; Weight Used for Protein Requirements:  Current     Fluid (ml/day):  25 mL/kg = 1700 mL/day or per MD; Method Used for Fluid Requirements:  ml/Kg      Nutrition Related Findings:  Labs/Meds reviewed. Last BM 1/24. Wounds:   (Left gluteal hematoma.)       Current Nutrition Therapies:    ADULT TUBE FEEDING; Orogastric; Renal Formula; Continuous; 10; Yes; 10; Q 4 hours; 35; 30;  Other (specify); per MD; Protein; 1 Protein modular per day  Current Tube Feeding (TF) Orders:  · Feeding Route: Orogastric  · Formula: Renal Formula  · Schedule: Continuous  · Additives/Modulars: Protein (x 1 per day)  · Water Flushes: per MD  · Current TF & Flush Orders Provides: Renal at 35 mL/hr + 1 protein modular daily = 1616 kcals, 94 gm pro/day  · Goal TF & Flush Orders Provides: Renal at 35 mL/hr + 1 protein modular daily = 1616 kcals, 94 gm pro/day    Additional Calorie Sources:   None. Anthropometric Measures:  · Height: 5' 10\" (177.8 cm)  · Current Body Weight: 148 lb 2.4 oz (67.2 kg)   · Admission Body Weight: 148 lb 2.4 oz (67.2 kg)    · Usual Body Weight: 150 lb (68 kg)     · Ideal Body Weight: 166 lbs; % Ideal Body Weight 89.2 %   · BMI: 21.3  · BMI Categories: Normal Weight (BMI 18.5-24. 9)       Nutrition Diagnosis:   · Inadequate oral intake related to impaired respiratory function as evidenced by NPO or clear liquid status due to medical condition,nutrition support - enteral nutrition    Nutrition Interventions:   Food and/or Nutrient Delivery:  Continue NPO,Continue Current Tube Feeding  Nutrition Education/Counseling:  No recommendation at this time   Coordination of Nutrition Care:  Continue to monitor while inpatient    Goals:  Meet % of estimated nutrition needs with nutrition support/oral diet. Nutrition Monitoring and Evaluation:   Behavioral-Environmental Outcomes:  None Identified   Food/Nutrient Intake Outcomes:  Enteral Nutrition Intake/Tolerance  Physical Signs/Symptoms Outcomes:  Biochemical Data,GI Status,Fluid Status or Edema,Hemodynamic Status,Nutrition Focused Physical Findings,Skin,Weight     Discharge Planning:     Too soon to determine     Electronically signed by Macey Mendez RD, LD on 1/24/22 at 2:58 PM EST    Contact: [phone]

## 2022-01-24 NOTE — PROGRESS NOTES
Progress Note    Patient - Hilario Antoine  Date of Admission -  2022  9:49 AM  Date of Evaluation -  2022  Room and Bed Number -  7739/7626-33   Hospital Day - 16    CHIEF COMPLAINT : ACUTE HYPOXIC RESPIRATORY FAILURE DUE TO COVID -19 PNEUMONIA   SUBJECTIVE:     OVERNIGHT EVENTS:         Patient is on mechanical ventilation he underwent spontaneous breathing trial for 2 hours but then became tachypneic and desaturated.   He is full code        SECRETIONS  -Amount:  [x] Small [] Moderate  [] Large    [] None  Color:     [x] White [] Colored  [] Bloody    SEDATION:    [x] Propofol gtt  [x] Versed gtt  [x] FENTANYL  gtt  gtt   [] No Sedation    PARALYZED:  [x] No    [] Yes    VASOPRESSORS:  [x] No    [] Yes  [] Levophed [] Dopamine [] Vasopressin  [] Dobutamine [] Phenylephrine [] Epinephrine    INHALED NITRIC OXIDE : [x] No    [] Yes    PRONE :       [x] No    [] Yes    Actemra:             [] No    [x] Yes    DEXAMETHASONE : [] No    [x] Yes      Ros unable to perform due to intubation and sedation    OBJECTIVE:     VITAL SIGNS:  BP (!) 165/72   Pulse 74   Temp 97.7 °F (36.5 °C)   Resp 9   Ht 5' 10\" (1.778 m)   Wt 148 lb 2.4 oz (67.2 kg)   SpO2 95%   BMI 21.26 kg/m²   Tmax over 24 hours:  Temp (24hrs), Av.5 °F (36.4 °C), Min:96.6 °F (35.9 °C), Max:98.4 °F (36.9 °C)      Patient Vitals for the past 8 hrs:   Temp Temp src Pulse Resp SpO2   22 1300 97.7 °F (36.5 °C)  74 9 95 %   22 1200 97.5 °F (36.4 °C) Bladder 104 18 91 %   22 1135   99 18 (!) 87 %   22 1100   84 16 90 %   22 1000 97.2 °F (36.2 °C) Bladder 67 13 91 %   22 0912   61 13 94 %   22 0900 97 °F (36.1 °C)  64 13 94 %   22 0826   57 23 99 %   01/24/22 0800 97 °F (36.1 °C) Bladder 55 18 99 %   22 0700 96.8 °F (36 °C)  57 13 100 %   22 0600   59 24 99 %         Intake/Output Summary (Last 24 hours) at 2022 1335  Last data filed at 2022 1300  Gross per 24 hour Intake 1204.85 ml   Output 1132 ml   Net 72.85 ml     Date 01/24/22 0000 - 01/24/22 2359   Shift 4965-3424 1977-7555 9121-8585 24 Hour Total   INTAKE   NG/GT(mL/kg) 140(2.1) 250(3.7)  390(5.8)   Shift Total(mL/kg) 140(2.1) 250(3.7)  390(5.8)   OUTPUT   Urine(mL/kg/hr) 220(0.4) 337  557   Shift Total(mL/kg) 220(3.3) 337(5)  557(8.3)   Weight (kg) 67.2 67.2 67.2 67.2     Wt Readings from Last 3 Encounters:   01/08/22 148 lb 2.4 oz (67.2 kg)     Body mass index is 21.26 kg/m².         PHYSICAL EXAM:  Sedated  Lungs decreased breath sounds bilaterally  CVS regular S1-S2  Abdomen nontender bowel sounds are present  Lower extremity edema and upper extremity edema    MEDICATIONS:  Scheduled Meds:   insulin lispro  0-6 Units SubCUTAneous Q4H    insulin lispro  0-3 Units SubCUTAneous Nightly    metoprolol tartrate  50 mg Oral BID    [START ON 1/25/2022] hydrocortisone sodium succinate PF  100 mg IntraVENous Daily    pantoprazole  40 mg IntraVENous Daily    And    sodium chloride (PF)  10 mL IntraVENous Daily    heparin (porcine)  5,000 Units SubCUTAneous 3 times per day    nitroGLYCERIN  0.4 mg SubLINGual Once    sodium bicarbonate  650 mg Oral BID    meropenem  500 mg IntraVENous Q12H    ferrous sulfate  325 mg Oral Daily with breakfast    guaiFENesin  600 mg Oral BID    lidocaine 1 % injection  5 mL IntraDERmal Once    sodium chloride flush  5-40 mL IntraVENous 2 times per day    busPIRone  10 mg Oral TID    [Held by provider] insulin glargine  20 Units SubCUTAneous QAM    senna  2 tablet Oral Nightly    amiodarone  200 mg Oral Daily    atorvastatin  20 mg Oral Nightly    clopidogrel  75 mg Oral Daily    vitamin B-12  1,000 mcg Oral Daily    aspirin  81 mg Oral Daily    sodium chloride flush  5-40 mL IntraVENous 2 times per day     Continuous Infusions:   fentaNYL 100 mcg/hr (01/24/22 0930)    propofol 10 mcg/kg/min (01/24/22 1215)    norepinephrine Stopped (01/23/22 0000)    midazolam 5 mg/hr (22 1200)    sodium chloride      sodium chloride       IV fluid builder Stopped (22 0410)    dextrose 100 mL/hr (22 0010)    sodium chloride       PRN Meds:   hydrALAZINE, 10 mg, Q6H PRN  sodium chloride, , PRN  sodium chloride flush, 5-40 mL, PRN  sodium chloride, 25 mL, PRN  melatonin, 3 mg, Nightly PRN  sodium chloride, 1 spray, PRN  LORazepam, 0.5 mg, Q2H PRN  glucose, 15 g, PRN  dextrose, 12.5 g, PRN  glucagon (rDNA), 1 mg, PRN  dextrose, 100 mL/hr, PRN  metoprolol, 5 mg, Q6H PRN  benzonatate, 200 mg, TID PRN  sodium chloride flush, 10 mL, PRN  sodium chloride flush, 5-40 mL, PRN  sodium chloride, 25 mL, PRN  ondansetron, 4 mg, Q8H PRN   Or  ondansetron, 4 mg, Q6H PRN  polyethylene glycol, 17 g, Daily PRN  acetaminophen, 650 mg, Q6H PRN   Or  acetaminophen, 650 mg, Q6H PRN        SUPPORT DEVICES: [x] Ventilator [] BIPAP  [] Nasal Cannula [] Room Air      ABGs:     Lab Results   Component Value Date    RYI3DWT NOT REPORTED 2022    FIO2 40.0 2022       DATA:  Complete Blood Count:   Recent Labs     22  0551 22  0405 22  0457   WBC 34.5* 14.9* 12.4*   RBC 3.58* 2.89* 2.62*   HGB 10.9* 9.1* 8.2*   HCT 34.1* 27.4* 25.6*   MCV 95.3 94.8 97.7   MCH 30.4 31.5 31.3   MCHC 32.0 33.2 32.0   RDW 17.2* 17.3* 17.7*   * 85* 89*   MPV 10.6 10.9 11.1        Last 3 Blood Glucose:   Recent Labs     22  0551 22  0405 22  0457   GLUCOSE 219* 178* 148*        PT/INR:    Lab Results   Component Value Date    PROTIME 11.6 2022    INR 1.1 2022     PTT:    Lab Results   Component Value Date    APTT 25.2 2022       Comprehensive Metabolic Profile:   Recent Labs     22  0551 22  0405 22  0457    140 144   K 4.9 4.8 4.1   * 109* 111*   CO2    BUN 50* 54* 63*   CREATININE 2.19* 2.32* 2.53*   GLUCOSE 219* 178* 148*   CALCIUM 7.8* 7.9* 8.1*      Magnesium:   Lab Results   Component Value Date    MG 2.6 01/15/2022    MG 2.6 01/14/2022    MG 2.3 01/10/2022     Phosphorus:   Lab Results   Component Value Date    PHOS 5.3 01/22/2022     Ionized Calcium:   Lab Results   Component Value Date    CAION 1.21 01/22/2022        Urinalysis:   Lab Results   Component Value Date    NITRU NEGATIVE 01/17/2022    COLORU Yellow 01/17/2022    PHUR 5.0 01/17/2022    WBCUA None 01/17/2022    RBCUA 2 TO 5 01/17/2022    MUCUS 1+ 01/17/2022    TRICHOMONAS NOT REPORTED 01/17/2022    YEAST NOT REPORTED 01/17/2022    BACTERIA NOT REPORTED 01/17/2022    SPECGRAV 1.018 01/17/2022    LEUKOCYTESUR NEGATIVE 01/17/2022    UROBILINOGEN Normal 01/17/2022    BILIRUBINUR NEGATIVE 01/17/2022    GLUCOSEU 1+ 01/17/2022    KETUA NEGATIVE 01/17/2022    AMORPHOUS NOT REPORTED 01/17/2022               XR CHEST PORTABLE    Result Date: 1/22/2022  1. The endotracheal tube tip is located 5.3 cm above the zheng. 2. Worsening bilateral lung infiltrates, compatible with pneumonia, including COVID. 3. Small bilateral pleural effusions. XR ABDOMEN FOR NG/OG/NE TUBE PLACEMENT    Result Date: 1/22/2022  1. The orogastric tube tip and side port are noted in the gastric fundus. CT CHEST ABDOMEN PELVIS WO CONTRAST    Result Date: 1/21/2022  1. New small bilateral pleural effusions with extensive bilateral airspace consolidation, increased from 01/08/2022. 2.  Decreased AP dimension of the distal trachea, suggesting tracheomalacia. 3.  Previously noted hematoma in the left gluteal musculature is not as well visualized on today's study, but appears overall slightly decreased in size. No new areas of hematoma. 4.  Mild distention of the stomach, mild prominence of proximal small bowel loops, and mild nonspecific stranding in the proximal mesentery. No definite evidence of high-grade bowel obstruction at this time.   Enteritis or early developing obstruction are considered in the differential. RECOMMENDATIONS: Unavailable            VENTILATOR SETTINGS:  Vent Information  $Ventilation: $Subsequent Day  Skin Assessment: Clean, dry, & intact  Suction Catheter Diameter: 14  Equipment Changed: HME  Vent Type: Servo i  Vent Mode: (S) PRVC (pt WOB increasing and sat desatting)  Vt Ordered: 510 mL  Rate Set: 18 bmp  Pressure Support: 8 cmH20  FiO2 : 40 %  SpO2: 95 %  SpO2/FiO2 ratio: 227.5  Sensitivity: 5  PEEP/CPAP: 5  I Time/ I Time %: 0.9 s  Humidification Source: HME  Humidification Temp: 31  Humidification Temp Measured: 31  Nitric Oxide/Epoprostenol In Use?: No  Mask Type: Full face mask  Mask Size: Medium     PaO2/FiO2 RATIO:  Recent Labs     01/24/22  0610   POCPO2 86.2      FiO2 : 40 %       LABS:  ABGs:   Recent Labs     01/22/22  0037 01/22/22  0607 01/22/22  1724 01/23/22  0433 01/24/22  0610   POCPH 7.502* 7.112* 7.404 7.405 7. 12   POCPCO2 31.0* 79.9* 35.3 37.8 24.9*   POCPO2 49.3* 71.5* 141.3* 61.3* 86.2   POCHCO3 24.3 25.5 22.1 23.7 15.5*   VMCA6HRS 88* 86* 99* 91* 97            ASSESSMENT:     Principal Problem:    Pneumonia due to COVID-19 virus  Active Problems:    Essential hypertension    Hyperlipidemia    Acute hypoxemic respiratory failure due to COVID-19 [facility])    Acute kidney injury superimposed on chronic kidney disease ([facility]), baseline creatinine 1.9    Stage 3b chronic kidney disease ([facility])    Elevated troponin    Nonrheumatic aortic valve stenosis    PAD (peripheral artery disease) ([facility])    Moderate protein-calorie malnutrition ([facility])    Atrial fibrillation with RVR ([facility])    COVID-19    Traumatic hematoma of buttock    Acute distention of stomach  Resolved Problems:    * No resolved hospital problems. *              Acute hypoxic respiratory failure secondary to COVID 19   Acute respiratory distress syndrome   Bilateral multifocal pneumonia due to COVID 19 infection   Covid -19 pandemic emergency     LOS: 16  PLAN:    D/w RT  D/w RN   Vent setting reviewed .   Patient underwent spontaneous breathing trial for approximately 2 hours and then became tachypneic and desaturated. Continue spontaneous breathing trial twice a day. Sedation reviewed, wean off Versed and start propofol and continue fentanyl. Airborne isolation and droplet precautions to be continued  Continue supportive care   Cont treatment with medications for COVID  Cont tube feeding   wean peep and fio2 - keep sats >90 %   Monitor endotracheal secretions   Will obtain xray chest and ABG as needed  Antibiotics per ID patient is on meropenem for gram-negative rods and respiratory cultures  Remove central line  Levophed weaned off  Decrease hydrocortisone tomorrow  He is full code        Treatment plan Discussed with nursing staff in detail , all questions answered . Total critical care time caring for this patient with life threatening, unstable organ failure, including direct patient contact, management of life support systems, review of data including imaging and labs, discussions with other team members and physicians at least 28  Min so far today, excluding procedures. Electronically signed by Dave Ruiz MD on 1/24/2022 at 1:35 PM       This patient was evaluated in the context of the global SARS-CoV-2 (COVID-19) pandemic, which necessitated considerations that the patient either has COVID-19 infection or is at risk of infection with COVID-19. Institutional protocols and algorithms that pertain to the evaluation & management of patients with COVID-19 or those at risk for COVID-19 are in a state of rapid changes based on information released by regulatory bodies including the CDC and federal and state organizations. These policies and algorithms were followed during the patient's care. Please note that this chart was generated using voice recognition Dragon dictation software. Although every effort was made to ensure the accuracy of this automated transcription, some errors in transcription may have occurred.

## 2022-01-24 NOTE — PROGRESS NOTES
Renal Progress Note    Patient :  Bryon Santacruz; [de-identified] y.o. MRN# [id number]  Location:  2642/8185-67  Attending:  Bianca Patricia MD  Admit Date:  1/8/2022   Hospital Day: 16      Subjective: Following for JULES/ATN on CKD III with baseline 1.8-2.0, peaked at 3.0 in setting of contrast exposure and severe anemia from spontaneous left gluteal area hematoma; admitted with COVID. Remains Intubated FIO2 30% improving with weaning of 2 hours earlier today. He is sedated on the ventilator. Labs continued to worsen with evolving renal failure. ABG shows pH 7.4 with PCO2 25 and bicarbonate 15 with PO2 of 86. Sodium is 144 potassium 4.1 chloride 111 CO2 23 with creatinine 63 and creatinine 2.53 and calcium 8.1. I/O is 1427 ml and 920 ml. Restarted on Meropenem per ID for increasing lung infiltrates. Chest x-ray shows worsening bilateral lung infiltrates compatible with pneumonia and small bilateral pleural effusions.     Outpatient Medications:     Medications Prior to Admission: aspirin 325 MG tablet, Take 81 mg by mouth daily   atorvastatin (LIPITOR) 40 MG tablet, Take 20 mg by mouth nightly  lisinopril (PRINIVIL;ZESTRIL) 30 MG tablet, Take 30 mg by mouth daily  metoprolol tartrate (LOPRESSOR) 50 MG tablet, Take 50 mg by mouth daily  cilostazol (PLETAL) 100 MG tablet, Take 100 mg by mouth 2 times daily  clopidogrel (PLAVIX) 75 MG tablet, Take 75 mg by mouth daily  vitamin B-12 (CYANOCOBALAMIN) 1000 MCG tablet, Take 1,000 mcg by mouth daily    Current Medications:     Scheduled Meds:    insulin lispro  0-6 Units SubCUTAneous Q4H    insulin lispro  0-3 Units SubCUTAneous Nightly    metoprolol tartrate  50 mg Oral BID    [START ON 1/25/2022] hydrocortisone sodium succinate PF  100 mg IntraVENous Daily    pantoprazole  40 mg IntraVENous Daily    And    sodium chloride (PF)  10 mL IntraVENous Daily    heparin (porcine)  5,000 Units SubCUTAneous 3 times per day    nitroGLYCERIN  0.4 mg SubLINGual Once    sodium bicarbonate  650 mg Oral BID    meropenem  500 mg IntraVENous Q12H    ferrous sulfate  325 mg Oral Daily with breakfast    guaiFENesin  600 mg Oral BID    lidocaine 1 % injection  5 mL IntraDERmal Once    sodium chloride flush  5-40 mL IntraVENous 2 times per day    busPIRone  10 mg Oral TID    [Held by provider] insulin glargine  20 Units SubCUTAneous QAM    senna  2 tablet Oral Nightly    amiodarone  200 mg Oral Daily    atorvastatin  20 mg Oral Nightly    clopidogrel  75 mg Oral Daily    vitamin B-12  1,000 mcg Oral Daily    aspirin  81 mg Oral Daily    sodium chloride flush  5-40 mL IntraVENous 2 times per day     Continuous Infusions:    fentaNYL 100 mcg/hr (22 0930)    propofol 10 mcg/kg/min (22 1215)    norepinephrine Stopped (22 0000)    midazolam 5 mg/hr (22 1200)    sodium chloride      sodium chloride       IV fluid builder Stopped (22 0410)    dextrose 100 mL/hr (22 0010)    sodium chloride       PRN Meds:  hydrALAZINE, sodium chloride, sodium chloride flush, sodium chloride, melatonin, sodium chloride, LORazepam, glucose, dextrose, glucagon (rDNA), dextrose, metoprolol, benzonatate, sodium chloride flush, sodium chloride flush, sodium chloride, ondansetron **OR** ondansetron, polyethylene glycol, acetaminophen **OR** acetaminophen    Input/Output:       I/O last 3 completed shifts: In: 2180.5 [I.V.:518.2; NG/GT:1136; IV Piggyback:526.2]  Out: 9896 [Urine:1495]. No data found. Vital Signs:   Temperature:  Temp: 96.6 °F (35.9 °C)  TMax:   Temp (24hrs), Av.8 °F (36.6 °C), Min:96.6 °F (35.9 °C), Max:98.4 °F (36.9 °C)    Respirations:  Resp: 18  Pulse:   Pulse: 104  BP:    BP: (!) 165/72  BP Range: No data recorded. No data recorded.       Physical Examination:     General:  Intubated, sedated  Neck:   No JVD, no accessory muscle use, midline trachea  Chest:              Bilateral air entry with coarse bilateral breath sounds  Cardiac:  Regular rate and rhythm positive S1 and S2 and positive murmur  Abdomen: Soft, mildly distended, active bowel sounds   SKIN:  No rashes, good skin turgor. Extremities:  No edema, palpable peripheral pulses     Labs:       Recent Labs     01/22/22  0551 01/23/22  0405 01/24/22  0457   WBC 34.5* 14.9* 12.4*   RBC 3.58* 2.89* 2.62*   HGB 10.9* 9.1* 8.2*   HCT 34.1* 27.4* 25.6*   MCV 95.3 94.8 97.7   MCH 30.4 31.5 31.3   MCHC 32.0 33.2 32.0   RDW 17.2* 17.3* 17.7*   * 85* 89*   MPV 10.6 10.9 11.1      BMP:   Recent Labs     01/22/22  0551 01/23/22  0405 01/24/22  0457    140 144   K 4.9 4.8 4.1   * 109* 111*   CO2 21 22 23   BUN 50* 54* 63*   CREATININE 2.19* 2.32* 2.53*   GLUCOSE 219* 178* 148*   CALCIUM 7.8* 7.9* 8.1*      Phosphorus:     Recent Labs     01/22/22  0551   PHOS 5.3*     SUMMER:      Lab Results   Component Value Date    SUMMER NEGATIVE 01/09/2022     SPEP:  Lab Results   Component Value Date    PROT 6.1 01/09/2022     C3:     Lab Results   Component Value Date    C3 134 01/09/2022     C4:     Lab Results   Component Value Date    C4 31 01/09/2022       Urinalysis/Chemistries:      Lab Results   Component Value Date    NITRU NEGATIVE 01/17/2022    COLORU Yellow 01/17/2022    PHUR 5.0 01/17/2022    WBCUA None 01/17/2022    RBCUA 2 TO 5 01/17/2022    MUCUS 1+ 01/17/2022    TRICHOMONAS NOT REPORTED 01/17/2022    YEAST NOT REPORTED 01/17/2022    BACTERIA NOT REPORTED 01/17/2022    SPECGRAV 1.018 01/17/2022    LEUKOCYTESUR NEGATIVE 01/17/2022    UROBILINOGEN Normal 01/17/2022    BILIRUBINUR NEGATIVE 01/17/2022    GLUCOSEU 1+ 01/17/2022    KETUA NEGATIVE 01/17/2022    AMORPHOUS NOT REPORTED 01/17/2022     Urine Sodium:     Lab Results   Component Value Date    CHERYL 56 01/10/2022     Urine Creatinine:     Lab Results   Component Value Date    LABCREA 90.8 01/09/2022       Radiology:     CXR: 1/22/22  1. The endotracheal tube tip is located 5.3 cm above the zheng.    2. Worsening bilateral lung infiltrates, compatible with pneumonia, including   COVID. 3. Small bilateral pleural effusions       Assessment:     1. Acute Kidney Injury: Secondary to ischemic ATN and nephrotoxic ATN initially from COVID-pneumonia, systemic inflammation, contrast exposure. Baseline 2.0 peaked at 3.0. Thereafter developed left gluteal area hematoma with drop in hemoglobin down to 5.7, decreased renal perfusion, had a secondary ATN creatinine peaked at 3.96. creatinine came down initially but now is rising again. He is high risk of needing dialysis on this admission. Never established with nephrologist prior to admission  2. 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 Persistent anemia, requiring multiple transfusions likely from left gluteal area hematoma  4. COVID-19 pneumonia with respiratory failure now intubated. FiO2 is 30% with some weaning her current  5. Severe aortic stenosis valve area 1.0 cm squares, peak gradient 57 mean 37    Plan:   1. Labs in am    2. Lasix 40 mg IV times one and may need further diuretics in the days to come  3. High risk of needing dialysis on this admission  4. Will continue to follow    Nutrition   Please ensure that patient is on a renal diet/TF. Avoid nephrotoxic drugs/contrast exposure. We will continue to follow along with you.        Napoleon Hill MD   Nephrology Attending Physician  [facility]  1/24/2022

## 2022-01-24 NOTE — PLAN OF CARE
Problem: Non-Violent Restraints  Goal: Removal from restraints as soon as assessed to be safe  1/24/2022 1434 by Estuardo Molina, RN  Outcome: Met This Shift  1/24/2022 0744 by Estuardo Molina, RN  Outcome: Ongoing  1/24/2022 0635 by David Yadav RN  Outcome: Met This Shift  Goal: No harm/injury to patient while restraints in use  1/24/2022 1434 by Estuardo Molina, RN  Outcome: Met This Shift  1/24/2022 0744 by Estuardo Molina, RN  Outcome: Ongoing  1/24/2022 0635 by David Yadav RN  Outcome: Met This Shift  Goal: Patient's dignity will be maintained  1/24/2022 1434 by Estuardo Molina, RN  Outcome: Met This Shift  1/24/2022 0744 by Estuardo Molina, RN  Outcome: Ongoing  1/24/2022 0635 by David Yadav RN  Outcome: Met This Shift

## 2022-01-24 NOTE — PLAN OF CARE
Problem: Airway Clearance - Ineffective  Goal: Achieve or maintain patent airway  Outcome: Ongoing     Problem: Gas Exchange - Impaired  Goal: Absence of hypoxia  Outcome: Ongoing     Problem: Gas Exchange - Impaired  Goal: Promote optimal lung function  Outcome: Ongoing     Problem: Breathing Pattern - Ineffective  Goal: Ability to achieve and maintain a regular respiratory rate  1/23/2022 2123 by Cuca Yusuf RCP  Outcome: Ongoing     Problem: OXYGENATION/RESPIRATORY FUNCTION  Goal: Patient will maintain patent airway  1/23/2022 2123 by Cuca Yusuf RCP  Outcome: Ongoing     Problem: OXYGENATION/RESPIRATORY FUNCTION  Goal: Patient will achieve/maintain normal respiratory rate/effort  Description: Respiratory rate and effort will be within normal limits for the patient  1/23/2022 2123 by Cuca Yusuf RCP  Outcome: Ongoing     Problem: MECHANICAL VENTILATION  Goal: Patient will maintain patent airway  1/23/2022 2123 by Cuca Yusuf RCP  Outcome: Ongoing     Problem: MECHANICAL VENTILATION  Goal: Oral health is maintained or improved  1/23/2022 2123 by Cuca Yusuf RCP  Outcome: Ongoing     Problem: MECHANICAL VENTILATION  Goal: ET tube will be managed safely  1/23/2022 2123 by Cuca Yusuf RCP  Outcome: Ongoing     Problem: MECHANICAL VENTILATION  Goal: Ability to express needs and understand communication  1/23/2022 2123 by Cuca Yusuf RCP  Outcome: Ongoing     Problem: MECHANICAL VENTILATION  Goal: Mobility/activity is maintained at optimum level for patient  1/23/2022 2123 by Cuca Yusuf RCP  Outcome: Ongoing     Problem: SKIN INTEGRITY  Goal: Skin integrity is maintained or improved  1/23/2022 2123 by Cuca Yusuf RCP  Outcome: Ongoing

## 2022-01-24 NOTE — PLAN OF CARE
Problem: Non-Violent Restraints  Goal: Removal from restraints as soon as assessed to be safe  1/23/2022 2051 by Simarn Babin RN  Outcome: Ongoing  1/23/2022 1616 by Nereida Rothman RN  Outcome: Ongoing  1/23/2022 1615 by Nereida Rothman RN  Outcome: Ongoing

## 2022-01-24 NOTE — PLAN OF CARE
Problem: SKIN INTEGRITY  Goal: Skin integrity is maintained or improved  1/24/2022 0635 by Ivette Saul RN  Outcome: Met This Shift  1/23/2022 2123 by Holger Lim RCP  Outcome: Ongoing     Problem: Non-Violent Restraints  Goal: Removal from restraints as soon as assessed to be safe  1/24/2022 0635 by Ivette Saul RN  Outcome: Met This Shift  1/23/2022 2051 by Ivette Saul RN  Outcome: Ongoing  Goal: No harm/injury to patient while restraints in use  1/24/2022 0635 by Ivette Saul RN  Outcome: Met This Shift  1/23/2022 2051 by Ivette Saul RN  Outcome: Ongoing  Goal: Patient's dignity will be maintained  1/24/2022 0635 by Ivette Saul RN  Outcome: Met This Shift  1/23/2022 2051 by Ivette Saul RN  Outcome: Ongoing     Problem: Falls - Risk of:  Goal: Will remain free from falls  Description: Will remain free from falls  Outcome: Ongoing  Goal: Absence of physical injury  Description: Absence of physical injury  Outcome: Ongoing     Problem: Airway Clearance - Ineffective  Goal: Achieve or maintain patent airway  1/23/2022 2123 by Holger Lim RCP  Outcome: Ongoing     Problem: Gas Exchange - Impaired  Goal: Absence of hypoxia  1/24/2022 9012 by Ivette Saul RN  Outcome: Ongoing  1/23/2022 2123 by Holger Lim RCP  Outcome: Ongoing  Goal: Promote optimal lung function  1/24/2022 0635 by Ivette Saul RN  Outcome: Ongoing  1/23/2022 2123 by Holger Lim RCP  Outcome: Ongoing     Problem: Breathing Pattern - Ineffective  Goal: Ability to achieve and maintain a regular respiratory rate  1/24/2022 0635 by Ivette Saul RN  Outcome: Ongoing  1/23/2022 2123 by Holger Lim RCP  Outcome: Ongoing     Problem: Loneliness or Risk for Loneliness  Goal: Demonstrate positive use of time alone when socialization is not possible  Outcome: Ongoing     Problem: Fatigue  Goal: Verbalize increase energy and improved vitality  Outcome: Ongoing     Problem: Skin Integrity:  Goal: Will show no infection signs and symptoms  Description: Will show no infection signs and symptoms  Outcome: Ongoing  Goal: Absence of new skin breakdown  Description: Absence of new skin breakdown  Outcome: Ongoing     Problem: Nutrition  Goal: Optimal nutrition therapy  Outcome: Ongoing     Problem: Pain:  Goal: Pain level will decrease  Description: Pain level will decrease  Outcome: Ongoing  Goal: Control of acute pain  Description: Control of acute pain  Outcome: Ongoing  Goal: Control of chronic pain  Description: Control of chronic pain  Outcome: Ongoing     Problem: OXYGENATION/RESPIRATORY FUNCTION  Goal: Patient will maintain patent airway  1/23/2022 2123 by Mel Whitlock RCP  Outcome: Ongoing  Goal: Patient will achieve/maintain normal respiratory rate/effort  Description: Respiratory rate and effort will be within normal limits for the patient  1/23/2022 2123 by Mel Whitlock RCP  Outcome: Ongoing     Problem: MECHANICAL VENTILATION  Goal: Patient will maintain patent airway  1/24/2022 0635 by Carol Mujica RN  Outcome: Ongoing  1/23/2022 2123 by Mel Whitlock RCP  Outcome: Ongoing  Goal: Oral health is maintained or improved  1/24/2022 0635 by Carol Mujica RN  Outcome: Ongoing  1/23/2022 2123 by Mel Whitlock RCP  Outcome: Ongoing  Goal: ET tube will be managed safely  1/24/2022 0635 by Carol Mujica RN  Outcome: Ongoing  1/23/2022 2123 by Mel Whitlock RCP  Outcome: Ongoing  Goal: Ability to express needs and understand communication  1/24/2022 0635 by Carol Mujica RN  Outcome: Ongoing  1/23/2022 2123 by Mel Whitlock RCP  Outcome: Ongoing  Goal: Mobility/activity is maintained at optimum level for patient  1/24/2022 2514 by Carol Mujica RN  Outcome: Ongoing  1/23/2022 2123 by Mel Whitlock RCP  Outcome: Ongoing

## 2022-01-24 NOTE — PROCEDURES
Product type Bard 4 Fr single lumen PowerMidline  History/Labs/Allergies Reviewed  Placed By: samantha Lee rn  Assisted By: CHERISE  Time out Performed using Two Identifiers  Lot # [id number]  Expiration date 7/31/2022  Catheter size 4 Yoruba  Trimmed at 12 cm  Total length inserted 12 cm  External catheter length 0 cm  Location left brachial vein  Number of attempts 1  Estimated blood loss 1 ml  Placement verified by- positive blood return & flushes easily  Special equipment used- ultrasound & micro-introducer technique   Catheter secured with statlock  Dressing applied- Tegaderm CHG  Lidocaine administered intradermally conc.1% 2 mL  Midline education:   [ x ] Discussed with patient/Family or POA prior to procedure. Risks and Benefits along with reason for procedure were discussed and teaching was reinforced with an education handout on Midline insertion. [facility] FAQ Catheter Associated Blood Stream Infections and [address] [facility] [address] REV. 7/13 Nursing and Booklet left at bedside or in chart. Patient (Family or POA) acknowledged understanding of information taught and agreed to procedure. [  ] Was not discussed with patient/family or POA due to pts medical status at time of procedure. pts family or POA not available to discuss Midline education.  [facility] FAQ Catheter Associated Blood Stream Infections and [facility] REV. 7/13 Nursing and Booklet left at bedside or in chart    Bryson Roque RN

## 2022-01-24 NOTE — PROGRESS NOTES
[facility]  Office: [address], DO, Elvia Lynne, DO, Anai Victor, DO, Katharine Moon, DO, Brain Dancer, MD, Yovana Hebert MD, Candace Oreilly MD, Hector Cleary MD, April Santoyo MD, Burgess Raymond MD, Ailyn Fields MD, Nora Ling, DO, David Simpson DO, Cesilia Forbes MD,  Tomas Baldwin DO, Tatum Case MD, Juan José Maldonado MD, Aditya Wallace MD, Amada Good MD, Milan Borrero MD, Gavino Mcdaniels MD, Yesica Hodges MD, Teodoro [facility], [facility] Bernyalthea, CNP, Raheem Zazueta, CNP, Hank Nolan, CNS, Julio Berrios, CNP, Alesha Kelly, CNP, Miguel Ángel Morejon, CNP, Fabiola Suarez, CNP, Author STEVE Garcia, Glenys العلي PA-C, Victoriano Brown, KOREY, Abdiel Wise DNP, Justine Nino, CNP, Linh Castañeda, CNP, Praveena Cabral CNP, Nathalie Medina, CNP, Pricilla Sanchez CNP, Kaye Bustillo, [address] [place] [address]    Progress Note    1/24/2022    2:11 PM    Name:   Caleb Carnes  MRN:     [id number]     Acct:      [de-identified]   Room:   [address] [place] [address] Day:  12  Admit Date:  1/8/2022  9:49 AM    PCP:   MOHAMUD Darling CNP  Code Status:  Full Code    Subjective:     C/C:   Chief Complaint   Patient presents with    Shortness of Breath    Nausea & Vomiting     Interval History Status: improving  Patient seen and examined at bedside. Tolerated weaning trial for 2 hours today. Blood pressures on the higher side. Hemoglobin slightly worse. Arm bruises are better  Patient vitals, labs and all providers notes were reviewed,from overnight shift and morning updates were noted and discussed with the nurse    Brief History:     26-year-old male past medical history hypertension, hyperlipidemia, CKD stage IIIb, aortic valve stenosis, peripheral arterial disease presents with shortness of breath found to be in acute respiratory failure secondary to COVID-19 as well as A. fib with RVR.   Patient being followed by cardiology, infectious disease, critical care, nephrology was also seen the patient. Patient was originally started on heparin drip for A. fib with RVR however was discontinued due to acute blood loss anemia and bilateral hematomas on both upper extremities. Patient required 2 units of red blood cells on 1/16/2022. Patient was also evaluated by nephrology due to JULES on CKD stage III which improved with IV hydration. For COVID-19 patient status post Actemra on/8/2022, and Decadron 1/8/2022. Patient continues on high flow. Patient originally did change his CODE STATUS from DNR CCA to full code on 11/13/2022 and reverted back to DNR CCA on 11/17/2022. Review of Systems:     Review of Systems   Unable to perform ROS: Intubated       Medications:      Allergies:  No Known Allergies    Current Meds:   Scheduled Meds:    insulin lispro  0-6 Units SubCUTAneous Q4H    insulin lispro  0-3 Units SubCUTAneous Nightly    metoprolol tartrate  50 mg Oral BID    [START ON 1/25/2022] hydrocortisone sodium succinate PF  100 mg IntraVENous Daily    pantoprazole  40 mg IntraVENous Daily    And    sodium chloride (PF)  10 mL IntraVENous Daily    heparin (porcine)  5,000 Units SubCUTAneous 3 times per day    nitroGLYCERIN  0.4 mg SubLINGual Once    sodium bicarbonate  650 mg Oral BID    meropenem  500 mg IntraVENous Q12H    ferrous sulfate  325 mg Oral Daily with breakfast    guaiFENesin  600 mg Oral BID    lidocaine 1 % injection  5 mL IntraDERmal Once    sodium chloride flush  5-40 mL IntraVENous 2 times per day    busPIRone  10 mg Oral TID    [Held by provider] insulin glargine  20 Units SubCUTAneous QAM    senna  2 tablet Oral Nightly    amiodarone  200 mg Oral Daily    atorvastatin  20 mg Oral Nightly    clopidogrel  75 mg Oral Daily    vitamin B-12  1,000 mcg Oral Daily    aspirin  81 mg Oral Daily    sodium chloride flush  5-40 mL IntraVENous 2 times per day     Continuous Infusions:    fentaNYL 100 mcg/hr (01/24/22 0930)  propofol 10 mcg/kg/min (22 1215)    norepinephrine Stopped (22 0000)    midazolam 5 mg/hr (22 1200)    sodium chloride      sodium chloride       IV fluid builder Stopped (22 0410)    dextrose 100 mL/hr (22 0010)    sodium chloride       PRN Meds: hydrALAZINE, sodium chloride, sodium chloride flush, sodium chloride, melatonin, sodium chloride, LORazepam, glucose, dextrose, glucagon (rDNA), dextrose, metoprolol, benzonatate, sodium chloride flush, sodium chloride flush, sodium chloride, ondansetron **OR** ondansetron, polyethylene glycol, acetaminophen **OR** acetaminophen    Data:     Past Medical History:   has a past medical history of Chronic lower back pain, Cobalamin deficiency, CVA (cerebral vascular accident) ([facility] Diabetes mellitus ([facility] Hyperlipidemia, Hypertension, [facility] and PAD (peripheral artery disease) ([facility] Social History:   reports that he has quit smoking. He has never used smokeless tobacco. He reports previous alcohol use. He reports that he does not use drugs. Family History: History reviewed. No pertinent family history. Vitals:  BP (!) 165/72   Pulse 69   Temp 97.7 °F (36.5 °C) (Bladder)   Resp 15   Ht 5' 10\" (1.778 m)   Wt 148 lb 2.4 oz (67.2 kg)   SpO2 95%   BMI 21.26 kg/m²   Temp (24hrs), Av.5 °F (36.4 °C), Min:96.6 °F (35.9 °C), Max:98.4 °F (36.9 °C)    Recent Labs     22  0610 22  0624 22  0807 22  1111   POCGLU 58* 108 101 92       I/O (24Hr):     Intake/Output Summary (Last 24 hours) at 2022 1411  Last data filed at 2022 1300  Gross per 24 hour   Intake 1204.85 ml   Output 1132 ml   Net 72.85 ml       Labs:  Hematology:  Recent Labs     22  0551 22  1130 22  0405 22  0457   WBC 34.5*  --  14.9* 12.4*   RBC 3.58*  --  2.89* 2.62*   HGB 10.9*  --  9.1* 8.2*   HCT 34.1*  --  27.4* 25.6*   MCV 95.3  --  94.8 97.7   MCH 30.4  --  31.5 31.3   MCHC 32.0  --  33.2 32.0   RDW 17.2*  --  17.3* 17.7*   *  --  85* 89*   MPV 10.6  --  10.9 11.1   CRP  --  21.5*  --   --    INR 1.1  --   --   --      Chemistry:  Recent Labs     01/22/22  0551 01/22/22  0626 01/22/22  1130 01/23/22  0405 01/24/22  0457     --   --  140 144   K 4.9  --   --  4.8 4.1   *  --   --  109* 111*   CO2 21  --   --  22 23   GLUCOSE 219*  --   --  178* 148*   BUN 50*  --   --  54* 63*   CREATININE 2.19*  --   --  2.32* 2.53*   ANIONGAP 11  --   --  9 10   LABGLOM 29*  --   --  27* 25*   GFRAA 35*  --   --  33* 30*   CALCIUM 7.8*  --   --  7.9* 8.1*   CAION  --  1.21  --   --   --    PHOS 5.3*  --   --   --   --    TROPHS 64*  --   --   --   --    LACTACIDWB  --   --  0.9  --   --      Recent Labs     01/22/22  1130 01/22/22  1602 01/23/22  2321 01/24/22  0339 01/24/22  0610 01/24/22  0624 01/24/22  0807 01/24/22  1111   LIPASE 54  --   --   --   --   --   --   --    POCGLU  --    < > 144* 148* 58* 108 101 92    < > = values in this interval not displayed. ABG:  Lab Results   Component Value Date    POCPH 7.401 01/24/2022    POCPCO2 24.9 01/24/2022    POCPO2 86.2 01/24/2022    POCHCO3 15.5 01/24/2022    NBEA 9 01/24/2022    PBEA NOT REPORTED 01/24/2022    YBM3JQR NOT REPORTED 01/24/2022    NEAW0GYC 97 01/24/2022    FIO2 40.0 01/24/2022     Lab Results   Component Value Date/Time    SPECIAL L HAND 5 ML 01/22/2022 04:20 PM     Lab Results   Component Value Date/Time    CULTURE NO GROWTH 1 DAY 01/22/2022 04:20 PM       Radiology:  CT ABDOMEN PELVIS WO CONTRAST Additional Contrast? None    Result Date: 1/16/2022  Left gluteal hematoma, partially visualized on this study without obvious active bleeding, but limited without IV contrast.  No retroperitoneal or rectus sheath bleed. Extensive findings in the visualized lungs compatible with known COVID-19 pneumonia; denser GGOs suggest worsening pneumonia/pneumonitis or developing consolidation. No pneumothorax. Multiple additional findings, as detailed in the body of the report above. RECOMMENDATIONS: Consider follow-up CT pelvis including the upper thighs, if the patient does not respond to resuscitation with blood products/fluids and other conservative measures including localized pressure. Findings were discussed with Delores Favre at 12:24 pm on 1/16/2022. XR CHEST PORTABLE    Result Date: 1/16/2022  Right IJ line in satisfactory position. Unchanged or slightly worse bilateral diffuse infiltrates consistent with the patient's history of COVID pneumonia. XR CHEST PORTABLE    Result Date: 1/14/2022  Patchy bilateral interstitial and ground-glass infiltrates most notably in the lung periphery. Findings have progressed from the prior exam compatible with patient's history of COVID pneumonia     XR CHEST PORTABLE    Result Date: 1/11/2022  Unchanged bilateral airspace opacities       Physical Examination:        Physical Exam  Vitals and nursing note reviewed. Constitutional:       General: He is not in acute distress. Interventions: He is sedated and intubated. HENT:      Head: Normocephalic and atraumatic. Eyes:      Conjunctiva/sclera: Conjunctivae normal.      Pupils: Pupils are equal, round, and reactive to light. Neck:      Comments: Central line noted  Cardiovascular:      Rate and Rhythm: Normal rate and regular rhythm. Heart sounds: No murmur heard. Pulmonary:      Effort: Pulmonary effort is normal. No accessory muscle usage or respiratory distress. He is intubated. Breath sounds: No stridor. Decreased breath sounds and rales present. No wheezing or rhonchi. Abdominal:      General: Bowel sounds are normal. There is no distension. Palpations: Abdomen is soft. Abdomen is not rigid. Tenderness: There is no abdominal tenderness. There is no guarding. Musculoskeletal:         General: No tenderness. Skin:     General: Skin is warm and dry.       Findings: Bruising and ecchymosis present. No erythema, lesion or rash. Neurological:      Cranial Nerves: No cranial nerve deficit. Motor: No seizure activity.          Assessment:        Hospital Problems           Last Modified POA    * (Principal) Pneumonia due to COVID-19 virus 1/8/2022 Yes    Essential hypertension 1/8/2022 Yes    Hyperlipidemia 1/8/2022 Yes    Acute hypoxemic respiratory failure due to COVID-19 [facility]) 1/8/2022 Yes    Acute kidney injury superimposed on chronic kidney disease ([facility] baseline creatinine 1.9 1/8/2022 Yes    Stage 3b chronic kidney disease ([facility] 1/8/2022 Yes    Elevated troponin 1/9/2022 Yes    Nonrheumatic aortic valve stenosis 1/9/2022 Yes    PAD (peripheral artery disease) ([facility] 1/9/2022 Yes    Moderate protein-calorie malnutrition ([facility] 1/9/2022 Yes    Atrial fibrillation with RVR ([facility] 1/10/2022 Yes    COVID-19 1/19/2022 Yes    Traumatic hematoma of buttock 1/19/2022 Yes    Acute distention of stomach 1/21/2022 Yes          Plan:        Sepsis : resolved   Decreased hydrocortisone to once daily today    COVID 19 Pneumonia :   S/p Actemra 1/8  Steroids   Vit C/D  Titrate inflammatory markers  ID involved  Meropenem started 1/21 due to development of consolidative infiltrates on repeat CT chest.     Acute hypoxemic respiratory failure: Likely secondary to Covid 19 pneumonia , S/P intubation 1/22  Decreased hydrocortisone to once daily today    Continue  TF     JULES/CKD with metabolic acidosis: improving,  continue to avoid for nephrotoxic agents, nephrology recommendations    Persistent hypoglycemia: improved     DM2: Restart sliding scale as patient is going to start on tube feeds and blood sugars are up, continue with hypoglycemia protocol  \    Acute blood loss anemia due to retroperitoneal bleed: Patient status post 3 unit of packed RBCs since admission, currently stable, continue to hold full anticoagulation, DAPT resumed on 1/20    Stomach distention/Enteritis: GI evaluated

## 2022-01-24 NOTE — PROGRESS NOTES
[facility] [address] Patient  Today's Date and Time: 1/24/2022, 9:55 AM    Impression :     · COVID 19 Confirmed Infection  · Covid tests:  · 1/8/21: Positive  · Acute hypoxic respiratory failure  · Paroxysmal A. 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  · Essential hypertension  · Elevated inflammatory markers  · Hyperlipidemia  · Acute gluteal hematoma  · Patient has not received the Covid vaccine  · Intubated 1/22/22      Recommendations:   · Antibiotic treatment:  · Meropenem 500 mg BID IV for leukocytosis & possible secondary bacterial pneumonia 1/11/21-discontinued 1/17/21  · Meropenem Re-started 1-21-22 because of residual dense consolidation of the lungs with air bronchograms  · Covid Rx:    · Remdesivir-contraindicated with JULES  · Monoclonal antibodies-out of window  · Decadron-initiated 1/8/2022  · Actemra-administered 1/8/2022    · The patient still has significant hypoxia is currently requiring high flow/BiPAP. · He is insisting on trying to go home and does not quite understand that he is requiring a significant amount of respiratory support. · He did mention to me that he was happy to die at home but it is my understanding that the recently changed his CODE STATUS to comfort care arrest to full code.   · His code status has again been changed to [facility]  · His code status changed back to a full code after he agreed to intubation on 1/22/22  · Continue supportive care      Medical Decision Making/Summary/Discussion:1/24/2022     · Patient admitted with COVID 19 infection  · Isolation until 1/28/22    Infection Control Recommendations   · Universal Precautions  · Airborne isolation  · Droplet Isolation    Antimicrobial Stewardship Recommendations       · Renal considerations  Coordination of Outpatient Care:   · Estimated Length of IV antimicrobials:TBD  · Patient will need Midline Catheter Insertion: TBD  · Patient will need PICC line Insertion: No  · Patient will need: Home IV , [place],  SNF,  LTAC:TBD  · Patient will need outpatient wound care:No    Chief complaint/reason for consultation:   · Concern for COVID infection      History of Present Illness:   Angelica Massey is a [de-identified]y.o.-year-old male who was initially admitted on 1/8/2022. Patient seen at the request of Dr. Osvaldo Ramirez:    Patient presented through ER with complaints of needing shortness of breath, cough, loss of appetite, nausea, vomiting and diarrhea over the past 7 to 10 days. He has not received the Covid vaccine and tested positive for Covid shortly after [place]. On evaluation in the ED, the patient had an SPO2 of 80% on room air and was tachypneic. A rapid Covid swab was positive. CT chest shows extensive bilateral pulmonary groundglass opacities. He was started on Decadron and given a dose of Actemra. Abnormal labs include:  · Creatinine 2.32  ·   · Troponin I 48  · WBC 17.1    Patient admitted because of concerns with COVID 19.     Meropenem initiated on 1/11/2022 for worsening respiratory distress and leukocytosis    Stable chest x-ray 1/11    CRP is trending down    Hemoglobin dropped to 5.7 on 1/16/2022  Hemoglobin remained stable at this time after receiving infusions of PRBC  Left gluteal hematoma present on CT abdomen pelvis      Occult blood noted on stool  Anemia continues in the program patient required another blood transfusion on 1/21/2022  Anticoagulation on hold s/p gluteal hematoma      Impression CT Chest/Abdomen/Pelvis 1/21/22   1.  New small bilateral pleural effusions with extensive bilateral airspace   consolidation, increased from 01/08/2022.       2.  Decreased AP dimension of the distal trachea, suggesting tracheomalacia.       3.  Previously noted hematoma in the left gluteal musculature is not as well   visualized on today's study, but appears overall 53 very high risk    CRP:160-->131-->52.3-->21.5  Ferritin:804  LDH: 563    Pro Calcitonin:      Cultures:  Urine:  ·   Blood:  ·   Sputum :  · 1/22: Gram negative rods  Wound:       CXR:     CAT:  1/8/21      Discussed with patient, RN, CC, IM. I have personally reviewed the past medical history, past surgical history, medications, social history, and family history, and I have updated the database accordingly.   Past Medical History:     Past Medical History:   Diagnosis Date    Chronic lower back pain     Cobalamin deficiency     CVA (cerebral vascular accident) ([address]     Diabetes mellitus ([address]     Hyperlipidemia     Hypertension     Malignant neoplasm of colon ([address]     [facility]AD (peripheral artery disease) ([facility])        Past Surgical  History:     Past Surgical History:   Procedure Laterality Date    ARTERY SURGERY         Medications:      insulin lispro  0-6 Units SubCUTAneous Q4H    insulin lispro  0-3 Units SubCUTAneous Nightly    pantoprazole  40 mg IntraVENous Daily    And    sodium chloride (PF)  10 mL IntraVENous Daily    heparin (porcine)  5,000 Units SubCUTAneous 3 times per day    nitroGLYCERIN  0.4 mg SubLINGual Once    hydrocortisone sodium succinate PF  100 mg IntraVENous Q8H    sodium bicarbonate  650 mg Oral BID    meropenem  500 mg IntraVENous Q12H    ferrous sulfate  325 mg Oral Daily with breakfast    guaiFENesin  600 mg Oral BID    lidocaine 1 % injection  5 mL IntraDERmal Once    sodium chloride flush  5-40 mL IntraVENous 2 times per day    busPIRone  10 mg Oral TID    [Held by provider] insulin glargine  20 Units SubCUTAneous QAM    senna  2 tablet Oral Nightly    amiodarone  200 mg Oral Daily    atorvastatin  20 mg Oral Nightly    clopidogrel  75 mg Oral Daily    vitamin B-12  1,000 mcg Oral Daily    aspirin  81 mg Oral Daily    sodium chloride flush  5-40 mL IntraVENous 2 times per day       Social History:     Social History     Socioeconomic History    Marital status:      Spouse name: Not on file    Number of children: Not on file    Years of education: Not on file    Highest education level: Not on file   Occupational History    Not on file   Tobacco Use    Smoking status: Former Smoker    Smokeless tobacco: Never Used   Substance and Sexual Activity    Alcohol use: Not Currently    Drug use: Never    Sexual activity: Not on file   Other Topics Concern    Not on file   Social History Narrative    Not on file     Social Determinants of Health     Financial Resource Strain:     Difficulty of Paying Living Expenses: Not on file   Food Insecurity:     Worried About [address] [employer] in the Last Year: Not on file    Lesley of Food in the Last Year: Not on file   Transportation Needs:     Lack of Transportation (Medical): Not on file    Lack of Transportation (Non-Medical): Not on file   Physical Activity:     Days of Exercise per Week: Not on file    Minutes of Exercise per Session: Not on file   Stress:     Feeling of Stress : Not on file   Social Connections:     Frequency of Communication with Friends and Family: Not on file    Frequency of Social Gatherings with Friends and Family: Not on file    Attends Sikhism Services: Not on file    Active Member of [address] [place] [address] or Organizations: Not on file    Attends Club or Organization Meetings: Not on file    Marital Status: Not on file   Intimate Partner Violence:     Fear of Current or Ex-Partner: Not on file    Emotionally Abused: Not on file    Physically Abused: Not on file    Sexually Abused: Not on file   Housing Stability:     Unable to Pay for Housing in the Last Year: Not on file    Number of [place] in the Last Year: Not on file    Unstable Housing in the Last Year: Not on file       Family History:   History reviewed. No pertinent family history. Allergies:   Patient has no known allergies.      Review of Systems:     Review of Systems   Unable to perform ROS: Intubated   Respiratory: Positive for shortness of breath. Cardiovascular: Negative. Gastrointestinal: Negative. Genitourinary: Negative. Musculoskeletal: Negative. Skin: Positive for color change. Neurological: Negative. Physical Examination :     Patient Vitals for the past 8 hrs:   Temp Temp src Pulse Resp SpO2   01/24/22 0912   61 13 94 %   01/24/22 0900   64 13 94 %   01/24/22 0826   57 23 99 %   01/24/22 0800  Bladder 55 18 99 %   01/24/22 0700   57 13 100 %   01/24/22 0600   59 24 99 %   01/24/22 0500   62 24 99 %   01/24/22 0438     98 %   01/24/22 0414    13 97 %   01/24/22 0400 96.6 °F (35.9 °C)  66 15 97 %   01/24/22 0300   67 20 98 %   01/24/22 0200 97 °F (36.1 °C)  71 20 98 %     Physical Exam  Vitals and nursing note reviewed. Constitutional:       Appearance: He is well-developed. Comments: Intubated and sedated   HENT:      Head: Normocephalic and atraumatic. Cardiovascular:      Rate and Rhythm: Normal rate. Heart sounds: Murmur heard. Pulmonary:      Effort: Respiratory distress present. Breath sounds: No wheezing. Abdominal:      General: Bowel sounds are normal.      Palpations: Abdomen is soft. There is no mass. Tenderness: There is no abdominal tenderness. Musculoskeletal:         General: Swelling (In the bilateral upper extremities especially in the forearms.) present. Normal range of motion. Cervical back: Neck supple. Lymphadenopathy:      Cervical: No cervical adenopathy. Skin:     General: Skin is dry. Findings: Bruising (There is bruising and ecchymotic skin lesions in the forearms bilaterally right worse than left) present.    Neurological:      Comments: FINA-intubated and sedated         Medical Decision Making -Laboratory:   I have independently reviewed/ordered the following labs:    CBC with Differential:   Recent Labs     01/23/22  0405 01/24/22  0457   WBC 14.9* 12.4*   HGB 9.1* 8.2* HCT 27.4* 25.6*   PLT 85* 89*   LYMPHOPCT 0* 2*   MONOPCT 1 3     BMP:   Recent Labs     01/23/22  0405 01/24/22  0457    144   K 4.8 4.1   * 111*   CO2 22 23   BUN 54* 63*   CREATININE 2.32* 2.53*     Hepatic Function Panel:   No results for input(s): PROT, LABALBU, BILIDIR, IBILI, BILITOT, ALKPHOS, ALT, AST in the last 72 hours. No results for input(s): RPR in the last 72 hours. No results for input(s): HIV in the last 72 hours. No results for input(s): BC in the last 72 hours. Lab Results   Component Value Date    MUCUS 1+ 01/17/2022    RBC 2.62 01/24/2022    TRICHOMONAS NOT REPORTED 01/17/2022    WBC 12.4 01/24/2022    YEAST NOT REPORTED 01/17/2022    TURBIDITY Clear 01/17/2022     Lab Results   Component Value Date    CREATININE 2.53 01/24/2022    GLUCOSE 148 01/24/2022       Medical Decision Making-Imaging:     Narrative   EXAMINATION:   CTA OF THE CHEST 1/8/2022 10:10 am       TECHNIQUE:   CTA of the chest was performed after the administration of intravenous   contrast.  Multiplanar reformatted images are provided for review.  MIP   images are provided for review. Dose modulation, iterative reconstruction,   and/or weight based adjustment of the mA/kV was utilized to reduce the   radiation dose to as low as reasonably achievable.       COMPARISON:   None.       HISTORY:   ORDERING SYSTEM PROVIDED HISTORY: dyspnea / hypoxia   Reason for Exam: Shortness of breath       FINDINGS:   Pulmonary Arteries: Pulmonary arteries are adequately opacified for   evaluation.  No evidence of intraluminal filling defect to suggest pulmonary   embolism.  Main pulmonary artery is normal in caliber.       Mediastinum: No evidence of mediastinal lymphadenopathy.  Normal heart size. Normal caliber thoracic aorta with diffuse atherosclerosis.       Lungs/pleura: Extensive ground-glass opacification of the bilateral lung   fields with peripheral involvement slight apical as well as basilar sparing.    No effusion or pneumothorax.       Upper Abdomen: Limited images of the upper abdomen are unremarkable.       Soft Tissues/Bones: No acute bone or soft tissue abnormality.           Impression   *No evidence of pulmonary embolism. *Extensive ground-glass opacification of the bilateral lung fields with   peripheral involvement slight apical as well as basilar sparing.  Imaging   features suggestive of COVID-19 pneumonia, though are nonspecific and can   occur with a variety of infectious and noninfectious processes.         Narrative   EXAMINATION:   CT OF THE ABDOMEN AND PELVIS WITHOUT CONTRAST, 1/16/2022 10:42 am       TECHNIQUE:   CT of the abdomen and pelvis was performed without the administration of   intravenous contrast. Multiplanar reformatted images are provided for review. Dose modulation, iterative reconstruction, and/or weight based adjustment of   the mA/kV was utilized to reduce the radiation dose to as low as reasonably   achievable.       COMPARISON:   CT of the chest from 01/08/2022, and retroperitoneal ultrasound from   01/10/2022.       HISTORY:   ORDERING SYSTEM PROVIDED HISTORY:  Retrop bleed r/o   TECHNOLOGIST PROVIDED HISTORY:   Retrop bleed r/o   Reason for Exam:  Retrop bleed r/o       FINDINGS:   Lower Chest: As demonstrated on recent CT chest, extensive and somewhat   denser confluent ground-glass airspace opacities re-identified mid-lower   lungs, sparing the bases with some associated crazy paving, air bronchograms   and bibasilar atelectatic appearing changes.  Main pulmonary artery caliber   31 mm.  Mild dilatation ascending aorta, with a maximal dimension of 41 mm.       Organs: Unopacified liver, spleen, adrenal glands and both kidneys show no   acute abnormality; without suspicious focal finding or hydronephrosis.    Significant right renal cortical thinning and some scarring suspected.  No   large stone.  Probable gallbladder sludge or vicarious contrast from recent   contrast CT; no calcified stones.       GI/Bowel: Small-moderate amount of retained stool, mostly right colon with   some fluid/levels; no abnormal dilatation, marked wall thickening or   pericolonic fat stranding.  Unremarkable small bowel.  Some fluid within a   mildly distended stomach.  Small hiatus hernia.       Pelvis: Partially fluid-filled urinary bladder without wall thickening or   mass.  No significant prostatic enlargement.  Symmetric seminal vesicles.  No   pelvic fluid collection or mass.  Partially visualized approximate 10 x 15 x   5.7 cm left gluteal mass with HU measurements/appearance most suggestive of   hematoma.  No high density finding compatible with active bleeding, but   assessment limited on this examination.  Small fat containing inguinal   hernias, larger on the left.       Peritoneum/Retroperitoneum: No retroperitoneal bleed or bulky   lymphadenopathy.  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  Probable limited intimal dissection   suprarenal aorta without marked aneurysmal dilatation.  Soft tissue stranding   flanks extending into the pelvis, suggesting some degree anasarca.  Slightly   greater prominence right proximal rectus musculature       Bones/Soft Tissues: No acute abnormality.  Mild scoliosis, multilevel   degenerative changes of spine but with DDD L5-S1 and bilateral mild hip joint   degenerative findings.           Impression   Left gluteal hematoma, partially visualized on this study without obvious   active bleeding, but limited without IV contrast.  No retroperitoneal or   rectus sheath bleed.       Extensive findings in the visualized lungs compatible with known COVID-19   pneumonia; denser GGOs suggest worsening pneumonia/pneumonitis or developing   consolidation.  No pneumothorax.       Multiple additional findings, as detailed in the body of the report above.       RECOMMENDATIONS:   Consider follow-up CT pelvis including the upper thighs, if the patient does   not respond to resuscitation with blood products/fluids and other   conservative measures including localized pressure.  Findings were discussed   with Phuong Acosta at 12:24 pm on 1/16/2022.             Narrative   EXAMINATION:   CT OF THE CHEST, ABDOMEN, AND PELVIS WITHOUT CONTRAST 1/21/2022 11:09 am       TECHNIQUE:   CT of the chest, abdomen and pelvis was performed without the administration   of intravenous contrast. Multiplanar reformatted images are provided for   review. Dose modulation, iterative reconstruction, and/or weight based   adjustment of the mA/kV was utilized to reduce the radiation dose to as low   as reasonably achievable.       COMPARISON:   CT abdomen and pelvis dated 01/16/2022.  CT chest dated 01/08/2022       HISTORY:   ORDERING SYSTEM PROVIDED HISTORY: blood loss anemia , gluteal hematoma and   worsening HB   TECHNOLOGIST PROVIDED HISTORY:   blood loss anemia , gluteal hematoma and worsening HB           FINDINGS:       Chest:       Mediastinum: Heart size is within normal limits.  Ascending thoracic aorta is   mildly dilated, measuring 4 cm in AP dimension, similar to the previous   study.  Main pulmonary artery is stable in caliber as well.  No mediastinal   hematoma or lymphadenopathy. Fabiola Finders is a catheter in the SVC with distal tip   in the distal SVC.       Lungs/pleura: There are small bilateral pleural effusions, which are new from   the previous study. Fabiola Finders is extensive dense airspace consolidation   throughout both lungs, with mild sparing at the bases, increased from   01/08/2022. Fabiola Finders is decreased AP dimension of the lower trachea, although   tracheobronchial tree remains patent.       Soft Tissues/Bones: There is no acute or suspicious osseous abnormality.    Visualized superficial soft tissues are within normal limits.           Abdomen/Pelvis:       Organs: Limited unenhanced liver is within normal limits.  There is a tiny   amount of free fluid adjacent to the hepatic dome.  Gallbladder, spleen,   pancreas, and adrenal glands are unremarkable.       There is bilateral renal atrophy, more so on the right, unchanged.  No   hydronephrosis or perinephric fluid collection. Daly Albert is mild bilateral   perinephric stranding, which is unchanged and likely physiologic.       GI/Bowel: The stomach is mildly distended with air-fluid level noted.  No   abnormal bowel distention.  There is mild increased prominence of the   proximal small bowel loops.  There is mild stranding in the proximal   mesentery.  No focal pericolonic inflammation.  No free air.       Pelvis: Urinary bladder is within normal limits.  No evidence of pelvic   lymphadenopathy.       Peritoneum/Retroperitoneum: Distal aortobifemoral graft noted.  Caliber of   the abdominal aorta is unchanged.  There is moderate to severe scattered   atherosclerosis, which is unchanged.  No retroperitoneal lymphadenopathy or   hematoma.  Slit-like IVC is noted.       Bones/Soft Tissues: There is no acute or suspicious osseous abnormality.  The   hematoma previously seen in the left gluteal muscle is not as well visualized   on today's study due to isoattenuation.  There is asymmetric swelling of the   left gluteal muscle, although slightly improved when compared to 01/16/2022. AP dimension in the area of suspected hematoma is 4.4 cm (previously 5.7 cm).    Transverse dimension is approximately 9.3 cm (previously 9.7 cm).  There is   stranding in the subcutaneous fat of the upper thighs bilaterally.           Impression   1.  New small bilateral pleural effusions with extensive bilateral airspace   consolidation, increased from 01/08/2022.       2.  Decreased AP dimension of the distal trachea, suggesting tracheomalacia.       3.  Previously noted hematoma in the left gluteal musculature is not as well   visualized on today's study, but appears overall slightly decreased in size. No new areas of hematoma.       4.  Mild distention of the stomach, mild prominence of proximal small bowel   loops, and mild nonspecific stranding in the proximal mesentery.  No definite   evidence of high-grade bowel obstruction at this time.  Enteritis or early   developing obstruction are considered in the differential.         Medical Decision Embxif-Piuqswny-Rydug:     Culture, Blood 1 [[id number]] Collected: 01/09/22 1155   Order Status: Completed Specimen: Blood Updated: 01/14/22 1300    Specimen Description . BLOOD    Special Requests NOT REPORTED    Culture NO GROWTH 5 DAYS   Culture, Blood 1 [[id number]] Collected: 01/09/22 1155   Order Status: Completed Specimen: Blood Updated: 01/14/22 1300    Specimen Description . BLOOD    Special Requests NOT REPORTED    Culture NO GROWTH 5 DAYS   COVID-19, Rapid [[id number]] (Abnormal) Collected: 01/08/22 1045   Order Status: Completed Specimen: Nasopharyngeal Swab Updated: 01/08/22 1109    Specimen Description . NASOPHARYNGEAL SWAB    SARS-CoV-2, Rapid DETECTED Abnormal     Comment:        Rapid NAAT: The specimen is POSITIVE for SARS-Cov-2, the novel coronavirus associated with   COVID-19.         This test has been authorized by the FDA under an Emergency Use Authorization (EUA) for use   by authorized laboratories.         The ID NOW COVID-19 assay is designed to detect the virus that causes COVID-19 in patients   with signs and symptoms of infection who are suspected of COVID-19. An individual without symptoms of COVID-19 and who is not shedding SARS-CoV-2 virus would   expect to have a negative (not detected) result in this assay.    Fact sheet for Healthcare Providers: Dionne   Fact sheet for Patients: [email or web address]           Methodology: Isothermal Nucleic Acid Amplification         Results reported to the appropriate Health Departmen Medical Decision Making-Other:     Note:  · Labs, medications, radiologic studies were reviewed with personal review of films  · Large amounts of data were reviewed  · Discussed with nursing Staff, Discharge planner  · Infection Control and Prevention measures reviewed  · All prior entries were reviewed  · Administer medications as ordered  · Prognosis: Guarded  · Discharge planning reviewed      Thank you for allowing us to participate in the care of this patient. Please call with questions. Electronically signed by MOHAMUD Khan CNP on 1/24/2022 at 9:55 AM      ATTESTATION:    I have discussed the case, including pertinent history and exam findings with the APRN. I have evaluated the  History, physical findings and pictures of the patient and the key elements of the encounter have been performed by me. I have reviewed the laboratory data, other diagnostic studies and discussed them with the APRN. I have updated the medical record where necessary. I agree with the assessment, plan and orders as documented by the APRN.     Yovana Macias MD.

## 2022-01-25 NOTE — PROGRESS NOTES
[facility]  Office: [address], DO, Steven Roldan, DO, Agustina Barros, DO, Svetlana Humphries Blood, DO, Romario Zurita MD, Margarita Coyle MD, Josiah Esposito MD, Jayla Pratt MD, Bonnie Hodgkin, MD, Althea Good MD, Enrico Serrano MD, Naheed Morales, DO, Yonatan Kenney, DO, Rufus Ceja MD,  Eboni Hendrix DO, Mary Sun MD, Abner Leal MD, Mervat Ambrose MD, Gladys Delatorre MD, Michelle Macias MD, Herrera Rdz MD, Vasquez Quiroz MD, Donell Nagel CNP, [facility], CNP, Jessica Camacho, CNP, Sweetie Ayala, CNS, Janey Coleman, CNP, Shiloh Stoll, CNP, Fran Santoyo CNP, Alexsandra Fonseca, CNP, Arturo Garcia CNP, Radonna Aschoff, PA-CHANG, Monica Steward DNP, Jayme Macedo [facility], Florencio Phan, CNP, Damien Hassan CNP, Abisai Soto CNP, Oliver Valentine CNP, Jenaro Walton CNP, Seven Zaldivar, [address] [place] [address]    Progress Note    1/25/2022    8:35 AM    Name:   Remi Coronel  MRN:     [id number]     Acct:      [de-identified]   Room:   [address] [place] [address] Day:  16  Admit Date:  1/8/2022  9:49 AM    PCP:   MOHAMUD Bhatt CNP  Code Status:  Full Code    Subjective:     C/C:   Chief Complaint   Patient presents with    Shortness of Breath    Nausea & Vomiting     Interval History Status: improving    Patient tolerated 2 hour CPAP overnight. Awaiting extubation per pulmonary medicine. Added miconazole     Brief History:     81-year-old male past medical history hypertension, hyperlipidemia, CKD stage IIIb, aortic valve stenosis, peripheral arterial disease presents with shortness of breath found to be in acute respiratory failure secondary to COVID-19 as well as A. fib with RVR. Patient being followed by cardiology, infectious disease, critical care, nephrology was also seen the patient.   Patient was originally started on heparin drip for A. fib with RVR however was discontinued due to acute blood loss anemia and bilateral hematomas on both upper extremities. Patient required 2 units of red blood cells on 2022. Patient was also evaluated by nephrology due to JULES on CKD stage III which improved with IV hydration. For COVID-19 patient status post Actemra , and Decadron 2022. Patient continues on high flow. Patient originally did change his CODE STATUS from DNR CCA to full code on 2022 and reverted back to DNR CCA on 2022. Review of Systems:     Review of Systems   Unable to perform ROS: Intubated       Medications:      Allergies:  No Known Allergies    Current Meds:   Scheduled Meds:    miconazole   Topical BID    insulin lispro  0-6 Units SubCUTAneous Q4H    insulin lispro  0-3 Units SubCUTAneous Nightly    metoprolol tartrate  50 mg Oral BID    hydrocortisone sodium succinate PF  100 mg IntraVENous Daily    pantoprazole  40 mg IntraVENous Daily    And    sodium chloride (PF)  10 mL IntraVENous Daily    heparin (porcine)  5,000 Units SubCUTAneous 3 times per day    nitroGLYCERIN  0.4 mg SubLINGual Once    sodium bicarbonate  650 mg Oral BID    meropenem  500 mg IntraVENous Q12H    ferrous sulfate  325 mg Oral Daily with breakfast    guaiFENesin  600 mg Oral BID    lidocaine 1 % injection  5 mL IntraDERmal Once    sodium chloride flush  5-40 mL IntraVENous 2 times per day    busPIRone  10 mg Oral TID    [Held by provider] insulin glargine  20 Units SubCUTAneous QAM    senna  2 tablet Oral Nightly    amiodarone  200 mg Oral Daily    atorvastatin  20 mg Oral Nightly    clopidogrel  75 mg Oral Daily    vitamin B-12  1,000 mcg Oral Daily    aspirin  81 mg Oral Daily    sodium chloride flush  5-40 mL IntraVENous 2 times per day     Continuous Infusions:    fentaNYL 75 mcg/hr (22 2100)    propofol 20 mcg/kg/min (22 8332)    norepinephrine Stopped (22 0000)    midazolam Stopped (22 1300)    sodium chloride      sodium chloride       IV fluid builder Stopped (22 0410)    dextrose 100 mL/hr (22 0010)    sodium chloride       PRN Meds: hydrALAZINE, sodium chloride, sodium chloride flush, sodium chloride, melatonin, sodium chloride, LORazepam, glucose, dextrose, glucagon (rDNA), dextrose, metoprolol, benzonatate, sodium chloride flush, sodium chloride flush, sodium chloride, ondansetron **OR** ondansetron, polyethylene glycol, acetaminophen **OR** acetaminophen    Data:     Past Medical History:   has a past medical history of Chronic lower back pain, Cobalamin deficiency, CVA (cerebral vascular accident) ([facility] Diabetes mellitus [facility] Hyperlipidemia, Hypertension, Malignant neoplasm of colon ([facility] and PAD (peripheral artery disease) ([facility] Social History:   reports that he has quit smoking. He has never used smokeless tobacco. He reports previous alcohol use. He reports that he does not use drugs. Family History: History reviewed. No pertinent family history. Vitals:  BP (!) 181/47   Pulse 95   Temp 97.5 °F (36.4 °C)   Resp 30   Ht 5' 10\" (1.778 m)   Wt 148 lb 2.4 oz (67.2 kg)   SpO2 96%   BMI 21.26 kg/m²   Temp (24hrs), Av.7 °F (36.5 °C), Min:97 °F (36.1 °C), Max:98.2 °F (36.8 °C)    Recent Labs     22  1609 22  1943 22  2330 22  0328   POCGLU 119* 87 159* 158*       I/O (24Hr):     Intake/Output Summary (Last 24 hours) at 2022 0835  Last data filed at 2022 0657  Gross per 24 hour   Intake 1138.97 ml   Output 2030 ml   Net -891.03 ml       Labs:  Hematology:  Recent Labs     22  1130 22  0405 22  0405 22  0457 22  1613 22  0424   WBC  --  14.9*  --  12.4*  --  17.2*   RBC  --  2.89*  --  2.62*  --  3.19*   HGB  --  9.1*   < > 8.2* 8.9* 10.0*   HCT  --  27.4*   < > 25.6* 27.5* 31.0*   MCV  --  94.8  --  97.7  --  97.2   MCH  --  31.5  --  31.3  --  31.3   MCHC  --  33.2  --  32.0  --  32.3   RDW  --  17.3*  --  17.7*  --  18.3*   PLT  --  85*  --  89* products/fluids and other conservative measures including localized pressure. Findings were discussed with [facility] at 12:24 pm on 1/16/2022. XR CHEST PORTABLE    Result Date: 1/16/2022  Right IJ line in satisfactory position. Unchanged or slightly worse bilateral diffuse infiltrates consistent with the patient's history of COVID pneumonia. XR CHEST PORTABLE    Result Date: 1/14/2022  Patchy bilateral interstitial and ground-glass infiltrates most notably in the lung periphery. Findings have progressed from the prior exam compatible with patient's history of COVID pneumonia     XR CHEST PORTABLE    Result Date: 1/11/2022  Unchanged bilateral airspace opacities       Physical Examination:        Physical Exam  Vitals and nursing note reviewed. Constitutional:       General: He is not in acute distress. Interventions: He is sedated and intubated. HENT:      Head: Normocephalic and atraumatic. Eyes:      Conjunctiva/sclera: Conjunctivae normal.      Pupils: Pupils are equal, round, and reactive to light. Neck:      Comments: Central line noted  Cardiovascular:      Rate and Rhythm: Normal rate and regular rhythm. Heart sounds: No murmur heard. Pulmonary:      Effort: Pulmonary effort is normal. No accessory muscle usage or respiratory distress. He is intubated. Breath sounds: No stridor. Decreased breath sounds and rales present. No wheezing or rhonchi. Abdominal:      General: Bowel sounds are normal. There is no distension. Palpations: Abdomen is soft. Abdomen is not rigid. Tenderness: There is no abdominal tenderness. There is no guarding. Musculoskeletal:         General: No tenderness. Skin:     General: Skin is warm and dry. Findings: Bruising and ecchymosis present. No erythema, lesion or rash. Neurological:      Cranial Nerves: No cranial nerve deficit. Motor: No seizure activity.          Assessment:        Hospital Problems Last Modified POA    * (Principal) Pneumonia due to COVID-19 virus 1/8/2022 Yes    Essential hypertension 1/8/2022 Yes    Hyperlipidemia 1/8/2022 Yes    Acute hypoxemic respiratory failure due to COVID-19 [facility]) 1/8/2022 Yes    Acute kidney injury superimposed on chronic kidney disease ([facility] baseline creatinine 1.9 1/8/2022 Yes    Stage 3b chronic kidney disease ([facility] 1/8/2022 Yes    Elevated troponin 1/9/2022 Yes    Nonrheumatic aortic valve stenosis 1/9/2022 Yes    PAD (peripheral artery disease) ([facility] 1/9/2022 Yes    Moderate protein-calorie malnutrition ([facility] 1/9/2022 Yes    Atrial fibrillation with RVR ([facility] 1/10/2022 Yes    COVID-19 1/19/2022 Yes    Traumatic hematoma of buttock 1/19/2022 Yes    Acute distention of stomach 1/21/2022 Yes          Plan:        Sepsis : resolved   Decreased hydrocortisone to once daily today    COVID 19 Pneumonia :   S/p Actemra 1/8  Steroids   Vit C/D  Titrate inflammatory markers  ID involved  Meropenem started 1/21 due to development of consolidative infiltrates on repeat CT chest.     Acute hypoxemic respiratory failure: Likely secondary to Covid 19 pneumonia , S/P intubation 1/22    CKD with metabolic acidosis: improving,  continue to avoid for nephrotoxic agents, nephrology recommendations    DM2: Restart sliding scale as patient is going to start on tube feeds and blood sugars are up, continue with hypoglycemia protocol    Acute blood loss anemia due to gluteal bleed: Patient status post 3 unit of packed RBCs since admission, currently stable, continue to hold full anticoagulation, DAPT resumed on 1/20    A. fib with RVR: Currently rate controlled, resume home beta-blockers, restart eliquis     CODE STATUS is DNR CCA    Peripheral vascular disease: continue antiplatelets     HTN: Home beta-blockers resumed today    HPL: continue home medications    DVT prophylaxis :  Resumed on 1/23    DC cortef, pulm to wean from vent. Dc fluids.  Restart eliquis 2.5mg BID does NOT have retroperitoneal bleed.  It is gluteal     Benigno Tolbert DO  1/25/2022  8:35 AM

## 2022-01-25 NOTE — PROGRESS NOTES
[facility] [address] Patient  Today's Date and Time: 1/25/2022, 11:16 AM    Impression :     · COVID 19 Confirmed Infection  · Covid tests:  · 1/8/21: Positive  · Acute hypoxic respiratory failure  · Paroxysmal A. 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  · Essential hypertension  · Elevated inflammatory markers  · Hyperlipidemia  · Acute gluteal hematoma  · Patient has not received the Covid vaccine  · Intubated 1/22/22      Recommendations:   · Antibiotic treatment:  · Meropenem 500 mg BID IV for leukocytosis & possible secondary bacterial pneumonia 1/11/21-discontinued 1/17/21  · Meropenem Re-started 1-21-22 because of residual dense consolidation of the lungs with air bronchograms  · Covid Rx:    · Remdesivir-contraindicated with JULES  · Monoclonal antibodies-out of window  · Decadron-initiated 1/8/2022  · Actemra-administered 1/8/2022    · The patient still has significant hypoxia is currently requiring high flow/BiPAP. · He is insisting on trying to go home and does not quite understand that he is requiring a significant amount of respiratory support. · He did mention to me that he was happy to die at home but it is my understanding that the recently changed his CODE STATUS to comfort care arrest to full code.   · His code status has again been changed to [facility]  · His code status changed back to a full code after he agreed to intubation on 1/22/22  · Continue supportive care      Medical Decision Making/Summary/Discussion:1/25/2022     · Patient admitted with COVID 19 infection  · Isolation until 1/28/22    Infection Control Recommendations   · Universal Precautions  · Airborne isolation  · Droplet Isolation    Antimicrobial Stewardship Recommendations       · Renal considerations  Coordination of Outpatient Care:   · Estimated Length of IV antimicrobials:TBD  · Patient will need Midline Catheter Insertion: TBD  · Patient will need PICC line Insertion: No  · Patient will need: Home IV , [place],  SNF,  LTAC:TBD  · Patient will need outpatient wound care:No    Chief complaint/reason for consultation:   · Concern for COVID infection      History of Present Illness:   Sera Maguire is a [de-identified]y.o.-year-old male who was initially admitted on 1/8/2022. Patient seen at the request of Dr. Laura Rivera:    Patient presented through ER with complaints of needing shortness of breath, cough, loss of appetite, nausea, vomiting and diarrhea over the past 7 to 10 days. He has not received the Covid vaccine and tested positive for Covid shortly after [place]. On evaluation in the ED, the patient had an SPO2 of 80% on room air and was tachypneic. A rapid Covid swab was positive. CT chest shows extensive bilateral pulmonary groundglass opacities. He was started on Decadron and given a dose of Actemra. Abnormal labs include:  · Creatinine 2.32  ·   · Troponin I 48  · WBC 17.1    Patient admitted because of concerns with COVID 19.     Meropenem initiated on 1/11/2022 for worsening respiratory distress and leukocytosis    Stable chest x-ray 1/11    CRP is trending down    Hemoglobin dropped to 5.7 on 1/16/2022  Hemoglobin remained stable at this time after receiving infusions of PRBC  Left gluteal hematoma present on CT abdomen pelvis      Occult blood noted on stool  Anemia continues in the program patient required another blood transfusion on 1/21/2022  Anticoagulation on hold s/p gluteal hematoma      Impression CT Chest/Abdomen/Pelvis 1/21/22   1.  New small bilateral pleural effusions with extensive bilateral airspace   consolidation, increased from 01/08/2022.       2.  Decreased AP dimension of the distal trachea, suggesting tracheomalacia.       3.  Previously noted hematoma in the left gluteal musculature is not as well   visualized on today's study, but appears overall slightly decreased in size. No new areas of hematoma.       4.  Mild distention of the stomach, mild prominence of proximal small bowel   loops, and mild nonspecific stranding in the proximal mesentery.  No definite   evidence of high-grade bowel obstruction at this time.  Enteritis or early   developing obstruction are considered in the differential.         The patient required intubation for worsening respiratory failure on 1/22/2022. He is currently requiring 65% FiO2 with a PEEP of 12. Propofol is being given for sedation    CURRENT EVALUATION : 1/25/2022    Afebrile  VS stable    The patient remains on mechanical ventilation today with 50-->30-->35% FiO2 and PEEP of 5. Plan for extubation today    Leukocytosis increased to 17   Sputum 1/22 Normal respiratory alistair light growth  MRSA nares not detected  Meropenem initiated 1/22/22  Repeat cultures ordered 1/25    CXR ordered 1/25    We will continue to monitor    Patient exhibiting respiratory distress. Yes  Respiratory secretions: No    Patient receiving supplemental oxygen. BiPAP & Hi-flow NC-->MV  RR: 13-->31  02 sat: 94-->98    % FIO2: 65-->50-->30-->35  PEEP:  12-->8-->5    NEWS Score: 0-4 Low risk group; 5-6: Medium risk group; 7 or above: High risk group  Parameters 3 2 1 0 1 2 3   Age    < 65   ? 65   RR ? 8  9-11 12-20  21-24 ? 25   O2 Sats ?  91 92-93 94-95 ? 96      Suppl O2  Yes  No      SBP ? 90  101-110 111-219   ? 220   HR ? 40  41-50 51-90  111-130 ? 131   Consciousness    Alert   Drowsiness, lethargy, or confusion   Temperature ? 35.0 C (95.0 F)  35.1-36.0 C 95.1-96.9 F 36.1-38.0 C 97.0-100.4 F 38.1-39.0 C 100.5-102.3 F ? 39.1 C ? 102.4 F      NEWS Score:   1/9/2022: 5 moderate risk    Overall Daily Picture:      Improving    Presence of secondary bacterial Infection:    Possible  Additional antibiotics: Meropenem 1/21/2022    Labs, X rays reviewed: 1/25/2022    BUN:63  Cr:2.53    WBC:34.5-->14.9-->12.4  Hb:9.1-->8.2  Plat: 85-->89    Absolute Neutrophils:16  Absolute Lymphocytes:0.34  Neutrophil/Lymphocyte Ratio: 53 very high risk    CRP:160-->131-->52.3-->21.5  Ferritin:804  LDH: 563    Pro Calcitonin:      Cultures:  Urine:  ·   Blood:  ·   Sputum :  · 1/22: Gram negative rods  Wound:       CXR:     CAT:  1/8/21      Discussed with patient, RN, CC, IM. I have personally reviewed the past medical history, past surgical history, medications, social history, and family history, and I have updated the database accordingly.   Past Medical History:     Past Medical History:   Diagnosis Date    Chronic lower back pain     Cobalamin deficiency     [facility]     Diabetes mellitus [facility]     Hyperlipidemia     Hypertension     Malignant neoplasm of colon ([facility]     [facility]ipheral artery disease[facility] ([facility])        Past Surgical  History:     Past Surgical History:   Procedure Laterality Date    ARTERY SURGERY      INSERT MIDLINE CATHETER  1/24/2022            Medications:      miconazole   Topical BID    apixaban  2.5 mg Oral BID    famotidine  20 mg Oral Daily    oxyCODONE  7.5 mg Oral Q6H    magnesium sulfate  1,000 mg IntraVENous Once    insulin lispro  0-6 Units SubCUTAneous Q4H    insulin lispro  0-3 Units SubCUTAneous Nightly    metoprolol tartrate  50 mg Oral BID    sodium bicarbonate  650 mg Oral BID    meropenem  500 mg IntraVENous Q12H    busPIRone  10 mg Oral TID    senna  2 tablet Oral Nightly    amiodarone  200 mg Oral Daily    atorvastatin  20 mg Oral Nightly    clopidogrel  75 mg Oral Daily    aspirin  81 mg Oral Daily       Social History:     Social History     Socioeconomic History    Marital status:      Spouse name: Not on file    Number of children: Not on file    Years of education: Not on file    Highest education level: Not on file   Occupational History    Not on file   Tobacco Use  Smoking status: Former Smoker    Smokeless tobacco: Never Used   Substance and Sexual Activity    Alcohol use: Not Currently    Drug use: Never    Sexual activity: Not on file   Other Topics Concern    Not on file   Social History Narrative    Not on file     Social Determinants of Health     Financial Resource Strain:     Difficulty of Paying Living Expenses: Not on file   Food Insecurity:     Worried About Running Out of Food in the Last Year: Not on file    Lesley of Food in the Last Year: Not on file   Transportation Needs:     Lack of Transportation (Medical): Not on file    Lack of Transportation (Non-Medical): Not on file   Physical Activity:     Days of Exercise per Week: Not on file    Minutes of Exercise per Session: Not on file   Stress:     Feeling of Stress : Not on file   Social Connections:     Frequency of Communication with Friends and Family: Not on file    Frequency of Social Gatherings with Friends and Family: Not on file    Attends [employer]: Not on file    Active Member of [address] [place] [address] or Organizations: Not on file    Attends Club or Organization Meetings: Not on file    Marital Status: Not on file   Intimate Partner Violence:     Fear of Current or Ex-Partner: Not on file    Emotionally Abused: Not on file    Physically Abused: Not on file    Sexually Abused: Not on file   Housing Stability:     Unable to Pay for Housing in the Last Year: Not on file    Number of [place] in the Last Year: Not on file    Unstable Housing in the Last Year: Not on file       Family History:   History reviewed. No pertinent family history. Allergies:   Patient has no known allergies. Review of Systems:     Review of Systems   Unable to perform ROS: Intubated   Respiratory: Positive for shortness of breath. Cardiovascular: Negative. Gastrointestinal: Negative. Genitourinary: Negative. Musculoskeletal: Negative. Skin: Positive for color change. Neurological: Negative. Physical Examination :     Patient Vitals for the past 8 hrs:   BP Temp Temp src Pulse Resp SpO2   01/25/22 1000  97.7 °F (36.5 °C) Bladder 80 (!) 31 97 %   01/25/22 0900    100 (!) 35 94 %   01/25/22 0851    102 (!) 35 93 %   01/25/22 0842    103 (!) 35 91 %   01/25/22 0800  97.7 °F (36.5 °C) Bladder 101 (!) 34 94 %   01/25/22 0740    100 30 95 %   01/25/22 0700    95 30 96 %   01/25/22 0632 (!) 181/47        01/25/22 0600  97.5 °F (36.4 °C)  78 17 98 %   01/25/22 0500    79 21 97 %   01/25/22 0400  98.2 °F (36.8 °C)  86 21 98 %     Physical Exam  Vitals and nursing note reviewed. Constitutional:       Appearance: He is well-developed. Comments: Intubated and sedated   HENT:      Head: Normocephalic and atraumatic. Cardiovascular:      Rate and Rhythm: Normal rate. Heart sounds: Murmur heard. Pulmonary:      Effort: Respiratory distress present. Breath sounds: No wheezing. Abdominal:      General: Bowel sounds are normal.      Palpations: Abdomen is soft. There is no mass. Tenderness: There is no abdominal tenderness. Musculoskeletal:         General: Swelling (In the bilateral upper extremities especially in the forearms.) present. Normal range of motion. Cervical back: Neck supple. Lymphadenopathy:      Cervical: No cervical adenopathy. Skin:     General: Skin is dry. Findings: Bruising (There is bruising and ecchymotic skin lesions in the forearms bilaterally right worse than left) present.    Neurological:      Comments: FINA-intubated and sedated         Medical Decision Making -Laboratory:   I have independently reviewed/ordered the following labs:    CBC with Differential:   Recent Labs     01/24/22  0457 01/24/22  0457 01/24/22  1613 01/25/22  0424   WBC 12.4*  --   --  17.2*   HGB 8.2*   < > 8.9* 10.0*   HCT 25.6*   < > 27.5* 31.0*   PLT 89*  --   --  135*   LYMPHOPCT 2*  --   --  2*   MONOPCT 3 pneumothorax.       Upper Abdomen: Limited images of the upper abdomen are unremarkable.       Soft Tissues/Bones: No acute bone or soft tissue abnormality.           Impression   *No evidence of pulmonary embolism. *Extensive ground-glass opacification of the bilateral lung fields with   peripheral involvement slight apical as well as basilar sparing.  Imaging   features suggestive of COVID-19 pneumonia, though are nonspecific and can   occur with a variety of infectious and noninfectious processes.         Narrative   EXAMINATION:   CT OF THE ABDOMEN AND PELVIS WITHOUT CONTRAST, 1/16/2022 10:42 am       TECHNIQUE:   CT of the abdomen and pelvis was performed without the administration of   intravenous contrast. Multiplanar reformatted images are provided for review. Dose modulation, iterative reconstruction, and/or weight based adjustment of   the mA/kV was utilized to reduce the radiation dose to as low as reasonably   achievable.       COMPARISON:   CT of the chest from 01/08/2022, and retroperitoneal ultrasound from   01/10/2022.       HISTORY:   ORDERING SYSTEM PROVIDED HISTORY:  Retrop bleed r/o   TECHNOLOGIST PROVIDED HISTORY:   Retrop bleed r/o   Reason for Exam:  Retrop bleed r/o       FINDINGS:   Lower Chest: As demonstrated on recent CT chest, extensive and somewhat   denser confluent ground-glass airspace opacities re-identified mid-lower   lungs, sparing the bases with some associated crazy paving, air bronchograms   and bibasilar atelectatic appearing changes.  Main pulmonary artery caliber   31 mm.  Mild dilatation ascending aorta, with a maximal dimension of 41 mm.       Organs: Unopacified liver, spleen, adrenal glands and both kidneys show no   acute abnormality; without suspicious focal finding or hydronephrosis.    Significant right renal cortical thinning and some scarring suspected.  No   large stone.  Probable gallbladder sludge or vicarious contrast from recent   contrast CT; no calcified stones.       GI/Bowel: Small-moderate amount of retained stool, mostly right colon with   some fluid/levels; no abnormal dilatation, marked wall thickening or   pericolonic fat stranding.  Unremarkable small bowel.  Some fluid within a   mildly distended stomach.  Small hiatus hernia.       Pelvis: Partially fluid-filled urinary bladder without wall thickening or   mass.  No significant prostatic enlargement.  Symmetric seminal vesicles.  No   pelvic fluid collection or mass.  Partially visualized approximate 10 x 15 x   5.7 cm left gluteal mass with HU measurements/appearance most suggestive of   hematoma.  No high density finding compatible with active bleeding, but   assessment limited on this examination.  Small fat containing inguinal   hernias, larger on the left.       Peritoneum/Retroperitoneum: No retroperitoneal bleed or bulky   lymphadenopathy.  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  Probable limited intimal dissection   suprarenal aorta without marked aneurysmal dilatation.  Soft tissue stranding   flanks extending into the pelvis, suggesting some degree anasarca.  Slightly   greater prominence right proximal rectus musculature       Bones/Soft Tissues: No acute abnormality.  Mild scoliosis, multilevel   degenerative changes of spine but with DDD L5-S1 and bilateral mild hip joint   degenerative findings.           Impression   Left gluteal hematoma, partially visualized on this study without obvious   active bleeding, but limited without IV contrast.  No retroperitoneal or   rectus sheath bleed.       Extensive findings in the visualized lungs compatible with known COVID-19   pneumonia; denser GGOs suggest worsening pneumonia/pneumonitis or developing   consolidation.  No pneumothorax.       Multiple additional findings, as detailed in the body of the report above.     RECOMMENDATIONS:   Consider follow-up CT pelvis including the upper thighs, if the patient does   not respond to resuscitation with blood products/fluids and other   conservative measures including localized pressure.  Findings were discussed   with Fransico Daly at 12:24 pm on 1/16/2022.             Narrative   EXAMINATION:   CT OF THE CHEST, ABDOMEN, AND PELVIS WITHOUT CONTRAST 1/21/2022 11:09 am       TECHNIQUE:   CT of the chest, abdomen and pelvis was performed without the administration   of intravenous contrast. Multiplanar reformatted images are provided for   review. Dose modulation, iterative reconstruction, and/or weight based   adjustment of the mA/kV was utilized to reduce the radiation dose to as low   as reasonably achievable.       COMPARISON:   CT abdomen and pelvis dated 01/16/2022.  CT chest dated 01/08/2022       HISTORY:   ORDERING SYSTEM PROVIDED HISTORY: blood loss anemia , gluteal hematoma and   worsening HB   TECHNOLOGIST PROVIDED HISTORY:   blood loss anemia , gluteal hematoma and worsening HB           FINDINGS:       Chest:       Mediastinum: Heart size is within normal limits.  Ascending thoracic aorta is   mildly dilated, measuring 4 cm in AP dimension, similar to the previous   study.  Main pulmonary artery is stable in caliber as well.  No mediastinal   hematoma or lymphadenopathy. Marco Motto is a catheter in the SVC with distal tip   in the distal SVC.       Lungs/pleura: There are small bilateral pleural effusions, which are new from   the previous study. Marco Motto is extensive dense airspace consolidation   throughout both lungs, with mild sparing at the bases, increased from   01/08/2022. Marco Motto is decreased AP dimension of the lower trachea, although   tracheobronchial tree remains patent.       Soft Tissues/Bones: There is no acute or suspicious osseous abnormality.    Visualized superficial soft tissues are within normal limits.           Abdomen/Pelvis:       Organs: Limited unenhanced liver is within normal limits.  There is a tiny   amount of free fluid adjacent to the hepatic dome.  Gallbladder, spleen,   pancreas, and adrenal glands are unremarkable.       There is bilateral renal atrophy, more so on the right, unchanged.  No   hydronephrosis or perinephric fluid collection. Pollie Dy is mild bilateral   perinephric stranding, which is unchanged and likely physiologic.       GI/Bowel: The stomach is mildly distended with air-fluid level noted.  No   abnormal bowel distention.  There is mild increased prominence of the   proximal small bowel loops.  There is mild stranding in the proximal   mesentery.  No focal pericolonic inflammation.  No free air.       Pelvis: Urinary bladder is within normal limits.  No evidence of pelvic   lymphadenopathy.       Peritoneum/Retroperitoneum: Distal aortobifemoral graft noted.  Caliber of   the abdominal aorta is unchanged.  There is moderate to severe scattered   atherosclerosis, which is unchanged.  No retroperitoneal lymphadenopathy or   hematoma.  Slit-like IVC is noted.       Bones/Soft Tissues: There is no acute or suspicious osseous abnormality.  The   hematoma previously seen in the left gluteal muscle is not as well visualized   on today's study due to isoattenuation.  There is asymmetric swelling of the   left gluteal muscle, although slightly improved when compared to 01/16/2022. AP dimension in the area of suspected hematoma is 4.4 cm (previously 5.7 cm).    Transverse dimension is approximately 9.3 cm (previously 9.7 cm).  There is   stranding in the subcutaneous fat of the upper thighs bilaterally.           Impression   1.  New small bilateral pleural effusions with extensive bilateral airspace   consolidation, increased from 01/08/2022.       2.  Decreased AP dimension of the distal trachea, suggesting tracheomalacia.       3.  Previously noted hematoma in the left gluteal musculature is not as well   visualized on today's study, but appears overall slightly decreased in size. No new areas of hematoma.       4.  Mild distention of the stomach, mild prominence of proximal small bowel   loops, and mild nonspecific stranding in the proximal mesentery.  No definite   evidence of high-grade bowel obstruction at this time.  Enteritis or early   developing obstruction are considered in the differential.         Medical Decision [id number]:     Culture, Blood 1 [[id number]] Collected: 01/09/22 1155   Order Status: Completed Specimen: Blood Updated: 01/14/22 1300    Specimen Description . BLOOD    Special Requests NOT REPORTED    Culture NO GROWTH 5 DAYS   Culture, Blood 1 [[id number]] Collected: 01/09/22 1155   Order Status: Completed Specimen: Blood Updated: 01/14/22 1300    Specimen Description . BLOOD    Special Requests NOT REPORTED    Culture NO GROWTH 5 DAYS   COVID-19, Rapid [[id number]] (Abnormal) Collected: 01/08/22 1045   Order Status: Completed Specimen: Nasopharyngeal Swab Updated: 01/08/22 1109    Specimen Description . NASOPHARYNGEAL SWAB    SARS-CoV-2, Rapid DETECTED Abnormal     Comment:        Rapid NAAT: The specimen is POSITIVE for SARS-Cov-2, the novel coronavirus associated with   COVID-19.         This test has been authorized by the FDA under an Emergency Use Authorization (EUA) for use   by authorized laboratories.         The ID NOW COVID-19 assay is designed to detect the virus that causes COVID-19 in patients   with signs and symptoms of infection who are suspected of COVID-19. An individual without symptoms of COVID-19 and who is not shedding SARS-CoV-2 virus would   expect to have a negative (not detected) result in this assay.    Fact sheet for Healthcare Providers: Dionne   Fact sheet for Patients: [email or web address]           Methodology: Isothermal Nucleic Acid Amplification         Results reported to the appropriate [facility]. 192 Decision Making-Other:     Note:  · Labs, medications, radiologic studies were reviewed with personal review of films  · Large amounts of data were reviewed  · Discussed with nursing Staff, Discharge planner  · Infection Control and Prevention measures reviewed  · All prior entries were reviewed  · Administer medications as ordered  · Prognosis: Guarded  · Discharge planning reviewed      Thank you for allowing us to participate in the care of this patient. Please call with questions. Electronically signed by MOHAMUD Gorman - CNP on 1/25/2022 at 11:16 AM      ATTESTATION:    I have discussed the case, including pertinent history and exam findings with the APRN. I have evaluated the  History, physical findings and pictures of the patient and the key elements of the encounter have been performed by me. I have reviewed the laboratory data, other diagnostic studies and discussed them with the APRN. I have updated the medical record where necessary. I agree with the assessment, plan and orders as documented by the APRN.     Alexis Perry MD.

## 2022-01-25 NOTE — PLAN OF CARE
Problem: Falls - Risk of:  Goal: Will remain free from falls  Description: Will remain free from falls  Outcome: Ongoing  Goal: Absence of physical injury  Description: Absence of physical injury  Outcome: Ongoing     Problem: Airway Clearance - Ineffective  Goal: Achieve or maintain patent airway  Outcome: Ongoing     Problem: Gas Exchange - Impaired  Goal: Absence of hypoxia  Outcome: Ongoing  Goal: Promote optimal lung function  Outcome: Ongoing     Problem: Breathing Pattern - Ineffective  Goal: Ability to achieve and maintain a regular respiratory rate  Outcome: Ongoing     Problem:  Body Temperature -  Risk of, Imbalanced  Goal: Ability to maintain a body temperature within defined limits  Outcome: Ongoing  Goal: Will regain or maintain usual level of consciousness  Outcome: Ongoing  Goal: Complications related to the disease process, condition or treatment will be avoided or minimized  Outcome: Ongoing     Problem: Isolation Precautions - Risk of Spread of Infection  Goal: Prevent transmission of infection  Outcome: Ongoing     Problem: Nutrition Deficits  Goal: Optimize nutritional status  Outcome: Ongoing     Problem: Risk for Fluid Volume Deficit  Goal: Maintain normal heart rhythm  Outcome: Ongoing  Goal: Maintain absence of muscle cramping  Outcome: Ongoing  Goal: Maintain normal serum potassium, sodium, calcium, phosphorus, and pH  Outcome: Ongoing     Problem: Loneliness or Risk for Loneliness  Goal: Demonstrate positive use of time alone when socialization is not possible  Outcome: Ongoing     Problem: Fatigue  Goal: Verbalize increase energy and improved vitality  Outcome: Ongoing     Problem: Patient Education: Go to Patient Education Activity  Goal: Patient/Family Education  Outcome: Ongoing     Problem: Skin Integrity:  Goal: Will show no infection signs and symptoms  Description: Will show no infection signs and symptoms  Outcome: Ongoing  Goal: Absence of new skin breakdown  Description: Absence of new skin breakdown  Outcome: Ongoing     Problem: Nutrition  Goal: Optimal nutrition therapy  Outcome: Ongoing     Problem: Pain:  Description: Pain management should include both nonpharmacologic and pharmacologic interventions.   Goal: Pain level will decrease  Description: Pain level will decrease  Outcome: Ongoing  Goal: Control of acute pain  Description: Control of acute pain  Outcome: Ongoing  Goal: Control of chronic pain  Description: Control of chronic pain  Outcome: Ongoing     Problem: OXYGENATION/RESPIRATORY FUNCTION  Goal: Patient will maintain patent airway  Outcome: Ongoing  Goal: Patient will achieve/maintain normal respiratory rate/effort  Description: Respiratory rate and effort will be within normal limits for the patient  Outcome: Ongoing     Problem: MECHANICAL VENTILATION  Goal: Patient will maintain patent airway  Outcome: Ongoing  Goal: Oral health is maintained or improved  Outcome: Ongoing  Goal: ET tube will be managed safely  Outcome: Ongoing  Goal: Ability to express needs and understand communication  Outcome: Ongoing  Goal: Mobility/activity is maintained at optimum level for patient  Outcome: Ongoing     Problem: SKIN INTEGRITY  Goal: Skin integrity is maintained or improved  Outcome: Ongoing

## 2022-01-25 NOTE — PROGRESS NOTES
01/25/22 0000 - 01/25/22 2359   Shift 8753-6055 0511-0537 2892-1232 24 Hour Total   INTAKE   I.V.(mL/kg) 143.4(2.1)   143.4(2.1)   IV Piggyback(mL/kg) 100(1.5)   100(1.5)   Shift Total(mL/kg) 243.4(3.6)   243.4(3.6)   OUTPUT   Urine(mL/kg/hr) 304(0.6) 118  422   Shift Total(mL/kg) 304(4.5) 118(1.8)  422(6.3)   Weight (kg) 67.2 67.2 67.2 67.2     Wt Readings from Last 3 Encounters:   01/08/22 148 lb 2.4 oz (67.2 kg)     Body mass index is 21.26 kg/m².         PHYSICAL EXAM:  Sedated  Lungs decreased breath sounds bilaterally  CVS regular S1-S2  Abdomen nontender bowel sounds are present  Lower extremity edema and upper extremity edema    MEDICATIONS:  Scheduled Meds:   miconazole   Topical BID    apixaban  2.5 mg Oral BID    famotidine  20 mg Oral Daily    insulin lispro  0-6 Units SubCUTAneous Q4H    insulin lispro  0-3 Units SubCUTAneous Nightly    metoprolol tartrate  50 mg Oral BID    sodium bicarbonate  650 mg Oral BID    meropenem  500 mg IntraVENous Q12H    busPIRone  10 mg Oral TID    senna  2 tablet Oral Nightly    amiodarone  200 mg Oral Daily    atorvastatin  20 mg Oral Nightly    clopidogrel  75 mg Oral Daily    aspirin  81 mg Oral Daily     Continuous Infusions:   dexmedetomidine      fentaNYL Stopped (01/25/22 0835)    propofol Stopped (01/25/22 0835)    midazolam Stopped (01/24/22 1300)    sodium chloride      sodium chloride      dextrose 100 mL/hr (01/19/22 0010)    sodium chloride       PRN Meds:   hydrALAZINE, 10 mg, Q6H PRN  sodium chloride, , PRN  sodium chloride flush, 5-40 mL, PRN  sodium chloride, 25 mL, PRN  melatonin, 3 mg, Nightly PRN  sodium chloride, 1 spray, PRN  LORazepam, 0.5 mg, Q2H PRN  glucose, 15 g, PRN  dextrose, 12.5 g, PRN  glucagon (rDNA), 1 mg, PRN  dextrose, 100 mL/hr, PRN  metoprolol, 5 mg, Q6H PRN  benzonatate, 200 mg, TID PRN  sodium chloride flush, 10 mL, PRN  sodium chloride flush, 5-40 mL, PRN  sodium chloride, 25 mL, PRN  ondansetron, 4 mg, Q8H PRN   Or  ondansetron, 4 mg, Q6H PRN  polyethylene glycol, 17 g, Daily PRN  acetaminophen, 650 mg, Q6H PRN   Or  acetaminophen, 650 mg, Q6H PRN        SUPPORT DEVICES: [x] Ventilator [] BIPAP  [] Nasal Cannula [] Room Air      ABGs:     Lab Results   Component Value Date    [id number] NOT REPORTED 01/25/2022    FIO2 40.0 01/25/2022       DATA:  Complete Blood Count:   Recent Labs     01/23/22  0405 01/23/22  0405 01/24/22 0457 01/24/22  1613 01/25/22 0424   WBC 14.9*  --  12.4*  --  17.2*   RBC 2.89*  --  2.62*  --  3.19*   HGB 9.1*   < > 8.2* 8.9* 10.0*   HCT 27.4*   < > 25.6* 27.5* 31.0*   MCV 94.8  --  97.7  --  97.2   MCH 31.5  --  31.3  --  31.3   MCHC 33.2  --  32.0  --  32.3   RDW 17.3*  --  17.7*  --  18.3*   PLT 85*  --  89*  --  135*   MPV 10.9  --  11.1  --  10.4    < > = values in this interval not displayed.         Last 3 Blood Glucose:   Recent Labs     01/23/22  0405 01/24/22 0457 01/25/22 0424   GLUCOSE 178* 148* 172*        PT/INR:    Lab Results   Component Value Date    PROTIME 11.6 01/22/2022    INR 1.1 01/22/2022     PTT:    Lab Results   Component Value Date    APTT 25.2 01/22/2022       Comprehensive Metabolic Profile:   Recent Labs     01/23/22 0405 01/24/22 0457 01/25/22  0424    144 142   K 4.8 4.1 4.0   * 111* 108*   CO2 22 23 23   BUN 54* 63* 66*   CREATININE 2.32* 2.53* 2.25*   GLUCOSE 178* 148* 172*   CALCIUM 7.9* 8.1* 8.0*      Magnesium:   Lab Results   Component Value Date    MG 2.6 01/15/2022    MG 2.6 01/14/2022    MG 2.3 01/10/2022     Phosphorus:   Lab Results   Component Value Date    PHOS 5.3 01/22/2022     Ionized Calcium:   Lab Results   Component Value Date    CAION 1.21 01/22/2022        Urinalysis:   Lab Results   Component Value Date    NITRU NEGATIVE 01/17/2022    COLORU Yellow 01/17/2022    PHUR 5.0 01/17/2022    WBCUA None 01/17/2022    RBCUA 2 TO 5 01/17/2022    MUCUS 1+ 01/17/2022    TRICHOMONAS NOT REPORTED 01/17/2022    YEAST NOT REPORTED 01/17/2022 BACTERIA NOT REPORTED 01/17/2022    SPECGRAV 1.018 01/17/2022    LEUKOCYTESUR NEGATIVE 01/17/2022    UROBILINOGEN Normal 01/17/2022    BILIRUBINUR NEGATIVE 01/17/2022    GLUCOSEU 1+ 01/17/2022    KETUA NEGATIVE 01/17/2022    AMORPHOUS NOT REPORTED 01/17/2022               XR CHEST PORTABLE    Result Date: 1/22/2022  1. The endotracheal tube tip is located 5.3 cm above the zheng. 2. Worsening bilateral lung infiltrates, compatible with pneumonia, including COVID. 3. Small bilateral pleural effusions. XR ABDOMEN FOR NG/OG/NE TUBE PLACEMENT    Result Date: 1/22/2022  1. The orogastric tube tip and side port are noted in the gastric fundus. CT CHEST ABDOMEN PELVIS WO CONTRAST    Result Date: 1/21/2022  1. New small bilateral pleural effusions with extensive bilateral airspace consolidation, increased from 01/08/2022. 2.  Decreased AP dimension of the distal trachea, suggesting tracheomalacia. 3.  Previously noted hematoma in the left gluteal musculature is not as well visualized on today's study, but appears overall slightly decreased in size. No new areas of hematoma. 4.  Mild distention of the stomach, mild prominence of proximal small bowel loops, and mild nonspecific stranding in the proximal mesentery. No definite evidence of high-grade bowel obstruction at this time.   Enteritis or early developing obstruction are considered in the differential. RECOMMENDATIONS: Unavailable            VENTILATOR SETTINGS:  Vent Information  $Ventilation: $Subsequent Day  Skin Assessment: Clean, dry, & intact  Suction Catheter Diameter: 14  Equipment Changed: HME  Vent Type: Servo i  Vent Mode: (S) PRVC (back to full support per Dr. Jessica Cross, high RR/WOB)  Vt Ordered: 510 mL  Rate Set: 16 bmp  Pressure Support: (S) 12 cmH20  FiO2 : (S) 35 % (physician aware)  SpO2: 94 %  SpO2/FiO2 ratio: 265.71  Sensitivity: 5  PEEP/CPAP: 5  I Time/ I Time %: 0.8 s  Humidification Source: HME  Humidification Temp: 31  Humidification Temp Measured: 31  Nitric Oxide/Epoprostenol In Use?: No  Mask Type: Full face mask  Mask Size: Medium     PaO2/FiO2 RATIO:  Recent Labs     01/25/22  0452   POCPO2 69.3*      FiO2 : (S) 35 % (physician aware)       LABS:  ABGs:   Recent Labs     01/22/22  1724 01/23/22  0433 01/24/22  0610 01/25/22  0452   POCPH 7.404 7.405 7.401 7.411   POCPCO2 35.3 37.8 24.9* 40.8   POCPO2 141.3* 61.3* 86.2 69.3*   POCHCO3 22.1 23.7 15.5* 25.9   XMOP0LLV 99* 91* 97 94            ASSESSMENT:     Principal Problem:    Pneumonia due to COVID-19 virus  Active Problems:    Essential hypertension    Hyperlipidemia    Acute hypoxemic respiratory failure due to COVID-19 [facility])    Acute kidney injury superimposed on chronic kidney disease ([facility]), baseline creatinine 1.9    Stage 3b chronic kidney disease ([facility])    Elevated troponin    Nonrheumatic aortic valve stenosis    PAD (peripheral artery disease) ([facility])    Moderate protein-calorie malnutrition ([facility])    Atrial fibrillation with RVR ([facility])    COVID-19    Traumatic hematoma of buttock    Acute distention of stomach  Resolved Problems:    * No resolved hospital problems. *              Acute hypoxic respiratory failure secondary to COVID 19   Acute respiratory distress syndrome   Bilateral multifocal pneumonia due to COVID 19 infection   Covid -19 pandemic emergency     LOS: 17  PLAN:    D/w RT  D/w RN   Vent setting reviewed . SBT bid .  He is not tolerating   Sedation reviewed, continue propofol , start oral roxciodone and use fentanyl prn   Airborne isolation and droplet precautions to be continued  Continue supportive care   Cont treatment with medications for COVID  Cont tube feeding   wean peep and fio2 - keep sats >90 %   Monitor endotracheal secretions   Will obtain xray chest and ABG as needed  Antibiotics per ID patient is on meropenem for gram-negative rods and respiratory cultures  Magnesium 1 gram iv   He is full code        Treatment plan Discussed with nursing staff in detail , all questions answered . Total critical care time caring for this patient with life threatening, unstable organ failure, including direct patient contact, management of life support systems, review of data including imaging and labs, discussions with other team members and physicians at least 28  Min so far today, excluding procedures. Electronically signed by Catie Serrano MD on 1/25/2022 at 10:09 AM       This patient was evaluated in the context of the global SARS-CoV-2 (COVID-19) pandemic, which necessitated considerations that the patient either has COVID-19 infection or is at risk of infection with COVID-19. Institutional protocols and algorithms that pertain to the evaluation & management of patients with COVID-19 or those at risk for COVID-19 are in a state of rapid changes based on information released by regulatory bodies including the CDC and federal and state organizations. These policies and algorithms were followed during the patient's care. Please note that this chart was generated using voice recognition Dragon dictation software. Although every effort was made to ensure the accuracy of this automated transcription, some errors in transcription may have occurred.

## 2022-01-25 NOTE — PROGRESS NOTES
Renal Progress Note    Patient :  Cindy Grayson; [de-identified] y.o. MRN# [id number]  Location:  [id number]/[id number]  Attending:  Dale Herrera DO  Admit Date:  1/8/2022   Hospital Day: 17      Subjective: The patient is seen and examined in the room today with the patient in COVID-19 isolation. Following for JULES/ATN on CKD III with baseline 1.8-2.0, peaked at 3.0 in setting of contrast exposure and severe anemia from spontaneous left gluteal area hematoma; admitted with COVID. Remains Intubated and on the ventilator. Creatinine is starting improved today compared to yesterday. Creatinine was 2.53 yesterday and is down to 2.25 today. If worsening, the patient has had multiple episodes of 18 on this admission likely secondary to hypoxia and hypotension and anemia. Labs today, 1/25/2022 showed sodium 142 with potassium 4 chloride 108 CO2 23 BUN 66 creatinine 2.25 with calcium 8. ABG shows good acid base balance with pH 7.41 with PCO2 41 and PO2 69 and bicarbonate 26. He is sedated on the ventilator. I/O is 1289/2051 ml after the patient was given Lasix 40 mg IV x1 yesterday. Again, creatinine is better today. Restarted on Meropenem per ID for increasing lung infiltrates. Chest x-ray shows worsening bilateral lung infiltrates compatible with pneumonia and small bilateral pleural effusions.     Outpatient Medications:     Medications Prior to Admission: aspirin 325 MG tablet, Take 81 mg by mouth daily   atorvastatin (LIPITOR) 40 MG tablet, Take 20 mg by mouth nightly  lisinopril (PRINIVIL;ZESTRIL) 30 MG tablet, Take 30 mg by mouth daily  metoprolol tartrate (LOPRESSOR) 50 MG tablet, Take 50 mg by mouth daily  cilostazol (PLETAL) 100 MG tablet, Take 100 mg by mouth 2 times daily  clopidogrel (PLAVIX) 75 MG tablet, Take 75 mg by mouth daily  vitamin B-12 (CYANOCOBALAMIN) 1000 MCG tablet, Take 1,000 mcg by mouth daily    Current Medications:     Scheduled Meds:    miconazole   Topical BID    apixaban  2.5 mg Oral BID    famotidine  20 mg Oral Daily    oxyCODONE  7.5 mg Oral Q6H    magnesium sulfate  1,000 mg IntraVENous Once    furosemide  80 mg IntraVENous Q12H    insulin lispro  0-6 Units SubCUTAneous Q4H    insulin lispro  0-3 Units SubCUTAneous Nightly    metoprolol tartrate  50 mg Oral BID    sodium bicarbonate  650 mg Oral BID    meropenem  500 mg IntraVENous Q12H    busPIRone  10 mg Oral TID    senna  2 tablet Oral Nightly    amiodarone  200 mg Oral Daily    atorvastatin  20 mg Oral Nightly    clopidogrel  75 mg Oral Daily    aspirin  81 mg Oral Daily     Continuous Infusions:    propofol 20 mcg/kg/min (22 1043)    midazolam Stopped (22 1300)    sodium chloride      sodium chloride      dextrose 100 mL/hr (22 0010)    sodium chloride       PRN Meds:  fentanNYL **OR** fentanNYL, hydrALAZINE, sodium chloride, sodium chloride flush, sodium chloride, melatonin, sodium chloride, LORazepam, glucose, dextrose, glucagon (rDNA), dextrose, metoprolol, benzonatate, sodium chloride flush, sodium chloride flush, sodium chloride, ondansetron **OR** ondansetron, polyethylene glycol, acetaminophen **OR** acetaminophen    Input/Output:       I/O last 3 completed shifts: In: 1614.4 [I.V.:485.4; NG/GT:929; IV Piggyback:200]  Out: 2446 [Urine:2446]. No data found.     Vital Signs:   Temperature:  Temp: 97.7 °F (36.5 °C)  TMax:   Temp (24hrs), Av.8 °F (36.6 °C), Min:97.5 °F (36.4 °C), Max:98.2 °F (36.8 °C)    Respirations:  Resp: 29  Pulse:   Pulse: 80  BP:    BP: (!) 181/47  BP Range: Systolic (20KWX), IVAN:510 , Min:181 , HJS:094       Diastolic (81PZV), DJB:81, Min:47, Max:88      Physical Examination:     General:  Intubated, sedated and on the ventilator  Neck:   No JVD, no accessory muscle use, midline trachea  Chest:              Bilateral air entry with coarse bilateral breath sounds  Cardiac:  Regular rate and rhythm positive S1 and S2 and positive murmur  Abdomen: Soft, mildly distended, active bowel sounds   SKIN:  No rashes, good skin turgor. Extremities:  No edema, palpable peripheral pulses     Labs:       Recent Labs     01/23/22  0405 01/23/22  0405 01/24/22  0457 01/24/22  1613 01/25/22  0424   WBC 14.9*  --  12.4*  --  17.2*   RBC 2.89*  --  2.62*  --  3.19*   HGB 9.1*   < > 8.2* 8.9* 10.0*   HCT 27.4*   < > 25.6* 27.5* 31.0*   MCV 94.8  --  97.7  --  97.2   MCH 31.5  --  31.3  --  31.3   MCHC 33.2  --  32.0  --  32.3   RDW 17.3*  --  17.7*  --  18.3*   PLT 85*  --  89*  --  135*   MPV 10.9  --  11.1  --  10.4    < > = values in this interval not displayed. BMP:   Recent Labs     01/23/22  0405 01/24/22  0457 01/25/22  0424    144 142   K 4.8 4.1 4.0   * 111* 108*   CO2 22 23 23   BUN 54* 63* 66*   CREATININE 2.32* 2.53* 2.25*   GLUCOSE 178* 148* 172*   CALCIUM 7.9* 8.1* 8.0*      Phosphorus:     No results for input(s): PHOS in the last 72 hours.   SUMMER:      Lab Results   Component Value Date    SUMMER NEGATIVE 01/09/2022     SPEP:  Lab Results   Component Value Date    PROT 6.1 01/09/2022     C3:     Lab Results   Component Value Date    C3 134 01/09/2022     C4:     Lab Results   Component Value Date    C4 31 01/09/2022       Urinalysis/Chemistries:      Lab Results   Component Value Date    NITRU NEGATIVE 01/17/2022    COLORU Yellow 01/17/2022    PHUR 5.0 01/17/2022    WBCUA None 01/17/2022    RBCUA 2 TO 5 01/17/2022    MUCUS 1+ 01/17/2022    TRICHOMONAS NOT REPORTED 01/17/2022    YEAST NOT REPORTED 01/17/2022    BACTERIA NOT REPORTED 01/17/2022    SPECGRAV 1.018 01/17/2022    LEUKOCYTESUR NEGATIVE 01/17/2022    UROBILINOGEN Normal 01/17/2022    BILIRUBINUR NEGATIVE 01/17/2022    GLUCOSEU 1+ 01/17/2022    KETUA NEGATIVE 01/17/2022    AMORPHOUS NOT REPORTED 01/17/2022     Urine Sodium:     Lab Results   Component Value Date    CHERYL 56 01/10/2022     Urine Creatinine:     Lab Results   Component Value Date    LABCREA 90.8 01/09/2022       Radiology:     CXR: 1/22/22  1. The endotracheal tube tip is located 5.3 cm above the zheng. 2. Worsening bilateral lung infiltrates, compatible with pneumonia, including   COVID. 3. Small bilateral pleural effusions       Assessment:     1. Acute Kidney Injury: Secondary to ischemic ATN and nephrotoxic ATN initially from COVID-pneumonia, systemic inflammation, contrast exposure. Baseline 2.0 peaked at 3.0. Thereafter developed left gluteal area hematoma with drop in hemoglobin down to 5.7, decreased renal perfusion, had a secondary ATN creatinine peaked at 3.96. creatinine came down initially and then magali again associated with worsening pulmonary edema on chest x-ray. Creatinine again is coming down at 2.25 today after a dose of 40 mg of IV Lasix yesterday. He is high risk of needing dialysis on this admission. Never established with nephrologist prior to admission  2. 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 Persistent anemia, requiring multiple transfusions likely from left gluteal area hematoma  4. COVID-19 pneumonia with respiratory failure now intubated. FiO2 is 30% with some weaning her current  5. Severe aortic stenosis valve area 1.0 cm squares, peak gradient 57 mean 37    Plan:   1. Labs in am are ordered  2. Increase Lasix to 80 mg IV every 12 hours to help with the patient's pulmonary edema and respiratory failure  3. High risk of needing dialysis on this admission  4. Will continue to follow    Nutrition   Please ensure that patient is on a renal diet/TF. Avoid nephrotoxic drugs/contrast exposure. We will continue to follow along with you.        Everardo Rinaldi MD   Nephrology Attending Physician  [facility]  1/25/2022

## 2022-01-26 NOTE — PROGRESS NOTES
Nephrology Progress Note      SUBJECTIVE      Patient seen and examined this afternoon  Patient currently intubated, on 1 pressor still. Patient has developed acute renal injury. His baseline serum creatinine is around 1.8 so he does have CKD 3 to begin with. His creatinine had gone as high as 3 and it is down to 2.2 yesterday, little up at 2.6 today. Received extra diuretic yesterday. Chest x-ray continues to show bilateral lung infiltrates    Cardiac echo shows preserved LV function    OBJECTIVE      CURRENT TEMPERATURE:  Temp: 98.1 °F (36.7 °C)  MAXIMUM TEMPERATURE OVER 24HRS:  Temp (24hrs), Av.3 °F (36.8 °C), Min:97.7 °F (36.5 °C), Max:98.8 °F (37.1 °C)    CURRENT RESPIRATORY RATE:  Resp: 20  CURRENT PULSE:  Pulse: 75  CURRENT BLOOD PRESSURE:  BP: (!) 174/45  24HR BLOOD PRESSURE RANGE:  Systolic (73TAX), HKN:706 , Min:174 , GZP:302   ; Diastolic (33ASU), BUM:12, Min:45, Max:64    24HR INTAKE/OUTPUT:      Intake/Output Summary (Last 24 hours) at 2022 1404  Last data filed at 2022 1224  Gross per 24 hour   Intake 1594.5 ml   Output 3335 ml   Net -1740.5 ml     WEIGHT :No data found. PHYSICAL EXAM      GENERAL APPEARANCE: Intubated, sedated  SKIN: warm and dry, no rash or erythema  EYES: conjunctivae normal and sclera anicteric  ENT: ETT in place  NECK:   No JVD. No carotid bruits and no carotid lymphadenopathy . PULMONARY: Coarse crackles bilaterally heard  CADRDIOVASCULAR: S1 and S2 normal NO S3 and NO S4 . + Systolic murmur.    ABDOMEN: soft nontender, bowel sounds present, no organomegaly,      EXTREMITIES: + Slight edema     CURRENT MEDICATIONS      metoclopramide (REGLAN) injection 10 mg, Q6H PRN  bisacodyl (DULCOLAX) suppository 10 mg, Daily PRN  fentaNYL 20 mcg/mL Infusion, Continuous  miconazole (MICOTIN) 2 % powder, BID  apixaban (ELIQUIS) tablet 2.5 mg, BID  famotidine (PEPCID) tablet 20 mg, Daily  oxyCODONE (ROXICODONE) immediate release tablet 7.5 mg, Q6H  furosemide (LASIX) hematoma  4. COVID-19 pneumonia with respiratory failure now intubated. FiO2 is 30% with some weaning her current  5.  Severe aortic stenosis valve area     PLAN      1. Decrease Lasix IV. 2.  Supportive care to continue. 3. Follow up labs ordered. 4.  Continue to follow very closely in regards to his renal status. He is at least at a moderate risk to develop requirement for RRT      Please do not hesitate to call with questions.     This note is created with the assistance of a speech-recognition program. While intending to generate a document that actually reflects the content of the visit, no guarantees can be provided that every mistake has been identified and corrected by editing    Electronically signed by Elisabet Call MD on 1/26/2022 at 2:04 PM

## 2022-01-26 NOTE — PROGRESS NOTES
[facility]  Office: [address], DO, Meredith Home, DO, Glendy Gutierrez, DO, Vika Watson Blood, DO, Harish Pearce MD, Candis Heath MD, Josselin Swain MD, Brandon Dexter MD, Brannon Sherman MD, Yousuf Christiansen MD, Leila Kong MD, Brittney Fox, DO, Shari Flores, DO, Maykel Roque MD,  Alethea Orellana, DO, Gulshan Leslie MD, Amara Morrison MD, Josi Ray MD, Flakita Damon MD, Nehemiah Judd MD, Becky Morton MD, Rosario Onofre MD, Collette Goodwin, [facility], [facility], CNP, Lulu Chen, CNP, Ngoc Soliz, CNS, Liudmila Swain, CNP, Stephanie Gomez, CNP, Lara Manzano, CNP, Khurram Silva, CNP, Betina Willingham, CNP, Jose Khan PA-C, Kavita Arreguin, [facility], Sydnee Rivera, [facility], Indio Li, CNP, Christiano Bello, CNP, Fabián Costello, CNP, Perla Serrato, CNP, Gladys White, [facility], Ingrid Chambers, [address] [place] [address]    Progress Note    1/26/2022    11:10 AM    Name:   Shankar Reyez  MRN:     [id number]     Acct:      [de-identified]   Room:   [address] [place] [address] Day:  25  Admit Date:  1/8/2022  9:49 AM    PCP:   MOHAMUD Ramires CNP  Code Status:  Full Code    Subjective:     C/C:   Chief Complaint   Patient presents with    Shortness of Breath    Nausea & Vomiting     Interval History Status: improving    Patient on CPAP. Still not following commands. Awaiting further recommendations from pulm. Brief History:     81-year-old male past medical history hypertension, hyperlipidemia, CKD stage IIIb, aortic valve stenosis, peripheral arterial disease presents with shortness of breath found to be in acute respiratory failure secondary to COVID-19 as well as A. fib with RVR. Patient being followed by cardiology, infectious disease, critical care, nephrology was also seen the patient.   Patient was originally started on heparin drip for A. fib with RVR however was discontinued due to acute blood loss anemia and bilateral hematomas on both upper extremities. Patient required 2 units of red blood cells on 1/16/2022. Patient was also evaluated by nephrology due to JULES on CKD stage III which improved with IV hydration. For COVID-19 patient status post Actemra on/8/2022, and Decadron 1/8/2022. Patient continues on high flow. Patient originally did change his CODE STATUS from DNR CCA to full code on 11/13/2022 and reverted back to DNR CCA on 11/17/2022. Review of Systems:     Review of Systems   Unable to perform ROS: Intubated       Medications:      Allergies:  No Known Allergies    Current Meds:   Scheduled Meds:    miconazole   Topical BID    apixaban  2.5 mg Oral BID    famotidine  20 mg Oral Daily    oxyCODONE  7.5 mg Oral Q6H    furosemide  80 mg IntraVENous Q12H    insulin lispro  0-6 Units SubCUTAneous Q4H    insulin lispro  0-3 Units SubCUTAneous Nightly    metoprolol tartrate  50 mg Oral BID    sodium bicarbonate  650 mg Oral BID    meropenem  500 mg IntraVENous Q12H    busPIRone  10 mg Oral TID    senna  2 tablet Oral Nightly    amiodarone  200 mg Oral Daily    atorvastatin  20 mg Oral Nightly    clopidogrel  75 mg Oral Daily    aspirin  81 mg Oral Daily     Continuous Infusions:    propofol Stopped (01/26/22 0809)    sodium chloride      sodium chloride      dextrose 100 mL/hr (01/19/22 0010)    sodium chloride       PRN Meds: metoclopramide, bisacodyl, fentanNYL **OR** fentanNYL, hydrALAZINE, sodium chloride, sodium chloride flush, sodium chloride, melatonin, sodium chloride, LORazepam, glucose, dextrose, glucagon (rDNA), dextrose, metoprolol, benzonatate, sodium chloride flush, sodium chloride flush, sodium chloride, ondansetron **OR** ondansetron, polyethylene glycol, acetaminophen **OR** acetaminophen    Data:     Past Medical History:   has a past medical history of Chronic lower back pain, Cobalamin deficiency, CVA (cerebral vascular accident) ([facility] Diabetes mellitus ([facility] Hyperlipidemia, Hypertension, Malignant neoplasm of colon ([facility] and PAD (peripheral artery disease) ([facility] Social History:   reports that he has quit smoking. He has never used smokeless tobacco. He reports previous alcohol use. He reports that he does not use drugs. Family History: History reviewed. No pertinent family history. Vitals:  BP (!) 174/45   Pulse 68   Temp 97.9 °F (36.6 °C)   Resp 18   Ht 5' 10\" (1.778 m)   Wt 148 lb 2.4 oz (67.2 kg)   SpO2 100%   BMI 21.26 kg/m²   Temp (24hrs), Av.3 °F (36.8 °C), Min:97.7 °F (36.5 °C), Max:98.8 °F (37.1 °C)    Recent Labs     22  1933 22  0005 22  0413 22  0832   POCGLU 166* 129* 147* 153*       I/O (24Hr): Intake/Output Summary (Last 24 hours) at 2022 1110  Last data filed at 2022 0900  Gross per 24 hour   Intake 1512.5 ml   Output 3250 ml   Net -1737.5 ml       Labs:  Hematology:  Recent Labs     22  0457 22  0457 22  1613 22  0424 22  0433   WBC 12.4*  --   --  17.2* 12.8*   RBC 2.62*  --   --  3.19* 3.41*   HGB 8.2*   < > 8.9* 10.0* 10.5*   HCT 25.6*   < > 27.5* 31.0* 33.0*   MCV 97.7  --   --  97.2 96.8   MCH 31.3  --   --  31.3 30.8   MCHC 32.0  --   --  32.3 31.8   RDW 17.7*  --   --  18.3* 18.3*   PLT 89*  --   --  135* 139   MPV 11.1  --   --  10.4 10.6    < > = values in this interval not displayed.      Chemistry:  Recent Labs     22  0457 22  0424 22  0433    142 143   K 4.1 4.0 3.8   * 108* 108*   CO2  24   GLUCOSE 148* 172* 160*   BUN 63* 66* 75*   CREATININE 2.53* 2.25* 2.67*   ANIONGAP 10 11 11   LABGLOM 25* 28* 23*   GFRAA 30* 34* 28*   CALCIUM 8.1* 8.0* 8.0*     Recent Labs     22  1232 22  1628 22  1933 22  0005 22  0413 22  0832   POCGLU 201* 176* 166* 129* 147* 153*     ABG:  Lab Results   Component Value Date    POCPH 7.436 2022    POCPCO2 39.9 2022    POCPO2 71.7 2022    POCHCO3 26.9 2022    NBEA NOT REPORTED 01/26/2022    PBEA 2 01/26/2022    STG3QWL NOT REPORTED 01/26/2022    ZPKG2USE 95 01/26/2022    FIO2 40.0 01/26/2022     Lab Results   Component Value Date/Time    SPECIAL NOT REPORTED 01/25/2022 09:01 PM     Lab Results   Component Value Date/Time    CULTURE PENDING 01/25/2022 09:01 PM       Radiology:  CT ABDOMEN PELVIS WO CONTRAST Additional Contrast? None    Result Date: 1/16/2022  Left gluteal hematoma, partially visualized on this study without obvious active bleeding, but limited without IV contrast.  No retroperitoneal or rectus sheath bleed. Extensive findings in the visualized lungs compatible with known COVID-19 pneumonia; denser GGOs suggest worsening pneumonia/pneumonitis or developing consolidation. No pneumothorax. Multiple additional findings, as detailed in the body of the report above. RECOMMENDATIONS: Consider follow-up CT pelvis including the upper thighs, if the patient does not respond to resuscitation with blood products/fluids and other conservative measures including localized pressure. Findings were discussed with Paola Chicas at 12:24 pm on 1/16/2022. XR CHEST PORTABLE    Result Date: 1/16/2022  Right IJ line in satisfactory position. Unchanged or slightly worse bilateral diffuse infiltrates consistent with the patient's history of COVID pneumonia. XR CHEST PORTABLE    Result Date: 1/14/2022  Patchy bilateral interstitial and ground-glass infiltrates most notably in the lung periphery. Findings have progressed from the prior exam compatible with patient's history of COVID pneumonia     XR CHEST PORTABLE    Result Date: 1/11/2022  Unchanged bilateral airspace opacities       Physical Examination:        Physical Exam  Vitals and nursing note reviewed. Constitutional:       General: He is not in acute distress. Interventions: He is sedated and intubated. HENT:      Head: Normocephalic and atraumatic.    Eyes:      Conjunctiva/sclera: Conjunctivae normal. Pupils: Pupils are equal, round, and reactive to light. Neck:      Comments: Central line noted  Cardiovascular:      Rate and Rhythm: Normal rate and regular rhythm. Heart sounds: No murmur heard. Pulmonary:      Effort: Pulmonary effort is normal. No accessory muscle usage or respiratory distress. He is intubated. Breath sounds: No stridor. Decreased breath sounds and rales present. No wheezing or rhonchi. Abdominal:      General: Bowel sounds are normal. There is no distension. Palpations: Abdomen is soft. Abdomen is not rigid. Tenderness: There is no abdominal tenderness. There is no guarding. Musculoskeletal:         General: No tenderness. Skin:     General: Skin is warm and dry. Findings: Bruising and ecchymosis present. No erythema, lesion or rash. Neurological:      Cranial Nerves: No cranial nerve deficit. Motor: No seizure activity. Assessment:        Hospital Problems           Last Modified POA    * (Principal) Pneumonia due to COVID-19 virus 1/8/2022 Yes    Essential hypertension 1/8/2022 Yes    Hyperlipidemia 1/8/2022 Yes    Acute hypoxemic respiratory failure due to COVID-19 [facility]) 1/8/2022 Yes    Acute kidney injury superimposed on chronic kidney disease ([address] baseline creatinine 1.9 1/8/2022 Yes    Stage 3b chronic kidney disease ([address] 1/8/2022 Yes    Elevated troponin 1/9/2022 Yes    Nonrheumatic aortic valve stenosis 1/9/2022 Yes    PAD (peripheral artery disease) ([address] 1/9/2022 Yes    Moderate protein-calorie malnutrition ([address] 1/9/2022 Yes    Atrial fibrillation with RVR ([address] 1/10/2022 Yes    COVID-19 1/19/2022 Yes    Traumatic hematoma of buttock 1/19/2022 Yes    Acute distention of stomach 1/21/2022 Yes          Plan:        COVID 19 Pneumonia : S/p Actemra 1/8. Meropenem started 1/21 due to development of consolidative infiltrates on repeat CT chest.     Acute hypoxemic respiratory failure: Likely secondary to Covid 19 pneumonia , S/P intubation 1/22    Severe aortic stenosislikely will need outpatient follow-up with cardiology once discharged and cleared from Covid    CKD with metabolic acidosis: improving,  continue to avoid for nephrotoxic agents, nephrology recommendations. Increase lasix     DM2: controlled    Acute blood loss anemia due to gluteal bleed: restart reduced dose eliquis 2.5mg BID due to age and weight    A. fib with RVR: Currently rate controlled, resume home beta-blockers, restarted eliquis     Peripheral vascular disease: continue antiplatelets     HTN: Home beta-blockers resumed today    HPL: continue home medications    DVT prophylaxis :  Resumed on 1/23    Antibiotics per infectious disease, lasix per nephrology- Pulm for vent. Extubate when able.      Colten Baez DO  1/26/2022  11:10 AM

## 2022-01-26 NOTE — PLAN OF CARE
Problem: Non-Violent Restraints  Goal: Removal from restraints as soon as assessed to be safe  Outcome: Ongoing  Goal: No harm/injury to   Problem: SKIN INTEGRITY  Goal: Skin integrity is maintained or improved  1/26/2022 0158 by Tex Hurd RN  Outcome: Met This Shift     Problem: MECHANICAL VENTILATION  Goal: Patient will maintain patent airway  1/26/2022 0158 by Tex Hurd RN  Outcome: Met This Shift  Goal: Oral health is maintained or improved  1/26/2022 0158 by Tex Hurd RN  Outcome: Met This Shift  Goal: ET tube will be managed safely  1/26/2022 0158 by Tex Hurd RN  Outcome: Met This Shift  Goal: Ability to express needs and understand communication  1/26/2022 0158 by Tex Hurd RN  Outcome: Met This Shift  Goal: Mobility/activity is maintained at optimum level for patient  1/26/2022 0158 by Tex Hurd RN  Outcome: Met This Shift   patient while restraints in use  Outcome: Ongoing

## 2022-01-26 NOTE — PLAN OF CARE
Problem: Falls - Risk of:  Goal: Will remain free from falls  Description: Will remain free from falls  Outcome: Met This Shift  Goal: Absence of physical injury  Description: Absence of physical injury  Outcome: Met This Shift     Problem: Airway Clearance - Ineffective  Goal: Achieve or maintain patent airway  Outcome: Met This Shift     Problem: Gas Exchange - Impaired  Goal: Absence of hypoxia  Outcome: Met This Shift  Goal: Promote optimal lung function  Outcome: Met This Shift     Problem: Skin Integrity:  Goal: Will show no infection signs and symptoms  Description: Will show no infection signs and symptoms  Outcome: Met This Shift  Goal: Absence of new skin breakdown  Description: Absence of new skin breakdown  Outcome: Met This Shift     Problem: Pain:  Goal: Pain level will decrease  Description: Pain level will decrease  Outcome: Met This Shift  Goal: Control of acute pain  Description: Control of acute pain  Outcome: Met This Shift  Goal: Control of chronic pain  Description: Control of chronic pain  Outcome: Met This Shift     Problem: MECHANICAL VENTILATION  Goal: Patient will maintain patent airway  Outcome: Met This Shift  Goal: Oral health is maintained or improved  Outcome: Met This Shift  Goal: ET tube will be managed safely  Outcome: Met This Shift  Goal: Ability to express needs and understand communication  Outcome: Met This Shift  Goal: Mobility/activity is maintained at optimum level for patient  Outcome: Met This Shift     Problem: SKIN INTEGRITY  Goal: Skin integrity is maintained or improved  Outcome: Met This Shift

## 2022-01-26 NOTE — PROGRESS NOTES
Progress Note    Patient - Zita Brice  Date of Admission -  2022  9:49 AM  Date of Evaluation -  2022  Room and Bed Number -  5688/8322-93   Hospital Day - 18    CHIEF COMPLAINT : ACUTE HYPOXIC RESPIRATORY FAILURE DUE TO COVID -19 PNEUMONIA   SUBJECTIVE:     OVERNIGHT EVENTS:         Patient seen while getting spontaneous breathing trial, he has increased work of breathing. I placed him back on PRVC.   Will restart fentanyl drip and continue propofol          SECRETIONS  -Amount:  [x] Small [] Moderate  [] Large    [] None  Color:     [x] White [] Colored  [] Bloody    SEDATION:    [x] Propofol gtt  [] Versed gtt  [x] FENTANYL  gtt    [] No Sedation    PARALYZED:  [x] No    [] Yes    VASOPRESSORS:  [x] No    [] Yes  [] Levophed [] Dopamine [] Vasopressin  [] Dobutamine [] Phenylephrine [] Epinephrine    INHALED NITRIC OXIDE : [x] No    [] Yes    PRONE :       [x] No    [] Yes    Actemra:             [] No    [x] Yes    DEXAMETHASONE : [] No    [x] Yes      Ros unable to perform due to intubation and sedation    OBJECTIVE:     VITAL SIGNS:  BP (!) 174/45   Pulse 68   Temp 97.9 °F (36.6 °C)   Resp 18   Ht 5' 10\" (1.778 m)   Wt 148 lb 2.4 oz (67.2 kg)   SpO2 100%   BMI 21.26 kg/m²   Tmax over 24 hours:  Temp (24hrs), Av.3 °F (36.8 °C), Min:97.7 °F (36.5 °C), Max:98.8 °F (37.1 °C)      Patient Vitals for the past 8 hrs:   Temp Temp src Pulse Resp SpO2   22 0952   68 18 100 %   22 0900 97.9 °F (36.6 °C)  76 22 99 %   22 0815   82 22 98 %   22 0800 97.9 °F (36.6 °C) Bladder 81 19 100 %   22 0745   85 19 100 %   22 0600 98.4 °F (36.9 °C) Bladder 90 22 98 %   22 0500   84 22 99 %   22 0400 98.4 °F (36.9 °C) Bladder 89 (!) 31 98 %   22 0358   89 26 100 %   22 0300   83 23 98 %         Intake/Output Summary (Last 24 hours) at 2022 1032  Last data filed at 2022 0900  Gross per 24 hour   Intake 1512.5 ml   Output 3320 ml Net -1807.5 ml     Date 01/26/22 0000 - 01/26/22 2359   Shift 0276-4035 6550-6359 3649-5726 24 Hour Total   INTAKE   I.V.(mL/kg) 88.5(1.3)   88.5(1.3)   NG/GT(mL/kg) 348(5.2) 80(1.2)  428(6.4)   IV Piggyback(mL/kg) 100(1.5)   100(1.5)   Shift Total(mL/kg) 536.5(8) 80(1.2)  616.5(9.2)   OUTPUT   Urine(mL/kg/hr) 1025(1.9) 250  1275   Shift Total(mL/kg) 1025(15.3) 250(3.7)  1275(19)   Weight (kg) 67.2 67.2 67.2 67.2     Wt Readings from Last 3 Encounters:   01/08/22 148 lb 2.4 oz (67.2 kg)     Body mass index is 21.26 kg/m².         PHYSICAL EXAM:  Sedated, opens eyes to voice  Lungs decreased breath sounds bilaterally  CVS regular S1-S2  Abdomen nontender bowel sounds are present  Lower extremity edema and upper extremity edema    MEDICATIONS:  Scheduled Meds:   miconazole   Topical BID    apixaban  2.5 mg Oral BID    famotidine  20 mg Oral Daily    oxyCODONE  7.5 mg Oral Q6H    furosemide  80 mg IntraVENous Q12H    insulin lispro  0-6 Units SubCUTAneous Q4H    insulin lispro  0-3 Units SubCUTAneous Nightly    metoprolol tartrate  50 mg Oral BID    sodium bicarbonate  650 mg Oral BID    meropenem  500 mg IntraVENous Q12H    busPIRone  10 mg Oral TID    senna  2 tablet Oral Nightly    amiodarone  200 mg Oral Daily    atorvastatin  20 mg Oral Nightly    clopidogrel  75 mg Oral Daily    aspirin  81 mg Oral Daily     Continuous Infusions:   propofol Stopped (01/26/22 0809)    sodium chloride      sodium chloride      dextrose 100 mL/hr (01/19/22 0010)    sodium chloride       PRN Meds:   metoclopramide, 10 mg, Q6H PRN  bisacodyl, 10 mg, Daily PRN  fentanNYL, 50 mcg, Q2H PRN   Or  fentanNYL, 100 mcg, Q2H PRN  hydrALAZINE, 10 mg, Q6H PRN  sodium chloride, , PRN  sodium chloride flush, 5-40 mL, PRN  sodium chloride, 25 mL, PRN  melatonin, 3 mg, Nightly PRN  sodium chloride, 1 spray, PRN  LORazepam, 0.5 mg, Q2H PRN  glucose, 15 g, PRN  dextrose, 12.5 g, PRN  glucagon (rDNA), 1 mg, PRN  dextrose, 100 COLORU Yellow 01/17/2022    PHUR 5.0 01/17/2022    WBCUA None 01/17/2022    RBCUA 2 TO 5 01/17/2022    MUCUS 1+ 01/17/2022    TRICHOMONAS NOT REPORTED 01/17/2022    YEAST NOT REPORTED 01/17/2022    BACTERIA NOT REPORTED 01/17/2022    SPECGRAV 1.018 01/17/2022    LEUKOCYTESUR NEGATIVE 01/17/2022    UROBILINOGEN Normal 01/17/2022    BILIRUBINUR NEGATIVE 01/17/2022    GLUCOSEU 1+ 01/17/2022    KETUA NEGATIVE 01/17/2022    AMORPHOUS NOT REPORTED 01/17/2022               XR CHEST PORTABLE    Result Date: 1/22/2022  1. The endotracheal tube tip is located 5.3 cm above the zheng. 2. Worsening bilateral lung infiltrates, compatible with pneumonia, including COVID. 3. Small bilateral pleural effusions. XR ABDOMEN FOR NG/OG/NE TUBE PLACEMENT    Result Date: 1/22/2022  1. The orogastric tube tip and side port are noted in the gastric fundus. CT CHEST ABDOMEN PELVIS WO CONTRAST    Result Date: 1/21/2022  1. New small bilateral pleural effusions with extensive bilateral airspace consolidation, increased from 01/08/2022. 2.  Decreased AP dimension of the distal trachea, suggesting tracheomalacia. 3.  Previously noted hematoma in the left gluteal musculature is not as well visualized on today's study, but appears overall slightly decreased in size. No new areas of hematoma. 4.  Mild distention of the stomach, mild prominence of proximal small bowel loops, and mild nonspecific stranding in the proximal mesentery. No definite evidence of high-grade bowel obstruction at this time.   Enteritis or early developing obstruction are considered in the differential. RECOMMENDATIONS: Unavailable            VENTILATOR SETTINGS:  Vent Information  $Ventilation: $Subsequent Day  Skin Assessment: Clean, dry, & intact  Suction Catheter Diameter: 14  Equipment Changed: HME  Vent Type: Servo i  Vent Mode: (S) CPAP (start SBT)  Vt Ordered: 580 mL  Rate Set: 16 bmp  Pressure Support: 10 cmH20  FiO2 : 40 %  SpO2: 100 %  SpO2/FiO2 ratio: 245  Sensitivity: 2  PEEP/CPAP: 5  I Time/ I Time %: 0.7 s  Humidification Source: HME  Humidification Temp: 31  Humidification Temp Measured: 31  Nitric Oxide/Epoprostenol In Use?: No  Mask Type: Full face mask  Mask Size: Medium     PaO2/FiO2 RATIO:  Recent Labs     01/26/22  0559   POCPO2 71.7*      FiO2 : 40 %       LABS:  ABGs:   Recent Labs     01/24/22  0610 01/25/22  0452 01/26/22  0559   POCPH 7.401 7.411 7.436   POCPCO2 24.9* 40.8 39.9   POCPO2 86.2 69.3* 71.7*   POCHCO3 15.5* 25.9 26.9   [id number] 97 94 95            ASSESSMENT:     Principal Problem:    Pneumonia due to COVID-19 virus  Active Problems:    Essential hypertension    Hyperlipidemia    Acute hypoxemic respiratory failure due to COVID-19 [facility])    Acute kidney injury superimposed on chronic kidney disease ([facility]), baseline creatinine 1.9    Stage 3b chronic kidney disease ([facility])    Elevated troponin    Nonrheumatic aortic valve stenosis    PAD (peripheral artery disease) ([facility])    Moderate protein-calorie malnutrition ([facility])    Atrial fibrillation with RVR ([facility])    COVID-19    Traumatic hematoma of buttock    Acute distention of stomach  Resolved Problems:    * No resolved hospital problems. *              Acute hypoxic respiratory failure secondary to COVID 19   Acute respiratory distress syndrome   Bilateral multifocal pneumonia due to COVID 19 infection   Covid -19 pandemic emergency    Severe Aortic stenosis    A. fib with RVR    LOS: 18  PLAN:    D/w RT  D/w RN   Vent setting reviewed . Continue PRVC and will continue to attempt daily spontaneous breathing trial.  Sedation reviewed and discussed with nursing staff patient is continued on propofol and tolerates fentanyl which helps with his work of breathing and comfort. Will restart fentanyl drip. I was paged by nursing staff that patient went into A. fib with RVR. I have started patient on amiodarone drip and discontinued oral amiodarone.   Consulted cardiology since patient also has severe aortic stenosis as seen on echocardiogram.  Continue supportive care   Cont treatment with medications for COVID  Cont tube feeding   wean peep and fio2 - keep sats >90 %   Monitor endotracheal secretions   Will obtain xray chest and ABG as needed  Antibiotics per ID patient is on meropenem for gram-negative rods and respiratory cultures    I called patient's wife Mrs. Davida Alex along with Hiram Clancy RN. I have updated her regarding ongoing clinical condition. I informed her that patient continues to fail weaning trial.  Also updated that he has severe aortic stenosis and the Covid infiltrates are persistent. He also has JULES. Patient will be out of isolation this Friday on 28 January. Family will be visiting patient then. Advised her to discuss goals of care with family. We will keep her updated. Treatment plan Discussed with nursing staff in detail , all questions answered . Total critical care time caring for this patient with life threatening, unstable organ failure, including direct patient contact, management of life support systems, review of data including imaging and labs, discussions with other team members and physicians at least 28  Min so far today, excluding procedures. Electronically signed by Vipin Joshi MD on 1/26/2022 at 10:32 AM       This patient was evaluated in the context of the global SARS-CoV-2 (COVID-19) pandemic, which necessitated considerations that the patient either has COVID-19 infection or is at risk of infection with COVID-19. Institutional protocols and algorithms that pertain to the evaluation & management of patients with COVID-19 or those at risk for COVID-19 are in a state of rapid changes based on information released by regulatory bodies including the CDC and federal and state organizations. These policies and algorithms were followed during the patient's care.   Please note that this chart was generated using voice recognition Dragon dictation software. Although every effort was made to ensure the accuracy of this automated transcription, some errors in transcription may have occurred.

## 2022-01-26 NOTE — PROGRESS NOTES
[facility] [address] Patient  Today's Date and Time: 1/26/2022, 12:08 PM    Impression :     · COVID 19 Confirmed Infection  · Covid tests:  · 1/8/21: Positive  · Acute hypoxic respiratory failure  · Paroxysmal A. 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  · Essential hypertension  · Elevated inflammatory markers  · Hyperlipidemia  · Acute gluteal hematoma  · Patient has not received the Covid vaccine  · Intubated 1/22/22      Recommendations:   · Antibiotic treatment:  · Meropenem 500 mg BID IV for leukocytosis & possible secondary bacterial pneumonia 1/11/21-discontinued 1/17/21  · Meropenem Re-started 1-21-22 because of residual dense consolidation of the lungs with air bronchograms  · Gram positive cocci clusters sputum 1/25  · Covid Rx:    · Remdesivir-contraindicated with JULES  · Monoclonal antibodies-out of window  · Decadron-initiated 1/8/2022  · Actemra-administered 1/8/2022    · The patient still has significant hypoxia is currently requiring high flow/BiPAP. · He is insisting on trying to go home and does not quite understand that he is requiring a significant amount of respiratory support. · He did mention to me that he was happy to die at home but it is my understanding that the recently changed his CODE STATUS to comfort care arrest to full code.   · His code status has again been changed to [facility]  · His code status changed back to a full code after he agreed to intubation on 1/22/22  · Continue supportive care      Medical Decision Making/Summary/Discussion:1/26/2022     · Patient admitted with COVID 19 infection  · Isolation until 1/28/22    Infection Control Recommendations   · Universal Precautions  · Airborne isolation  · Droplet Isolation    Antimicrobial Stewardship Recommendations       · Renal considerations  Coordination of Outpatient Care:   · Estimated Length of IV antimicrobials:TBD  · Patient will need Midline Catheter Insertion: TBD  · Patient will need PICC line Insertion: No  · Patient will need: Home IV , [place],  SNF,  LTAC:TBD  · Patient will need outpatient wound care:No    Chief complaint/reason for consultation:   · Concern for COVID infection      History of Present Illness:   Bony Sherman is a [de-identified]y.o.-year-old male who was initially admitted on 1/8/2022. Patient seen at the request of Dr. Rosa Chol:    Patient presented through ER with complaints of needing shortness of breath, cough, loss of appetite, nausea, vomiting and diarrhea over the past 7 to 10 days. He has not received the Covid vaccine and tested positive for Covid shortly after [place]. On evaluation in the ED, the patient had an SPO2 of 80% on room air and was tachypneic. A rapid Covid swab was positive. CT chest shows extensive bilateral pulmonary groundglass opacities. He was started on Decadron and given a dose of Actemra. Abnormal labs include:  · Creatinine 2.32  ·   · Troponin I 48  · WBC 17.1    Patient admitted because of concerns with COVID 19.     Meropenem initiated on 1/11/2022 for worsening respiratory distress and leukocytosis    Stable chest x-ray 1/11    CRP is trending down    Hemoglobin dropped to 5.7 on 1/16/2022  Hemoglobin remained stable at this time after receiving infusions of PRBC  Left gluteal hematoma present on CT abdomen pelvis      Occult blood noted on stool  Anemia continues in the program patient required another blood transfusion on 1/21/2022  Anticoagulation on hold s/p gluteal hematoma      Impression CT Chest/Abdomen/Pelvis 1/21/22   1.  New small bilateral pleural effusions with extensive bilateral airspace   consolidation, increased from 01/08/2022.       2.  Decreased AP dimension of the distal trachea, suggesting tracheomalacia.       3.  Previously noted hematoma in the left gluteal musculature is not as well visualized on today's study, but appears overall slightly decreased in size. No new areas of hematoma.       4.  Mild distention of the stomach, mild prominence of proximal small bowel   loops, and mild nonspecific stranding in the proximal mesentery.  No definite   evidence of high-grade bowel obstruction at this time.  Enteritis or early   developing obstruction are considered in the differential.         The patient required intubation for worsening respiratory failure on 1/22/2022. He is currently requiring 65% FiO2 with a PEEP of 12. Propofol is being given for sedation    CURRENT EVALUATION : 1/26/2022    Afebrile  VS stable    The patient remains on mechanical ventilation today with 40% FiO2 and PEEP of 5. He is undergoing another weaning trial.    Leukocytosis increased to 17   Sputum 1/22 Normal respiratory alistair light growth  MRSA nares not detected  Meropenem initiated 1/22/22  Repeat cultures ordered 1/25-gram positive cocci clusters sputum    CXR ordered 1/25-stable    We will continue to monitor    Patient exhibiting respiratory distress. Yes  Respiratory secretions: No    Patient receiving supplemental oxygen. BiPAP & Hi-flow NC-->MV  RR: 13-->31-->20  02 sat: 94-->98-->99    % FIO2: 65-->50-->30-->35-->40  PEEP:  12-->8-->5    NEWS Score: 0-4 Low risk group; 5-6: Medium risk group; 7 or above: High risk group  Parameters 3 2 1 0 1 2 3   Age    < 65   ? 65   RR ? 8  9-11 12-20  21-24 ? 25   O2 Sats ?  91 92-93 94-95 ? 96      Suppl O2  Yes  No      SBP ? 90  101-110 111-219   ? 220   HR ? 40  41-50 51-90  111-130 ? 131   Consciousness    Alert   Drowsiness, lethargy, or confusion   Temperature ? 35.0 C (95.0 F)  35.1-36.0 C 95.1-96.9 F 36.1-38.0 C 97.0-100.4 F 38.1-39.0 C 100.5-102.3 F ? 39.1 C ? 102.4 F      NEWS Score:   1/9/2022: 5 moderate risk    Overall Daily Picture:      Improving    Presence of secondary bacterial Infection:    Possible  Additional antibiotics: Meropenem 1/21/2022    Labs, X rays reviewed: 1/26/2022    BUN:63-->75  Cr:2.53-->2.67    WBC:34.5-->14.9-->12.4-->17.2-->12.8  Hb:9.1-->8.2-->10.5  Plat: 85-->89-->139    Absolute Neutrophils:16  Absolute Lymphocytes:0.34  Neutrophil/Lymphocyte Ratio: 53 very high risk    CRP:160-->131-->52.3-->21.5  Ferritin:804  LDH: 563    Pro Calcitonin:      Cultures:  Urine:  ·   Blood:  ·   Sputum :  · 1/22: Gram negative rods  Wound:       CXR:     CAT:  1/8/21      Discussed with patient, RN, CC, IM. I have personally reviewed the past medical history, past surgical history, medications, social history, and family history, and I have updated the database accordingly.   Past Medical History:     Past Medical History:   Diagnosis Date    Chronic lower back pain     Cobalamin deficiency     [facility]     Diabetes mellitus [facility] [facility]     Hyperlipidemia     Hypertension     Malignant neoplasm of colon ([facility]     [facility]ipheral [facility]        Past Surgical  History:     Past Surgical History:   Procedure Laterality Date    ARTERY SURGERY      INSERT MIDLINE CATHETER  1/24/2022            Medications:      miconazole   Topical BID    apixaban  2.5 mg Oral BID    famotidine  20 mg Oral Daily    oxyCODONE  7.5 mg Oral Q6H    furosemide  80 mg IntraVENous Q12H    insulin lispro  0-6 Units SubCUTAneous Q4H    insulin lispro  0-3 Units SubCUTAneous Nightly    metoprolol tartrate  50 mg Oral BID    sodium bicarbonate  650 mg Oral BID    meropenem  500 mg IntraVENous Q12H    busPIRone  10 mg Oral TID    senna  2 tablet Oral Nightly    amiodarone  200 mg Oral Daily    atorvastatin  20 mg Oral Nightly    clopidogrel  75 mg Oral Daily    aspirin  81 mg Oral Daily       Social History:     Social History     Socioeconomic History    Marital status:      Spouse name: Not on file    Number of children: Not on file    Years of education: Not on file    Highest education level: Not on file   Occupational History    Not on file   Tobacco Use    Smoking status: Former Smoker    Smokeless tobacco: Never Used   Substance and Sexual Activity    Alcohol use: Not Currently    Drug use: Never    Sexual activity: Not on file   Other Topics Concern    Not on file   Social History Narrative    Not on file     Social Determinants of Health     Financial Resource Strain:     Difficulty of Paying Living Expenses: Not on file   Food Insecurity:     Worried About Running Out of Food in the Last Year: Not on file    Lesley of Food in the Last Year: Not on file   Transportation Needs:     Lack of Transportation (Medical): Not on file    Lack of Transportation (Non-Medical): Not on file   Physical Activity:     Days of Exercise per Week: Not on file    Minutes of Exercise per Session: Not on file   Stress:     Feeling of Stress : Not on file   Social Connections:     Frequency of Communication with Friends and Family: Not on file    Frequency of Social Gatherings with Friends and Family: Not on file    Attends [employer] Services: Not on file    Active Member of [address] [place] [address] or Organizations: Not on file    Attends Club or Organization Meetings: Not on file    Marital Status: Not on file   Intimate Partner Violence:     Fear of Current or Ex-Partner: Not on file    Emotionally Abused: Not on file    Physically Abused: Not on file    Sexually Abused: Not on file   Housing Stability:     Unable to Pay for Housing in the Last Year: Not on file    Number of [place] in the Last Year: Not on file    Unstable Housing in the Last Year: Not on file       Family History:   History reviewed. No pertinent family history. Allergies:   Patient has no known allergies. Review of Systems:     Review of Systems   Unable to perform ROS: Intubated   Respiratory: Positive for shortness of breath. Cardiovascular: Negative. Gastrointestinal: Negative. Genitourinary: Negative. Musculoskeletal: Negative. Skin: Positive for color change. Neurological: Negative. Physical Examination :     Patient Vitals for the past 8 hrs:   Temp Temp src Pulse Resp SpO2   01/26/22 1100   82 26 100 %   01/26/22 1000   71 22 100 %   01/26/22 0952   68 18 100 %   01/26/22 0900 97.9 °F (36.6 °C)  76 22 99 %   01/26/22 0815   82 22 98 %   01/26/22 0800 97.9 °F (36.6 °C) Bladder 81 19 100 %   01/26/22 0745   85 19 100 %   01/26/22 0600 98.4 °F (36.9 °C) Bladder 90 22 98 %   01/26/22 0500   84 22 99 %     Physical Exam  Vitals and nursing note reviewed. Constitutional:       Appearance: He is well-developed. Comments: Intubated and sedated   HENT:      Head: Normocephalic and atraumatic. Cardiovascular:      Rate and Rhythm: Normal rate. Heart sounds: Murmur heard. Pulmonary:      Effort: Respiratory distress present. Breath sounds: No wheezing. Abdominal:      General: Bowel sounds are normal.      Palpations: Abdomen is soft. There is no mass. Tenderness: There is no abdominal tenderness. Musculoskeletal:         General: Swelling (In the bilateral upper extremities especially in the forearms.) present. Normal range of motion. Cervical back: Neck supple. Lymphadenopathy:      Cervical: No cervical adenopathy. Skin:     General: Skin is dry. Findings: Bruising (There is bruising and ecchymotic skin lesions in the forearms bilaterally right worse than left) present.    Neurological:      Comments: FINA-intubated and sedated         Medical Decision Making -Laboratory:   I have independently reviewed/ordered the following labs:    CBC with Differential:   Recent Labs     01/25/22 0424 01/26/22  0433   WBC 17.2* 12.8*   HGB 10.0* 10.5*   HCT 31.0* 33.0*   * 139   LYMPHOPCT 2* 3*   MONOPCT 2 6     BMP:   Recent Labs     01/25/22  0424 01/26/22  0433    143   K 4.0 3.8   * 108*   CO2 23 24   BUN 66* 75*   CREATININE 2.25* pulmonary embolism. *Extensive ground-glass opacification of the bilateral lung fields with   peripheral involvement slight apical as well as basilar sparing.  Imaging   features suggestive of COVID-19 pneumonia, though are nonspecific and can   occur with a variety of infectious and noninfectious processes.         Narrative   EXAMINATION:   CT OF THE ABDOMEN AND PELVIS WITHOUT CONTRAST, 1/16/2022 10:42 am       TECHNIQUE:   CT of the abdomen and pelvis was performed without the administration of   intravenous contrast. Multiplanar reformatted images are provided for review. Dose modulation, iterative reconstruction, and/or weight based adjustment of   the mA/kV was utilized to reduce the radiation dose to as low as reasonably   achievable.       COMPARISON:   CT of the chest from 01/08/2022, and retroperitoneal ultrasound from   01/10/2022.       HISTORY:   ORDERING SYSTEM PROVIDED HISTORY:  Retrop bleed r/o   TECHNOLOGIST PROVIDED HISTORY:   Retrop bleed r/o   Reason for Exam:  Retrop bleed r/o       FINDINGS:   Lower Chest: As demonstrated on recent CT chest, extensive and somewhat   denser confluent ground-glass airspace opacities re-identified mid-lower   lungs, sparing the bases with some associated crazy paving, air bronchograms   and bibasilar atelectatic appearing changes.  Main pulmonary artery caliber   31 mm.  Mild dilatation ascending aorta, with a maximal dimension of 41 mm.       Organs: Unopacified liver, spleen, adrenal glands and both kidneys show no   acute abnormality; without suspicious focal finding or hydronephrosis. Significant right renal cortical thinning and some scarring suspected.  No   large stone.  Probable gallbladder sludge or vicarious contrast from recent   contrast CT; no calcified stones.       GI/Bowel: Small-moderate amount of retained stool, mostly right colon with   some fluid/levels; no abnormal dilatation, marked wall thickening or   pericolonic fat stranding. measures including localized pressure.  Findings were discussed   with Dana Ortiz at 12:24 pm on 1/16/2022.             Narrative   EXAMINATION:   CT OF THE CHEST, ABDOMEN, AND PELVIS WITHOUT CONTRAST 1/21/2022 11:09 am       TECHNIQUE:   CT of the chest, abdomen and pelvis was performed without the administration   of intravenous contrast. Multiplanar reformatted images are provided for   review. Dose modulation, iterative reconstruction, and/or weight based   adjustment of the mA/kV was utilized to reduce the radiation dose to as low   as reasonably achievable.       COMPARISON:   CT abdomen and pelvis dated 01/16/2022.  CT chest dated 01/08/2022       HISTORY:   ORDERING SYSTEM PROVIDED HISTORY: blood loss anemia , gluteal hematoma and   worsening HB   TECHNOLOGIST PROVIDED HISTORY:   blood loss anemia , gluteal hematoma and worsening HB           FINDINGS:       Chest:       Mediastinum: Heart size is within normal limits.  Ascending thoracic aorta is   mildly dilated, measuring 4 cm in AP dimension, similar to the previous   study.  Main pulmonary artery is stable in caliber as well.  No mediastinal   hematoma or lymphadenopathy. Vika Villalba is a catheter in the SVC with distal tip   in the distal SVC.       Lungs/pleura: There are small bilateral pleural effusions, which are new from   the previous study. Vika Villalba is extensive dense airspace consolidation   throughout both lungs, with mild sparing at the bases, increased from   01/08/2022. Vika Villalba is decreased AP dimension of the lower trachea, although   tracheobronchial tree remains patent.       Soft Tissues/Bones: There is no acute or suspicious osseous abnormality. Visualized superficial soft tissues are within normal limits.           Abdomen/Pelvis:       Organs: Limited unenhanced liver is within normal limits.  There is a tiny   amount of free fluid adjacent to the hepatic dome.  Gallbladder, spleen,   pancreas, and adrenal glands are unremarkable.     There is bilateral renal atrophy, more so on the right, unchanged.  No   hydronephrosis or perinephric fluid collection. Lunette Jw is mild bilateral   perinephric stranding, which is unchanged and likely physiologic.       GI/Bowel: The stomach is mildly distended with air-fluid level noted.  No   abnormal bowel distention.  There is mild increased prominence of the   proximal small bowel loops.  There is mild stranding in the proximal   mesentery.  No focal pericolonic inflammation.  No free air.       Pelvis: Urinary bladder is within normal limits.  No evidence of pelvic   lymphadenopathy.       Peritoneum/Retroperitoneum: Distal aortobifemoral graft noted.  Caliber of   the abdominal aorta is unchanged.  There is moderate to severe scattered   atherosclerosis, which is unchanged.  No retroperitoneal lymphadenopathy or   hematoma.  Slit-like IVC is noted.       Bones/Soft Tissues: There is no acute or suspicious osseous abnormality.  The   hematoma previously seen in the left gluteal muscle is not as well visualized   on today's study due to isoattenuation.  There is asymmetric swelling of the   left gluteal muscle, although slightly improved when compared to 01/16/2022. AP dimension in the area of suspected hematoma is 4.4 cm (previously 5.7 cm). Transverse dimension is approximately 9.3 cm (previously 9.7 cm).  There is   stranding in the subcutaneous fat of the upper thighs bilaterally.           Impression   1.  New small bilateral pleural effusions with extensive bilateral airspace   consolidation, increased from 01/08/2022.       2.  Decreased AP dimension of the distal trachea, suggesting tracheomalacia.       3.  Previously noted hematoma in the left gluteal musculature is not as well   visualized on today's study, but appears overall slightly decreased in size.    No new areas of hematoma.       4.  Mild distention of the stomach, mild prominence of proximal small bowel   loops, and mild nonspecific stranding in the proximal mesentery.  No definite   evidence of high-grade bowel obstruction at this time.  Enteritis or early   developing obstruction are considered in the differential.         Medical Decision Dabfbi-Hwlkdaqa-Kudiy:     Culture, Blood 1 [[id number]] Collected: 01/09/22 1155   Order Status: Completed Specimen: Blood Updated: 01/14/22 1300    Specimen Description . BLOOD    Special Requests NOT REPORTED    Culture NO GROWTH 5 DAYS   Culture, Blood 1 [[id number]] Collected: 01/09/22 1155   Order Status: Completed Specimen: Blood Updated: 01/14/22 1300    Specimen Description . BLOOD    Special Requests NOT REPORTED    Culture NO GROWTH 5 DAYS   COVID-19, Rapid [[id number]] (Abnormal) Collected: 01/08/22 1045   Order Status: Completed Specimen: Nasopharyngeal Swab Updated: 01/08/22 1109    Specimen Description . NASOPHARYNGEAL SWAB    SARS-CoV-2, Rapid DETECTED Abnormal     Comment:        Rapid NAAT: The specimen is POSITIVE for SARS-Cov-2, the novel coronavirus associated with   COVID-19.         This test has been authorized by the FDA under an Emergency Use Authorization (EUA) for use   by authorized laboratories.         The ID NOW COVID-19 assay is designed to detect the virus that causes COVID-19 in patients   with signs and symptoms of infection who are suspected of COVID-19. An individual without symptoms of COVID-19 and who is not shedding SARS-CoV-2 virus would   expect to have a negative (not detected) result in this assay.    Fact sheet for Healthcare Providers: Dionne   Fact sheet for Patients: [email or web address]           Methodology: Isothermal Nucleic Acid Amplification         Results reported to the appropriate Health [facility] in [place]            Medical Decision Making-Other:     Note:  · Labs, medications, radiologic studies were reviewed with personal review of films  · Large amounts of data were reviewed  · Discussed with nursing Staff, Discharge planner  · Infection Control and Prevention measures reviewed  · All prior entries were reviewed  · Administer medications as ordered  · Prognosis: Guarded  · Discharge planning reviewed      Thank you for allowing us to participate in the care of this patient. Please call with questions. Electronically signed by MOHAMUD Dawson - CNP on 1/26/2022 at 12:08 PM        ATTESTATION:    I have discussed the case, including pertinent history and exam findings with the APRN. I have evaluated the  History, physical findings and pictures of the patient and the key elements of the encounter have been performed by me. I have reviewed the laboratory data, other diagnostic studies and discussed them with the APRN. I have updated the medical record where necessary. I agree with the assessment, plan and orders as documented by the APRN.     Kamila Bledsoe MD.

## 2022-01-27 NOTE — PROGRESS NOTES
[facility]  Progress Note                   Date:   1/27/2022  Patient name: Yohannes Palencia  Date of admission:  1/8/2022  9:49 AM  MRN:   [id number]  YOB: 1940  PCP: Frankey Piccolo, APRN - CNP    Reason for Admission: Hypoxia [R09.02]  Elevated troponin [R77.8]  Acute kidney injury ([address] [N17.9]  COVID-19 [U07.1]  Pneumonia due to COVID-19 virus [U07.1, J12.82]    Subjective:       Clinical Changes /Abnormalities:  Reviewed charting and discussed with Dr Avel Augustin. A Fib with RVR overnight Converted to SR on amiodarone drip and IV Dig X 2 doses. SR on tele HR 79  Intubated  Covid + 1/8/2022      Review of Systems    Medications:   Scheduled Meds:   torsemide  40 mg Oral Daily    miconazole   Topical BID    apixaban  2.5 mg Oral BID    famotidine  20 mg Oral Daily    oxyCODONE  7.5 mg Oral Q6H    insulin lispro  0-6 Units SubCUTAneous Q4H    insulin lispro  0-3 Units SubCUTAneous Nightly    metoprolol tartrate  50 mg Oral BID    meropenem  500 mg IntraVENous Q12H    busPIRone  10 mg Oral TID    senna  2 tablet Oral Nightly    atorvastatin  20 mg Oral Nightly    clopidogrel  75 mg Oral Daily    aspirin  81 mg Oral Daily     Continuous Infusions:   fentaNYL 50 mcg/hr (01/27/22 0033)    amiodarone 0.5 mg/min (01/27/22 0050)    propofol 15 mcg/kg/min (01/27/22 0328)    sodium chloride      sodium chloride      dextrose 100 mL/hr (01/19/22 0010)    sodium chloride       CBC:   Recent Labs     01/25/22 0424 01/26/22  0433 01/27/22  0343   WBC 17.2* 12.8* 12.8*   HGB 10.0* 10.5* 10.0*   * 139 144     BMP:    Recent Labs     01/25/22 0424 01/26/22  0433 01/27/22  0343    143 144   K 4.0 3.8 3.7   * 108* 107   CO2 23 24 26   BUN 66* 75* 80*   CREATININE 2.25* 2.67* 2.52*   GLUCOSE 172* 160* 195*     Hepatic:  No results for input(s): AST, ALT, ALB, BILITOT, ALKPHOS in the last 72 hours.   Troponin:   No results for input(s): TROPHS in the last 72 hours.  BNP: No results for input(s): BNP in the last 72 hours. Lipids: No results for input(s): CHOL, HDL in the last 72 hours. Invalid input(s): LDLCALCU  INR: No results for input(s): INR in the last 72 hours. Objective:   Vitals: BP (!) 164/51   Pulse 90   Temp 97.9 °F (36.6 °C) (Axillary)   Resp 21   Ht 5' 10\" (1.778 m)   Wt 148 lb 2.4 oz (67.2 kg)   SpO2 98%   BMI 21.26 kg/m²     For careful stewardship of limited PPE during COVID-19 pandemic my physical exam was deferred. For physical exam, please see today's physical from primary team or ICU team.       Echo 3/29/21    Left Ventricle: There is mild increased wall thickness/hypertrophy.   Left Ventricle: Systolic function is normal with an ejection fraction   of 65-70%.   Left Ventricle: Grade I diastolic dysfunction (impaired relaxation) is   present.   Right Ventricle: Systolic function is normal.     Aortic Valve: There is no regurgitation. There is moderate stenosis. The calculated aortic valve area is 1.72 cm2. The calculated aortic valve   peak gradient is 47.00 mmHg. The calculated aortic valve mean gradient is   28.00 mmHg.   Tricuspid Valve: There is trace regurgitation. There is no evidence of   tricuspid valve stenosis.   Pericardium: There is no pericardial effusion. Tele presently Afib 120-140s      Echo 1/11/2022     Summary  Left ventricle is normal in size, global left ventricular systolic function  is normal.  Estimated ejection fraction is 55 % . Left atrium is at upper limits of normal.  Inter-atrial septum is intact with no evidence for an atrial septal defect. Right atrium is normal in size. Normal right ventricular size and function. Aortic valve is trileaflet, calcified with restriction of motion. Peak instantaneous gradient 60 mmHg and mean gradient 37 mmHg. Calculated ALF 1.0cm2 by VTI (image #57.)  Severe aortic stenosis. Trivial aortic insufficiency.   Mitral valve sclerosis without significant stenosis. Mean gradient is 4mmHg . Trivial mitral regurgitation. Tricuspid valve was not well visualized. Mild tricuspid regurgitation. No significant pericardial effusion is seen. Assessment / Acute Cardiac Problems:   1. COVID-19 PNA  2. New onset Afib RVR  3. Type I vs Type II MI likely secondary to JULES/COVID  4. HTN  5. JULES on CKD (baseline creat 1.4-1.5)  6. Mod AS on Echo 3/2021  7. Preserved LVEF on prior echo  8. Acute hypoxic resp failure    Patient Active Problem List:     Essential hypertension     Hyperlipidemia     Pneumonia due to COVID-19 virus     Acute hypoxemic respiratory failure due to COVID-19 [facility])     Acute kidney injury superimposed on chronic kidney disease ([facility] [address] baseline creatinine 1.9     Stage 3b chronic kidney disease (HCC)     Elevated troponin     Nonrheumatic aortic valve stenosis     PAD (peripheral artery disease) (HCC)     Moderate protein-calorie malnutrition (HCC)    [id number] Score for Atrial Fibrillation Stroke Risk   Risk   Factors  Component Value   C CHF No 0   H HTN Yes 1   A2 Age >= 76 Yes,  [de-identified] y.o.) 2   D DM No 0   S2 Prior Stroke/TIA No 0   V Vascular Disease No 0   A Age 74-69 No,  [de-identified] y.o.) 0   Sc Sex male 0    [id number]  Score  3   Score last updated 1/11/22 28:79 PM EST    Click here for a link to the UpToDate guideline \"Atrial Fibrillation: Anticoagulation therapy to prevent embolization    Disclaimer: Risk Score calculation is dependent on accuracy of patient problem list and past encounter diagnosis. Plan of Treatment:   1. Afib - stable. Will d/c amiodarone drip and restart PO amio. Continue BB and Eliquis. 2. Keep K >4 and Mg >2. Appreciate nephrology recs. 3. Echo reviewed per above  - preserved EF. 4. CKD Fluid management per Nephrology  5. Severe aortic stenosis. need R/L heart cath once acute issues resolved . Will schedule follow up outpatient  6.  Rest of care managed per Primary Team.     Electronically signed by

## 2022-01-27 NOTE — PROGRESS NOTES
Updated wife Johan Dyson, answered any questions she may have. And went over plan of care. She was thankful and had no further questions.

## 2022-01-27 NOTE — PLAN OF CARE
Problem: Falls - Risk of:  Goal: Will remain free from falls  Description: Will remain free from falls  Outcome: Ongoing  Goal: Absence of physical injury  Description: Absence of physical injury  Outcome: Ongoing     Problem: Airway Clearance - Ineffective  Goal: Achieve or maintain patent airway  1/27/2022 1140 by Kong Reddy RN  Outcome: Ongoing  1/27/2022 0904 by Angelina Mena RCP  Outcome: Ongoing     Problem: Gas Exchange - Impaired  Goal: Absence of hypoxia  1/27/2022 1140 by Kong Reddy RN  Outcome: Ongoing  1/27/2022 0904 by Angelina Mena RCP  Outcome: Ongoing  Goal: Promote optimal lung function  1/27/2022 1140 by Kong Reddy RN  Outcome: Ongoing  1/27/2022 0904 by Angelina Mena RCP  Outcome: Ongoing     Problem: Breathing Pattern - Ineffective  Goal: Ability to achieve and maintain a regular respiratory rate  1/27/2022 1140 by Kong Reddy RN  Outcome: Ongoing  1/27/2022 0904 by Angelina Mena RCP  Outcome: Ongoing     Problem:  Body Temperature -  Risk of, Imbalanced  Goal: Ability to maintain a body temperature within defined limits  Outcome: Ongoing  Goal: Will regain or maintain usual level of consciousness  Outcome: Ongoing  Goal: Complications related to the disease process, condition or treatment will be avoided or minimized  Outcome: Ongoing     Problem: Isolation Precautions - Risk of Spread of Infection  Goal: Prevent transmission of infection  Outcome: Ongoing     Problem: Nutrition Deficits  Goal: Optimize nutritional status  Outcome: Ongoing     Problem: Risk for Fluid Volume Deficit  Goal: Maintain normal heart rhythm  Outcome: Ongoing  Goal: Maintain absence of muscle cramping  Outcome: Ongoing  Goal: Maintain normal serum potassium, sodium, calcium, phosphorus, and pH  Outcome: Ongoing

## 2022-01-27 NOTE — PLAN OF CARE
Problem: Non-Violent Restraints  Goal: Removal from restraints as soon as assessed to be safe  1/27/2022 0113 by Matthew Carroll RN  Outcome: Completed  1/26/2022 1941 by Matthew Carroll RN  Outcome: Ongoing  Goal: No harm/injury to patient while restraints in use  1/27/2022 0113 by Matthew Carroll RN  Outcome: Completed  1/26/2022 1941 by Matthew Carroll RN  Outcome: Ongoing  Goal: Patient's dignity will be maintained  1/27/2022 0113 by Matthew Carroll RN  Outcome: Completed  1/26/2022 1941 by Matthew Carroll RN  Outcome: Ongoing

## 2022-01-27 NOTE — PROGRESS NOTES
Progress Note    Patient - Yohannes Palencia  Date of Admission -  2022  9:49 AM  Date of Evaluation -  2022  Room and Bed Number -  8739/3715-16   Hospital Day -     CHIEF COMPLAINT : ACUTE HYPOXIC RESPIRATORY FAILURE DUE TO COVID -19 PNEUMONIA   SUBJECTIVE:     OVERNIGHT EVENTS:         Failed sbt   Converted to sinus - amiodarone changed to po by cardiology   [address] in resp sec        SECRETIONS  -Amount:  [x] Small [] Moderate  [] Large    [] None  Color:     [x] White [] Colored  [] Bloody    SEDATION:    [x] Propofol gtt  [] Versed gtt  [x] FENTANYL  gtt    [] No Sedation    PARALYZED:  [x] No    [] Yes    VASOPRESSORS:  [x] No    [] Yes  [] Levophed [] Dopamine [] Vasopressin  [] Dobutamine [] Phenylephrine [] Epinephrine    INHALED NITRIC OXIDE : [x] No    [] Yes    PRONE :       [x] No    [] Yes    Actemra:             [] No    [x] Yes    DEXAMETHASONE : [] No    [x] Yes      Ros unable to perform due to intubation and sedation    OBJECTIVE:     VITAL SIGNS:  BP (!) 164/51   Pulse 90   Temp 97.9 °F (36.6 °C) (Axillary)   Resp 21   Ht 5' 10\" (1.778 m)   Wt 148 lb 2.4 oz (67.2 kg)   SpO2 98%   BMI 21.26 kg/m²   Tmax over 24 hours:  Temp (24hrs), Av.1 °F (36.7 °C), Min:97.5 °F (36.4 °C), Max:98.8 °F (37.1 °C)      Patient Vitals for the past 8 hrs:   Temp Temp src Pulse Resp SpO2   22 0838   90 21 98 %   22 0600 97.9 °F (36.6 °C) Axillary 80 20 98 %   22 0500   84 26 98 %   22 0420   83 25 98 %   22 0410    25 96 %   22 0400 98.1 °F (36.7 °C) Axillary 82 23 97 %   22 0335   79 23 97 %   22 0300   81 24 97 %         Intake/Output Summary (Last 24 hours) at 2022 1019  Last data filed at 2022 0400  Gross per 24 hour   Intake 1274.34 ml   Output 1405 ml   Net -130.66 ml     Date 22 0000 - 22 2359   Shift 4389-6966 7528-0487 8252-1778 24 Hour Total   INTAKE   I.V.(mL/kg) 252. 2(3.8)   252. 2(3.8)   NG/GT(mL/kg) 568(8) 202(3)   IV Piggyback(mL/kg) 100(1.5)   100(1.5)   Shift Total(mL/kg) 554. 2(8.2)   554. 2(8.2)   OUTPUT   Urine(mL/kg/hr) 550(1)   550   Shift Total(mL/kg) 550(8.2)   550(8.2)   Weight (kg) 67.2 67.2 67.2 67.2     Wt Readings from Last 3 Encounters:   01/08/22 148 lb 2.4 oz (67.2 kg)     Body mass index is 21.26 kg/m².         PHYSICAL EXAM:  Sedated, opens eyes to voice  Lungs decreased breath sounds bilaterally  CVS regular S1-S2  Abdomen nontender bowel sounds are present  Lower extremity edema and upper extremity edema    MEDICATIONS:  Scheduled Meds:   furosemide  40 mg IntraVENous BID    miconazole   Topical BID    apixaban  2.5 mg Oral BID    famotidine  20 mg Oral Daily    oxyCODONE  7.5 mg Oral Q6H    insulin lispro  0-6 Units SubCUTAneous Q4H    insulin lispro  0-3 Units SubCUTAneous Nightly    metoprolol tartrate  50 mg Oral BID    meropenem  500 mg IntraVENous Q12H    busPIRone  10 mg Oral TID    senna  2 tablet Oral Nightly    atorvastatin  20 mg Oral Nightly    clopidogrel  75 mg Oral Daily    aspirin  81 mg Oral Daily     Continuous Infusions:   fentaNYL 50 mcg/hr (01/27/22 0033)    amiodarone 0.5 mg/min (01/27/22 0050)    propofol 15 mcg/kg/min (01/27/22 0328)    sodium chloride      sodium chloride      dextrose 100 mL/hr (01/19/22 0010)    sodium chloride       PRN Meds:   metoclopramide, 10 mg, Q6H PRN  bisacodyl, 10 mg, Daily PRN  hydrALAZINE, 10 mg, Q6H PRN  sodium chloride, , PRN  sodium chloride flush, 5-40 mL, PRN  sodium chloride, 25 mL, PRN  melatonin, 3 mg, Nightly PRN  sodium chloride, 1 spray, PRN  LORazepam, 0.5 mg, Q2H PRN  glucose, 15 g, PRN  dextrose, 12.5 g, PRN  glucagon (rDNA), 1 mg, PRN  dextrose, 100 mL/hr, PRN  metoprolol, 5 mg, Q6H PRN  benzonatate, 200 mg, TID PRN  sodium chloride flush, 10 mL, PRN  sodium chloride flush, 5-40 mL, PRN  sodium chloride, 25 mL, PRN  ondansetron, 4 mg, Q8H PRN   Or  ondansetron, 4 mg, Q6H PRN  polyethylene glycol, 17 g, Daily PRN  acetaminophen, 650 mg, Q6H PRN   Or  acetaminophen, 650 mg, Q6H PRN        SUPPORT DEVICES: [x] Ventilator [] BIPAP  [] Nasal Cannula [] Room Air      ABGs:     Lab Results   Component Value Date    NAK1PHT NOT REPORTED 01/27/2022    FIO2 40.0 01/27/2022       DATA:  Complete Blood Count:   Recent Labs     01/25/22  0424 01/26/22  0433 01/27/22  0343   WBC 17.2* 12.8* 12.8*   RBC 3.19* 3.41* 3.20*   HGB 10.0* 10.5* 10.0*   HCT 31.0* 33.0* 31.5*   MCV 97.2 96.8 98.4   MCH 31.3 30.8 31.3   MCHC 32.3 31.8 31.7   RDW 18.3* 18.3* 18.2*   * 139 144   MPV 10.4 10.6 10.4        Last 3 Blood Glucose:   Recent Labs     01/25/22 0424 01/26/22  0433 01/27/22  0343   GLUCOSE 172* 160* 195*        PT/INR:    Lab Results   Component Value Date    PROTIME 11.6 01/22/2022    INR 1.1 01/22/2022     PTT:    Lab Results   Component Value Date    APTT 25.2 01/22/2022       Comprehensive Metabolic Profile:   Recent Labs     01/25/22 0424 01/26/22  0433 01/27/22  0343    143 144   K 4.0 3.8 3.7   * 108* 107   CO2 23 24 26   BUN 66* 75* 80*   CREATININE 2.25* 2.67* 2.52*   GLUCOSE 172* 160* 195*   CALCIUM 8.0* 8.0* 8.3*      Magnesium:   Lab Results   Component Value Date    MG 2.4 01/26/2022    MG 2.6 01/15/2022    MG 2.6 01/14/2022     Phosphorus:   Lab Results   Component Value Date    PHOS 5.3 01/22/2022     Ionized Calcium:   Lab Results   Component Value Date    CAION 1.21 01/22/2022        Urinalysis:   Lab Results   Component Value Date    NITRU NEGATIVE 01/17/2022    COLORU Yellow 01/17/2022    PHUR 5.0 01/17/2022    WBCUA None 01/17/2022    RBCUA 2 TO 5 01/17/2022    MUCUS 1+ 01/17/2022    TRICHOMONAS NOT REPORTED 01/17/2022    YEAST NOT REPORTED 01/17/2022    BACTERIA NOT REPORTED 01/17/2022    SPECGRAV 1.018 01/17/2022    LEUKOCYTESUR NEGATIVE 01/17/2022    UROBILINOGEN Normal 01/17/2022    BILIRUBINUR NEGATIVE 01/17/2022    GLUCOSEU 1+ 01/17/2022    KETUA NEGATIVE 01/17/2022    AMORPHOUS NOT REPORTED 01/17/2022               XR CHEST PORTABLE    Result Date: 1/22/2022  1. The endotracheal tube tip is located 5.3 cm above the zheng. 2. Worsening bilateral lung infiltrates, compatible with pneumonia, including COVID. 3. Small bilateral pleural effusions. XR ABDOMEN FOR NG/OG/NE TUBE PLACEMENT    Result Date: 1/22/2022  1. The orogastric tube tip and side port are noted in the gastric fundus. CT CHEST ABDOMEN PELVIS WO CONTRAST    Result Date: 1/21/2022  1. New small bilateral pleural effusions with extensive bilateral airspace consolidation, increased from 01/08/2022. 2.  Decreased AP dimension of the distal trachea, suggesting tracheomalacia. 3.  Previously noted hematoma in the left gluteal musculature is not as well visualized on today's study, but appears overall slightly decreased in size. No new areas of hematoma. 4.  Mild distention of the stomach, mild prominence of proximal small bowel loops, and mild nonspecific stranding in the proximal mesentery. No definite evidence of high-grade bowel obstruction at this time.   Enteritis or early developing obstruction are considered in the differential. RECOMMENDATIONS: Unavailable            VENTILATOR SETTINGS:  Vent Information  $Ventilation: $Subsequent Day  Skin Assessment: Clean, dry, & intact  Suction Catheter Diameter: 14  Equipment Changed: HME  Vent Type: Servo i  Vent Mode: PRVC  Vt Ordered: 580 mL  Rate Set: 16 bmp  Pressure Support: 10 cmH20  FiO2 : 45 %  SpO2: 98 %  SpO2/FiO2 ratio: 217.78  Sensitivity: 2  PEEP/CPAP: 6  I Time/ I Time %: 0.8 s  Humidification Source: HME  Humidification Temp: 31  Humidification Temp Measured: 31  Nitric Oxide/Epoprostenol In Use?: No  Mask Type: Full face mask  Mask Size: Medium     PaO2/FiO2 RATIO:  Recent Labs     01/27/22 0410   POCPO2 54.5*      FiO2 : 45 %       LABS:  ABGs:   Recent Labs     01/25/22  0452 01/26/22  0559 01/27/22  0410   POCPH 7.411 7.436 7.414   POCPCO2 40.8 39.9 46.4 POCPO2 69.3* 71.7* 54.5*   POCHCO3 25.9 26.9 29.7*   NFRO7QZF 94 95 88*            ASSESSMENT:     Principal Problem:    Pneumonia due to COVID-19 virus  Active Problems:    Essential hypertension    Hyperlipidemia    Acute hypoxemic respiratory failure due to COVID-19 [facility])    Acute kidney injury (HCC)    Stage 3b chronic kidney disease (HCC)    Elevated troponin    Nonrheumatic aortic valve stenosis    PAD (peripheral artery disease) (HCC)    Moderate protein-calorie malnutrition (HCC)    Atrial fibrillation with RVR (HCC)    COVID-19    Traumatic hematoma of buttock    Acute distention of stomach    Hypoxia  Resolved Problems:    * No resolved hospital problems. *              Acute hypoxic respiratory failure secondary to COVID 19   Acute respiratory distress syndrome   Bilateral multifocal pneumonia due to COVID 19 infection   Covid -19 pandemic emergency    Severe Aortic stenosis    A. fib     LOS: 19  PLAN:    D/w RT  D/w RN   Fails sbt - continue daily trials   Continue supportive care   Cont treatment with medications for COVID  Cont tube feeding   wean peep and fio2 - keep sats >90 %   Monitor endotracheal secretions   Will obtain xray chest and ABG as needed  Antibiotics per ID       D/w DR Shanthi Borrero         Treatment plan Discussed with nursing staff in detail , all questions answered . Total critical care time caring for this patient with life threatening, unstable organ failure, including direct patient contact, management of life support systems, review of data including imaging and labs, discussions with other team members and physicians at least 27  Min so far today, excluding procedures. Electronically signed by Fela Dumont MD on 1/27/2022 at 10:19 AM       This patient was evaluated in the context of the global SARS-CoV-2 (COVID-19) pandemic, which necessitated considerations that the patient either has COVID-19 infection or is at risk of infection with COVID-19.  Institutional protocols and algorithms that pertain to the evaluation & management of patients with COVID-19 or those at risk for COVID-19 are in a state of rapid changes based on information released by regulatory bodies including the CDC and federal and state organizations. These policies and algorithms were followed during the patient's care. Please note that this chart was generated using voice recognition Dragon dictation software. Although every effort was made to ensure the accuracy of this automated transcription, some errors in transcription may have occurred.

## 2022-01-27 NOTE — CARE COORDINATION
Transitional planning:  Nurse rounds:Intubated fiO2 40%, PEEP 6, midline,  IV drips, amniodorone, Propofol, had afib AVR. Condom cath. Follows commands. On CPAP trial for 2-2 1/2 hours BID. Family meeting Friday.

## 2022-01-27 NOTE — PROGRESS NOTES
[address] [id number] Continence Nurse  Consult Note       NAME:  Desiree Ledezma  MEDICAL RECORD NUMBER:  [id number]  AGE: [de-identified] y.o. GENDER: male  : 1940  TODAY'S DATE:  2022    Subjective:     Reason for WOCN Evaluation and Assessment: \"bilateral groin redness & skin opening\"       Desiree Ledezma is a [de-identified] y.o. male referred by:   [] Physician  [] Nursing  [] Other:     Wound Identification:  No erosions noted in the groin folds. Antifungal powder in use. Groin folds are  and the scrotum is elevated with a pillow case. Large ecchymotic creas present BUE and left buttock. Bilateral heels are clear of injury. Objective:      BP (!) 164/51   Pulse 75   Temp 97.9 °F (36.6 °C) (Oral)   Resp 18   Ht 5' 10\" (1.778 m)   Wt 148 lb 2.4 oz (67.2 kg)   SpO2 99%   BMI 21.26 kg/m²   Venancio Risk Score: Venancio Scale Score: 13    LABS    CBC:   Lab Results   Component Value Date    WBC 12.8 2022    RBC 3.20 2022    HGB 10.0 2022     CMP:  Albumin:    Lab Results   Component Value Date    LABALBU 2.7 2022     PT/INR:    Lab Results   Component Value Date    PROTIME 11.6 2022    INR 1.1 2022     HgBA1c:  No results found for: LABA1C  PTT: No components found for: LABPTT              Plan:     Plan of Care: Turn every 2 hours  Float heels off of bed with pillows under calves    Use lift sling to reposition patient to minimize potential for shear injury. Foam sacrum dressing to sacrococcygeal area. Peel back dressing, inspect skin beneath, re-secure. Change every 72 hours and prn wrinkles, soilage. Discontinue Sacral dressing if repeatedly soiled by incontinence. Routine incontinence care with incontinence barrier cloths and zinc oxide cream. Apply zinc oxide cream BID and prn incontinence. Moisture wicking under pads  Pillow case to separate the groin folds and to elevate the scrotum  External urinary catheter to contain urinary incontinence. Specialty Bed Required : Yes: Isoflex gel surface with a static air overlay in use.    [] Low Air Loss   [x] Pressure Redistribution  [] Fluid Immersion  [] Bariatric  [] Total Pressure Relief  [] Other:     Discharge Plan:  tbd    Patient/Caregiver Teaching:    [] Indicates understanding       [] Needs reinforcement  [] Unsuccessful      [] Verbal Understanding  [] Demonstrated understanding       [x] No evidence of learning  [] Refused teaching         [] N/A    Wound Ostomy Continence nurse (ET nurse) will no longer follow pt.    Call prn if concerns related to skin, wound, or ostomy care       Electronically signed by Marsa Lennox, RN, Oli Dawn on 1/27/2022 at 4:07 PM

## 2022-01-27 NOTE — PLAN OF CARE
Problem: Falls - Risk of:  Goal: Will remain free from falls  Description: Will remain free from falls  Outcome: Ongoing  Goal: Absence of physical injury  Description: Absence of physical injury  Outcome: Ongoing     Problem: Airway Clearance - Ineffective  Goal: Achieve or maintain patent airway  Outcome: Ongoing     Problem: Gas Exchange - Impaired  Goal: Absence of hypoxia  Outcome: Ongoing  Goal: Promote optimal lung function  Outcome: Ongoing     Problem: Breathing Pattern - Ineffective  Goal: Ability to achieve and maintain a regular respiratory rate  Outcome: Ongoing     Problem:  Body Temperature -  Risk of, Imbalanced  Goal: Ability to maintain a body temperature within defined limits  Outcome: Ongoing     Problem: Skin Integrity:  Goal: Will show no infection signs and symptoms  Description: Will show no infection signs and symptoms  Outcome: Ongoing  Goal: Absence of new skin breakdown  Description: Absence of new skin breakdown  Outcome: Ongoing     Problem: Pain:  Goal: Pain level will decrease  Description: Pain level will decrease  Outcome: Ongoing  Goal: Control of acute pain  Description: Control of acute pain  Outcome: Ongoing  Goal: Control of chronic pain  Description: Control of chronic pain  Outcome: Ongoing     Problem: MECHANICAL VENTILATION  Goal: Patient will maintain patent airway  Outcome: Ongoing  Goal: Oral health is maintained or improved  Outcome: Ongoing  Goal: ET tube will be managed safely  Outcome: Ongoing     Problem: Non-Violent Restraints  Goal: Removal from restraints as soon as assessed to be safe  1/26/2022 1941 by Romana Blacksmith, RN  Outcome: Ongoing  1/26/2022 0949 by Denisse Robbins RN  Outcome: Ongoing  Goal: No harm/injury to patient while restraints in use  1/26/2022 1941 by Romana Blacksmith, RN  Outcome: Ongoing  1/26/2022 0949 by Denisse Robbins RN  Outcome: Ongoing  Goal: Patient's dignity will be maintained  Outcome: Ongoing Never smoker

## 2022-01-27 NOTE — PROGRESS NOTES
[facility]  Office: [address], DO, Micheline Gonzalez, DO, Elizabetholiver Landry, DO, Jeff Moon, DO, Curtis Rodriguez MD, Abby Haddad MD, Godwin Ramirez MD, Juvenal Samaniego MD, Vonda Hendrix MD, Truong Resendiz MD, Val Bell MD, Malia Pate, DO, Francis Arrieta DO, Marc Meyers MD,  Janice Sanders DO, Georgiana Schmitt MD, Hardik Alexandre MD, Pierce Dos Santos MD, Geovanna Harrell MD, Lynne Koch MD, Jasmin Rizvi MD, Wes Yadav MD, [facility], [facility], [facility], CNP, Karen Lord, CNP, Hiram Norton, CNS, Carter Riggs, CNP, Cecilio Kennedy, CNP, Sandeep Thompson, CNP, Bhargav Good, CNP, Krista Woodruff, CNP, Harvey Smith PA-C, Omar Nova, [facility], Aminah Mendoza, [facility], Diogenes Booker, CNP, Sharmila Wynn, CNP, Omkar Rothman, CNP, Nina Perry, CNP, Carlie Lafleur, CNP, Bola Durbin, [address] [place] [address]    Progress Note    1/27/2022    11:57 AM    Name:   Juan Rodas  MRN:     [id number]     Acct:      [de-identified]   Room:   [address] [place] [address] Day:  23  Admit Date:  1/8/2022  9:49 AM    PCP:   Emelia Stack, APRLOBITO - CNP  Code Status:  Full Code    Subjective:     C/C:   Chief Complaint   Patient presents with    Shortness of Breath    Nausea & Vomiting     Interval History Status: improving    Patient still sedated, minimal vent settings. No issues overnight    Brief History:     [de-identified] male past medical history hypertension, hyperlipidemia, CKD stage IIIb, aortic valve stenosis, peripheral arterial disease presents with shortness of breath found to be in acute respiratory failure secondary to COVID-19 as well as A. fib with RVR. Patient being followed by cardiology, infectious disease, critical care, nephrology was also seen the patient. Patient was originally started on heparin drip for A. fib with RVR however was discontinued due to acute blood loss anemia and bilateral hematomas on both upper extremities. Patient required 2 units of red blood cells on 1/16/2022. Patient was also evaluated by nephrology due to JULES on CKD stage III which improved with IV hydration. For COVID-19 patient status post Actemra on/8/2022, and Decadron 1/8/2022. Patient continues on high flow. Patient originally did change his CODE STATUS from DNR CCA to full code on 11/13/2022 and reverted back to DNR CCA on 11/17/2022. Review of Systems:     Review of Systems   Unable to perform ROS: Intubated       Medications:      Allergies:  No Known Allergies    Current Meds:   Scheduled Meds:    torsemide  40 mg Oral Daily    amiodarone  200 mg Oral Daily    miconazole   Topical BID    apixaban  2.5 mg Oral BID    famotidine  20 mg Oral Daily    oxyCODONE  7.5 mg Oral Q6H    insulin lispro  0-6 Units SubCUTAneous Q4H    insulin lispro  0-3 Units SubCUTAneous Nightly    metoprolol tartrate  50 mg Oral BID    meropenem  500 mg IntraVENous Q12H    busPIRone  10 mg Oral TID    senna  2 tablet Oral Nightly    atorvastatin  20 mg Oral Nightly    clopidogrel  75 mg Oral Daily    aspirin  81 mg Oral Daily     Continuous Infusions:    fentaNYL 50 mcg/hr (01/27/22 0033)    propofol 25 mcg/kg/min (01/27/22 1051)    sodium chloride      sodium chloride      dextrose 100 mL/hr (01/19/22 0010)    sodium chloride       PRN Meds: metoclopramide, bisacodyl, hydrALAZINE, sodium chloride, sodium chloride flush, sodium chloride, melatonin, sodium chloride, LORazepam, glucose, dextrose, glucagon (rDNA), dextrose, metoprolol, benzonatate, sodium chloride flush, sodium chloride flush, sodium chloride, ondansetron **OR** ondansetron, polyethylene glycol, acetaminophen **OR** acetaminophen    Data:     Past Medical History:   has a past medical history of Chronic lower back pain, Cobalamin deficiency, CVA (cerebral vascular accident) ([facility] [address] Diabetes mellitus ([facility] [address] Hyperlipidemia, Hypertension, Malignant neoplasm of colon ([facility] [address] and PAD (peripheral artery disease) ([facility] [address] Social History:   reports that he has quit smoking. He has never used smokeless tobacco. He reports previous alcohol use. He reports that he does not use drugs. Family History: History reviewed. No pertinent family history. Vitals:  BP (!) 164/51   Pulse 90   Temp 97.9 °F (36.6 °C) (Axillary)   Resp 21   Ht 5' 10\" (1.778 m)   Wt 148 lb 2.4 oz (67.2 kg)   SpO2 98%   BMI 21.26 kg/m²   Temp (24hrs), Av.1 °F (36.7 °C), Min:97.5 °F (36.4 °C), Max:98.8 °F (37.1 °C)    Recent Labs     22  1118 22  0010 22  0542   POCGLU 168* 181* 157* 203*       I/O (24Hr):     Intake/Output Summary (Last 24 hours) at 2022 1157  Last data filed at 2022 0400  Gross per 24 hour   Intake 1116.34 ml   Output 1325 ml   Net -208.66 ml       Labs:  Hematology:  Recent Labs     22  0343   WBC 17.2* 12.8* 12.8*   RBC 3.19* 3.41* 3.20*   HGB 10.0* 10.5* 10.0*   HCT 31.0* 33.0* 31.5*   MCV 97.2 96.8 98.4   MCH 31.3 30.8 31.3   MCHC 32.3 31.8 31.7   RDW 18.3* 18.3* 18.2*   * 139 144   MPV 10.4 10.6 10.4     Chemistry:  Recent Labs     22  0433 01/26/22  1825 01/27/22  0343    143  --  144   K 4.0 3.8  --  3.7   * 108*  --  107   CO2 23 24  --  26   GLUCOSE 172* 160*  --  195*   BUN 66* 75*  --  80*   CREATININE 2.25* 2.67*  --  2.52*   MG  --   --  2.4  --    ANIONGAP 11 11  --  11   LABGLOM 28* 23*  --  25*   GFRAA 34* 28*  --  30*   CALCIUM 8.0* 8.0*  --  8.3*     Recent Labs     22  0413 22  0832 22  1118 22  0010 22  0542   POCGLU 147* 153* 168* 181* 157* 203*     ABG:  Lab Results   Component Value Date    POCPH 7.414 2022    POCPCO2 46.4 2022    POCPO2 54.5 2022    POCHCO3 29.7 2022    NBEA NOT REPORTED 2022    PBEA 4 2022    RPP4FPM NOT REPORTED 2022    BGDT9AQF 88 2022    FIO2 40.0 2022 Lab Results   Component Value Date/Time    SPECIAL NOT REPORTED 01/25/2022 09:01 PM     Lab Results   Component Value Date/Time    CULTURE CULTURE IN PROGRESS 01/25/2022 09:01 PM       Radiology:  CT ABDOMEN PELVIS WO CONTRAST Additional Contrast? None    Result Date: 1/16/2022  Left gluteal hematoma, partially visualized on this study without obvious active bleeding, but limited without IV contrast.  No retroperitoneal or rectus sheath bleed. Extensive findings in the visualized lungs compatible with known COVID-19 pneumonia; denser GGOs suggest worsening pneumonia/pneumonitis or developing consolidation. No pneumothorax. Multiple additional findings, as detailed in the body of the report above. RECOMMENDATIONS: Consider follow-up CT pelvis including the upper thighs, if the patient does not respond to resuscitation with blood products/fluids and other conservative measures including localized pressure. Findings were discussed with Lilly Pate at 12:24 pm on 1/16/2022. XR CHEST PORTABLE    Result Date: 1/16/2022  Right IJ line in satisfactory position. Unchanged or slightly worse bilateral diffuse infiltrates consistent with the patient's history of COVID pneumonia. XR CHEST PORTABLE    Result Date: 1/14/2022  Patchy bilateral interstitial and ground-glass infiltrates most notably in the lung periphery. Findings have progressed from the prior exam compatible with patient's history of COVID pneumonia     XR CHEST PORTABLE    Result Date: 1/11/2022  Unchanged bilateral airspace opacities       Physical Examination:        Physical Exam  Vitals and nursing note reviewed. Constitutional:       General: He is not in acute distress. Interventions: He is sedated and intubated. HENT:      Head: Normocephalic and atraumatic. Eyes:      Conjunctiva/sclera: Conjunctivae normal.      Pupils: Pupils are equal, round, and reactive to light.    Neck:      Comments: Central line noted  Cardiovascular: Rate and Rhythm: Normal rate and regular rhythm. Heart sounds: No murmur heard. Pulmonary:      Effort: Pulmonary effort is normal. No accessory muscle usage or respiratory distress. He is intubated. Breath sounds: No stridor. Decreased breath sounds and rales present. No wheezing or rhonchi. Abdominal:      General: Bowel sounds are normal. There is no distension. Palpations: Abdomen is soft. Abdomen is not rigid. Tenderness: There is no abdominal tenderness. There is no guarding. Musculoskeletal:         General: No tenderness. Skin:     General: Skin is warm and dry. Findings: Bruising and ecchymosis present. No erythema, lesion or rash. Neurological:      Cranial Nerves: No cranial nerve deficit. Motor: No seizure activity. Assessment:        Hospital Problems           Last Modified POA    * (Principal) Pneumonia due to COVID-19 virus 1/8/2022 Yes    Essential hypertension 1/8/2022 Yes    Hyperlipidemia 1/8/2022 Yes    Acute hypoxemic respiratory failure due to COVID-19 [facility]) 1/8/2022 Yes    Acute kidney injury ([facility] [address] 1/26/2022 Yes    Stage 3b chronic kidney disease ([address] 1/8/2022 Yes    Elevated troponin 1/9/2022 Yes    Nonrheumatic aortic valve stenosis 1/9/2022 Yes    PAD (peripheral artery disease) ([facility] [address] 1/9/2022 Yes    Moderate protein-calorie malnutrition ([address] 1/9/2022 Yes    Atrial fibrillation with RVR ([facility] [address] 1/10/2022 Yes    COVID-19 1/19/2022 Yes    Traumatic hematoma of buttock 1/19/2022 Yes    Acute distention of stomach 1/21/2022 Yes    Hypoxia 1/26/2022 Yes          Plan:        COVID 19 Pneumonia : S/p Actemra 1/8. Meropenem started 1/21 due to development of consolidative infiltrates on repeat CT chest.     Acute hypoxemic respiratory failure: Likely secondary to Covid 19 pneumonia , S/P intubation 1/22    Severe aortic stenosislikely will need outpatient follow-up with cardiology once discharged and cleared from Covid    CKD with metabolic acidosis: improving,  continue to avoid for nephrotoxic agents, nephrology recommendations. Increase lasix     DM2: controlled    Acute blood loss anemia due to gluteal bleed: restart reduced dose eliquis 2.5mg BID due to age and weight    A. fib with RVR: Amiodarone drip, Lopressor, Eliquis    Peripheral vascular disease: continue antiplatelets     HTN: Home beta-blockers resumed today    HPL: continue home medications    A. fib with RVR yesterday, started on amiodarone drip. Spoke with cardiology, will discuss diuretics with their attending as well as rate control.   Pulmonary medicine for extubation    María Elena Sanford DO  1/27/2022  11:57 AM

## 2022-01-27 NOTE — PROGRESS NOTES
Comprehensive Nutrition Assessment    Type and Reason for Visit:  Reassess    Nutrition Recommendations/Plan: Continue Renal TF - suggest modifying goal rate to 25 mL/hr along with 2 protein modulars per day. Will provide 1288 kcals, (+kcals from propofol), 101 gm pro/day. Monitor TF tolerance/adequacy of intakes and rate of propofol - modify TF as needed. Will monitor for bowel function, labs, weights, and plan of care. Nutrition Assessment:  Pt remains on the vent. Propofol running at 10.1 mL/hr. Renal TF running at goal rate of 35 mL/hr + 1 protein modular per day - RN reports pt tolerating feedings well. Discussed modifying TF goal rate with provision of propotol. Last recorded BM 1/21. Labs reviewed: BUN 80 mg/dL, CR 2.52 mg/dL, Glucose 157-203 mg/dL. Meds reviewed. Malnutrition Assessment:  Malnutrition Status:  Insufficient data    Context:  Acute Illness       Estimated Daily Nutrient Needs:  Energy (kcal):  22-25 kcal/kg = 0598-5686 kcals/day; Weight Used for Energy Requirements:  Current     Protein (g):  1.2-1.5 gm /kg =  gm pro/day; Weight Used for Protein Requirements:  Current     Fluid (ml/day):  25 mL/kg = 1700 mL/day or per MD; Method Used for Fluid Requirements:  ml/Kg      Nutrition Related Findings:  Labs/Meds reviewed. Last recorded BM 1/21. Wounds:   (Left gluteal hematoma.)       Current Nutrition Therapies:    ADULT TUBE FEEDING; Orogastric; Renal Formula; Continuous; 10; Yes; 10; Q 4 hours; 35; 30;  Other (specify); per MD; Protein; 1 Protein modular per day  Current Tube Feeding (TF) Orders:  · Feeding Route: Orogastric  · Formula: Renal Formula  · Schedule: Continuous  · Additives/Modulars: Protein (x 1 per day)  · Water Flushes: per MD  · Current TF & Flush Orders Provides: Renal at 35 mL/hr + 1 protein modular daily = 1616 kcals, 94 gm pro/day  · Goal TF & Flush Orders Provides: Renal at 35 mL/hr + 1 protein modular daily = 1616 kcals, 94 gm pro/day    Additional Calorie Sources:   Propofol at 10.1 mL/hr = 267 kcals/day    Anthropometric Measures:  · Height: 5' 10\" (177.8 cm)  · Current Body Weight: 148 lb 2.4 oz (67.2 kg)   · Admission Body Weight: 148 lb 2.4 oz (67.2 kg)    · Usual Body Weight: 150 lb (68 kg)     · Ideal Body Weight: 166 lbs; % Ideal Body Weight 89.2 %   · BMI: 21.3  · BMI Categories: Normal Weight (BMI 18.5-24. 9)       Nutrition Diagnosis:   · Inadequate oral intake related to impaired respiratory function as evidenced by NPO or clear liquid status due to medical condition,nutrition support - enteral nutrition    Nutrition Interventions:   Food and/or Nutrient Delivery:  Continue NPO,Modify Tube Feeding  Nutrition Education/Counseling:  No recommendation at this time   Coordination of Nutrition Care:  Continue to monitor while inpatient    Goals:  Meet % of estimated nutrition needs with nutrition support/oral diet. Nutrition Monitoring and Evaluation:   Behavioral-Environmental Outcomes:  None Identified   Food/Nutrient Intake Outcomes:  Enteral Nutrition Intake/Tolerance  Physical Signs/Symptoms Outcomes:  Biochemical Data,GI Status,Constipation,Fluid Status or Edema,Hemodynamic Status,Nutrition Focused Physical Findings,Skin,Weight     Discharge Planning:     Too soon to determine     Electronically signed by Concepcion Gong RD, LD on 1/27/22 at 11:58 AM EST    Contact: [phone]

## 2022-01-27 NOTE — PROGRESS NOTES
[facility] [address] Patient  Today's Date and Time: 1/27/2022, 8:31 AM    Impression :     · COVID 19 Confirmed Infection  · Covid tests:  · 1/8/21: Positive  · Acute hypoxic respiratory failure  · Paroxysmal A. 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  · Essential hypertension  · Elevated inflammatory markers  · Hyperlipidemia  · Acute gluteal hematoma  · Patient has not received the Covid vaccine  · Intubated 1/22/22      Recommendations:   · Antibiotic treatment:  · Meropenem 500 mg BID IV for leukocytosis & possible secondary bacterial pneumonia 1/11/21-discontinued 1/17/21  · Meropenem Re-started 1-21-22 because of residual dense consolidation of the lungs with air bronchograms  · Gram positive cocci clusters sputum 1/25  · Covid Rx:    · Remdesivir-contraindicated with JULES  · Monoclonal antibodies-out of window  · Decadron-initiated 1/8/2022  · Actemra-administered 1/8/2022    · The patient still has significant hypoxia is currently requiring high flow/BiPAP. · He is insisting on trying to go home and does not quite understand that he is requiring a significant amount of respiratory support. · He did mention to me that he was happy to die at home but it is my understanding that the recently changed his CODE STATUS to comfort care arrest to full code.   · His code status has again been changed to [facility]  · His code status changed back to a full code after he agreed to intubation on 1/22/22  · Continue supportive care      Medical Decision Making/Summary/Discussion:1/27/2022     · Patient admitted with COVID 19 infection  · Isolation until 1/28/22    Infection Control Recommendations   · Universal Precautions  · Airborne isolation  · Droplet Isolation    Antimicrobial Stewardship Recommendations       · Renal considerations  Coordination of Outpatient Care:   · Estimated Length of IV antimicrobials:TBD  · Patient will need Midline Catheter Insertion: TBD  · Patient will need PICC line Insertion: No  · Patient will need: Home IV , [place],  SNF,  LTAC:TBD  · Patient will need outpatient wound care:No    Chief complaint/reason for consultation:   · Concern for COVID infection      History of Present Illness:   Annita Esquivel is a [de-identified]y.o.-year-old male who was initially admitted on 1/8/2022. Patient seen at the request of Dr. Shantelle Weiner:    Patient presented through ER with complaints of needing shortness of breath, cough, loss of appetite, nausea, vomiting and diarrhea over the past 7 to 10 days. He has not received the Covid vaccine and tested positive for Covid shortly after [place]. On evaluation in the ED, the patient had an SPO2 of 80% on room air and was tachypneic. A rapid Covid swab was positive. CT chest shows extensive bilateral pulmonary groundglass opacities. He was started on Decadron and given a dose of Actemra. Abnormal labs include:  · Creatinine 2.32  ·   · Troponin I 48  · WBC 17.1    Patient admitted because of concerns with COVID 19.     Meropenem initiated on 1/11/2022 for worsening respiratory distress and leukocytosis    Stable chest x-ray 1/11    CRP is trending down    Hemoglobin dropped to 5.7 on 1/16/2022  Hemoglobin remained stable at this time after receiving infusions of PRBC  Left gluteal hematoma present on CT abdomen pelvis      Occult blood noted on stool  Anemia continues in the program patient required another blood transfusion on 1/21/2022  Anticoagulation on hold s/p gluteal hematoma      Impression CT Chest/Abdomen/Pelvis 1/21/22   1.  New small bilateral pleural effusions with extensive bilateral airspace   consolidation, increased from 01/08/2022.       2.  Decreased AP dimension of the distal trachea, suggesting tracheomalacia.       3.  Previously noted hematoma in the left gluteal musculature is not as well visualized on today's study, but appears overall slightly decreased in size. No new areas of hematoma.       4.  Mild distention of the stomach, mild prominence of proximal small bowel   loops, and mild nonspecific stranding in the proximal mesentery.  No definite   evidence of high-grade bowel obstruction at this time.  Enteritis or early   developing obstruction are considered in the differential.         The patient required intubation for worsening respiratory failure on 1/22/2022. He is currently requiring 65% FiO2 with a PEEP of 12. Propofol is being given for sedation    CURRENT EVALUATION : 1/27/2022    Afebrile  VS stable with hypertension    The patient remains on mechanical ventilation today with 40-->45% FiO2 and PEEP of 6. He is undergoing another weaning trial.    Leukocytosis increased to 17   Sputum 1/22 Normal respiratory alistair light growth  MRSA nares not detected  Meropenem initiated 1/22/22  Repeat cultures ordered 1/25-gram positive cocci clusters sputum    CXR ordered 1/25-stable    We will continue to monitor    Patient exhibiting respiratory distress. Yes  Respiratory secretions: No    Patient receiving supplemental oxygen. BiPAP & Hi-flow NC-->MV  RR: 24  02 sat: 98    % FIO2: 65-->50-->30-->35-->40-->45  PEEP:  12-->8-->5-->6    NEWS Score: 0-4 Low risk group; 5-6: Medium risk group; 7 or above: High risk group  Parameters 3 2 1 0 1 2 3   Age    < 65   ? 65   RR ? 8  9-11 12-20  21-24 ? 25   O2 Sats ?  91 92-93 94-95 ? 96      Suppl O2  Yes  No      SBP ? 90  101-110 111-219   ? 220   HR ? 40  41-50 51-90  111-130 ? 131   Consciousness    Alert   Drowsiness, lethargy, or confusion   Temperature ? 35.0 C (95.0 F)  35.1-36.0 C 95.1-96.9 F 36.1-38.0 C 97.0-100.4 F 38.1-39.0 C 100.5-102.3 F ? 39.1 C ? 102.4 F      NEWS Score:   1/9/2022: 5 moderate risk    Overall Daily Picture:      Unchanged    Presence of secondary bacterial Infection:    Possible  Additional antibiotics: Meropenem 1/21/2022    Labs, X rays reviewed: 1/27/2022    BUN:63-->75-->80  Cr:2.53-->2.67-->2.52    WBC:34.5-->14.9-->12.4-->17.2-->12.8-->12.8  Hb:9.1-->8.2-->10.5-->10  Plat: 85-->89-->139-->144    Absolute Neutrophils:16  Absolute Lymphocytes:0.34  Neutrophil/Lymphocyte Ratio: 53 very high risk    CRP:160-->131-->52.3-->21.5  Ferritin:804  LDH: 563    Pro Calcitonin:      Cultures:  Urine:  ·   Blood:  · 1/25: No growth  Sputum :  · 1/22: Gram negative rods  · 1/25: Gram positive cocci clusters  MRSA Nares:   Not detected    CXR:     CAT:  1/8/21      Discussed with patient, RN, CC, IM. I have personally reviewed the past medical history, past surgical history, medications, social history, and family history, and I have updated the database accordingly.   Past Medical History:     Past Medical History:   Diagnosis Date    Chronic lower back pain     Cobalamin deficiency     [facility]     [facility] mellitus [facility]     Hyperlipidemia     Hypertension     Malignant neoplasm of colon [facility]     [facility]peripheral artery disease[facility]        Past Surgical  History:     Past Surgical History:   Procedure Laterality Date    ARTERY SURGERY      INSERT MIDLINE CATHETER  1/24/2022            Medications:      furosemide  40 mg IntraVENous BID    miconazole   Topical BID    apixaban  2.5 mg Oral BID    famotidine  20 mg Oral Daily    oxyCODONE  7.5 mg Oral Q6H    insulin lispro  0-6 Units SubCUTAneous Q4H    insulin lispro  0-3 Units SubCUTAneous Nightly    metoprolol tartrate  50 mg Oral BID    meropenem  500 mg IntraVENous Q12H    busPIRone  10 mg Oral TID    senna  2 tablet Oral Nightly    atorvastatin  20 mg Oral Nightly    clopidogrel  75 mg Oral Daily    aspirin  81 mg Oral Daily       Social History:     Social History     Socioeconomic History    Marital status:      Spouse name: Not on file    Number of children: Not on file    Years of education: Not on file    Highest education level: Not on file   Occupational History    Not on file   Tobacco Use    Smoking status: Former Smoker    Smokeless tobacco: Never Used   Substance and Sexual Activity    Alcohol use: Not Currently    Drug use: Never    Sexual activity: Not on file   Other Topics Concern    Not on file   Social History Narrative    Not on file     Social Determinants of Health     Financial Resource Strain:     Difficulty of Paying Living Expenses: Not on file   Food Insecurity:     Worried About Running Out of Food in the Last Year: Not on file    Lesley of Food in the Last Year: Not on file   Transportation Needs:     Lack of Transportation (Medical): Not on file    Lack of Transportation (Non-Medical): Not on file   Physical Activity:     Days of Exercise per Week: Not on file    Minutes of Exercise per Session: Not on file   Stress:     Feeling of Stress : Not on file   Social Connections:     Frequency of Communication with Friends and Family: Not on file    Frequency of Social Gatherings with Friends and Family: Not on file    Attends Ca[employer] Services: Not on file    Active Member of [address] [place] [address] or Organizations: Not on file    Attends Club or Organization Meetings: Not on file    Marital Status: Not on file   Intimate Partner Violence:     Fear of Current or Ex-Partner: Not on file    Emotionally Abused: Not on file    Physically Abused: Not on file    Sexually Abused: Not on file   Housing Stability:     Unable to Pay for Housing in the Last Year: Not on file    Number of [place] in the Last Year: Not on file    Unstable Housing in the Last Year: Not on file       Family History:   History reviewed. No pertinent family history. Allergies:   Patient has no known allergies. Review of Systems:     Review of Systems   Unable to perform ROS: Intubated   Respiratory: Positive for shortness of breath. Cardiovascular: Negative. Gastrointestinal: Negative. Genitourinary: Negative. Musculoskeletal: Negative. Skin: Positive for color change. Neurological: Negative. Physical Examination :     Patient Vitals for the past 8 hrs:   Temp Temp src Pulse Resp SpO2   01/27/22 0600 97.9 °F (36.6 °C) Axillary 80 20 98 %   01/27/22 0500   84 26 98 %   01/27/22 0420   83 25 98 %   01/27/22 0410    25 96 %   01/27/22 0400 98.1 °F (36.7 °C) Axillary 82 23 97 %   01/27/22 0335   79 23 97 %   01/27/22 0300   81 24 97 %   01/27/22 0200 97.5 °F (36.4 °C) Axillary 71 15 96 %   01/27/22 0100   78 25 94 %     Physical Exam  Vitals and nursing note reviewed. Constitutional:       Appearance: He is well-developed. Comments: Intubated and sedated   HENT:      Head: Normocephalic and atraumatic. Cardiovascular:      Rate and Rhythm: Normal rate. Heart sounds: Murmur heard. Pulmonary:      Effort: Respiratory distress present. Breath sounds: No wheezing. Abdominal:      General: Bowel sounds are normal.      Palpations: Abdomen is soft. There is no mass. Tenderness: There is no abdominal tenderness. Musculoskeletal:         General: Swelling (In the bilateral upper extremities especially in the forearms.) present. Normal range of motion. Cervical back: Neck supple. Lymphadenopathy:      Cervical: No cervical adenopathy. Skin:     General: Skin is dry. Findings: Bruising (There is bruising and ecchymotic skin lesions in the forearms bilaterally right worse than left) present.    Neurological:      Comments: FINA-intubated and sedated         Medical Decision Making -Laboratory:   I have independently reviewed/ordered the following labs:    CBC with Differential:   Recent Labs     01/26/22  0433 01/27/22  0343   WBC 12.8* 12.8*   HGB 10.5* 10.0*   HCT 33.0* 31.5*    144   LYMPHOPCT 3* 2*   MONOPCT 6 7     BMP:   Recent Labs     01/26/22  0433 01/26/22  1825 01/27/22  0343    --  144   K 3.8  --  3.7   *  --  107   CO2 24  --  26   BUN 75*  --  80*   CREATININE 2.67*  --  2.52*   MG  --  2.4  --      Hepatic Function Panel:   No results for input(s): PROT, LABALBU, BILIDIR, IBILI, BILITOT, ALKPHOS, ALT, AST in the last 72 hours. No results for input(s): RPR in the last 72 hours. No results for input(s): HIV in the last 72 hours. No results for input(s): BC in the last 72 hours. Lab Results   Component Value Date    MUCUS 1+ 01/17/2022    RBC 3.20 01/27/2022    TRICHOMONAS NOT REPORTED 01/17/2022    WBC 12.8 01/27/2022    YEAST NOT REPORTED 01/17/2022    TURBIDITY Clear 01/17/2022     Lab Results   Component Value Date    CREATININE 2.52 01/27/2022    GLUCOSE 195 01/27/2022       Medical Decision Making-Imaging:     Narrative   EXAMINATION:   CTA OF THE CHEST 1/8/2022 10:10 am       TECHNIQUE:   CTA of the chest was performed after the administration of intravenous   contrast.  Multiplanar reformatted images are provided for review.  MIP   images are provided for review. Dose modulation, iterative reconstruction,   and/or weight based adjustment of the mA/kV was utilized to reduce the   radiation dose to as low as reasonably achievable.       COMPARISON:   None.       HISTORY:   ORDERING SYSTEM PROVIDED HISTORY: dyspnea / hypoxia   Reason for Exam: Shortness of breath       FINDINGS:   Pulmonary Arteries: Pulmonary arteries are adequately opacified for   evaluation.  No evidence of intraluminal filling defect to suggest pulmonary   embolism.  Main pulmonary artery is normal in caliber.       Mediastinum: No evidence of mediastinal lymphadenopathy.  Normal heart size. Normal caliber thoracic aorta with diffuse atherosclerosis.       Lungs/pleura: Extensive ground-glass opacification of the bilateral lung   fields with peripheral involvement slight apical as well as basilar sparing.    No effusion or pneumothorax.       Upper Abdomen: Limited images of the upper abdomen are unremarkable.       Soft Tissues/Bones: No acute bone or soft tissue abnormality.           Impression   *No evidence of pulmonary embolism. *Extensive ground-glass opacification of the bilateral lung fields with   peripheral involvement slight apical as well as basilar sparing.  Imaging   features suggestive of COVID-19 pneumonia, though are nonspecific and can   occur with a variety of infectious and noninfectious processes.         Narrative   EXAMINATION:   CT OF THE ABDOMEN AND PELVIS WITHOUT CONTRAST, 1/16/2022 10:42 am       TECHNIQUE:   CT of the abdomen and pelvis was performed without the administration of   intravenous contrast. Multiplanar reformatted images are provided for review. Dose modulation, iterative reconstruction, and/or weight based adjustment of   the mA/kV was utilized to reduce the radiation dose to as low as reasonably   achievable.       COMPARISON:   CT of the chest from 01/08/2022, and retroperitoneal ultrasound from   01/10/2022.       HISTORY:   ORDERING SYSTEM PROVIDED HISTORY:  Retrop bleed r/o   TECHNOLOGIST PROVIDED HISTORY:   Retrop bleed r/o   Reason for Exam:  Retrop bleed r/o       FINDINGS:   Lower Chest: As demonstrated on recent CT chest, extensive and somewhat   denser confluent ground-glass airspace opacities re-identified mid-lower   lungs, sparing the bases with some associated crazy paving, air bronchograms   and bibasilar atelectatic appearing changes.  Main pulmonary artery caliber   31 mm.  Mild dilatation ascending aorta, with a maximal dimension of 41 mm.       Organs: Unopacified liver, spleen, adrenal glands and both kidneys show no   acute abnormality; without suspicious focal finding or hydronephrosis.    Significant right renal cortical thinning and some scarring suspected.  No   large stone.  Probable gallbladder sludge or vicarious contrast from recent   contrast CT; no calcified stones.       GI/Bowel: Small-moderate amount of retained stool, mostly right colon with   some fluid/levels; no abnormal dilatation, marked wall thickening or   pericolonic fat stranding.  Unremarkable small bowel.  Some fluid within a   mildly distended stomach.  Small hiatus hernia.       Pelvis: Partially fluid-filled urinary bladder without wall thickening or   mass.  No significant prostatic enlargement.  Symmetric seminal vesicles.  No   pelvic fluid collection or mass.  Partially visualized approximate 10 x 15 x   5.7 cm left gluteal mass with HU measurements/appearance most suggestive of   hematoma.  No high density finding compatible with active bleeding, but   assessment limited on this examination.  Small fat containing inguinal   hernias, larger on the left.       Peritoneum/Retroperitoneum: No retroperitoneal bleed or bulky   lymphadenopathy.  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  Probable limited intimal dissection   suprarenal aorta without marked aneurysmal dilatation.  Soft tissue stranding   flanks extending into the pelvis, suggesting some degree anasarca.  Slightly   greater prominence right proximal rectus musculature       Bones/Soft Tissues: No acute abnormality.  Mild scoliosis, multilevel   degenerative changes of spine but with DDD L5-S1 and bilateral mild hip joint   degenerative findings.           Impression   Left gluteal hematoma, partially visualized on this study without obvious   active bleeding, but limited without IV contrast.  No retroperitoneal or   rectus sheath bleed.       Extensive findings in the visualized lungs compatible with known COVID-19   pneumonia; denser GGOs suggest worsening pneumonia/pneumonitis or developing   consolidation.  No pneumothorax.       Multiple additional findings, as detailed in the body of the report above.       RECOMMENDATIONS:   Consider follow-up CT pelvis including the upper thighs, if the patient does   not respond to resuscitation with blood products/fluids and other   conservative measures including localized pressure.  Findings were discussed   with Phuong Acosta at 12:24 pm on 1/16/2022.             Narrative   EXAMINATION:   CT OF THE CHEST, ABDOMEN, AND PELVIS WITHOUT CONTRAST 1/21/2022 11:09 am       TECHNIQUE:   CT of the chest, abdomen and pelvis was performed without the administration   of intravenous contrast. Multiplanar reformatted images are provided for   review. Dose modulation, iterative reconstruction, and/or weight based   adjustment of the mA/kV was utilized to reduce the radiation dose to as low   as reasonably achievable.       COMPARISON:   CT abdomen and pelvis dated 01/16/2022.  CT chest dated 01/08/2022       HISTORY:   ORDERING SYSTEM PROVIDED HISTORY: blood loss anemia , gluteal hematoma and   worsening HB   TECHNOLOGIST PROVIDED HISTORY:   blood loss anemia , gluteal hematoma and worsening HB           FINDINGS:       Chest:       Mediastinum: Heart size is within normal limits.  Ascending thoracic aorta is   mildly dilated, measuring 4 cm in AP dimension, similar to the previous   study.  Main pulmonary artery is stable in caliber as well.  No mediastinal   hematoma or lymphadenopathy. Fabiola Finders is a catheter in the SVC with distal tip   in the distal SVC.       Lungs/pleura: There are small bilateral pleural effusions, which are new from   the previous study. Fabiola Finders is extensive dense airspace consolidation   throughout both lungs, with mild sparing at the bases, increased from   01/08/2022. Fabiola Finders is decreased AP dimension of the lower trachea, although   tracheobronchial tree remains patent.       Soft Tissues/Bones: There is no acute or suspicious osseous abnormality.    Visualized superficial soft tissues are within normal limits.           Abdomen/Pelvis:       Organs: Limited unenhanced liver is within normal limits.  There is a tiny   amount of free fluid adjacent to the hepatic dome.  Gallbladder, spleen,   pancreas, and adrenal glands are unremarkable.       There is bilateral renal atrophy, more so on the right, unchanged.  No   hydronephrosis or perinephric fluid collection. Ephriam Titus is mild bilateral   perinephric stranding, which is unchanged and likely physiologic.       GI/Bowel: The stomach is mildly distended with air-fluid level noted.  No   abnormal bowel distention.  There is mild increased prominence of the   proximal small bowel loops.  There is mild stranding in the proximal   mesentery.  No focal pericolonic inflammation.  No free air.       Pelvis: Urinary bladder is within normal limits.  No evidence of pelvic   lymphadenopathy.       Peritoneum/Retroperitoneum: Distal aortobifemoral graft noted.  Caliber of   the abdominal aorta is unchanged.  There is moderate to severe scattered   atherosclerosis, which is unchanged.  No retroperitoneal lymphadenopathy or   hematoma.  Slit-like IVC is noted.       Bones/Soft Tissues: There is no acute or suspicious osseous abnormality.  The   hematoma previously seen in the left gluteal muscle is not as well visualized   on today's study due to isoattenuation.  There is asymmetric swelling of the   left gluteal muscle, although slightly improved when compared to 01/16/2022. AP dimension in the area of suspected hematoma is 4.4 cm (previously 5.7 cm). Transverse dimension is approximately 9.3 cm (previously 9.7 cm).  There is   stranding in the subcutaneous fat of the upper thighs bilaterally.           Impression   1.  New small bilateral pleural effusions with extensive bilateral airspace   consolidation, increased from 01/08/2022.       2.  Decreased AP dimension of the distal trachea, suggesting tracheomalacia.       3.  Previously noted hematoma in the left gluteal musculature is not as well   visualized on today's study, but appears overall slightly decreased in size.    No new areas of radiologic studies were reviewed with personal review of films  · Large amounts of data were reviewed  · Discussed with nursing Staff, Discharge planner  · Infection Control and Prevention measures reviewed  · All prior entries were reviewed  · Administer medications as ordered  · Prognosis: Guarded  · Discharge planning reviewed      Thank you for allowing us to participate in the care of this patient. Please call with questions. Electronically signed by MOHAMUD Kohler CNP on 1/27/2022 at 8:31 AM    ATTESTATION:    I have discussed the case, including pertinent history and exam findings with the APRN. I have evaluated the  History, physical findings and pictures of the patient and the key elements of the encounter have been performed by me. I have reviewed the laboratory data, other diagnostic studies and discussed them with the APRN. I have updated the medical record where necessary. I agree with the assessment, plan and orders as documented by the APRN.     Adin Pierson MD.

## 2022-01-27 NOTE — PROGRESS NOTES
Nephrology Progress Note      SUBJECTIVE      Patient seen and examined this afternoon  Patient currently intubated, off pressors, continues on fentanyl and propofol. FiO2 45%. Unable to wean. Does follow commands. Patient has developed acute renal injury. His baseline serum creatinine is around 2.02.2, proteinuria 0.5 to 0.8 g so he does have CKD 3 3B4 to begin with. His creatinine had gone as high as 3 and it is down to 2.5 today. Has received intravenous diuretics. Chest x-ray continues to show bilateral lung infiltrates combination of COVID-19 pneumonia and superadded bacterial infection    Cardiac echo shows preserved LV function    This is a second hit with ATN. Initially came in with COVID-19 pneumonia developed ATN which recovered after developed gluteal hematoma drop in hemoglobin second had ATN creatinine peaking around 3.0 which seems to be resolving now. In the meantime respiratory status worsened and he is now been intubated since 2022. His initial CODE STATUS was DNR CCA which has not been changed to full code. Family meeting planned for Friday  OBJECTIVE      CURRENT TEMPERATURE:  Temp: 97.9 °F (36.6 °C)  MAXIMUM TEMPERATURE OVER 24HRS:  Temp (24hrs), Av.1 °F (36.7 °C), Min:97.5 °F (36.4 °C), Max:98.8 °F (37.1 °C)    CURRENT RESPIRATORY RATE:  Resp: 21  CURRENT PULSE:  Pulse: 90  CURRENT BLOOD PRESSURE:  BP: (!) 164/51  24HR BLOOD PRESSURE RANGE:  Systolic (00WMO), VCT:271 , Min:163 , FSR:349   ; Diastolic (31JWV), QBC:46, Min:51, Max:55    24HR INTAKE/OUTPUT:      Intake/Output Summary (Last 24 hours) at 2022 1046  Last data filed at 2022 0400  Gross per 24 hour   Intake 1274.34 ml   Output 1405 ml   Net -130.66 ml     WEIGHT :No data found. PHYSICAL EXAM      GENERAL APPEARANCE: Intubated, sedated  SKIN: warm and dry, no rash or erythema  EYES: conjunctivae normal and sclera anicteric  ENT: ETT in place  NECK:   No JVD.  No carotid bruits and no carotid lymphadenopathy . PULMONARY: Decreased air entry bilaterally  CADRDIOVASCULAR: S1 and S2 normal NO S3 and NO S4 . + Systolic murmur second heart sound not heard.    ABDOMEN: soft nontender, bowel sounds present, no organomegaly,      EXTREMITIES: + Slight edema     CURRENT MEDICATIONS      torsemide (DEMADEX) tablet 40 mg, Daily  metoclopramide (REGLAN) injection 10 mg, Q6H PRN  bisacodyl (DULCOLAX) suppository 10 mg, Daily PRN  fentaNYL 20 mcg/mL Infusion, Continuous  amiodarone (CORDARONE) 450 mg in dextrose 5 % 250 mL infusion, Continuous  miconazole (MICOTIN) 2 % powder, BID  apixaban (ELIQUIS) tablet 2.5 mg, BID  famotidine (PEPCID) tablet 20 mg, Daily  oxyCODONE (ROXICODONE) immediate release tablet 7.5 mg, Q6H  insulin lispro (HUMALOG) injection vial 0-6 Units, Q4H  insulin lispro (HUMALOG) injection vial 0-3 Units, Nightly  metoprolol tartrate (LOPRESSOR) tablet 50 mg, BID  hydrALAZINE (APRESOLINE) injection 10 mg, Q6H PRN  propofol injection, Titrated  0.9 % sodium chloride infusion, PRN  meropenem (MERREM) 500 mg IVPB (mini-bag), Q12H  sodium chloride flush 0.9 % injection 5-40 mL, PRN  0.9 % sodium chloride infusion, PRN  busPIRone (BUSPAR) tablet 10 mg, TID  melatonin tablet 3 mg, Nightly PRN  sodium chloride (OCEAN) 0.65 % nasal spray 1 spray, PRN  LORazepam (ATIVAN) injection 0.5 mg, Q2H PRN  senna (SENOKOT) tablet 17.2 mg, Nightly  glucose (GLUTOSE) 40 % oral gel 15 g, PRN  dextrose 50 % IV solution, PRN  glucagon (rDNA) injection 1 mg, PRN  dextrose 5 % solution, PRN  metoprolol (LOPRESSOR) injection 5 mg, Q6H PRN  benzonatate (TESSALON) capsule 200 mg, TID PRN  sodium chloride flush 0.9 % injection 10 mL, PRN  atorvastatin (LIPITOR) tablet 20 mg, Nightly  clopidogrel (PLAVIX) tablet 75 mg, Daily  aspirin chewable tablet 81 mg, Daily  sodium chloride flush 0.9 % injection 5-40 mL, PRN  0.9 % sodium chloride infusion, PRN  ondansetron (ZOFRAN-ODT) disintegrating tablet 4 mg, Q8H PRN   Or  ondansetron [facility]) injection 4 mg, Q6H PRN  polyethylene glycol (GLYCOLAX) packet 17 g, Daily PRN  acetaminophen (TYLENOL) tablet 650 mg, Q6H PRN   Or  acetaminophen (TYLENOL) suppository 650 mg, Q6H PRN          LABS      CBC:   Recent Labs     01/25/22  0424 01/26/22  0433 01/27/22  0343   WBC 17.2* 12.8* 12.8*   RBC 3.19* 3.41* 3.20*   HGB 10.0* 10.5* 10.0*   HCT 31.0* 33.0* 31.5*   MCV 97.2 96.8 98.4   MCH 31.3 30.8 31.3   MCHC 32.3 31.8 31.7   RDW 18.3* 18.3* 18.2*   * 139 144   MPV 10.4 10.6 10.4      BMP:   Recent Labs     01/25/22 0424 01/26/22  0433 01/27/22  0343    143 144   K 4.0 3.8 3.7   * 108* 107   CO2 23 24 26   BUN 66* 75* 80*   CREATININE 2.25* 2.67* 2.52*   GLUCOSE 172* 160* 195*   CALCIUM 8.0* 8.0* 8.3*      FERRITIN:    Lab Results   Component Value Date    FERRITIN 804 01/09/2022     SUMMER:   Lab Results   Component Value Date    SUMMER NEGATIVE 01/09/2022       SPEP:   Lab Results   Component Value Date    PROT 6.1 01/09/2022     C3:   Lab Results   Component Value Date    C3 134 01/09/2022     C4:   Lab Results   Component Value Date    C4 31 01/09/2022     URINE SODIUM:    Lab Results   Component Value Date    CHERYL 56 01/10/2022      URINE CREATININE:    Lab Results   Component Value Date    LABCREA 90.8 01/09/2022     URINALYSIS:  U/A:   Lab Results   Component Value Date    NITRU NEGATIVE 01/17/2022    COLORU Yellow 01/17/2022    PHUR 5.0 01/17/2022    WBCUA None 01/17/2022    RBCUA 2 TO 5 01/17/2022    MUCUS 1+ 01/17/2022    TRICHOMONAS NOT REPORTED 01/17/2022    YEAST NOT REPORTED 01/17/2022    BACTERIA NOT REPORTED 01/17/2022    SPECGRAV 1.018 01/17/2022    LEUKOCYTESUR NEGATIVE 01/17/2022    UROBILINOGEN Normal 01/17/2022    BILIRUBINUR NEGATIVE 01/17/2022    GLUCOSEU 1+ 01/17/2022    KETUA NEGATIVE 01/17/2022    AMORPHOUS NOT REPORTED 01/17/2022         ASSESSMENT      1.  Acute Kidney Injury: Secondary to ischemic ATN and nephrotoxic ATN initially from COVID-pneumonia, systemic inflammation, contrast exposure.  Baseline 2.0 peaked at 3.0. Carrie Tang developed left gluteal area hematoma with drop in hemoglobin down to 5.7, decreased renal perfusion, had a second hit creatinine peaked at 3.96. Her creatinine now fluctuating in the 2.2-2.5 range  2. 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 Persistent anemia, requiring multiple transfusions likely from left gluteal area hematoma  4. COVID-19 pneumonia with respiratory failure now intubated. FiO2 is 30% with some weaning her current  5.  Severe aortic stenosis valve area 1.0 cm² peak gradient 60 mean 37    PLAN      1. Discontinue Lasix, start Demadex 40 daily  2. Supportive care to continue. 3. Follow up labs ordered. 4.  Continue to follow very closely in regards to his renal status. He is at least at a moderate risk to develop requirement for RRT  5. Overall prognosis poor      Please do not hesitate to call with questions.     This note is created with the assistance of a speech-recognition program. While intending to generate a document that actually reflects the content of the visit, no guarantees can be provided that every mistake has been identified and corrected by editing    Electronically signed by Pilar Singleton MD on 1/27/2022 at 10:46 AM

## 2022-01-27 NOTE — PLAN OF CARE
Problem: Airway Clearance - Ineffective  Goal: Achieve or maintain patent airway  1/27/2022 0904 by Cheryle Grief, RCP  Outcome: Ongoing     Problem: Gas Exchange - Impaired  Goal: Absence of hypoxia  1/27/2022 0904 by Cheryle Grief, RCP  Outcome: Ongoing     Problem: Gas Exchange - Impaired  Goal: Promote optimal lung function  1/27/2022 0904 by Cheryle Grief, RCP  Outcome: Ongoing     Problem: Breathing Pattern - Ineffective  Goal: Ability to achieve and maintain a regular respiratory rate  1/27/2022 0904 by Cheryle Grief, RCP  Outcome: Ongoing     Problem: OXYGENATION/RESPIRATORY FUNCTION  Goal: Patient will maintain patent airway  Outcome: Ongoing     Problem: OXYGENATION/RESPIRATORY FUNCTION  Goal: Patient will achieve/maintain normal respiratory rate/effort  Description: Respiratory rate and effort will be within normal limits for the patient  Outcome: Ongoing     Problem: MECHANICAL VENTILATION  Goal: Patient will maintain patent airway  1/27/2022 0904 by Cheryle Grief, RCP  Outcome: Ongoing     Problem: MECHANICAL VENTILATION  Goal: Oral health is maintained or improved  1/27/2022 0904 by Cheryle Grief, RCP  Outcome: Ongoing     Problem: MECHANICAL VENTILATION  Goal: ET tube will be managed safely  1/27/2022 0904 by Cheryle Grief, RCP  Outcome: Ongoing     Problem: MECHANICAL VENTILATION  Goal: Ability to express needs and understand communication  Outcome: Ongoing     Problem: MECHANICAL VENTILATION  Goal: Mobility/activity is maintained at optimum level for patient  Outcome: Ongoing     Problem: SKIN INTEGRITY  Goal: Skin integrity is maintained or improved  Outcome: Ongoing

## 2022-01-28 NOTE — CONSULTS
Palliative Care Inpatient Consult    NAME:  Angelica Massey  MEDICAL RECORD NUMBER:  [id number]  AGE: [de-identified] y.o. GENDER: male  : 1940  TODAY'S DATE:  2022    Reasons for Consultation:    Goals of care  Code status discussion  Family support  Symptom management    Members of PC team contributing to this consultation are :  Dr. Reyna Babin:  I was updated by the patient's nurse this morning in [facility] ICU. He was now out of isolation. Per RN, Mitali Edge had updated the family and gave her all options, including trial extubation vs trach vs palliation depending on how he does. He was just intubated  On 2022, so was being given time to see how his condition progressed. I called the patient's wife and introduced myself to her. The family had already been updated by Dr. Mitali Sutton today. In fact, her son had come to visit earlier. She had no questions at this time. Plan to wait and see how he improves. I offered her comfort and support at this time. Education/support to family  Communications with primary service  Continue with current plan of care  Code status clarified: Full Code    Additional Assessments:     1- Symptom management/ pain control     The patient is intubated and sedated    We feel the patient symptoms are being controlled. his current regimen is reviewed by myself and discussed with the staff. 2- Goals of care evaluation   The patient goals of care are provide comfort care/support/palliation/relieve suffering and support for family/caregiver   Goals of care discussed with:    [] Patient independently    [] Patient and Family    [] Family or Healthcare DPOA independently    [] Unable to discuss with patient, family/DPOA not present    3- Code Status  Full Code    4- Other recommendations   - We will continue to provide comfort and support to the patient and the family      Palliative Care will continue to follow Mr. Sherl Lesch care as needed.    Thank you for allowing Palliative Care to participate in the care of Mr. Mónica Smith . History of Present Illness     The patient is a [de-identified] y.o. male with a PMHx of CKD IIIb-4, severe AS, anemia, HTN, HLD, PAD s/p aortic bypass, DM, CVA in 3/2021, left carotid artery stenosis presented with SOB, nausea, vomiting for 7-10 days. He was found to be Covid positive. He was in respiratory failure, requiring high flow for many days. Unfortunately, he continued to deteriorate, requiring intubation. There were many code status changes during his admission; at the time prior to intubation, he decided to switch from [facility] to full code. Currently, he is on minimal vent settings, however, he has not succeeded with weaning trials. Referred to Palliative Care by   [x] Physician   [] Nursing  [] Family Request   [] Other:       Active Hospital Problems    Diagnosis Date Noted    Hypoxia [R09.02]     Acute distention of stomach [K31.0]     COVID-19 [U07.1]     Traumatic hematoma of buttock [S30. 0XXA]     Atrial fibrillation with RVR ([facility] [I48.91] 01/10/2022    Nonrheumatic aortic valve stenosis [I35.0] 01/09/2022    PAD (peripheral artery disease) ([facility] [I73.9] 01/09/2022    Moderate protein-calorie malnutrition ([facility] [E44.0] 01/09/2022    Pneumonia due to COVID-19 virus [U07.1, J12.82] 01/08/2022    Acute hypoxemic respiratory failure due to COVID-19 ([facility] [U07.1, J96.01] 01/08/2022    Essential hypertension [I10] 01/08/2022    Hyperlipidemia [E78.5] 01/08/2022    Acute kidney injury ([facility] [N17.9] 01/08/2022    Stage 3b chronic kidney disease ([facility] [N18.32] 01/08/2022    Elevated troponin [R77.8]        PAST MEDICAL HISTORY      Diagnosis Date    Chronic lower back pain     Cobalamin deficiency     CVA (cerebral vascular accident) ([facility]     Diabetes mellitus ([facility]     Hyperlipidemia     Hypertension     Malignant neoplasm of colon ([facility] [facility]     PAD (peripheral artery disease) ([facility] [address] [facility]        PAST SURGICAL HISTORY  Past Surgical History:   Procedure Laterality Date    ARTERY SURGERY      INSERT MIDLINE CATHETER  1/24/2022            SOCIAL HISTORY  Social History     Tobacco Use    Smoking status: Former Smoker    Smokeless tobacco: Never Used   Substance Use Topics    Alcohol use: Not Currently    Drug use: Never       FAMILY HISTORY  History reviewed. No pertinent family history. ALLERGIES  No Known Allergies    MEDICATIONS  Current Medications    metoprolol tartrate  75 mg Oral BID    torsemide  40 mg Oral Daily    amiodarone  200 mg Oral Daily    miconazole   Topical BID    apixaban  2.5 mg Oral BID    famotidine  20 mg Oral Daily    oxyCODONE  7.5 mg Oral Q6H    insulin lispro  0-6 Units SubCUTAneous Q4H    insulin lispro  0-3 Units SubCUTAneous Nightly    meropenem  500 mg IntraVENous Q12H    busPIRone  10 mg Oral TID    senna  2 tablet Oral Nightly    atorvastatin  20 mg Oral Nightly    clopidogrel  75 mg Oral Daily    aspirin  81 mg Oral Daily     metoclopramide, bisacodyl, hydrALAZINE, sodium chloride, sodium chloride flush, sodium chloride, melatonin, sodium chloride, LORazepam, glucose, dextrose, glucagon (rDNA), dextrose, metoprolol, benzonatate, sodium chloride flush, sodium chloride flush, sodium chloride, ondansetron **OR** ondansetron, polyethylene glycol, acetaminophen **OR** acetaminophen  Home Medications  No current facility-administered medications on file prior to encounter.      Current Outpatient Medications on File Prior to Encounter   Medication Sig Dispense Refill    aspirin 325 MG tablet Take 81 mg by mouth daily       atorvastatin (LIPITOR) 40 MG tablet Take 20 mg by mouth nightly      lisinopril (PRINIVIL;ZESTRIL) 30 MG tablet Take 30 mg by mouth daily      metoprolol tartrate (LOPRESSOR) 50 MG tablet Take 50 mg by mouth daily      cilostazol (PLETAL) 100 MG tablet Take 100 mg by mouth 2 times daily      clopidogrel (PLAVIX) 75 MG tablet Take 75 mg by mouth daily      vitamin B-12 (CYANOCOBALAMIN) 1000 MCG tablet Take 1,000 mcg by mouth daily         Data         BP (!) 146/42   Pulse 108   Temp 98.8 °F (37.1 °C) (Oral)   Resp 20   Ht 5' 10\" (1.778 m)   Wt 148 lb 2.4 oz (67.2 kg)   SpO2 98%   BMI 21.26 kg/m²     Wt Readings from Last 3 Encounters:   01/08/22 148 lb 2.4 oz (67.2 kg)        Code Status: Full Code     ADVANCED CARE PLANNING:  Patient has capacity for medical decisions: no  Health Care Power of : no  Living Will: no     Personal, Social, and Family History  Marital Status:   Living situation: with family:  spouse  Importance of rosenda/Alevism/spiritual beliefs: [] Very [x] Somewhat [] Not   Psychological Distress: mild  Does patient understand diagnosis/treatment? not asked  Does caregiver understand diagnosis/treatment? yes    Assessment        REVIEW OF SYSTEMS  Could not obtain as the patient is intubated and sedated    PHYSICAL ASSESSMENT:  Physical Exam  Vitals and nursing note reviewed. Constitutional:       Appearance: He is ill-appearing. Comments: Intubated, sedated. HENT:      Head: Normocephalic and atraumatic. Cardiovascular:      Rate and Rhythm: Normal rate. Pulmonary:      Effort: No respiratory distress. Breath sounds: No wheezing. Comments: Mechanically ventilated, decreased bibasilar breath sounds  Abdominal:      General: Bowel sounds are normal.      Palpations: Abdomen is soft. Musculoskeletal:      Right lower leg: Edema present. Left lower leg: Edema present. Skin:     General: Skin is dry. Findings: Bruising present. Neurological:      Comments: Intubated, sedated.           Palliative Performance Scale:  ___100% Full ambulation; normal activity/No disease; full self-care; normal intake; LOC full  ___90% full ambulation; normal activity/some disease; full self-care; normal intake; LOC full  ___80% ambulation full; normal activity with effort/some disease; full self-care; normal/reduced intake; LOC full  ___70% ambulation reduced; cannot do normal work/some disease; full self-care; normal/reduce intake; LOC full  ___60%  Ambulation reduced; Significant disease; Can't do hobbies/housework; intake normal or reduced; occasional assist; LOC full/confusion  ___50%  Mainly sit/lie; Extensive disease; Can't do any work; Considerable assist; intake normal or reduced; LOC full/confusion  ___40%  Mainly in bed; Extensive disease; Mainly assist; intake normal or reduced; LOC full/confusion   ___30%  Bed Bound; Extensive disease; Total care; intake reduced; LOC full/confusion  ___20%  Bed Bound; Extensive disease; Total care; intake minimal; Drowsy/coma  ___10%  Bed Bound; Extensive disease; Total care; Mouth care only; Drowsy/coma  ___0       Death      Principle Problem/Diagnosis:  Principal Problem:    Pneumonia due to COVID-19 virus  Active Problems:    Essential hypertension    Hyperlipidemia    Acute hypoxemic respiratory failure due to COVID-19 [facility])    Acute kidney injury (HCC)    Stage 3b chronic kidney disease (HCC)    Elevated troponin    Nonrheumatic aortic valve stenosis    PAD (peripheral artery disease) (HCC)    Moderate protein-calorie malnutrition (HCC)    Atrial fibrillation with RVR (HCC)    COVID-19    Traumatic hematoma of buttock    Acute distention of stomach    Hypoxia  Resolved Problems:    * No resolved hospital problems. *      Please call with any palliative questions or concerns. Palliative Care Team is available via perfect serve or via phone. This note has been dictated by dragon, typing errors may be a possibility.   The total time I spent in seeing the patient, discussing goals of care, advanced directives, code status, greater than 50% time in counseling and other major issues was more than 60 minutes      Electronically signed by      Gisell Lala MD  Hospice/Palliative Care Fellow  [place], [place]  1/28/2022 1:43

## 2022-01-28 NOTE — PROGRESS NOTES
Nephrology Progress Note      SUBJECTIVE      Patient seen and examined this afternoon  Patient currently intubated, off pressors, continues on fentanyl and propofol. FiO2 40%. Unable to wean. Does follow commands. Patient has developed acute renal injury. His baseline serum creatinine is around 2.02.2, proteinuria 0.5 to 0.8 g so he does have CKD  3B4 to begin with. His creatinine had gone as high as 3 and it is down to 2.3 today. Has been receiving diuretics, initially intravenous switched to oral Demadex from yesterday. Chest x-ray continues to show bilateral lung infiltrates combination of COVID-19 pneumonia and superadded bacterial infection  This morning labs show sodium of 150 indicating free water losses which have not been replaced, tube feeds continue at goal rate, renal formula at 40 mL an hour  Cardiac echo shows preserved LV function    This is a second hit with ATN. Initially came in with COVID-19 pneumonia developed ATN which recovered after developed gluteal hematoma drop in hemoglobin and ATN creatinine peaking around 3.0 which seems to be resolving now. Developing hypernatremia from free water losses likely from recovery phase of ATN  In the meantime respiratory status worsened and he is now been intubated since 2022. His initial CODE STATUS was DNR CCA which has not been changed to full code. Family meeting planned for Friday.   Is out of isolation we will plan to be moved to the MICU  OBJECTIVE      CURRENT TEMPERATURE:  Temp: 98.6 °F (37 °C)  MAXIMUM TEMPERATURE OVER 24HRS:  Temp (24hrs), Av.2 °F (36.8 °C), Min:97.8 °F (36.6 °C), Max:98.8 °F (37.1 °C)    CURRENT RESPIRATORY RATE:  Resp: 16  CURRENT PULSE:  Pulse: 70  CURRENT BLOOD PRESSURE:  BP: (!) 146/42  24HR BLOOD PRESSURE RANGE:  Systolic (96QCO), CMO:777 , Min:146 , GRU:743   ; Diastolic (62CVR), RUO:22, Min:42, Max:42    24HR INTAKE/OUTPUT:      Intake/Output Summary (Last 24 hours) at 2022 1153  Last data filed at 1/28/2022 0830  Gross per 24 hour   Intake 2083.44 ml   Output 1575 ml   Net 508.44 ml     WEIGHT :No data found. PHYSICAL EXAM      GENERAL APPEARANCE: Intubated, sedated  SKIN: warm and dry, no rash or erythema  EYES: conjunctivae normal and sclera anicteric  ENT: ETT in place  NECK:   No JVD. No carotid bruits and no carotid lymphadenopathy . PULMONARY: Decreased air entry bilaterally  CADRDIOVASCULAR: S1 and S2 normal NO S3 and NO S4 . + Systolic murmur second heart sound not heard.    ABDOMEN: soft nontender, bowel sounds present, no organomegaly,      EXTREMITIES: + Slight edema     CURRENT MEDICATIONS      metoprolol tartrate (LOPRESSOR) tablet 75 mg, BID  potassium bicarb-citric acid (EFFER-K) effervescent tablet 40 mEq, Once  torsemide (DEMADEX) tablet 40 mg, Daily  amiodarone (CORDARONE) tablet 200 mg, Daily  metoclopramide (REGLAN) injection 10 mg, Q6H PRN  bisacodyl (DULCOLAX) suppository 10 mg, Daily PRN  fentaNYL 20 mcg/mL Infusion, Continuous  miconazole (MICOTIN) 2 % powder, BID  apixaban (ELIQUIS) tablet 2.5 mg, BID  famotidine (PEPCID) tablet 20 mg, Daily  oxyCODONE (ROXICODONE) immediate release tablet 7.5 mg, Q6H  insulin lispro (HUMALOG) injection vial 0-6 Units, Q4H  insulin lispro (HUMALOG) injection vial 0-3 Units, Nightly  hydrALAZINE (APRESOLINE) injection 10 mg, Q6H PRN  propofol injection, Titrated  0.9 % sodium chloride infusion, PRN  meropenem (MERREM) 500 mg IVPB (mini-bag), Q12H  sodium chloride flush 0.9 % injection 5-40 mL, PRN  0.9 % sodium chloride infusion, PRN  busPIRone (BUSPAR) tablet 10 mg, TID  melatonin tablet 3 mg, Nightly PRN  sodium chloride (OCEAN) 0.65 % nasal spray 1 spray, PRN  LORazepam (ATIVAN) injection 0.5 mg, Q2H PRN  senna (SENOKOT) tablet 17.2 mg, Nightly  glucose (GLUTOSE) 40 % oral gel 15 g, PRN  dextrose 50 % IV solution, PRN  glucagon (rDNA) injection 1 mg, PRN  dextrose 5 % solution, PRN  metoprolol (LOPRESSOR) injection 5 mg, Q6H PRN  benzonatate (TESSALON) capsule 200 mg, TID PRN  sodium chloride flush 0.9 % injection 10 mL, PRN  atorvastatin (LIPITOR) tablet 20 mg, Nightly  clopidogrel (PLAVIX) tablet 75 mg, Daily  aspirin chewable tablet 81 mg, Daily  sodium chloride flush 0.9 % injection 5-40 mL, PRN  0.9 % sodium chloride infusion, PRN  ondansetron (ZOFRAN-ODT) disintegrating tablet 4 mg, Q8H PRN   Or  ondansetron (ZOFRAN) injection 4 mg, Q6H PRN  polyethylene glycol (GLYCOLAX) packet 17 g, Daily PRN  acetaminophen (TYLENOL) tablet 650 mg, Q6H PRN   Or  acetaminophen (TYLENOL) suppository 650 mg, Q6H PRN          LABS      CBC:   Recent Labs     01/26/22 0433 01/27/22 0343 01/28/22 0436   WBC 12.8* 12.8* 11.4*   RBC 3.41* 3.20* 2.79*   HGB 10.5* 10.0* 8.7*   HCT 33.0* 31.5* 27.7*   MCV 96.8 98.4 99.3   MCH 30.8 31.3 31.2   MCHC 31.8 31.7 31.4   RDW 18.3* 18.2* 18.4*    144 139   MPV 10.6 10.4 10.9      BMP:   Recent Labs     01/26/22  0433 01/27/22  0343 01/28/22 0436    144 150*   K 3.8 3.7 3.9   * 107 110*   CO2 24 26 29   BUN 75* 80* 76*   CREATININE 2.67* 2.52* 2.35*   GLUCOSE 160* 195* 110*   CALCIUM 8.0* 8.3* 8.3*      FERRITIN:    Lab Results   Component Value Date    FERRITIN 804 01/09/2022     SUMMER:   Lab Results   Component Value Date    SUMMER NEGATIVE 01/09/2022       SPEP:   Lab Results   Component Value Date    PROT 6.1 01/09/2022     C3:   Lab Results   Component Value Date    C3 134 01/09/2022     C4:   Lab Results   Component Value Date    C4 31 01/09/2022     URINE SODIUM:    Lab Results   Component Value Date    CHERYL 56 01/10/2022      URINE CREATININE:    Lab Results   Component Value Date    LABCREA 90.8 01/09/2022     URINALYSIS:  U/A:   Lab Results   Component Value Date    NITRU NEGATIVE 01/17/2022    COLORU Yellow 01/17/2022    PHUR 5.0 01/17/2022    WBCUA None 01/17/2022    RBCUA 2 TO 5 01/17/2022    MUCUS 1+ 01/17/2022    TRICHOMONAS NOT REPORTED 01/17/2022    YEAST NOT REPORTED 01/17/2022    BACTERIA NOT REPORTED 01/17/2022    SPECGRAV 1.018 01/17/2022    LEUKOCYTESUR NEGATIVE 01/17/2022    UROBILINOGEN Normal 01/17/2022    BILIRUBINUR NEGATIVE 01/17/2022    GLUCOSEU 1+ 01/17/2022    KETUA NEGATIVE 01/17/2022    AMORPHOUS NOT REPORTED 01/17/2022         ASSESSMENT      1. Acute Kidney Injury: Secondary to ischemic ATN and nephrotoxic ATN initially from COVID-pneumonia, systemic inflammation, contrast exposure.  Baseline 2.0 peaked at 3.0. Martínez Doing developed left gluteal area hematoma with drop in hemoglobin down to 5.7, decreased renal perfusion, had a second hit creatinine peaked at 3.96. Creatinine now fluctuating the 2.2-2.5 range   2. 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 Persistent anemia, requiring multiple transfusions likely from left gluteal area hematoma  4. COVID-19 pneumonia with respiratory failure now intubated. FiO2 is 30% with some weaning her current  5.  Severe aortic stenosis valve area 1.0 cm² peak gradient 60 mean 37  6. Hypovolemic hyponatremia from with free water losses likely insensible    PLAN      1. Continue Demadex 40 daily   2. Add free water 300 mL every 6 hours   2. Supportive care to continue, continue tube feeds at current rate. 3. Follow up labs ordered. 4.  Continue to follow very closely in regards to his renal status. 5.  Overall prognosis poor      Please do not hesitate to call with questions.     This note is created with the assistance of a speech-recognition program. While intending to generate a document that actually reflects the content of the visit, no guarantees can be provided that every mistake has been identified and corrected by editing    Electronically signed by Momo Trinidad MD on 1/28/2022 at 11:53 AM

## 2022-01-28 NOTE — PROGRESS NOTES
[facility] [address] [facility] Patient  Today's Date and Time: 1/28/2022, 2:03 PM    Impression :     · COVID 19 Confirmed Infection  · Covid tests:  · 1/8/21: Positive  · Acute hypoxic respiratory failure  · Paroxysmal A. 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  · Essential hypertension  · Elevated inflammatory markers  · Hyperlipidemia  · Acute gluteal hematoma  · Patient has not received the Covid vaccine  · Intubated 1/22/22      Recommendations:   · Antibiotic treatment:  · Meropenem 500 mg BID IV for leukocytosis & possible secondary bacterial pneumonia 1/11/21-discontinued 1/17/21  · Meropenem Re-started 1-21-22 because of residual dense consolidation of the lungs with air bronchograms  · Gram positive cocci clusters sputum 1/25  · Covid Rx:    · Remdesivir-contraindicated with JULES  · Monoclonal antibodies-out of window  · Decadron-initiated 1/8/2022  · Actemra-administered 1/8/2022    · The patient still has significant hypoxia is currently requiring high flow/BiPAP. · He is insisting on trying to go home and does not quite understand that he is requiring a significant amount of respiratory support. · He did mention to me that he was happy to die at home but it is my understanding that the recently changed his CODE STATUS to comfort care arrest to full code.   · His code status has again been changed to [facility]  · His code status changed back to a full code after he agreed to intubation on 1/22/22  · Continue supportive care      Medical Decision Making/Summary/Discussion:1/28/2022     · Patient admitted with COVID 19 infection  · Isolation until 1/28/22    Infection Control Recommendations   · Universal Precautions  · Airborne isolation  · Droplet Isolation    Antimicrobial Stewardship Recommendations       · Renal considerations  Coordination of Outpatient Care:   · Estimated Length of IV antimicrobials:TBD  · Patient will need Midline Catheter Insertion: TBD  · Patient will need PICC line Insertion: No  · Patient will need: Home IV , [facility],  SNF,  LTAC:TBD  · Patient will need outpatient wound care:No    Chief complaint/reason for consultation:   · Concern for COVID infection      History of Present Illness:   Nia Pitts is a [de-identified]y.o.-year-old male who was initially admitted on 1/8/2022. Patient seen at the request of Dr. Becky Curtis:    Patient presented through ER with complaints of needing shortness of breath, cough, loss of appetite, nausea, vomiting and diarrhea over the past 7 to 10 days. He has not received the Covid vaccine and tested positive for Covid shortly after [place]. On evaluation in the ED, the patient had an SPO2 of 80% on room air and was tachypneic. A rapid Covid swab was positive. CT chest shows extensive bilateral pulmonary groundglass opacities. He was started on Decadron and given a dose of Actemra. Abnormal labs include:  · Creatinine 2.32  ·   · Troponin I 48  · WBC 17.1    Patient admitted because of concerns with COVID 19.     Meropenem initiated on 1/11/2022 for worsening respiratory distress and leukocytosis    Stable chest x-ray 1/11    CRP is trending down    Hemoglobin dropped to 5.7 on 1/16/2022  Hemoglobin remained stable at this time after receiving infusions of PRBC  Left gluteal hematoma present on CT abdomen pelvis      Occult blood noted on stool  Anemia continues in the program patient required another blood transfusion on 1/21/2022  Anticoagulation on hold s/p gluteal hematoma      Impression CT Chest/Abdomen/Pelvis 1/21/22   1.  New small bilateral pleural effusions with extensive bilateral airspace   consolidation, increased from 01/08/2022.       2.  Decreased AP dimension of the distal trachea, suggesting tracheomalacia.       3.  Previously noted hematoma in the left gluteal musculature is not as well visualized on today's study, but appears overall slightly decreased in size. No new areas of hematoma.       4.  Mild distention of the stomach, mild prominence of proximal small bowel   loops, and mild nonspecific stranding in the proximal mesentery.  No definite   evidence of high-grade bowel obstruction at this time.  Enteritis or early   developing obstruction are considered in the differential.         The patient required intubation for worsening respiratory failure on 1/22/2022. He is currently requiring 65% FiO2 with a PEEP of 12. Propofol is being given for sedation    CURRENT EVALUATION : 1/28/2022    Afebrile  VS stable with hypertension    The patient remains on mechanical ventilation today with 40-->45%-->40% FiO2 and PEEP of 6-->5  Patient has not been tolerating weaning trials. Leukocytes trending down  Sputum 1/22 Normal respiratory alistair light growth  MRSA nares not detected  Meropenem initiated 1/22/22  Repeat cultures ordered 1/25-gram positive cocci clusters sputum    CXR ordered 1/25-stable    We will continue to monitor    Patient exhibiting respiratory distress. Yes  Respiratory secretions: No    Patient receiving supplemental oxygen. BiPAP & Hi-flow NC-->MV  RR: 24  02 sat: 98    % FIO2: 65-->50-->30-->35-->40-->45  PEEP:  12-->8-->5-->6    NEWS Score: 0-4 Low risk group; 5-6: Medium risk group; 7 or above: High risk group  Parameters 3 2 1 0 1 2 3   Age    < 65   ? 65   RR ? 8  9-11 12-20  21-24 ? 25   O2 Sats ?  91 92-93 94-95 ? 96      Suppl O2  Yes  No      SBP ? 90  101-110 111-219   ? 220   HR ? 40  41-50 51-90  111-130 ? 131   Consciousness    Alert   Drowsiness, lethargy, or confusion   Temperature ? 35.0 C (95.0 F)  35.1-36.0 C 95.1-96.9 F 36.1-38.0 C 97.0-100.4 F 38.1-39.0 C 100.5-102.3 F ? 39.1 C ? 102.4 F      NEWS Score:   1/9/2022: 5 moderate risk    Overall Daily Picture:      Unchanged    Presence of secondary bacterial Infection:    Possible  Additional antibiotics: Meropenem 1/21/2022    Labs, X rays reviewed: 1/28/2022    BUN:63-->75-->80-->76  Cr:2.53-->2.67-->2.52-->2.35    WBC: 17.2-->12.8-->12.8-->11.4  Hb:9.1-->8.2-->10.5-->10-->8.7  Plat: 85-->89-->139-->144-->139    Absolute Neutrophils:16  Absolute Lymphocytes:0.34  Neutrophil/Lymphocyte Ratio: 53 very high risk    CRP:160-->131-->52.3-->21.5  Ferritin:804  LDH: 563    Pro Calcitonin:      Cultures:  Urine:  ·   Blood:  · 1/25: No growth  Sputum :  · 1/22: Gram negative rods  · 1/25: Gram positive cocci clusters  MRSA Nares:   Not detected    CXR:     CAT:  1/8/21      Discussed with patient, RN, CC, IM. I have personally reviewed the past medical history, past surgical history, medications, social history, and family history, and I have updated the database accordingly.   Past Medical History:     Past Medical History:   Diagnosis Date    Chronic lower back pain     Cobalamin deficiency     [facility]     Diabetes mellitus ([facility]     Hyperlipidemia     Hypertension     Malignant neoplasm of colon ([facility]     PAD (peripheral artery disease) ([facility]        Past Surgical  History:     Past Surgical History:   Procedure Laterality Date    ARTERY SURGERY      INSERT MIDLINE CATHETER  1/24/2022            Medications:      metoprolol tartrate  75 mg Oral BID    torsemide  40 mg Oral Daily    amiodarone  200 mg Oral Daily    miconazole   Topical BID    apixaban  2.5 mg Oral BID    famotidine  20 mg Oral Daily    oxyCODONE  7.5 mg Oral Q6H    insulin lispro  0-6 Units SubCUTAneous Q4H    insulin lispro  0-3 Units SubCUTAneous Nightly    meropenem  500 mg IntraVENous Q12H    busPIRone  10 mg Oral TID    senna  2 tablet Oral Nightly    atorvastatin  20 mg Oral Nightly    clopidogrel  75 mg Oral Daily    aspirin  81 mg Oral Daily       Social History:     Social History     Socioeconomic History    Marital status:  Spouse name: Not on file    Number of children: Not on file    Years of education: Not on file    Highest education level: Not on file   Occupational History    Not on file   Tobacco Use    Smoking status: Former Smoker    Smokeless tobacco: Never Used   Substance and Sexual Activity    Alcohol use: Not Currently    Drug use: Never    Sexual activity: Not on file   Other Topics Concern    Not on file   Social History Narrative    Not on file     Social Determinants of Health     Financial Resource Strain:     Difficulty of Paying Living Expenses: Not on file   Food Insecurity:     Worried About Running Out of Food in the Last Year: Not on file    Lesley of Food in the Last Year: Not on file   Transportation Needs:     Lack of Transportation (Medical): Not on file    Lack of Transportation (Non-Medical): Not on file   Physical Activity:     Days of Exercise per Week: Not on file    Minutes of Exercise per Session: Not on file   Stress:     Feeling of Stress : Not on file   Social Connections:     Frequency of Communication with Friends and Family: Not on file    Frequency of Social Gatherings with Friends and Family: Not on file    Attends Synagogue Services: Not on file    Active Member of [address] [place] [address] [employer] or Organizations: Not on file    Attends Club or Organization Meetings: Not on file    Marital Status: Not on file   Intimate Partner Violence:     Fear of Current or Ex-Partner: Not on file    Emotionally Abused: Not on file    Physically Abused: Not on file    Sexually Abused: Not on file   Housing Stability:     Unable to Pay for Housing in the Last Year: Not on file    Number of [place] in the Last Year: Not on file    Unstable Housing in the Last Year: Not on file       Family History:   History reviewed. No pertinent family history. Allergies:   Patient has no known allergies.      Review of Systems:     Review of Systems   Unable to perform ROS: Intubated   Respiratory: Positive for shortness of breath. Cardiovascular: Negative. Gastrointestinal: Negative. Genitourinary: Negative. Musculoskeletal: Negative. Skin: Positive for color change. Neurological: Negative. Physical Examination :     Patient Vitals for the past 8 hrs:   Temp Temp src Pulse Resp SpO2   01/28/22 1333   108 20 98 %   01/28/22 1206   75 17 99 %   01/28/22 1200 98.8 °F (37.1 °C) Oral 72 16 99 %   01/28/22 1100   70 16 98 %   01/28/22 1041   71 14 96 %   01/28/22 1040   71 16 97 %   01/28/22 1031    16 98 %   01/28/22 1000 98.6 °F (37 °C) Oral 71 16 98 %   01/28/22 0938   79 16 100 %   01/28/22 0928     100 %   01/28/22 0900   73 16 98 %   01/28/22 0800 98.4 °F (36.9 °C) Oral 70 15 99 %   01/28/22 0700   68 16 99 %     Physical Exam  Vitals and nursing note reviewed. Constitutional:       Appearance: He is well-developed. Comments: Intubated and sedated   HENT:      Head: Normocephalic and atraumatic. Cardiovascular:      Rate and Rhythm: Normal rate. Heart sounds: Murmur heard. Pulmonary:      Effort: Respiratory distress present. Breath sounds: No wheezing. Abdominal:      General: Bowel sounds are normal.      Palpations: Abdomen is soft. There is no mass. Tenderness: There is no abdominal tenderness. Musculoskeletal:         General: Swelling (In the bilateral upper extremities especially in the forearms.) present. Normal range of motion. Cervical back: Neck supple. Lymphadenopathy:      Cervical: No cervical adenopathy. Skin:     General: Skin is dry. Findings: Bruising (There is bruising and ecchymotic skin lesions in the forearms bilaterally right worse than left) present.    Neurological:      Comments: FINA-intubated and sedated         Medical Decision Making -Laboratory:   I have independently reviewed/ordered the following labs:    CBC with Differential:   Recent Labs     01/27/22  0343 01/28/22  0436   WBC 12.8* 11.4*   HGB 10.0* 8.7*   HCT 31.5* 27.7*    139   LYMPHOPCT 2* 5*   MONOPCT 7 3     BMP:   Recent Labs     01/26/22  0433 01/26/22  1825 01/27/22  0343 01/28/22  0436   NA   < >  --  144 150*   K   < >  --  3.7 3.9   CL   < >  --  107 110*   CO2   < >  --  26 29   BUN   < >  --  80* 76*   CREATININE   < >  --  2.52* 2.35*   MG  --  2.4  --  2.5    < > = values in this interval not displayed. Hepatic Function Panel:   No results for input(s): PROT, LABALBU, BILIDIR, IBILI, BILITOT, ALKPHOS, ALT, AST in the last 72 hours. No results for input(s): RPR in the last 72 hours. No results for input(s): HIV in the last 72 hours. No results for input(s): BC in the last 72 hours. Lab Results   Component Value Date    MUCUS 1+ 01/17/2022    RBC 2.79 01/28/2022    TRICHOMONAS NOT REPORTED 01/17/2022    WBC 11.4 01/28/2022    YEAST NOT REPORTED 01/17/2022    TURBIDITY Clear 01/17/2022     Lab Results   Component Value Date    CREATININE 2.35 01/28/2022    GLUCOSE 110 01/28/2022       Medical Decision Making-Imaging:     Narrative   EXAMINATION:   CTA OF THE CHEST 1/8/2022 10:10 am       TECHNIQUE:   CTA of the chest was performed after the administration of intravenous   contrast.  Multiplanar reformatted images are provided for review.  MIP   images are provided for review. Dose modulation, iterative reconstruction,   and/or weight based adjustment of the mA/kV was utilized to reduce the   radiation dose to as low as reasonably achievable.       COMPARISON:   None.       HISTORY:   ORDERING SYSTEM PROVIDED HISTORY: dyspnea / hypoxia   Reason for Exam: Shortness of breath       FINDINGS:   Pulmonary Arteries: Pulmonary arteries are adequately opacified for   evaluation.  No evidence of intraluminal filling defect to suggest pulmonary   embolism.  Main pulmonary artery is normal in caliber.       Mediastinum: No evidence of mediastinal lymphadenopathy.  Normal heart size.    Normal caliber thoracic aorta with diffuse atherosclerosis.       Lungs/pleura: Extensive ground-glass opacification of the bilateral lung   fields with peripheral involvement slight apical as well as basilar sparing. No effusion or pneumothorax.       Upper Abdomen: Limited images of the upper abdomen are unremarkable.       Soft Tissues/Bones: No acute bone or soft tissue abnormality.           Impression   *No evidence of pulmonary embolism. *Extensive ground-glass opacification of the bilateral lung fields with   peripheral involvement slight apical as well as basilar sparing.  Imaging   features suggestive of COVID-19 pneumonia, though are nonspecific and can   occur with a variety of infectious and noninfectious processes.         Narrative   EXAMINATION:   CT OF THE ABDOMEN AND PELVIS WITHOUT CONTRAST, 1/16/2022 10:42 am       TECHNIQUE:   CT of the abdomen and pelvis was performed without the administration of   intravenous contrast. Multiplanar reformatted images are provided for review.    Dose modulation, iterative reconstruction, and/or weight based adjustment of   the mA/kV was utilized to reduce the radiation dose to as low as reasonably   achievable.       COMPARISON:   CT of the chest from 01/08/2022, and retroperitoneal ultrasound from   01/10/2022.       HISTORY:   ORDERING SYSTEM PROVIDED HISTORY:  Retrop bleed r/o   TECHNOLOGIST PROVIDED HISTORY:   Retrop bleed r/o   Reason for Exam:  Retrop bleed r/o       FINDINGS:   Lower Chest: As demonstrated on recent CT chest, extensive and somewhat   denser confluent ground-glass airspace opacities re-identified mid-lower   lungs, sparing the bases with some associated crazy paving, air bronchograms   and bibasilar atelectatic appearing changes.  Main pulmonary artery caliber   31 mm.  Mild dilatation ascending aorta, with a maximal dimension of 41 mm.       Organs: Unopacified liver, spleen, adrenal glands and both kidneys show no   acute abnormality; without suspicious focal finding COVID-19   pneumonia; denser GGOs suggest worsening pneumonia/pneumonitis or developing   consolidation.  No pneumothorax.       Multiple additional findings, as detailed in the body of the report above.       RECOMMENDATIONS:   Consider follow-up CT pelvis including the upper thighs, if the patient does   not respond to resuscitation with blood products/fluids and other   conservative measures including localized pressure.  Findings were discussed   with Janeth Avina at 12:24 pm on 1/16/2022.             Narrative   EXAMINATION:   CT OF THE CHEST, ABDOMEN, AND PELVIS WITHOUT CONTRAST 1/21/2022 11:09 am       TECHNIQUE:   CT of the chest, abdomen and pelvis was performed without the administration   of intravenous contrast. Multiplanar reformatted images are provided for   review. Dose modulation, iterative reconstruction, and/or weight based   adjustment of the mA/kV was utilized to reduce the radiation dose to as low   as reasonably achievable.       COMPARISON:   CT abdomen and pelvis dated 01/16/2022.  CT chest dated 01/08/2022       HISTORY:   ORDERING SYSTEM PROVIDED HISTORY: blood loss anemia , gluteal hematoma and   worsening HB   TECHNOLOGIST PROVIDED HISTORY:   blood loss anemia , gluteal hematoma and worsening HB           FINDINGS:       Chest:       Mediastinum: Heart size is within normal limits.  Ascending thoracic aorta is   mildly dilated, measuring 4 cm in AP dimension, similar to the previous   study.  Main pulmonary artery is stable in caliber as well.  No mediastinal   hematoma or lymphadenopathy. Radha Bloch is a catheter in the SVC with distal tip   in the distal SVC.       Lungs/pleura:  There are small bilateral pleural effusions, which are new from   the previous study. Radha Bloch is extensive dense airspace consolidation   throughout both lungs, with mild sparing at the bases, increased from   01/08/2022. Radha Bloch is decreased AP dimension of the lower trachea, although   tracheobronchial tree remains patent.       Soft Tissues/Bones: There is no acute or suspicious osseous abnormality. Visualized superficial soft tissues are within normal limits.           Abdomen/Pelvis:       Organs: Limited unenhanced liver is within normal limits.  There is a tiny   amount of free fluid adjacent to the hepatic dome.  Gallbladder, spleen,   pancreas, and adrenal glands are unremarkable.       There is bilateral renal atrophy, more so on the right, unchanged.  No   hydronephrosis or perinephric fluid collection. [place] Sack is mild bilateral   perinephric stranding, which is unchanged and likely physiologic.       GI/Bowel: The stomach is mildly distended with air-fluid level noted.  No   abnormal bowel distention.  There is mild increased prominence of the   proximal small bowel loops.  There is mild stranding in the proximal   mesentery.  No focal pericolonic inflammation.  No free air.       Pelvis: Urinary bladder is within normal limits.  No evidence of pelvic   lymphadenopathy.       Peritoneum/Retroperitoneum: Distal aortobifemoral graft noted.  Caliber of   the abdominal aorta is unchanged.  There is moderate to severe scattered   atherosclerosis, which is unchanged.  No retroperitoneal lymphadenopathy or   hematoma.  Slit-like IVC is noted.       Bones/Soft Tissues: There is no acute or suspicious osseous abnormality.  The   hematoma previously seen in the left gluteal muscle is not as well visualized   on today's study due to isoattenuation.  There is asymmetric swelling of the   left gluteal muscle, although slightly improved when compared to 01/16/2022. AP dimension in the area of suspected hematoma is 4.4 cm (previously 5.7 cm).    Transverse dimension is approximately 9.3 cm (previously 9.7 cm).  There is   stranding in the subcutaneous fat of the upper thighs bilaterally.           Impression   1.  New small bilateral pleural effusions with extensive bilateral airspace   consolidation, increased from 01/08/2022.       2.  Decreased AP dimension of the distal trachea, suggesting tracheomalacia.       3.  Previously noted hematoma in the left gluteal musculature is not as well   visualized on today's study, but appears overall slightly decreased in size. No new areas of hematoma.       4.  Mild distention of the stomach, mild prominence of proximal small bowel   loops, and mild nonspecific stranding in the proximal mesentery.  No definite   evidence of high-grade bowel obstruction at this time.  Enteritis or early   developing obstruction are considered in the differential.         Medical Decision [id number]:     Culture, Blood 1 [[id number]] Collected: 01/09/22 1155   Order Status: Completed Specimen: Blood Updated: 01/14/22 1300    Specimen Description . BLOOD    Special Requests NOT REPORTED    Culture NO GROWTH 5 DAYS   Culture, Blood 1 [[id number]] Collected: 01/09/22 1155   Order Status: Completed Specimen: Blood Updated: 01/14/22 1300    Specimen Description . BLOOD    Special Requests NOT REPORTED    Culture NO GROWTH 5 DAYS   COVID-19, Rapid [[id number]] (Abnormal) Collected: 01/08/22 1045   Order Status: Completed Specimen: Nasopharyngeal Swab Updated: 01/08/22 1109    Specimen Description . NASOPHARYNGEAL SWAB    SARS-CoV-2, Rapid DETECTED Abnormal     Comment:        Rapid NAAT: The specimen is POSITIVE for SARS-Cov-2, the novel coronavirus associated with   COVID-19.         This test has been authorized by the FDA under an Emergency Use Authorization (EUA) for use   by authorized laboratories.         The ID NOW COVID-19 assay is designed to detect the virus that causes COVID-19 in patients   with signs and symptoms of infection who are suspected of COVID-19. An individual without symptoms of COVID-19 and who is not shedding SARS-CoV-2 virus would   expect to have a negative (not detected) result in this assay.    Fact sheet for Healthcare Providers: Dionne   Fact sheet for Patients: Dionne           Methodology: Isothermal Nucleic Acid Amplification         Results reported to the appropriate [facility]            Medical Decision Making-Other:     Note:  · Labs, medications, radiologic studies were reviewed with personal review of films  · Large amounts of data were reviewed  · Discussed with nursing Staff, Discharge planner  · Infection Control and Prevention measures reviewed  · All prior entries were reviewed  · Administer medications as ordered  · Prognosis: Guarded  · Discharge planning reviewed      Thank you for allowing us to participate in the care of this patient. Please call with questions. Rodney Aguilera MD    Electronically signed by [place] Courser on 1/28/2022 at 2:03 PM    ATTESTATION:    I have discussed the case, including pertinent history and exam findings with the residents and students. I have seen and examined the patient and the key elements of the encounter have been performed by me. I was present when the student obtained his information or examined the patient. I have reviewed the laboratory data, other diagnostic studies and discussed them with the residents. I have updated the medical record where necessary. I agree with the assessment, plan and orders as documented by the resident/ student.     Rodney Aguilera MD.

## 2022-01-28 NOTE — PROGRESS NOTES
[facility]  Office: [address], DO, Amita Light, DO, Say Reyez, DO, Davin Vazpaulette Moon, DO, Margaret Roblero MD, Anderson Barnes MD, Jazmyn Das MD, Philipp De Leon MD, Alice Hidalgo MD, Fartun Cottrell MD, Chinedu Aldana MD, Clarice Masters, DO, Domo Crowley, DO, Abdi Delacruz MD,  Mahesh Booker, DO, Tomas Cheatham MD, Rachelle Horvath MD, Candis Richey MD, Nathaniel Browning MD, Deloris Kam MD, Jessie Mccartney MD, Juan Yeboah MD, Arnold Cardenas [facility], [facility], CNP, Ever Cancino, CNP, Chinedu Friedman, CNS, Toño Schilling, CNP, Erlin Perez, CNP, Raj Tineo, CNP, Melanie Perez, CNP, Shilpa Norris, CNP, Francisca Colon PA-C, Mary Martines, [facility], Leana Gross, [facility], Wil Husbands, CNP, Brandon Vaca, CNP, Bola Macdnoald, CNP, Aspen Garcia, CNP, Nancy Arreola, [facility], Maulik Hillman, [address] [place] [address]    Progress Note    1/28/2022    11:35 AM    Name:   Riky Antony  MRN:     [id number]     Acct:      [de-identified]   Room:   [address] [place] [address] Day:  21  Admit Date:  1/8/2022  9:49 AM    PCP:   MOHAMUD Mckeon CNP  Code Status:  Full Code    Subjective:     C/C:   Chief Complaint   Patient presents with    Shortness of Breath    Nausea & Vomiting     Interval History Status: improving    Met son this morning at bedside, discussed poor progress regarding extubation. Patient had a prolonged intubation and has not been tolerating CPAP trials. Discussed possibility of reintubation and tracheostomy and PEG tube placement. Son states that he likely would not want to be on the ventilator long-term and was a DNR before this hospitalization. He also explained the possibility of proceeding with extubation with no plans for reintubation which he will speak to his mother about.   No other issues overnight     Brief History:     78-year-old male past medical history hypertension, hyperlipidemia, CKD stage IIIb, aortic valve stenosis, peripheral arterial disease presents with shortness of breath found to be in acute respiratory failure secondary to COVID-19 as well as A. fib with RVR. Patient being followed by cardiology, infectious disease, critical care, nephrology was also seen the patient. Patient was originally started on heparin drip for A. fib with RVR however was discontinued due to acute blood loss anemia and bilateral hematomas on both upper extremities. Patient required 2 units of red blood cells on 1/16/2022. Patient was also evaluated by nephrology due to JULES on CKD stage III which improved with IV hydration. For COVID-19 patient status post Actemra on/8/2022, and Decadron 1/8/2022. Patient continues on high flow. Patient originally did change his CODE STATUS from DNR CCA to full code on 11/13/2022 and reverted back to DNR CCA on 11/17/2022. Review of Systems:     Review of Systems   Unable to perform ROS: Intubated       Medications:      Allergies:  No Known Allergies    Current Meds:   Scheduled Meds:    torsemide  40 mg Oral Daily    amiodarone  200 mg Oral Daily    miconazole   Topical BID    apixaban  2.5 mg Oral BID    famotidine  20 mg Oral Daily    oxyCODONE  7.5 mg Oral Q6H    insulin lispro  0-6 Units SubCUTAneous Q4H    insulin lispro  0-3 Units SubCUTAneous Nightly    metoprolol tartrate  50 mg Oral BID    meropenem  500 mg IntraVENous Q12H    busPIRone  10 mg Oral TID    senna  2 tablet Oral Nightly    atorvastatin  20 mg Oral Nightly    clopidogrel  75 mg Oral Daily    aspirin  81 mg Oral Daily     Continuous Infusions:    fentaNYL 75 mcg/hr (01/28/22 0049)    propofol 25 mcg/kg/min (01/28/22 0540)    sodium chloride      sodium chloride      dextrose 100 mL/hr (01/19/22 0010)    sodium chloride       PRN Meds: metoclopramide, bisacodyl, hydrALAZINE, sodium chloride, sodium chloride flush, sodium chloride, melatonin, sodium chloride, LORazepam, glucose, dextrose, glucagon (rDNA), dextrose, metoprolol, benzonatate, sodium chloride flush, sodium chloride flush, sodium chloride, ondansetron **OR** ondansetron, polyethylene glycol, acetaminophen **OR** acetaminophen    Data:     Past Medical History:   has a past medical history of Chronic lower back pain, Cobalamin deficiency, CVA (cerebral vascular accident) ([facility] Diabetes mellitus ([facility] Hyperlipidemia, Hypertension, Malignant neoplasm of colon ([facility] and PAD (peripheral artery disease) ([facility] Social History:   reports that he has quit smoking. He has never used smokeless tobacco. He reports previous alcohol use. He reports that he does not use drugs. Family History: History reviewed. No pertinent family history. Vitals:  BP (!) 146/42   Pulse 70   Temp 98.6 °F (37 °C) (Oral)   Resp 16   Ht 5' 10\" (1.778 m)   Wt 148 lb 2.4 oz (67.2 kg)   SpO2 98%   BMI 21.26 kg/m²   Temp (24hrs), Av.2 °F (36.8 °C), Min:97.8 °F (36.6 °C), Max:98.8 °F (37.1 °C)    Recent Labs     22  0100 22  0417 22  0421 22  1110   POCGLU 152* 109* 107 94       I/O (24Hr):     Intake/Output Summary (Last 24 hours) at 2022 1135  Last data filed at 2022 0830  Gross per 24 hour   Intake 2083.44 ml   Output 1575 ml   Net 508.44 ml       Labs:  Hematology:  Recent Labs     22  0433 22  0343 22  0436   WBC 12.8* 12.8* 11.4*   RBC 3.41* 3.20* 2.79*   HGB 10.5* 10.0* 8.7*   HCT 33.0* 31.5* 27.7*   MCV 96.8 98.4 99.3   MCH 30.8 31.3 31.2   MCHC 31.8 31.7 31.4   RDW 18.3* 18.2* 18.4*    144 139   MPV 10.6 10.4 10.9     Chemistry:  Recent Labs     22  0433 22  1825 22  0343 226     --  144 150*   K 3.8  --  3.7 3.9   *  --  107 110*   CO2 24  --  26 29   GLUCOSE 160*  --  195* 110*   BUN 75*  --  80* 76*   CREATININE 2.67*  --  2.52* 2.35*   MG  --  2.4  --   --    ANIONGAP 11  --  11 11   LABGLOM 23*  --  25* 27*   GFRAA 28*  --  30* 33*   CALCIUM 8.0*  --  8.3* 8.3* Recent Labs     01/27/22  1750 01/27/22  2119 01/28/22  0100 01/28/22  0417 01/28/22  0421 01/28/22  1110   POCGLU 189* 133* 152* 109* 107 94     ABG:  Lab Results   Component Value Date    POCPH 7.393 01/28/2022    POCPCO2 53.1 01/28/2022    POCPO2 68.2 01/28/2022    POCHCO3 32.4 01/28/2022    NBEA NOT REPORTED 01/28/2022    PBEA 7 01/28/2022    ROO2LRB NOT REPORTED 01/28/2022    XSXP7RCC 93 01/28/2022    FIO2 40.0 01/28/2022     Lab Results   Component Value Date/Time    SPECIAL NOT REPORTED 01/25/2022 09:01 PM     Lab Results   Component Value Date/Time    CULTURE NORMAL RESPIRATORY COLT MODERATE GROWTH 01/25/2022 09:01 PM       Radiology:  CT ABDOMEN PELVIS WO CONTRAST Additional Contrast? None    Result Date: 1/16/2022  Left gluteal hematoma, partially visualized on this study without obvious active bleeding, but limited without IV contrast.  No retroperitoneal or rectus sheath bleed. Extensive findings in the visualized lungs compatible with known COVID-19 pneumonia; denser GGOs suggest worsening pneumonia/pneumonitis or developing consolidation. No pneumothorax. Multiple additional findings, as detailed in the body of the report above. RECOMMENDATIONS: Consider follow-up CT pelvis including the upper thighs, if the patient does not respond to resuscitation with blood products/fluids and other conservative measures including localized pressure. Findings were discussed with Kush Sehehan at 12:24 pm on 1/16/2022. XR CHEST PORTABLE    Result Date: 1/16/2022  Right IJ line in satisfactory position. Unchanged or slightly worse bilateral diffuse infiltrates consistent with the patient's history of COVID pneumonia. XR CHEST PORTABLE    Result Date: 1/14/2022  Patchy bilateral interstitial and ground-glass infiltrates most notably in the lung periphery.   Findings have progressed from the prior exam compatible with patient's history of COVID pneumonia     XR CHEST PORTABLE    Result Date: 1/11/2022  Unchanged bilateral airspace opacities       Physical Examination:        Physical Exam  Vitals and nursing note reviewed. Constitutional:       General: He is not in acute distress. Interventions: He is sedated and intubated. HENT:      Head: Normocephalic and atraumatic. Eyes:      Conjunctiva/sclera: Conjunctivae normal.      Pupils: Pupils are equal, round, and reactive to light. Neck:      Comments: Central line noted  Cardiovascular:      Rate and Rhythm: Normal rate and regular rhythm. Heart sounds: No murmur heard. Pulmonary:      Effort: Pulmonary effort is normal. No accessory muscle usage or respiratory distress. He is intubated. Breath sounds: No stridor. Decreased breath sounds and rales present. No wheezing or rhonchi. Abdominal:      General: Bowel sounds are normal. There is no distension. Palpations: Abdomen is soft. Abdomen is not rigid. Tenderness: There is no abdominal tenderness. There is no guarding. Musculoskeletal:         General: No tenderness. Skin:     General: Skin is warm and dry. Findings: Bruising and ecchymosis present. No erythema, lesion or rash. Neurological:      Cranial Nerves: No cranial nerve deficit. Motor: No seizure activity.          Assessment:        Hospital Problems           Last Modified POA    * (Principal) Pneumonia due to COVID-19 virus 1/8/2022 Yes    Essential hypertension 1/8/2022 Yes    Hyperlipidemia 1/8/2022 Yes    Acute hypoxemic respiratory failure due to COVID-19 [facility]) 1/8/2022 Yes    Acute kidney injury ([address] 1/26/2022 Yes    Stage 3b chronic kidney disease ([address] 1/8/2022 Yes    Elevated troponin 1/9/2022 Yes    Nonrheumatic aortic valve stenosis 1/9/2022 Yes    PAD (peripheral artery disease) ([address] 1/9/2022 Yes    Moderate protein-calorie malnutrition ([address] 1/9/2022 Yes    Atrial fibrillation with RVR ([address] 1/10/2022 Yes    COVID-19 1/19/2022 Yes    Traumatic hematoma of buttock 1/19/2022 Yes    Acute distention of stomach 1/21/2022 Yes    Hypoxia 1/26/2022 Yes          Plan:        COVID 19 Pneumonia : S/p Actemra 1/8. Meropenem started 1/21 due to development of consolidative infiltrates on repeat CT chest.  Infectious disease still following, patient on minimal vent settings but having difficulty extubating     Acute hypoxemic respiratory failure: Likely secondary to Covid 19 pneumonia , S/P intubation 1/22. Failing CPAP trials, discussed goals of care with son    Severe aortic stenosislikely will need outpatient follow-up with cardiology once discharged and cleared from Covid    CKD with metabolic acidosis: continue to avoid for nephrotoxic agents, nephrology following    DM2: controlled    Acute blood loss anemia due to gluteal bleed: restart reduced dose eliquis 2.5mg BID due to age and weight    A. fib with RVR: Amiodarone drip, Lopressor, Eliquis    Peripheral vascular disease: continue antiplatelets     HTN: Home beta-blockers resumed today    HPL: continue home medications    Discussed goals of care with son at length, they are planning to delivery and make changes if desired. Patient still remains full code.   Transferred to medical ICU as patient is now out of Covid isolation    Christine Andrade DO  1/28/2022  11:35 AM

## 2022-01-28 NOTE — PROGRESS NOTES
[facility]  Progress Note                   Date:   1/28/2022  Patient name: Hilario Antoine  Date of admission:  1/8/2022  9:49 AM  MRN:   [id number]  YOB: 1940  PCP: MOHAMUD Hoffmann CNP    Reason for Admission: Hypoxia [R09.02]  Elevated troponin [R77.8]  Acute kidney injury ([address] [N17.9]  COVID-19 [U07.1]  Pneumonia due to COVID-19 virus [U07.1, J12.82]    Subjective:       Clinical Changes /Abnormalities:  Reviewed charting. Discussed with nurse   SR with PVC  Intubated  Covid + 1/8/2022      Review of Systems    Medications:   Scheduled Meds:   torsemide  40 mg Oral Daily    amiodarone  200 mg Oral Daily    miconazole   Topical BID    apixaban  2.5 mg Oral BID    famotidine  20 mg Oral Daily    oxyCODONE  7.5 mg Oral Q6H    insulin lispro  0-6 Units SubCUTAneous Q4H    insulin lispro  0-3 Units SubCUTAneous Nightly    metoprolol tartrate  50 mg Oral BID    meropenem  500 mg IntraVENous Q12H    busPIRone  10 mg Oral TID    senna  2 tablet Oral Nightly    atorvastatin  20 mg Oral Nightly    clopidogrel  75 mg Oral Daily    aspirin  81 mg Oral Daily     Continuous Infusions:   fentaNYL 75 mcg/hr (01/28/22 0049)    propofol 25 mcg/kg/min (01/28/22 0540)    sodium chloride      sodium chloride      dextrose 100 mL/hr (01/19/22 0010)    sodium chloride       CBC:   Recent Labs     01/26/22  0433 01/27/22  0343 01/28/22 0436   WBC 12.8* 12.8* 11.4*   HGB 10.5* 10.0* 8.7*    144 139     BMP:    Recent Labs     01/26/22  0433 01/27/22  0343 01/28/22 0436    144 150*   K 3.8 3.7 3.9   * 107 110*   CO2 24 26 29   BUN 75* 80* 76*   CREATININE 2.67* 2.52* 2.35*   GLUCOSE 160* 195* 110*     Hepatic:  No results for input(s): AST, ALT, ALB, BILITOT, ALKPHOS in the last 72 hours. Troponin:   No results for input(s): TROPHS in the last 72 hours. BNP: No results for input(s): BNP in the last 72 hours.   Lipids: No results for input(s): CHOL, HDL in the last 72 hours. Invalid input(s): LDLCALCU  INR: No results for input(s): INR in the last 72 hours. Objective:   Vitals: BP (!) 146/42   Pulse 71   Temp 98.6 °F (37 °C) (Oral)   Resp 14   Ht 5' 10\" (1.778 m)   Wt 148 lb 2.4 oz (67.2 kg)   SpO2 96%   BMI 21.26 kg/m²     For careful stewardship of limited PPE during COVID-19 pandemic my physical exam was deferred. For physical exam, please see today's physical from primary team or ICU team.       Echo 3/29/21    Left Ventricle: There is mild increased wall thickness/hypertrophy.   Left Ventricle: Systolic function is normal with an ejection fraction   of 65-70%.   Left Ventricle: Grade I diastolic dysfunction (impaired relaxation) is   present.   Right Ventricle: Systolic function is normal.     Aortic Valve: There is no regurgitation. There is moderate stenosis. The calculated aortic valve area is 1.72 cm2. The calculated aortic valve   peak gradient is 47.00 mmHg. The calculated aortic valve mean gradient is   28.00 mmHg.   Tricuspid Valve: There is trace regurgitation. There is no evidence of   tricuspid valve stenosis.   Pericardium: There is no pericardial effusion. Tele presently Afib 120-140s      Echo 1/11/2022     Summary  Left ventricle is normal in size, global left ventricular systolic function  is normal.  Estimated ejection fraction is 55 % . Left atrium is at upper limits of normal.  Inter-atrial septum is intact with no evidence for an atrial septal defect. Right atrium is normal in size. Normal right ventricular size and function. Aortic valve is trileaflet, calcified with restriction of motion. Peak instantaneous gradient 60 mmHg and mean gradient 37 mmHg. Calculated ALF 1.0cm2 by VTI (image #57.)  Severe aortic stenosis. Trivial aortic insufficiency. Mitral valve sclerosis without significant stenosis. Mean gradient is 4mmHg . Trivial mitral regurgitation.   Tricuspid valve was not well visualized. Mild tricuspid regurgitation. No significant pericardial effusion is seen. Assessment / Acute Cardiac Problems:   1. COVID-19 PNA  2. New onset Afib RVR  3. Type I vs Type II MI likely secondary to JULES/COVID  4. HTN  5. JULES on CKD (baseline creat 1.4-1.5)  6. Mod AS on Echo 3/2021  7. Preserved LVEF on prior echo  8. Acute hypoxic resp failure    Patient Active Problem List:     Essential hypertension     Hyperlipidemia     Pneumonia due to COVID-19 virus     Acute hypoxemic respiratory failure due to COVID-19 [facility])     Acute kidney injury superimposed on chronic kidney disease ([facility] baseline creatinine 1.9     Stage 3b chronic kidney disease (HCC)     Elevated troponin     Nonrheumatic aortic valve stenosis     PAD (peripheral artery disease) (HCC)     Moderate protein-calorie malnutrition (HCC)    [id number] Score for Atrial Fibrillation Stroke Risk   Risk   Factors  Component Value   C CHF No 0   H HTN Yes 1   A2 Age >= 76 Yes,  [de-identified] y.o.) 2   D DM No 0   S2 Prior Stroke/TIA No 0   V Vascular Disease No 0   A Age 74-69 No,  [de-identified] y.o.) 0   Sc Sex male 0    [id number]  Score  3   Score last updated 1/11/22 64:30 PM EST    Click here for a link to the UpToDate guideline \"Atrial Fibrillation: Anticoagulation therapy to prevent embolization    Disclaimer: Risk Score calculation is dependent on accuracy of patient problem list and past encounter diagnosis. Plan of Treatment:   1. Afib - stable. Currently SR . Continue po amiodarone ,  BB and Eliquis. 2. Keep K >4 and Mg >2.   3. Echo reviewed per above  - preserved EF. 4. CKD Fluid management per Nephrology  5. Severe aortic stenosis.   need R/L heart cath once acute issues resolved as outpatient       Electronically signed by MOHAMUD Virk NP on 1/28/2022 at 10:58 AM  [address]  [phone]

## 2022-01-28 NOTE — PROGRESS NOTES
Critical Care Team - Daily Progress Note      Date and time: 1/28/2022 2:37 PM  Patient's name:  Eufemia Edmondson  Medical Record Number: [id number]  Patient's account/billing number: [de-identified]  Patient's YOB: 1940  Age: [de-identified] y.o. Date of Admission: 1/8/2022  9:49 AM  Length of stay during current admission: 20      Primary Care Physician: MOHAMUD Hickman - CNP  ICU Attending Physician: Dr. Zander Mays Status: Full Code    Reason for ICU admission:   Chief Complaint   Patient presents with    Shortness of Breath    Nausea & Vomiting       Subjective:     HISTORY OF PRESENTING ILLNESS:    History was obtained from EMR due to patient being intubated patient is a 81-year-old  non- male who presented to the ER 01/08/2022 for shortness of breath, nausea and vomiting and was admitted for pneumonia due to COVID-19. He reported having increasing shortness of breath with nausea and vomiting or 7 to 10 days before coming to the hospital.  Patient was tested positive for Covid shortly after [place] time. Subsequently patient became mildly short of breath and more notified us cocci approximately 10 feet. Upon arrival to the emergency room, patient was hypoxic on room air with oxygen saturation less than 80%. He was put on high flow oxygen and was started on Decadron on 1/8/2010 and received Actemra on 1/8/2022. Patient was admitted in Covid unit. He also received meropenem for leukocytosis and possible secondary bacterial pneumonia from 1/11/2022 to 1/17/2022. Meropenem was restarted on 1/21/2022 because of residual dense consolidation in the lungs with air bronchograms with end date of 1/31/2022 per ID. Remdesivir is contraindicated due to JULES. Patient had significant hypoxia while being on high flow nasal cannula and BiPAP alternatively he required intubation and mechanical ventilation on 1/22/22.   Nephrology is following for JULES likely secondary to ischemic ATN and Other:   Glycemic control: 90s-180s; controlled on low dose sliding scale   Spontaneous breathing trial: in progress    Bowel regimen/urine output: Senna; Uout: 1.7L/24 hours; Net -2.3L   Indwelling catheter/lines: Midline - left brachial ;  Arterial line- left radial- ;    De-escalation: none      AWAKE & FOLLOWING COMMANDS:  [] No   [x] Yes    CURRENT VENTILATION STATUS:     [x] Ventilator  [] BIPAP  [] Nasal Cannula [] Room Air      IF INTUBATED, ET TUBE MARKING AT LOWER LIP:       cms    SECRETIONS Amount:  [] Small [] Moderate  [] Large  [] None  Color:     [] White [] Colored  [] Bloody    SEDATION:  RAAS Score:  [x] Propofol gtt  [] Versed gtt  [] Ativan gtt   [] No Sedation    PARALYZED:  [x] No    [] Yes    DIARRHEA:                [x] No                [] Yes  (C. Difficile status: [] positive                                                                                                                       [] negative                                                                                                                     [] pending)    VASOPRESSORS:  [x] No    [] Yes    If yes -   [] Levophed       [] Dopamine     [] Vasopressin       [] Dobutamine  [] Phenylephrine         [] Epinephrine    CENTRAL LINES:     [x] No   [] Yes   (Date of Insertion:   )           If yes -     [] Right IJ     [] Left IJ [] Right Femoral [] Left Femoral                   [] Right Subclavian [] Left Subclavian       ORTIZ'S CATHETER:   [x] No   [] Yes  (Date of Insertion:   )     URINE OUTPUT:            [x] Good   [] Low              [] Anuric    REVIEW OF SYSTEMS:  · Unable to assess due to patient being intubated      OBJECTIVE:     VITAL SIGNS:  BP (!) 106/54   Pulse 98   Temp 98.8 °F (37.1 °C) (Oral)   Resp 16   Ht 5' 10\" (1.778 m)   Wt 148 lb 2.4 oz (67.2 kg)   SpO2 98%   BMI 21.26 kg/m²   Tmax over 24 hours:  Temp (24hrs), Av.3 °F (36.8 °C), Min:97.8 °F (36.6 °C), Max:98.8 °F (37.1 °C)      Patient Vitals for the past 8 hrs:   BP Temp Temp src Pulse Resp SpO2   01/28/22 1400 (!) 106/54   98 16 98 %   01/28/22 1333    108 20 98 %   01/28/22 1300    99 16 99 %   01/28/22 1206    75 17 99 %   01/28/22 1200  98.8 °F (37.1 °C) Oral 72 16 99 %   01/28/22 1100    70 16 98 %   01/28/22 1041    71 14 96 %   01/28/22 1040    71 16 97 %   01/28/22 1031     16 98 %   01/28/22 1000  98.6 °F (37 °C) Oral 71 16 98 %   01/28/22 0938    79 16 100 %   01/28/22 0928      100 %   01/28/22 0900    73 16 98 %   01/28/22 0800  98.4 °F (36.9 °C) Oral 70 15 99 %   01/28/22 0700    68 16 99 %         Intake/Output Summary (Last 24 hours) at 1/28/2022 1437  Last data filed at 1/28/2022 1200  Gross per 24 hour   Intake 2187.23 ml   Output 1675 ml   Net 512.23 ml     Date 01/28/22 0000 - 01/28/22 2359   Shift 7959-4003 2084-4453 0368-9223 24 Hour Total   INTAKE   I.V.(mL/kg) 148(2.2) 263.8(3.9)  411.8(6.1)   NG/GT(mL/kg) 288(4.3) 704(10.5)  992(14.8)   IV Piggyback(mL/kg) 100(1.5)   100(1.5)   Shift Total(mL/kg) 536(8) 967.8(14.4)  1503.8(22.4)   OUTPUT   Urine(mL/kg/hr) 900(1.7) 300  1200   Shift Total(mL/kg) 900(13.4) 300(4.5)  1200(17.9)   Weight (kg) 67.2 67.2 67.2 67.2     Wt Readings from Last 3 Encounters:   01/08/22 148 lb 2.4 oz (67.2 kg)     Body mass index is 21.26 kg/m². PHYSICAL EXAM:  Constitutional: intubated, sedated and mechanically ventilated, follows commands off sedation. HEENT: PERRLA, EOMI, sclera clear, anicteric  Respiratory: clear to auscultation, no wheezes or rales and unlabored breathing. Cardiovascular: regular rate and rhythm, normal S1, S2, no murmur noted and 2+ pulses throughout  Abdomen: soft, nontender, nondistended, no masses or organomegaly  NEUROLOGIC: intubated, sedated and mechanically ventilated, follows commands off sedation. Extremities:  peripheral pulses normal, no pedal edema,.       Any additional physical findings: None      MEDICATIONS:  Scheduled Meds:   metoprolol tartrate  75 mg Oral BID    torsemide  40 mg Oral Daily    amiodarone  200 mg Oral Daily    miconazole   Topical BID    apixaban  2.5 mg Oral BID    famotidine  20 mg Oral Daily    oxyCODONE  7.5 mg Oral Q6H    insulin lispro  0-6 Units SubCUTAneous Q4H    insulin lispro  0-3 Units SubCUTAneous Nightly    meropenem  500 mg IntraVENous Q12H    busPIRone  10 mg Oral TID    senna  2 tablet Oral Nightly    atorvastatin  20 mg Oral Nightly    clopidogrel  75 mg Oral Daily    aspirin  81 mg Oral Daily     Continuous Infusions:   fentaNYL 75 mcg/hr (01/28/22 0049)    propofol 20 mcg/kg/min (01/28/22 1304)    sodium chloride      sodium chloride      dextrose 100 mL/hr (01/19/22 0010)    sodium chloride       PRN Meds:   metoclopramide, 10 mg, Q6H PRN  bisacodyl, 10 mg, Daily PRN  hydrALAZINE, 10 mg, Q6H PRN  sodium chloride, , PRN  sodium chloride flush, 5-40 mL, PRN  sodium chloride, 25 mL, PRN  melatonin, 3 mg, Nightly PRN  sodium chloride, 1 spray, PRN  LORazepam, 0.5 mg, Q2H PRN  glucose, 15 g, PRN  dextrose, 12.5 g, PRN  glucagon (rDNA), 1 mg, PRN  dextrose, 100 mL/hr, PRN  metoprolol, 5 mg, Q6H PRN  benzonatate, 200 mg, TID PRN  sodium chloride flush, 10 mL, PRN  sodium chloride flush, 5-40 mL, PRN  sodium chloride, 25 mL, PRN  ondansetron, 4 mg, Q8H PRN   Or  ondansetron, 4 mg, Q6H PRN  polyethylene glycol, 17 g, Daily PRN  acetaminophen, 650 mg, Q6H PRN   Or  acetaminophen, 650 mg, Q6H PRN        SUPPORT DEVICES: [x] Ventilator [] BIPAP  [] Nasal Cannula [] Room Air    VENT SETTINGS (Comprehensive) (if applicable):  Vent Information  $Ventilation: $Subsequent Day  Skin Assessment: Clean, dry, & intact  Suction Catheter Diameter: 14  Equipment Changed: Airway securing device  Vent Type: Servo i  Vent Mode: PRVC  Vt Ordered: 580 mL  Rate Set: 16 bmp  Pressure Support: 10 cmH20  FiO2 : 40 %  SpO2: 98 %  SpO2/FiO2 ratio: 245  Sensitivity: 2  PEEP/CPAP: 5  I Time/ I Time %: 0.85 s  Humidification Source: HME  Humidification Temp: 31  Humidification Temp Measured: 31  Nitric Oxide/Epoprostenol In Use?: No  Mask Type: Full face mask  Mask Size: Medium  Additional Respiratory  Assessments  Pulse: 98  Resp: 16  SpO2: 98 %  End Tidal CO2: 52 (%)  Position: Semi-Haynes's  Humidification Source: HME  Humidification Temp: 31  Oral Care Completed?: Yes  Oral Care: Suction toothette,Mouth suctioned  Subglottic Suction Done?: No  Cuff Pressure (cm H2O):  (mlt)    ABGs:     Lab Results   Component Value Date    KBI8TRR NOT REPORTED 01/28/2022    FIO2 40.0 01/28/2022     Lactic Acid:   Lab Results   Component Value Date    LACTA 2.1 01/08/2022         DATA:  Complete Blood Count:   Recent Labs     01/26/22  0433 01/27/22  0343 01/28/22  0436   WBC 12.8* 12.8* 11.4*   HGB 10.5* 10.0* 8.7*   MCV 96.8 98.4 99.3    144 139   RBC 3.41* 3.20* 2.79*   HCT 33.0* 31.5* 27.7*   MCH 30.8 31.3 31.2   MCHC 31.8 31.7 31.4   RDW 18.3* 18.2* 18.4*   MPV 10.6 10.4 10.9        PT/INR:    Lab Results   Component Value Date    PROTIME 11.6 01/22/2022    INR 1.1 01/22/2022     PTT:    Lab Results   Component Value Date    APTT 25.2 01/22/2022       Basal Metabolic Profile:   Recent Labs     01/26/22  0433 01/27/22 0343 01/28/22 0436    144 150*   K 3.8 3.7 3.9   BUN 75* 80* 76*   CREATININE 2.67* 2.52* 2.35*   * 107 110*   CO2 24 26 29      Magnesium:   Lab Results   Component Value Date    MG 2.5 01/28/2022    MG 2.4 01/26/2022    MG 2.6 01/15/2022     Phosphorus:   Lab Results   Component Value Date    PHOS 5.3 01/22/2022     S. Calcium:  Recent Labs     01/28/22  0436   CALCIUM 8.3*     S. Ionized Calcium:No results for input(s): IONCA in the last 72 hours.       Urinalysis:   Lab Results   Component Value Date    NITRU NEGATIVE 01/17/2022    COLORU Yellow 01/17/2022    PHUR 5.0 01/17/2022    WBCUA None 01/17/2022    RBCUA 2 TO 5 01/17/2022    MUCUS 1+ 01/17/2022    TRICHOMONAS NOT REPORTED 01/17/2022    YEAST NOT REPORTED 01/17/2022    BACTERIA NOT REPORTED 01/17/2022    SPECGRAV 1.018 01/17/2022    LEUKOCYTESUR NEGATIVE 01/17/2022    UROBILINOGEN Normal 01/17/2022    BILIRUBINUR NEGATIVE 01/17/2022    GLUCOSEU 1+ 01/17/2022    KETUA NEGATIVE 01/17/2022    AMORPHOUS NOT REPORTED 01/17/2022       CARDIAC ENZYMES: No results for input(s): CKMB, CKMBINDEX, TROPONINI in the last 72 hours. Invalid input(s): CKTOTAL;3  BNP: No results for input(s): BNP in the last 72 hours. LFTS  No results for input(s): ALKPHOS, ALT, AST, BILITOT, BILIDIR, LABALBU in the last 72 hours. AMYLASE/LIPASE/AMMONIA  No results for input(s): AMYLASE, LIPASE, AMMONIA in the last 72 hours. Last 3 Blood Glucose:   Recent Labs     01/26/22  0433 01/27/22  0343 01/28/22  0436   GLUCOSE 160* 195* 110*      HgBA1c:  No results found for: LABA1C      TSH:  No results found for: TSH  ANEMIA STUDIES  No results for input(s): LABIRON, TIBC, FERRITIN, [id number], FOLATE, OCCULTBLD in the last 72 hours.       Cultures during this admission:     Blood cultures:                 [] None drawn      [x] Negative             []  Positive (Details:  )  Urine Culture:                   [] None drawn      [] Negative             [x]  Positive (Details: Presumptive candida albicans )  Sputum Culture:               [] None drawn       [] Negative             [x]  Positive (Details: few gram positive cocci in clusters)   Endotracheal aspirate:     [] None drawn       [] Negative             [x]  Positive (Details: gram negative rods )        ASSESSMENT:     Principal Problem:    Pneumonia due to COVID-19 virus  Active Problems:    Essential hypertension    Hyperlipidemia    Acute hypoxemic respiratory failure due to COVID-19 [facility])    [facility]    Stage 3b chronic kidney disease (HCC)    Elevated troponin    Nonrheumatic aortic valve stenosis    PAD (peripheral artery disease) ([facility] Moderate protein-calorie malnutrition (HCC)    Atrial fibrillation with RVR (HCC)    COVID-19    Traumatic hematoma of buttock    Acute distention of stomach    Hypoxia  Resolved Problems:    * No resolved hospital problems. *        PLAN:         1. Neurologic:   · Neuro intact  · Neuro checks per protocol  · Sedation on Propofol and Fentanyl  · Pain management: Fentanyl gtt  · Patient follows commands off sedation    2. Cardiovascular:  · Hemodynamically stable  · HR 60s-90s  · MAPs 70s-80s  · MAP goal 65  · New onset A.fib with RVR and NSTEMI Tyep II Vs. Type I- Cardiology following:  Continue Amiodarone PO, beta blockers and Eliquis. Rate controlled in NSR. 3. Pulmonary:  · Maintain oxygen sats >92%  · Pulmonary toilet  · Acute hypoxic respiratory failure secondary to COVID pneumonia  · Vent setting: PRVC/16/580/5/40%  · AB.39/pCO2 53.1/ pO2 68.2/ HCO3 32.4  · CXR :ET tube at the level of zheng. Recommended withdrawal by 3.5 cm. ET tube was withdrawn. · ID following: On Meropenem (Stop date: 22)    4. GI/Nutrition  · Senna daily, Dulcolax and Glycolax PRN  · Ulcer Prophylaxis: Pepcid  · Diet:ADULT TUBE FEEDING; Orogastric; Renal Formula; Continuous; 10; Yes; 10; Q 4 hours; 25; 30; Other (specify); per MD; Protein; 2 Protein modulars per day    5. Renal/Fluid/Electrolyte  · IV Fluids: None  · I/O: In: 2772.1 [I.V.:843; NG/GT:1521]  · Out:  [Urine:]  · UOP: 1.7L/24 hourst   · JULES on CKD stage IIIb-4 (baseline Cr 2.0) secondary to ischemic ATN and nephrotoxic ATN:  BUN/Cr 76/2.35  · Nephrology following: Continue Demadex 40 daily; add free water 300 mL q6 hours, f/u labs ordered,   · Monitor electrolytes, replace PRN     6. ID  WBC:   Lab Results   Component Value Date    WBC 11.4 (H) 2022   ·   · Tmax: Temp (24hrs), Av.3 °F (36.8 °C), Min:97.8 °F (36.6 °C), Max:98.8 °F (37.1 °C)  ·   · Antimicrobials: Meropenem (Stop date: 22)    7.  Hematology:  Recent Labs 01/26/22 0433 01/27/22 0343 01/28/22 0436   HGB 10.5* 10.0* 8.7*   ·  slight Hgb drop  · Monitor daily CBC    8. Endocrine:   glucose controlled - most recent BGL is   Recent Labs     01/26/22 0433 01/27/22 0343 01/28/22 0436   GLUCOSE 160* 195* 110*   ·   9.  DVT Prophylaxis  · On Eliquis      GI PPX: Pepcid  DVT PPX: Eliquis  IVF: none  Diety: tube feeds    Jono Jarrett MD, PGY-1     Department of Internal Medicine/ Critical care  [facility] ([place]             1/28/2022, 2:37 PM

## 2022-01-28 NOTE — PLAN OF CARE
Problem: Falls - Risk of:  Goal: Will remain free from falls  Description: Will remain free from falls  1/28/2022 1119 by Amos Hashimoto, RN  Outcome: Ongoing  1/28/2022 9969 by Yosi Duong RN  Outcome: Met This Shift  Goal: Absence of physical injury  Description: Absence of physical injury  1/28/2022 1119 by Amos Hashimoto, RN  Outcome: Ongoing  1/28/2022 9630 by Yosi Duong RN  Outcome: Met This Shift     Problem: Airway Clearance - Ineffective  Goal: Achieve or maintain patent airway  1/28/2022 1119 by Amos Hashimoto, RN  Outcome: Ongoing  1/28/2022 1031 by Miki Live RCP  Outcome: Ongoing  1/28/2022 0632 by Yosi Duong RN  Outcome: Met This Shift     Problem: Gas Exchange - Impaired  Goal: Absence of hypoxia  1/28/2022 1119 by Amos Hashimoto, RN  Outcome: Ongoing  1/28/2022 1031 by Miki Live RCP  Outcome: Ongoing  1/28/2022 0632 by Yosi Duong RN  Outcome: Met This Shift  Goal: Promote optimal lung function  1/28/2022 1119 by Amos Hashimoto, RN  Outcome: Ongoing  1/28/2022 1031 by Miki Live RCP  Outcome: Ongoing  1/28/2022 0632 by Yosi Duong RN  Outcome: Met This Shift     Problem: Gas Exchange - Impaired  Goal: Absence of hypoxia  1/28/2022 1119 by Amos Hashimoto, RN  Outcome: Ongoing  1/28/2022 1031 by Miki Live RCP  Outcome: Ongoing  1/28/2022 0632 by Yosi Duong RN  Outcome: Met This Shift  Goal: Promote optimal lung function  1/28/2022 1119 by Amos Hashimoto, RN  Outcome: Ongoing  1/28/2022 1031 by Miki Live RCP  Outcome: Ongoing  1/28/2022 0632 by Yosi Duong RN  Outcome: Met This Shift     Problem: Breathing Pattern - Ineffective  Goal: Ability to achieve and maintain a regular respiratory rate  1/28/2022 1119 by Amos Hashimoto, RN  Outcome: Ongoing  1/28/2022 1031 by Miki Live RCP  Outcome: Ongoing  1/28/2022 0632 by Yosi Duong, RN  Outcome: Met This Shift     Problem:  Body Temperature -  Risk of,

## 2022-01-28 NOTE — PLAN OF CARE
Problem: Airway Clearance - Ineffective  Goal: Achieve or maintain patent airway  1/28/2022 1031 by Theron Solis RCP  Outcome: Ongoing  1/28/2022 0632 by Florian Hart RN  Outcome: Met This Shift     Problem: Gas Exchange - Impaired  Goal: Absence of hypoxia  1/28/2022 1031 by Theron Solis RCP  Outcome: Ongoing  1/28/2022 0632 by Florian Hart RN  Outcome: Met This Shift  Goal: Promote optimal lung function  1/28/2022 1031 by Theron Solis RCP  Outcome: Ongoing  1/28/2022 0632 by Florian Hart RN  Outcome: Met This Shift     Problem: Breathing Pattern - Ineffective  Goal: Ability to achieve and maintain a regular respiratory rate  1/28/2022 1031 by Theron Solis RCP  Outcome: Ongoing  1/28/2022 0632 by Florian Hart RN  Outcome: Met This Shift     Problem: OXYGENATION/RESPIRATORY FUNCTION  Goal: Patient will maintain patent airway  1/28/2022 1031 by Theron Solis RCP  Outcome: Ongoing  1/28/2022 0255 by Ever Hartley RCP  Outcome: Ongoing  Goal: Patient will achieve/maintain normal respiratory rate/effort  Description: Respiratory rate and effort will be within normal limits for the patient  1/28/2022 1031 by Theron Solis RCP  Outcome: Ongoing  1/28/2022 0255 by Ever Hartley RCP  Outcome: Ongoing     Problem: MECHANICAL VENTILATION  Goal: Patient will maintain patent airway  1/28/2022 1031 by Theron Solis RCP  Outcome: Ongoing  1/28/2022 0632 by Florian Hart RN  Outcome: Met This Shift  1/28/2022 0255 by Ever Hartley RCP  Outcome: Ongoing  Goal: Oral health is maintained or improved  1/28/2022 1031 by Theron Solis RCP  Outcome: Ongoing  1/28/2022 0632 by Florian Hart RN  Outcome: Met This Shift  1/28/2022 0255 by Ever Hartley RCP  Outcome: Ongoing  Goal: ET tube will be managed safely  1/28/2022 1031 by Theron Solis RCP  Outcome: Ongoing  1/28/2022 0255 by Ever Hartley RCP  Outcome: Ongoing  Goal: Ability to express needs and understand communication  1/28/2022 1031 by Francis Hernandez RCP  Outcome: Ongoing  1/28/2022 7217 by Simran Babin RN  Outcome: Met This Shift  1/28/2022 0255 by Nolan Prater RCP  Outcome: Ongoing  Goal: Mobility/activity is maintained at optimum level for patient  1/28/2022 1031 by Francis Hernandez RCP  Outcome: Ongoing  1/28/2022 0632 by Simran Babin RN  Outcome: Met This Shift  1/28/2022 0255 by Nolan Prater RCP  Outcome: Ongoing     Problem: SKIN INTEGRITY  Goal: Skin integrity is maintained or improved  1/28/2022 1031 by Francis Hernandez RCP  Outcome: Ongoing  1/28/2022 0632 by Simran Babin RN  Outcome: Met This Shift  1/28/2022 0255 by Nolan Prater RCP  Outcome: Ongoing

## 2022-01-28 NOTE — PROGRESS NOTES
Patient transferred to MICU, report was given to [facility]. Writer called patients wife Johnna Malin to inform her, patient was moved. Updated her on plan of care. She had no further questions.

## 2022-01-28 NOTE — PLAN OF CARE
Problem: Falls - Risk of:  Goal: Will remain free from falls  Description: Will remain free from falls  Outcome: Met This Shift  Goal: Absence of physical injury  Description: Absence of physical injury  Outcome: Met This Shift     Problem: Airway Clearance - Ineffective  Goal: Achieve or maintain patent airway  Outcome: Met This Shift     Problem: Gas Exchange - Impaired  Goal: Absence of hypoxia  Outcome: Met This Shift  Goal: Promote optimal lung function  Outcome: Met This Shift     Problem: Breathing Pattern - Ineffective  Goal: Ability to achieve and maintain a regular respiratory rate  Outcome: Met This Shift     Problem: Skin Integrity:  Goal: Absence of new skin breakdown  Description: Absence of new skin breakdown  Outcome: Met This Shift     Problem: Nutrition  Goal: Optimal nutrition therapy  Outcome: Met This Shift     Problem: MECHANICAL VENTILATION  Goal: Patient will maintain patent airway  1/28/2022 7684 by Qing Faye RN  Outcome: Met This Shift  1/28/2022 0255 by Elen Tabares RCP  Outcome: Ongoing  Goal: Oral health is maintained or improved  1/28/2022 0632 by Qing Faye RN  Outcome: Met This Shift  1/28/2022 0255 by Elen Tabares RCP  Outcome: Ongoing  Goal: Ability to express needs and understand communication  1/28/2022 8642 by Qing Faye RN  Outcome: Met This Shift  1/28/2022 0255 by Elen Tabares RCP  Outcome: Ongoing  Goal: Mobility/activity is maintained at optimum level for patient  1/28/2022 6491 by Qing Faye RN  Outcome: Met This Shift  1/28/2022 0255 by Elen Tabares RCP  Outcome: Ongoing     Problem: SKIN INTEGRITY  Goal: Skin integrity is maintained or improved  1/28/2022 1146 by Qing Faye RN  Outcome: Met This Shift  1/28/2022 0255 by Elen Tabares RCP  Outcome: Ongoing

## 2022-01-28 NOTE — PROGRESS NOTES
Progress Note    Patient - Yannick Rebolledo  Date of Admission -  2022  9:49 AM  Date of Evaluation -  2022  Room and Bed Number -  6116/5896-43   Hospital Day -     CHIEF COMPLAINT : ACUTE HYPOXIC RESPIRATORY FAILURE DUE TO COVID -19 PNEUMONIA   SUBJECTIVE:     OVERNIGHT EVENTS:         Out of isolation   Will transfer to MICU   Follows commands off sedation             SECRETIONS  -Amount:  [x] Small [] Moderate  [] Large    [] None  Color:     [x] White [] Colored  [] Bloody    SEDATION:    [x] Propofol gtt  [] Versed gtt  [x] FENTANYL  gtt    [] No Sedation    PARALYZED:  [x] No    [] Yes    VASOPRESSORS:  [x] No    [] Yes  [] Levophed [] Dopamine [] Vasopressin  [] Dobutamine [] Phenylephrine [] Epinephrine    INHALED NITRIC OXIDE : [x] No    [] Yes    PRONE :       [x] No    [] Yes    Actemra:             [] No    [x] Yes    DEXAMETHASONE : [] No    [x] Yes      Ros unable to perform due to intubation and sedation    OBJECTIVE:     VITAL SIGNS:  BP (!) 146/42   Pulse 79   Temp 98.8 °F (37.1 °C)   Resp 16   Ht 5' 10\" (1.778 m)   Wt 148 lb 2.4 oz (67.2 kg)   SpO2 100%   BMI 21.26 kg/m²   Tmax over 24 hours:  Temp (24hrs), Av.2 °F (36.8 °C), Min:97.8 °F (36.6 °C), Max:98.8 °F (37.1 °C)      Patient Vitals for the past 8 hrs:   Temp Temp src Pulse Resp SpO2   22 0938   79 16 100 %   22 0928     100 %   22 0600 98.8 °F (37.1 °C)  72 15 99 %   22 0500   74 16 99 %   22 0400 98.6 °F (37 °C) Oral 70 15 99 %   22 0300   68 16 99 %   22 0250   68 15 99 %         Intake/Output Summary (Last 24 hours) at 2022 1001  Last data filed at 2022 0600  Gross per 24 hour   Intake 1543.14 ml   Output 1375 ml   Net 168.14 ml     Date 22 - 22 5740   Shift 2157-5356 4371-6103 2380-4823 24 Hour Total   INTAKE   I.V.(mL/kg) 148(2.2)   148(2.2)   NG/GT(mL/kg) 288(4.3)   288(4.3)   IV Piggyback(mL/kg) 100(1.5)   100(1.5)   Shift Total(mL/kg) 536(8)   536(8)   OUTPUT   Urine(mL/kg/hr) 900(1.7)   900   Shift Total(mL/kg) 900(13.4)   900(13.4)   Weight (kg) 67.2 67.2 67.2 67.2     Wt Readings from Last 3 Encounters:   01/08/22 148 lb 2.4 oz (67.2 kg)     Body mass index is 21.26 kg/m².         PHYSICAL EXAM:  Sedated, opens eyes to voice  Lungs decreased breath sounds bilaterally  CVS regular S1-S2  Abdomen nontender bowel sounds are present  Lower extremity edema and upper extremity edema    MEDICATIONS:  Scheduled Meds:   torsemide  40 mg Oral Daily    amiodarone  200 mg Oral Daily    miconazole   Topical BID    apixaban  2.5 mg Oral BID    famotidine  20 mg Oral Daily    oxyCODONE  7.5 mg Oral Q6H    insulin lispro  0-6 Units SubCUTAneous Q4H    insulin lispro  0-3 Units SubCUTAneous Nightly    metoprolol tartrate  50 mg Oral BID    meropenem  500 mg IntraVENous Q12H    busPIRone  10 mg Oral TID    senna  2 tablet Oral Nightly    atorvastatin  20 mg Oral Nightly    clopidogrel  75 mg Oral Daily    aspirin  81 mg Oral Daily     Continuous Infusions:   fentaNYL 75 mcg/hr (01/28/22 0049)    propofol 25 mcg/kg/min (01/28/22 0540)    sodium chloride      sodium chloride      dextrose 100 mL/hr (01/19/22 0010)    sodium chloride       PRN Meds:   metoclopramide, 10 mg, Q6H PRN  bisacodyl, 10 mg, Daily PRN  hydrALAZINE, 10 mg, Q6H PRN  sodium chloride, , PRN  sodium chloride flush, 5-40 mL, PRN  sodium chloride, 25 mL, PRN  melatonin, 3 mg, Nightly PRN  sodium chloride, 1 spray, PRN  LORazepam, 0.5 mg, Q2H PRN  glucose, 15 g, PRN  dextrose, 12.5 g, PRN  glucagon (rDNA), 1 mg, PRN  dextrose, 100 mL/hr, PRN  metoprolol, 5 mg, Q6H PRN  benzonatate, 200 mg, TID PRN  sodium chloride flush, 10 mL, PRN  sodium chloride flush, 5-40 mL, PRN  sodium chloride, 25 mL, PRN  ondansetron, 4 mg, Q8H PRN   Or  ondansetron, 4 mg, Q6H PRN  polyethylene glycol, 17 g, Daily PRN  acetaminophen, 650 mg, Q6H PRN   Or  acetaminophen, 650 mg, Q6H PRN        SUPPORT DEVICES: [x] Ventilator [] BIPAP  [] Nasal Cannula [] Room Air      ABGs:     Lab Results   Component Value Date    [id number] NOT REPORTED 01/28/2022    FIO2 40.0 01/28/2022       DATA:  Complete Blood Count:   Recent Labs     01/26/22  0433 01/27/22  0343 01/28/22  0436   WBC 12.8* 12.8* 11.4*   RBC 3.41* 3.20* 2.79*   HGB 10.5* 10.0* 8.7*   HCT 33.0* 31.5* 27.7*   MCV 96.8 98.4 99.3   MCH 30.8 31.3 31.2   MCHC 31.8 31.7 31.4   RDW 18.3* 18.2* 18.4*    144 139   MPV 10.6 10.4 10.9        Last 3 Blood Glucose:   Recent Labs     01/26/22  0433 01/27/22  0343 01/28/22  0436   GLUCOSE 160* 195* 110*        PT/INR:    Lab Results   Component Value Date    PROTIME 11.6 01/22/2022    INR 1.1 01/22/2022     PTT:    Lab Results   Component Value Date    APTT 25.2 01/22/2022       Comprehensive Metabolic Profile:   Recent Labs     01/26/22  0433 01/27/22 0343 01/28/22  0436    144 150*   K 3.8 3.7 3.9   * 107 110*   CO2 24 26 29   BUN 75* 80* 76*   CREATININE 2.67* 2.52* 2.35*   GLUCOSE 160* 195* 110*   CALCIUM 8.0* 8.3* 8.3*      Magnesium:   Lab Results   Component Value Date    MG 2.4 01/26/2022    MG 2.6 01/15/2022    MG 2.6 01/14/2022     Phosphorus:   Lab Results   Component Value Date    PHOS 5.3 01/22/2022     Ionized Calcium:   Lab Results   Component Value Date    CAION 1.21 01/22/2022        Urinalysis:   Lab Results   Component Value Date    NITRU NEGATIVE 01/17/2022    COLORU Yellow 01/17/2022    PHUR 5.0 01/17/2022    WBCUA None 01/17/2022    RBCUA 2 TO 5 01/17/2022    MUCUS 1+ 01/17/2022    TRICHOMONAS NOT REPORTED 01/17/2022    YEAST NOT REPORTED 01/17/2022    BACTERIA NOT REPORTED 01/17/2022    SPECGRAV 1.018 01/17/2022    LEUKOCYTESUR NEGATIVE 01/17/2022    UROBILINOGEN Normal 01/17/2022    BILIRUBINUR NEGATIVE 01/17/2022    GLUCOSEU 1+ 01/17/2022    KETUA NEGATIVE 01/17/2022    AMORPHOUS NOT REPORTED 01/17/2022               XR CHEST PORTABLE    Result Date: 1/22/2022  1. The endotracheal tube tip is located 5.3 cm above the zheng. 2. Worsening bilateral lung infiltrates, compatible with pneumonia, including COVID. 3. Small bilateral pleural effusions. XR ABDOMEN FOR NG/OG/NE TUBE PLACEMENT    Result Date: 1/22/2022  1. The orogastric tube tip and side port are noted in the gastric fundus. CT CHEST ABDOMEN PELVIS WO CONTRAST    Result Date: 1/21/2022  1. New small bilateral pleural effusions with extensive bilateral airspace consolidation, increased from 01/08/2022. 2.  Decreased AP dimension of the distal trachea, suggesting tracheomalacia. 3.  Previously noted hematoma in the left gluteal musculature is not as well visualized on today's study, but appears overall slightly decreased in size. No new areas of hematoma. 4.  Mild distention of the stomach, mild prominence of proximal small bowel loops, and mild nonspecific stranding in the proximal mesentery. No definite evidence of high-grade bowel obstruction at this time. Enteritis or early developing obstruction are considered in the differential. RECOMMENDATIONS: Unavailable            VENTILATOR SETTINGS:  Vent Information  $Ventilation: $Subsequent Day  Skin Assessment: Clean, dry, & intact  Suction Catheter Diameter: 14  Equipment Changed: HME  Vent Type: Servo i  Vent Mode: PRVC  Vt Ordered: 580 mL  Rate Set: 16 bmp  Pressure Support: 10 cmH20  FiO2 : 100 %  SpO2: 100 %  SpO2/FiO2 ratio: 100  Sensitivity: 2  PEEP/CPAP: (S) 5  I Time/ I Time %: 0.85 s  Humidification Source: HME  Humidification Temp: 31  Humidification Temp Measured: 31  Nitric Oxide/Epoprostenol In Use?: No  Mask Type: Full face mask  Mask Size: Medium     PaO2/FiO2 RATIO:  Recent Labs     01/28/22 0417   POCPO2 68.2*      FiO2 : 100 %       LABS:  ABGs:   Recent Labs     01/26/22  0559 01/27/22 0410 01/28/22 0417   POCPH 7.436 7.414 7. 393   POCPCO2 39.9 46.4 53.1*   POCPO2 71.7* 54.5* 68.2*   POCHCO3 26.9 29.7* 32.4*   [id number] 95 80* 93*            ASSESSMENT:     Principal Problem:    Pneumonia due to COVID-19 virus  Active Problems:    Essential hypertension    Hyperlipidemia    Acute hypoxemic respiratory failure due to COVID-19 [facility])    Acute kidney injury (HCC)    Stage 3b chronic kidney disease (HCC)    Elevated troponin    Nonrheumatic aortic valve stenosis    PAD (peripheral artery disease) ([facility])    Moderate protein-calorie malnutrition (HCC)    Atrial fibrillation with RVR ([facility])    COVID-19    Traumatic hematoma of buttock    Acute distention of stomach    Hypoxia  Resolved Problems:    * No resolved hospital problems. *              Acute hypoxic respiratory failure secondary to COVID 19   Acute respiratory distress syndrome   Bilateral multifocal pneumonia due to COVID 19 infection   Covid -19 pandemic emergency    Severe Aortic stenosis    A. fib rate controlled     LOS: 20  PLAN:    D/w RT  D/w RN   Daily SBT   Family to visit today   Will obtain xray chest and ABG as needed  Antibiotics per ID   Withdraw et tube 2 cm     D/w DR Sonal Ontiveros         Treatment plan Discussed with nursing staff in detail , all questions answered . Total critical care time caring for this patient with life threatening, unstable organ failure, including direct patient contact, management of life support systems, review of data including imaging and labs, discussions with other team members and physicians at least 27  Min so far today, excluding procedures. Electronically signed by Jon Lawrence MD on 1/28/2022 at 10:01 AM       This patient was evaluated in the context of the global SARS-CoV-2 (COVID-19) pandemic, which necessitated considerations that the patient either has COVID-19 infection or is at risk of infection with COVID-19.  Institutional protocols and algorithms that pertain to the evaluation & management of patients with COVID-19 or those at risk for COVID-19 are in a state of rapid changes based on information released by regulatory bodies including the CDC and federal and state organizations. These policies and algorithms were followed during the patient's care. Please note that this chart was generated using voice recognition Dragon dictation software. Although every effort was made to ensure the accuracy of this automated transcription, some errors in transcription may have occurred.

## 2022-01-28 NOTE — PROGRESS NOTES
[facility] [address] [facility] Patient  Today's Date and Time: 1/28/2022, 10:22 AM    Impression :     COVID 19 Confirmed Infection  Covid tests:  1/8/21: Positive  Acute hypoxic respiratory failure  Paroxysmal A. 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  Essential hypertension  Elevated inflammatory markers  Hyperlipidemia  Acute gluteal hematoma  Patient has not received the Covid vaccine  Intubated 1/22/22      Recommendations:   Antibiotic treatment:  Meropenem 500 mg BID IV for leukocytosis & possible secondary bacterial pneumonia 1/11/21-discontinued 1/17/21  Meropenem Re-started 1-21-22 because of residual dense consolidation of the lungs with air bronchograms-End date 1/31  Normal Flora1/25  Covid Rx:    Remdesivir-contraindicated with JULES  Monoclonal antibodies-out of window  Decadron-initiated 1/8/2022  Actemra-administered 1/8/2022    The patient still has significant hypoxia is currently requiring high flow/BiPAP. He is insisting on trying to go home and does not quite understand that he is requiring a significant amount of respiratory support. He did mention to me that he was happy to die at home but it is my understanding that the recently changed his CODE STATUS to comfort care arrest to full code.   His code status has again been changed to [facility]  His code status changed back to a full code after he agreed to intubation on 1/22/22  Continue supportive care      Medical Decision Making/Summary/Discussion:1/28/2022     Patient admitted with COVID 19 infection  Isolation until 1/28/22    Infection Control Recommendations   [place]ritch Precautions  Airborne isolation  Droplet Isolation    Antimicrobial Stewardship Recommendations       Renal considerations  Coordination of Outpatient Care:   Estimated Length of IV antimicrobials:TBD  Patient will need Midline Catheter Insertion: TBD  Patient will need PICC line Insertion: No  Patient will need: Home IV , [place],  SNF,  LTAC:TBD  Patient will need outpatient wound care:No    Chief complaint/reason for consultation:   Concern for COVID infection      History of Present Illness:   Alex Lucas is a [de-identified]y.o.-year-old male who was initially admitted on 1/8/2022. Patient seen at the request of Dr. Aguilar Friend:    Patient presented through ER with complaints of needing shortness of breath, cough, loss of appetite, nausea, vomiting and diarrhea over the past 7 to 10 days. He has not received the Covid vaccine and tested positive for Covid shortly after [place]. On evaluation in the ED, the patient had an SPO2 of 80% on room air and was tachypneic. A rapid Covid swab was positive. CT chest shows extensive bilateral pulmonary groundglass opacities. He was started on Decadron and given a dose of Actemra. Abnormal labs include:  Creatinine 2.32    Troponin I 48  WBC 17.1    Patient admitted because of concerns with COVID 19. Meropenem initiated on 1/11/2022 for worsening respiratory distress and leukocytosis    Stable chest x-ray 1/11    CRP is trending down    Hemoglobin dropped to 5.7 on 1/16/2022  Hemoglobin remained stable at this time after receiving infusions of PRBC  Left gluteal hematoma present on CT abdomen pelvis      Occult blood noted on stool  Anemia continues in the program patient required another blood transfusion on 1/21/2022  Anticoagulation on hold s/p gluteal hematoma      Impression CT Chest/Abdomen/Pelvis 1/21/22   1. New small bilateral pleural effusions with extensive bilateral airspace   consolidation, increased from 01/08/2022.       2.  Decreased AP dimension of the distal trachea, suggesting tracheomalacia. 3.  Previously noted hematoma in the left gluteal musculature is not as well   visualized on today's study, but appears overall slightly decreased in size. No new areas of hematoma. 4.  Mild distention of the stomach, mild prominence of proximal small bowel   loops, and mild nonspecific stranding in the proximal mesentery. No definite   evidence of high-grade bowel obstruction at this time. Enteritis or early   developing obstruction are considered in the differential.         The patient required intubation for worsening respiratory failure on 1/22/2022. He is currently requiring 65% FiO2 with a PEEP of 12. Propofol is being given for sedation    CURRENT EVALUATION : 1/28/2022    Afebrile  VS stable     The patient remains on mechanical ventilation today with 40-->45% FiO2 and PEEP of 6. Leukocytosis increased to 17   Sputum 1/22 Normal respiratory alistair light growth  MRSA nares not detected  Meropenem initiated 1/22/22  Repeat cultures ordered 1/25-gram positive cocci clusters sputum    CXR ordered 1/25-stable    We will continue to monitor    Patient exhibiting respiratory distress. Yes  Respiratory secretions: No    Patient receiving supplemental oxygen. BiPAP & Hi-flow NC-->MV  RR: 16  02 sat: 99    % FIO2: 65-->50-->30-->35-->40-->45  PEEP:  12-->8-->5-->6    NEWS Score: 0-4 Low risk group; 5-6: Medium risk group; 7 or above: High risk group  Parameters 3 2 1 0 1 2 3   Age    < 65   ? 65   RR ? 8  9-11 12-20  21-24 ? 25   O2 Sats ?  91 92-93 94-95 ? 96      Suppl O2  Yes  No      SBP ? 90  101-110 111-219   ? 220   HR ? 40  41-50 51-90  111-130 ? 131   Consciousness    Alert   Drowsiness, lethargy, or confusion   Temperature ? 35.0 C (95.0 F)  35.1-36.0 C 95.1-96.9 F 36.1-38.0 C 97.0-100.4 F 38.1-39.0 C 100.5-102.3 F ? 39.1 C ? 102.4 F      NEWS Score:  1/9/2022: 5 moderate risk    Overall Daily Picture:     Unchanged    Presence of secondary bacterial Infection:    Possible  Additional antibiotics: Meropenem 1/21/2022    Labs, X rays reviewed: 1/28/2022    BUN:76  Cr:2.35    WBC:11.4  Hb:8.7  Plat: 139    Absolute Neutrophils:16  Absolute Lymphocytes:0.34  Neutrophil/Lymphocyte Ratio: 53 very high risk    CRP:160-->131-->52.3-->21.5  Ferritin:804  LDH: 563    Pro Calcitonin:      Cultures:  Urine:    Blood:  1/25: No growth  Sputum :  1/22: Gram negative rods  1/25: Gram positive cocci clusters-Normal alistair  MRSA Nares:  Not detected    CXR:     CAT:  1/8/21      Discussed with patient, RN, CC, IM. I have personally reviewed the past medical history, past surgical history, medications, social history, and family history, and I have updated the database accordingly.   Past Medical History:     Past Medical History:   Diagnosis Date    Chronic lower back pain     Cobalamin deficiency     CVA (cerebral vascular accident) [facility] [address]     Diabetes mellitus ([facility] [address]     Hyperlipidemia     Hypertension     Malignant neoplasm of colon ([facility] [address]     PAD (peripheral artery disease) ([facility]        Past Surgical  History:     Past Surgical History:   Procedure Laterality Date    ARTERY SURGERY      INSERT MIDLINE CATHETER  1/24/2022            Medications:      torsemide  40 mg Oral Daily    amiodarone  200 mg Oral Daily    miconazole   Topical BID    apixaban  2.5 mg Oral BID    famotidine  20 mg Oral Daily    oxyCODONE  7.5 mg Oral Q6H    insulin lispro  0-6 Units SubCUTAneous Q4H    insulin lispro  0-3 Units SubCUTAneous Nightly    metoprolol tartrate  50 mg Oral BID    meropenem  500 mg IntraVENous Q12H    busPIRone  10 mg Oral TID    senna  2 tablet Oral Nightly    atorvastatin  20 mg Oral Nightly    clopidogrel  75 mg Oral Daily    aspirin  81 mg Oral Daily       Social History:     Social History     Socioeconomic History    Marital status:      Spouse name: Not on file    Number of children: Not on file    Years of education: Not on file    Highest education level: Not on file   Occupational History    Not on file   Tobacco Use    Smoking status: Former Smoker    Smokeless tobacco: Never Used   Substance and Sexual Activity    Alcohol use: Not Currently Drug use: Never    Sexual activity: Not on file   Other Topics Concern    Not on file   Social History Narrative    Not on file     Social Determinants of Health     Financial Resource Strain:     Difficulty of Paying Living Expenses: Not on file   Food Insecurity:     Worried About [address] in the Last Year: Not on file    Ran Out of Food in the Last Year: Not on file   Transportation Needs:     Lack of Transportation (Medical): Not on file    Lack of Transportation (Non-Medical): Not on file   Physical Activity:     Days of Exercise per Week: Not on file    Minutes of Exercise per Session: Not on file   Stress:     Feeling of Stress : Not on file   Social Connections:     Frequency of Communication with Friends and Family: Not on file    Frequency of Social Gatherings with Friends and Family: Not on file    Attends Protestant Services: Not on file    Active Member of Clubs or Organizations: Not on file    Attends Club or Organization Meetings: Not on file    Marital Status: Not on file   Intimate Partner Violence:     Fear of Current or Ex-Partner: Not on file    Emotionally Abused: Not on file    Physically Abused: Not on file    Sexually Abused: Not on file   Housing Stability:     Unable to Pay for Housing in the Last Year: Not on file    Number of [place] in the Last Year: Not on file    Unstable Housing in the Last Year: Not on file       Family History:   History reviewed. No pertinent family history. Allergies:   Patient has no known allergies. Review of Systems:     Review of Systems   Unable to perform ROS: Intubated   Respiratory: Positive for shortness of breath. Cardiovascular: Negative. Gastrointestinal: Negative. Genitourinary: Negative. Musculoskeletal: Negative. Skin: Positive for color change. Neurological: Negative.           Physical Examination :     Patient Vitals for the past 8 hrs:   Temp Temp src Pulse Resp SpO2   01/28/22 0938 -- -- 79 16 100 % 01/28/22 0928 -- -- -- -- 100 %   01/28/22 0600 98.8 °F (37.1 °C) -- 72 15 99 %   01/28/22 0500 -- -- 74 16 99 %   01/28/22 0400 98.6 °F (37 °C) Oral 70 15 99 %   01/28/22 0300 -- -- 68 16 99 %   01/28/22 0250 -- -- 68 15 99 %     Physical Exam  Vitals and nursing note reviewed. Constitutional:       Appearance: He is well-developed. Comments: Intubated and sedated   HENT:      Head: Normocephalic and atraumatic. Cardiovascular:      Rate and Rhythm: Normal rate. Heart sounds: Murmur heard. Pulmonary:      Effort: Respiratory distress present. Breath sounds: No wheezing. Abdominal:      General: Bowel sounds are normal.      Palpations: Abdomen is soft. There is no mass. Tenderness: There is no abdominal tenderness. Musculoskeletal:         General: Swelling (In the bilateral upper extremities especially in the forearms.) present. Normal range of motion. Cervical back: Neck supple. Lymphadenopathy:      Cervical: No cervical adenopathy. Skin:     General: Skin is dry. Findings: Bruising (There is bruising and ecchymotic skin lesions in the forearms bilaterally right worse than left) present. Neurological:      Comments: FINA-intubated and sedated         Medical Decision Making -Laboratory:   I have independently reviewed/ordered the following labs:    CBC with Differential:   Recent Labs     01/27/22  0343 01/28/22  0436   WBC 12.8* 11.4*   HGB 10.0* 8.7*   HCT 31.5* 27.7*    139   LYMPHOPCT 2* 5*   MONOPCT 7 3     BMP:   Recent Labs     01/26/22  0433 01/26/22  1825 01/27/22  0343 01/28/22  0436   NA   < >  --  144 150*   K   < >  --  3.7 3.9   CL   < >  --  107 110*   CO2   < >  --  26 29   BUN   < >  --  80* 76*   CREATININE   < >  --  2.52* 2.35*   MG  --  2.4  --   --     < > = values in this interval not displayed. Hepatic Function Panel:   No results for input(s): PROT, LABALBU, BILIDIR, IBILI, BILITOT, ALKPHOS, ALT, AST in the last 72 hours.   No results for input(s): RPR in the last 72 hours. No results for input(s): HIV in the last 72 hours. No results for input(s): BC in the last 72 hours. Lab Results   Component Value Date    MUCUS 1+ 01/17/2022    RBC 2.79 01/28/2022    TRICHOMONAS NOT REPORTED 01/17/2022    WBC 11.4 01/28/2022    YEAST NOT REPORTED 01/17/2022    TURBIDITY Clear 01/17/2022     Lab Results   Component Value Date    CREATININE 2.35 01/28/2022    GLUCOSE 110 01/28/2022       Medical Decision Making-Imaging:     Narrative   EXAMINATION:   CTA OF THE CHEST 1/8/2022 10:10 am       TECHNIQUE:   CTA of the chest was performed after the administration of intravenous   contrast.  Multiplanar reformatted images are provided for review. MIP   images are provided for review. Dose modulation, iterative reconstruction,   and/or weight based adjustment of the mA/kV was utilized to reduce the   radiation dose to as low as reasonably achievable. COMPARISON:   None. HISTORY:   ORDERING SYSTEM PROVIDED HISTORY: dyspnea / hypoxia   Reason for Exam: Shortness of breath       FINDINGS:   Pulmonary Arteries: Pulmonary arteries are adequately opacified for   evaluation. No evidence of intraluminal filling defect to suggest pulmonary   embolism. Main pulmonary artery is normal in caliber. Mediastinum: No evidence of mediastinal lymphadenopathy. Normal heart size. Normal caliber thoracic aorta with diffuse atherosclerosis. Lungs/pleura: Extensive ground-glass opacification of the bilateral lung   fields with peripheral involvement slight apical as well as basilar sparing. No effusion or pneumothorax. Upper Abdomen: Limited images of the upper abdomen are unremarkable. Soft Tissues/Bones: No acute bone or soft tissue abnormality. Impression   *No evidence of pulmonary embolism.    *Extensive ground-glass opacification of the bilateral lung fields with   peripheral involvement slight apical as well as basilar sparing. Imaging   features suggestive of COVID-19 pneumonia, though are nonspecific and can   occur with a variety of infectious and noninfectious processes. Narrative   EXAMINATION:   CT OF THE ABDOMEN AND PELVIS WITHOUT CONTRAST, 1/16/2022 10:42 am       TECHNIQUE:   CT of the abdomen and pelvis was performed without the administration of   intravenous contrast. Multiplanar reformatted images are provided for review. Dose modulation, iterative reconstruction, and/or weight based adjustment of   the mA/kV was utilized to reduce the radiation dose to as low as reasonably   achievable. COMPARISON:   CT of the chest from 01/08/2022, and retroperitoneal ultrasound from   01/10/2022. HISTORY:   ORDERING SYSTEM PROVIDED HISTORY:  Retrop bleed r/o   TECHNOLOGIST PROVIDED HISTORY:   Retrop bleed r/o   Reason for Exam:  Retrop bleed r/o       FINDINGS:   Lower Chest: As demonstrated on recent CT chest, extensive and somewhat   denser confluent ground-glass airspace opacities re-identified mid-lower   lungs, sparing the bases with some associated crazy paving, air bronchograms   and bibasilar atelectatic appearing changes. Main pulmonary artery caliber   31 mm. Mild dilatation ascending aorta, with a maximal dimension of 41 mm. Organs: Unopacified liver, spleen, adrenal glands and both kidneys show no   acute abnormality; without suspicious focal finding or hydronephrosis. Significant right renal cortical thinning and some scarring suspected. No   large stone. Probable gallbladder sludge or vicarious contrast from recent   contrast CT; no calcified stones. GI/Bowel: Small-moderate amount of retained stool, mostly right colon with   some fluid/levels; no abnormal dilatation, marked wall thickening or   pericolonic fat stranding. Unremarkable small bowel. Some fluid within a   mildly distended stomach. Small hiatus hernia.        Pelvis: Partially fluid-filled urinary bladder without wall thickening or   mass. No significant prostatic enlargement. Symmetric seminal vesicles. No   pelvic fluid collection or mass. Partially visualized approximate 10 x 15 x   5.7 cm left gluteal mass with HU measurements/appearance most suggestive of   hematoma. No high density finding compatible with active bleeding, but   assessment limited on this examination. Small fat containing inguinal   hernias, larger on the left. Peritoneum/Retroperitoneum: No retroperitoneal bleed or bulky   lymphadenopathy. 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 Probable limited intimal dissection   suprarenal aorta without marked aneurysmal dilatation. Soft tissue stranding   flanks extending into the pelvis, suggesting some degree anasarca. Slightly   greater prominence right proximal rectus musculature       Bones/Soft Tissues: No acute abnormality. Mild scoliosis, multilevel   degenerative changes of spine but with DDD L5-S1 and bilateral mild hip joint   degenerative findings. Impression   Left gluteal hematoma, partially visualized on this study without obvious   active bleeding, but limited without IV contrast.  No retroperitoneal or   rectus sheath bleed. Extensive findings in the visualized lungs compatible with known COVID-19   pneumonia; denser GGOs suggest worsening pneumonia/pneumonitis or developing   consolidation. No pneumothorax. Multiple additional findings, as detailed in the body of the report above. RECOMMENDATIONS:   Consider follow-up CT pelvis including the upper thighs, if the patient does   not respond to resuscitation with blood products/fluids and other   conservative measures including localized pressure. Findings were discussed   with Benigno Steward at 12:24 pm on 1/16/2022.              Narrative   EXAMINATION:   CT OF THE CHEST, ABDOMEN, AND PELVIS WITHOUT CONTRAST 1/21/2022 11:09 am       TECHNIQUE:   CT of the chest, abdomen and pelvis was performed without the administration   of intravenous contrast. Multiplanar reformatted images are provided for   review. Dose modulation, iterative reconstruction, and/or weight based   adjustment of the mA/kV was utilized to reduce the radiation dose to as low   as reasonably achievable. COMPARISON:   CT abdomen and pelvis dated 01/16/2022. CT chest dated 01/08/2022       HISTORY:   ORDERING SYSTEM PROVIDED HISTORY: blood loss anemia , gluteal hematoma and   worsening HB   TECHNOLOGIST PROVIDED HISTORY:   blood loss anemia , gluteal hematoma and worsening HB           FINDINGS:       Chest:       Mediastinum: Heart size is within normal limits. Ascending thoracic aorta is   mildly dilated, measuring 4 cm in AP dimension, similar to the previous   study. Main pulmonary artery is stable in caliber as well. No mediastinal   hematoma or lymphadenopathy. There is a catheter in the SVC with distal tip   in the distal SVC. Lungs/pleura: There are small bilateral pleural effusions, which are new from   the previous study. There is extensive dense airspace consolidation   throughout both lungs, with mild sparing at the bases, increased from   01/08/2022. There is decreased AP dimension of the lower trachea, although   tracheobronchial tree remains patent. Soft Tissues/Bones: There is no acute or suspicious osseous abnormality. Visualized superficial soft tissues are within normal limits. Abdomen/Pelvis:       Organs: Limited unenhanced liver is within normal limits. There is a tiny   amount of free fluid adjacent to the hepatic dome. Gallbladder, spleen,   pancreas, and adrenal glands are unremarkable. There is bilateral renal atrophy, more so on the right, unchanged. No   hydronephrosis or perinephric fluid collection.   There is mild bilateral   perinephric stranding, which is unchanged and likely physiologic. GI/Bowel: The stomach is mildly distended with air-fluid level noted. No   abnormal bowel distention. There is mild increased prominence of the   proximal small bowel loops. There is mild stranding in the proximal   mesentery. No focal pericolonic inflammation. No free air. Pelvis: Urinary bladder is within normal limits. No evidence of pelvic   lymphadenopathy. Peritoneum/Retroperitoneum: Distal aortobifemoral graft noted. Caliber of   the abdominal aorta is unchanged. There is moderate to severe scattered   atherosclerosis, which is unchanged. No retroperitoneal lymphadenopathy or   hematoma. Slit-like IVC is noted. Bones/Soft Tissues: There is no acute or suspicious osseous abnormality. The   hematoma previously seen in the left gluteal muscle is not as well visualized   on today's study due to isoattenuation. There is asymmetric swelling of the   left gluteal muscle, although slightly improved when compared to 01/16/2022. AP dimension in the area of suspected hematoma is 4.4 cm (previously 5.7 cm). Transverse dimension is approximately 9.3 cm (previously 9.7 cm). There is   stranding in the subcutaneous fat of the upper thighs bilaterally. Impression   1. New small bilateral pleural effusions with extensive bilateral airspace   consolidation, increased from 01/08/2022.       2.  Decreased AP dimension of the distal trachea, suggesting tracheomalacia. 3.  Previously noted hematoma in the left gluteal musculature is not as well   visualized on today's study, but appears overall slightly decreased in size. No new areas of hematoma. 4.  Mild distention of the stomach, mild prominence of proximal small bowel   loops, and mild nonspecific stranding in the proximal mesentery. No definite   evidence of high-grade bowel obstruction at this time.   Enteritis or early   developing obstruction are considered in the differential.         Medical Decision [id number]:     Culture, Blood 1 [[id number]] Collected: 01/09/22 1155   Order Status: Completed Specimen: Blood Updated: 01/14/22 1300    Specimen Description . BLOOD    Special Requests NOT REPORTED    Culture NO GROWTH 5 DAYS   Culture, Blood 1 [[id number]] Collected: 01/09/22 1155   Order Status: Completed Specimen: Blood Updated: 01/14/22 1300    Specimen Description . BLOOD    Special Requests NOT REPORTED    Culture NO GROWTH 5 DAYS   COVID-19, Rapid [[id number]] (Abnormal) Collected: 01/08/22 1045   Order Status: Completed Specimen: Nasopharyngeal Swab Updated: 01/08/22 1109    Specimen Description . NASOPHARYNGEAL SWAB    SARS-CoV-2, Rapid DETECTED Abnormal     Comment:        Rapid NAAT: The specimen is POSITIVE for SARS-Cov-2, the novel coronavirus associated with   COVID-19. This test has been authorized by the FDA under an Emergency Use Authorization (EUA) for use   by authorized laboratories. The ID NOW COVID-19 assay is designed to detect the virus that causes COVID-19 in patients   with signs and symptoms of infection who are suspected of COVID-19. An individual without symptoms of COVID-19 and who is not shedding SARS-CoV-2 virus would   expect to have a negative (not detected) result in this assay.    Fact sheet for Healthcare Providers: Dionne   Fact sheet for Patients: [email or web address]           Methodology: Isothermal Nucleic Acid Amplification         Results reported to the appropriate Health Departmen            Medical Decision Making-Other:     Note:  Labs, medications, radiologic studies were reviewed with personal review of films  Large amounts of data were reviewed  Discussed with nursing Staff, Discharge planner  Infection Control and Prevention measures reviewed  All prior entries were reviewed  Administer medications as ordered  Prognosis: Guarded  Discharge planning reviewed      Thank you for allowing us to participate in the care of this patient. Please call with questions. Electronically signed by MOHAMUD Moreland CNP on 1/28/2022 at 10:22 AM    ATTESTATION:    I have discussed the case, including pertinent history and exam findings with the APRN. I have evaluated the  History, physical findings and pictures of the patient and the key elements of the encounter have been performed by me. I have reviewed the laboratory data, other diagnostic studies and discussed them with the APRN. I have updated the medical record where necessary. I agree with the assessment, plan and orders as documented by the APRN.     Brenda Salter MD.

## 2022-01-29 NOTE — PROGRESS NOTES
01/29/22 1426   Vent Information   Vent Mode PRVC   Vt Ordered 580 mL   Rate Set 16 bmp   Pt switched back to full support secondary to increased RR and decreased SPO2.

## 2022-01-29 NOTE — PROGRESS NOTES
[facility] [address] Patient  Today's Date and Time: 1/28/2022, 7:47 PM    Impression :     · COVID 19 Confirmed Infection  · Covid tests:  · 1/8/21: Positive  · Acute hypoxic respiratory failure  · Paroxysmal A. 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  · Essential hypertension  · Elevated inflammatory markers  · Hyperlipidemia  · Acute gluteal hematoma  · Patient has not received the Covid vaccine  · Intubated 1/22/22      Recommendations:   · Antibiotic treatment:  · Meropenem 500 mg BID IV for leukocytosis & possible secondary bacterial pneumonia 1/11/21-discontinued 1/17/21  · Meropenem Re-started 1-21-22 because of residual dense consolidation of the lungs with air bronchograms  · Gram positive cocci clusters sputum 1/25  · Covid Rx:    · Remdesivir-contraindicated with JULES  · Monoclonal antibodies-out of window  · Decadron-initiated 1/8/2022  · Actemra-administered 1/8/2022    · The patient still has significant hypoxia is currently requiring high flow/BiPAP. · He is insisting on trying to go home and does not quite understand that he is requiring a significant amount of respiratory support. · He did mention to me that he was happy to die at home but it is my understanding that the recently changed his CODE STATUS to comfort care arrest to full code.   · His code status has again been changed to [facility]  · His code status changed back to a full code after he agreed to intubation on 1/22/22  · Continue supportive care      Medical Decision Making/Summary/Discussion:1/28/2022     · Patient admitted with COVID 19 infection  · Isolation until 1/28/22    Infection Control Recommendations   · Universal Precautions  · Airborne isolation  · Droplet Isolation    Antimicrobial Stewardship Recommendations       · Renal considerations  Coordination of Outpatient Care:   · Estimated Length of IV antimicrobials:TBD  · Patient will need Midline Catheter Insertion: TBD  · Patient will need PICC line Insertion: No  · Patient will need: Home IV , [place],  SNF,  LTAC:TBD  · Patient will need outpatient wound care:No    Chief complaint/reason for consultation:   · Concern for COVID infection      History of Present Illness:   Shankar Reyez is a [de-identified]y.o.-year-old male who was initially admitted on 1/8/2022. Patient seen at the request of Dr. Yen Bell:    Patient presented through ER with complaints of needing shortness of breath, cough, loss of appetite, nausea, vomiting and diarrhea over the past 7 to 10 days. He has not received the Covid vaccine and tested positive for Covid shortly after [place]. On evaluation in the ED, the patient had an SPO2 of 80% on room air and was tachypneic. A rapid Covid swab was positive. CT chest shows extensive bilateral pulmonary groundglass opacities. He was started on Decadron and given a dose of Actemra. Abnormal labs include:  · Creatinine 2.32  ·   · Troponin I 48  · WBC 17.1    Patient admitted because of concerns with COVID 19.     Meropenem initiated on 1/11/2022 for worsening respiratory distress and leukocytosis    Stable chest x-ray 1/11    CRP is trending down    Hemoglobin dropped to 5.7 on 1/16/2022  Hemoglobin remained stable at this time after receiving infusions of PRBC  Left gluteal hematoma present on CT abdomen pelvis      Occult blood noted on stool  Anemia continues in the program patient required another blood transfusion on 1/21/2022  Anticoagulation on hold s/p gluteal hematoma      Impression CT Chest/Abdomen/Pelvis 1/21/22   1.  New small bilateral pleural effusions with extensive bilateral airspace   consolidation, increased from 01/08/2022.       2.  Decreased AP dimension of the distal trachea, suggesting tracheomalacia.       3.  Previously noted hematoma in the left gluteal musculature is not as well Infection:    Possible  Additional antibiotics: Meropenem 1/21/2022    Labs, X rays reviewed: 1/28/2022    BUN:63-->75-->80-->76  Cr:2.53-->2.67-->2.52-->2.35    WBC: 17.2-->12.8-->12.8-->11.4  Hb:9.1-->8.2-->10.5-->10-->8.7  Plat: 85-->89-->139-->144-->139    Absolute Neutrophils:16  Absolute Lymphocytes:0.34  Neutrophil/Lymphocyte Ratio: 53 very high risk    CRP:160-->131-->52.3-->21.5  Ferritin:804  LDH: 563    Pro Calcitonin:      Cultures:  Urine:  ·   Blood:  · 1/25: No growth  Sputum :  · 1/22: Gram negative rods  · 1/25: Gram positive cocci clusters  MRSA Nares:   Not detected    CXR:     CAT:  1/8/21      Discussed with patient, RN, CC, IM. I have personally reviewed the past medical history, past surgical history, medications, social history, and family history, and I have updated the database accordingly.   Past Medical History:     Past Medical History:   Diagnosis Date    Chronic lower back pain     Cobalamin deficiency     [facility]     Diabetes mellitus [facility] [facility]     Hyperlipidemia     Hypertension     Malignant neoplasm of colon ([facility]     PAD (peripheral artery disease) ([facility])        Past Surgical  History:     Past Surgical History:   Procedure Laterality Date    ARTERY SURGERY      INSERT MIDLINE CATHETER  1/24/2022            Medications:      metoprolol tartrate  75 mg Oral BID    torsemide  40 mg Oral Daily    amiodarone  200 mg Oral Daily    miconazole   Topical BID    apixaban  2.5 mg Oral BID    famotidine  20 mg Oral Daily    oxyCODONE  7.5 mg Oral Q6H    insulin lispro  0-6 Units SubCUTAneous Q4H    insulin lispro  0-3 Units SubCUTAneous Nightly    meropenem  500 mg IntraVENous Q12H    busPIRone  10 mg Oral TID    senna  2 tablet Oral Nightly    atorvastatin  20 mg Oral Nightly    clopidogrel  75 mg Oral Daily    aspirin  81 mg Oral Daily       Social History:     Social History     Socioeconomic History    Marital status:  Spouse name: Not on file    Number of children: Not on file    Years of education: Not on file    Highest education level: Not on file   Occupational History    Not on file   Tobacco Use    Smoking status: Former Smoker    Smokeless tobacco: Never Used   Substance and Sexual Activity    Alcohol use: Not Currently    Drug use: Never    Sexual activity: Not on file   Other Topics Concern    Not on file   Social History Narrative    Not on file     Social Determinants of Health     Financial Resource Strain:     Difficulty of Paying Living Expenses: Not on file   Food Insecurity:     Worried About Running Out of Food in the Last Year: Not on file    Lesley of Food in the Last Year: Not on file   Transportation Needs:     Lack of Transportation (Medical): Not on file    Lack of Transportation (Non-Medical): Not on file   Physical Activity:     Days of Exercise per Week: Not on file    Minutes of Exercise per Session: Not on file   Stress:     Feeling of Stress : Not on file   Social Connections:     Frequency of Communication with Friends and Family: Not on file    Frequency of Social Gatherings with Friends and Family: Not on file    Attends Taoist Services: Not on file    Active Member of [address] [place] [address] [employer] or Organizations: Not on file    Attends Club or Organization Meetings: Not on file    Marital Status: Not on file   Intimate Partner Violence:     Fear of Current or Ex-Partner: Not on file    Emotionally Abused: Not on file    Physically Abused: Not on file    Sexually Abused: Not on file   Housing Stability:     Unable to Pay for Housing in the Last Year: Not on file    Number of [place] in the Last Year: Not on file    Unstable Housing in the Last Year: Not on file       Family History:   History reviewed. No pertinent family history. Allergies:   Patient has no known allergies.      Review of Systems:     Review of Systems   Unable to perform ROS: Intubated   Respiratory: Positive for shortness of breath. Cardiovascular: Negative. Gastrointestinal: Negative. Genitourinary: Negative. Musculoskeletal: Negative. Skin: Positive for color change. Neurological: Negative. Physical Examination :     Patient Vitals for the past 8 hrs:   BP Temp Temp src Pulse Resp SpO2   01/28/22 1800    69 18 98 %   01/28/22 1700    70 16 99 %   01/28/22 1600    66 16 100 %   01/28/22 1524    66 16 99 %   01/28/22 1400 (!) 106/54   98 16 98 %   01/28/22 1333    108 20 98 %   01/28/22 1300    99 16 99 %   01/28/22 1206    75 17 99 %   01/28/22 1200  98.8 °F (37.1 °C) Oral 72 16 99 %     Physical Exam  Vitals and nursing note reviewed. Constitutional:       Appearance: He is well-developed. Comments: Intubated and sedated   HENT:      Head: Normocephalic and atraumatic. Cardiovascular:      Rate and Rhythm: Normal rate. Heart sounds: Murmur heard. Pulmonary:      Effort: Respiratory distress present. Breath sounds: No wheezing. Abdominal:      General: Bowel sounds are normal.      Palpations: Abdomen is soft. There is no mass. Tenderness: There is no abdominal tenderness. Musculoskeletal:         General: Swelling (In the bilateral upper extremities especially in the forearms.) present. Normal range of motion. Cervical back: Neck supple. Lymphadenopathy:      Cervical: No cervical adenopathy. Skin:     General: Skin is dry. Findings: Bruising (There is bruising and ecchymotic skin lesions in the forearms bilaterally right worse than left) present.    Neurological:      Comments: FINA-intubated and sedated         Medical Decision Making -Laboratory:   I have independently reviewed/ordered the following labs:    CBC with Differential:   Recent Labs     01/27/22  0343 01/28/22  0436   WBC 12.8* 11.4*   HGB 10.0* 8.7*   HCT 31.5* 27.7*    139   LYMPHOPCT 2* 5*   MONOPCT 7 3     BMP:   Recent Labs 01/26/22  0433 01/26/22  1825 01/27/22  0343 01/28/22  0436   NA   < >  --  144 150*   K   < >  --  3.7 3.9   CL   < >  --  107 110*   CO2   < >  --  26 29   BUN   < >  --  80* 76*   CREATININE   < >  --  2.52* 2.35*   MG  --  2.4  --  2.5    < > = values in this interval not displayed. Hepatic Function Panel:   No results for input(s): PROT, LABALBU, BILIDIR, IBILI, BILITOT, ALKPHOS, ALT, AST in the last 72 hours. No results for input(s): RPR in the last 72 hours. No results for input(s): HIV in the last 72 hours. No results for input(s): BC in the last 72 hours. Lab Results   Component Value Date    MUCUS 1+ 01/17/2022    RBC 2.79 01/28/2022    TRICHOMONAS NOT REPORTED 01/17/2022    WBC 11.4 01/28/2022    YEAST NOT REPORTED 01/17/2022    TURBIDITY Clear 01/17/2022     Lab Results   Component Value Date    CREATININE 2.35 01/28/2022    GLUCOSE 110 01/28/2022       Medical Decision Making-Imaging:     Narrative   EXAMINATION:   CTA OF THE CHEST 1/8/2022 10:10 am       TECHNIQUE:   CTA of the chest was performed after the administration of intravenous   contrast.  Multiplanar reformatted images are provided for review.  MIP   images are provided for review. Dose modulation, iterative reconstruction,   and/or weight based adjustment of the mA/kV was utilized to reduce the   radiation dose to as low as reasonably achievable.       COMPARISON:   None.       HISTORY:   ORDERING SYSTEM PROVIDED HISTORY: dyspnea / hypoxia   Reason for Exam: Shortness of breath       FINDINGS:   Pulmonary Arteries: Pulmonary arteries are adequately opacified for   evaluation.  No evidence of intraluminal filling defect to suggest pulmonary   embolism.  Main pulmonary artery is normal in caliber.       Mediastinum: No evidence of mediastinal lymphadenopathy.  Normal heart size.    Normal caliber thoracic aorta with diffuse atherosclerosis.       Lungs/pleura: Extensive ground-glass opacification of the bilateral lung   fields with peripheral involvement slight apical as well as basilar sparing. No effusion or pneumothorax.       Upper Abdomen: Limited images of the upper abdomen are unremarkable.       Soft Tissues/Bones: No acute bone or soft tissue abnormality.           Impression   *No evidence of pulmonary embolism. *Extensive ground-glass opacification of the bilateral lung fields with   peripheral involvement slight apical as well as basilar sparing.  Imaging   features suggestive of COVID-19 pneumonia, though are nonspecific and can   occur with a variety of infectious and noninfectious processes.         Narrative   EXAMINATION:   CT OF THE ABDOMEN AND PELVIS WITHOUT CONTRAST, 1/16/2022 10:42 am       TECHNIQUE:   CT of the abdomen and pelvis was performed without the administration of   intravenous contrast. Multiplanar reformatted images are provided for review. Dose modulation, iterative reconstruction, and/or weight based adjustment of   the mA/kV was utilized to reduce the radiation dose to as low as reasonably   achievable.       COMPARISON:   CT of the chest from 01/08/2022, and retroperitoneal ultrasound from   01/10/2022.       HISTORY:   ORDERING SYSTEM PROVIDED HISTORY:  Retrop bleed r/o   TECHNOLOGIST PROVIDED HISTORY:   Retrop bleed r/o   Reason for Exam:  Retrop bleed r/o       FINDINGS:   Lower Chest: As demonstrated on recent CT chest, extensive and somewhat   denser confluent ground-glass airspace opacities re-identified mid-lower   lungs, sparing the bases with some associated crazy paving, air bronchograms   and bibasilar atelectatic appearing changes.  Main pulmonary artery caliber   31 mm.  Mild dilatation ascending aorta, with a maximal dimension of 41 mm.       Organs: Unopacified liver, spleen, adrenal glands and both kidneys show no   acute abnormality; without suspicious focal finding or hydronephrosis. Significant right renal cortical thinning and some scarring suspected.  No   large stone.  Probable gallbladder sludge or vicarious contrast from recent   contrast CT; no calcified stones.       GI/Bowel: Small-moderate amount of retained stool, mostly right colon with   some fluid/levels; no abnormal dilatation, marked wall thickening or   pericolonic fat stranding.  Unremarkable small bowel.  Some fluid within a   mildly distended stomach.  Small hiatus hernia.       Pelvis: Partially fluid-filled urinary bladder without wall thickening or   mass.  No significant prostatic enlargement.  Symmetric seminal vesicles.  No   pelvic fluid collection or mass.  Partially visualized approximate 10 x 15 x   5.7 cm left gluteal mass with HU measurements/appearance most suggestive of   hematoma.  No high density finding compatible with active bleeding, but   assessment limited on this examination.  Small fat containing inguinal   hernias, larger on the left.       Peritoneum/Retroperitoneum: No retroperitoneal bleed or bulky   lymphadenopathy.  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  Probable limited intimal dissection   suprarenal aorta without marked aneurysmal dilatation.  Soft tissue stranding   flanks extending into the pelvis, suggesting some degree anasarca.  Slightly   greater prominence right proximal rectus musculature       Bones/Soft Tissues: No acute abnormality.  Mild scoliosis, multilevel   degenerative changes of spine but with DDD L5-S1 and bilateral mild hip joint   degenerative findings.           Impression   Left gluteal hematoma, partially visualized on this study without obvious   active bleeding, but limited without IV contrast.  No retroperitoneal or   rectus sheath bleed.       Extensive findings in the visualized lungs compatible with known COVID-19   pneumonia; denser GGOs suggest worsening pneumonia/pneumonitis or developing   consolidation.  No pneumothorax.       Multiple additional findings, as detailed in the body of the report above.       RECOMMENDATIONS:   Consider follow-up CT pelvis including the upper thighs, if the patient does   not respond to resuscitation with blood products/fluids and other   conservative measures including localized pressure.  Findings were discussed   with Cara Yanez at 12:24 pm on 1/16/2022.             Narrative   EXAMINATION:   CT OF THE CHEST, ABDOMEN, AND PELVIS WITHOUT CONTRAST 1/21/2022 11:09 am       TECHNIQUE:   CT of the chest, abdomen and pelvis was performed without the administration   of intravenous contrast. Multiplanar reformatted images are provided for   review. Dose modulation, iterative reconstruction, and/or weight based   adjustment of the mA/kV was utilized to reduce the radiation dose to as low   as reasonably achievable.       COMPARISON:   CT abdomen and pelvis dated 01/16/2022.  CT chest dated 01/08/2022       HISTORY:   ORDERING SYSTEM PROVIDED HISTORY: blood loss anemia , gluteal hematoma and   worsening HB   TECHNOLOGIST PROVIDED HISTORY:   blood loss anemia , gluteal hematoma and worsening HB           FINDINGS:       Chest:       Mediastinum: Heart size is within normal limits.  Ascending thoracic aorta is   mildly dilated, measuring 4 cm in AP dimension, similar to the previous   study.  Main pulmonary artery is stable in caliber as well.  No mediastinal   hematoma or lymphadenopathy. Lenetta Cave is a catheter in the SVC with distal tip   in the distal SVC.       Lungs/pleura: There are small bilateral pleural effusions, which are new from   the previous study. Lenetta Cave is extensive dense airspace consolidation   throughout both lungs, with mild sparing at the bases, increased from   01/08/2022. Lenetta Cave is decreased AP dimension of the lower trachea, although   tracheobronchial tree remains patent.       Soft Tissues/Bones: There is no acute or suspicious osseous abnormality.    Visualized superficial soft tissues are within normal limits.           Abdomen/Pelvis:       Organs: Limited unenhanced liver is within normal limits.  There is a tiny   amount of free fluid adjacent to the hepatic dome.  Gallbladder, spleen,   pancreas, and adrenal glands are unremarkable.       There is bilateral renal atrophy, more so on the right, unchanged.  No   hydronephrosis or perinephric fluid collection. Bishop Letters is mild bilateral   perinephric stranding, which is unchanged and likely physiologic.       GI/Bowel: The stomach is mildly distended with air-fluid level noted.  No   abnormal bowel distention.  There is mild increased prominence of the   proximal small bowel loops.  There is mild stranding in the proximal   mesentery.  No focal pericolonic inflammation.  No free air.       Pelvis: Urinary bladder is within normal limits.  No evidence of pelvic   lymphadenopathy.       Peritoneum/Retroperitoneum: Distal aortobifemoral graft noted.  Caliber of   the abdominal aorta is unchanged.  There is moderate to severe scattered   atherosclerosis, which is unchanged.  No retroperitoneal lymphadenopathy or   hematoma.  Slit-like IVC is noted.       Bones/Soft Tissues: There is no acute or suspicious osseous abnormality.  The   hematoma previously seen in the left gluteal muscle is not as well visualized   on today's study due to isoattenuation.  There is asymmetric swelling of the   left gluteal muscle, although slightly improved when compared to 01/16/2022. AP dimension in the area of suspected hematoma is 4.4 cm (previously 5.7 cm). Transverse dimension is approximately 9.3 cm (previously 9.7 cm).  There is   stranding in the subcutaneous fat of the upper thighs bilaterally.           Impression   1.  New small bilateral pleural effusions with extensive bilateral airspace   consolidation, increased from 01/08/2022.       2.  Decreased AP dimension of the distal trachea, suggesting tracheomalacia.       3.  Previously noted hematoma in the left gluteal musculature is not as well   visualized on today's study, but appears overall slightly decreased in size. No new areas of hematoma.       4.  Mild distention of the stomach, mild prominence of proximal small bowel   loops, and mild nonspecific stranding in the proximal mesentery.  No definite   evidence of high-grade bowel obstruction at this time.  Enteritis or early   developing obstruction are considered in the differential.         Medical Decision [id number]:     Culture, Blood 1 [[id number]] Collected: 01/09/22 1155   Order Status: Completed Specimen: Blood Updated: 01/14/22 1300    Specimen Description . BLOOD    Special Requests NOT REPORTED    Culture NO GROWTH 5 DAYS   Culture, Blood 1 [[id number]] Collected: 01/09/22 1155   Order Status: Completed Specimen: Blood Updated: 01/14/22 1300    Specimen Description . BLOOD    Special Requests NOT REPORTED    Culture NO GROWTH 5 DAYS   COVID-19, Rapid [[id number]] (Abnormal) Collected: 01/08/22 1045   Order Status: Completed Specimen: Nasopharyngeal Swab Updated: 01/08/22 1109    Specimen Description . NASOPHARYNGEAL SWAB    SARS-CoV-2, Rapid DETECTED Abnormal     Comment:        Rapid NAAT: The specimen is POSITIVE for SARS-Cov-2, the novel coronavirus associated with   COVID-19.         This test has been authorized by the FDA under an Emergency Use Authorization (EUA) for use   by authorized laboratories.         The ID NOW COVID-19 assay is designed to detect the virus that causes COVID-19 in patients   with signs and symptoms of infection who are suspected of COVID-19. An individual without symptoms of COVID-19 and who is not shedding SARS-CoV-2 virus would   expect to have a negative (not detected) result in this assay.    Fact sheet for Healthcare Providers: [email or web address]   Fact sheet for Patients: [email or web address]           Methodology: Isothermal Nucleic Acid Amplification         Results reported to the appropriate [facility]            Medical Decision Making-Other:     Note:  · Labs, medications, radiologic studies were reviewed with personal review of films  · Large amounts of data were reviewed  · Discussed with nursing Staff, Discharge planner  · Infection Control and Prevention measures reviewed  · All prior entries were reviewed  · Administer medications as ordered  · Prognosis: Guarded  · Discharge planning reviewed      Thank you for allowing us to participate in the care of this patient. Please call with questions. MD Chidi Malcolm participated in the evaluation of this patient under my direct supervision. Electronically signed by Will De Los Santos MD on 1/28/2022 at 7:47 PM    ATTESTATION:    I have discussed the case, including pertinent history and exam findings with the residents and students. I have seen and examined the patient and the key elements of the encounter have been performed by me. I was present when the student obtained his information or examined the patient. I have reviewed the laboratory data, other diagnostic studies and discussed them with the residents. I have updated the medical record where necessary. I agree with the assessment, plan and orders as documented by the resident/ student.     Araseli Iverson MD.

## 2022-01-29 NOTE — PROGRESS NOTES
Renal Progress Note    Patient :  Catherine Dudley; [de-identified] y.o. MRN# [id number]  Location:  5276/9958-12  Attending:  Nicole Casillas MD  Admit Date:  1/8/2022   Hospital Day: 21      Subjective: Following for JULES/CKD 3-4 with baseline 2-2.2, proteinuria 0.5 to 0.8 g,. Initially came in with COVID-19 pneumonia developed ATN which recovered after he developed gluteal hematoma and drop in hemoglobin and ATN creatinine peaking around 3.0 which seems to be resolving now. Developing hypernatremia from free water losses likely from recovery phase of ATN. Patient was seen and examined. Remains intubated, FIO2 30% PEEP 5. Wakens easily. Per nursing hoping to extubate tomorrow or Monday. On Torsemide 40mg daily. Urine output 1400 plus 1, negative 1.7 liters  Creatinine with a slight increase from yesterday and now 2.54 from 2.35, potassium 4.1, sodium down to 147. Getting free water 300 Q 6hours  Not on pressor support. Hypertensive overnight and medications adjusted.   Has tube feed at 25/hr    Outpatient Medications:     Medications Prior to Admission: aspirin 325 MG tablet, Take 81 mg by mouth daily   atorvastatin (LIPITOR) 40 MG tablet, Take 20 mg by mouth nightly  lisinopril (PRINIVIL;ZESTRIL) 30 MG tablet, Take 30 mg by mouth daily  metoprolol tartrate (LOPRESSOR) 50 MG tablet, Take 50 mg by mouth daily  cilostazol (PLETAL) 100 MG tablet, Take 100 mg by mouth 2 times daily  clopidogrel (PLAVIX) 75 MG tablet, Take 75 mg by mouth daily  vitamin B-12 (CYANOCOBALAMIN) 1000 MCG tablet, Take 1,000 mcg by mouth daily    Current Medications:     Scheduled Meds:    metoprolol tartrate  75 mg Oral BID    amLODIPine  10 mg Oral Daily    torsemide  40 mg Oral Daily    amiodarone  200 mg Oral Daily    miconazole   Topical BID    apixaban  2.5 mg Oral BID    famotidine  20 mg Oral Daily    oxyCODONE  7.5 mg Oral Q6H    insulin lispro  0-6 Units SubCUTAneous Q4H    insulin lispro  0-3 Units SubCUTAneous Nightly  meropenem  500 mg IntraVENous Q12H    busPIRone  10 mg Oral TID    senna  2 tablet Oral Nightly    atorvastatin  20 mg Oral Nightly    clopidogrel  75 mg Oral Daily    aspirin  81 mg Oral Daily     Continuous Infusions:    fentaNYL 50 mcg/hr (22)    propofol 20 mcg/kg/min (22)    sodium chloride      sodium chloride      dextrose 100 mL/hr (22 0010)    sodium chloride       PRN Meds:  metoclopramide, bisacodyl, hydrALAZINE, sodium chloride, sodium chloride flush, sodium chloride, melatonin, sodium chloride, LORazepam, glucose, dextrose, glucagon (rDNA), dextrose, metoprolol, benzonatate, sodium chloride flush, sodium chloride flush, sodium chloride, ondansetron **OR** ondansetron, polyethylene glycol, acetaminophen **OR** acetaminophen    Input/Output:       I/O last 3 completed shifts: In: 3160.1 [I.V.:959; NG/GT:1793; IV Piggyback:408.1]  Out: 2300 [Urine:2300]. Patient Vitals for the past 96 hrs (Last 3 readings):   Weight   22 0100 164 lb 10.9 oz (74.7 kg)       Vital Signs:   Temperature:  Temp: 98.1 °F (36.7 °C)  TMax:   Temp (24hrs), Av.4 °F (36.9 °C), Min:97.7 °F (36.5 °C), Max:99.1 °F (37.3 °C)    Respirations:  Resp: 22  Pulse:   Pulse: 76  BP:    BP: (!) 142/45  BP Range: Systolic (12DGE), ADARSH:370 , Min:106 , JBR:438       Diastolic (80DIA), UMQ:19, Min:45, Max:54      Physical Examination:     General:  Intubated, sedated  HEENT: Has some blood draining from mouth  Eyes:   Pupils equal, round and reactive to light, EOMI. Neck:   No JVD, no thyromegaly, no lymphadenopathy. Chest:   Decreased in bases  Cardiac:  S1 S2 RR, no murmurs, gallops or rubs, JVP not raised. Abdomen: Soft, non-tender, no masses or organomegaly, BS audible. :   No suprapubic or flank tenderness. SKIN:  No rashes, good skin turgor. Bruising upper extremities  Extremities:  Trace edema lower extremities.     Labs:       Recent Labs     22  0343 22  0430 01/29/22  0425   WBC 12.8* 11.4* 11.4*   RBC 3.20* 2.79* 2.84*   HGB 10.0* 8.7* 8.9*   HCT 31.5* 27.7* 28.1*   MCV 98.4 99.3 98.9   MCH 31.3 31.2 31.3   MCHC 31.7 31.4 31.7   RDW 18.2* 18.4* 18.3*    139 143   MPV 10.4 10.9 11.0      BMP:   Recent Labs     01/27/22  0343 01/28/22  0436 01/29/22 0425    150* 147*   K 3.7 3.9 4.1    110* 109*   CO2 26 29 29   BUN 80* 76* 89*   CREATININE 2.52* 2.35* 2.54*   GLUCOSE 195* 110* 132*   CALCIUM 8.3* 8.3* 8.4*      Magnesium:    Recent Labs     01/26/22  1825 01/28/22  0436   MG 2.4 2.5     SUMMER:      Lab Results   Component Value Date    SUMMER NEGATIVE 01/09/2022     SPEP:  Lab Results   Component Value Date    PROT 6.1 01/09/2022     C3:     Lab Results   Component Value Date    C3 134 01/09/2022     C4:     Lab Results   Component Value Date    C4 31 01/09/2022       Urinalysis/Chemistries:      Lab Results   Component Value Date    NITRU NEGATIVE 01/17/2022    COLORU Yellow 01/17/2022    PHUR 5.0 01/17/2022    WBCUA None 01/17/2022    RBCUA 2 TO 5 01/17/2022    MUCUS 1+ 01/17/2022    TRICHOMONAS NOT REPORTED 01/17/2022    YEAST NOT REPORTED 01/17/2022    BACTERIA NOT REPORTED 01/17/2022    SPECGRAV 1.018 01/17/2022    LEUKOCYTESUR NEGATIVE 01/17/2022    UROBILINOGEN Normal 01/17/2022    BILIRUBINUR NEGATIVE 01/17/2022    GLUCOSEU 1+ 01/17/2022    KETUA NEGATIVE 01/17/2022    AMORPHOUS NOT REPORTED 01/17/2022     Urine Sodium:     Lab Results   Component Value Date    CHERYL 56 01/10/2022       Urine Creatinine:     Lab Results   Component Value Date    LABCREA 90.8 01/09/2022       Radiology:     CXR:   FINDINGS:   There are scattered pulmonary infiltrates.  There are no effusions.  There is   no pneumothorax.  The mediastinal structures are unremarkable.  The upper   abdomen is unremarkable.  The extrathoracic soft tissues are unremarkable.    There is an endotracheal tube with tip in the distal midtrachea.  There is a   gastric tube with tip in the proximal aspect of the stomach.           Impression   Stable scattered pulmonary infiltrates.       Support tubes as described above. Assessment:     1. Acute Kidney Injury: Secondary to ischemic ATN and nephrotoxic ATN initially from COVID-pneumonia, systemic inflammation, contrast exposure.  Baseline 2.0 peaked at 3.0. [employer] developed left gluteal area hematoma with drop in hemoglobin down to 5.7, decreased renal perfusion, had a second hit creatinine peaked at 3.96. Creatinine now fluctuating the 2.2-2.5 range   2. 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 Persistent anemia, requiring multiple transfusions likely from left gluteal area hematoma  4. COVID-19 pneumonia with respiratory failure now intubated.    5.  Severe aortic stenosis valve area 1.0 cm² peak gradient 60 mean 37  6. Hypovolemic hyponatremia from with free water losses likely insensible   7. Atrial fibrillation: On Eliquis and amiodarone    Plan:   1. Continue free water 300cc Q 6hours  2. Continue Torsemide 40mg daily  3. Daily Labs  4. Monitor strict I's and O's renal function. 5. Will follow. Nutrition   Please ensure that patient is on a renal diet/TF. Avoid nephrotoxic drugs/contrast exposure. We will continue to follow along with you. Alexandra Fortune CNP    Attending Physician Statement  I have discussed the care of this patient, including pertinent history and exam findings, with the Resident/CNP. I have reviewed and edited the key elements of all parts of the encounter with the Resident/CNP. I agree with the assessment, plan and orders as documented by the Resident/CNP. Car Hinton MD   Nephrology [address] [place] [address] Drive    This note is created with the assistance of a speech-recognition program. While intending to generate a document that actually reflects the content of the visit, no guarantees can be provided that every mistake has been identified and corrected by editing.

## 2022-01-29 NOTE — PLAN OF CARE
Problem: OXYGENATION/RESPIRATORY FUNCTION  Goal: Patient will maintain patent airway  1/28/2022 1957 by Jose Eduardo Xie RCP  Outcome: Ongoing  1/28/2022 1119 by Flakita Wagnoer RN  Outcome: Ongoing  1/28/2022 1031 by Iesha Jenkins RCP  Outcome: Ongoing  Goal: Patient will achieve/maintain normal respiratory rate/effort  Description: Respiratory rate and effort will be within normal limits for the patient  1/28/2022 1957 by Jose Eduardo Xie RCP  Outcome: Ongoing  1/28/2022 1119 by Flakita Wagoner RN  Outcome: Ongoing  1/28/2022 1031 by Iesha Jenkins RCP  Outcome: Ongoing     Problem: MECHANICAL VENTILATION  Goal: Patient will maintain patent airway  1/28/2022 1957 by ZIGGY ThomsonP  Outcome: Ongoing  1/28/2022 1119 by Flakita Wagoner RN  Outcome: Ongoing  1/28/2022 1031 by Iesha Jenkins RCP  Outcome: Ongoing  1/28/2022 0632 by Ady Lorenz RN  Outcome: Met This Shift  Goal: Oral health is maintained or improved  1/28/2022 1957 by Jose Eduardo Xie RCP  Outcome: Ongoing  1/28/2022 1119 by Flakita Wagoner RN  Outcome: Ongoing  1/28/2022 1031 by Iesha Jenkins RCP  Outcome: Ongoing  1/28/2022 0632 by Ady Lorenz RN  Outcome: Met This Shift  Goal: ET tube will be managed safely  1/28/2022 1957 by Jose Eduardo Xie RCP  Outcome: Ongoing  1/28/2022 1119 by Flakita Wagoner RN  Outcome: Ongoing  1/28/2022 1031 by Iesha Jenkins RCP  Outcome: Ongoing  Goal: Ability to express needs and understand communication  1/28/2022 1957 by Jose Eduardo Xie RCP  Outcome: Ongoing  1/28/2022 1119 by Flakita Wagoner RN  Outcome: Ongoing  1/28/2022 1031 by ZIGGY GtzP  Outcome: Ongoing  1/28/2022 0632 by Ady Lorenz RN  Outcome: Met This Shift  Goal: Mobility/activity is maintained at optimum level for patient  1/28/2022 1957 by ZIGGY ThomsonP  Outcome: Ongoing  1/28/2022 1119 by Flakita Wagoner RN  Outcome: Ongoing  1/28/2022 1031 by Iesha Jenkins RCP  Outcome: Ongoing  1/28/2022 2316 by Charissa Virgen RN  Outcome: Met This Shift     Problem: SKIN INTEGRITY  Goal: Skin integrity is maintained or improved  1/28/2022 1957 by Arielle Singh RCP  Outcome: Ongoing  1/28/2022 1119 by Deep Oshea RN  Outcome: Ongoing  1/28/2022 1031 by Marie Easley RCP  Outcome: Ongoing  1/28/2022 0632 by Charissa Virgen RN  Outcome: Met This Shift

## 2022-01-29 NOTE — PROGRESS NOTES
Critical Care Team - Daily Progress Note      Date and time: 1/29/2022 7:31 AM  Patient's name:  Esdras Desai  Medical Record Number: [id number]  Patient's account/billing number: [de-identified]  Patient's YOB: 1940  Age: [de-identified] y.o. Date of Admission: 1/8/2022  9:49 AM  Length of stay during current admission: 21      Primary Care Physician: Philbert Dubin, APRN - CNP  ICU Attending Physician: Dr. Eugenio Julio Status: Full Code    Reason for ICU admission:   Chief Complaint   Patient presents with    Shortness of Breath    Nausea & Vomiting       Subjective:     HISTORY OF PRESENTING ILLNESS:     History was obtained from EMR due to patient being intubated patient is a 60-year-old  non- male who presented to the ER 01/08/2022 for shortness of breath, nausea and vomiting and was admitted for pneumonia due to COVID-19. He reported having increasing shortness of breath with nausea and vomiting or 7 to 10 days before coming to the hospital.  Patient was tested positive for Covid shortly after [place] time. Subsequently patient became mildly short of breath and more notified us cocci approximately 10 feet. Upon arrival to the emergency room, patient was hypoxic on room air with oxygen saturation less than 80%. He was put on high flow oxygen and was started on Decadron on 1/8/2010 and received Actemra on 1/8/2022. Patient was admitted in Covid unit. He also received meropenem for leukocytosis and possible secondary bacterial pneumonia from 1/11/2022 to 1/17/2022. Meropenem was restarted on 1/21/2022 because of residual dense consolidation in the lungs with air bronchograms with end date of 1/31/2022 per ID. Remdesivir is contraindicated due to JULES. Patient had significant hypoxia while being on high flow nasal cannula and BiPAP alternatively he required intubation and mechanical ventilation on 1/22/22.   Nephrology is following for JULES likely secondary to ischemic ATN and nephrotoxic ATN. Cardiology is following for new onset A.fib and NSTEMI type-1 Vs. Type 2. Palliative care is following.      - Patient's CODE STATUS is FULL CODE.   - Echo 3/2021: LVEF 65-70%; mild increased wall thickness for left ventricle. Grade I diastolic dysfunction (impaired relaxation). OVERNIGHT EVENTS:      No acute events overnight. Patient remained afebrile and hemodynamically stable. He was hypertensive overnight and was started on Norvasc 10 mg daily starting today am.     Sedation: Fentanyl @ 50 mcg/hr; Propofol @ 20 mcg/kg/min  Pressors: none  Analgesic:  Fentanyl @ 50 mcg/hr;     Vent setting: PRVC/16/580/5/30%  AB.393/pCO2 53.1/pO2 68.2/HCO3 32.4  CXR : stable scattered pulmonary infiltrates. ET tube in distal mid trachea. ID following: On Meropenem (Stop date: 22)    Nephrology following: Continue Demadex 40 daily; add free water 300 mL q6 hours, f/u labs ordered. Cardiology following: Cardiology following for new onset A.fib with RVR and NSTEMI likely Type-II Vs. Type I. - Continue Amiodarone 200 mg daily PO, beta blockers and Eliquis. Rate controlled in NSR on Lopressor 75 mg BID and Amiodarone. Palliative Care following: FULL CODE for now. Continue supportive care.      - Plan for possible extubation on Monday with possibility of re-intubation if needed. Will continue weaning trials with CPAP through the weekend per Dr. Geovanny Nino. Family may plan for Trach and PEG if indicated in future. Labs reviewed: Na 147; on free water flushes 300 mL q6 hrs, K 4.1, Cl 109, BUN 89<--76; Cr 2.54<--2.35; WBC 11.4, Hgb 8.9<--8.7, Plt 143    F.A.S.T. M. H.U.G.S. B.I.D.  Feeding Diet: ADULT TUBE FEEDING; Orogastric; Renal Formula; Continuous; 10; Yes; 10; Q 4 hours; 25; 30;  Other (specify); per MD; Protein; 2 Protein modulars per day   Fluids: none   Family: will be updated   Analgesic: Fentanyl @ 50 mcg/hr   Sedation:  Fentanyl @ 50 mcg/hr; Propofol @ 20 mcg/kg/min   Thrombo-prophylaxis: [] Enoxaparin, [] Unfract. Heparin Subcutaneously, [] EPC Cuffs; Eliquis   Mobility: limited due to ventilator support   Heads up: 30 degrees   Ulcer prophylaxis: [] PPI Agent, [x] [id number], [x] Sucralfate, [] Other:   Glycemic control: controlled; Bs-150s   Spontaneous breathing trial: in progress; Patient tolerated 2h 15 min CPAP trials yesterday.  Bowel regimen/urine output: Senna; Uout: 1.4L/24 hours  · Indwelling catheter/lines: Midline - left brachial ; Arterial line- left radial- ;    De-escalation: none        AWAKE & FOLLOWING COMMANDS:  [] No   [x] Yes    CURRENT VENTILATION STATUS:     [x] Ventilator  [] BIPAP  [] Nasal Cannula [] Room Air      IF INTUBATED, ET TUBE MARKING AT LOWER LIP:       cms    SECRETIONS Amount:  [] Small [] Moderate  [] Large  [] None  Color:     [] White [] Colored  [] Bloody    SEDATION:  RAAS Score:  [x] Propofol gtt  [] Versed gtt  [] Ativan gtt   [] No Sedation    PARALYZED:  [x] No    [] Yes    DIARRHEA:                [x] No                [] Yes  (C. Difficile status: [] positive                                                                                                                       [] negative                                                                                                                     [] pending)    VASOPRESSORS:  [x] No    [] Yes    If yes -   [] Levophed       [] Dopamine     [] Vasopressin       [] Dobutamine  [] Phenylephrine         [] Epinephrine    CENTRAL LINES:     [x] No   [] Yes   (Date of Insertion:   )           If yes -     [] Right IJ     [] Left IJ [] Right Femoral [] Left Femoral                   [] Right Subclavian [] Left Subclavian       ORTIZ'S CATHETER:   [x] No   [] Yes  (Date of Insertion:   )     URINE OUTPUT:            [x] Good   [] Low              [] Anuric    REVIEW OF SYSTEMS:  Unable to assess due to patient being intubated.      OBJECTIVE:     VITAL findings: None      MEDICATIONS:  Scheduled Meds:   metoprolol tartrate  75 mg Oral BID    amLODIPine  10 mg Oral Daily    torsemide  40 mg Oral Daily    amiodarone  200 mg Oral Daily    miconazole   Topical BID    apixaban  2.5 mg Oral BID    famotidine  20 mg Oral Daily    oxyCODONE  7.5 mg Oral Q6H    insulin lispro  0-6 Units SubCUTAneous Q4H    insulin lispro  0-3 Units SubCUTAneous Nightly    meropenem  500 mg IntraVENous Q12H    busPIRone  10 mg Oral TID    senna  2 tablet Oral Nightly    atorvastatin  20 mg Oral Nightly    clopidogrel  75 mg Oral Daily    aspirin  81 mg Oral Daily     Continuous Infusions:   fentaNYL 50 mcg/hr (01/28/22 2124)    propofol 20 mcg/kg/min (01/29/22 0657)    sodium chloride      sodium chloride      dextrose 100 mL/hr (01/19/22 0010)    sodium chloride       PRN Meds:   metoclopramide, 10 mg, Q6H PRN  bisacodyl, 10 mg, Daily PRN  hydrALAZINE, 10 mg, Q6H PRN  sodium chloride, , PRN  sodium chloride flush, 5-40 mL, PRN  sodium chloride, 25 mL, PRN  melatonin, 3 mg, Nightly PRN  sodium chloride, 1 spray, PRN  LORazepam, 0.5 mg, Q2H PRN  glucose, 15 g, PRN  dextrose, 12.5 g, PRN  glucagon (rDNA), 1 mg, PRN  dextrose, 100 mL/hr, PRN  metoprolol, 5 mg, Q6H PRN  benzonatate, 200 mg, TID PRN  sodium chloride flush, 10 mL, PRN  sodium chloride flush, 5-40 mL, PRN  sodium chloride, 25 mL, PRN  ondansetron, 4 mg, Q8H PRN   Or  ondansetron, 4 mg, Q6H PRN  polyethylene glycol, 17 g, Daily PRN  acetaminophen, 650 mg, Q6H PRN   Or  acetaminophen, 650 mg, Q6H PRN        SUPPORT DEVICES: [x] Ventilator [] BIPAP  [] Nasal Cannula [] Room Air    VENT SETTINGS (Comprehensive) (if applicable):  Vent Information  $Ventilation: $Subsequent Day  Skin Assessment: Clean, dry, & intact  Suction Catheter Diameter: 14  Equipment Changed: Expiratory Filter  Vent Type: Servo i  Vent Mode: PRVC  Vt Ordered: 580 mL  Rate Set: 16 bmp  Pressure Support: 10 cmH20  FiO2 : 30 %  SpO2: 92 %  SpO2/FiO2 ratio: 306.67  Sensitivity: 2  PEEP/CPAP: 5  I Time/ I Time %: 0.85 s  Humidification Source: HME  Humidification Temp: 31  Humidification Temp Measured: 31  Nitric Oxide/Epoprostenol In Use?: No  Mask Type: Full face mask  Mask Size: Medium  Additional Respiratory  Assessments  Pulse: 76  Resp: 22  SpO2: 92 %  End Tidal CO2: 50 (%)  Position: Semi-Haynes's  Humidification Source: HME  Humidification Temp: 31  Oral Care Completed?: Yes  Oral Care: Mouthwash,Mouth swabbed,Mouth suctioned  Subglottic Suction Done?: No  Cuff Pressure (cm H2O):  (mlt)    ABGs:     Lab Results   Component Value Date    AYM2TTD NOT REPORTED 01/28/2022    FIO2 40.0 01/28/2022     Lactic Acid:   Lab Results   Component Value Date    LACTA 2.1 01/08/2022         DATA:  Complete Blood Count:   Recent Labs     01/27/22 0343 01/28/22 0436 01/29/22  0425   WBC 12.8* 11.4* 11.4*   HGB 10.0* 8.7* 8.9*   MCV 98.4 99.3 98.9    139 143   RBC 3.20* 2.79* 2.84*   HCT 31.5* 27.7* 28.1*   MCH 31.3 31.2 31.3   MCHC 31.7 31.4 31.7   RDW 18.2* 18.4* 18.3*   MPV 10.4 10.9 11.0        PT/INR:    Lab Results   Component Value Date    PROTIME 11.6 01/22/2022    INR 1.1 01/22/2022     PTT:    Lab Results   Component Value Date    APTT 25.2 01/22/2022       Basal Metabolic Profile:   Recent Labs     01/27/22 0343 01/28/22  0436 01/29/22  0425    150* 147*   K 3.7 3.9 4.1   BUN 80* 76* 89*   CREATININE 2.52* 2.35* 2.54*    110* 109*   CO2 26 29 29      Magnesium:   Lab Results   Component Value Date    MG 2.5 01/28/2022    MG 2.4 01/26/2022    MG 2.6 01/15/2022     Phosphorus:   Lab Results   Component Value Date    PHOS 5.3 01/22/2022     S. Calcium:  Recent Labs     01/29/22  0425   CALCIUM 8.4*     S. Ionized Calcium:No results for input(s): IONCA in the last 72 hours.       Urinalysis:   Lab Results   Component Value Date    NITRU NEGATIVE 01/17/2022    COLORU Yellow 01/17/2022    PHUR 5.0 01/17/2022    WBCUA None 01/17/2022 RBCUA 2 TO 5 01/17/2022    MUCUS 1+ 01/17/2022    TRICHOMONAS NOT REPORTED 01/17/2022    YEAST NOT REPORTED 01/17/2022    BACTERIA NOT REPORTED 01/17/2022    SPECGRAV 1.018 01/17/2022    LEUKOCYTESUR NEGATIVE 01/17/2022    UROBILINOGEN Normal 01/17/2022    BILIRUBINUR NEGATIVE 01/17/2022    GLUCOSEU 1+ 01/17/2022    KETUA NEGATIVE 01/17/2022    AMORPHOUS NOT REPORTED 01/17/2022       CARDIAC ENZYMES: No results for input(s): CKMB, CKMBINDEX, TROPONINI in the last 72 hours. Invalid input(s): CKTOTAL;3  BNP: No results for input(s): BNP in the last 72 hours. LFTS  No results for input(s): ALKPHOS, ALT, AST, BILITOT, BILIDIR, LABALBU in the last 72 hours. AMYLASE/LIPASE/AMMONIA  No results for input(s): AMYLASE, LIPASE, AMMONIA in the last 72 hours. Last 3 Blood Glucose:   Recent Labs     01/27/22  0343 01/28/22  0436 01/29/22  0425   GLUCOSE 195* 110* 132*      HgBA1c:  No results found for: LABA1C      TSH:  No results found for: TSH  ANEMIA STUDIES  No results for input(s): LABIRON, TIBC, FERRITIN, [id number], FOLATE, OCCULTBLD in the last 72 hours.       Cultures during this admission:     Blood cultures:                 [] None drawn      [x] Negative             []  Positive (Details:  )  Urine Culture:                   [] None drawn      [] Negative             [x]  Positive (Details: Presumptive candida albicans )  Sputum Culture:               [x] None drawn       [] Negative             []  Positive (Details:  )   Endotracheal aspirate:     [] None drawn       [] Negative             [x]  Positive (Details: Few gram positive cocci in clusters )        ASSESSMENT:     Principal Problem:    Pneumonia due to COVID-19 virus  Active Problems:    Essential hypertension    Hyperlipidemia    Acute hypoxemic respiratory failure due to COVID-19 [facility])    Acute kidney injury ([facility] [address]    Stage 3b chronic kidney disease (HCC)    Elevated troponin    Nonrheumatic aortic valve stenosis    PAD (peripheral artery disease) ([facility] [address]    Moderate protein-calorie malnutrition ([facility] [address]    Atrial fibrillation with RVR (HCC)    COVID-19    Traumatic hematoma of buttock    Acute distention of stomach    Hypoxia  Resolved Problems:    * No resolved hospital problems. *        PLAN:     1. Neurologic:   · Neuro intact  · Neuro checks per protocol  · Sedation on Propofol and Fentanyl  · Pain management: Fentanyl gtt  · Patient follows commands off sedation     2. Cardiovascular:  · Hemodynamically stable  · HR 60s-90s  · MAPs 70s-80s  · MAP goal 65  · New onset A.fib with RVR and NSTEMI Tyep II Vs. Type I- Cardiology following: Continue Amiodarone 200 mg daily PO, beta blockers and Eliquis. Rate controlled in NSR on Lopressor 75 mg BID and Amiodarone.     3. Pulmonary:  · Maintain oxygen sats >92%  · Pulmonary toilet  · Acute hypoxic respiratory failure secondary to COVID pneumonia  · Vent setting: PRVC/16/580/5/30%  · AB.393/pCO2 53.1/pO2 68.2/HCO3 32.4  · CXR : stable scattered pulmonary infiltrates. ET tube in distal mid trachea. · ID following: On Meropenem (Stop date: 22)     4. GI/Nutrition  · Senna daily, Dulcolax and Glycolax PRN  · Ulcer Prophylaxis: Pepcid  · Diet:ADULT TUBE FEEDING; Orogastric; Renal Formula; Continuous; 10; Yes; 10; Q 4 hours; 25; 30; Other (specify); per MD; Protein; 2 Protein modulars per day     5. Renal/Fluid/Electrolyte  · IV Fluids: None  · I/O: In:   · UOut: 1.4 L/24 hours   · JULES on CKD stage IIIb-4 (baseline Cr 2.0) secondary to ischemic ATN and nephrotoxic ATN:  BUN/Cr 89/2.54  · Nephrology following: Continue Demadex 40 daily; add free water 300 mL q6 hours, f/u labs ordered,   · Monitor electrolytes, replace PRN      6. ID  · WBC: 11.4<--11.4  · Antimicrobials: Meropenem (Stop date: 22)     7. Hematology:  · Hgb 8.9<--8.7  · Monitor daily CBC     8. Endocrine:   · glucose controlled  · Bs-150s  9.  DVT Prophylaxis  · On Eliquis        GI PPX: Pepcid  DVT PPX: Eliquis  IVF: none  Diet: tube feeds       Ramandeep Reyes MD, PGY-1           Department of Internal Medicine/ Critical care  [facility] ([place]             1/29/2022, 7:31 AM

## 2022-01-29 NOTE — PLAN OF CARE
Problem: OXYGENATION/RESPIRATORY FUNCTION  Goal: Patient will maintain patent airway  1/29/2022 0827 by Adia Esparza RCP  Outcome: Ongoing     Problem: OXYGENATION/RESPIRATORY FUNCTION  Goal: Patient will achieve/maintain normal respiratory rate/effort  Description: Respiratory rate and effort will be within normal limits for the patient  1/29/2022 0827 by Adia Esparza RCP  Outcome: Ongoing     Problem: MECHANICAL VENTILATION  Goal: Patient will maintain patent airway  1/29/2022 0827 by Adia Esparza RCP  Outcome: Ongoing     Problem: MECHANICAL VENTILATION  Goal: Oral health is maintained or improved  1/29/2022 0827 by Adia Esparza RCP  Outcome: Ongoing     Problem: MECHANICAL VENTILATION  Goal: ET tube will be managed safely  1/29/2022 0827 by Adia Esparza RCP  Outcome: Ongoing     Problem: MECHANICAL VENTILATION  Goal: Ability to express needs and understand communication  1/29/2022 0827 by Adia Esparza RCP  Outcome: Ongoing     Problem: MECHANICAL VENTILATION  Goal: Mobility/activity is maintained at optimum level for patient  1/29/2022 0827 by Adia Esparza RCP  Outcome: Ongoing     Problem: SKIN INTEGRITY  Goal: Skin integrity is maintained or improved  1/29/2022 0827 by Adia Esparza RCP  Outcome: Ongoing

## 2022-01-29 NOTE — PLAN OF CARE
Problem: Falls - Risk of:  Goal: Will remain free from falls  Description: Will remain free from falls  1/29/2022 0717 by Libra Costa RN  Outcome: Ongoing  1/29/2022 0442 by Carlos Shane RN  Outcome: Ongoing  Goal: Absence of physical injury  Description: Absence of physical injury  1/29/2022 0717 by Libra Costa RN  Outcome: Ongoing  1/29/2022 0442 by Carlos Shane RN  Outcome: Ongoing     Problem: Airway Clearance - Ineffective  Goal: Achieve or maintain patent airway  1/29/2022 0717 by Libra Costa RN  Outcome: Ongoing  1/29/2022 0442 by Carlos Shane RN  Outcome: Ongoing     Problem: Gas Exchange - Impaired  Goal: Absence of hypoxia  1/29/2022 0717 by Libra Costa RN  Outcome: Ongoing  1/29/2022 0442 by Carlos Shane RN  Outcome: Ongoing  Goal: Promote optimal lung function  1/29/2022 0717 by Libra Costa RN  Outcome: Ongoing  1/29/2022 0442 by Carlos Shane RN  Outcome: Ongoing     Problem: Breathing Pattern - Ineffective  Goal: Ability to achieve and maintain a regular respiratory rate  1/29/2022 0717 by Libra Costa RN  Outcome: Ongoing  1/29/2022 0442 by Carlos Shane RN  Outcome: Ongoing     Problem:  Body Temperature -  Risk of, Imbalanced  Goal: Ability to maintain a body temperature within defined limits  1/29/2022 0717 by Libra Costa RN  Outcome: Ongoing  1/29/2022 0442 by Carlos Shane RN  Outcome: Ongoing  Goal: Will regain or maintain usual level of consciousness  1/29/2022 0717 by Libra Costa RN  Outcome: Ongoing  1/29/2022 0442 by Carlos Shane RN  Outcome: Ongoing  Goal: Complications related to the disease process, condition or treatment will be avoided or minimized  1/29/2022 0717 by Libra Costa RN  Outcome: Ongoing  1/29/2022 0442 by Carlos Shane RN  Outcome: Ongoing     Problem: Nutrition Deficits  Goal: Optimize nutritional status  1/29/2022 0717 by Libra Costa RN  Outcome: Ongoing  1/29/2022 0442 by Carlos Shane RN  Outcome: Ongoing     Problem: Risk for Fluid Volume Deficit  Goal: Maintain normal heart rhythm  1/29/2022 0717 by Mj Xiao RN  Outcome: Ongoing  1/29/2022 0442 by Roselinda Dandy, RN  Outcome: Ongoing  Goal: Maintain absence of muscle cramping  1/29/2022 0717 by Mj Xiao RN  Outcome: Ongoing  1/29/2022 0442 by Roselinda Dandy, RN  Outcome: Ongoing  Goal: Maintain normal serum potassium, sodium, calcium, phosphorus, and pH  1/29/2022 0717 by Mj Xiao RN  Outcome: Ongoing  1/29/2022 0442 by Roselinda Dandy, RN  Outcome: Ongoing     Problem: Loneliness or Risk for Loneliness  Goal: Demonstrate positive use of time alone when socialization is not possible  1/29/2022 0717 by Mj Xiao RN  Outcome: Ongoing  1/29/2022 0442 by Roselinda Dandy, RN  Outcome: Ongoing     Problem: Fatigue  Goal: Verbalize increase energy and improved vitality  1/29/2022 0717 by Mj Xiao RN  Outcome: Ongoing  1/29/2022 0442 by Roselinda Dandy, RN  Outcome: Ongoing     Problem: Patient Education: Go to Patient Education Activity  Goal: Patient/Family Education  1/29/2022 2079 by Mj Xiao RN  Outcome: Ongoing  1/29/2022 0442 by Roselinda Dandy, RN  Outcome: Ongoing     Problem: Skin Integrity:  Goal: Will show no infection signs and symptoms  Description: Will show no infection signs and symptoms  1/29/2022 0717 by Mj Xiao RN  Outcome: Ongoing  1/29/2022 0442 by Roselinda Dandy, RN  Outcome: Ongoing  Goal: Absence of new skin breakdown  Description: Absence of new skin breakdown  1/29/2022 0717 by Mj Xiao RN  Outcome: Ongoing  1/29/2022 0442 by Roselinda Dandy, RN  Outcome: Ongoing     Problem: Nutrition  Goal: Optimal nutrition therapy  1/29/2022 0717 by Mj Xiao RN  Outcome: Ongoing  1/29/2022 0442 by Roselinda Dandy, RN  Outcome: Ongoing     Problem: Pain:  Goal: Pain level will decrease  Description: Pain level will decrease  1/29/2022 0717 by Mj Xiao RN  Outcome: Ongoing  1/29/2022 0442 by Kelli Rosenberg RN  Outcome: Ongoing  Goal: Control of acute pain  Description: Control of acute pain  1/29/2022 0717 by Chasidy Phan RN  Outcome: Ongoing  1/29/2022 0442 by Kelli Rosenberg RN  Outcome: Ongoing  Goal: Control of chronic pain  Description: Control of chronic pain  1/29/2022 0717 by Chasidy Phan RN  Outcome: Ongoing  1/29/2022 0442 by Kelli Rosenberg RN  Outcome: Ongoing     Problem: OXYGENATION/RESPIRATORY FUNCTION  Goal: Patient will maintain patent airway  1/29/2022 0717 by Chasidy Phan RN  Outcome: Ongoing  1/29/2022 0442 by Kelli Rosenberg RN  Outcome: Ongoing  1/28/2022 1957 by Dawit Childress RCP  Outcome: Ongoing  Goal: Patient will achieve/maintain normal respiratory rate/effort  Description: Respiratory rate and effort will be within normal limits for the patient  1/29/2022 0717 by Chasidy Phan RN  Outcome: Ongoing  1/29/2022 0442 by Kelli Rosenberg RN  Outcome: Ongoing  1/28/2022 1957 by Dawit Childress RCP  Outcome: Ongoing     Problem: MECHANICAL VENTILATION  Goal: Patient will maintain patent airway  1/29/2022 0717 by Chasidy Phan RN  Outcome: Ongoing  1/29/2022 0442 by Kelli Rosenberg RN  Outcome: Ongoing  1/28/2022 1957 by Dawit Childress RCP  Outcome: Ongoing  Goal: Oral health is maintained or improved  1/29/2022 0717 by Chasidy Phan RN  Outcome: Ongoing  1/29/2022 0442 by Kelli Rosenberg RN  Outcome: Ongoing  1/28/2022 1957 by Dawit Childress RCP  Outcome: Ongoing  Goal: ET tube will be managed safely  1/29/2022 0717 by Chasidy Phan RN  Outcome: Ongoing  1/29/2022 0442 by Kelli Rosenberg RN  Outcome: Ongoing  1/28/2022 1957 by Dawit Childress RCP  Outcome: Ongoing  Goal: Ability to express needs and understand communication  1/29/2022 [phone] by Chasidy Phan RN  Outcome: Ongoing  1/29/2022 0442 by Kelli Rosenberg RN  Outcome: Ongoing  1/28/2022 1957 by Dawit Childress RCP  Outcome: Ongoing  Goal: Mobility/activity is maintained at optimum level for patient  1/29/2022 0717 by Chasidy Phan RN  Outcome: Ongoing  1/29/2022 0442 by Kelli Rosenberg RN  Outcome: Ongoing  1/28/2022 1957 by Dawit Childress RCP  Outcome: Ongoing     Problem: SKIN INTEGRITY  Goal: Skin integrity is maintained or improved  1/29/2022 0717 by Chasidy Phan RN  Outcome: Ongoing  1/29/2022 0442 by Kelli Rosenberg RN  Outcome: Ongoing  1/28/2022 1957 by Dawit Childress RCP  Outcome: Ongoing

## 2022-01-29 NOTE — PROGRESS NOTES
[facility] [address] Patient  Today's Date and Time: 1/29/2022, 4:10 PM    Impression :     · COVID 19 Confirmed Infection  · Covid tests:  · 1/8/21: Positive  · Acute hypoxic respiratory failure  · Paroxysmal A. 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  · Essential hypertension  · Elevated inflammatory markers  · Hyperlipidemia  · Acute gluteal hematoma  · Patient has not received the Covid vaccine  · Intubated 1/22/22      Recommendations:   · Antibiotic treatment:  · Meropenem for leukocytosis & possible secondary bacterial pneumonia 1/11/21-1/17/21  · Meropenem Re-started 1-21-22 because of residual dense consolidation of the lungs with air bronchograms  · Normal alistair sputum 1/25    · Covid Rx:  · Remdesivir-contraindicated with JULES  · Monoclonal antibodies-out of window  · Decadron-initiated 1/8/2022  · Actemra-administered 1/8/2022      Medical Decision Making/Summary/Discussion:1/29/2022     · Patient admitted with COVID 19 infection  · Isolation until 1/28/22    Infection Control Recommendations   · Universal Precautions  · Airborne isolation  · Droplet Isolation    Antimicrobial Stewardship Recommendations       · Renal considerations  Coordination of Outpatient Care:   · Estimated Length of IV antimicrobials:TBD  · Patient will need Midline Catheter Insertion: TBD  · Patient will need PICC line Insertion: No  · Patient will need: Home IV , [place],  SNF,  LTAC:TBD  · Patient will need outpatient wound care:No    Chief complaint/reason for consultation:   · Concern for COVID infection      History of Present Illness:   Caleb Carnes is a [de-identified]y.o.-year-old male who was initially admitted on 1/8/2022.  Patient seen at the request of Dr. No Fierro:    Patient presented through ER with complaints of needing shortness of breath, cough, loss of appetite, nausea, vomiting and diarrhea over the past 7 to 10 days. He has not received the Covid vaccine and tested positive for Covid shortly after [place]. On evaluation in the ED, the patient had an SPO2 of 80% on room air and was tachypneic. A rapid Covid swab was positive. CT chest shows extensive bilateral pulmonary groundglass opacities. He was started on Decadron and given a dose of Actemra. Abnormal labs include:  · Creatinine 2.32  ·   · Troponin I 48  · WBC 17.1    Patient admitted because of concerns with COVID 19. Meropenem initiated on 1/11/2022 for worsening respiratory distress and leukocytosis    Stable chest x-ray 1/11    CRP is trending down    Hemoglobin dropped to 5.7 on 1/16/2022  Hemoglobin remained stable at this time after receiving infusions of PRBC  Left gluteal hematoma present on CT abdomen pelvis      Occult blood noted on stool  Anemia continues in the program patient required another blood transfusion on 1/21/2022  Anticoagulation on hold s/p gluteal hematoma      Impression CT Chest/Abdomen/Pelvis 1/21/22   1.  New small bilateral pleural effusions with extensive bilateral airspace   consolidation, increased from 01/08/2022.       2.  Decreased AP dimension of the distal trachea, suggesting tracheomalacia.       3.  Previously noted hematoma in the left gluteal musculature is not as well   visualized on today's study, but appears overall slightly decreased in size. No new areas of hematoma.       4.  Mild distention of the stomach, mild prominence of proximal small bowel   loops, and mild nonspecific stranding in the proximal mesentery.  No definite   evidence of high-grade bowel obstruction at this time.  Enteritis or early   developing obstruction are considered in the differential.         The patient required intubation for worsening respiratory failure on 1/22/2022. He is currently requiring 65% FiO2 with a PEEP of 12.   Propofol is being given for sedation    CURRENT EVALUATION : 1/29/2022  No fever  Sp cx normal ailstair  CXR multifocal pneumonia same  WBc normal  Vented 30FIo2 and 5 peep  No fever  Sputum thick tanned     Sputum 1/22 Normal respiratory alistair light growth  Repeat cultures ordered 1/25-gram positive cocci clusters sputum  CXR ordered 1/25-stable      Labs, X rays reviewed: 1/29/2022    BUN:63-->75-->80-->76  Cr:2.53-->2.67-->2.52-->2.35    WBC: 17.2-->12.8-->12.8-->11.4  Hb:9.1-->8.2-->10.5-->10-->8.7  Plat: 85-->89-->139-->144-->139    Absolute Neutrophils:16  Absolute Lymphocytes:0.34  Neutrophil/Lymphocyte Ratio: 53 very high risk    CRP:160-->131-->52.3-->21.5  Ferritin:804  LDH: 563    Pro Calcitonin:      Cultures:  Urine:  ·   Blood:  · 1/25: No growth  Sputum :  · 1/22: Gram negative rods  · 1/25: Gram positive cocci clusters  MRSA Nares:   Not detected    CXR:     CAT:  1/8/21      Discussed with patient, RN, CC, IM. I have personally reviewed the past medical history, past surgical history, medications, social history, and family history, and I have updated the database accordingly.   Past Medical History:     Past Medical History:   Diagnosis Date    Chronic lower back pain     Cobalamin deficiency     [facility] [address] [address]     Diabetes mellitus [facility]     Hyperlipidemia     Hypertension     Malignant neoplasm of colon [facility]     [facility]ipheral [facility]        Past Surgical  History:     Past Surgical History:   Procedure Laterality Date    ARTERY SURGERY      INSERT MIDLINE CATHETER  1/24/2022            Medications:      metoprolol tartrate  75 mg Oral BID    amLODIPine  10 mg Oral Daily    torsemide  40 mg Oral Daily    amiodarone  200 mg Oral Daily    miconazole   Topical BID    apixaban  2.5 mg Oral BID    famotidine  20 mg Oral Daily    oxyCODONE  7.5 mg Oral Q6H    insulin lispro  0-6 Units SubCUTAneous Q4H    insulin lispro  0-3 Units SubCUTAneous Nightly    meropenem  500 mg IntraVENous Q12H    busPIRone 10 mg Oral TID    senna  2 tablet Oral Nightly    atorvastatin  20 mg Oral Nightly    clopidogrel  75 mg Oral Daily    aspirin  81 mg Oral Daily       Social History:     Social History     Socioeconomic History    Marital status:      Spouse name: Not on file    Number of children: Not on file    Years of education: Not on file    Highest education level: Not on file   Occupational History    Not on file   Tobacco Use    Smoking status: Former Smoker    Smokeless tobacco: Never Used   Substance and Sexual Activity    Alcohol use: Not Currently    Drug use: Never    Sexual activity: Not on file   Other Topics Concern    Not on file   Social History Narrative    Not on file     Social Determinants of Health     Financial Resource Strain:     Difficulty of Paying Living Expenses: Not on file   Food Insecurity:     Worried About [address] in the Last Year: Not on file    [address] in the Last Year: Not on file   Transportation Needs:     Lack of Transportation (Medical): Not on file    Lack of Transportation (Non-Medical):  Not on file   Physical Activity:     Days of Exercise per Week: Not on file    Minutes of Exercise per Session: Not on file   Stress:     Feeling of Stress : Not on file   Social Connections:     Frequency of Communication with Friends and Family: Not on file    Frequency of Social Gatherings with Friends and Family: Not on file    Attends Shinto Services: Not on file    Active Member of Clubs or Organizations: Not on file    Attends Club or Organization Meetings: Not on file    Marital Status: Not on file   Intimate Partner Violence:     Fear of Current or Ex-Partner: Not on file    Emotionally Abused: Not on file    Physically Abused: Not on file    Sexually Abused: Not on file   Housing Stability:     Unable to Pay for Housing in the Last Year: Not on file    Number of [place] in the Last Year: Not on file    Unstable Housing in and sedated   Psychiatric:      Comments: intubated         Medical Decision Making -Laboratory:   I have independently reviewed/ordered the following labs:    CBC with Differential:   Recent Labs     01/28/22 0436 01/29/22  0425   WBC 11.4* 11.4*   HGB 8.7* 8.9*   HCT 27.7* 28.1*    143   LYMPHOPCT 5* 3*   MONOPCT 3 2     BMP:   Recent Labs     01/26/22  1825 01/27/22  0343 01/28/22  0436 01/29/22  0425   NA  --    < > 150* 147*   K  --    < > 3.9 4.1   CL  --    < > 110* 109*   CO2  --    < > 29 29   BUN  --    < > 76* 89*   CREATININE  --    < > 2.35* 2.54*   MG 2.4  --  2.5  --     < > = values in this interval not displayed. Hepatic Function Panel:   No results for input(s): PROT, LABALBU, BILIDIR, IBILI, BILITOT, ALKPHOS, ALT, AST in the last 72 hours. No results for input(s): RPR in the last 72 hours. No results for input(s): HIV in the last 72 hours. No results for input(s): BC in the last 72 hours.   Lab Results   Component Value Date    MUCUS 1+ 01/17/2022    RBC 2.84 01/29/2022    TRICHOMONAS NOT REPORTED 01/17/2022    WBC 11.4 01/29/2022    YEAST NOT REPORTED 01/17/2022    TURBIDITY Clear 01/17/2022     Lab Results   Component Value Date    CREATININE 2.54 01/29/2022    GLUCOSE 132 01/29/2022           Electronically signed by Emily Palacio MD on 1/29/2022 at 4:10 PM

## 2022-01-29 NOTE — PLAN OF CARE
Problem: Falls - Risk of:  Goal: Will remain free from falls  Description: Will remain free from falls  Outcome: Ongoing  Goal: Absence of physical injury  Description: Absence of physical injury  Outcome: Ongoing     Problem: Airway Clearance - Ineffective  Goal: Achieve or maintain patent airway  Outcome: Ongoing     Problem: Gas Exchange - Impaired  Goal: Absence of hypoxia  Outcome: Ongoing  Goal: Promote optimal lung function  Outcome: Ongoing     Problem: Breathing Pattern - Ineffective  Goal: Ability to achieve and maintain a regular respiratory rate  Outcome: Ongoing     Problem:  Body Temperature -  Risk of, Imbalanced  Goal: Ability to maintain a body temperature within defined limits  Outcome: Ongoing  Goal: Will regain or maintain usual level of consciousness  Outcome: Ongoing  Goal: Complications related to the disease process, condition or treatment will be avoided or minimized  Outcome: Ongoing     Problem: Nutrition Deficits  Goal: Optimize nutritional status  Outcome: Ongoing     Problem: Risk for Fluid Volume Deficit  Goal: Maintain normal heart rhythm  Outcome: Ongoing  Goal: Maintain absence of muscle cramping  Outcome: Ongoing  Goal: Maintain normal serum potassium, sodium, calcium, phosphorus, and pH  Outcome: Ongoing     Problem: Loneliness or Risk for Loneliness  Goal: Demonstrate positive use of time alone when socialization is not possible  Outcome: Ongoing     Problem: Fatigue  Goal: Verbalize increase energy and improved vitality  Outcome: Ongoing     Problem: Patient Education: Go to Patient Education Activity  Goal: Patient/Family Education  Outcome: Ongoing     Problem: Skin Integrity:  Goal: Will show no infection signs and symptoms  Description: Will show no infection signs and symptoms  Outcome: Ongoing  Goal: Absence of new skin breakdown  Description: Absence of new skin breakdown  Outcome: Ongoing     Problem: Nutrition  Goal: Optimal nutrition therapy  Outcome: Ongoing Problem: Pain:  Goal: Pain level will decrease  Description: Pain level will decrease  Outcome: Ongoing  Goal: Control of acute pain  Description: Control of acute pain  Outcome: Ongoing  Goal: Control of chronic pain  Description: Control of chronic pain  Outcome: Ongoing     Problem: OXYGENATION/RESPIRATORY FUNCTION  Goal: Patient will maintain patent airway  1/29/2022 0442 by Karina Espinosa RN  Outcome: Ongoing  1/28/2022 1957 by Viktor Ford RCP  Outcome: Ongoing  Goal: Patient will achieve/maintain normal respiratory rate/effort  Description: Respiratory rate and effort will be within normal limits for the patient  1/29/2022 0442 by Karina Espinosa RN  Outcome: Ongoing  1/28/2022 1957 by Viktor Ford RCP  Outcome: Ongoing     Problem: MECHANICAL VENTILATION  Goal: Patient will maintain patent airway  1/29/2022 0442 by Karina Espinosa RN  Outcome: Ongoing  1/28/2022 1957 by Viktor Ford RCP  Outcome: Ongoing  Goal: Oral health is maintained or improved  1/29/2022 0442 by Kairna Espinosa RN  Outcome: Ongoing  1/28/2022 1957 by Viktor Ford RCP  Outcome: Ongoing  Goal: ET tube will be managed safely  1/29/2022 0442 by Karina Espinosa RN  Outcome: Ongoing  1/28/2022 1957 by Viktor Ford RCP  Outcome: Ongoing  Goal: Ability to express needs and understand communication  1/29/2022 0442 by Karina Espinosa RN  Outcome: Ongoing  1/28/2022 1957 by Viktor Ford RCP  Outcome: Ongoing  Goal: Mobility/activity is maintained at optimum level for patient  1/29/2022 0442 by Karina Espinosa RN  Outcome: Ongoing  1/28/2022 1957 by Viktor Ford RCP  Outcome: Ongoing     Problem: SKIN INTEGRITY  Goal: Skin integrity is maintained or improved  1/29/2022 0442 by Karina Espinosa RN  Outcome: Ongoing  1/28/2022 1957 by Viktor Ford RCP  Outcome: Ongoing

## 2022-01-29 NOTE — PROGRESS NOTES
[facility]  Progress Note                   Date:   1/29/2022  Patient name: Cindy Grayson  Date of admission:  1/8/2022  9:49 AM  MRN:   [id number]  YOB: 1940  PCP: MOHAMUD Taylor - CNP    Reason for Admission: Hypoxia [R09.02]  Elevated troponin [R77.8]  Acute kidney injury ([address] [N17.9]  COVID-19 [U07.1]  Pneumonia due to COVID-19 virus [U07.1, J12.82]    Subjective:   Patient seen and examined at bedside. No acute events overnight. Continues to be intubated and sedated. In sinus rhythm with some PVCs on telemetry. Review of Systems    Medications:   Scheduled Meds:   metoprolol tartrate  75 mg Oral BID    amLODIPine  10 mg Oral Daily    torsemide  40 mg Oral Daily    amiodarone  200 mg Oral Daily    miconazole   Topical BID    apixaban  2.5 mg Oral BID    famotidine  20 mg Oral Daily    oxyCODONE  7.5 mg Oral Q6H    insulin lispro  0-6 Units SubCUTAneous Q4H    insulin lispro  0-3 Units SubCUTAneous Nightly    meropenem  500 mg IntraVENous Q12H    busPIRone  10 mg Oral TID    senna  2 tablet Oral Nightly    atorvastatin  20 mg Oral Nightly    clopidogrel  75 mg Oral Daily    aspirin  81 mg Oral Daily     Continuous Infusions:   fentaNYL 50 mcg/hr (01/28/22 2124)    propofol 20 mcg/kg/min (01/29/22 0657)    sodium chloride      sodium chloride      dextrose 100 mL/hr (01/19/22 0010)    sodium chloride       CBC:   Recent Labs     01/27/22 0343 01/28/22 0436 01/29/22 0425   WBC 12.8* 11.4* 11.4*   HGB 10.0* 8.7* 8.9*    139 143     BMP:    Recent Labs     01/27/22 0343 01/28/22 0436 01/29/22 0425    150* 147*   K 3.7 3.9 4.1    110* 109*   CO2 26 29 29   BUN 80* 76* 89*   CREATININE 2.52* 2.35* 2.54*   GLUCOSE 195* 110* 132*     Hepatic:  No results for input(s): AST, ALT, ALB, BILITOT, ALKPHOS in the last 72 hours. Troponin:   No results for input(s): TROPHS in the last 72 hours.   BNP: No results for input(s): BNP in the last 72 hours. Lipids: No results for input(s): CHOL, HDL in the last 72 hours. Invalid input(s): LDLCALCU  INR: No results for input(s): INR in the last 72 hours. Objective:   Vitals: BP (!) 144/52   Pulse 74   Temp 98.2 °F (36.8 °C) (Oral)   Resp 17   Ht 5' 10\" (1.778 m)   Wt 164 lb 10.9 oz (74.7 kg)   SpO2 93%   BMI 23.63 kg/m²     For careful stewardship of limited PPE during COVID-19 pandemic my physical exam was deferred. For physical exam, please see today's physical from primary team or ICU team.       Echo 3/29/21    Left Ventricle: There is mild increased wall thickness/hypertrophy.   Left Ventricle: Systolic function is normal with an ejection fraction   of 65-70%.   Left Ventricle: Grade I diastolic dysfunction (impaired relaxation) is   present.   Right Ventricle: Systolic function is normal.     Aortic Valve: There is no regurgitation. There is moderate stenosis. The calculated aortic valve area is 1.72 cm2. The calculated aortic valve   peak gradient is 47.00 mmHg. The calculated aortic valve mean gradient is   28.00 mmHg.   Tricuspid Valve: There is trace regurgitation. There is no evidence of   tricuspid valve stenosis.   Pericardium: There is no pericardial effusion. Tele presently Afib 120-140s      Echo 1/11/2022     Summary  Left ventricle is normal in size, global left ventricular systolic function  is normal.  Estimated ejection fraction is 55 % . Left atrium is at upper limits of normal.  Inter-atrial septum is intact with no evidence for an atrial septal defect. Right atrium is normal in size. Normal right ventricular size and function. Aortic valve is trileaflet, calcified with restriction of motion. Peak instantaneous gradient 60 mmHg and mean gradient 37 mmHg. Calculated ALF 1.0cm2 by VTI (image #57.)  Severe aortic stenosis. Trivial aortic insufficiency. Mitral valve sclerosis without significant stenosis.   Mean gradient is 4mmHg .  Trivial mitral regurgitation. Tricuspid valve was not well visualized. Mild tricuspid regurgitation. No significant pericardial effusion is seen. Assessment / Acute Cardiac Problems:   1. COVID-19 PNA  2. New onset Afib RVR  3. Type I vs Type II MI likely secondary to JULES/COVID  4. HTN  5. JULES on CKD (baseline creat 1.4-1.5)  6. Severe AS on Echo 3/2021  7. Preserved LVEF on prior echo  8. Acute hypoxic resp failure    [id number] Score for Atrial Fibrillation Stroke Risk   Risk   Factors  Component Value   C CHF No 0   H HTN Yes 1   A2 Age >= 76 Yes,  [de-identified] y.o.) 2   D DM No 0   S2 Prior Stroke/TIA No 0   V Vascular Disease No 0   A Age 74-69 No,  [de-identified] y.o.) 0   Sc Sex male 0    [id number]  Score  3   Score last updated 1/11/22 72:29 PM EST    Click here for a link to the UpToDate guideline \"Atrial Fibrillation: Anticoagulation therapy to prevent embolization    Disclaimer: Risk Score calculation is dependent on accuracy of patient problem list and past encounter diagnosis. Plan of Treatment:   1. Afib - stable. Currently SR .    2. Continue po amiodarone 200 mg daily, Lopressor 75 mg twice daily and Eliquis 2.5 mg twice daily. 3. Patient is on aspirin and Plavix for history of peripheral arterial disease  4. Keep K >4 and Mg >2.   5. Echo reviewed per above  - preserved EF. 6. CKD Fluid management per Nephrology  7. Severe aortic stenosis. need R/L heart cath once acute issues resolved as outpatient       Tsaha Zapien MD  Internal Medicine Resident, PGY-2  [address], [place]  1/29/2022,8:42 AM    Attending note,   The patient was seen and examined agree with above. Intubated and sedated. In normal sinus rhythm. Continue current medications. We will plan for left heart cath right heart cath and aortic valve study and coronary angiography when acute issues resolve. Continue supportive care.

## 2022-01-30 NOTE — PROGRESS NOTES
Critical Care Team - Daily Progress Note      Date and time: 1/30/2022 7:00 AM  Patient's name:  Mignon Robles  Medical Record Number: [id number]  Patient's account/billing number: [de-identified]  Patient's YOB: 1940  Age: [de-identified] y.o.   Date of Admission: 1/8/2022  9:49 AM  Length of stay during current admission: 22      Primary Care Physician: MOHAMUD Alcazar CNP  ICU Attending Physician: Dr. Segundo Peña Status: Full Code    Reason for ICU admission:   Chief Complaint   Patient presents with    Shortness of Breath    Nausea & Vomiting       Subjective:     HISTORY OF PRESENTING ILLNESS:     History was obtained from EMR due to patient being intubated patient is a 60-year-old  non- male who presented to the ER 01/08/2022 for shortness of breath, nausea and vomiting and was admitted for pneumonia due to COVID-19.  He reported having increasing shortness of breath with nausea and vomiting or 7 to 10 days before coming to the hospital. Lizette Alonzo was tested positive for Covid shortly after [place] time.  Subsequently patient became mildly short of breath and more notified us cocci approximately 10 feet.  Upon arrival to the emergency room, patient was hypoxic on room air with oxygen saturation less than 80%.  He was put on high flow oxygen and was started on Decadron on 1/8/2010 and received Actemra on 1/8/2022.  Patient was admitted in Covid unit.  He also received meropenem for leukocytosis and possible secondary bacterial pneumonia from 1/11/2022 to 1/17/2022.  Meropenem was restarted on 1/21/2022 because of residual dense consolidation in the lungs with air bronchograms with end date of 1/31/2022 per ID.  Remdesivir is contraindicated due to JULES.  Patient had significant hypoxia while being on high flow nasal cannula and BiPAP alternatively he required intubation and mechanical ventilation on 1/22/22.  Nephrology is following for JULES likely secondary to ischemic ATN and nephrotoxic ATN. Cardiology is following for new onset A.fib and NSTEMI type-1 Vs. Type 2. Palliative care is following.      - Patient's CODE STATUS is FULL CODE.   - Echo 2022: LVEF 55%. Calculated ALF 1.0cm2 by VTI (image #57.) Severe aortic stenosis.      OVERNIGHT EVENTS:     No acute events overnight. Patient remained afebrile and hemodynamically stable. Sedation: Fentanyl @ 50 mcg/hr; Propofol @ 20 mcg/kg/min  Pressors: none  Analgesic:  Fentanyl @ 50 mcg/hr;      Vent setting: PRVC/16/580/5/30%  AB.393/pCO2 53.1/pO2 68.2/HCO3 32.4    ID following: On Meropenem (Stop date: 22)     Nephrology following: Continue Demadex 40 daily; add free water 300 mL q6 hours, f/u labs ordered    Cardiology following: Cardiology following for new onset A.fib with RVR and NSTEMI likely Type-II Vs. Type I. - Continue Amiodarone 200 mg daily PO, beta blockers and Eliquis. Rate controlled in NSR on Lopressor 75 mg BID and Amiodarone.     Palliative Care following: FULL CODE for now. Continue supportive care.      - Plan for possible extubation on Monday with possibility of re-intubation if needed. Will continue weaning trials with CPAP through the weekend per Dr. Easton Puente. Family may plan for Trach and PEG if indicated in future.      Labs reviewed: Na 149; on free water flushes 300 mL q6 hrs, K 3.8, Cl 109, BUN 93<--89<--76; Cr 2.25<--2.54<--2.35; WBC 10.4<--11.4, Hgb 8.9<--8.9, Plt 175     F.A.S.T. M. H.U.G.S. B.I.D.  Feeding Diet: ADULT TUBE FEEDING; Orogastric; Renal Formula; Continuous; 10; Yes; 10; Q 4 hours; 25; 30; Other (specify); per MD; Protein; 2 Protein modulars per day   Fluids: None   Family: will update   Analgesic: Fentanyl @ 50 mcg/hr;   Sedation: Fentanyl @ 50 mcg/hr; Propofol @ 20 mcg/kg/min   Thrombo-prophylaxis: [] Enoxaparin, [] Unfract.  Heparin Subcutaneously, [] EPC Cuffs; Eliquis   Mobility: limited due to ventilator; OT/PT on board   Heads up: 30 degrees   Ulcer prophylaxis: [] PPI Agent, [x] [id number], [] Sucralfate, [] Other:   Glycemic control: controlled; Bs-180s   Spontaneous breathing trial: in progress,  Patient tolerated CPAP trials for 3 hours before switching back to full support secondary to increased RR and decreased SpO2  · Bowel regimen/urine output: Senna; Uout: 1.4L/24 hours  · Indwelling catheter/lines: Midline - left brachial ; Arterial line- left radial- ;    De-escalation: none      AWAKE & FOLLOWING COMMANDS:  [] No   [x] Yes    CURRENT VENTILATION STATUS:     [x] Ventilator  [] BIPAP  [] Nasal Cannula [] Room Air      IF INTUBATED, ET TUBE MARKING AT LOWER LIP:       cms    SECRETIONS Amount:  [] Small [] Moderate  [] Large  [] None  Color:     [] White [] Colored  [] Bloody    SEDATION:  RAAS Score:  [x] Propofol gtt  [] Versed gtt  [] Ativan gtt   [] No Sedation    PARALYZED:  [x] No    [] Yes    DIARRHEA:                [x] No                [] Yes  (C. Difficile status: [] positive                                                                                                                       [] negative                                                                                                                     [] pending)    VASOPRESSORS:  [x] No    [] Yes    If yes -   [] Levophed       [] Dopamine     [] Vasopressin       [] Dobutamine  [] Phenylephrine         [] Epinephrine    CENTRAL LINES:     [x] No   [] Yes   (Date of Insertion:   )           If yes -     [] Right IJ     [] Left IJ [] Right Femoral [] Left Femoral                   [] Right Subclavian [] Left Subclavian       ORTIZ'S CATHETER:   [x] No   [] Yes  (Date of Insertion:   )     URINE OUTPUT:            [x] Good   [] Low              [] Anuric    REVIEW OF SYSTEMS:  · Unable to assess due to patient being ventilated.        OBJECTIVE:     VITAL SIGNS:  BP (!) 151/50   Pulse 63   Temp 97.7 °F (36.5 °C)   Resp 16   Ht 5' 10\" (1.778 m)   Wt 164 lb 10.9 oz (74.7 kg)   SpO2 95%   BMI 23.63 kg/m²   Tmax over 24 hours:  Temp (24hrs), Av.3 °F (36.8 °C), Min:97.7 °F (36.5 °C), Max:98.8 °F (37.1 °C)      Patient Vitals for the past 8 hrs:   Temp Temp src Pulse Resp SpO2   22 0600   63 16 95 %   22 0500   66 17 94 %   22 0400 97.7 °F (36.5 °C)  69 20 96 %   22 0320   68 18 96 %   22 0300   67 18 96 %   22 0200   67 17 97 %   22 0100   66 24 95 %   22 0000 98.1 °F (36.7 °C) Oral 66 19    22 2313   63 14 96 %         Intake/Output Summary (Last 24 hours) at 2022 0700  Last data filed at 2022 0600  Gross per 24 hour   Intake 1337.06 ml   Output 1850 ml   Net -512.94 ml     Date 22 0000 - 22 2359   Shift 1419-4320 1237-1081 2396-6729 24 Hour Total   INTAKE   I.V.(mL/kg) 17.5(0.2)   17.5(0.2)   NG/GT(mL/kg) 100(1.3)   100(1.3)   IV Piggyback(mL/kg) 495.1(6.6)   495.1(6.6)   Shift Total(mL/kg) 612.6(8.2)   612.6(8.2)   OUTPUT   Urine(mL/kg/hr) 300   300   Shift Total(mL/kg) 300(4)   300(4)   Weight (kg) 74.7 74.7 74.7 74.7     Wt Readings from Last 3 Encounters:   22 164 lb 10.9 oz (74.7 kg)     Body mass index is 23.63 kg/m². PHYSICAL EXAM:  Constitutional: intubated, sedated, mechanically ventilated, follows commands off sedation  HEENT: PERRLA, EOMI, sclera clear, anicteric  Respiratory: clear to auscultation, no wheezes or rales and unlabored breathing. Cardiovascular: regular rate and rhythm, normal S1, S2. severe systolic murmur at aortic site  Abdomen: soft, nontender, nondistended, no masses or organomegaly  NEUROLOGIC: follows commands off sedation  Extremities:  peripheral pulses normal, no pedal edema,.       Any additional physical findings: None      MEDICATIONS:  Scheduled Meds:   metoprolol tartrate  75 mg Oral BID    amLODIPine  10 mg Oral Daily    torsemide  40 mg Oral Daily    amiodarone  200 mg Oral Daily    miconazole   Topical BID    apixaban  2.5 mg Oral BID    famotidine  20 mg Oral Daily    oxyCODONE  7.5 mg Oral Q6H    insulin lispro  0-6 Units SubCUTAneous Q4H    insulin lispro  0-3 Units SubCUTAneous Nightly    meropenem  500 mg IntraVENous Q12H    busPIRone  10 mg Oral TID    senna  2 tablet Oral Nightly    atorvastatin  20 mg Oral Nightly    clopidogrel  75 mg Oral Daily    aspirin  81 mg Oral Daily     Continuous Infusions:   fentaNYL 50 mcg/hr (01/29/22 1608)    propofol 20 mcg/kg/min (01/30/22 0251)    sodium chloride      sodium chloride      dextrose 100 mL/hr (01/19/22 0010)    sodium chloride       PRN Meds:   metoclopramide, 10 mg, Q6H PRN  bisacodyl, 10 mg, Daily PRN  hydrALAZINE, 10 mg, Q6H PRN  sodium chloride, , PRN  sodium chloride flush, 5-40 mL, PRN  sodium chloride, 25 mL, PRN  melatonin, 3 mg, Nightly PRN  sodium chloride, 1 spray, PRN  LORazepam, 0.5 mg, Q2H PRN  glucose, 15 g, PRN  dextrose, 12.5 g, PRN  glucagon (rDNA), 1 mg, PRN  dextrose, 100 mL/hr, PRN  metoprolol, 5 mg, Q6H PRN  benzonatate, 200 mg, TID PRN  sodium chloride flush, 10 mL, PRN  sodium chloride flush, 5-40 mL, PRN  sodium chloride, 25 mL, PRN  ondansetron, 4 mg, Q8H PRN   Or  ondansetron, 4 mg, Q6H PRN  polyethylene glycol, 17 g, Daily PRN  acetaminophen, 650 mg, Q6H PRN   Or  acetaminophen, 650 mg, Q6H PRN        SUPPORT DEVICES: [x] Ventilator [] BIPAP  [] Nasal Cannula [] Room Air    VENT SETTINGS (Comprehensive) (if applicable):  Vent Information  $Ventilation: $Subsequent Day  Skin Assessment: Clean, dry, & intact  Suction Catheter Diameter: 14  Equipment Changed: Expiratory Filter  Vent Type: Servo i  Vent Mode: PRVC  Vt Ordered: 580 mL  Rate Set: 16 bmp  Pressure Support: 6 cmH20  FiO2 : 30 %  SpO2: 95 %  SpO2/FiO2 ratio: 316.67  Sensitivity: 2  PEEP/CPAP: 5  I Time/ I Time %: 0.85 s  Humidification Source: [facility]  Humidification Temp: 31  Humidification Temp Measured: 31  Nitric Oxide/Epoprostenol In Use?: No  Mask Type: Full face mask  Mask Size: Medium  Additional Respiratory  Assessments  Pulse: 63  Resp: 16  SpO2: 95 %  End Tidal CO2: 48 (%)  Position: Semi-Haynes's  Humidification Source: E  Humidification Temp: 31  Oral Care Completed?: Yes  Oral Care: Mouthwash,Mouth swabbed,Mouth suctioned  Subglottic Suction Done?: Yes  Cuff Pressure (cm H2O):  (mlt)    ABGs:     Lab Results   Component Value Date    [id number] NOT REPORTED 01/28/2022    FIO2 40.0 01/28/2022     Lactic Acid:   Lab Results   Component Value Date    LACTA 2.1 01/08/2022         DATA:  Complete Blood Count:   Recent Labs     01/28/22  0436 01/29/22  0425 01/30/22  0503   WBC 11.4* 11.4* 10.4   HGB 8.7* 8.9* 8.9*   MCV 99.3 98.9 99.3    143 175   RBC 2.79* 2.84* 2.86*   HCT 27.7* 28.1* 28.4*   MCH 31.2 31.3 31.1   MCHC 31.4 31.7 31.3   RDW 18.4* 18.3* 18.0*   MPV 10.9 11.0 10.9        PT/INR:    Lab Results   Component Value Date    PROTIME 11.6 01/22/2022    INR 1.1 01/22/2022     PTT:    Lab Results   Component Value Date    APTT 25.2 01/22/2022       Basal Metabolic Profile:   Recent Labs     01/28/22  0436 01/29/22  0425 01/30/22  0503   * 147* 149*   K 3.9 4.1 3.8   BUN 76* 89* 93*   CREATININE 2.35* 2.54* 2.25*   * 109* 109*   CO2 29 29 29      Magnesium:   Lab Results   Component Value Date    MG 2.5 01/28/2022    MG 2.4 01/26/2022    MG 2.6 01/15/2022     Phosphorus:   Lab Results   Component Value Date    PHOS 5.3 01/22/2022     S. Calcium:  Recent Labs     01/30/22  0503   CALCIUM 8.4*     S. Ionized Calcium:No results for input(s): IONCA in the last 72 hours.       Urinalysis:   Lab Results   Component Value Date    NITRU NEGATIVE 01/17/2022    COLORU Yellow 01/17/2022    PHUR 5.0 01/17/2022    WBCUA None 01/17/2022    RBCUA 2 TO 5 01/17/2022    MUCUS 1+ 01/17/2022    TRICHOMONAS NOT REPORTED 01/17/2022    YEAST NOT REPORTED 01/17/2022    BACTERIA NOT REPORTED 01/17/2022    SPECGRAV 1.018 01/17/2022    LEUKOCYTESUR NEGATIVE 01/17/2022    UROBILINOGEN Normal 01/17/2022    BILIRUBINUR NEGATIVE 01/17/2022    GLUCOSEU 1+ 01/17/2022    KETUA NEGATIVE 01/17/2022    AMORPHOUS NOT REPORTED 01/17/2022       CARDIAC ENZYMES: No results for input(s): CKMB, CKMBINDEX, TROPONINI in the last 72 hours. Invalid input(s): CKTOTAL;3  BNP: No results for input(s): BNP in the last 72 hours. LFTS  No results for input(s): ALKPHOS, ALT, AST, BILITOT, BILIDIR, LABALBU in the last 72 hours. AMYLASE/LIPASE/AMMONIA  No results for input(s): AMYLASE, LIPASE, AMMONIA in the last 72 hours. Last 3 Blood Glucose:   Recent Labs     01/28/22  0436 01/29/22  0425 01/30/22  0503   GLUCOSE 110* 132* 134*      HgBA1c:  No results found for: LABA1C      TSH:  No results found for: TSH  ANEMIA STUDIES  No results for input(s): LABIRON, TIBC, FERRITIN, [id number], FOLATE, OCCULTBLD in the last 72 hours.       Cultures during this admission:     Blood cultures:                 [] None drawn      [x] Negative             []  Positive (Details:  )  Urine Culture:                   [] None drawn      [] Negative             [x]  Positive (Details: Presumptive candida albicans )  Sputum Culture:               [] None drawn       [] Negative             [x]  Positive (Details: Rare gram negative rods  )   Endotracheal aspirate:     [] None drawn       [] Negative             [x]  Positive (Details: Rare gram negative rods  )        ASSESSMENT:     Principal Problem:    Pneumonia due to COVID-19 virus  Active Problems:    Essential hypertension    Hyperlipidemia    Acute hypoxemic respiratory failure due to COVID-19 [facility])    Acute kidney injury ([address]    Stage 3b chronic kidney disease (HCC)    Elevated troponin    Nonrheumatic aortic valve stenosis    PAD (peripheral artery disease) (HCC)    Moderate protein-calorie malnutrition (HCC)    Atrial fibrillation with RVR (HCC)    COVID-19    Traumatic hematoma of buttock    Acute distention of stomach    Hypoxia  Resolved Problems:    * No resolved hospital problems. *        PLAN:       1. Neurologic:   · Neuro intact  · Neuro checks per protocol  · Sedation on Propofol and Fentanyl  · Pain management: Fentanyl gtt  · Patient follows commands off sedation     2. Cardiovascular:  · Hemodynamically stable  · HR 60s-70s  · MAPs 60s-70s  · MAP goal 65  · Echo : LVEF 55%. Severe aortic stenosis   · New onset A.fib with RVR and NSTEMI Tyep II Vs. Type I- Cardiology following: Continue Amiodarone 200 mg daily PO, beta blockers and Eliquis. Rate controlled in NSR on Lopressor 75 mg BID and Amiodarone.     3. Pulmonary:  · Maintain oxygen sats >92%  · Pulmonary toilet  · Acute hypoxic respiratory failure secondary to COVID pneumonia  · Vent setting: PRVC/16/580/5/30%  · AB.393/pCO2 53.1/pO2 68.2/HCO3 32.4  · CXR : stable scattered pulmonary infiltrates. ET tube in distal mid trachea. · ID following: On Meropenem (Stop date: 22)       4. GI/Nutrition  · Senna daily, Dulcolax and Glycolax PRN  · Ulcer Prophylaxis: Pepcid  · Diet:ADULT TUBE FEEDING; Orogastric; Renal Formula; Continuous; 10; Yes; 10; Q 4 hours; 25; 30; Other (specify); per MD; Protein; 2 Protein modulars per day       5. Renal/Fluid/Electrolyte  · IV Fluids: None  · I/O: In: 1337  · UOut: 1.8 L/24 hours   · JULES on CKD stage IIIb-4 (baseline Cr 2.0) secondary to ischemic ATN and nephrotoxic ATN:  BUN/Cr 93/2.25  · Nephrology following: Continue Demadex 40 daily; add free water 300 mL q6 hours, f/u labs ordered  · Monitor electrolytes, replace PRN        6. ID  · WBC: 10.4<--11.4  · Antimicrobials: Meropenem (Stop date: 22)       7. Hematology:  · Hgb 8.9<--8.9  · Monitor daily CBC     8. Endocrine:   · glucose controlled  · Bs-190s    9.  DVT Prophylaxis  · On Eliquis        GI PPX: Pepcid  DVT PPX: Eliquis  IVF: None  Diet: Tube feeds      Danny Argueta MD, PGY-1          Department of Internal Medicine/ Critical care  [facility], [place] 1/30/2022, 7:00 AM

## 2022-01-30 NOTE — PROGRESS NOTES
[facility]  Progress Note                   Date:   1/30/2022  Patient name: Mignon Robles  Date of admission:  1/8/2022  9:49 AM  MRN:   [id number]  YOB: 1940  PCP: MOHAMUD Alcazar CNP    Reason for Admission: Hypoxia [R09.02]  Elevated troponin [R77.8]  Acute kidney injury ([address] [N17.9]  COVID-19 [U07.1]  Pneumonia due to COVID-19 virus [U07.1, J12.82]    Subjective:   Patient seen and examined at bedside. No acute events overnight. Remains intubated 30% FiO2. Sedated with propofol, fentanyl  In sinus rhythm on telemetry      Review of Systems    Medications:   Scheduled Meds:   potassium chloride  20 mEq IntraVENous Once    metoprolol tartrate  75 mg Oral BID    amLODIPine  10 mg Oral Daily    torsemide  40 mg Oral Daily    amiodarone  200 mg Oral Daily    miconazole   Topical BID    apixaban  2.5 mg Oral BID    famotidine  20 mg Oral Daily    oxyCODONE  7.5 mg Oral Q6H    insulin lispro  0-6 Units SubCUTAneous Q4H    insulin lispro  0-3 Units SubCUTAneous Nightly    meropenem  500 mg IntraVENous Q12H    busPIRone  10 mg Oral TID    senna  2 tablet Oral Nightly    atorvastatin  20 mg Oral Nightly    clopidogrel  75 mg Oral Daily    aspirin  81 mg Oral Daily     Continuous Infusions:   fentaNYL 50 mcg/hr (01/29/22 1608)    propofol 20 mcg/kg/min (01/30/22 0251)    sodium chloride      sodium chloride      dextrose 100 mL/hr (01/19/22 0010)    sodium chloride       CBC:   Recent Labs     01/28/22  0436 01/29/22  0425 01/30/22  0503   WBC 11.4* 11.4* 10.4   HGB 8.7* 8.9* 8.9*    143 175     BMP:    Recent Labs     01/28/22  0436 01/29/22  0425 01/30/22  0503   * 147* 149*   K 3.9 4.1 3.8   * 109* 109*   CO2 29 29 29   BUN 76* 89* 93*   CREATININE 2.35* 2.54* 2.25*   GLUCOSE 110* 132* 134*     Hepatic:  No results for input(s): AST, ALT, ALB, BILITOT, ALKPHOS in the last 72 hours.   Troponin:   No results for input(s): TROPHS in the last 72 hours. BNP: No results for input(s): BNP in the last 72 hours. Lipids: No results for input(s): CHOL, HDL in the last 72 hours. Invalid input(s): LDLCALCU  INR: No results for input(s): INR in the last 72 hours. Objective:   Vitals: BP (!) 151/50   Pulse 66   Temp 97.7 °F (36.5 °C)   Resp 17   Ht 5' 10\" (1.778 m)   Wt 164 lb 10.9 oz (74.7 kg)   SpO2 94%   BMI 23.63 kg/m²     For careful stewardship of limited PPE during COVID-19 pandemic my physical exam was deferred. For physical exam, please see today's physical from primary team or ICU team.       Echo 3/29/21    Left Ventricle: There is mild increased wall thickness/hypertrophy.   Left Ventricle: Systolic function is normal with an ejection fraction   of 65-70%.   Left Ventricle: Grade I diastolic dysfunction (impaired relaxation) is   present.   Right Ventricle: Systolic function is normal.     Aortic Valve: There is no regurgitation. There is moderate stenosis. The calculated aortic valve area is 1.72 cm2. The calculated aortic valve   peak gradient is 47.00 mmHg. The calculated aortic valve mean gradient is   28.00 mmHg.   Tricuspid Valve: There is trace regurgitation. There is no evidence of   tricuspid valve stenosis.   Pericardium: There is no pericardial effusion. Tele presently Afib 120-140s      Echo 1/11/2022     Summary  Left ventricle is normal in size, global left ventricular systolic function  is normal.  Estimated ejection fraction is 55 % . Left atrium is at upper limits of normal.  Inter-atrial septum is intact with no evidence for an atrial septal defect. Right atrium is normal in size. Normal right ventricular size and function. Aortic valve is trileaflet, calcified with restriction of motion. Peak instantaneous gradient 60 mmHg and mean gradient 37 mmHg. Calculated ALF 1.0cm2 by VTI (image #[id number].)  Severe aortic stenosis. Trivial aortic insufficiency.   Mitral valve sclerosis without

## 2022-01-30 NOTE — PROGRESS NOTES
Renal Progress Note    Patient :  Maryam Ellis; [de-identified] y.o. MRN# [id number]  Location:  [id number]/2282-87  Attending:  Osman Payan MD  Admit Date:  1/8/2022   Hospital Day: 22      Subjective: Following for JULES/CKD 3-4 with baseline 2-2.2, proteinuria 0.5 to 0.8 g,. Initially came in with COVID-19 pneumonia developed ATN which recovered after he developed gluteal hematoma and drop in hemoglobin and ATN creatinine peaking around 3.0 which seems to be resolving now. Developing hypernatremia from free water losses likely from recovery phase of ATN. Patient was seen and examined. Remains intubated, FIO2 30% PEEP 5. Wakens easily. Per nursing hoping to extubate tomorrow or Monday. On Torsemide 40mg daily. Urine output 1.85 over last 24 hours, overall negative 1.7 liters. Creatinine improved from 2.54 mg/dL now 2.25 mg/dL , potassium 3.8, sodium is 149. Getting free water 300 Q 6hours  Has tube feed at 25/hr.     Outpatient Medications:     Medications Prior to Admission: aspirin 325 MG tablet, Take 81 mg by mouth daily   atorvastatin (LIPITOR) 40 MG tablet, Take 20 mg by mouth nightly  lisinopril (PRINIVIL;ZESTRIL) 30 MG tablet, Take 30 mg by mouth daily  metoprolol tartrate (LOPRESSOR) 50 MG tablet, Take 50 mg by mouth daily  cilostazol (PLETAL) 100 MG tablet, Take 100 mg by mouth 2 times daily  clopidogrel (PLAVIX) 75 MG tablet, Take 75 mg by mouth daily  vitamin B-12 (CYANOCOBALAMIN) 1000 MCG tablet, Take 1,000 mcg by mouth daily    Current Medications:     Scheduled Meds:    metoprolol tartrate  75 mg Oral BID    amLODIPine  10 mg Oral Daily    torsemide  40 mg Oral Daily    amiodarone  200 mg Oral Daily    miconazole   Topical BID    apixaban  2.5 mg Oral BID    famotidine  20 mg Oral Daily    oxyCODONE  7.5 mg Oral Q6H    insulin lispro  0-6 Units SubCUTAneous Q4H    insulin lispro  0-3 Units SubCUTAneous Nightly    meropenem  500 mg IntraVENous Q12H    busPIRone  10 mg Oral TID    senna 2 tablet Oral Nightly    atorvastatin  20 mg Oral Nightly    clopidogrel  75 mg Oral Daily    aspirin  81 mg Oral Daily     Continuous Infusions:    fentaNYL 50 mcg/hr (22 1608)    propofol 20 mcg/kg/min (22 0251)    sodium chloride      sodium chloride      dextrose 100 mL/hr (22 0010)    sodium chloride       PRN Meds:  metoclopramide, bisacodyl, hydrALAZINE, sodium chloride, sodium chloride flush, sodium chloride, melatonin, sodium chloride, LORazepam, glucose, dextrose, glucagon (rDNA), dextrose, metoprolol, benzonatate, sodium chloride flush, sodium chloride flush, sodium chloride, ondansetron **OR** ondansetron, polyethylene glycol, acetaminophen **OR** acetaminophen    Input/Output:       I/O last 3 completed shifts: In: 2382.1 [I.V.:467; NG/GT:1420; IV Piggyback:495.1]  Out: 2550 [Urine:2550]. Patient Vitals for the past 96 hrs (Last 3 readings):   Weight   22 0100 164 lb 10.9 oz (74.7 kg)       Vital Signs:   Temperature:  Temp: 97.7 °F (36.5 °C)  TMax:   Temp (24hrs), Av.3 °F (36.8 °C), Min:97.7 °F (36.5 °C), Max:98.8 °F (37.1 °C)    Respirations:  Resp: 18  Pulse:   Pulse: 62  BP:    BP: (!) 151/50  BP Range: Systolic (21GKA), AL , Min:151 , OSW:083       Diastolic (79QRY), LWX:55, Min:50, Max:55      Physical Examination:     General:  Intubated, sedated  HEENT: Has some blood draining from mouth  Eyes:   Pupils equal, round and reactive to light, EOMI. Neck:   No JVD, no thyromegaly, no lymphadenopathy. Chest:   Decreased in bases  Cardiac:  S1 S2 RR, no murmurs, gallops or rubs, JVP not raised. Abdomen: Soft, non-tender, no masses or organomegaly, BS audible. :   No suprapubic or flank tenderness. SKIN:  No rashes, good skin turgor. Bruising upper extremities  Extremities:  Trace edema lower extremities.     Labs:       Recent Labs     22  0436 22  0425 22  0503   WBC 11.4* 11.4* 10.4   RBC 2.79* 2.84* 2.86*   HGB 8.7* 8.9* 8.9* HCT 27.7* 28.1* 28.4*   MCV 99.3 98.9 99.3   MCH 31.2 31.3 31.1   MCHC 31.4 31.7 31.3   RDW 18.4* 18.3* 18.0*    143 175   MPV 10.9 11.0 10.9      BMP:   Recent Labs     01/28/22  0436 01/29/22  0425 01/30/22  0503   * 147* 149*   K 3.9 4.1 3.8   * 109* 109*   CO2 29 29 29   BUN 76* 89* 93*   CREATININE 2.35* 2.54* 2.25*   GLUCOSE 110* 132* 134*   CALCIUM 8.3* 8.4* 8.4*      Magnesium:    Recent Labs     01/28/22 0436   MG 2.5     SUMMER:      Lab Results   Component Value Date    SUMMER NEGATIVE 01/09/2022     SPEP:  Lab Results   Component Value Date    PROT 6.1 01/09/2022     C3:     Lab Results   Component Value Date    C3 134 01/09/2022     C4:     Lab Results   Component Value Date    C4 31 01/09/2022       Urinalysis/Chemistries:      Lab Results   Component Value Date    NITRU NEGATIVE 01/17/2022    COLORU Yellow 01/17/2022    PHUR 5.0 01/17/2022    WBCUA None 01/17/2022    RBCUA 2 TO 5 01/17/2022    MUCUS 1+ 01/17/2022    TRICHOMONAS NOT REPORTED 01/17/2022    YEAST NOT REPORTED 01/17/2022    BACTERIA NOT REPORTED 01/17/2022    SPECGRAV 1.018 01/17/2022    LEUKOCYTESUR NEGATIVE 01/17/2022    UROBILINOGEN Normal 01/17/2022    BILIRUBINUR NEGATIVE 01/17/2022    GLUCOSEU 1+ 01/17/2022    KETUA NEGATIVE 01/17/2022    AMORPHOUS NOT REPORTED 01/17/2022     Urine Sodium:     Lab Results   Component Value Date    CHERYL 56 01/10/2022       Urine Creatinine:     Lab Results   Component Value Date    LABCREA 90.8 01/09/2022       Radiology:     CXR:   FINDINGS:   There are scattered pulmonary infiltrates.  There are no effusions.  There is   no pneumothorax.  The mediastinal structures are unremarkable.  The upper   abdomen is unremarkable.  The extrathoracic soft tissues are unremarkable.    There is an endotracheal tube with tip in the distal midtrachea.  There is a   gastric tube with tip in the proximal aspect of the stomach.           Impression   Stable scattered pulmonary infiltrates.       Support tubes as described above. Assessment:     1. Acute Kidney Injury: Secondary to ischemic ATN and nephrotoxic ATN initially from COVID-pneumonia, systemic inflammation, contrast exposure.  Baseline 2.0 peaked at 3.0. Ej Lau developed left gluteal area hematoma with drop in hemoglobin down to 5.7, decreased renal perfusion, had a second hit creatinine peaked at 3.96. Creatinine now fluctuating the 2.2-2.5 range back at baseline. 2.25 mg/dL  2. 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 Persistent anemia, requiring multiple transfusions likely from left gluteal area hematoma  4. COVID-19 pneumonia with respiratory failure now intubated.    5.  Severe aortic stenosis valve area 1.0 cm² peak gradient 60 mean 37  6. Hypernatremia from with free water losses likely insensible   7. Atrial fibrillation: On Eliquis and amiodarone    Plan:   1. Increase free water to 300cc Q 4 hours. 2. Continue Torsemide 40mg daily. 3. Daily Labs. 4. Monitor strict I's and O's renal function. 5. Will follow. Nutrition   Please ensure that patient is on a renal diet/TF. Avoid nephrotoxic drugs/contrast exposure. We will continue to follow along with you. Electronically signed by Rufino Castano CNP, MOHAMUD - CNP on 1/30/2022 at 9:14 AM     Attending Physician Statement  I have discussed the care of this patient, including pertinent history and exam findings, with the Resident/CNP. I have reviewed and edited the key elements of all parts of the encounter with the Resident/CNP. I agree with the assessment, plan and orders as documented by the Resident/CNP. Car Joy MD   Nephrology [address] [place] [address] Drive    This note is created with the assistance of a speech-recognition program. While intending to generate a document that actually reflects the content of the visit, no guarantees can be provided that every mistake has been identified and corrected by editing.

## 2022-01-30 NOTE — PLAN OF CARE
Problem: OXYGENATION/RESPIRATORY FUNCTION  Goal: Patient will maintain patent airway  1/29/2022 1943 by Zully Martin RCP  Outcome: Ongoing  1/29/2022 0827 by Courtney Adames, ZIGGYP  Outcome: Ongoing  1/29/2022 0717 by Samy Desai RN  Outcome: Ongoing  Goal: Patient will achieve/maintain normal respiratory rate/effort  Description: Respiratory rate and effort will be within normal limits for the patient  1/29/2022 1943 by Zully Martin RCP  Outcome: Ongoing  1/29/2022 0827 by Courtney Adames, RCP  Outcome: Ongoing  1/29/2022 0717 by Samy Desai RN  Outcome: Ongoing     Problem: MECHANICAL VENTILATION  Goal: Patient will maintain patent airway  1/29/2022 1943 by Zully Martin RCP  Outcome: Ongoing  1/29/2022 0827 by Courtney Adames RCP  Outcome: Ongoing  1/29/2022 0717 by Samy Desai RN  Outcome: Ongoing  Goal: Oral health is maintained or improved  1/29/2022 1943 by Zully Martin RCP  Outcome: Ongoing  1/29/2022 0827 by Courtney Adames RCP  Outcome: Ongoing  1/29/2022 0717 by Samy Desai RN  Outcome: Ongoing  Goal: ET tube will be managed safely  1/29/2022 1943 by Zully Martin RCP  Outcome: Ongoing  1/29/2022 0827 by ZIGGY LayP  Outcome: Ongoing  1/29/2022 0717 by Samy Desai RN  Outcome: Ongoing  Goal: Ability to express needs and understand communication  1/29/2022 1943 by Zully Martin RCPHIL  Outcome: Ongoing  1/29/2022 0827 by Courtney Adames RCP  Outcome: Ongoing  1/29/2022 0717 by Samy Desai RN  Outcome: Ongoing  Goal: Mobility/activity is maintained at optimum level for patient  1/29/2022 1943 by Zully Martin RCP  Outcome: Ongoing  1/29/2022 0827 by Courtney Adames RCP  Outcome: Ongoing  1/29/2022 0717 by Samy Desai RN  Outcome: Ongoing     Problem: SKIN INTEGRITY  Goal: Skin integrity is maintained or improved  1/29/2022 1943 by Zully Martin, RCP  Outcome: Ongoing  1/29/2022 0827 by Courtney Adames RCP  Outcome: Ongoing  1/29/2022 0717 by Mariano Garg RN  Outcome: Ongoing

## 2022-01-30 NOTE — PLAN OF CARE
Problem: Falls - Risk of:  Goal: Will remain free from falls  Description: Will remain free from falls  Outcome: Ongoing  Goal: Absence of physical injury  Description: Absence of physical injury  Outcome: Ongoing     Problem: Airway Clearance - Ineffective  Goal: Achieve or maintain patent airway  Outcome: Ongoing     Problem: Gas Exchange - Impaired  Goal: Absence of hypoxia  Outcome: Ongoing  Goal: Promote optimal lung function  Outcome: Ongoing     Problem: Breathing Pattern - Ineffective  Goal: Ability to achieve and maintain a regular respiratory rate  Outcome: Ongoing     Problem:  Body Temperature -  Risk of, Imbalanced  Goal: Ability to maintain a body temperature within defined limits  Outcome: Ongoing  Goal: Will regain or maintain usual level of consciousness  Outcome: Ongoing  Goal: Complications related to the disease process, condition or treatment will be avoided or minimized  Outcome: Ongoing     Problem: Nutrition Deficits  Goal: Optimize nutritional status  Outcome: Ongoing     Problem: Risk for Fluid Volume Deficit  Goal: Maintain normal heart rhythm  Outcome: Ongoing  Goal: Maintain absence of muscle cramping  Outcome: Ongoing  Goal: Maintain normal serum potassium, sodium, calcium, phosphorus, and pH  Outcome: Ongoing     Problem: Loneliness or Risk for Loneliness  Goal: Demonstrate positive use of time alone when socialization is not possible  Outcome: Ongoing     Problem: Fatigue  Goal: Verbalize increase energy and improved vitality  Outcome: Ongoing     Problem: Patient Education: Go to Patient Education Activity  Goal: Patient/Family Education  Outcome: Ongoing     Problem: Skin Integrity:  Goal: Will show no infection signs and symptoms  Description: Will show no infection signs and symptoms  Outcome: Ongoing  Goal: Absence of new skin breakdown  Description: Absence of new skin breakdown  Outcome: Ongoing     Problem: Nutrition  Goal: Optimal nutrition therapy  Outcome: Ongoing Problem: Pain:  Goal: Pain level will decrease  Description: Pain level will decrease  Outcome: Ongoing  Goal: Control of acute pain  Description: Control of acute pain  Outcome: Ongoing  Goal: Control of chronic pain  Description: Control of chronic pain  Outcome: Ongoing     Problem: OXYGENATION/RESPIRATORY FUNCTION  Goal: Patient will maintain patent airway  1/30/2022 0319 by Robi Martínez RN  Outcome: Ongoing  1/29/2022 1943 by Sigifredo Wilson RCP  Outcome: Ongoing  Goal: Patient will achieve/maintain normal respiratory rate/effort  Description: Respiratory rate and effort will be within normal limits for the patient  1/30/2022 0319 by Robi Martínez RN  Outcome: Ongoing  1/29/2022 1943 by Sigifredo Wilson RCP  Outcome: Ongoing     Problem: MECHANICAL VENTILATION  Goal: Patient will maintain patent airway  1/30/2022 0319 by Robi Martínez RN  Outcome: Ongoing  1/29/2022 1943 by Sigifredo Wilson RCP  Outcome: Ongoing  Goal: Oral health is maintained or improved  1/30/2022 0319 by Robi Martínez RN  Outcome: Ongoing  1/29/2022 1943 by Sigifredo Wilson RCP  Outcome: Ongoing  Goal: ET tube will be managed safely  1/30/2022 0319 by Robi Martínez RN  Outcome: Ongoing  1/29/2022 1943 by Sigifredo Wilson RCP  Outcome: Ongoing  Goal: Ability to express needs and understand communication  1/30/2022 0319 by Robi Martínez RN  Outcome: Ongoing  1/29/2022 1943 by Sigifredo Wilson RCP  Outcome: Ongoing  Goal: Mobility/activity is maintained at optimum level for patient  1/30/2022 0319 by Robi Martínez RN  Outcome: Ongoing  1/29/2022 1943 by Sigifredo Wilson RCP  Outcome: Ongoing     Problem: SKIN INTEGRITY  Goal: Skin integrity is maintained or improved  1/30/2022 0319 by Robi Martínez RN  Outcome: Ongoing  1/29/2022 1943 by Sigifredo Wilson RCP  Outcome: Ongoing

## 2022-01-30 NOTE — PLAN OF CARE
Problem: OXYGENATION/RESPIRATORY FUNCTION  Goal: Patient will maintain patent airway  1/30/2022 0831 by Nate Goodman RCP  Outcome: Ongoing     Problem: OXYGENATION/RESPIRATORY FUNCTION  Goal: Patient will achieve/maintain normal respiratory rate/effort  Description: Respiratory rate and effort will be within normal limits for the patient  1/30/2022 0831 by Nate Goodman RCP  Outcome: Ongoing     Problem: MECHANICAL VENTILATION  Goal: Patient will maintain patent airway  1/30/2022 0831 by Nate Goodman RCP  Outcome: Ongoing     Problem: MECHANICAL VENTILATION  Goal: Oral health is maintained or improved  1/30/2022 0831 by Nate Goodman RCP  Outcome: Ongoing     Problem: MECHANICAL VENTILATION  Goal: ET tube will be managed safely  1/30/2022 0831 by Nate Goodman RCP  Outcome: Ongoing     Problem: MECHANICAL VENTILATION  Goal: Ability to express needs and understand communication  1/30/2022 0831 by Nate Goodman RCP  Outcome: Ongoing     Problem: MECHANICAL VENTILATION  Goal: Mobility/activity is maintained at optimum level for patient  1/30/2022 0831 by Nate Goodman RCP  Outcome: Ongoing     Problem: SKIN INTEGRITY  Goal: Skin integrity is maintained or improved  1/30/2022 0831 by Nate Goodman RCP  Outcome: Ongoing

## 2022-01-30 NOTE — PLAN OF CARE
Problem: Falls - Risk of:  Goal: Will remain free from falls  Description: Will remain free from falls  1/30/2022 0716 by Flaca Guzman RN  Outcome: Ongoing  1/30/2022 0319 by Robi Martínez RN  Outcome: Ongoing  Goal: Absence of physical injury  Description: Absence of physical injury  1/30/2022 0716 by Flaca Guzman RN  Outcome: Ongoing  1/30/2022 0319 by Robi Martínez RN  Outcome: Ongoing     Problem: Airway Clearance - Ineffective  Goal: Achieve or maintain patent airway  1/30/2022 0716 by Flaca Guzman RN  Outcome: Ongoing  1/30/2022 0319 by Robi Martínez RN  Outcome: Ongoing     Problem: Gas Exchange - Impaired  Goal: Absence of hypoxia  1/30/2022 0716 by Flaca Guzman RN  Outcome: Ongoing  1/30/2022 0319 by Robi Martínez RN  Outcome: Ongoing  Goal: Promote optimal lung function  1/30/2022 0716 by Flaca Guzman RN  Outcome: Ongoing  1/30/2022 0319 by Robi Martínez RN  Outcome: Ongoing     Problem: Breathing Pattern - Ineffective  Goal: Ability to achieve and maintain a regular respiratory rate  1/30/2022 0716 by Flaca Guzman RN  Outcome: Ongoing  1/30/2022 0319 by Robi Martínez RN  Outcome: Ongoing     Problem:  Body Temperature -  Risk of, Imbalanced  Goal: Ability to maintain a body temperature within defined limits  1/30/2022 0716 by Flaca Guzman RN  Outcome: Ongoing  1/30/2022 0319 by Robi Martínez RN  Outcome: Ongoing  Goal: Will regain or maintain usual level of consciousness  1/30/2022 0716 by Flaca Guzman RN  Outcome: Ongoing  1/30/2022 0319 by Robi Martínez RN  Outcome: Ongoing  Goal: Complications related to the disease process, condition or treatment will be avoided or minimized  1/30/2022 0716 by Flaca Guzman RN  Outcome: Ongoing  1/30/2022 0319 by Robi Martínez RN  Outcome: Ongoing     Problem: Nutrition Deficits  Goal: Optimize nutritional status  1/30/2022 0716 by Flaca Guzman RN  Outcome: Ongoing  1/30/2022 0319 by Rosales Merlos RN  Outcome: Ongoing     Problem: Risk for Fluid Volume Deficit  Goal: Maintain normal heart rhythm  1/30/2022 0716 by Benson Hurst RN  Outcome: Ongoing  1/30/2022 0319 by Rosales Merlos RN  Outcome: Ongoing  Goal: Maintain absence of muscle cramping  1/30/2022 0716 by Benson Hurst RN  Outcome: Ongoing  1/30/2022 0319 by Rosales Merlos RN  Outcome: Ongoing  Goal: Maintain normal serum potassium, sodium, calcium, phosphorus, and pH  1/30/2022 0716 by Benson Hurst RN  Outcome: Ongoing  1/30/2022 0319 by Rosales Merlos RN  Outcome: Ongoing     Problem: Loneliness or Risk for Loneliness  Goal: Demonstrate positive use of time alone when socialization is not possible  1/30/2022 0716 by Benson Hurst RN  Outcome: Ongoing  1/30/2022 0319 by Rosales Merlos RN  Outcome: Ongoing     Problem: Fatigue  Goal: Verbalize increase energy and improved vitality  1/30/2022 0716 by Benson Hurst RN  Outcome: Ongoing  1/30/2022 0319 by Rosales Merlos RN  Outcome: Ongoing     Problem: Patient Education: Go to Patient Education Activity  Goal: Patient/Family Education  1/30/2022 8012 by Benson Hurst RN  Outcome: Ongoing  1/30/2022 0319 by Rosales Merlos RN  Outcome: Ongoing     Problem: Skin Integrity:  Goal: Will show no infection signs and symptoms  Description: Will show no infection signs and symptoms  1/30/2022 0716 by Benson Hurst RN  Outcome: Ongoing  1/30/2022 0319 by Rosales Merlos RN  Outcome: Ongoing  Goal: Absence of new skin breakdown  Description: Absence of new skin breakdown  1/30/2022 0716 by Benson Hurst RN  Outcome: Ongoing  1/30/2022 0319 by Rosales Merlos RN  Outcome: Ongoing     Problem: Nutrition  Goal: Optimal nutrition therapy  1/30/2022 0716 by Benson Hurst RN  Outcome: Ongoing  1/30/2022 0319 by Rosales Merlos RN  Outcome: Ongoing     Problem: Pain:  Goal: Pain level will decrease  Description: Pain level will decrease  1/30/2022 0716 by Stephanie Barton RN  Outcome: Ongoing  1/30/2022 0319 by Leann Car RN  Outcome: Ongoing  Goal: Control of acute pain  Description: Control of acute pain  1/30/2022 0716 by Stephanie Barton RN  Outcome: Ongoing  1/30/2022 0319 by Leann Car RN  Outcome: Ongoing  Goal: Control of chronic pain  Description: Control of chronic pain  1/30/2022 0716 by Stephanie Barton RN  Outcome: Ongoing  1/30/2022 0319 by Leann Car RN  Outcome: Ongoing     Problem: OXYGENATION/RESPIRATORY FUNCTION  Goal: Patient will maintain patent airway  1/30/2022 0716 by Stephanie Barton RN  Outcome: Ongoing  1/30/2022 0319 by Leann Car RN  Outcome: Ongoing  1/29/2022 1943 by Tod Sierra RCP  Outcome: Ongoing  Goal: Patient will achieve/maintain normal respiratory rate/effort  Description: Respiratory rate and effort will be within normal limits for the patient  1/30/2022 0716 by Stephanie Barton RN  Outcome: Ongoing  1/30/2022 0319 by Leann Car RN  Outcome: Ongoing  1/29/2022 1943 by Tod Sierra RCP  Outcome: Ongoing     Problem: MECHANICAL VENTILATION  Goal: Patient will maintain patent airway  1/30/2022 0716 by Stephanie Barton RN  Outcome: Ongoing  1/30/2022 0319 by Leann Car RN  Outcome: Ongoing  1/29/2022 1943 by Tod Sierra RCP  Outcome: Ongoing  Goal: Oral health is maintained or improved  1/30/2022 0716 by Stephanie Barton RN  Outcome: Ongoing  1/30/2022 0319 by Leann Car RN  Outcome: Ongoing  1/29/2022 1943 by Tod Sierra RCP  Outcome: Ongoing  Goal: ET tube will be managed safely  1/30/2022 0716 by Stephanie Barton RN  Outcome: Ongoing  1/30/2022 0319 by Leann Car RN  Outcome: Ongoing  1/29/2022 1943 by Tod Sierra RCP  Outcome: Ongoing  Goal: Ability to express needs and understand communication  1/30/2022 0716 by Stephanie Barton RN  Outcome: Ongoing  1/30/2022 0319 by Yvette Boles RN  Outcome: Ongoing  1/29/2022 1943 by Migdalia King RCP  Outcome: Ongoing  Goal: Mobility/activity is maintained at optimum level for patient  1/30/2022 0716 by [place] RN  Outcome: Ongoing  1/30/2022 0319 by Yvette Boles RN  Outcome: Ongoing  1/29/2022 1943 by Migdalia King RCP  Outcome: Ongoing     Problem: SKIN INTEGRITY  Goal: Skin integrity is maintained or improved  1/30/2022 0716 by [place] RN  Outcome: Ongoing  1/30/2022 0319 by Yvette Boles RN  Outcome: Ongoing  1/29/2022 1943 by Migdalia King RCP  Outcome: Ongoing

## 2022-01-30 NOTE — PROGRESS NOTES
Physical Therapy        Physical Therapy Cancel Note      DATE: 2022    NAME: Sera Maguire  MRN: [id number]   : 1940      Patient not seen this date for Physical Therapy due to:    Patient is not appropriate for PT evaluation/treatment at this time d/t int/sed      Electronically signed by Janes Reeves PT on 2022 at 12:04 PM

## 2022-01-31 NOTE — PROGRESS NOTES
[facility] [address] [address] Patient  Today's Date and Time: 1/31/2022, 3:20 PM    Impression :     · COVID 19 Confirmed Infection  · Covid tests:  · 1/8/21: Positive  · Acute hypoxic respiratory failure  · Paroxysmal A. 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  · Essential hypertension  · Elevated inflammatory markers  · Hyperlipidemia  · Acute gluteal hematoma  · Patient has not received the Covid vaccine  · Intubated 1/22/22  · DNR-CCA    Recommendations:   · Antibiotic treatment:  · Meropenem 500 mg BID IV for leukocytosis & possible secondary bacterial pneumonia 1/11/21-discontinued 1/17/21  · Meropenem Re-started 1-21-22 because of residual dense consolidation of the lungs with air bronchograms  · Sputum 1/25: Normal alistair  · Covid Rx:    · Remdesivir-contraindicated with JULES  · Monoclonal antibodies-out of window  · Decadron-initiated 1/8/2022  · Actemra-administered 1/8/2022    · The patient still has significant hypoxia is currently requiring high flow/BiPAP. · He is insisting on trying to go home and does not quite understand that he is requiring a significant amount of respiratory support. · He did mention to me that he was happy to die at home but it is my understanding that the recently changed his CODE STATUS to comfort care arrest to full code.   · His code status has again been changed to [facility]  · His code status changed back to a full code after he agreed to intubation on 1/22/22  · Continue supportive care      Medical Decision Making/Summary/Discussion:1/31/2022     · Patient admitted with COVID 19 infection  · Isolation until 1/28/22    Infection Control Recommendations   · [place] Precautions    Antimicrobial Stewardship Recommendations       · Renal considerations  Coordination of Outpatient Care:   · Estimated Length of IV antimicrobials:TBD  · Patient will need Midline Catheter Insertion: TBD  · Patient will need PICC line Insertion: No  · Patient will need: Home IV , [place],  SNF,  LTAC:TBD  · Patient will need outpatient wound care:No    Chief complaint/reason for consultation:   · Concern for COVID infection      History of Present Illness:   Bony Sherman is a [de-identified]y.o.-year-old male who was initially admitted on 1/8/2022. Patient seen at the request of Dr. Rosa Chol:    Patient presented through ER with complaints of needing shortness of breath, cough, loss of appetite, nausea, vomiting and diarrhea over the past 7 to 10 days. He has not received the Covid vaccine and tested positive for Covid shortly after Ludwig. On evaluation in the ED, the patient had an SPO2 of 80% on room air and was tachypneic. A rapid Covid swab was positive. CT chest shows extensive bilateral pulmonary groundglass opacities. He was started on Decadron and given a dose of Actemra. Abnormal labs include:  · Creatinine 2.32  ·   · Troponin I 48  · WBC 17.1    Patient admitted because of concerns with COVID 19.     Meropenem initiated on 1/11/2022 for worsening respiratory distress and leukocytosis    Stable chest x-ray 1/11    CRP is trending down    Hemoglobin dropped to 5.7 on 1/16/2022  Hemoglobin remained stable at this time after receiving infusions of PRBC  Left gluteal hematoma present on CT abdomen pelvis      Occult blood noted on stool  Anemia continues in the program patient required another blood transfusion on 1/21/2022  Anticoagulation on hold s/p gluteal hematoma      Impression CT Chest/Abdomen/Pelvis 1/21/22   1.  New small bilateral pleural effusions with extensive bilateral airspace   consolidation, increased from 01/08/2022.       2.  Decreased AP dimension of the distal trachea, suggesting tracheomalacia.       3.  Previously noted hematoma in the left gluteal musculature is not as well   visualized on today's study, but appears overall slightly decreased in size. No new areas of hematoma.       4.  Mild distention of the stomach, mild prominence of proximal small bowel   loops, and mild nonspecific stranding in the proximal mesentery.  No definite   evidence of high-grade bowel obstruction at this time.  Enteritis or early   developing obstruction are considered in the differential.         The patient required intubation for worsening respiratory failure on 1/22/2022. He is currently requiring 65% FiO2 with a PEEP of 12. Propofol is being given for sedation    CURRENT EVALUATION : 1/31/2022    Afebrile  VS stable with hypertension    The patient remains on mechanical ventilation with low settings. He was on a weaning trial upon evaluation. He has not been tolerating weaning trials thus far. Propofol and Fentanyl for sedation    Candida Albicans urine 1/25/22  MRSA nares not detected    Meropenem initiated 1/22/22  Repeat cultures ordered 1/25/22-Normal alistair  Leukocytes has resolved    No acute issues per RN    Patient exhibiting respiratory distress. Yes  Respiratory secretions: No    Patient receiving supplemental oxygen. BiPAP & Hi-flow NC-->MV  RR: 24-->23  02 sat: 98-->94    % FIO2: 30  PEEP: 6    NEWS Score: 0-4 Low risk group; 5-6: Medium risk group; 7 or above: High risk group  Parameters 3 2 1 0 1 2 3   Age    < 65   ? 65   RR ? 8  9-11 12-20  21-24 ? 25   O2 Sats ?  91 92-93 94-95 ? 96      Suppl O2  Yes  No      SBP ? 90  101-110 111-219   ? 220   HR ? 40  41-50 51-90  111-130 ? 131   Consciousness    Alert   Drowsiness, lethargy, or confusion   Temperature ? 35.0 C (95.0 F)  35.1-36.0 C 95.1-96.9 F 36.1-38.0 C 97.0-100.4 F 38.1-39.0 C 100.5-102.3 F ? 39.1 C ? 102.4 F      NEWS Score:   1/9/2022: 5 moderate risk    Overall Daily Picture:      Unchanged    Presence of secondary bacterial Infection:    Possible  Additional antibiotics: Meropenem 1/21/2022    Labs, X rays reviewed: 1/31/2022    BUN:99  Cr:2.4    WBC: Highest education level: Not on file   Occupational History    Not on file   Tobacco Use    Smoking status: Former Smoker    Smokeless tobacco: Never Used   Substance and Sexual Activity    Alcohol use: Not Currently    Drug use: Never    Sexual activity: Not on file   Other Topics Concern    Not on file   Social History Narrative    Not on file     Social Determinants of Health     Financial Resource Strain:     Difficulty of Paying Living Expenses: Not on file   Food Insecurity:     Worried About Running Out of Food in the Last Year: Not on file    Lesley of Food in the Last Year: Not on file   Transportation Needs:     Lack of Transportation (Medical): Not on file    Lack of Transportation (Non-Medical): Not on file   Physical Activity:     Days of Exercise per Week: Not on file    Minutes of Exercise per Session: Not on file   Stress:     Feeling of Stress : Not on file   Social Connections:     Frequency of Communication with Friends and Family: Not on file    Frequency of Social Gatherings with Friends and Family: Not on file    Attends Yazidi Services: Not on file    Active Member of [address] [place] [address] or Organizations: Not on file    Attends Club or Organization Meetings: Not on file    Marital Status: Not on file   Intimate Partner Violence:     Fear of Current or Ex-Partner: Not on file    Emotionally Abused: Not on file    Physically Abused: Not on file    Sexually Abused: Not on file   Housing Stability:     Unable to Pay for Housing in the Last Year: Not on file    Number of [place] in the Last Year: Not on file    Unstable Housing in the Last Year: Not on file       Family History:   History reviewed. No pertinent family history. Allergies:   Patient has no known allergies. Review of Systems:     Review of Systems   Unable to perform ROS: Intubated   Respiratory: Positive for shortness of breath. Cardiovascular: Negative. Gastrointestinal: Negative. Genitourinary: Negative. Musculoskeletal: Negative. Skin: Positive for color change. Neurological: Negative. Physical Examination :     Patient Vitals for the past 8 hrs:   Pulse Resp SpO2   01/31/22 1445 66 23 96 %   01/31/22 1430 64 25 93 %   01/31/22 1415 62 20 94 %   01/31/22 1400 64 25 94 %   01/31/22 1345 60 23 93 %   01/31/22 1330 66 24 94 %   01/31/22 1315 62 22 95 %   01/31/22 1300 64 21 95 %   01/31/22 1245 59 21 94 %   01/31/22 1230 63 19 95 %   01/31/22 1215 62 22 95 %   01/31/22 1200 63 20 96 %   01/31/22 1145 63 27 95 %   01/31/22 1130 63 27 95 %   01/31/22 1115 63 26 96 %   01/31/22 1114 61 25 96 %   01/31/22 1100 61 25 97 %   01/31/22 1045 61 24 97 %   01/31/22 1030 59 23 97 %   01/31/22 1015 56 22 97 %   01/31/22 1000 57 23 97 %   01/31/22 0945 58 22 97 %   01/31/22 0930 54 21 96 %   01/31/22 0915 54 22 96 %   01/31/22 0900 54 20 96 %   01/31/22 0845 57 20 96 %   01/31/22 0843  23 96 %   01/31/22 0830 68 22 95 %   01/31/22 0817 68 23 96 %   01/31/22 0815 70 24 95 %   01/31/22 0800 72 20 96 %   01/31/22 0745 63 17 95 %   01/31/22 0730 64 18 95 %     Physical Exam  Vitals and nursing note reviewed. Constitutional:       Appearance: He is well-developed. Comments: Intubated and sedated   HENT:      Head: Normocephalic and atraumatic. Cardiovascular:      Rate and Rhythm: Normal rate. Heart sounds: Murmur heard. Pulmonary:      Effort: Respiratory distress present. Breath sounds: No wheezing. Abdominal:      General: Bowel sounds are normal.      Palpations: Abdomen is soft. There is no mass. Tenderness: There is no abdominal tenderness. Musculoskeletal:         General: Swelling (In the bilateral upper extremities especially in the forearms.) present. Normal range of motion. Cervical back: Neck supple. Lymphadenopathy:      Cervical: No cervical adenopathy. Skin:     General: Skin is dry.       Findings: Bruising (There is bruising and ecchymotic skin lesions in the forearms bilaterally right worse than left) present. Neurological:      Comments: FINA-intubated and sedated         Medical Decision Making -Laboratory:   I have independently reviewed/ordered the following labs:    CBC with Differential:   Recent Labs     01/30/22  0503 01/31/22  0339   WBC 10.4 8.8   HGB 8.9* 8.1*   HCT 28.4* 26.3*    190   LYMPHOPCT 5* 5*   MONOPCT 6 7     BMP:   Recent Labs     01/30/22  0503 01/31/22  0339   * 146*   K 3.8 4.0   * 107   CO2 29 31   BUN 93* 99*   CREATININE 2.25* 2.44*     Hepatic Function Panel:   No results for input(s): PROT, LABALBU, BILIDIR, IBILI, BILITOT, ALKPHOS, ALT, AST in the last 72 hours. No results for input(s): RPR in the last 72 hours. No results for input(s): HIV in the last 72 hours. No results for input(s): BC in the last 72 hours. Lab Results   Component Value Date    MUCUS 1+ 01/17/2022    RBC 2.62 01/31/2022    TRICHOMONAS NOT REPORTED 01/17/2022    WBC 8.8 01/31/2022    YEAST NOT REPORTED 01/17/2022    TURBIDITY Clear 01/17/2022     Lab Results   Component Value Date    CREATININE 2.44 01/31/2022    GLUCOSE 188 01/31/2022       Medical Decision Making-Imaging:     Narrative   EXAMINATION:   CTA OF THE CHEST 1/8/2022 10:10 am       TECHNIQUE:   CTA of the chest was performed after the administration of intravenous   contrast.  Multiplanar reformatted images are provided for review.  MIP   images are provided for review.  Dose modulation, iterative reconstruction,   and/or weight based adjustment of the mA/kV was utilized to reduce the   radiation dose to as low as reasonably achievable.       COMPARISON:   None.       HISTORY:   ORDERING SYSTEM PROVIDED HISTORY: dyspnea / hypoxia   Reason for Exam: Shortness of breath       FINDINGS:   Pulmonary Arteries: Pulmonary arteries are adequately opacified for   evaluation.  No evidence of intraluminal filling defect to suggest pulmonary   embolism. Dennie Shad pulmonary artery is normal in caliber.       Mediastinum: No evidence of mediastinal lymphadenopathy.  Normal heart size. Normal caliber thoracic aorta with diffuse atherosclerosis.       Lungs/pleura: Extensive ground-glass opacification of the bilateral lung   fields with peripheral involvement slight apical as well as basilar sparing. No effusion or pneumothorax.       Upper Abdomen: Limited images of the upper abdomen are unremarkable.       Soft Tissues/Bones: No acute bone or soft tissue abnormality.           Impression   *No evidence of pulmonary embolism. *Extensive ground-glass opacification of the bilateral lung fields with   peripheral involvement slight apical as well as basilar sparing.  Imaging   features suggestive of COVID-19 pneumonia, though are nonspecific and can   occur with a variety of infectious and noninfectious processes.         Narrative   EXAMINATION:   CT OF THE ABDOMEN AND PELVIS WITHOUT CONTRAST, 1/16/2022 10:42 am       TECHNIQUE:   CT of the abdomen and pelvis was performed without the administration of   intravenous contrast. Multiplanar reformatted images are provided for review.    Dose modulation, iterative reconstruction, and/or weight based adjustment of   the mA/kV was utilized to reduce the radiation dose to as low as reasonably   achievable.       COMPARISON:   CT of the chest from 01/08/2022, and retroperitoneal ultrasound from   01/10/2022.       HISTORY:   ORDERING SYSTEM PROVIDED HISTORY:  Retrop bleed r/o   TECHNOLOGIST PROVIDED HISTORY:   Retrop bleed r/o   Reason for Exam:  Retrop bleed r/o       FINDINGS:   Lower Chest: As demonstrated on recent CT chest, extensive and somewhat   denser confluent ground-glass airspace opacities re-identified mid-lower   lungs, sparing the bases with some associated crazy paving, air bronchograms   and bibasilar atelectatic appearing changes.  Main pulmonary artery caliber   31 mm.  Mild dilatation ascending aorta, with a maximal dimension of 41 mm.       Organs: Unopacified liver, spleen, adrenal glands and both kidneys show no   acute abnormality; without suspicious focal finding or hydronephrosis.    Significant right renal cortical thinning and some scarring suspected.  No   large stone.  Probable gallbladder sludge or vicarious contrast from recent   contrast CT; no calcified stones.       GI/Bowel: Small-moderate amount of retained stool, mostly right colon with   some fluid/levels; no abnormal dilatation, marked wall thickening or   pericolonic fat stranding.  Unremarkable small bowel.  Some fluid within a   mildly distended stomach.  Small hiatus hernia.       Pelvis: Partially fluid-filled urinary bladder without wall thickening or   mass.  No significant prostatic enlargement.  Symmetric seminal vesicles.  No   pelvic fluid collection or mass.  Partially visualized approximate 10 x 15 x   5.7 cm left gluteal mass with HU measurements/appearance most suggestive of   hematoma.  No high density finding compatible with active bleeding, but   assessment limited on this examination.  Small fat containing inguinal   hernias, larger on the left.       Peritoneum/Retroperitoneum: No retroperitoneal bleed or bulky   lymphadenopathy.  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  Probable limited intimal dissection   suprarenal aorta without marked aneurysmal dilatation.  Soft tissue stranding   flanks extending into the pelvis, suggesting some degree anasarca.  Slightly   greater prominence right proximal rectus musculature       Bones/Soft Tissues: No acute abnormality.  Mild scoliosis, multilevel   degenerative changes of spine but with DDD L5-S1 and bilateral mild hip joint   degenerative findings.           Impression   Left gluteal hematoma, partially visualized on this study without obvious   active bleeding, but limited without IV contrast.  No retroperitoneal or   rectus sheath bleed.       Extensive findings in the visualized lungs compatible with known COVID-19   pneumonia; denser GGOs suggest worsening pneumonia/pneumonitis or developing   consolidation.  No pneumothorax.       Multiple additional findings, as detailed in the body of the report above.       RECOMMENDATIONS:   Consider follow-up CT pelvis including the upper thighs, if the patient does   not respond to resuscitation with blood products/fluids and other   conservative measures including localized pressure.  Findings were discussed   with Carmelina Martinez at 12:24 pm on 1/16/2022.             Narrative   EXAMINATION:   CT OF THE CHEST, ABDOMEN, AND PELVIS WITHOUT CONTRAST 1/21/2022 11:09 am       TECHNIQUE:   CT of the chest, abdomen and pelvis was performed without the administration   of intravenous contrast. Multiplanar reformatted images are provided for   review. Dose modulation, iterative reconstruction, and/or weight based   adjustment of the mA/kV was utilized to reduce the radiation dose to as low   as reasonably achievable.       COMPARISON:   CT abdomen and pelvis dated 01/16/2022.  CT chest dated 01/08/2022       HISTORY:   ORDERING SYSTEM PROVIDED HISTORY: blood loss anemia , gluteal hematoma and   worsening HB   TECHNOLOGIST PROVIDED HISTORY:   blood loss anemia , gluteal hematoma and worsening HB           FINDINGS:       Chest:       Mediastinum: Heart size is within normal limits.  Ascending thoracic aorta is   mildly dilated, measuring 4 cm in AP dimension, similar to the previous   study.  Main pulmonary artery is stable in caliber as well.  No mediastinal   hematoma or lymphadenopathy. Tiago Zavala is a catheter in the SVC with distal tip   in the distal SVC.       Lungs/pleura:  There are small bilateral pleural effusions, which are new from   the previous study. Lei Lucas is extensive dense airspace consolidation   throughout both lungs, with mild sparing at the bases, increased from   01/08/2022. Jillene Bergamo is decreased AP dimension of the lower trachea, although   tracheobronchial tree remains patent.       Soft Tissues/Bones: There is no acute or suspicious osseous abnormality. Visualized superficial soft tissues are within normal limits.           Abdomen/Pelvis:       Organs: Limited unenhanced liver is within normal limits.  There is a tiny   amount of free fluid adjacent to the hepatic dome.  Gallbladder, spleen,   pancreas, and adrenal glands are unremarkable.       There is bilateral renal atrophy, more so on the right, unchanged.  No   hydronephrosis or perinephric fluid collection. Jillene Bergamo is mild bilateral   perinephric stranding, which is unchanged and likely physiologic.       GI/Bowel: The stomach is mildly distended with air-fluid level noted.  No   abnormal bowel distention.  There is mild increased prominence of the   proximal small bowel loops.  There is mild stranding in the proximal   mesentery.  No focal pericolonic inflammation.  No free air.       Pelvis: Urinary bladder is within normal limits.  No evidence of pelvic   lymphadenopathy.       Peritoneum/Retroperitoneum: Distal aortobifemoral graft noted.  Caliber of   the abdominal aorta is unchanged.  There is moderate to severe scattered   atherosclerosis, which is unchanged.  No retroperitoneal lymphadenopathy or   hematoma.  Slit-like IVC is noted.       Bones/Soft Tissues: There is no acute or suspicious osseous abnormality.  The   hematoma previously seen in the left gluteal muscle is not as well visualized   on today's study due to isoattenuation.  There is asymmetric swelling of the   left gluteal muscle, although slightly improved when compared to 01/16/2022. AP dimension in the area of suspected hematoma is 4.4 cm (previously 5.7 cm).    Transverse dimension is approximately 9.3 cm (previously 9.7 cm).  There is   stranding in the subcutaneous fat of the upper thighs bilaterally.           Impression   1.  New small bilateral pleural effusions with extensive bilateral airspace   consolidation, increased from 01/08/2022.       2.  Decreased AP dimension of the distal trachea, suggesting tracheomalacia.       3.  Previously noted hematoma in the left gluteal musculature is not as well   visualized on today's study, but appears overall slightly decreased in size. No new areas of hematoma.       4.  Mild distention of the stomach, mild prominence of proximal small bowel   loops, and mild nonspecific stranding in the proximal mesentery.  No definite   evidence of high-grade bowel obstruction at this time.  Enteritis or early   developing obstruction are considered in the differential.         Medical Decision [id number]:     Culture, Blood 1 [[id number]] Collected: 01/09/22 1155   Order Status: Completed Specimen: Blood Updated: 01/14/22 1300    Specimen Description . BLOOD    Special Requests NOT REPORTED    Culture NO GROWTH 5 DAYS   Culture, Blood 1 [[id number]] Collected: 01/09/22 1155   Order Status: Completed Specimen: Blood Updated: 01/14/22 1300    Specimen Description . BLOOD    Special Requests NOT REPORTED    Culture NO GROWTH 5 DAYS   COVID-19, Rapid [[id number]] (Abnormal) Collected: 01/08/22 1045   Order Status: Completed Specimen: Nasopharyngeal Swab Updated: 01/08/22 1109    Specimen Description . NASOPHARYNGEAL SWAB    SARS-CoV-2, Rapid DETECTED Abnormal     Comment:        Rapid NAAT: The specimen is POSITIVE for SARS-Cov-2, the novel coronavirus associated with   COVID-19.         This test has been authorized by the FDA under an Emergency Use Authorization (EUA) for use   by authorized laboratories.         The ID NOW COVID-19 assay is designed to detect the virus that causes COVID-19 in patients   with signs and symptoms of infection who are suspected of COVID-19.    An individual without symptoms of COVID-19 and who is not shedding SARS-CoV-2 virus would   expect to have a negative (not detected) result in this assay. Fact sheet for Healthcare Providers: Dionne   Fact sheet for Patients: [email or web address]           Methodology: Isothermal Nucleic Acid Amplification         Results reported to the appropriate Health DepartLevine, Susan. \Hospital Has a New Name and Outlook.\""            Medical Decision Making-Other:     Note:  · Labs, medications, radiologic studies were reviewed with personal review of films  · Large amounts of data were reviewed  · Discussed with nursing Staff, Discharge planner  · Infection Control and Prevention measures reviewed  · All prior entries were reviewed  · Administer medications as ordered  · Prognosis: Guarded  · Discharge planning reviewed      Thank you for allowing us to participate in the care of this patient. Please call with questions. Alejandra Martinez APRN    ATTESTATION:    I have discussed the case, including pertinent history and exam findings with the APRN. I have evaluated the  History, physical findings and pictures of the patient and the key elements of the encounter have been performed by me. I have reviewed the laboratory data, other diagnostic studies and discussed them with the APRN. I have updated the medical record where necessary. I agree with the assessment, plan and orders as documented by the APRN.     Faustino Forrester MD.

## 2022-01-31 NOTE — PLAN OF CARE
Problem: OXYGENATION/RESPIRATORY FUNCTION  Goal: Patient will maintain patent airway  1/31/2022 0842 by Bruce Camejo RCP  Outcome: Ongoing     Problem: OXYGENATION/RESPIRATORY FUNCTION  Goal: Patient will achieve/maintain normal respiratory rate/effort  Description: Respiratory rate and effort will be within normal limits for the patient  1/31/2022 0842 by Bruce Camejo RCP  Outcome: Ongoing     Problem: MECHANICAL VENTILATION  Goal: Patient will maintain patent airway  1/31/2022 0842 by Bruce Camejo RCP  Outcome: Ongoing     Problem: MECHANICAL VENTILATION  Goal: Oral health is maintained or improved  1/31/2022 0842 by Bruce Camejo RCP  Outcome: Ongoing     Problem: MECHANICAL VENTILATION  Goal: ET tube will be managed safely  1/31/2022 0842 by Bruce Camejo RCP  Outcome: Ongoing     Problem: MECHANICAL VENTILATION  Goal: Ability to express needs and understand communication  1/31/2022 0842 by Bruce Camejo RCP  Outcome: Ongoing     Problem: MECHANICAL VENTILATION  Goal: Mobility/activity is maintained at optimum level for patient  1/31/2022 0842 by Bruce Camejo RCP  Outcome: Ongoing     Problem: SKIN INTEGRITY  Goal: Skin integrity is maintained or improved  1/31/2022 0842 by Bruce Camejo RCP  Outcome: Ongoing

## 2022-01-31 NOTE — PLAN OF CARE
Problem: OXYGENATION/RESPIRATORY FUNCTION  Goal: Patient will maintain patent airway  1/30/2022 2020 by Pao Hernandez RCP  Outcome: Ongoing     Problem: OXYGENATION/RESPIRATORY FUNCTION  Goal: Patient will achieve/maintain normal respiratory rate/effort  Description: Respiratory rate and effort will be within normal limits for the patient  1/30/2022 2020 by Pao Hernandez RCP  Outcome: Ongoing     Problem: MECHANICAL VENTILATION  Goal: Patient will maintain patent airway  1/30/2022 2020 by Pao Hernandez RCP  Outcome: Ongoing     Problem: MECHANICAL VENTILATION  Goal: Oral health is maintained or improved  1/30/2022 2020 by Pao Hernandez RCP  Outcome: Ongoing     Problem: MECHANICAL VENTILATION  Goal: ET tube will be managed safely  1/30/2022 2020 by Pao Hernandez RCP  Outcome: Ongoing     Problem: MECHANICAL VENTILATION  Goal: Ability to express needs and understand communication  1/30/2022 2020 by Pao Hernandez RCP  Outcome: Ongoing     Problem: MECHANICAL VENTILATION  Goal: Mobility/activity is maintained at optimum level for patient  1/30/2022 2020 by Pao Hernandez RCP  Outcome: Ongoing     Problem: SKIN INTEGRITY  Goal: Skin integrity is maintained or improved  1/30/2022 2020 by Pao Hernandez RCP  Outcome: Ongoing

## 2022-01-31 NOTE — PLAN OF CARE
Problem: Falls - Risk of:  Goal: Will remain free from falls  Description: Will remain free from falls  Outcome: Ongoing  Goal: Absence of physical injury  Description: Absence of physical injury  Outcome: Ongoing     Problem: Airway Clearance - Ineffective  Goal: Achieve or maintain patent airway  Outcome: Ongoing     Problem: Gas Exchange - Impaired  Goal: Absence of hypoxia  Outcome: Ongoing  Goal: Promote optimal lung function  Outcome: Ongoing     Problem: Breathing Pattern - Ineffective  Goal: Ability to achieve and maintain a regular respiratory rate  Outcome: Ongoing     Problem:  Body Temperature -  Risk of, Imbalanced  Goal: Ability to maintain a body temperature within defined limits  Outcome: Ongoing  Goal: Will regain or maintain usual level of consciousness  Outcome: Ongoing  Goal: Complications related to the disease process, condition or treatment will be avoided or minimized  Outcome: Ongoing     Problem: Nutrition Deficits  Goal: Optimize nutritional status  Outcome: Ongoing     Problem: Risk for Fluid Volume Deficit  Goal: Maintain normal heart rhythm  Outcome: Ongoing  Goal: Maintain absence of muscle cramping  Outcome: Ongoing  Goal: Maintain normal serum potassium, sodium, calcium, phosphorus, and pH  Outcome: Ongoing     Problem: Loneliness or Risk for Loneliness  Goal: Demonstrate positive use of time alone when socialization is not possible  Outcome: Ongoing     Problem: Fatigue  Goal: Verbalize increase energy and improved vitality  Outcome: Ongoing     Problem: Patient Education: Go to Patient Education Activity  Goal: Patient/Family Education  Outcome: Ongoing     Problem: Skin Integrity:  Goal: Will show no infection signs and symptoms  Description: Will show no infection signs and symptoms  Outcome: Ongoing  Goal: Absence of new skin breakdown  Description: Absence of new skin breakdown  Outcome: Ongoing     Problem: Nutrition  Goal: Optimal nutrition therapy  Outcome: Ongoing

## 2022-01-31 NOTE — PROGRESS NOTES
(67.2 kg)    · Usual Body Weight: 150 lb (68 kg)     · Ideal Body Weight: 166 lbs; % Ideal Body Weight 99.2 %   · BMI: 23.6  · BMI Categories: Normal Weight (BMI 18.5-24. 9)       Nutrition Diagnosis:   · Inadequate oral intake related to impaired respiratory function as evidenced by NPO or clear liquid status due to medical condition,nutrition support - enteral nutrition    Nutrition Interventions:   Food and/or Nutrient Delivery:  Modify Tube Feeding-Suggest increase TF goal rate to 40 mL/hr and discontinue protein modulars. This will provide 1728 kcal and 78 g pro/day. Nutrition Education/Counseling:  No recommendation at this time   Coordination of Nutrition Care:  Continue to monitor while inpatient    Goals:  Meet % of estimated nutrition needs with nutrition support/oral diet. Progressing towards goal     Nutrition Monitoring and Evaluation:   Behavioral-Environmental Outcomes:  None Identified   Food/Nutrient Intake Outcomes:  Enteral Nutrition Intake/Tolerance  Physical Signs/Symptoms Outcomes:  Biochemical Data,Fluid Status or Edema,Nutrition Focused Physical Findings,Skin,Weight     Discharge Planning:     Too soon to determine     Electronically signed by Quiana House RD, LD on 1/31/22 at 4:19 PM EST    Contact: [phone]

## 2022-01-31 NOTE — PROGRESS NOTES
Critical Care Team - Daily Progress Note      Date and time: 1/31/2022 7:34 AM  Patient's name:  Abad Mullins  Medical Record Number: [id number]  Patient's account/billing number: [de-identified]  Patient's YOB: 1940  Age: [de-identified] y.o.   Date of Admission: 1/8/2022  9:49 AM  Length of stay during current admission: 23      Primary Care Physician: MOHAMUD Hernandez - CNP  ICU Attending Physician: Dr. Sugar Mullins    Code Status: Full Code    Reason for ICU admission:   Chief Complaint   Patient presents with    Shortness of Breath    Nausea & Vomiting       Subjective:        HISTORY OF PRESENTING ILLNESS:     History was obtained from EMR due to patient being intubated patient is a 25-year-old  non- male who presented to the ER 01/08/2022 for shortness of breath, nausea and vomiting and was admitted for pneumonia due to COVID-19.  He reported having increasing shortness of breath with nausea and vomiting or 7 to 10 days before coming to the hospital. [facility] [place] was tested positive for Covid shortly after [place] time.  Subsequently patient became mildly short of breath and more notified us cocci approximately 10 feet.  Upon arrival to the emergency room, patient was hypoxic on room air with oxygen saturation less than 80%.  He was put on high flow oxygen and was started on Decadron on 1/8/2010 and received Actemra on 1/8/2022.  Patient was admitted in Covid unit.  He also received meropenem for leukocytosis and possible secondary bacterial pneumonia from 1/11/2022 to 1/17/2022.  Meropenem was restarted on 1/21/2022 because of residual dense consolidation in the lungs with air bronchograms with end date of 1/31/2022 per ID.  Remdesivir is contraindicated due to JULES.  Patient had significant hypoxia while being on high flow nasal cannula and BiPAP alternatively he required intubation and mechanical ventilation on 1/22/22.  Nephrology is following for JULES likely secondary to ischemic ATN and degrees   Ulcer prophylaxis: [] PPI Agent, [x] [id number], [] Sucralfate, [] Other:   Glycemic control: well-controlled; Bs-180s   Spontaneous breathing trial: CPAP trials in progress   Bowel regimen/urine output: Senna, Uout: 2L/24 hours  · Indwelling catheter/lines: Midline - left brachial ; Arterial line- left radial- ;    De-escalation: plan for possible extubation today. AWAKE & FOLLOWING COMMANDS:  [] No   [x] Yes    CURRENT VENTILATION STATUS:     [x] Ventilator  [] BIPAP  [] Nasal Cannula [] Room Air      IF INTUBATED, ET TUBE MARKING AT LOWER LIP:       cms    SECRETIONS Amount:  [] Small [] Moderate  [] Large  [] None  Color:     [] White [] Colored  [] Bloody    SEDATION:  RAAS Score:  [x] Propofol gtt  [] Versed gtt  [] Ativan gtt   [] No Sedation    PARALYZED:  [x] No    [] Yes    DIARRHEA:                [x] No                [] Yes  (C. Difficile status: [] positive                                                                                                                       [] negative                                                                                                                     [] pending)    VASOPRESSORS:  [x] No    [] Yes    If yes -   [] Levophed       [] Dopamine     [] Vasopressin       [] Dobutamine  [] Phenylephrine         [] Epinephrine    CENTRAL LINES:     [x] No   [] Yes   (Date of Insertion:   )           If yes -     [] Right IJ     [] Left IJ [] Right Femoral [] Left Femoral                   [] Right Subclavian [] Left Subclavian       ORTIZ'S CATHETER:   [x] No   [] Yes  (Date of Insertion:   )     URINE OUTPUT:            [x] Good   [] Low              [] Anuric    REVIEW OF SYSTEMS:  · Unable to assess due to patient being intubated.        OBJECTIVE:     VITAL SIGNS:  BP (!) 151/50   Pulse 66   Temp 97.5 °F (36.4 °C) (Oral)   Resp 18   Ht 5' 10\" (1.778 m)   Wt 164 lb 10.9 oz (74.7 kg)   SpO2 96%   BMI 23.63 kg/m²   Tmax over 24 hours:  Temp (24hrs), Av.8 °F (36.6 °C), Min:97.4 °F (36.3 °C), Max:98.3 °F (36.8 °C)      Patient Vitals for the past 8 hrs:   Temp Temp src Pulse Resp SpO2   22 0719 97.5 °F (36.4 °C) Oral      22 0545   66 18 96 %   22 0530   68 20 96 %   22 0515   66 17 95 %   22 0500   66 18 95 %   22 0456    19 95 %   22 0445   64 18 95 %   22 0430   68 21 95 %   22 0415   63 20 96 %   22 0400 97.7 °F (36.5 °C) Oral 64 15 95 %   22 0345   58 17 95 %   22 0330   60 15 94 %   22 0325   57 15 94 %   22 0315   57 15 95 %   22 0300   56 18 95 %   22 0200 97.7 °F (36.5 °C) Oral 57 17 96 %   22 0145   56 16 96 %   22 0130   57 17 96 %   22 0115   58 17 96 %   22 0100   60 18 95 %   22 0045   57 16 96 %   22 0030   57 16 96 %   22 0015   57 16 96 %   22 0000 97.7 °F (36.5 °C) Oral 57 16 96 %   22 2345   58 16 97 %   22 2339   55 12 97 %         Intake/Output Summary (Last 24 hours) at 2022 0734  Last data filed at 2022 0330  Gross per 24 hour   Intake 1220.9 ml   Output 2000 ml   Net -779.1 ml     Date 22 0000 - 22 2359   Shift 4297-9136 0746-3525 8589-9692 24 Hour Total   INTAKE   I.V.(mL/kg) 85.4(1.1)   85.4(1.1)   NG/GT(mL/kg) 695(9.3)   695(9.3)   Shift Total(mL/kg) 780. 4(10.4)   780. 4(10.4)   OUTPUT   Urine(mL/kg/hr) 250   250   Shift Total(mL/kg) 250(3.3)   250(3.3)   Weight (kg) 74.7 74.7 74.7 74.7     Wt Readings from Last 3 Encounters:   22 164 lb 10.9 oz (74.7 kg)     Body mass index is 23.63 kg/m². PHYSICAL EXAM:  Constitutional: intubated, sedated, mechanically ventilated, follows commands off sedation  HEENT: PERRLA, EOMI, sclera clear, anicteric  Respiratory: clear to auscultation, no wheezes or rales and unlabored breathing.   Cardiovascular: regular rate and rhythm, normal S1, S2, no murmur noted and 2+ pulses throughout  Abdomen: soft, nontender, nondistended, no masses or organomegaly  NEUROLOGIC: follows commands off sedation  Extremities:  peripheral pulses normal, no pedal edema,.       Any additional physical findings: None      MEDICATIONS:  Scheduled Meds:   metoprolol tartrate  75 mg Oral BID    amLODIPine  10 mg Oral Daily    torsemide  40 mg Oral Daily    amiodarone  200 mg Oral Daily    miconazole   Topical BID    apixaban  2.5 mg Oral BID    famotidine  20 mg Oral Daily    oxyCODONE  7.5 mg Oral Q6H    insulin lispro  0-6 Units SubCUTAneous Q4H    insulin lispro  0-3 Units SubCUTAneous Nightly    busPIRone  10 mg Oral TID    senna  2 tablet Oral Nightly    atorvastatin  20 mg Oral Nightly    clopidogrel  75 mg Oral Daily    aspirin  81 mg Oral Daily     Continuous Infusions:   fentaNYL 50 mcg/hr (01/30/22 1144)    propofol 30 mcg/kg/min (01/31/22 0515)    sodium chloride      sodium chloride      dextrose 100 mL/hr (01/19/22 0010)    sodium chloride       PRN Meds:   metoclopramide, 10 mg, Q6H PRN  bisacodyl, 10 mg, Daily PRN  hydrALAZINE, 10 mg, Q6H PRN  sodium chloride, , PRN  sodium chloride flush, 5-40 mL, PRN  sodium chloride, 25 mL, PRN  melatonin, 3 mg, Nightly PRN  sodium chloride, 1 spray, PRN  LORazepam, 0.5 mg, Q2H PRN  glucose, 15 g, PRN  dextrose, 12.5 g, PRN  glucagon (rDNA), 1 mg, PRN  dextrose, 100 mL/hr, PRN  metoprolol, 5 mg, Q6H PRN  benzonatate, 200 mg, TID PRN  sodium chloride flush, 10 mL, PRN  sodium chloride flush, 5-40 mL, PRN  sodium chloride, 25 mL, PRN  ondansetron, 4 mg, Q8H PRN   Or  ondansetron, 4 mg, Q6H PRN  polyethylene glycol, 17 g, Daily PRN  acetaminophen, 650 mg, Q6H PRN   Or  acetaminophen, 650 mg, Q6H PRN        SUPPORT DEVICES: [x] Ventilator [] BIPAP  [] Nasal Cannula [] Room Air    VENT SETTINGS (Comprehensive) (if applicable):  Vent Information  $Ventilation: $Subsequent Day  Skin Assessment: Skin breakdown present (see comment/note)  Suction Catheter Diameter: 14  Equipment Changed: Expiratory Filter  Vent Type: Servo i  Vent Mode: PRVC  Vt Ordered: 580 mL  Rate Set: 16 bmp  Pressure Support: 6 cmH20  FiO2 : 30 %  SpO2: 96 %  SpO2/FiO2 ratio: 320  Sensitivity: 2  PEEP/CPAP: 5  I Time/ I Time %: 0.85 s  Humidification Source: HME  Humidification Temp: 31  Humidification Temp Measured: 31  Nitric Oxide/Epoprostenol In Use?: No  Mask Type: Full face mask  Mask Size: Medium  Additional Respiratory  Assessments  Pulse: 66  Resp: 18  SpO2: 96 %  End Tidal CO2: 39 (%)  Position: Semi-Haynes's  Humidification Source: HME  Humidification Temp: 31  Oral Care Completed?: Yes  Oral Care: Lip moisturizer applied,Mouth moisturizer,Mouth suctioned  Subglottic Suction Done?: No  Cuff Pressure (cm H2O):  (mlt)    ABGs:     Lab Results   Component Value Date    QLC6RLB NOT REPORTED 01/31/2022    FIO2 30.0 01/31/2022     Lactic Acid:   Lab Results   Component Value Date    LACTA 2.1 01/08/2022         DATA:  Complete Blood Count:   Recent Labs     01/29/22 0425 01/30/22  0503 01/31/22  0339   WBC 11.4* 10.4 8.8   HGB 8.9* 8.9* 8.1*   MCV 98.9 99.3 100.4    175 190   RBC 2.84* 2.86* 2.62*   HCT 28.1* 28.4* 26.3*   MCH 31.3 31.1 30.9   MCHC 31.7 31.3 30.8   RDW 18.3* 18.0* 17.8*   MPV 11.0 10.9 11.0        PT/INR:    Lab Results   Component Value Date    PROTIME 11.6 01/22/2022    INR 1.1 01/22/2022     PTT:    Lab Results   Component Value Date    APTT 25.2 01/22/2022       Basal Metabolic Profile:   Recent Labs     01/29/22 0425 01/30/22  0503 01/31/22  0339   * 149* 146*   K 4.1 3.8 4.0   BUN 89* 93* 99*   CREATININE 2.54* 2.25* 2.44*   * 109* 107   CO2 29 29 31      Magnesium:   Lab Results   Component Value Date    MG 2.5 01/28/2022    MG 2.4 01/26/2022    MG 2.6 01/15/2022     Phosphorus:   Lab Results   Component Value Date    PHOS 5.3 01/22/2022     S.  Calcium:  Recent Labs     01/31/22  0339   CALCIUM 8.7 S. Ionized Calcium:No results for input(s): IONCA in the last 72 hours. Urinalysis:   Lab Results   Component Value Date    NITRU NEGATIVE 01/17/2022    COLORU Yellow 01/17/2022    PHUR 5.0 01/17/2022    WBCUA None 01/17/2022    RBCUA 2 TO 5 01/17/2022    MUCUS 1+ 01/17/2022    TRICHOMONAS NOT REPORTED 01/17/2022    YEAST NOT REPORTED 01/17/2022    BACTERIA NOT REPORTED 01/17/2022    SPECGRAV 1.018 01/17/2022    LEUKOCYTESUR NEGATIVE 01/17/2022    UROBILINOGEN Normal 01/17/2022    BILIRUBINUR NEGATIVE 01/17/2022    GLUCOSEU 1+ 01/17/2022    KETUA NEGATIVE 01/17/2022    AMORPHOUS NOT REPORTED 01/17/2022       CARDIAC ENZYMES: No results for input(s): CKMB, CKMBINDEX, TROPONINI in the last 72 hours. Invalid input(s): CKTOTAL;3  BNP: No results for input(s): BNP in the last 72 hours. LFTS  No results for input(s): ALKPHOS, ALT, AST, BILITOT, BILIDIR, LABALBU in the last 72 hours. AMYLASE/LIPASE/AMMONIA  No results for input(s): AMYLASE, LIPASE, AMMONIA in the last 72 hours. Last 3 Blood Glucose:   Recent Labs     01/29/22  0425 01/30/22  0503 01/31/22  0339   GLUCOSE 132* 134* 188*      HgBA1c:  No results found for: LABA1C      TSH:  No results found for: TSH  ANEMIA STUDIES  No results for input(s): LABIRON, TIBC, FERRITIN, [id number], FOLATE, OCCULTBLD in the last 72 hours.       Cultures during this admission:     Blood cultures:                 [] None drawn      [x] Negative             []  Positive (Details:  )  Urine Culture:                   [] None drawn      [] Negative             [x]  Positive (Details: Presumptive candida albicans )  Sputum Culture:               [] None drawn       [] Negative             [x]  Positive (Details: Rare gram negative rods  )   Endotracheal aspirate:     [] None drawn       [] Negative             [x]  Positive (Details: Rare gram negative rods )        ASSESSMENT:     Principal Problem:    Pneumonia due to COVID-19 virus  Active Problems:    Essential hypertension    Hyperlipidemia    Acute hypoxemic respiratory failure due to COVID-19 [facility])    Acute kidney injury ([facility] [address]    Stage 3b chronic kidney disease (HCC)    Elevated troponin    Nonrheumatic aortic valve stenosis    PAD (peripheral artery disease) (HCC)    Moderate protein-calorie malnutrition (HCC)    Atrial fibrillation with RVR (HCC)    COVID-19    Traumatic hematoma of buttock    Acute distention of stomach    Hypoxia  Resolved Problems:    * No resolved hospital problems. *        PLAN:       1. Neurologic:   · Neuro intact  · Neuro checks per protocol  · Sedation on Propofol and Fentanyl  · Pain management: Fentanyl gtt; Roxicodone 7.5 mg q6 hours  · Patient follows commands off sedation     2. Cardiovascular:  · Hemodynamically stable, hypertensive; on Norvasc 10 mg daily  · HR 60s  · MAPs 70s-80s  · MAP goal 65  · Echo : LVEF 55%. Severe aortic stenosis   · New onset A.fib with RVR and NSTEMI Tyep II Vs. Type I- Cardiology following: Continue Amiodarone 200 mg daily PO, beta blockers and Eliquis. Rate controlled in NSR on Lopressor 75 mg BID and Amiodarone.     3. Pulmonary:  · Maintain oxygen sats >92%  · Pulmonary toilet  · Acute hypoxic respiratory failure secondary to COVID pneumonia  · Vent setting: PRVC/16/580/5/30%  · AB.48/pCO2 44.4/pO2 56.8/HCO3   · CXR : stable scattered pulmonary infiltrates. ET tube in distal mid trachea. · ID following: On Meropenem (Stop date: 22)        4. GI/Nutrition  · Senna daily, Dulcolax and Glycolax PRN  · Ulcer Prophylaxis: Pepcid  · Diet:ADULT TUBE FEEDING; Orogastric; Renal Formula; Continuous; 10; Yes; 10; Q 4 hours; 25; 30; Other (specify); per MD; Protein; 2 Protein modulars per day        5.  Renal/Fluid/Electrolyte  · IV Fluids: None  · I/O: In: 1220  · UOut: 2.0 L/24 hours   · JULES on CKD stage IIIb-4 (baseline Cr 2.0) secondary to ischemic ATN and nephrotoxic ATN:  BUN/Cr 99/2.44  · Nephrology following: Continue Demadex 40 daily; add free water 300 mL q4 hours, f/u labs ordered  · Monitor electrolytes, replace PRN         6. ID  · WBC: 10.4<--11.4  · Antimicrobials: Meropenem (Stop date: 22)        7. Hematology:  · Hgb 8.1<--8.9; Plt 190  · Monitor daily CBC     8. Endocrine:   · glucose controlled on low dose sliding scale  · Bs-180s     9. DVT Prophylaxis  · On Eliquis        GI PPX: Pepcid  DVT PPX: Eliquis  IVF: None  Diet: Tube feeds        Bridgette Lara MD, PGY-1           Department of Internal Medicine/ Critical care  [facility] ([place]             2022, 7:34 AM  Attending Physician Statement  I have discussed the care of Caleb Carnes, including pertinent history and exam findings,  with the resident. I have seen and examined the patient and the key elements of all parts of the encounter have been performed by me. I agree with the assessment, plan and orders as documented by the resident with additions . COVID-19 pneumonia leading to acute hypoxic respiratory failure, JULES and severe aortic stenosis  A. fib  Patient on spontaneous breathing trial but has increased work of breathing. Not ready for extubation will be high risk for intubation. Continue supportive care. Sedation reviewed and adjusted  Prognosis guarded  Total critical care time caring for this patient with life threatening, unstable organ failure, including direct patient contact, management of life support systems, review of data including imaging and labs, discussions with other team members and physicians at least 28   Min so far today, excluding procedures. Treatment plan Discussed with nursing staff in detail , all questions answered . Electronically signed by Osmar Daniels MD on   22 at 2:08 PM EST    Please note that this chart was generated using voice recognition Dragon dictation software.   Although every effort was made to ensure the accuracy of this automated transcription, some errors in transcription may have occurred.

## 2022-01-31 NOTE — CONSULTS
PALLIATIVE CARE    Patient: Riky Antony    Room: 2022/9338-48      Consult already seen on 1/28/2022. Please refer to our progress note today.      Amol Cummings MD  Hospice/Palliative Care Fellow  [facility], [place], [place]  1/31/2022 11:22 AM

## 2022-01-31 NOTE — PLAN OF CARE
Nutrition Problem #1: Inadequate oral intake  Intervention: Food and/or Nutrient Delivery: Modify Tube Feeding (Suggest increase TF goal rate to 40 mL/hr and discontinue protein modulars. This will provide 1728 kcal and 78 g pro/day.)  Nutritional Goals: Meet % of estimated nutrition needs with nutrition support/oral diet.

## 2022-01-31 NOTE — PROGRESS NOTES
IV team consulted for peripheral IV placement. Patients arms are edematous and bruised throughout. Patient currently has a midline in left upper arm and a peripheral iv in right forearm. Writer assessed bilateral upper extremities thoroughly with the ultrasound machine and at this time there is nowhere to place a peripheral iv that will last for any length of time. Primary RN notified.   Arabella Ruano RN

## 2022-01-31 NOTE — PROGRESS NOTES
PALLIATIVE CARE PROGRESS NOTE     Patient: Abad Mullins    Room: 4544/0616-26    Reason For Consult:  Goals of care  Code status discussion    Palliative Interaction:    We spoke with the patient's wife Linnette Roque and daughter Osman Delacruz via phone today. We updated them on the patient's condition and the fact that he again failed his weaning trial. Osman Delacruz wanted an explanation as to what was entailed with weaning and I educated them. We discussed goals going forward. They do not want the patient to have chest compressions/defibrillation if his heart stopped. However, for now, they want to give the patient some more time to try to wean off the vent. They agreed on [facility], with Dr. Efrain Matos and myself as witnesses. IMPRESSION/ PLAN  1- Symptom management/ pain control   We feel the patient symptomsare being controlled. his current regimen is reviewed by myself and discussed with the staff.     - Goals of care evaluation   The patient goals of care are improve or maintain function/quality of life, provide comfort care/support/palliation/relieve suffering and support for family/caregiver  Long discussion to ensure his understanding of goals of care, and theconcept of palliative care. The family's understanding of his goals of care were reviewed. 3-Code Status:  DNR-CCA    4-OtherRecommendations:  - will change code status to [facility]. - Will follow up later with the family if the patient is unable to be weaned off the vent.   __________________________________________________________________    HISTORY OF PRESENT ILLNESS:   The patient is a [de-identified] y.o. male with a PMHx of CKD IIIb-4, severe AS, anemia, HTN, HLD, PAD s/p aortic bypass, DM, CVA in 3/2021, left carotid artery stenosis presented with SOB, nausea, vomiting for 7-10 days. He was found to be Covid positive. He was in respiratory failure, requiring high flow for many days. Unfortunately, he continued to deteriorate, requiring intubation.  There were many code status changes during his admission; at the time prior to intubation, he decided to switch from [facility] to [facility]. OVERNIGHT EVENTS:  Failed SBT today    Could not obtain as the patient is intubated and sedated    Vital Signs:  BP (!) 151/50   Pulse 63   Temp 97.5 °F (36.4 °C) (Oral)   Resp 26   Ht 5' 10\" (1.778 m)   Wt 164 lb 10.9 oz (74.7 kg)   SpO2 96%   BMI 23.63 kg/m²     Pertinent Laboratory StudiesReviewed by Me Personally Today:  Lab Results   Component Value Date    WBC 8.8 01/31/2022    HGB 8.1 (L) 01/31/2022    HCT 26.3 (L) 01/31/2022    .4 01/31/2022     01/31/2022     Lab Results   Component Value Date     01/31/2022    K 4.0 01/31/2022     01/31/2022    CO2 31 01/31/2022    BUN 99 01/31/2022    CREATININE 2.44 01/31/2022    GLUCOSE 188 01/31/2022    CALCIUM 8.7 01/31/2022         has a past medical history of Chronic lower back pain, Cobalamin deficiency, CVA (cerebral vascular accident) ([facility] [address] Diabetes mellitus ([facility] [address] Hyperlipidemia, Hypertension, Malignant neoplasm of colon ([facility] [address] and PAD (peripheral artery disease) ([facility] [address] History reviewed. No pertinent family history. Social History     Tobacco Use    Smoking status: Former Smoker    Smokeless tobacco: Never Used   Substance Use Topics    Alcohol use: Not Currently    Drug use: Never       Physical Exam  Vitals and nursing note reviewed. Constitutional:       Comments: Intubated, sedated   HENT:      Head: Normocephalic and atraumatic. Cardiovascular:      Rate and Rhythm: Normal rate and regular rhythm. Pulmonary:      Comments: Mechanically ventilated, decreased bibasilar breath sounds  Abdominal:      Palpations: Abdomen is soft. Skin:     General: Skin is warm and dry. Neurological:      Comments: Intubated, sedated.           Palliative Performance Scale:  ___100% Full ambulation; normal activity/No disease; full self-care; normal intake; LOC full  ___90% full ambulation; normal activity/some disease; full self-care; normal intake; LOC full  ___80% ambulation full; normal activity with effort/some disease; full self-care; normal/reduced intake; LOC full  ___70% ambulation reduced; cannot do normal work/some disease; full self-care; normal/reduce intake; LOC full  ___60%  Ambulation reduced; Significant disease; Can't do hobbies/housework; intake normal or reduced; occasional assist; LOC full/confusion  ___50%  Mainly sit/lie; Extensive disease; Can't do any work; Considerable assist; intake normal or reduced; LOC full/confusion  ___40%  Mainly in bed; Extensive disease; Mainly assist; intake normal or reduced; LOC full/confusion   _x_30%  Bed Bound; Extensive disease; Total care; intake reduced; LOC full/confusion  ___20%  Bed Bound; Extensive disease; Total care; intake minimal; Drowsy/coma  ___10%  Bed Bound; Extensive disease; Total care; Mouth care only; Drowsy/coma  ___0       Death        Principle Problem/Diagnosis:  Principal Problem:    Pneumonia due to COVID-19 virus    Active Problems:    Essential hypertension    Hyperlipidemia    Acute hypoxemic respiratory failure due to COVID-19 [facility])    Acute kidney injury ([facility] [address]    Stage 3b chronic kidney disease (HCC)    Elevated troponin    Nonrheumatic aortic valve stenosis    PAD (peripheral artery disease) (HCC)    Moderate protein-calorie malnutrition (HCC)    Atrial fibrillation with RVR ([facility] [address]    COVID-19    Traumatic hematoma of buttock    Acute distention of stomach    Hypoxia          Electronically signed by      Diego Strong MD  [place], [place]  1/31/2022 2:47 PM  Palliative Care Office [phone]  Palliative Care Cell Phone: [phone]        ATTENDING ATTESTATION:    I have discussed the care of Paty Perez, including pertinent history and exam findings, with the resident/fellow/NP.     I have seen and examined the patient and the key elements of all parts of the encounter have been performed by me. I agree with the assessment, plan and orders as documented by the fellow/resident/NP, after I modified the exam findings and the plan of treatments and the final version is my approved version of the assessment.       Additional Comments:   Patient was seen today along with palliative care fellow Dr. Gilberto Oden  I along with with Dr. Gilberto Oden called patient's wife Jelena Wilkes and talked with her and Royal Mercado patient's daughter  We discussed patient's current medical conditions with Jelena Katrin and Royal Mercado  We explained the different types of codes to them and both and and Royal Mercado stated that they do not want the patient to have CPR/chest compressions to be done if his heart stopped but did not want to continue all other treatments  We explained to the family that patient's CODE STATUS will be changed to DNR CCA  with intubation and all treatments to continue    CODE STATUS changed to DNR CCA with intubation and all treatments to continue as per family wishes     We will continue to provide comfort and support for the patient and family      Electronically signed by Elsa Valdez MD on 1/31/2022 at 2:54 PM

## 2022-01-31 NOTE — PROGRESS NOTES
[facility]  Progress Note                   Date:   1/31/2022  Patient name: Juan Rodas  Date of admission:  1/8/2022  9:49 AM  MRN:   [id number]  YOB: 1940  PCP: Emelia Stack, APRN - CNP    Reason for Admission: Hypoxia [R09.02]  Elevated troponin [R77.8]  Acute kidney injury ([address] [N17.9]  COVID-19 [U07.1]  Pneumonia due to COVID-19 virus [U07.1, J12.82]    Subjective:   Patient seen and examined at bedside. No acute events overnight. Remains intubated 30% FiO2. Sedated with propofol, fentanyl  In sinus rhythm on telemetry    Review of Systems   Unable to perform ROS: Intubated      Medications:   Scheduled Meds:   metoprolol tartrate  75 mg Oral BID    amLODIPine  10 mg Oral Daily    torsemide  40 mg Oral Daily    amiodarone  200 mg Oral Daily    miconazole   Topical BID    apixaban  2.5 mg Oral BID    famotidine  20 mg Oral Daily    oxyCODONE  7.5 mg Oral Q6H    insulin lispro  0-6 Units SubCUTAneous Q4H    insulin lispro  0-3 Units SubCUTAneous Nightly    busPIRone  10 mg Oral TID    senna  2 tablet Oral Nightly    atorvastatin  20 mg Oral Nightly    clopidogrel  75 mg Oral Daily    aspirin  81 mg Oral Daily     Continuous Infusions:   fentaNYL 50 mcg/hr (01/31/22 0834)    propofol 20 mcg/kg/min (01/31/22 0838)    sodium chloride      sodium chloride      dextrose 100 mL/hr (01/19/22 0010)    sodium chloride       CBC:   Recent Labs     01/29/22  0425 01/30/22  0503 01/31/22  0339   WBC 11.4* 10.4 8.8   HGB 8.9* 8.9* 8.1*    175 190     BMP:    Recent Labs     01/29/22  0425 01/30/22  0503 01/31/22  0339   * 149* 146*   K 4.1 3.8 4.0   * 109* 107   CO2 29 29 31   BUN 89* 93* 99*   CREATININE 2.54* 2.25* 2.44*   GLUCOSE 132* 134* 188*     Hepatic:  No results for input(s): AST, ALT, ALB, BILITOT, ALKPHOS in the last 72 hours. Troponin:   No results for input(s): TROPHS in the last 72 hours.   BNP: No results for input(s): BNP in the last 72 hours. Lipids: No results for input(s): CHOL, HDL in the last 72 hours. Invalid input(s): LDLCALCU  INR: No results for input(s): INR in the last 72 hours. Objective:   Vitals: BP (!) 151/50   Pulse 68   Temp 97.5 °F (36.4 °C) (Oral)   Resp 23   Ht 5' 10\" (1.778 m)   Wt 164 lb 10.9 oz (74.7 kg)   SpO2 96%   BMI 23.63 kg/m²     For careful stewardship of limited PPE during COVID-19 pandemic my physical exam was deferred. For physical exam, please see today's physical from primary team or ICU team.     Echo 3/29/21    Left Ventricle: There is mild increased wall thickness/hypertrophy.   Left Ventricle: Systolic function is normal with an ejection fraction   of 65-70%.   Left Ventricle: Grade I diastolic dysfunction (impaired relaxation) is   present.   Right Ventricle: Systolic function is normal.     Aortic Valve: There is no regurgitation. There is moderate stenosis. The calculated aortic valve area is 1.72 cm2. The calculated aortic valve   peak gradient is 47.00 mmHg. The calculated aortic valve mean gradient is   28.00 mmHg.   Tricuspid Valve: There is trace regurgitation. There is no evidence of   tricuspid valve stenosis.   Pericardium: There is no pericardial effusion. Tele presently Afib 120-140s    Echo 1/11/2022  Summary  Left ventricle is normal in size, global left ventricular systolic function  is normal.  Estimated ejection fraction is 55 % . Left atrium is at upper limits of normal.  Inter-atrial septum is intact with no evidence for an atrial septal defect. Right atrium is normal in size. Normal right ventricular size and function. Aortic valve is trileaflet, calcified with restriction of motion. Peak instantaneous gradient 60 mmHg and mean gradient 37 mmHg. Calculated ALF 1.0cm2 by VTI (image #57.)  Severe aortic stenosis. Trivial aortic insufficiency. Mitral valve sclerosis without significant stenosis. Mean gradient is 4mmHg .   Trivial Kimmy Beltran MD

## 2022-01-31 NOTE — PROGRESS NOTES
Renal Progress Note    Patient :  Paty Perez; [de-identified] y.o. MRN# [id number]  Location:  9679/7356-78  Attending:  Denise Hou MD  Admit Date:  1/8/2022   Hospital Day: 21      Subjective: Following for JULES/CKD 3-4 with baseline 2-2.2, proteinuria 0.5 to 0.8 g,. Initially came in with COVID-19 pneumonia developed ATN which recovered after he developed gluteal hematoma and drop in hemoglobin and ATN creatinine peaking around 3.0 which seems to be resolving now. Developing hypernatremia from free water losses likely from recovery phase of ATN. Patient was seen and examined. Tube feeds continue at goal rate, also free water via normal every 4 hours, hypernatremia better  Remains intubated, FIO2 30% PEEP 5. Wakens easily. No plans to extubate at present. On Torsemide 40mg daily. Urine output more than 2-1/2 L, negative fluid balance, appears to be in post ATN diuresis. .  Creatinine seems to have picked up towards today. , potassium 4.0, sodium is 146. Getting free water 300 Q 4hours  Has tube feed at 25/hr.     Outpatient Medications:     Medications Prior to Admission: aspirin 325 MG tablet, Take 81 mg by mouth daily   atorvastatin (LIPITOR) 40 MG tablet, Take 20 mg by mouth nightly  lisinopril (PRINIVIL;ZESTRIL) 30 MG tablet, Take 30 mg by mouth daily  metoprolol tartrate (LOPRESSOR) 50 MG tablet, Take 50 mg by mouth daily  cilostazol (PLETAL) 100 MG tablet, Take 100 mg by mouth 2 times daily  clopidogrel (PLAVIX) 75 MG tablet, Take 75 mg by mouth daily  vitamin B-12 (CYANOCOBALAMIN) 1000 MCG tablet, Take 1,000 mcg by mouth daily    Current Medications:     Scheduled Meds:    metoprolol tartrate  75 mg Oral BID    amLODIPine  10 mg Oral Daily    amiodarone  200 mg Oral Daily    miconazole   Topical BID    apixaban  2.5 mg Oral BID    famotidine  20 mg Oral Daily    oxyCODONE  7.5 mg Oral Q6H    insulin lispro  0-6 Units SubCUTAneous Q4H    insulin lispro  0-3 Units SubCUTAneous Nightly    busPIRone  10 mg Oral TID    senna  2 tablet Oral Nightly    atorvastatin  20 mg Oral Nightly    clopidogrel  75 mg Oral Daily    aspirin  81 mg Oral Daily     Continuous Infusions:    fentaNYL 50 mcg/hr (22 0834)    propofol 10 mcg/kg/min (22 1019)    sodium chloride      sodium chloride      dextrose 100 mL/hr (22 0010)    sodium chloride       PRN Meds:  metoclopramide, bisacodyl, hydrALAZINE, sodium chloride, sodium chloride flush, sodium chloride, melatonin, sodium chloride, LORazepam, glucose, dextrose, glucagon (rDNA), dextrose, metoprolol, benzonatate, sodium chloride flush, sodium chloride flush, sodium chloride, ondansetron **OR** ondansetron, polyethylene glycol, acetaminophen **OR** acetaminophen    Input/Output:       I/O last 3 completed shifts: In: 2558 [I.V.:344.9; NG/GT:1718; IV Piggyback:495.1]  Out: 2500 [Urine:2500]. Patient Vitals for the past 96 hrs (Last 3 readings):   Weight   22 0100 164 lb 10.9 oz (74.7 kg)       Vital Signs:   Temperature:  Temp: 97.5 °F (36.4 °C)  TMax:   Temp (24hrs), Av.8 °F (36.6 °C), Min:97.4 °F (36.3 °C), Max:98.2 °F (36.8 °C)    Respirations:  Resp: 26  Pulse:   Pulse: 63  BP:    BP: (!) 151/50  BP Range: No data recorded. No data recorded. Physical Examination:     General:  Intubated, sedated  HEENT: Has some blood draining from mouth  Eyes:   Pupils equal, round and reactive to light, EOMI. Neck:   No JVD, no thyromegaly, no lymphadenopathy. Chest:   Decreased in bases  Cardiac:  S1 S2 RR, no murmurs, gallops or rubs, JVP not raised. Abdomen: Soft, non-tender, no masses or organomegaly, BS audible. :   No suprapubic or flank tenderness. SKIN:  No rashes, good skin turgor. Bruising upper extremities  Extremities:  Trace edema lower extremities.     Labs:       Recent Labs     22  0425 22  0503 22  0339   WBC 11.4* 10.4 8.8   RBC 2.84* 2.86* 2.62*   HGB 8.9* 8.9* 8.1*   HCT 28.1* 28.4* 26.3*   MCV 98.9 99.3 100.4   MCH 31.3 31.1 30.9   MCHC 31.7 31.3 30.8   RDW 18.3* 18.0* 17.8*    175 190   MPV 11.0 10.9 11.0      BMP:   Recent Labs     01/29/22  0425 01/30/22  0503 01/31/22  0339   * 149* 146*   K 4.1 3.8 4.0   * 109* 107   CO2 29 29 31   BUN 89* 93* 99*   CREATININE 2.54* 2.25* 2.44*   GLUCOSE 132* 134* 188*   CALCIUM 8.4* 8.4* 8.7      Magnesium:    No results for input(s): MG in the last 72 hours. SUMMER:      Lab Results   Component Value Date    SUMMER NEGATIVE 01/09/2022     SPEP:  Lab Results   Component Value Date    PROT 6.1 01/09/2022     C3:     Lab Results   Component Value Date    C3 134 01/09/2022     C4:     Lab Results   Component Value Date    C4 31 01/09/2022       Urinalysis/Chemistries:      Lab Results   Component Value Date    NITRU NEGATIVE 01/17/2022    COLORU Yellow 01/17/2022    PHUR 5.0 01/17/2022    WBCUA None 01/17/2022    RBCUA 2 TO 5 01/17/2022    MUCUS 1+ 01/17/2022    TRICHOMONAS NOT REPORTED 01/17/2022    YEAST NOT REPORTED 01/17/2022    BACTERIA NOT REPORTED 01/17/2022    SPECGRAV 1.018 01/17/2022    LEUKOCYTESUR NEGATIVE 01/17/2022    UROBILINOGEN Normal 01/17/2022    BILIRUBINUR NEGATIVE 01/17/2022    GLUCOSEU 1+ 01/17/2022    KETUA NEGATIVE 01/17/2022    AMORPHOUS NOT REPORTED 01/17/2022     Urine Sodium:     Lab Results   Component Value Date    CHERYL 56 01/10/2022       Urine Creatinine:     Lab Results   Component Value Date    LABCREA 90.8 01/09/2022       Radiology:     CXR:   FINDINGS:   There are scattered pulmonary infiltrates.  There are no effusions.  There is   no pneumothorax.  The mediastinal structures are unremarkable.  The upper   abdomen is unremarkable.  The extrathoracic soft tissues are unremarkable.    There is an endotracheal tube with tip in the distal midtrachea.  There is a   gastric tube with tip in the proximal aspect of the stomach.           Impression   Stable scattered pulmonary infiltrates.       Support tubes as described above. Assessment:     1. Acute Kidney Injury: Secondary to ischemic ATN and nephrotoxic ATN initially from COVID-pneumonia, systemic inflammation, contrast exposure.  Baseline 2.0 peaked at 3.0. Cherylene Olmstead developed left gluteal area hematoma with drop in hemoglobin down to 5.7, decreased renal perfusion, had a second hit creatinine peaked at 3.96. Creatinine now fluctuating the 2.2-2.5, post ATN diuresis evident  2. 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 Persistent anemia, requiring multiple transfusions likely from left gluteal area hematoma  4. COVID-19 pneumonia with respiratory failure now intubated.    5.  Severe aortic stenosis valve area 1.0 cm² peak gradient 60 mean 37  6. Hypernatremia from with free water losses likely insensible   7. Atrial fibrillation: On Eliquis and amiodarone  8. Contraction and post hypercapnic alkalosis evident    Plan:   1. Increase free water to 300cc Q 4 hours. 2.  Hold torsemide for now  3. Daily Labs. 4. Monitor strict I's and O's renal function. 5. Will follow. 6.  Prognosis guarded  Nutrition   Please ensure that patient is on a renal diet/TF. Avoid nephrotoxic drugs/contrast exposure. We will continue to follow along with you.      Electronically signed by Bhanu Chaudhari MD on 1/31/2022 at 11:38 AM

## 2022-02-01 NOTE — PROGRESS NOTES
Renal Progress Note    Patient :  Nia Pitts; [de-identified] y.o. MRN# [id number]  Location:  [id number]/1885-11  Attending:  Davon Carter MD  Admit Date:  1/8/2022   Hospital Day: 24      Subjective: Following for JULES/CKD 3-4 with baseline 2-2.2, proteinuria 0.5 to 0.8 g,. Initially came in with COVID-19 pneumonia developed ATN which recovered after he developed gluteal hematoma and drop in hemoglobin and ATN creatinine peaking around 3.0 which seems to be resolving now. Developing hypernatremia from free water losses likely from recovery phase of ATN. Patient was seen and examined. Off pressor support. No new issues reported overnight. Patient has mild pedal edema, bilateral upper extremity edematous. Remains intubated, FIO2 30% PEEP 5. No plans to extubate at present. Torsemide was held yesterday. Labs show, creatinine 2.18 trending down. Sodium 148, chloride 108, potassium 3.8, bicarb 32, calcium 8.8. Urine output 2.2 L, negative fluid balance - 4.4 L. Getting free water 300 Q 4hours  Has tube feed at 25/hr.     Outpatient Medications:     Medications Prior to Admission: aspirin 325 MG tablet, Take 81 mg by mouth daily   atorvastatin (LIPITOR) 40 MG tablet, Take 20 mg by mouth nightly  lisinopril (PRINIVIL;ZESTRIL) 30 MG tablet, Take 30 mg by mouth daily  metoprolol tartrate (LOPRESSOR) 50 MG tablet, Take 50 mg by mouth daily  cilostazol (PLETAL) 100 MG tablet, Take 100 mg by mouth 2 times daily  clopidogrel (PLAVIX) 75 MG tablet, Take 75 mg by mouth daily  vitamin B-12 (CYANOCOBALAMIN) 1000 MCG tablet, Take 1,000 mcg by mouth daily    Current Medications:     Scheduled Meds:    amiodarone  200 mg Per G Tube Daily    amLODIPine  10 mg Per G Tube Daily    apixaban  2.5 mg Per G Tube BID    atorvastatin  20 mg Per G Tube Nightly    busPIRone  10 mg Per G Tube TID    clopidogrel  75 mg Per G Tube Daily    famotidine  20 mg Per G Tube Daily    metoprolol tartrate  75 mg Per G Tube BID    oxyCODONE  7.5 mg Per G Tube Q6H    senna  2 tablet Per G Tube Nightly    miconazole   Topical BID    insulin lispro  0-6 Units SubCUTAneous Q4H     Continuous Infusions:    fentaNYL 25 mcg/hr (22 0742)    propofol 10 mcg/kg/min (22 0119)    dextrose 100 mL/hr (22 0010)    sodium chloride       PRN Meds:  metoclopramide, bisacodyl, hydrALAZINE, sodium chloride flush, melatonin, sodium chloride, LORazepam, glucose, dextrose, glucagon (rDNA), dextrose, metoprolol, sodium chloride flush, sodium chloride, ondansetron **OR** ondansetron, polyethylene glycol, acetaminophen **OR** acetaminophen    Input/Output:       I/O last 3 completed shifts: In: 4128.2 [I.V.:490.2; NG/GT:3638]  Out: 1085 [Urine:3145]. Patient Vitals for the past 96 hrs (Last 3 readings):   Weight   22 2200 163 lb 2.3 oz (74 kg)   22 0100 164 lb 10.9 oz (74.7 kg)       Vital Signs:   Temperature:  Temp: 97.8 °F (36.6 °C)  TMax:   Temp (24hrs), Av.7 °F (36.5 °C), Min:97.5 °F (36.4 °C), Max:98.1 °F (36.7 °C)    Respirations:  Resp: 25  Pulse:   Pulse: 65  BP:    BP: (!) 151/49  BP Range: Systolic (13YFG), PLU:762 , Min:151 , BOYD:200       Diastolic (48ATT), TWX:70, Min:49, Max:51      Physical Examination:     General:  Intubated, sedated  HEENT: Has dried blood around the mouth  Eyes:   Pupils equal, round and reactive to light. Neck:   No JVD, no thyromegaly, no lymphadenopathy. Chest:   Decreased in bases  Cardiac:  S1 S2 RR, no murmurs, gallops or rubs, JVP not raised. Abdomen: Soft, no masses or organomegaly, BS audible. Neuro: On Mech vent, sedated  :   No suprapubic or flank tenderness. SKIN:  No rashes, good skin turgor. Bruising upper extremities  Extremities:  Trace edema lower extremities.     Labs:       Recent Labs     22  0503 22  0339 22  0428   WBC 10.4 8.8 10.3   RBC 2.86* 2.62* 2.71*   HGB 8.9* 8.1* 8.4*   HCT 28.4* 26.3* 26.9*   MCV 99.3 100.4 99.3   MCH 31.1 30.9 31.0   MCHC 31.3 30.8 31.2   RDW 18.0* 17.8* 17.6*    190 237   MPV 10.9 11.0 10.6      BMP:   Recent Labs     01/30/22  0503 01/31/22  0339 02/01/22  0428   * 146* 148*   K 3.8 4.0 3.8   * 107 108*   CO2 29 31 32*   BUN 93* 99* 95*   CREATININE 2.25* 2.44* 2.18*   GLUCOSE 134* 188* 179*   CALCIUM 8.4* 8.7 8.8     SUMMER:      Lab Results   Component Value Date    SUMMER NEGATIVE 01/09/2022     SPEP:  Lab Results   Component Value Date    PROT 6.1 01/09/2022     C3:     Lab Results   Component Value Date    C3 134 01/09/2022     C4:     Lab Results   Component Value Date    C4 31 01/09/2022       Urinalysis/Chemistries:      Lab Results   Component Value Date    NITRU NEGATIVE 01/17/2022    COLORU Yellow 01/17/2022    PHUR 5.0 01/17/2022    WBCUA None 01/17/2022    RBCUA 2 TO 5 01/17/2022    MUCUS 1+ 01/17/2022    TRICHOMONAS NOT REPORTED 01/17/2022    YEAST NOT REPORTED 01/17/2022    BACTERIA NOT REPORTED 01/17/2022    SPECGRAV 1.018 01/17/2022    LEUKOCYTESUR NEGATIVE 01/17/2022    UROBILINOGEN Normal 01/17/2022    BILIRUBINUR NEGATIVE 01/17/2022    GLUCOSEU 1+ 01/17/2022    KETUA NEGATIVE 01/17/2022    AMORPHOUS NOT REPORTED 01/17/2022     Urine Sodium:     Lab Results   Component Value Date    CHERYL 56 01/10/2022       Urine Creatinine:     Lab Results   Component Value Date    LABCREA 90.8 01/09/2022       Radiology:     CXR:   FINDINGS:   There are scattered pulmonary infiltrates.  There are no effusions.  There is   no pneumothorax.  The mediastinal structures are unremarkable.  The upper   abdomen is unremarkable.  The extrathoracic soft tissues are unremarkable. There is an endotracheal tube with tip in the distal midtrachea.  There is a   gastric tube with tip in the proximal aspect of the stomach.           Impression   Stable scattered pulmonary infiltrates.       Support tubes as described above. Assessment:     1.  Acute Kidney Injury: Secondary to ischemic ATN and nephrotoxic ATN initially from COVID-pneumonia, systemic inflammation, contrast exposure.  Baseline 2.0 peaked at 3.0. Yuliana Rachel developed left gluteal area hematoma with drop in hemoglobin down to 5.7, decreased renal perfusion, had a second hit creatinine peaked at 3.96. Creatinine now fluctuating the 2.2-2.5, post ATN diuresis evident  2. 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 Persistent anemia, requiring multiple transfusions likely from left gluteal area hematoma  4. COVID-19 pneumonia with respiratory failure now intubated.    5.  Severe aortic stenosis valve area 1.0 cm² peak gradient 60 mean 37  6. Hypernatremia from with free water losses likely insensible   7. Atrial fibrillation: On Eliquis and amiodarone  8. Contraction and post hypercapnic alkalosis evident    Plan:   1. Continue free water to 300cc Q 4 hours. 2.  Continue to hold torsemide for now. 3.  Will start half-normal saline at 50 cc an hour. 4.  Monitor strict I's and O's and renal function. 5.  BMP in AM.  6.  Prognosis guarded. 7.  Will follow. Nutrition   Please ensure that patient is on a renal diet/TF. Avoid nephrotoxic drugs/contrast exposure. We will continue to follow along with you. Anamaria Lewis MD  Internal Medicine Resident, PGY-2  [facility], [place]         2/1/2022, 10:45 AM    Attending Physician Statement  I have discussed the care of this patient, including pertinent history and exam findings, with the Resident/CNP. I have reviewed and edited the key elements of all parts of the encounter with the Resident/CNP. I agree with the assessment, plan and orders as documented by the Resident/CNP. Car Olmos MD   Nephrology [address] [place] [address] Drive    This note is created with the assistance of a speech-recognition program. While intending to generate a document that actually reflects the content of the visit, no guarantees can be provided that every mistake has been identified and corrected by editing.

## 2022-02-01 NOTE — PROGRESS NOTES
[facility]  Progress Note                   Date:   2/1/2022  Patient name: Vicente Pat  Date of admission:  1/8/2022  9:49 AM  MRN:   [id number]  YOB: 1940  PCP: MOHAMUD Gunter - CNP    Reason for Admission: Hypoxia [R09.02]  Elevated troponin [R77.8]  Acute kidney injury ([facility] [address] [N17.9]  COVID-19 [U07.1]  Pneumonia due to COVID-19 virus [U07.1, J12.82]    Subjective:   Patient seen and examined at bedside. No acute events overnight. Remains intubated 30% FiO2. Sedated with propofol, fentanyl  In sinus rhythm on telemetry    Review of Systems   Unable to perform ROS: Intubated      Medications:   Scheduled Meds:   amiodarone  200 mg Per G Tube Daily    amLODIPine  10 mg Per G Tube Daily    apixaban  2.5 mg Per G Tube BID    aspirin  81 mg Per G Tube Daily    atorvastatin  20 mg Per G Tube Nightly    busPIRone  10 mg Per G Tube TID    clopidogrel  75 mg Per G Tube Daily    famotidine  20 mg Per G Tube Daily    metoprolol tartrate  75 mg Per G Tube BID    oxyCODONE  7.5 mg Per G Tube Q6H    senna  2 tablet Per G Tube Nightly    miconazole   Topical BID    insulin lispro  0-6 Units SubCUTAneous Q4H    insulin lispro  0-3 Units SubCUTAneous Nightly     Continuous Infusions:   fentaNYL 25 mcg/hr (02/01/22 0742)    propofol 10 mcg/kg/min (02/01/22 0119)    sodium chloride      sodium chloride      dextrose 100 mL/hr (01/19/22 0010)    sodium chloride       CBC:   Recent Labs     01/30/22  0503 01/31/22 0339 02/01/22 0428   WBC 10.4 8.8 10.3   HGB 8.9* 8.1* 8.4*    190 237     BMP:    Recent Labs     01/30/22  0503 01/31/22 0339 02/01/22 0428   * 146* 148*   K 3.8 4.0 3.8   * 107 108*   CO2 29 31 32*   BUN 93* 99* 95*   CREATININE 2.25* 2.44* 2.18*   GLUCOSE 134* 188* 179*     Hepatic:  No results for input(s): AST, ALT, ALB, BILITOT, ALKPHOS in the last 72 hours. Troponin:   No results for input(s): TROPHS in the last 72 hours.   BNP: No results for input(s): BNP in the last 72 hours. Lipids: No results for input(s): CHOL, HDL in the last 72 hours. Invalid input(s): LDLCALCU  INR: No results for input(s): INR in the last 72 hours. Objective:   Vitals: BP (!) 151/49   Pulse 67   Temp 97.8 °F (36.6 °C) (Oral)   Resp 17   Ht 5' 10\" (1.778 m)   Wt 163 lb 2.3 oz (74 kg)   SpO2 95%   BMI 23.41 kg/m²     Constitutional: Patient sedated and intubated. EENT: normocephalic, atraumatic  Neck: Supple, symmetrical, trachea midline  Respiratory: Crackles heard in upper lung lobes. Intubated, on ventilator  Cardiovascular: regular rate and rhythm, normal S1, S2, no murmur noted  Abdomen: soft, nontender, nondistended, no masses  Extremities:  +2 pitting edema in b/l lower extremities     Echo 3/29/21    Left Ventricle: There is mild increased wall thickness/hypertrophy.   Left Ventricle: Systolic function is normal with an ejection fraction   of 65-70%.   Left Ventricle: Grade I diastolic dysfunction (impaired relaxation) is   present.   Right Ventricle: Systolic function is normal.     Aortic Valve: There is no regurgitation. There is moderate stenosis. The calculated aortic valve area is 1.72 cm2. The calculated aortic valve   peak gradient is 47.00 mmHg. The calculated aortic valve mean gradient is   28.00 mmHg.   Tricuspid Valve: There is trace regurgitation. There is no evidence of   tricuspid valve stenosis.   Pericardium: There is no pericardial effusion. Echo 1/11/2022  Summary  Left ventricle is normal in size, global left ventricular systolic function  is normal.  Estimated ejection fraction is 55 % . Left atrium is at upper limits of normal.  Inter-atrial septum is intact with no evidence for an atrial septal defect. Right atrium is normal in size. Normal right ventricular size and function. Aortic valve is trileaflet, calcified with restriction of motion.   Peak instantaneous gradient 60 mmHg and mean gradient 37 mmHg. Calculated ALF 1.0cm2 by VTI (image #[id number].)  Severe aortic stenosis. Trivial aortic insufficiency. Mitral valve sclerosis without significant stenosis. Mean gradient is 4mmHg . Trivial mitral regurgitation. Tricuspid valve was not well visualized. Mild tricuspid regurgitation. No significant pericardial effusion is seen. Assessment / Acute Cardiac Problems:   1. COVID-19 PNA  2. New onset Afib RVR  3. Type I vs Type II MI likely secondary to JULES/COVID  4. HTN  5. JULES on CKD (baseline creat 1.4-1.5)  6. Severe AS on Echo 3/2021  7. Preserved LVEF on prior echo  8. Acute hypoxic resp failure    [id number] Score for Atrial Fibrillation Stroke Risk   Risk   Factors  Component Value   C CHF No 0   H HTN Yes 1   A2 Age >= 76 Yes,  [de-identified] y.o.) 2   D DM No 0   S2 Prior Stroke/TIA No 0   V Vascular Disease No 0   A Age 74-69 No,  [de-identified] y.o.) 0   Sc Sex male 0    [id number]  Score  3   Score last updated 1/11/22 65:31 PM EST    Click here for a link to the UpToDate guideline \"Atrial Fibrillation: Anticoagulation therapy to prevent embolization    Disclaimer: Risk Score calculation is dependent on accuracy of patient problem list and past encounter diagnosis. Plan of Treatment:   1. Afib - stable. Currently SR .    2. Continue po amiodarone 200 mg daily, Lopressor 75 mg twice daily and Eliquis 2.5 mg twice daily. 3. Patient is on aspirin and Plavix for history of peripheral arterial disease  4. Keep K >4 and Mg >2.   5. CKD Fluid management per Nephrology  6. Severe aortic stenosis. Will defer cardiac catheterization till family decides on course of action. Patient's family would like till this weekend to decide whether to proceed with intervention vs comfort care code status change  Ronald Barrett MD  PGY-2, Internal Medicine Resident  [facility]  2/1/2022 9:07 AM     Attending Physician Statement  I have discussed the care of Remi Coronel, including pertinent history and exam findings,  with the cardiology fellow/resident. I have seen and examined the patient and the key elements of all parts of the encounter have been performed by me.         Jaya Akers MD, [facility]

## 2022-02-01 NOTE — PROGRESS NOTES
[facility] [address] 19 Patient  Today's Date and Time: 2/1/2022, 2:14 PM    Impression :     · COVID 19 Confirmed Infection  · Covid tests:  · 1/8/21: Positive  · Acute hypoxic respiratory failure  · Paroxysmal A. 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  · Essential hypertension  · Elevated inflammatory markers  · Hyperlipidemia  · Acute gluteal hematoma  · Patient has not received the Covid vaccine  · Intubated 1/22/22  · DNR-CCA    Recommendations:   · Antibiotic treatment:  · Meropenem 500 mg BID IV for leukocytosis & possible secondary bacterial pneumonia 1/11/21-discontinued 1/17/21  · Meropenem Re-started 1-21-22 because of residual dense consolidation of the lungs with air bronchograms-Completed 1/31/22  · Sputum 1/25: Normal alistair  · Covid Rx:    · Remdesivir-contraindicated with JULES  · Monoclonal antibodies-out of window  · Decadron-initiated 1/8/2022  · Actemra-administered 1/8/2022  · Continue supportive care  · [facility]      Medical Decision Making/Summary/Discussion:2/1/2022     · Patient admitted with COVID 19 infection  · Isolation until 1/28/22    Infection Control Recommendations   · [place] Precautions    Antimicrobial Stewardship Recommendations       · Renal considerations  Coordination of Outpatient Care:   · Estimated Length of IV antimicrobials:TBD  · Patient will need Midline Catheter Insertion: TBD  · Patient will need PICC line Insertion: No  · Patient will need: Home IV , [place],  SNF,  LTAC:TBD  · Patient will need outpatient wound care:No    Chief complaint/reason for consultation:   · Concern for COVID infection      History of Present Illness:   Nia Pitts is a [de-identified]y.o.-year-old male who was initially admitted on 1/8/2022.  Patient seen at the request of Dr. Becky Curtis:    Patient presented through ER with complaints of needing shortness of breath, cough, loss of appetite, nausea, vomiting and diarrhea over the past 7 to 10 days. He has not received the Covid vaccine and tested positive for Covid shortly after [place]. On evaluation in the ED, the patient had an SPO2 of 80% on room air and was tachypneic. A rapid Covid swab was positive. CT chest shows extensive bilateral pulmonary groundglass opacities. He was started on Decadron and given a dose of Actemra. Abnormal labs include:  · Creatinine 2.32  ·   · Troponin I 48  · WBC 17.1    Patient admitted because of concerns with COVID 19. Meropenem initiated on 1/11/2022 for worsening respiratory distress and leukocytosis    Stable chest x-ray 1/11    CRP is trending down    Hemoglobin dropped to 5.7 on 1/16/2022  Hemoglobin remained stable at this time after receiving infusions of PRBC  Left gluteal hematoma present on CT abdomen pelvis    Occult blood noted on stool  Anemia continues in the program patient required another blood transfusion on 1/21/2022  Anticoagulation on hold s/p gluteal hematoma    Impression CT Chest/Abdomen/Pelvis 1/21/22   1.  New small bilateral pleural effusions with extensive bilateral airspace   consolidation, increased from 01/08/2022.       2.  Decreased AP dimension of the distal trachea, suggesting tracheomalacia.       3.  Previously noted hematoma in the left gluteal musculature is not as well   visualized on today's study, but appears overall slightly decreased in size. No new areas of hematoma.       4.  Mild distention of the stomach, mild prominence of proximal small bowel   loops, and mild nonspecific stranding in the proximal mesentery.  No definite   evidence of high-grade bowel obstruction at this time.  Enteritis or early   developing obstruction are considered in the differential.         The patient required intubation for worsening respiratory failure on 1/22/2022. He is currently requiring 65% FiO2 with a PEEP of 12.   Propofol is being given for sedation    CURRENT EVALUATION : 2/1/2022    Afebrile  VS stable with hypertension    The patient remains on mechanical ventilation with low settings. He was on a weaning trial this morning for a couple hours before being switched back to full support. He has not been tolerating weaning trials thus far. Propofol and Fentanyl for sedation    Candida Albicans urine 1/25/22  MRSA nares not detected    Meropenem initiated 1/22/22  Repeat cultures ordered 1/25/22-Normal alistair  Leukocytes has resolved    No acute issues per RN    Patient exhibiting respiratory distress. Yes  Respiratory secretions: No    Patient receiving supplemental oxygen. BiPAP & Hi-flow NC-->MV  RR: 24-->23-->19   02 sat: 98-->94-->95    % FIO2: 30  PEEP: 6-->5    NEWS Score: 0-4 Low risk group; 5-6: Medium risk group; 7 or above: High risk group  Parameters 3 2 1 0 1 2 3   Age    < 65   ? 65   RR ? 8  9-11 12-20  21-24 ? 25   O2 Sats ?  91 92-93 94-95 ? 96      Suppl O2  Yes  No      SBP ? 90  101-110 111-219   ? 220   HR ? 40  41-50 51-90  111-130 ? 131   Consciousness    Alert   Drowsiness, lethargy, or confusion   Temperature ? 35.0 C (95.0 F)  35.1-36.0 C 95.1-96.9 F 36.1-38.0 C 97.0-100.4 F 38.1-39.0 C 100.5-102.3 F ? 39.1 C ? 102.4 F      NEWS Score:   1/9/2022: 5 moderate risk    Overall Daily Picture:      Unchanged    Presence of secondary bacterial Infection:    Possible  Additional antibiotics: Meropenem 1/21/2022    Labs, X rays reviewed: 2/1/2022    BUN:99-->95  Cr:2.4-->2.18    WBC: 8.8-->10.3   Hb:8.1-->8.4  Plat:190-->237    Absolute Neutrophils:16  Absolute Lymphocytes:0.34  Neutrophil/Lymphocyte Ratio: 53 very high risk    CRP:160-->131-->52.3-->21.5  Ferritin:804  LDH: 563    Pro Calcitonin:      Cultures:  Urine:  · 1/25: Candida albicans  Blood:  · 1/25: No growth  Sputum :  · 1/22: Gram negative rods  · 1/25: Normal alistair  MRSA Nares:   Not detected    CXR:     CAT:  1/8/21      Discussed with patient, RN, CC, IM. I have personally reviewed the past medical history, past surgical history, medications, social history, and family history, and I have updated the database accordingly.   Past Medical History:     Past Medical History:   Diagnosis Date    Chronic lower back pain     Cobalamin deficiency     [facility] [address] [facility]     [facility]     Hyperlipidemia     Hypertension     [facility]     PAD (peripheral artery disease) ([facility])        Past Surgical  History:     Past Surgical History:   Procedure Laterality Date    ARTERY SURGERY      INSERT MIDLINE CATHETER  1/24/2022            Medications:      amiodarone  200 mg Per G Tube Daily    amLODIPine  10 mg Per G Tube Daily    apixaban  2.5 mg Per G Tube BID    atorvastatin  20 mg Per G Tube Nightly    busPIRone  10 mg Per G Tube TID    clopidogrel  75 mg Per G Tube Daily    famotidine  20 mg Per G Tube Daily    metoprolol tartrate  75 mg Per G Tube BID    oxyCODONE  7.5 mg Per G Tube Q6H    senna  2 tablet Per G Tube Nightly    miconazole   Topical BID    insulin lispro  0-6 Units SubCUTAneous Q4H       Social History:     Social History     Socioeconomic History    Marital status:      Spouse name: Not on file    Number of children: Not on file    Years of education: Not on file    Highest education level: Not on file   Occupational History    Not on file   Tobacco Use    Smoking status: Former Smoker    Smokeless tobacco: Never Used   Substance and Sexual Activity    Alcohol use: Not Currently    Drug use: Never    Sexual activity: Not on file   Other Topics Concern    Not on file   Social History Narrative    Not on file     Social Determinants of Health     Financial Resource Strain:     Difficulty of Paying Living Expenses: Not on file   Food Insecurity:     Worried About Running Out of Food in the Last Year: Not on file    Lesley of Food in the Last Year: Not on file   Transportation Needs:     Lack of Transportation (Medical): Not on file    Lack of Transportation (Non-Medical): Not on file   Physical Activity:     Days of Exercise per Week: Not on file    Minutes of Exercise per Session: Not on file   Stress:     Feeling of Stress : Not on file   Social Connections:     Frequency of Communication with Friends and Family: Not on file    Frequency of Social Gatherings with Friends and Family: Not on file    Attends Voodoo Services: Not on file    Active Member of [address] [place] [address] [employer] [facility] or Organizations: Not on file    Attends Club or Organization Meetings: Not on file    Marital Status: Not on file   Intimate Partner Violence:     Fear of Current or Ex-Partner: Not on file    Emotionally Abused: Not on file    Physically Abused: Not on file    Sexually Abused: Not on file   Housing Stability:     Unable to Pay for Housing in the Last Year: Not on file    Number of [place] in the Last Year: Not on file    Unstable Housing in the Last Year: Not on file       Family History:   History reviewed. No pertinent family history. Allergies:   Patient has no known allergies. Review of Systems:     Review of Systems   Unable to perform ROS: Intubated   Respiratory: Positive for shortness of breath. Cardiovascular: Negative. Gastrointestinal: Negative. Genitourinary: Negative. Musculoskeletal: Negative. Skin: Positive for color change. Neurological: Negative.           Physical Examination :     Patient Vitals for the past 8 hrs:   Temp Temp src Pulse Resp SpO2   02/01/22 1345   71 19 95 %   02/01/22 1330   72 22 95 %   02/01/22 1315   70 20 95 %   02/01/22 1300   74 20 94 %   02/01/22 1245   69 18 96 %   02/01/22 1230   73 19 93 %   02/01/22 1215   71 23 95 %   02/01/22 1200   72 24 94 %   02/01/22 1145   70 20 94 %   02/01/22 1130   69 21 95 %   02/01/22 1115   73 25 94 %   02/01/22 1100   70 23 91 %   02/01/22 619 [address] 71 22 95 %   02/01/22 1030   69 25 94 %   02/01/22 1015   67 22 95 %   02/01/22 1000   69 26 94 %   02/01/22 0945   67 30 93 %   02/01/22 0930   66 30 94 %   02/01/22 0915   66 29 94 %   02/01/22 0900   65 25 95 %   02/01/22 0845   65 25 95 %   02/01/22 0830   67 25 94 %   02/01/22 0815   70 25 94 %   02/01/22 0800   87 16 100 %   02/01/22 0755   67 17 95 %   02/01/22 0745   76 22 95 %   02/01/22 0730   80 21 95 %   02/01/22 0715   79 18 95 %   02/01/22 0700 97.8 °F (36.6 °C) Oral 74 20 96 %     Physical Exam  Vitals and nursing note reviewed. Constitutional:       Appearance: He is well-developed. Comments: Intubated and sedated   HENT:      Head: Normocephalic and atraumatic. Cardiovascular:      Rate and Rhythm: Normal rate. Heart sounds: Murmur heard. Pulmonary:      Effort: Respiratory distress present. Breath sounds: No wheezing. Abdominal:      General: Bowel sounds are normal.      Palpations: Abdomen is soft. There is no mass. Tenderness: There is no abdominal tenderness. Musculoskeletal:         General: Swelling (In the bilateral upper extremities especially in the forearms.) present. Normal range of motion. Cervical back: Neck supple. Lymphadenopathy:      Cervical: No cervical adenopathy. Skin:     General: Skin is dry. Findings: Bruising (There is bruising and ecchymotic skin lesions in the forearms bilaterally right worse than left) present.    Neurological:      Comments: FINA-intubated and sedated         Medical Decision Making -Laboratory:   I have independently reviewed/ordered the following labs:    CBC with Differential:   Recent Labs     01/31/22 0339 02/01/22 0428   WBC 8.8 10.3   HGB 8.1* 8.4*   HCT 26.3* 26.9*    237   LYMPHOPCT 5* 4*   MONOPCT 7 8     BMP:   Recent Labs     01/31/22  0339 02/01/22 0428   * 148*   K 4.0 3.8    108*   CO2 31 32*   BUN 99* 95*   CREATININE 2.44* 2.18* Hepatic Function Panel:   No results for input(s): PROT, LABALBU, BILIDIR, IBILI, BILITOT, ALKPHOS, ALT, AST in the last 72 hours. No results for input(s): RPR in the last 72 hours. No results for input(s): HIV in the last 72 hours. No results for input(s): BC in the last 72 hours. Lab Results   Component Value Date    MUCUS 1+ 01/17/2022    RBC 2.71 02/01/2022    TRICHOMONAS NOT REPORTED 01/17/2022    WBC 10.3 02/01/2022    YEAST NOT REPORTED 01/17/2022    TURBIDITY Clear 01/17/2022     Lab Results   Component Value Date    CREATININE 2.18 02/01/2022    GLUCOSE 179 02/01/2022       Medical Decision Making-Imaging:     Narrative   EXAMINATION:   CTA OF THE CHEST 1/8/2022 10:10 am       TECHNIQUE:   CTA of the chest was performed after the administration of intravenous   contrast.  Multiplanar reformatted images are provided for review.  MIP   images are provided for review. Dose modulation, iterative reconstruction,   and/or weight based adjustment of the mA/kV was utilized to reduce the   radiation dose to as low as reasonably achievable.       COMPARISON:   None.       HISTORY:   ORDERING SYSTEM PROVIDED HISTORY: dyspnea / hypoxia   Reason for Exam: Shortness of breath       FINDINGS:   Pulmonary Arteries: Pulmonary arteries are adequately opacified for   evaluation.  No evidence of intraluminal filling defect to suggest pulmonary   embolism.  Main pulmonary artery is normal in caliber.       Mediastinum: No evidence of mediastinal lymphadenopathy.  Normal heart size. Normal caliber thoracic aorta with diffuse atherosclerosis.       Lungs/pleura: Extensive ground-glass opacification of the bilateral lung   fields with peripheral involvement slight apical as well as basilar sparing.    No effusion or pneumothorax.       Upper Abdomen: Limited images of the upper abdomen are unremarkable.       Soft Tissues/Bones: No acute bone or soft tissue abnormality.           Impression   *No evidence of pulmonary localized pressure.  Findings were discussed   with Yancy Pardo at 12:24 pm on 1/16/2022.             Narrative   EXAMINATION:   CT OF THE CHEST, ABDOMEN, AND PELVIS WITHOUT CONTRAST 1/21/2022 11:09 am       TECHNIQUE:   CT of the chest, abdomen and pelvis was performed without the administration   of intravenous contrast. Multiplanar reformatted images are provided for   review. Dose modulation, iterative reconstruction, and/or weight based   adjustment of the mA/kV was utilized to reduce the radiation dose to as low   as reasonably achievable.       COMPARISON:   CT abdomen and pelvis dated 01/16/2022.  CT chest dated 01/08/2022       HISTORY:   ORDERING SYSTEM PROVIDED HISTORY: blood loss anemia , gluteal hematoma and   worsening HB   TECHNOLOGIST PROVIDED HISTORY:   blood loss anemia , gluteal hematoma and worsening HB           FINDINGS:       Chest:       Mediastinum: Heart size is within normal limits.  Ascending thoracic aorta is   mildly dilated, measuring 4 cm in AP dimension, similar to the previous   study.  Main pulmonary artery is stable in caliber as well.  No mediastinal   hematoma or lymphadenopathy. Bernardo Conradi is a catheter in the SVC with distal tip   in the distal SVC.       Lungs/pleura: There are small bilateral pleural effusions, which are new from   the previous study. Bernardo Conradi is extensive dense airspace consolidation   throughout both lungs, with mild sparing at the bases, increased from   01/08/2022. Bernardo Conradi is decreased AP dimension of the lower trachea, although   tracheobronchial tree remains patent.       Soft Tissues/Bones: There is no acute or suspicious osseous abnormality.    Visualized superficial soft tissues are within normal limits.           Abdomen/Pelvis:       Organs: Limited unenhanced liver is within normal limits.  There is a tiny   amount of free fluid adjacent to the hepatic dome.  Gallbladder, spleen,   pancreas, and adrenal glands are unremarkable.       There is the proximal mesentery.  No definite   evidence of high-grade bowel obstruction at this time.  Enteritis or early   developing obstruction are considered in the differential.         Medical Decision Cqzpgj-Eekbajit-Prevz:     Culture, Blood 1 [[id number]] Collected: 01/09/22 1155   Order Status: Completed Specimen: Blood Updated: 01/14/22 1300    Specimen Description . BLOOD    Special Requests NOT REPORTED    Culture NO GROWTH 5 DAYS   Culture, Blood 1 [[id number]] Collected: 01/09/22 1155   Order Status: Completed Specimen: Blood Updated: 01/14/22 1300    Specimen Description . BLOOD    Special Requests NOT REPORTED    Culture NO GROWTH 5 DAYS   COVID-19, Rapid [[id number]] (Abnormal) Collected: 01/08/22 1045   Order Status: Completed Specimen: Nasopharyngeal Swab Updated: 01/08/22 1109    Specimen Description . NASOPHARYNGEAL SWAB    SARS-CoV-2, Rapid DETECTED Abnormal     Comment:        Rapid NAAT: The specimen is POSITIVE for SARS-Cov-2, the novel coronavirus associated with   COVID-19.         This test has been authorized by the FDA under an Emergency Use Authorization (EUA) for use   by authorized laboratories.         The ID NOW COVID-19 assay is designed to detect the virus that causes COVID-19 in patients   with signs and symptoms of infection who are suspected of COVID-19. An individual without symptoms of COVID-19 and who is not shedding SARS-CoV-2 virus would   expect to have a negative (not detected) result in this assay.    Fact sheet for Healthcare Providers: Asael.es   Fact sheet for Patients: Asael.es           Methodology: Isothermal Nucleic Acid Amplification         Results reported [facility] the [facility]            Medical Decision Making-Other:     Note:  · Labs, medications, radiologic studies were reviewed with personal review of films  · Large amounts of data were reviewed  · Discussed with nursing Staff, Discharge planner  · Infection Control and Prevention measures reviewed  · All prior entries were reviewed  · Administer medications as ordered  · Prognosis: Guarded  · Discharge planning reviewed      Thank you for allowing us to participate in the care of this patient. Please call with questions. MOHAMUD Malave    ATTESTATION:    I have discussed the case, including pertinent history and exam findings with the APRN. I have evaluated the  History, physical findings and pictures of the patient and the key elements of the encounter have been performed by me. I have reviewed the laboratory data, other diagnostic studies and discussed them with the APRN. I have updated the medical record where necessary. I agree with the assessment, plan and orders as documented by the APRN.     Spenser Knight MD.

## 2022-02-01 NOTE — PLAN OF CARE
Problem: OXYGENATION/RESPIRATORY FUNCTION  Goal: Patient will maintain patent airway  1/31/2022 2140 by Damaris Villegas RCP  Outcome: Ongoing     Problem: OXYGENATION/RESPIRATORY FUNCTION  Goal: Patient will achieve/maintain normal respiratory rate/effort  Description: Respiratory rate and effort will be within normal limits for the patient  1/31/2022 2140 by Damaris Villegas, SUE  Outcome: Ongoing     Problem: MECHANICAL VENTILATION  Goal: Patient will maintain patent airway  1/31/2022 2140 by Damaris Villegas RCPHIL  Outcome: Ongoing     Problem: MECHANICAL VENTILATION  Goal: Oral health is maintained or improved  1/31/2022 2140 by Damaris Villegas, RCPHIL  Outcome: Ongoing     Problem: MECHANICAL VENTILATION  Goal: ET tube will be managed safely  1/31/2022 2140 by Damaris Villegas RCP  Outcome: Ongoing     Problem: MECHANICAL VENTILATION  Goal: Ability to express needs and understand communication  1/31/2022 2140 by Damaris Villegas RCP  Outcome: Ongoing     Problem: MECHANICAL VENTILATION  Goal: Mobility/activity is maintained at optimum level for patient  1/31/2022 2140 by Damaris Villegas, RCP  Outcome: Ongoing     Problem: SKIN INTEGRITY  Goal: Skin integrity is maintained or improved  1/31/2022 2140 by Damaris Villegas RCP  Outcome: Ongoing

## 2022-02-01 NOTE — PROGRESS NOTES
PALLIATIVE CARE NURSING ASSESSMENT    Patient: Caleb Carnes  Room: 0622/2983-00    Reason For Consult   Goals of care evaluation  Distress management  Guidance and support  Facilitate communications  Assistance in coordinating care    Code Status:   DNR-CCA      Impression: Caleb Carnes is a [de-identified]y.o. year old male  has a past medical history of Chronic lower back pain, Cobalamin deficiency, CVA (cerebral vascular accident) ([facility] [address] Diabetes mellitus ([facility] [address] Hyperlipidemia, Hypertension, Malignant neoplasm of colon ([facility] [address] [facility] and [facility] .  Currently hospitalized for the management of Covid pneumonia. The Palliative Care Team is following to assist with support of family and patient, goals of care,  Communication. Vital Signs  Blood pressure (!) 151/49, pulse 65, temperature 97.8 °F (36.6 °C), temperature source Oral, resp. rate 25, height 5' 10\" (1.778 m), weight 163 lb 2.3 oz (74 kg), SpO2 95 %. Patient Active Problem List   Diagnosis    Essential hypertension    Hyperlipidemia    Pneumonia due to COVID-19 virus    Acute hypoxemic respiratory failure due to COVID-19 [facility])    Acute kidney injury ([facility]    Stage 3b chronic kidney disease (HCC)    Elevated troponin    Nonrheumatic aortic valve stenosis    [facility]    Moderate protein-calorie malnutrition (HCC)    Atrial fibrillation with RVR (HCC)    COVID-19    Traumatic hematoma of buttock    Acute distention of stomach    Hypoxia       Palliative Interaction:  Reviewed patient's chart  Went to see patient. Pt on ventilator per ET tube. Vent settings prvc rate 16, peep 5 and FIO2 30%. Patient appears in sinus rhythm, oral gastric tube in place with tube feedings. Charting relates that patient failed weaning trial yesterday 1/31/22    Pt placed on cpap at 0830 today  Back to full support at 1003 per Dr. Easton Puente.   No plan to extubate    Respiratory rate 24, heart rate 70, bp 137/46, pulse ox 93%    Called and introduced myself to patient's wife Jhonatan Solano. Let her know I am with palliative care. I wanted to make sure that she was getting updates from staff regarding condition. She relates that they are. They are asking about the weaning trials an what is going wrong with those. I let her know patient was on trial today for 2 hours. Will have to get update from nurse or respiratory therapy and update them further. Asa Christensen know I will try to get her a better update, or I will ask the nurse to update. .      5916: called Jhonatan Solano and let her know that I spoke with bedside nurse who relates that patient needed put back on vent due to his breathing. Effiesourav Christensen know that the nurse relates that she is happy to talk to them about how he is doing today. Asa Christensen know she can call the unit and talk to nurse. Jhonatan Solano relates her son will be coming up shortly. Jhonatan Solano thanked me for my call.       Goals/Plan of care  Education/support to staff  Education/support to family  Discharge planning/helping to coordinate care  Communications with primary service  Providing support for coping/adaptation/distress of family  Discussing meaning/purpose   Specific spiritual beliefs/practices  Continue with current plan of care  Clarification of medical condition to patient and family  Code status clarified: [address] [place]  Validating patient/family distress  Continued communication updates  Recognizing, reflecting, and empathizing with family members' anticipatory grief      Palliative Care Coordinator  Bar CRAWFORD, RN, ONN-CG    [place] Office: [address] [place] Office: [phone]  [facility] Office [phone]    For Symptom Management Clinic scheduling please call [phone]

## 2022-02-01 NOTE — PROGRESS NOTES
02/01/22 1003   Vent Information   [facility]     Back to full support per Dr. Galvez Agent. No plans to extubate today.

## 2022-02-01 NOTE — PLAN OF CARE
Problem: Falls - Risk of:  Goal: Will remain free from falls  Description: Will remain free from falls  Outcome: Ongoing  Goal: Absence of physical injury  Description: Absence of physical injury  Outcome: Ongoing     Problem: Airway Clearance - Ineffective  Goal: Achieve or maintain patent airway  Outcome: Ongoing     Problem: Gas Exchange - Impaired  Goal: Absence of hypoxia  Outcome: Ongoing  Goal: Promote optimal lung function  Outcome: Ongoing     Problem: Nutrition  Goal: Optimal nutrition therapy  1/31/2022 1623 by Robert Mckenzie RD, LD  Outcome: Ongoing  Note: Nutrition Problem #1: Inadequate oral intake  Intervention: Food and/or Nutrient Delivery: Modify Tube Feeding (Suggest increase TF goal rate to 40 mL/hr and discontinue protein modulars. This will provide 1728 kcal and 78 g pro/day.)  Nutritional Goals: Meet % of estimated nutrition needs with nutrition support/oral diet.        Problem: OXYGENATION/RESPIRATORY FUNCTION  Goal: Patient will maintain patent airway  1/31/2022 2140 by Cici Wilson RCP  Outcome: Ongoing  1/31/2022 0842 by Melania Chavez RCP  Outcome: Ongoing  Goal: Patient will achieve/maintain normal respiratory rate/effort  Description: Respiratory rate and effort will be within normal limits for the patient  1/31/2022 2140 by Cici Wilson RCP  Outcome: Ongoing  1/31/2022 0842 by Melania Chavez RCP  Outcome: Ongoing     Problem: MECHANICAL VENTILATION  Goal: Patient will maintain patent airway  1/31/2022 2140 by Cici Wilson RCP  Outcome: Ongoing  1/31/2022 0842 by Melania Chavez RCP  Outcome: Ongoing  Goal: Oral health is maintained or improved  1/31/2022 2140 by Cici Wilson RCP  Outcome: Ongoing  1/31/2022 0842 by Melania Chavez RCP  Outcome: Ongoing  Goal: ET tube will be managed safely  1/31/2022 2140 by Cici Wilson RCP  Outcome: Ongoing  1/31/2022 0842 by Melania Chavez RCP  Outcome: Ongoing  Goal: Ability to express needs and understand communication  1/31/2022

## 2022-02-01 NOTE — PLAN OF CARE
Problem: OXYGENATION/RESPIRATORY FUNCTION  Goal: Patient will maintain patent airway  2/1/2022 0914 by Mansoor Reyes RCP  Outcome: Ongoing  1/31/2022 2140 by Pao Hernandez RCP  Outcome: Ongoing  Goal: Patient will achieve/maintain normal respiratory rate/effort  Description: Respiratory rate and effort will be within normal limits for the patient  2/1/2022 0914 by Mansoor Reyes, RCP  Outcome: Ongoing  1/31/2022 2140 by Pao Hernandez RCP  Outcome: Ongoing     Problem: MECHANICAL VENTILATION  Goal: Patient will maintain patent airway  2/1/2022 0914 by Mansoor Reyes RCP  Outcome: Ongoing  1/31/2022 2140 by Pao Hernandez RCP  Outcome: Ongoing  Goal: Oral health is maintained or improved  2/1/2022 0914 by Mansoor Reyes RCP  Outcome: Ongoing  1/31/2022 2140 by Pao Hernandez RCP  Outcome: Ongoing  Goal: ET tube will be managed safely  2/1/2022 0914 by Mansoor Reyes, RCP  Outcome: Ongoing  1/31/2022 2140 by Pao Hernandez RCP  Outcome: Ongoing  Goal: Ability to express needs and understand communication  2/1/2022 0914 by Mansoor Reyes RCP  Outcome: Ongoing  1/31/2022 2140 by Pao Hernandez RCP  Outcome: Ongoing  Goal: Mobility/activity is maintained at optimum level for patient  2/1/2022 0914 by Mansoor Reyes RCP  Outcome: Ongoing  1/31/2022 2140 by Pao Hernandez RCP  Outcome: Ongoing     Problem: SKIN INTEGRITY  Goal: Skin integrity is maintained or improved  2/1/2022 0914 by Mansoor Reyes, RCP  Outcome: Ongoing  1/31/2022 2140 by Pao Hernandez RCP  Outcome: Ongoing

## 2022-02-01 NOTE — PROGRESS NOTES
Critical Care Team - Daily Progress Note      Date and time: 2/1/2022 7:24 AM  Patient's name:  Yadiel Quijano  Medical Record Number: [id number]  Patient's account/billing number: [de-identified]  Patient's YOB: 1940  Age: [de-identified] y.o.   Date of Admission: 1/8/2022  9:49 AM  Length of stay during current admission: 24      Primary Care Physician: MOHAMUD Mahoney CNP  ICU Attending Physician: Dr. Porter Morales    Code Status: DNR-CCA    Reason for ICU admission:   Chief Complaint   Patient presents with    Shortness of Breath    Nausea & Vomiting       SUBJECTIVE:     HISTORY OF PRESENTING ILLNESS:    History was obtained from EMR due to patient being intubated patient is a 45-year-old  non- male who presented to the ER 01/08/2022 for shortness of breath, nausea and vomiting and was admitted for pneumonia due to COVID-19.  He reported having increasing shortness of breath with nausea and vomiting or 7 to 10 days before coming to the hospital. Noemí Mahan was tested positive for Covid shortly after Ludwig time.  Subsequently patient became mildly short of breath and more notified us cocci approximately 10 feet.  Upon arrival to the emergency room, patient was hypoxic on room air with oxygen saturation less than 80%.  He was put on high flow oxygen and was started on Decadron on 1/8/2010 and received Actemra on 1/8/2022.  Patient was admitted in Covid unit.  He also received meropenem for leukocytosis and possible secondary bacterial pneumonia from 1/11/2022 to 1/17/2022.  Meropenem was restarted on 1/21/2022 because of residual dense consolidation in the lungs with air bronchograms with end date of 1/31/2022 per ID.  Remdesivir is contraindicated due to JULES.  Patient had significant hypoxia while being on high flow nasal cannula and BiPAP alternatively he required intubation and mechanical ventilation on 1/22/22.  Nephrology is following for JULES likely secondary to ischemic ATN and nephrotoxic ATN. Cardiology is following for new onset A.fib and NSTEMI type-1 Vs. Type 2. Palliative care is following. OVERNIGHT EVENTS:         No acute events overnight  Patient afebrile and hemodynamically stable    Sedation: Fentanyl at 25 mcg/hr, Propofol at 10 mcg/kg/min  Pressors: None    Vent settings: PRVC/16/580/5/30%  ABG: pH 7.511, CO2 43.9, O2 57.7, HCO3 35.1    ID: IV Merrem 500 mg BID re started on 01/21/22    Nephrology: Increase free water to 300 cc Q4 hours, hold torsemide. Start half normal saline at 50 cc/hr. Cardiology: Following for new onset a fib with RVR on 01/10. Continue with PO Amiodarone 200 mg daily, Lopressor 75 mg twice daily and Eliquis 2.5 mg twice daily. Palliative Care: CODE STATUS changed to DNR CCA with intubation on 01/31/2022. Plan is for weaning trial today for possible extubation. Family is currently discussing possibility of trach and peg. F.A.S.T. M. H.U.G.S. B.I.D.  Feeding Diet: ADULT TUBE FEEDING; Orogastric; Renal Formula; Continuous; 25; Yes; 15; Q 4 hours; 40; 300; Other (specify); every 4 hrs per MD orders   Fluids: None   Family: Updated, currently considering options for trach and peg.  Analgesic: Fentanyl 25 mcg/hr, Roxicodone 7.5 mg Q6 hours   Sedation: Fentanyl 25 mcg/hr, Propofol 10 mcg/kg/min   Thrombo-prophylaxis: [] Enoxaparin, [] Unfract.  Heparin Subcutaneously, [] EPC Cuffs, ELIQUIS   Mobility: Limited, PT/OT following   Heads up: 45 degrees   Ulcer prophylaxis: [] PPI Agent, [x] [id number], [] Sucralfate, [] Other:   Glycemic control: Well controlled between 160-180   Spontaneous breathing trial: Will attempt this morning   Bowel regimen/urine output: 2.2L in last 24 hours   Indwelling catheter/lines: Midline - Left brachial 01/24, Arterial line - Left radial 01/22   De-escalation: Wean trial with plan for extubation today      AWAKE & FOLLOWING COMMANDS:  [] No   [x] Yes    CURRENT VENTILATION STATUS:     [x] Ventilator  [] BIPAP  [] Nasal Cannula [] Room Air      SECRETIONS Amount:  [] Small [] Moderate  [] Large  [] None  Color:     [] White [] Colored  [] Bloody    SEDATION:  RAAS Score:  [x] Propofol gtt  [] Versed gtt  [] Ativan gtt   [] No Sedation    PARALYZED:  [x] No    [] Yes    DIARRHEA:                [x] No                [] Yes  (C. Difficile status: [] positive                                                                                                                       [] negative                                                                                                                     [] pending)    VASOPRESSORS:  [x] No    [] Yes    If yes -   [] Levophed       [] Dopamine     [] Vasopressin       [] Dobutamine  [] Phenylephrine         [] Epinephrine    CENTRAL LINES:     [x] No   [] Yes   (Date of Insertion:   )           If yes -     [] Right IJ     [] Left IJ [] Right Femoral [] Left Femoral                   [] Right Subclavian [] Left Subclavian       ORTIZ'S CATHETER:   [x] No   [] Yes  (Date of Insertion:   )     URINE OUTPUT:            [x] Good   [] Low              [] Anuric    REVIEW OF SYSTEMS:  Unable to assess due to intubated status    OBJECTIVE:     VITAL SIGNS:  BP (!) 151/49   Pulse 74   Temp 97.8 °F (36.6 °C) (Oral)   Resp 20   Ht 5' 10\" (1.778 m)   Wt 163 lb 2.3 oz (74 kg)   SpO2 96%   BMI 23.41 kg/m²   Tmax over 24 hours:  Temp (24hrs), Av.7 °F (36.5 °C), Min:97.5 °F (36.4 °C), Max:98.1 °F (36.7 °C)      Patient Vitals for the past 6 hrs:   BP Temp Temp src Pulse Resp SpO2   22 0700  97.8 °F (36.6 °C) Oral 74 20 96 %   22 0600    80 24 96 %   22 0547     16 96 %   22 0500    69 18 95 %   22 0400 (!) 151/49 98.1 °F (36.7 °C) Oral 66 17 96 %   22 0320    66 22 96 %   22 0300    63 18 95 %   22 0200    60 21 95 %         Intake/Output Summary (Last 24 hours) at 2022 9720  Last data filed at 2/1/2022 0700  Gross per 24 hour   Intake 2782.25 ml   Output 2245 ml   Net 537.25 ml     Wt Readings from Last 2 Encounters:   01/31/22 163 lb 2.3 oz (74 kg)     Body mass index is 23.41 kg/m². PHYSICAL EXAMINATION:  Constitutional: Patient sedated and intubated. EENT: normocephalic, atraumatic  Neck: Supple, symmetrical, trachea midline  Respiratory: Crackles heard in upper lung lobes.  Intubated, on ventilator  Cardiovascular: regular rate and rhythm, normal S1, S2, no murmur noted  Abdomen: soft, nontender, nondistended, no masses  Extremities:  +2 pitting edema in b/l lower extremities       MEDICATIONS:    Scheduled Meds:   amiodarone  200 mg Per G Tube Daily    amLODIPine  10 mg Per G Tube Daily    apixaban  2.5 mg Per G Tube BID    aspirin  81 mg Per G Tube Daily    atorvastatin  20 mg Per G Tube Nightly    busPIRone  10 mg Per G Tube TID    clopidogrel  75 mg Per G Tube Daily    famotidine  20 mg Per G Tube Daily    metoprolol tartrate  75 mg Per G Tube BID    oxyCODONE  7.5 mg Per G Tube Q6H    senna  2 tablet Per G Tube Nightly    miconazole   Topical BID    insulin lispro  0-6 Units SubCUTAneous Q4H    insulin lispro  0-3 Units SubCUTAneous Nightly     Continuous Infusions:   fentaNYL 50 mcg/hr (02/01/22 0258)    propofol 10 mcg/kg/min (02/01/22 0119)    sodium chloride      sodium chloride      dextrose 100 mL/hr (01/19/22 0010)    sodium chloride       PRN Meds:   metoclopramide, 10 mg, Q6H PRN  bisacodyl, 10 mg, Daily PRN  hydrALAZINE, 10 mg, Q6H PRN  sodium chloride, , PRN  sodium chloride flush, 5-40 mL, PRN  sodium chloride, 25 mL, PRN  melatonin, 3 mg, Nightly PRN  sodium chloride, 1 spray, PRN  LORazepam, 0.5 mg, Q2H PRN  glucose, 15 g, PRN  dextrose, 12.5 g, PRN  glucagon (rDNA), 1 mg, PRN  dextrose, 100 mL/hr, PRN  metoprolol, 5 mg, Q6H PRN  benzonatate, 200 mg, TID PRN  sodium chloride flush, 10 mL, PRN  sodium chloride flush, 5-40 mL, PRN  sodium chloride, 25 mL, PRN  ondansetron, 4 mg, Q8H PRN   Or  ondansetron, 4 mg, Q6H PRN  polyethylene glycol, 17 g, Daily PRN  acetaminophen, 650 mg, Q6H PRN   Or  acetaminophen, 650 mg, Q6H PRN          VENT SETTINGS (Comprehensive) (if applicable):  Vent Information  $Ventilation: $Subsequent Day  Skin Assessment: Clean, dry, & intact  Suction Catheter Diameter: 14  Equipment Changed: HME  Vent Type: Servo i  Vent Mode: PRVC  Vt Ordered: 580 mL  Rate Set: 16 bmp  Pressure Support: 6 cmH20  FiO2 : 30 %  SpO2: 96 %  SpO2/FiO2 ratio: 320  Sensitivity: 2  PEEP/CPAP: 5  I Time/ I Time %: 0.7 s  Humidification Source: HME  Humidification Temp: 31  Humidification Temp Measured: 31  Nitric Oxide/Epoprostenol In Use?: No  Mask Type: Full face mask  Mask Size: Medium  Additional Respiratory  Assessments  Pulse: 74  Resp: 20  SpO2: 96 %  End Tidal CO2: 42 (%)  Position: Semi-Haynes's  Humidification Source: HME  Humidification Temp: 31  Oral Care Completed?: Yes  Oral Care: Lip moisturizer applied,Mouth moisturizer,Mouth suctioned  Subglottic Suction Done?: No  Cuff Pressure (cm H2O):  (mov)    ABGs:     Laboratory findings:    Complete Blood Count:   Recent Labs     01/30/22  0503 01/31/22  0339 02/01/22  0428   WBC 10.4 8.8 10.3   HGB 8.9* 8.1* 8.4*   HCT 28.4* 26.3* 26.9*    190 237        Last 3 Blood Glucose:   Recent Labs     01/30/22  0503 01/31/22 0339 02/01/22 0428   GLUCOSE 134* 188* 179*        PT/INR:    Lab Results   Component Value Date    PROTIME 11.6 01/22/2022    INR 1.1 01/22/2022     PTT:    Lab Results   Component Value Date    APTT 25.2 01/22/2022       Comprehensive Metabolic Profile:   Recent Labs     01/30/22  0503 01/31/22  0339 02/01/22 0428   * 146* 148*   K 3.8 4.0 3.8   * 107 108*   CO2 29 31 32*   BUN 93* 99* 95*   CREATININE 2.25* 2.44* 2.18*   GLUCOSE 134* 188* 179*   CALCIUM 8.4* 8.7 8.8      Magnesium:   Lab Results   Component Value Date    MG 2.5 01/28/2022     Phosphorus:   Lab Results Component Value Date    PHOS 5.3 01/22/2022     Ionized Calcium:   Lab Results   Component Value Date    CAION 1.21 01/22/2022        Urinalysis:     Troponin: No results for input(s): TROPONINI in the last 72 hours. Microbiology:    Cultures during this admission:     Blood cultures:                 [] None drawn      [x] Negative             []  Positive (Details:  )  Urine Culture:                   [] None drawn      [] Negative             []  Positive (Details:  )  Sputum Culture:               [] None drawn       [] Negative             []  Positive (Details:  )   Endotracheal aspirate:     [] None drawn       [] Negative             []  Positive (Details:  )     Radiology/Imaging:   Chest Xray (2/1/2022):    ASSESSMENT:     Patient Active Problem List    Diagnosis Date Noted    Hypoxia     Acute distention of stomach     COVID-19     Traumatic hematoma of buttock     Atrial fibrillation with RVR ([facility] 01/10/2022    Nonrheumatic aortic valve stenosis 01/09/2022    PAD (peripheral artery disease) ([facility] [facility] 01/09/2022    Moderate protein-calorie malnutrition ([facility] 01/09/2022    Essential hypertension 01/08/2022    Hyperlipidemia 01/08/2022    Pneumonia due to COVID-19 virus 01/08/2022    Acute hypoxemic respiratory failure due to COVID-19 ([facility] [facility] 01/08/2022    Acute kidney injury ([facility] [address] 01/08/2022    Stage 3b chronic kidney disease ([facility] [facility] 01/08/2022    Elevated troponin        PLAN:     1. Neurologic:   · Neuro intact  · Neuro checks per protocol  · Sedation on Propofol and Fentanyl  · Pain management: Fentanyl gtt; Roxicodone 7.5 mg q6 hours  · Patient follows commands off sedation     2. Cardiovascular:  · Hemodynamically stable, hypertensive; on Norvasc 10 mg daily and Lopressor 75 mg twice daily  · HR 60s  · MAPs 70s-80s  · MAP goal 65  · Echo 1/22: LVEF 55%.  Severe aortic stenosis   · New onset A.fib with RVR and NSTEMI Type II Vs. Type I- Cardiology following: Continue Amiodarone 200 mg daily PO, beta blockers and Eliquis. Rate controlled in NSR on Lopressor 75 mg BID and Amiodarone. Will consider a heart cath for aortic stenosis once more medically stable     3. Pulmonary:  · Maintain oxygen sats >92%  · Pulmonary toilet  · Acute hypoxic respiratory failure secondary to COVID pneumonia  · Vent settings: PRVC/16/580/5/30%  · ABG: pH 7.511, CO2 43.9, O2 57.7, HCO3 35.1  · CXR : stable scattered pulmonary infiltrates. ET tube in distal mid trachea. · ID following: Received Meropenem from  to . Then received another treatment from  to .         4. GI/Nutrition  · Senna daily, Dulcolax and Glycolax PRN  · Ulcer Prophylaxis: Pepcid  · Diet:ADULT TUBE FEEDING; Orogastric; Renal Formula; Continuous; 10; Yes; 10; Q 4 hours; 25; 30; Other (specify); per MD; Protein; 2 Protein modulars per day        5. Renal/Fluid/Electrolyte  · IV Fluids: Half NS at 50 cc/hr  · I/O: In: 2.9L/24 hours  · UOut: 2.2 L/24 hours   · JULES on CKD stage IIIb-4 (baseline Cr 2.0) secondary to ischemic ATN and nephrotoxic ATN:  BUN/Cr 95/2.18  · Nephrology following: HOLD Demadex 40 daily; add free water 300 mL q4 hours, f/u labs ordered  · Monitor electrolytes, replace PRN         6. ID  · WBC: 8.8 -> 10.3  · Antimicrobials: Meropenem completed on 22      7. Hematology:  · Hgb 8.1 ->8. 4  · Monitor daily CBC     8. Endocrine:   · Glucose controlled on low dose sliding scale  · Bs-180s     9. DVT Prophylaxis  · On Eliquis 2.5 mg twice daily        GI PPX: Pepcid  DVT PPX: Eliquis  IVF: None  Diet: Tube feeds      Yanna Kaur MD  Family Medicine Resident PGY-2  Department of Internal Medicine/Critical care  [facility])             2022, 7:24 AM    Attending Physician Statement  I have discussed the care of Benji Valdez, including pertinent history and exam findings,  with the resident.  I have seen and examined the patient and the key elements of all parts of the encounter have been performed by me. I agree with the assessment, plan and orders as documented by the resident with additions . Edematous   On ps 6 and cpap 5 - using accesory muscles of respiration . Not ready for extubation   Cont daily sbt   Sedation holiday   Continue supportive care   Prognoses guarded          Total critical care time caring for this patient with life threatening, unstable organ failure, including direct patient contact, management of life support systems, review of data including imaging and labs, discussions with other team members and physicians at least 27   Min so far today, excluding procedures. Treatment plan Discussed with nursing staff in detail , all questions answered . Electronically signed by Aly Christie MD on   2/1/22 at 3:02 PM EST    Please note that this chart was generated using voice recognition Dragon dictation software. Although every effort was made to ensure the accuracy of this automated transcription, some errors in transcription may have occurred.

## 2022-02-01 NOTE — PROGRESS NOTES
02/01/22 0803   Vent Information   Vent Mode CPAP   Pressure Support 6 cmH20       Patient started on SBT. Tolerating well at this time. RN informed.

## 2022-02-01 NOTE — PROGRESS NOTES
[facility])  Occupational Therapy Not Seen Note    DATE: 2022    NAME: Hilario Antoine  MRN: [id number]   : 1940      Patient not seen this date for Occupational Therapy due to:    Patient is not appropriate for active participation in OT evaluation/treatment at this time d/t intubated and sedated    Next Scheduled Treatment: check back 2022    Electronically signed by MANOJ Dooley/L on 2022 at 10:02 AM

## 2022-02-02 NOTE — PLAN OF CARE
Problem: OXYGENATION/RESPIRATORY FUNCTION  Goal: Patient will maintain patent airway  2/2/2022 1612 by Juan Ramon Roldan RCP  Outcome: Ongoing  2/2/2022 1430 by Mundo Vega RN  Outcome: Ongoing     Problem: OXYGENATION/RESPIRATORY FUNCTION  Goal: Patient will achieve/maintain normal respiratory rate/effort  Description: Respiratory rate and effort will be within normal limits for the patient  2/2/2022 1612 by Juan Ramon Roldan RCP  Outcome: Ongoing  2/2/2022 1430 by Mundo Vega RN  Outcome: Ongoing     Problem: MECHANICAL VENTILATION  Goal: Patient will maintain patent airway  2/2/2022 1612 by Juan Ramon Roldan RCP  Outcome: Ongoing  2/2/2022 1430 by Mundo Vega RN  Outcome: Ongoing     Problem: MECHANICAL VENTILATION  Goal: Oral health is maintained or improved  2/2/2022 1612 by Juan Ramon Roldan RCP  Outcome: Ongoing  2/2/2022 1430 by Mundo Vega RN  Outcome: Ongoing     Problem: MECHANICAL VENTILATION  Goal: ET tube will be managed safely  2/2/2022 1612 by Juan Ramon Roldan RCP  Outcome: Ongoing  2/2/2022 1430 by Mundo Vega RN  Outcome: Ongoing     Problem: MECHANICAL VENTILATION  Goal: Ability to express needs and understand communication  2/2/2022 1612 by Juan Ramon Roldan RCP  Outcome: Ongoing  2/2/2022 1430 by Mundo Vega RN  Outcome: Ongoing     Problem: MECHANICAL VENTILATION  Goal: Mobility/activity is maintained at optimum level for patient  2/2/2022 1612 by Juan Ramon Roldan RCP  Outcome: Ongoing  2/2/2022 1430 by Mundo Vega RN  Outcome: Ongoing     Problem: SKIN INTEGRITY  Goal: Skin integrity is maintained or improved  2/2/2022 1612 by Juan Ramon Roldan RCP  Outcome: Ongoing  2/2/2022 1430 by Mundo Vega RN  Outcome: Ongoing

## 2022-02-02 NOTE — FLOWSHEET NOTE
Writer called pt's spouse Tay House who is his decision maker to update her on his current status. Pt's Hgb is low @ 6.7, which makes his Po2 lower. She agrees with 's order to transfuse 1 Unit of PRBC's. Call also placed to Daughter Antonio Miles & message left, that I wanted to give her an update on his status.

## 2022-02-02 NOTE — PLAN OF CARE
Problem: Falls - Risk of:  Goal: Will remain free from falls  Description: Will remain free from falls  Outcome: Ongoing  Goal: Absence of physical injury  Description: Absence of physical injury  Outcome: Ongoing     Problem: Airway Clearance - Ineffective  Goal: Achieve or maintain patent airway  Outcome: Ongoing     Problem: Gas Exchange - Impaired  Goal: Absence of hypoxia  Outcome: Ongoing  Goal: Promote optimal lung function  Outcome: Ongoing     Problem: Breathing Pattern - Ineffective  Goal: Ability to achieve and maintain a regular respiratory rate  Outcome: Ongoing     Problem:  Body Temperature -  Risk of, Imbalanced  Goal: Ability to maintain a body temperature within defined limits  Outcome: Ongoing  Goal: Will regain or maintain usual level of consciousness  Outcome: Ongoing  Goal: Complications related to the disease process, condition or treatment will be avoided or minimized  Outcome: Ongoing     Problem: Nutrition Deficits  Goal: Optimize nutritional status  Outcome: Ongoing     Problem: Risk for Fluid Volume Deficit  Goal: Maintain normal heart rhythm  Outcome: Ongoing  Goal: Maintain absence of muscle cramping  Outcome: Ongoing  Goal: Maintain normal serum potassium, sodium, calcium, phosphorus, and pH  Outcome: Ongoing     Problem: Loneliness or Risk for Loneliness  Goal: Demonstrate positive use of time alone when socialization is not possible  Outcome: Ongoing     Problem: Fatigue  Goal: Verbalize increase energy and improved vitality  Outcome: Ongoing     Problem: Patient Education: Go to Patient Education Activity  Goal: Patient/Family Education  Outcome: Ongoing     Problem: Skin Integrity:  Goal: Will show no infection signs and symptoms  Description: Will show no infection signs and symptoms  Outcome: Ongoing  Goal: Absence of new skin breakdown  Description: Absence of new skin breakdown  Outcome: Ongoing     Problem: Nutrition  Goal: Optimal nutrition therapy  Outcome: Ongoing Problem: Pain:  Goal: Pain level will decrease  Description: Pain level will decrease  Outcome: Ongoing  Goal: Control of acute pain  Description: Control of acute pain  Outcome: Ongoing  Goal: Control of chronic pain  Description: Control of chronic pain  Outcome: Ongoing     Problem: OXYGENATION/RESPIRATORY FUNCTION  Goal: Patient will maintain patent airway  2/2/2022 0121 by Luis M Brewer RN  Outcome: Ongoing  2/1/2022 2123 by Cara Senior RCP  Outcome: Ongoing  Goal: Patient will achieve/maintain normal respiratory rate/effort  Description: Respiratory rate and effort will be within normal limits for the patient  2/2/2022 0121 by Luis M Brewer RN  Outcome: Ongoing  2/1/2022 2123 by Cara Senior RCP  Outcome: Ongoing     Problem: MECHANICAL VENTILATION  Goal: Patient will maintain patent airway  2/2/2022 0121 by Luis M Brewer RN  Outcome: Ongoing  2/1/2022 2123 by Cara Senior RCP  Outcome: Ongoing  Goal: Oral health is maintained or improved  2/2/2022 0121 by Luis M Brewer RN  Outcome: Ongoing  2/1/2022 2123 by Cara Senior RCP  Outcome: Ongoing  Goal: ET tube will be managed safely  2/2/2022 0121 by Luis M Brewer RN  Outcome: Ongoing  2/1/2022 2123 by Cara Senior RCP  Outcome: Ongoing  Goal: Ability to express needs and understand communication  2/2/2022 0121 by Luis M Brewer RN  Outcome: Ongoing  2/1/2022 2123 by Cara Senior RCP  Outcome: Ongoing  Goal: Mobility/activity is maintained at optimum level for patient  2/2/2022 0121 by Luis M Brewer RN  Outcome: Ongoing  2/1/2022 2123 by Cara Senior RCP  Outcome: Ongoing     Problem: SKIN INTEGRITY  Goal: Skin integrity is maintained or improved  2/2/2022 0121 by Luis M Brewer RN  Outcome: Ongoing  2/1/2022 2123 by Cara Senior RCP  Outcome: Ongoing     Problem: PAIN  Goal: STG - Patient will increase activity level  Outcome: Ongoing     Problem: SKIN INTEGRITY  Goal: LTG - Patient will demonstrate appropriate pressure relief techniques  Outcome: Ongoing

## 2022-02-02 NOTE — PROGRESS NOTES
Physical Therapy  DATE: 2022  NAME: Vira Andrews  MRN: [id number]   : 1940    Discharge Recommendations: Continue to Assess (pending progress)     Subjective: Pt supine in bed, RN aggreable to PT PROM  Pain: FINA  Patient follows: No Commands  Is patient on ventilator: Yes  Is patient on sedation: Yes  Precautions: Vent precautions    Therapeutic exercises:  UE/LE(s)   BILATERAL  Passive range of motion all planes x 10 reps  bilateral gastrocnemius stretching 3 reps x 30seconds  Pt repositioned for comfort, BLE ankles unloaded    Goals  Short Term Goals  Short term goal 1: Prevent contractures  Short term goal 2: Facilitate active ROM x4 extremities. Short term goal 3: Assess functional mobility when able. Plan: Progress functional mobility as medically appropriate.    Time In: 1135  Time Out: 1148  Time Coded Minutes (treatment minutes): 13  Rehab Potential:Guarded  Treatments/week: 2-3wk  Arline-Hill, ANN MARIE

## 2022-02-02 NOTE — PLAN OF CARE
Problem: OXYGENATION/RESPIRATORY FUNCTION  Goal: Patient will maintain patent airway  2/1/2022 2123 by Emilia Singleton RCP  Outcome: Ongoing     Problem: OXYGENATION/RESPIRATORY FUNCTION  Goal: Patient will achieve/maintain normal respiratory rate/effort  Description: Respiratory rate and effort will be within normal limits for the patient  2/1/2022 2123 by Emilia Singleton RCP  Outcome: Ongoing     Problem: MECHANICAL VENTILATION  Goal: Patient will maintain patent airway  2/1/2022 2123 by Emilia Singleton RCP  Outcome: Ongoing     Problem: MECHANICAL VENTILATION  Goal: Oral health is maintained or improved  2/1/2022 2123 by Emilia Singleton RCP  Outcome: Ongoing     Problem: MECHANICAL VENTILATION  Goal: ET tube will be managed safely  2/1/2022 2123 by Emilia Singleton RCP  Outcome: Ongoing     Problem: MECHANICAL VENTILATION  Goal: Ability to express needs and understand communication  2/1/2022 2123 by Emilia Singleton RCP  Outcome: Ongoing     Problem: MECHANICAL VENTILATION  Goal: Mobility/activity is maintained at optimum level for patient  2/1/2022 2123 by Emilia Singleton RCP  Outcome: Ongoing     Problem: SKIN INTEGRITY  Goal: Skin integrity is maintained or improved  2/1/2022 2123 by Emilia Singleton RCP  Outcome: Ongoing

## 2022-02-02 NOTE — PROGRESS NOTES
[facility] [address] [address] Patient  Today's Date and Time: 2/2/2022, 3:19 PM    Impression :     · COVID 19 Confirmed Infection  · Covid tests:  · 1/8/21: Positive  · Acute hypoxic respiratory failure  · Paroxysmal A. 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  · Essential hypertension  · Elevated inflammatory markers  · Hyperlipidemia  · Acute gluteal hematoma  · Patient has not received the Covid vaccine  · Intubated 1/22/22  · DNR-CCA    Recommendations:   · Antibiotic treatment:  · Meropenem 500 mg BID IV for leukocytosis & possible secondary bacterial pneumonia 1/11/21-discontinued 1/17/21  · Meropenem Re-started 1-21-22 because of residual dense consolidation of the lungs with air bronchograms-Completed 1/31/22  · Sputum 1/25: Normal alistair  · Covid Rx:    · Remdesivir-contraindicated with JULES  · Monoclonal antibodies-out of window  · Decadron-initiated 1/8/2022  · Actemra-administered 1/8/2022  · Continue supportive care  · [facility]      Medical Decision Making/Summary/Discussion:2/2/2022     · Patient admitted with COVID 19 infection  · Isolation until 1/28/22    Infection Control Recommendations   · [place] Precautions    Antimicrobial Stewardship Recommendations       · Renal considerations  Coordination of Outpatient Care:   · Estimated Length of IV antimicrobials:TBD  · Patient will need Midline Catheter Insertion: TBD  · Patient will need PICC line Insertion: No  · Patient will need: Home IV , [place],  SNF,  LTAC:TBD  · Patient will need outpatient wound care:No    Chief complaint/reason for consultation:   · Concern for COVID infection      History of Present Illness:   Remi Coronel is a [de-identified]y.o.-year-old male who was initially admitted on 1/8/2022.  Patient seen at the request of Dr. Piter Graham:    Patient presented through ER with complaints of needing shortness of breath, cough, loss of appetite, nausea, vomiting and diarrhea over the past 7 to 10 days. He has not received the Covid vaccine and tested positive for Covid shortly after [place]. On evaluation in the ED, the patient had an SPO2 of 80% on room air and was tachypneic. A rapid Covid swab was positive. CT chest shows extensive bilateral pulmonary groundglass opacities. He was started on Decadron and given a dose of Actemra. Abnormal labs include:  · Creatinine 2.32  ·   · Troponin I 48  · WBC 17.1    Patient admitted because of concerns with COVID 19. Meropenem initiated on 1/11/2022 for worsening respiratory distress and leukocytosis    Stable chest x-ray 1/11    CRP is trending down    Hemoglobin dropped to 5.7 on 1/16/2022  Hemoglobin remained stable at this time after receiving infusions of PRBC  Left gluteal hematoma present on CT abdomen pelvis    Occult blood noted on stool  Anemia continues in the program patient required another blood transfusion on 1/21/2022  Anticoagulation on hold s/p gluteal hematoma    Impression CT Chest/Abdomen/Pelvis 1/21/22   1.  New small bilateral pleural effusions with extensive bilateral airspace   consolidation, increased from 01/08/2022.       2.  Decreased AP dimension of the distal trachea, suggesting tracheomalacia.       3.  Previously noted hematoma in the left gluteal musculature is not as well   visualized on today's study, but appears overall slightly decreased in size. No new areas of hematoma.       4.  Mild distention of the stomach, mild prominence of proximal small bowel   loops, and mild nonspecific stranding in the proximal mesentery.  No definite   evidence of high-grade bowel obstruction at this time.  Enteritis or early   developing obstruction are considered in the differential.         The patient required intubation for worsening respiratory failure on 1/22/2022. He is currently requiring 65% FiO2 with a PEEP of 12.   Propofol is being given for sedation    CURRENT EVALUATION : 2/2/2022    Patient evaluated and examined in the ICU. Afebrile  VS stable  HTN    The patient remains on mechanical ventilation with low settings. Weaning trials as tolerated  He has not been tolerating weaning trials well thus far. Propofol and Fentanyl for sedation    Candida Albicans urine 1/25/22  MRSA nares not detected    Meropenem initiated 1/22/22  Repeat cultures ordered 1/25/22-Normal alistair  Leukocytosis has resolved      Patient exhibiting respiratory distress. Yes  Respiratory secretions: No    Patient receiving supplemental oxygen. BiPAP & Hi-flow NC-->MV  RR: 24-->23-->19   02 sat: 98-->94-->95    % FIO2: 30  PEEP: 6-->5    NEWS Score: 0-4 Low risk group; 5-6: Medium risk group; 7 or above: High risk group  Parameters 3 2 1 0 1 2 3   Age    < 65   ? 65   RR ? 8  9-11 12-20  21-24 ? 25   O2 Sats ? 91 92-93 94-95 ? 96      Suppl O2  Yes  No      SBP ? 90  101-110 111-219   ? 220   HR ? 40  41-50 51-90  111-130 ? 131   Consciousness    Alert   Drowsiness, lethargy, or confusion   Temperature ? 35.0 C (95.0 F)  35.1-36.0 C 95.1-96.9 F 36.1-38.0 C 97.0-100.4 F 38.1-39.0 C 100.5-102.3 F ? 39.1 C ? 102.4 F      NEWS Score:   1/9/2022: 5 moderate risk    Overall Daily Picture:      Unchanged    Presence of secondary bacterial Infection:    Possible  Additional antibiotics: Meropenem 1/21/2022    Labs, X rays reviewed: 2/2/2022    BUN:99-->95  Cr:2.4-->2.18    WBC: 8.8-->10.3   Hb:8.1-->8.4  Plat:190-->237    Absolute Neutrophils:16  Absolute Lymphocytes:0.34  Neutrophil/Lymphocyte Ratio: 53 very high risk    CRP:160-->131-->52.3-->21.5  Ferritin:804  LDH: 563    Pro Calcitonin:      Cultures:  Urine:  · 1/25: Candida albicans  Blood:  · 1/25: No growth  Sputum :  · 1/22: Gram negative rods  · 1/25: Normal alistair  MRSA Nares:   Not detected    CXR:     CAT:  1/8/21      Discussed with patient, RN, CC, IM.     I have personally reviewed the past medical history, past surgical history, medications, social history, and family history, and I have updated the database accordingly.   Past Medical History:     Past Medical History:   Diagnosis Date    Chronic lower back pain     Cobalamin deficiency     [facility]     Diabetes mellitus ([facility]     Hyperlipidemia     Hypertension     Malignant neoplasm of colon ([facility] [facility]     [facility]ipheral [facility]        Past Surgical  History:     Past Surgical History:   Procedure Laterality Date    ARTERY SURGERY      INSERT MIDLINE CATHETER  1/24/2022            Medications:      amiodarone  200 mg Per G Tube Daily    amLODIPine  10 mg Per G Tube Daily    [Held by provider] apixaban  2.5 mg Per G Tube BID    atorvastatin  20 mg Per G Tube Nightly    busPIRone  10 mg Per G Tube TID    [Held by provider] clopidogrel  75 mg Per G Tube Daily    famotidine  20 mg Per G Tube Daily    metoprolol tartrate  75 mg Per G Tube BID    oxyCODONE  7.5 mg Per G Tube Q6H    senna  2 tablet Per G Tube Nightly    miconazole   Topical BID    insulin lispro  0-6 Units SubCUTAneous Q4H       Social History:     Social History     Socioeconomic History    Marital status:      Spouse name: Not on file    Number of children: Not on file    Years of education: Not on file    Highest education level: Not on file   Occupational History    Not on file   Tobacco Use    Smoking status: Former Smoker    Smokeless tobacco: Never Used   Substance and Sexual Activity    Alcohol use: Not Currently    Drug use: Never    Sexual activity: Not on file   Other Topics Concern    Not on file   Social History Narrative    Not on file     Social Determinants of Health     Financial Resource Strain:     Difficulty of Paying Living Expenses: Not on file   Food Insecurity:     Worried About Running Out of Food in the Last Year: Not on file    Lesley of Food in the Last Year: Not on file Transportation Needs:     Lack of Transportation (Medical): Not on file    Lack of Transportation (Non-Medical): Not on file   Physical Activity:     Days of Exercise per Week: Not on file    Minutes of Exercise per Session: Not on file   Stress:     Feeling of Stress : Not on file   Social Connections:     Frequency of Communication with Friends and Family: Not on file    Frequency of Social Gatherings with Friends and Family: Not on file    Attends Jain Services: Not on file    Active Member of [address] [place] [address] [employer] or Organizations: Not on file    Attends Club or Organization Meetings: Not on file    Marital Status: Not on file   Intimate Partner Violence:     Fear of Current or Ex-Partner: Not on file    Emotionally Abused: Not on file    Physically Abused: Not on file    Sexually Abused: Not on file   Housing Stability:     Unable to Pay for Housing in the Last Year: Not on file    Number of [place] in the Last Year: Not on file    Unstable Housing in the Last Year: Not on file       Family History:   History reviewed. No pertinent family history. Allergies:   Patient has no known allergies. Review of Systems:     Review of Systems   Unable to perform ROS: Intubated   Respiratory: Positive for shortness of breath. Cardiovascular: Negative. Gastrointestinal: Negative. Genitourinary: Negative. Musculoskeletal: Negative. Skin: Positive for color change. Neurological: Negative.           Physical Examination :     Patient Vitals for the past 8 hrs:   BP Temp Temp src Pulse Resp SpO2 Height   02/02/22 1400  98.2 °F (36.8 °C) Axillary 79 30 96 %    02/02/22 1300    77 (!) 31 97 %    02/02/22 1200  98.1 °F (36.7 °C) Axillary 69 29 98 %    02/02/22 1159    74 28 97 %    02/02/22 1150 (!) 163/50         02/02/22 1128    69 26 100 %    02/02/22 1036       5' 10\" (1.778 m)   02/02/22 1000  98.1 °F (36.7 °C) Axillary       02/02/22 0854 (!) 156/54   75      02/02/22 0806    68 18 99 %    02/02/22 0800  98.1 °F (36.7 °C) Axillary         Physical Exam  Vitals and nursing note reviewed. Constitutional:       Appearance: He is well-developed. Comments: Intubated and sedated   HENT:      Head: Normocephalic and atraumatic. Cardiovascular:      Rate and Rhythm: Normal rate. Heart sounds: Murmur heard. Pulmonary:      Effort: Respiratory distress present. Breath sounds: No wheezing. Abdominal:      General: Bowel sounds are normal.      Palpations: Abdomen is soft. There is no mass. Tenderness: There is no abdominal tenderness. Musculoskeletal:         General: Swelling (In the bilateral upper extremities especially in the forearms.) present. Normal range of motion. Cervical back: Neck supple. Lymphadenopathy:      Cervical: No cervical adenopathy. Skin:     General: Skin is dry. Findings: Bruising (There is bruising and ecchymotic skin lesions in the forearms bilaterally right worse than left) present. Neurological:      Comments: FINA-intubated and sedated         Medical Decision Making -Laboratory:   I have independently reviewed/ordered the following labs:    CBC with Differential:   Recent Labs     02/01/22 0428 02/01/22 0428 02/02/22  0402 02/02/22  0945   WBC 10.3  --  9.6  --    HGB 8.4*   < > 6.7* 7.8*   HCT 26.9*   < > 21.2* 24.5*     --  233  --    LYMPHOPCT 4*  --  5*  --    MONOPCT 8  --  6  --     < > = values in this interval not displayed. BMP:   Recent Labs     02/01/22 0428 02/02/22 0402   * 145*   K 3.8 3.5*   * 107   CO2 32* 30   BUN 95* 91*   CREATININE 2.18* 2.25*   MG  --  2.5     Hepatic Function Panel:   No results for input(s): PROT, LABALBU, BILIDIR, IBILI, BILITOT, ALKPHOS, ALT, AST in the last 72 hours. No results for input(s): RPR in the last 72 hours. No results for input(s): HIV in the last 72 hours.   No results for input(s): BC in the last 72 hours. Lab Results   Component Value Date    MUCUS 1+ 01/17/2022    RBC 2.13 02/02/2022    TRICHOMONAS NOT REPORTED 01/17/2022    WBC 9.6 02/02/2022    YEAST NOT REPORTED 01/17/2022    TURBIDITY Clear 01/17/2022     Lab Results   Component Value Date    CREATININE 2.25 02/02/2022    GLUCOSE 160 02/02/2022       Medical Decision Making-Imaging:     Narrative   EXAMINATION:   CTA OF THE CHEST 1/8/2022 10:10 am       TECHNIQUE:   CTA of the chest was performed after the administration of intravenous   contrast.  Multiplanar reformatted images are provided for review.  MIP   images are provided for review. Dose modulation, iterative reconstruction,   and/or weight based adjustment of the mA/kV was utilized to reduce the   radiation dose to as low as reasonably achievable.       COMPARISON:   None.       HISTORY:   ORDERING SYSTEM PROVIDED HISTORY: dyspnea / hypoxia   Reason for Exam: Shortness of breath       FINDINGS:   Pulmonary Arteries: Pulmonary arteries are adequately opacified for   evaluation.  No evidence of intraluminal filling defect to suggest pulmonary   embolism.  Main pulmonary artery is normal in caliber.       Mediastinum: No evidence of mediastinal lymphadenopathy.  Normal heart size. Normal caliber thoracic aorta with diffuse atherosclerosis.       Lungs/pleura: Extensive ground-glass opacification of the bilateral lung   fields with peripheral involvement slight apical as well as basilar sparing. No effusion or pneumothorax.       Upper Abdomen: Limited images of the upper abdomen are unremarkable.       Soft Tissues/Bones: No acute bone or soft tissue abnormality.           Impression   *No evidence of pulmonary embolism.    *Extensive ground-glass opacification of the bilateral lung fields with   peripheral involvement slight apical as well as basilar sparing.  Imaging   features suggestive of COVID-19 pneumonia, though are nonspecific and can   occur with a variety of infectious and noninfectious processes.         Narrative   EXAMINATION:   CT OF THE ABDOMEN AND PELVIS WITHOUT CONTRAST, 1/16/2022 10:42 am       TECHNIQUE:   CT of the abdomen and pelvis was performed without the administration of   intravenous contrast. Multiplanar reformatted images are provided for review. Dose modulation, iterative reconstruction, and/or weight based adjustment of   the mA/kV was utilized to reduce the radiation dose to as low as reasonably   achievable.       COMPARISON:   CT of the chest from 01/08/2022, and retroperitoneal ultrasound from   01/10/2022.       HISTORY:   ORDERING SYSTEM PROVIDED HISTORY:  Retrop bleed r/o   TECHNOLOGIST PROVIDED HISTORY:   Retrop bleed r/o   Reason for Exam:  Retrop bleed r/o       FINDINGS:   Lower Chest: As demonstrated on recent CT chest, extensive and somewhat   denser confluent ground-glass airspace opacities re-identified mid-lower   lungs, sparing the bases with some associated crazy paving, air bronchograms   and bibasilar atelectatic appearing changes.  Main pulmonary artery caliber   31 mm.  Mild dilatation ascending aorta, with a maximal dimension of 41 mm.       Organs: Unopacified liver, spleen, adrenal glands and both kidneys show no   acute abnormality; without suspicious focal finding or hydronephrosis. Significant right renal cortical thinning and some scarring suspected.  No   large stone.  Probable gallbladder sludge or vicarious contrast from recent   contrast CT; no calcified stones.       GI/Bowel: Small-moderate amount of retained stool, mostly right colon with   some fluid/levels; no abnormal dilatation, marked wall thickening or   pericolonic fat stranding.  Unremarkable small bowel.  Some fluid within a   mildly distended stomach.  Small hiatus hernia.       Pelvis: Partially fluid-filled urinary bladder without wall thickening or   mass.  No significant prostatic enlargement.  Symmetric seminal vesicles.  No   pelvic fluid collection or mass.  Partially visualized approximate 10 x 15 x   5.7 cm left gluteal mass with HU measurements/appearance most suggestive of   hematoma.  No high density finding compatible with active bleeding, but   assessment limited on this examination.  Small fat containing inguinal   hernias, larger on the left.       Peritoneum/Retroperitoneum: No retroperitoneal bleed or bulky   lymphadenopathy.  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  Probable limited intimal dissection   suprarenal aorta without marked aneurysmal dilatation.  Soft tissue stranding   flanks extending into the pelvis, suggesting some degree anasarca.  Slightly   greater prominence right proximal rectus musculature       Bones/Soft Tissues: No acute abnormality.  Mild scoliosis, multilevel   degenerative changes of spine but with DDD L5-S1 and bilateral mild hip joint   degenerative findings.           Impression   Left gluteal hematoma, partially visualized on this study without obvious   active bleeding, but limited without IV contrast.  No retroperitoneal or   rectus sheath bleed.       Extensive findings in the visualized lungs compatible with known COVID-19   pneumonia; denser GGOs suggest worsening pneumonia/pneumonitis or developing   consolidation.  No pneumothorax.       Multiple additional findings, as detailed in the body of the report above.       RECOMMENDATIONS:   Consider follow-up CT pelvis including the upper thighs, if the patient does   not respond to resuscitation with blood products/fluids and other   conservative measures including localized pressure.  Findings were discussed   with Elisa Aguilar at 12:24 pm on 1/16/2022.             Narrative   EXAMINATION:   CT OF THE CHEST, ABDOMEN, AND PELVIS WITHOUT CONTRAST 1/21/2022 11:09 am       TECHNIQUE:   CT of the chest, abdomen and pelvis was performed without the administration   of intravenous contrast. Multiplanar reformatted images are provided for   review. Dose modulation, iterative reconstruction, and/or weight based   adjustment of the mA/kV was utilized to reduce the radiation dose to as low   as reasonably achievable.       COMPARISON:   CT abdomen and pelvis dated 01/16/2022.  CT chest dated 01/08/2022       HISTORY:   ORDERING SYSTEM PROVIDED HISTORY: blood loss anemia , gluteal hematoma and   worsening HB   TECHNOLOGIST PROVIDED HISTORY:   blood loss anemia , gluteal hematoma and worsening HB           FINDINGS:       Chest:       Mediastinum: Heart size is within normal limits.  Ascending thoracic aorta is   mildly dilated, measuring 4 cm in AP dimension, similar to the previous   study.  Main pulmonary artery is stable in caliber as well.  No mediastinal   hematoma or lymphadenopathy. Christine Cambric is a catheter in the SVC with distal tip   in the distal SVC.       Lungs/pleura: There are small bilateral pleural effusions, which are new from   the previous study. Christine Cambric is extensive dense airspace consolidation   throughout both lungs, with mild sparing at the bases, increased from   01/08/2022. Christine Cambric is decreased AP dimension of the lower trachea, although   tracheobronchial tree remains patent.       Soft Tissues/Bones: There is no acute or suspicious osseous abnormality. Visualized superficial soft tissues are within normal limits.           Abdomen/Pelvis:       Organs: Limited unenhanced liver is within normal limits.  There is a tiny   amount of free fluid adjacent to the hepatic dome.  Gallbladder, spleen,   pancreas, and adrenal glands are unremarkable.       There is bilateral renal atrophy, more so on the right, unchanged.  No   hydronephrosis or perinephric fluid collection. Christine Cambric is mild bilateral   perinephric stranding, which is unchanged and likely physiologic.       GI/Bowel: The stomach is mildly distended with air-fluid level noted.  No   abnormal bowel distention.  There is mild increased prominence of the   proximal small bowel loops.  There is mild stranding in the proximal   mesentery.  No focal pericolonic inflammation.  No free air.       Pelvis: Urinary bladder is within normal limits.  No evidence of pelvic   lymphadenopathy.       Peritoneum/Retroperitoneum: Distal aortobifemoral graft noted.  Caliber of   the abdominal aorta is unchanged.  There is moderate to severe scattered   atherosclerosis, which is unchanged.  No retroperitoneal lymphadenopathy or   hematoma.  Slit-like IVC is noted.       Bones/Soft Tissues: There is no acute or suspicious osseous abnormality.  The   hematoma previously seen in the left gluteal muscle is not as well visualized   on today's study due to isoattenuation.  There is asymmetric swelling of the   left gluteal muscle, although slightly improved when compared to 01/16/2022. AP dimension in the area of suspected hematoma is 4.4 cm (previously 5.7 cm). Transverse dimension is approximately 9.3 cm (previously 9.7 cm).  There is   stranding in the subcutaneous fat of the upper thighs bilaterally.           Impression   1.  New small bilateral pleural effusions with extensive bilateral airspace   consolidation, increased from 01/08/2022.       2.  Decreased AP dimension of the distal trachea, suggesting tracheomalacia.       3.  Previously noted hematoma in the left gluteal musculature is not as well   visualized on today's study, but appears overall slightly decreased in size.    No new areas of hematoma.       4.  Mild distention of the stomach, mild prominence of proximal small bowel   loops, and mild nonspecific stranding in the proximal mesentery.  No definite   evidence of high-grade bowel obstruction at this time.  Enteritis or early   developing obstruction are considered in the differential.         Medical Decision [id number]-[id number]-[id number]:     Culture, Blood 1 [[id number]] Collected: 01/09/22 3720 Order Status: Completed Specimen: Blood Updated: 01/14/22 1300    Specimen Description . BLOOD    Special Requests NOT REPORTED    Culture NO GROWTH 5 DAYS   Culture, Blood 1 [[id number]] Collected: 01/09/22 1155   Order Status: Completed Specimen: Blood Updated: 01/14/22 1300    Specimen Description . BLOOD    Special Requests NOT REPORTED    Culture NO GROWTH 5 DAYS   COVID-19, Rapid [[id number]] (Abnormal) Collected: 01/08/22 1045   Order Status: Completed Specimen: Nasopharyngeal Swab Updated: 01/08/22 1109    Specimen Description . NASOPHARYNGEAL SWAB    SARS-CoV-2, Rapid DETECTED Abnormal     Comment:        Rapid NAAT: The specimen is POSITIVE for SARS-Cov-2, the novel coronavirus associated with   COVID-19.         This test has been authorized by the FDA under an Emergency Use Authorization (EUA) for use   by authorized laboratories.         The ID NOW COVID-19 assay is designed to detect the virus that causes COVID-19 in patients   with signs and symptoms of infection who are suspected of COVID-19. An individual without symptoms of COVID-19 and who is not shedding SARS-CoV-2 virus would   expect to have a negative (not detected) result in this assay. Fact sheet for Healthcare Providers: BuildHer.es   Fact sheet for Patients: BuildHer.es           Methodology: Isothermal Nucleic Acid Amplification         Results reported to the appropriate [facility]            Medical Decision Making-Other:     Note:  · Labs, medications, radiologic studies were reviewed with personal review of films  · Large amounts of data were reviewed  · Discussed with nursing Staff, Discharge planner  · Infection Control and Prevention measures reviewed  · All prior entries were reviewed  · Administer medications as ordered  · Prognosis: Guarded  · Discharge planning reviewed      Thank you for allowing us to participate in the care of this patient.  Please call with questions.     Michael Figures, MD

## 2022-02-02 NOTE — PROGRESS NOTES
famotidine  20 mg Per G Tube Daily    metoprolol tartrate  75 mg Per G Tube BID    oxyCODONE  7.5 mg Per G Tube Q6H    senna  2 tablet Per G Tube Nightly    miconazole   Topical BID    insulin lispro  0-6 Units SubCUTAneous Q4H     Continuous Infusions:    sodium chloride      sodium chloride 50 mL/hr at 22 0641    fentaNYL 50 mcg/hr (22 1927)    propofol 10 mcg/kg/min (22 0905)    dextrose 100 mL/hr (22 0010)    sodium chloride 10 mL (22 004)     PRN Meds:  sodium chloride, hydrALAZINE, metoclopramide, bisacodyl, sodium chloride flush, melatonin, sodium chloride, LORazepam, glucose, dextrose, glucagon (rDNA), dextrose, metoprolol, sodium chloride flush, sodium chloride, ondansetron **OR** ondansetron, polyethylene glycol, acetaminophen **OR** acetaminophen    Input/Output:       I/O last 3 completed shifts: In: 4224.7 [I.V.:981.7; NG/GT:3243]  Out: 1795 [BKRKV:4854]. Patient Vitals for the past 96 hrs (Last 3 readings):   Weight   22 0536 164 lb 7.4 oz (74.6 kg)   22 2200 163 lb 2.3 oz (74 kg)       Vital Signs:   Temperature:  Temp: 98.2 °F (36.8 °C)  TMax:   Temp (24hrs), Av.2 °F (36.8 °C), Min:97.4 °F (36.3 °C), Max:98.8 °F (37.1 °C)    Respirations:  Resp: 21  Pulse:   Pulse: 75  BP:    BP: (!) 156/54  BP Range: Systolic (68UQL), JUM:956 , Min:122 , MCH:689       Diastolic (98DPZ), EZA:41, Min:38, Max:58      Physical Examination:     General:  Intubated, sedated  HEENT: Has dried blood around the mouth  Eyes:   Pupils equal, round and reactive to light. Neck:   No JVD, no thyromegaly, no lymphadenopathy. Chest:   Decreased in bases  Cardiac:  S1 S2 RR, no murmurs, gallops or rubs, JVP not raised. Abdomen: Soft, no masses or organomegaly, BS audible. Neuro: On Mech vent, sedated  :   No suprapubic or flank tenderness. SKIN:  No rashes, good skin turgor. Bruising upper extremities  Extremities:  Trace edema lower extremities.     Labs: Recent Labs     01/31/22  0339 02/01/22 0428 02/02/22  0402   WBC 8.8 10.3 9.6   RBC 2.62* 2.71* 2.13*   HGB 8.1* 8.4* 6.7*   HCT 26.3* 26.9* 21.2*   .4 99.3 99.5   MCH 30.9 31.0 31.5   MCHC 30.8 31.2 31.6   RDW 17.8* 17.6* 17.6*    237 233   MPV 11.0 10.6 10.9      BMP:   Recent Labs     01/31/22 0339 02/01/22  0428 02/02/22  0402   * 148* 145*   K 4.0 3.8 3.5*    108* 107   CO2 31 32* 30   BUN 99* 95* 91*   CREATININE 2.44* 2.18* 2.25*   GLUCOSE 188* 179* 160*   CALCIUM 8.7 8.8 8.1*     SUMMER:      Lab Results   Component Value Date    SUMMER NEGATIVE 01/09/2022     SPEP:  Lab Results   Component Value Date    PROT 6.1 01/09/2022     C3:     Lab Results   Component Value Date    C3 134 01/09/2022     C4:     Lab Results   Component Value Date    C4 31 01/09/2022       Urinalysis/Chemistries:      Lab Results   Component Value Date    NITRU NEGATIVE 01/17/2022    COLORU Yellow 01/17/2022    PHUR 5.0 01/17/2022    WBCUA None 01/17/2022    RBCUA 2 TO 5 01/17/2022    MUCUS 1+ 01/17/2022    TRICHOMONAS NOT REPORTED 01/17/2022    YEAST NOT REPORTED 01/17/2022    BACTERIA NOT REPORTED 01/17/2022    SPECGRAV 1.018 01/17/2022    LEUKOCYTESUR NEGATIVE 01/17/2022    UROBILINOGEN Normal 01/17/2022    BILIRUBINUR NEGATIVE 01/17/2022    GLUCOSEU 1+ 01/17/2022    KETUA NEGATIVE 01/17/2022    AMORPHOUS NOT REPORTED 01/17/2022     Urine Sodium:     Lab Results   Component Value Date    CHERYL 56 01/10/2022       Urine Creatinine:     Lab Results   Component Value Date    LABCREA 90.8 01/09/2022       Radiology:     CXR:   FINDINGS:   There are scattered pulmonary infiltrates.  There are no effusions.  There is   no pneumothorax.  The mediastinal structures are unremarkable.  The upper   abdomen is unremarkable.  The extrathoracic soft tissues are unremarkable.    There is an endotracheal tube with tip in the distal midtrachea.  There is a   gastric tube with tip in the proximal aspect of the stomach.         Impression   Stable scattered pulmonary infiltrates.       Support tubes as described above. Assessment:     1. Acute Kidney Injury: Secondary to ischemic ATN and nephrotoxic ATN initially from COVID-pneumonia, systemic inflammation, contrast exposure.  Baseline 2.0 peaked at 3.0. Santiago Madelyn developed left gluteal area hematoma with drop in hemoglobin down to 5.7, decreased renal perfusion, had a second hit creatinine peaked at 3.96. Creatinine now fluctuating the 2.2-2.5, post ATN diuresis evident  2. 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 Persistent anemia, requiring multiple transfusions likely from left gluteal area hematoma  4. COVID-19 pneumonia with respiratory failure now intubated.    5.  Severe aortic stenosis valve area 1.0 cm² peak gradient 60 mean 37  6. Hypernatremia from with free water losses likely insensible   7. Atrial fibrillation: On Eliquis and amiodarone  8. Contraction and post hypercapnic alkalosis evident  9. Possible GI bleed. 10.  Drop in hemoglobin requiring blood transfusion. Plan:   1. Continue free water to 300cc Q 4 hours. 2.  Continue to hold torsemide for now. 3.  Continue half-normal saline at 50 cc an hour. 4.  Agree with blood transfusion as hemoglobin less than 7.  5.  Follow-up fecal occult blood testing and if positive, might require GI consult. 6.  Will give 20mEq of KCL. 7.  Monitor strict I's and O's and renal function. 8.  BMP in AM.  9.  Prognosis guarded. 10.  Will follow. Nutrition   Please ensure that patient is on a renal diet/TF. Avoid nephrotoxic drugs/contrast exposure. We will continue to follow along with you.      Rodolfo Laird MD  Internal Medicine Resident, PGY-2  [facility]         2/2/2022, 9:47 AM    Attending Physician Statement  I have discussed the care of this patient, including pertinent history and exam findings, with the Resident/CNP. I have reviewed and edited the key elements of all parts of the encounter with the Resident/CNP. I agree with the assessment, plan and orders as documented by the Resident/CNP. Car Rendon MD   Nephrology [address] [place] [address] Drive    This note is created with the assistance of a speech-recognition program. While intending to generate a document that actually reflects the content of the visit, no guarantees can be provided that every mistake has been identified and corrected by editing.

## 2022-02-02 NOTE — PLAN OF CARE
Problem: Breathing Pattern - Ineffective  Goal: Ability to achieve and maintain a regular respiratory rate  2/2/2022 1430 by David Macias RN  Outcome: Ongoing     Problem: Risk for Fluid Volume Deficit  Goal: Maintain normal heart rhythm  2/2/2022 1430 by David Macias RN  Outcome: Ongoing     Problem: Skin Integrity:  Goal: Will show no infection signs and symptoms  Description: Will show no infection signs and symptoms  2/2/2022 1430 by David Macias RN  Outcome: Ongoing

## 2022-02-02 NOTE — PROGRESS NOTES
Patient's son here for visit. Update given. He states current plan to continue to try to wean and extubate in the next week if possible. The family's current wishes are no trach and PEG. Patient's son wanted to make sure we wouldn't extubate without notifying family and I explained the extubation process in detail.   Support given

## 2022-02-02 NOTE — PROGRESS NOTES
Critical Care Team - Daily Progress Note      Date and time: 2/2/2022 7:57 AM  Patient's name:  Catherine Dudley  Medical Record Number: [id number]  Patient's account/billing number: [de-identified]  Patient's YOB: 1940  Age: [de-identified] y.o. Date of Admission: 1/8/2022  9:49 AM  Length of stay during current admission: 25      Primary Care Physician: MOHAMUD Jeronimo - CNP  ICU Attending Physician: Dr. Amina Martinez      Code Status: DNR-CCA    Reason for ICU admission:   Chief Complaint   Patient presents with    Shortness of Breath    Nausea & Vomiting       SUBJECTIVE:     HPI:  History was obtained from EMR due to patient being intubated patient is a 62-year-old  non- male who presented to the ER 01/08/2022 for shortness of breath, nausea and vomiting and was admitted for pneumonia due to COVID-19.  He reported having increasing shortness of breath with nausea and vomiting or 7 to 10 days before coming to the hospital. Luis Mcgee was tested positive for Covid shortly after Ludwig time.  Subsequently patient became mildly short of breath and more notified us cocci approximately 10 feet.  Upon arrival to the emergency room, patient was hypoxic on room air with oxygen saturation less than 80%.  He was put on high flow oxygen and was started on Decadron on 1/8/2010 and received Actemra on 1/8/2022.  Patient was admitted in Covid unit.  He also received meropenem for leukocytosis and possible secondary bacterial pneumonia from 1/11/2022 to 1/17/2022.  Meropenem was restarted on 1/21/2022 because of residual dense consolidation in the lungs with air bronchograms with end date of 1/31/2022 per ID.  Remdesivir is contraindicated due to JULES.  Patient had significant hypoxia while being on high flow nasal cannula and BiPAP alternatively he required intubation and mechanical ventilation on 1/22/22.  Nephrology is following for JULES likely secondary to ischemic ATN and nephrotoxic ATN.  Cardiology is following for new onset A.fib and NSTEMI type-1 Vs. Type 2. Palliative care is following. OVERNIGHT EVENTS:         Patient seen and examined at bedside  Overnight, hemoglobin dropped to 6.7, given 1 unit of PRBC  Per nursing staff stool is very dark will get fecal occult   Did have some desaturation on 30% FiO2 likely due to blood loss, currently on 40% FiO2    Sedation: Propofol 10 mcg/kg/hr  Pressors: None  Analgesics: Fentanyl 50 mcg/hr     Vent Settings: PRVC, RR 14, , PEEP 8, FiO2 40%  ABG: pH 7.506, pCO2 43.4, pO2 51.3, HCO3 34.3    ID: IV Merrem re started on 01/21/22 and completed on 01/31/22    Nephrology: Continue free water 300 cc Q4 hours. Continue to hold Torsemide. Cardiology: A fib stable continue PO Amiodarone, Lopressor and Eliquis    Palliative Care: CODE STATUS made DNR-CCA with intubation on 01/31/2022 Friday this week will be 2 weeks of intubation. Family waiting to decide for possible terminal extubation, at this time they do not want trach and peg for patient.       AWAKE & FOLLOWING COMMANDS:  [] No   [x] Yes    CURRENT VENTILATION STATUS:     [x] Ventilator  [] BIPAP  [] Nasal Cannula [] Room Air      SECRETIONS Amount:  [] Small [] Moderate  [] Large  [x] None  Color:     [] White [] Colored  [] Bloody    SEDATION:  RAAS Score:  [x] Propofol gtt  [] Versed gtt  [] Ativan gtt   [] No Sedation    PARALYZED:  [x] No    [] Yes    DIARRHEA:                [x] No                [] Yes  (C. Difficile status: [] positive                                                                                                                       [] negative                                                                                                                     [] pending)    VASOPRESSORS:  [x] No    [] Yes    If yes -   [] Levophed       [] Dopamine     [] Vasopressin       [] Dobutamine  [] Phenylephrine         [] Epinephrine    CENTRAL LINES:     [x] No   [] Yes   (Date of Insertion:   )           If yes -     [] Right IJ     [] Left IJ [] Right Femoral [] Left Femoral                   [] Right Subclavian [] Left Subclavian       ORTIZ'S CATHETER:   [x] No   [] Yes  (Date of Insertion:   )     URINE OUTPUT:            [x] Good   [] Low              [] Anuric    ROS: Unable to assess patient intubated and sedated. OBJECTIVE:     VITAL SIGNS:  BP (!) 131/39   Pulse 67   Temp 98.2 °F (36.8 °C) (Oral)   Resp 25   Ht 5' 10\" (1.778 m)   Wt 164 lb 7.4 oz (74.6 kg)   SpO2 100%   BMI 23.60 kg/m²   Tmax over 24 hours:  Temp (24hrs), Av.2 °F (36.8 °C), Min:97.4 °F (36.3 °C), Max:98.8 °F (37.1 °C)      Patient Vitals for the past 6 hrs:   BP Temp Temp src Pulse Resp SpO2 Weight   22 0700    67 25 100 %    22 0653 (!) 131/39 98.2 °F (36.8 °C) Oral 68 22 100 %    22 0645    68 21 100 %    22 0637  98.1 °F (36.7 °C) Oral 69 22 100 %    22 0630    69 21 100 %    22 0615    67 21 100 %    22 0600    66 20 100 %    22 0559    65 20 100 %    22 0558    66 20 100 %    22 0557 (!) 122/38 98.1 °F (36.7 °C) Oral 67 19 100 %    22 0536    65 18  164 lb 7.4 oz (74.6 kg)   22 0500    65 19     22 0400  98.1 °F (36.7 °C) Oral 64 19     22 0304    66 17 96 %    22 0300    67 18 96 %    22 0200    72 21 94 %          Intake/Output Summary (Last 24 hours) at 2022 0757  Last data filed at 2022 0400  Gross per 24 hour   Intake 2465.44 ml   Output 950 ml   Net 1515.44 ml     Wt Readings from Last 2 Encounters:   22 164 lb 7.4 oz (74.6 kg)     Body mass index is 23.6 kg/m². PHYSICAL EXAMINATION:  Constitutional: Patient sedated and intubated. Patient has large ecchymosis on the lower back and large portion of the right upper extremity.    EENT: normocephalic, atraumatic  Neck: Supple, symmetrical, trachea midline  Respiratory: clear to auscultation b/l, no wheezes, no rales.  Intubated, on ventilator  Cardiovascular: regular rate and rhythm, normal S1, S2, no murmur noted  Abdomen: soft, nontender, nondistended, no masses  Extremities:   no pedal edema, no cyanosis      MEDICATIONS:    Scheduled Meds:   amiodarone  200 mg Per G Tube Daily    amLODIPine  10 mg Per G Tube Daily    apixaban  2.5 mg Per G Tube BID    atorvastatin  20 mg Per G Tube Nightly    busPIRone  10 mg Per G Tube TID    clopidogrel  75 mg Per G Tube Daily    famotidine  20 mg Per G Tube Daily    metoprolol tartrate  75 mg Per G Tube BID    oxyCODONE  7.5 mg Per G Tube Q6H    senna  2 tablet Per G Tube Nightly    miconazole   Topical BID    insulin lispro  0-6 Units SubCUTAneous Q4H     Continuous Infusions:   sodium chloride      sodium chloride 50 mL/hr at 02/02/22 0641    fentaNYL 50 mcg/hr (02/01/22 1927)    propofol 10 mcg/kg/min (02/01/22 1928)    dextrose 100 mL/hr (01/19/22 0010)    sodium chloride 10 mL (02/02/22 0041)     PRN Meds:   sodium chloride, , PRN  hydrALAZINE, 10 mg, Q6H PRN  metoclopramide, 10 mg, Q6H PRN  bisacodyl, 10 mg, Daily PRN  sodium chloride flush, 5-40 mL, PRN  melatonin, 3 mg, Nightly PRN  sodium chloride, 1 spray, PRN  LORazepam, 0.5 mg, Q2H PRN  glucose, 15 g, PRN  dextrose, 12.5 g, PRN  glucagon (rDNA), 1 mg, PRN  dextrose, 100 mL/hr, PRN  metoprolol, 5 mg, Q6H PRN  sodium chloride flush, 10 mL, PRN  sodium chloride, 25 mL, PRN  ondansetron, 4 mg, Q8H PRN   Or  ondansetron, 4 mg, Q6H PRN  polyethylene glycol, 17 g, Daily PRN  acetaminophen, 650 mg, Q6H PRN   Or  acetaminophen, 650 mg, Q6H PRN          VENT SETTINGS (Comprehensive) (if applicable):  Vent Information  $Ventilation: $Subsequent Day  Skin Assessment: Clean, dry, & intact  Suction Catheter Diameter: 14  Equipment ID: [id number]  Equipment Changed: Suction catheter  Vent Type: Servo i  Vent Mode: PRVC  Vt Ordered: 580 mL  Rate Set: (S) 14 bmp  Pressure Support: 6 cmH20  FiO2 : 40 %  SpO2: 100 %  SpO2/FiO2 ratio: 250  Sensitivity: 2  PEEP/CPAP: (S) 8  I Time/ I Time %: 0.8 s  Humidification Source: HME  Humidification Temp: 31  Humidification Temp Measured: 31  Nitric Oxide/Epoprostenol In Use?: No  Mask Type: Full face mask  Mask Size: Medium  Additional Respiratory  Assessments  Pulse: 67  Resp: 25  SpO2: 100 %  End Tidal CO2: 42 (%)  Position: Semi-Haynes's  Humidification Source: HME  Humidification Temp: 31  Oral Care Completed?: Yes  Oral Care: Mouthwash,Lip moisturizer applied,Mouth swabbed,Mouth moisturizer,Mouth suctioned  Subglottic Suction Done?: No  Cuff Pressure (cm H2O):  (mov)    ABGs:     Laboratory findings:    Complete Blood Count:   Recent Labs     01/31/22 0339 02/01/22 0428 02/02/22  0402   WBC 8.8 10.3 9.6   HGB 8.1* 8.4* 6.7*   HCT 26.3* 26.9* 21.2*    237 233        Last 3 Blood Glucose:   Recent Labs     01/31/22 0339 02/01/22 0428 02/02/22  0402   GLUCOSE 188* 179* 160*        PT/INR:    Lab Results   Component Value Date    PROTIME 11.6 01/22/2022    INR 1.1 01/22/2022     PTT:    Lab Results   Component Value Date    APTT 25.2 01/22/2022       Comprehensive Metabolic Profile:   Recent Labs     01/31/22 0339 02/01/22 0428 02/02/22  0402   * 148* 145*   K 4.0 3.8 3.5*    108* 107   CO2 31 32* 30   BUN 99* 95* 91*   CREATININE 2.44* 2.18* 2.25*   GLUCOSE 188* 179* 160*   CALCIUM 8.7 8.8 8.1*      Magnesium:   Lab Results   Component Value Date    MG 2.5 02/02/2022     Phosphorus:   Lab Results   Component Value Date    PHOS 5.3 01/22/2022     Ionized Calcium:   Lab Results   Component Value Date    CAION 1.21 01/22/2022        Urinalysis:     Troponin: No results for input(s): TROPONINI in the last 72 hours.     Microbiology:    Cultures during this admission:     Blood cultures:                 [] None drawn      [x] Negative             []  Positive (Details:  )  Urine Culture:                   [] None drawn      [] Negative [x]  Positive (Details:  )  Sputum Culture:               [] None drawn       [x] Negative             []  Positive (Details: Candida)   Endotracheal aspirate:     [] None drawn       [] Negative             []  Positive (Details:  )     Radiology/Imaging:   Chest Xray (2/2/2022):    ASSESSMENT:     Patient Active Problem List    Diagnosis Date Noted    Hypoxia     Acute distention of stomach     COVID-19     Traumatic hematoma of buttock     Atrial fibrillation with RVR ([facility] [facility] 01/10/2022    Nonrheumatic aortic valve stenosis 01/09/2022    PAD (peripheral artery disease) ([facility] [address] 01/09/2022    Moderate protein-calorie malnutrition ([address] 01/09/2022    Essential hypertension 01/08/2022    Hyperlipidemia 01/08/2022    Pneumonia due to COVID-19 virus 01/08/2022    Acute hypoxemic respiratory failure due to COVID-19 ([facility] [address] 01/08/2022    Acute kidney injury ([facility] [address] 01/08/2022    Stage 3b chronic kidney disease ([facility] [address] 01/08/2022    Elevated troponin        PLAN:     1. Neurologic:   · Neuro intact  · Neuro checks per protocol  · Sedation on Propofol and Fentanyl  · Pain management: Fentanyl gtt; Roxicodone 7.5 mg q6 hours  · Patient follows commands off sedation     2. Cardiovascular:  · Hemodynamically stable, on Norvasc 10 mg daily and Lopressor 75 mg twice daily  · HR 60s  · MAPs 70s-80s  · MAP goal 65  · Echo 1/22: LVEF 55%. Severe aortic stenosis   · New onset A.fib with RVR and NSTEMI Type II Vs. Type I- Cardiology following: Continue Amiodarone 200 mg daily PO, beta blockers and Eliquis. Rate controlled in NSR on Lopressor 75 mg BID and Amiodarone.  Will consider a heart cath for aortic stenosis once more medically stable     3. Pulmonary:  · Maintain oxygen sats >92%  · Pulmonary toilet  · Acute hypoxic respiratory failure secondary to COVID pneumonia  · Vent Settings: PRVC, RR 14, , PEEP 8, FiO2 40%  · ABG: pH 7.506, pCO2 43.4, pO2 51.3, HCO3 34.3  · CXR 1/29: stable scattered pulmonary infiltrates. ET tube in distal mid trachea. · ID following: Received Meropenem from  to . Then received another treatment from  to .         4. GI/Nutrition  · Senna daily, Dulcolax and Glycolax PRN  · Ulcer Prophylaxis: Pepcid  · Diet:ADULT TUBE FEEDING; Orogastric; Renal Formula; Continuous; 10; Yes; 10; Q 4 hours; 25; 30; Other (specify); per MD; Protein; 2 Protein modulars per day        5. Renal/Fluid/Electrolyte  · IV Fluids: None  · I/O: In: 2.9L/24 hours  · UOut: 2.2 L/24 hours   · JULES on CKD stage IIIb-4 (baseline Cr 2.0) secondary to ischemic ATN and nephrotoxic ATN:  BUN/Cr 91/2.25  · Nephrology following: HOLD Demadex 40 daily; add free water 300 mL q4 hours, f/u labs ordered  · Monitor electrolytes, replace PRN        6. ID  · WBC: 8.8 -> 10.3 -> 9.6  · Antimicrobials: Meropenem completed on 22      7. Hematology:  · Hgb 8.1 ->8.4 -> 6.7  · Transfuse 1 unit PRBC on 2022  · Monitor post transfusion H&H and repeat in evening  · HOLD ELIQUIS AND PLAVIX TODAY     8. Endocrine:   · Glucose controlled on low dose sliding scale  · Bs-180s     9. DVT Prophylaxis  · On Eliquis 2.5 mg twice daily        GI PPX: Pepcid  DVT PPX: Eliquis  IVF: None  Diet: Tube feeds        Ángel Huggins MD  Family Medicine Resident PGY-2  Department of Internal Medicine/Critical care  [facility])                2022, 7:57 AM    Attending Physician Statement  I have discussed the care of Juan Rodas, including pertinent history and exam findings,  with the resident. I have seen and examined the patient and the key elements of all parts of the encounter have been performed by me. I agree with the assessment, plan and orders as documented by the resident with additions . Ards due to covid -19 pna   Sever AS   jules on ckd   Acute anemia     Decline in hb , no overt gi bleed .    Will check stool occult blood   No blood in og   Hold anticoagulants   Transfuse 1 unit   Will repeat chest xray . PaO2 is 51 and PaO2 / fio2 ration - 127 , Aa - 180   Will continue daily sbt but his gas exchange is poor and uses accessory muscles due sbt which preclude his extubation . Sedation holiday daily          Total critical care time caring for this patient with life threatening, unstable organ failure, including direct patient contact, management of life support systems, review of data including imaging and labs, discussions with other team members and physicians at least 27   Min so far today, excluding procedures. Treatment plan Discussed with nursing staff in detail , all questions answered . Electronically signed by Fela Dumont MD on   2/2/22 at 1:35 PM EST    Please note that this chart was generated using voice recognition Dragon dictation software. Although every effort was made to ensure the accuracy of this automated transcription, some errors in transcription may have occurred.

## 2022-02-02 NOTE — PROGRESS NOTES
Comprehensive Nutrition Assessment    Type and Reason for Visit:  Reassess    Nutrition Recommendations/Plan: Modify Tube Feeding-Suggest change TF to Standard without Fiber goal 55 mL/hr for more volume and potassium. This will provide 1584 kcal and 73 g pro/day. Will continue to monitor TF tolerance/adequacy, labs, meds, and care plans. Nutrition Assessment:  Pt remains on vent, Propofol at 4 mL/hr. Renal TF at 40 mL/hr with minimal residuals. Large BM today. Labs reviewed: Na 145 mmol/L (receiving 300 mL H2O every 4 hr), BUN 91 mg/dL, CR 2.25 mg/dL, Glu 155, 160 mg/dL, K 3.5 mmol/L. Meds reviewed. Malnutrition Assessment:  Malnutrition Status:  Insufficient data      Estimated Daily Nutrient Needs:  Energy (kcal):  1700 kcal/day; Weight Used for Energy Requirements:  Admission     Protein (g):  80 g pro/day; Weight Used for Protein Requirements:  Admission (1.2)        Fluid (ml/day):  28 mL/kg = 2100 mL/day or per MD; Method Used for Fluid Requirements:  ml/Kg      Nutrition Related Findings:  Meds/labs reviewed. Last BM noted: 2/2/22      Wounds: Mouth    Current Nutrition Therapies:    ADULT TUBE FEEDING; Orogastric; Renal Formula; Continuous; 25; Yes; 15; Q 4 hours; 40; 300; Other (specify); every 4 hrs per MD orders  Current Tube Feeding (TF) Orders:  · Feeding Route: Orogastric  · Formula: Renal Formula  · Schedule: Continuous at 40 mL/hr   · Water Flushes: 300 mL every 4 hrs, per MD  · Current TF & Flush Orders Provides: Renal TF at 40 mL/hr =1728 kcal, 78 g pro, 698 mL water/day. Total free water= 2498 mL/day with current H2O flushes of 300 mL every 4 hr.  · Goal TF & Flush Orders Provides: Standard without Fiber goal 55 mL/hr will provide 1584 kcal ,73 g pro, 1082 mL water/day. Total free water would = 2882 mL/day if 300 mL water flush q 4 hr continues.     Additional Calorie Sources:   Propofol at 4 mL/hr =106 kcal/day    Anthropometric Measures:  · Height: 5' 10\" (177.8 cm)  · Current Body Weight: 164 lb 7.4 oz (74.6 kg)   · Admission Body Weight: 148 lb 2.4 oz (67.2 kg)    · Usual Body Weight: 150 lb (68 kg)     · Ideal Body Weight: 166 lbs; % Ideal Body Weight 99.1 %   · BMI: 23.6  · BMI Categories: Normal Weight (BMI 18.5-24. 9)       Nutrition Diagnosis:   · Inadequate oral intake related to impaired respiratory function as evidenced by NPO or clear liquid status due to medical condition,nutrition support - enteral nutrition    Nutrition Interventions:   Food and/or Nutrient Delivery:  Modify Tube Feeding-Suggest change TF to Standard without Fiber goal 55 mL/hr for more volume and potassium. This will provide 1584 kcal and 73 g pro/day. Nutrition Education/Counseling:  No recommendation at this time   Coordination of Nutrition Care:  Continue to monitor while inpatient    Goals:  Meet % of estimated nutrition needs with nutrition support/oral diet. Goal achieved     Nutrition Monitoring and Evaluation:   Behavioral-Environmental Outcomes:  None Identified   Food/Nutrient Intake Outcomes:  Enteral Nutrition Intake/Tolerance,IVF Intake  Physical Signs/Symptoms Outcomes:  Biochemical Data,Fluid Status or Edema,Nutrition Focused Physical Findings,Skin,Weight     Discharge Planning:     Too soon to determine     Electronically signed by Mannie Weeks RD, LD on 2/2/22 at 10:58 AM EST    Contact:[phone]

## 2022-02-02 NOTE — PROGRESS NOTES
[facility])  Occupational Therapy Not Seen Note    DATE: 2022    NAME: Abad Mullins  MRN: [id number]   : 1940      Patient not seen this date for Occupational Therapy due to:    Patient is not appropriate for active participation in OT evaluation/treatment at this time d/t intubated and sedated    Next Scheduled Treatment: check back 2022    Electronically signed by MANOJ Waggoner/L on 2022 at 9:27 AM

## 2022-02-03 NOTE — PROGRESS NOTES
Renal Progress Note    Patient :  She Carter; [de-identified] y.o. 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 Initially came in with COVID-19 pneumonia developed ATN which recovered after he developed gluteal hematoma and drop in hemoglobin and ATN creatinine peaking around 3.0 which seems to be resolving now. Developing hypernatremia from free water losses. Patient was seen and examined. Weight has gone up by at least 3 kg, and positive fluid balance of about 3 L    Off pressor support. Received 1 unit packed cells yesterday, hemoglobin still 6.9 this morning, dropped from 7.9 post transfusion yesterday  Eliquis and Plavix held. Pedal edema and hand swelling improved. Remains intubated, FIO2 40% PEEP 8. No plans to extubate at present. Family declines tracheostomy placement, will attempt weaning later on this week to see if her successful otherwise CODE STATUS will be changed appropriately.     Creatinine down to 2.1, azotemia better    Urine output reviewed, Art catheter was removed currently has a pure wick  Getting free water 300 Q 4hours  Has tube feed at 55 mL an hour    Outpatient Medications:     Medications Prior to Admission: aspirin 325 MG tablet, Take 81 mg by mouth daily   atorvastatin (LIPITOR) 40 MG tablet, Take 20 mg by mouth nightly  lisinopril (PRINIVIL;ZESTRIL) 30 MG tablet, Take 30 mg by mouth daily  metoprolol tartrate (LOPRESSOR) 50 MG tablet, Take 50 mg by mouth daily  cilostazol (PLETAL) 100 MG tablet, Take 100 mg by mouth 2 times daily  clopidogrel (PLAVIX) 75 MG tablet, Take 75 mg by mouth daily  vitamin B-12 (CYANOCOBALAMIN) 1000 MCG tablet, Take 1,000 mcg by mouth daily    Current Medications:     Scheduled Meds:    torsemide  40 mg Oral Every Other Day    amiodarone  200 mg Per G Tube Daily    amLODIPine  10 mg Per G Tube Daily    [Held by provider] apixaban  2.5 mg Per G Tube BID    atorvastatin  20 mg Per G Tube Nightly    busPIRone  10 mg Per G Tube TID    [Held by provider] clopidogrel  75 mg Per G Tube Daily    famotidine  20 mg Per G Tube Daily    metoprolol tartrate  75 mg Per G Tube BID    oxyCODONE  7.5 mg Per G Tube Q6H    senna  2 tablet Per G Tube Nightly    miconazole   Topical BID    insulin lispro  0-6 Units SubCUTAneous Q4H     Continuous Infusions:    sodium chloride      fentaNYL 25 mcg/hr (22 1419)    propofol 10 mcg/kg/min (22 0926)    dextrose 100 mL/hr (22 0010)    sodium chloride 10 mL (22 0041)     PRN Meds:  sodium chloride, hydrALAZINE, metoclopramide, bisacodyl, sodium chloride flush, melatonin, sodium chloride, LORazepam, glucose, dextrose, glucagon (rDNA), dextrose, metoprolol, sodium chloride flush, sodium chloride, ondansetron **OR** ondansetron, polyethylene glycol, acetaminophen **OR** acetaminophen    Input/Output:       I/O last 3 completed shifts: In: 6007.3 [I.V.:1.3; Blood:350; HT/MZ:2764]  Out: 1900 [DYNHS:2968]. Patient Vitals for the past 96 hrs (Last 3 readings):   Weight   22 0600 169 lb 12.1 oz (77 kg)   22 0536 164 lb 7.4 oz (74.6 kg)   22 2200 163 lb 2.3 oz (74 kg)       Vital Signs:   Temperature:  Temp: 98.1 °F (36.7 °C)  TMax:   Temp (24hrs), Av.4 °F (36.9 °C), Min:98.1 °F (36.7 °C), Max:99 °F (37.2 °C)    Respirations:  Resp: 23  Pulse:   Pulse: 59  BP:    BP: (!) 168/54  BP Range: Systolic (71HFM), JHS:247 , Min:139 , UXT:865       Diastolic (52PXF), QLQ:72, Min:49, Max:75      Physical Examination:     General:  Intubated, sedated  HEENT: Has dried blood around the mouth  Eyes:   Pupils equal, round and reactive to light. Neck:   No JVD, no thyromegaly, no lymphadenopathy. Chest:   Decreased in bases  Cardiac:  S1 S2 RR, no murmurs, gallops or rubs, JVP not raised.   Abdomen: Soft, no masses or organomegaly, BS audible. [facility], FiO2 40%  :   No suprapubic or flank tenderness. SKIN:  No rashes, good skin turgor. Bruising upper extremities  Extremities:  Trace edema lower extremities. Labs:       Recent Labs     02/01/22 0428 02/01/22 0428 02/02/22  0402 02/02/22  0402 02/02/22  0945 02/02/22  1811 02/03/22  0535   WBC 10.3  --  9.6  --   --   --  11.2   RBC 2.71*  --  2.13*  --   --   --  2.22*   HGB 8.4*   < > 6.7*   < > 7.8* 7.9* 6.9*   HCT 26.9*   < > 21.2*   < > 24.5* 24.5* 21.9*   MCV 99.3  --  99.5  --   --   --  98.6   MCH 31.0  --  31.5  --   --   --  31.1   MCHC 31.2  --  31.6  --   --   --  31.5   RDW 17.6*  --  17.6*  --   --   --  17.0*     --  233  --   --   --  256   MPV 10.6  --  10.9  --   --   --  11.0    < > = values in this interval not displayed.       BMP:   Recent Labs     02/01/22 0428 02/02/22 0402 02/03/22  0535   * 145* 141   K 3.8 3.5* 4.0   * 107 104   CO2 32* 30 30   BUN 95* 91* 91*   CREATININE 2.18* 2.25* 2.09*   GLUCOSE 179* 160* 191*   CALCIUM 8.8 8.1* 8.2*     SUMMER:      Lab Results   Component Value Date    SUMMER NEGATIVE 01/09/2022     SPEP:  Lab Results   Component Value Date    PROT 6.1 01/09/2022     C3:     Lab Results   Component Value Date    C3 134 01/09/2022     C4:     Lab Results   Component Value Date    C4 31 01/09/2022       Urinalysis/Chemistries:      Lab Results   Component Value Date    NITRU NEGATIVE 01/17/2022    COLORU Yellow 01/17/2022    PHUR 5.0 01/17/2022    WBCUA None 01/17/2022    RBCUA 2 TO 5 01/17/2022    MUCUS 1+ 01/17/2022    TRICHOMONAS NOT REPORTED 01/17/2022    YEAST NOT REPORTED 01/17/2022    BACTERIA NOT REPORTED 01/17/2022    SPECGRAV 1.018 01/17/2022    LEUKOCYTESUR NEGATIVE 01/17/2022    UROBILINOGEN Normal 01/17/2022    BILIRUBINUR NEGATIVE 01/17/2022    GLUCOSEU 1+ 01/17/2022    KETUA NEGATIVE 01/17/2022    AMORPHOUS NOT REPORTED 01/17/2022     Urine Sodium:     Lab Results Component Value Date    CHERYL 56 01/10/2022       Urine Creatinine:     Lab Results   Component Value Date    LABCREA 90.8 01/09/2022       Radiology:     CXR:   FINDINGS:   There are scattered pulmonary infiltrates.  There are no effusions. Bessy Glimpse is   no pneumothorax.  The mediastinal structures are unremarkable.  The upper   abdomen is unremarkable.  The extrathoracic soft tissues are unremarkable. There is an endotracheal tube with tip in the distal midtrachea.  There is a   gastric tube with tip in the proximal aspect of the stomach.           Impression   Stable scattered pulmonary infiltrates.       Support tubes as described above. Assessment:     1. Acute Kidney Injury: Secondary to ischemic ATN and nephrotoxic ATN initially from COVID-pneumonia, systemic inflammation, contrast exposure.  Baseline 2.0 peaked at 3.0. Lexy Crisostomo developed left gluteal area hematoma with drop in hemoglobin down to 5.7, decreased renal perfusion, had a second hit creatinine peaked at 3.96. Creatinine now fluctuating the 2.2-2.5, post ATN diuresis evident  2. 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 Persistent anemia, requiring multiple transfusions likely from left gluteal area hematoma  4. COVID-19 pneumonia with respiratory failure now intubated.    5.  Severe aortic stenosis valve area 1.0 cm² peak gradient 60 mean 37  6. Hypernatremia from with free water losses likely insensible, resolved  7. Atrial fibrillation: On Eliquis and amiodarone  8. Contraction and post hypercapnic alkalosis evident  9. Possible GI bleed. 10.  Drop in hemoglobin requiring blood transfusion. Plan:   1. Decrease free water to 300 mL every 8 hrs  2. Change torsemide to 40 mg every other day starting today. 3.  Discontinue half saline   4. Transfuse as necessary  5.   Follow-up fecal occult blood testing and if positive, might require GI consult, decision up to primary service. 6.  Replace potassium as necessary  7. Monitor strict I's and O's and renal function. 8.  BMP in AM.  9.  Prognosis guarded. 10.  Optimize tube feeds  11. Nothing else to add will sign off please call for questions  Nutrition   Please ensure that patient is on a renal diet/TF. Avoid nephrotoxic drugs/contrast exposure. We will continue to follow along with you.

## 2022-02-03 NOTE — PLAN OF CARE
Problem: Airway Clearance - Ineffective  Goal: Achieve or maintain patent airway  2/3/2022 0017 by Sharonda Jacobson RN  Outcome: Ongoing     Problem: Gas Exchange - Impaired  Goal: Absence of hypoxia  2/3/2022 0809 by Gabby Sullivan, ZIGGYP  Outcome: Ongoing  2/3/2022 0017 by Sharonda Jacobson RN  Outcome: Ongoing  Goal: Promote optimal lung function  2/3/2022 0809 by Gabby Sullivan, ZIGGYP  Outcome: Ongoing  2/3/2022 0017 by Sharonda Jacobson RN  Outcome: Ongoing     Problem: Breathing Pattern - Ineffective  Goal: Ability to achieve and maintain a regular respiratory rate  2/3/2022 0809 by Gabby Sullivan, RCP  Outcome: Ongoing  2/3/2022 0017 by Sharonda Jacobson RN  Outcome: Ongoing     Problem: Skin Integrity:  Goal: Will show no infection signs and symptoms  Description: Will show no infection signs and symptoms  2/3/2022 0809 by ZIGGY SloanP  Outcome: Ongoing  2/3/2022 0017 by Sharonda Jacobson RN  Outcome: Ongoing  Goal: Absence of new skin breakdown  Description: Absence of new skin breakdown  2/3/2022 0809 by Gabby Sullivan, RCP  Outcome: Ongoing  2/3/2022 0017 by Sharonda Jacobson RN  Outcome: Ongoing     Problem: OXYGENATION/RESPIRATORY FUNCTION  Goal: Patient will maintain patent airway  2/3/2022 0809 by ZIGGY SloanP  Outcome: Ongoing  2/3/2022 0017 by Sharonda Jacobson RN  Outcome: Ongoing  2/2/2022 1938 by Dawit Childress RCP  Outcome: Ongoing  Goal: Patient will achieve/maintain normal respiratory rate/effort  Description: Respiratory rate and effort will be within normal limits for the patient  2/3/2022 0809 by Gabby Sullivan RCP  Outcome: Ongoing  2/3/2022 0017 by Sharonda Jacobson RN  Outcome: Ongoing  2/2/2022 1938 by Dawit Childress RCP  Outcome: Ongoing     Problem: MECHANICAL VENTILATION  Goal: Patient will maintain patent airway  2/3/2022 0809 by ZIGGY SloanP  Outcome: Ongoing  2/3/2022 0017 by Sharonda Jacobson RN  Outcome: Ongoing  2/2/2022 1938 by Dawit Childress RCP  Outcome: Ongoing  Goal: Oral health is maintained or improved  2/3/2022 0809 by Sonya Cordova RCP  Outcome: Ongoing  2/3/2022 0017 by Nadeem Cedeño RN  Outcome: Ongoing  2/2/2022 1938 by Isa Higuera RCP  Outcome: Ongoing  Goal: ET tube will be managed safely  2/3/2022 0809 by ZIGGY MyersP  Outcome: Ongoing  2/3/2022 0017 by Nadeem Cedeño RN  Outcome: Ongoing  2/2/2022 1938 by Isa Higuera RCP  Outcome: Ongoing  Goal: Ability to express needs and understand communication  2/3/2022 0809 by Sonya Cordova RCP  Outcome: Ongoing  2/3/2022 0017 by Nadeem Cedeño RN  Outcome: Ongoing  2/2/2022 1938 by Isa Higuera RCP  Outcome: Ongoing  Goal: Mobility/activity is maintained at optimum level for patient  2/3/2022 0809 by Sonya Cordova RCP  Outcome: Ongoing  2/3/2022 0017 by Nadeem Cedeño RN  Outcome: Ongoing  2/2/2022 1938 by Isa Higuera RCPHIL  Outcome: Ongoing     Problem: SKIN INTEGRITY  Goal: Skin integrity is maintained or improved  2/3/2022 0809 by Sonya Cordova RCP  Outcome: Ongoing  2/3/2022 0017 by Nadeem Cedeño RN  Outcome: Ongoing  2/2/2022 1938 by Isa Higuera RCP  Outcome: Ongoing

## 2022-02-03 NOTE — PROGRESS NOTES
[facility] - Progress Note COVID 19 Patient  Today's Date and Time: 2/3/2022, 1:56 PM    Impression :     · COVID 19 Confirmed Infection  · Covid tests:  · 1/8/21: Positive  · Acute hypoxic respiratory failure  · Paroxysmal A. 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  · Essential hypertension  · Elevated inflammatory markers  · Hyperlipidemia  · Acute gluteal hematoma  · Patient has not received the Covid vaccine  · Intubated 1/22/22  · DNR-CCA    Recommendations:   · Antibiotic treatment:  · Meropenem 500 mg BID IV for leukocytosis & possible secondary bacterial pneumonia 1/11/21-discontinued 1/17/21  · Meropenem Re-started 1-21-22 because of residual dense consolidation of the lungs with air bronchograms-Completed 1/31/22  · Sputum 1/25: Normal alistair  · Covid Rx:    · Remdesivir-contraindicated with JULES  · Monoclonal antibodies-out of window  · Decadron-initiated 1/8/2022  · Actemra-administered 1/8/2022  · Continue supportive care  · [facility]      Medical Decision Making/Summary/Discussion:2/3/2022     · Patient admitted with COVID 19 infection  · Isolation until 1/28/22    Infection Control Recommendations   · Comfort Precautions    Antimicrobial Stewardship Recommendations       · Renal considerations  Coordination of Outpatient Care:   · Estimated Length of IV antimicrobials:TBD  · Patient will need Midline Catheter Insertion: TBD  · Patient will need PICC line Insertion: No  · Patient will need: Home IV , [place],  SNF,  LTAC:TBD  · Patient will need outpatient wound care:No    Chief complaint/reason for consultation:   · Concern for COVID infection      History of Present Illness:   María Elena Watson is a [de-identified]y.o.-year-old male who was initially admitted on 1/8/2022.  Patient seen at the request of Dr. Melodie Gannon:    Patient presented through ER with complaints of needing shortness of breath, cough, loss of appetite, nausea, vomiting and diarrhea over the past 7 to 10 days. He has not received the Covid vaccine and tested positive for Covid shortly after [place]. On evaluation in the ED, the patient had an SPO2 of 80% on room air and was tachypneic. A rapid Covid swab was positive. CT chest shows extensive bilateral pulmonary groundglass opacities. He was started on Decadron and given a dose of Actemra. Abnormal labs include:  · Creatinine 2.32  ·   · Troponin I 48  · WBC 17.1    Patient admitted because of concerns with COVID 19. Meropenem initiated on 1/11/2022 for worsening respiratory distress and leukocytosis    Stable chest x-ray 1/11    CRP is trending down    Hemoglobin dropped to 5.7 on 1/16/2022  Hemoglobin remained stable at this time after receiving infusions of PRBC  Left gluteal hematoma present on CT abdomen pelvis    Occult blood noted on stool  Anemia continues in the program patient required another blood transfusion on 1/21/2022  Anticoagulation on hold s/p gluteal hematoma    Impression CT Chest/Abdomen/Pelvis 1/21/22   1.  New small bilateral pleural effusions with extensive bilateral airspace   consolidation, increased from 01/08/2022.       2.  Decreased AP dimension of the distal trachea, suggesting tracheomalacia.       3.  Previously noted hematoma in the left gluteal musculature is not as well   visualized on today's study, but appears overall slightly decreased in size. No new areas of hematoma.       4.  Mild distention of the stomach, mild prominence of proximal small bowel   loops, and mild nonspecific stranding in the proximal mesentery.  No definite   evidence of high-grade bowel obstruction at this time.  Enteritis or early   developing obstruction are considered in the differential.         The patient required intubation for worsening respiratory failure on 1/22/2022. He is currently requiring 65% FiO2 with a PEEP of 12.   Propofol is being given for sedation    CURRENT EVALUATION : 2/3/2022    Patient evaluated and examined in the ICU. Afebrile  VS stable    The patient remains on mechanical ventilation with low settings. Weaning trials as tolerated  He has not been tolerating weaning trials well thus far and is now unresponsive. Propofol for sedation    Candida Albicans urine 1/25/22  MRSA nares not detected    Meropenem initiated 1/22/22  Repeat cultures ordered 1/25/22-Normal alistair  Leukocytosis has resolved    Patient exhibiting respiratory distress. Yes  Respiratory secretions: No    Patient receiving supplemental oxygen. BiPAP & Hi-flow NC-->MV  RR: 24-->23-->19   02 sat:100    % FIO2: 12  PEEP: 6-->5-->8    NEWS Score: 0-4 Low risk group; 5-6: Medium risk group; 7 or above: High risk group  Parameters 3 2 1 0 1 2 3   Age    < 65   ? 65   RR ? 8  9-11 12-20  21-24 ? 25   O2 Sats ? 91 92-93 94-95 ? 96      Suppl O2  Yes  No      SBP ? 90  101-110 111-219   ? 220   HR ? 40  41-50 51-90  111-130 ? 131   Consciousness    Alert   Drowsiness, lethargy, or confusion   Temperature ? 35.0 C (95.0 F)  35.1-36.0 C 95.1-96.9 F 36.1-38.0 C 97.0-100.4 F 38.1-39.0 C 100.5-102.3 F ? 39.1 C ? 102.4 F      NEWS Score:   1/9/2022: 5 moderate risk    Overall Daily Picture:      Unchanged    Presence of secondary bacterial Infection:    Possible  Additional antibiotics: Meropenem 1/21/2022    Labs, X rays reviewed: 2/3/2022    BUN:99-->95-->91  Cr:2.4-->2.18-->2.09    WBC: 8.8-->10.3-->11.2   Hb:8.1-->8.4-->6.9  Plat:190-->237-->256    Absolute Neutrophils:16  Absolute Lymphocytes:0.34  Neutrophil/Lymphocyte Ratio: 53 very high risk    CRP:160-->131-->52.3-->21.5  Ferritin:804  LDH: 563    Pro Calcitonin:      Cultures:  Urine:  · 1/25: Candida albicans  Blood:  · 1/25: No growth  Sputum :  · 1/22: Gram negative rods  · 1/25: Normal alistair  MRSA Nares:   Not detected    CXR:     CAT:  1/8/21      Discussed with patient, RN, CC, IM.     I have personally reviewed the past medical history, past surgical history, medications, social history, and family history, and I have updated the database accordingly.   Past Medical History:     Past Medical History:   Diagnosis Date    Chronic lower back pain     Cobalamin deficiency     [facility]     [facility]     Hyperlipidemia     Hypertension     Mal[facility] of colon [facility]     PAD (peripheral artery disease) (HCC)        Past Surgical  History:     Past Surgical History:   Procedure Laterality Date    ARTERY SURGERY      INSERT MIDLINE CATHETER  1/24/2022            Medications:      torsemide  40 mg Oral Every Other Day    amiodarone  200 mg Per G Tube Daily    amLODIPine  10 mg Per G Tube Daily    [Held by provider] apixaban  2.5 mg Per G Tube BID    atorvastatin  20 mg Per G Tube Nightly    busPIRone  10 mg Per G Tube TID    [Held by provider] clopidogrel  75 mg Per G Tube Daily    famotidine  20 mg Per G Tube Daily    metoprolol tartrate  75 mg Per G Tube BID    oxyCODONE  7.5 mg Per G Tube Q6H    senna  2 tablet Per G Tube Nightly    miconazole   Topical BID    insulin lispro  0-6 Units SubCUTAneous Q4H       Social History:     Social History     Socioeconomic History    Marital status:      Spouse name: Not on file    Number of children: Not on file    Years of education: Not on file    Highest education level: Not on file   Occupational History    Not on file   Tobacco Use    Smoking status: Former Smoker    Smokeless tobacco: Never Used   Substance and Sexual Activity    Alcohol use: Not Currently    Drug use: Never    Sexual activity: Not on file   Other Topics Concern    Not on file   Social History Narrative    Not on file     Social Determinants of Health     Financial Resource Strain:     Difficulty of Paying Living Expenses: Not on file   Food Insecurity:     Worried About Running Out of Food in the Last Year: Not on file    Ran Out of Food in the Last Year: Not on file   Transportation Needs:     Lack of Transportation (Medical): Not on file    Lack of Transportation (Non-Medical): Not on file   Physical Activity:     Days of Exercise per Week: Not on file    Minutes of Exercise per Session: Not on file   Stress:     Feeling of Stress : Not on file   Social Connections:     Frequency of Communication with Friends and Family: Not on file    Frequency of Social Gatherings with Friends and Family: Not on file    Attends [employer] Services: Not on file    Active Member of [address] [place] [address] [employer] or Organizations: Not on file    Attends Club or Organization Meetings: Not on file    Marital Status: Not on file   Intimate Partner Violence:     Fear of Current or Ex-Partner: Not on file    Emotionally Abused: Not on file    Physically Abused: Not on file    Sexually Abused: Not on file   Housing Stability:     Unable to Pay for Housing in the Last Year: Not on file    Number of [place] in the Last Year: Not on file    Unstable Housing in the Last Year: Not on file       Family History:   History reviewed. No pertinent family history. Allergies:   Patient has no known allergies. Review of Systems:     Review of Systems   Unable to perform ROS: Intubated   Respiratory: Positive for shortness of breath. Cardiovascular: Negative. Gastrointestinal: Negative. Genitourinary: Negative. Musculoskeletal: Negative. Skin: Positive for color change. Neurological: Negative.           Physical Examination :     Patient Vitals for the past 8 hrs:   BP Temp Temp src Pulse Resp SpO2 Weight   02/03/22 1158      93 %    02/03/22 1150    64 24 95 %    02/03/22 1000  98.1 °F (36.7 °C) Axillary 59 23 97 %    02/03/22 0900    58 21 98 %    02/03/22 0810 (!) 168/54   72      02/03/22 0805    65 18 99 %    02/03/22 0800    67 20 99 %    02/03/22 0754  98.2 °F (36.8 °C) Axillary     02/03/22 0700    70 19 99 %    02/03/22 0600  98.8 °F (37.1 °C) Oral 65 17 98 % 169 lb 12.1 oz (77 kg)     Physical Exam  Vitals and nursing note reviewed. Constitutional:       Appearance: He is well-developed. Comments: Intubated and sedated   HENT:      Head: Normocephalic and atraumatic. Cardiovascular:      Rate and Rhythm: Normal rate. Heart sounds: Murmur heard. Pulmonary:      Effort: Respiratory distress present. Breath sounds: No wheezing. Abdominal:      General: Bowel sounds are normal.      Palpations: Abdomen is soft. There is no mass. Tenderness: There is no abdominal tenderness. Musculoskeletal:         General: Swelling (In the bilateral upper extremities especially in the forearms.) present. Normal range of motion. Cervical back: Neck supple. Lymphadenopathy:      Cervical: No cervical adenopathy. Skin:     General: Skin is dry. Findings: Bruising (There is bruising and ecchymotic skin lesions in the forearms bilaterally right worse than left) present. Neurological:      Comments: FINA-intubated and sedated         Medical Decision Making -Laboratory:   I have independently reviewed/ordered the following labs:    CBC with Differential:   Recent Labs     02/02/22  0402 02/02/22  0945 02/03/22  0535 02/03/22  1149   WBC 9.6  --  11.2  --    HGB 6.7*   < > 6.9* 7.9*   HCT 21.2*   < > 21.9* 25.1*     --  256  --    LYMPHOPCT 5*  --  8*  --    MONOPCT 6  --  6  --     < > = values in this interval not displayed. BMP:   Recent Labs     02/02/22  0402 02/03/22  0535   * 141   K 3.5* 4.0    104   CO2 30 30   BUN 91* 91*   CREATININE 2.25* 2.09*   MG 2.5  --      Hepatic Function Panel:   No results for input(s): PROT, LABALBU, BILIDIR, IBILI, BILITOT, ALKPHOS, ALT, AST in the last 72 hours. No results for input(s): RPR in the last 72 hours. No results for input(s): HIV in the last 72 hours.   No results for input(s): BC in the last 72 hours. Lab Results   Component Value Date    MUCUS 1+ 01/17/2022    RBC 2.22 02/03/2022    TRICHOMONAS NOT REPORTED 01/17/2022    WBC 11.2 02/03/2022    YEAST NOT REPORTED 01/17/2022    TURBIDITY Clear 01/17/2022     Lab Results   Component Value Date    CREATININE 2.09 02/03/2022    GLUCOSE 191 02/03/2022       Medical Decision Making-Imaging:     Narrative   EXAMINATION:   CTA OF THE CHEST 1/8/2022 10:10 am       TECHNIQUE:   CTA of the chest was performed after the administration of intravenous   contrast.  Multiplanar reformatted images are provided for review.  MIP   images are provided for review. Dose modulation, iterative reconstruction,   and/or weight based adjustment of the mA/kV was utilized to reduce the   radiation dose to as low as reasonably achievable.       COMPARISON:   None.       HISTORY:   ORDERING SYSTEM PROVIDED HISTORY: dyspnea / hypoxia   Reason for Exam: Shortness of breath       FINDINGS:   Pulmonary Arteries: Pulmonary arteries are adequately opacified for   evaluation.  No evidence of intraluminal filling defect to suggest pulmonary   embolism.  Main pulmonary artery is normal in caliber.       Mediastinum: No evidence of mediastinal lymphadenopathy.  Normal heart size. Normal caliber thoracic aorta with diffuse atherosclerosis.       Lungs/pleura: Extensive ground-glass opacification of the bilateral lung   fields with peripheral involvement slight apical as well as basilar sparing. No effusion or pneumothorax.       Upper Abdomen: Limited images of the upper abdomen are unremarkable.       Soft Tissues/Bones: No acute bone or soft tissue abnormality.           Impression   *No evidence of pulmonary embolism.    *Extensive ground-glass opacification of the bilateral lung fields with   peripheral involvement slight apical as well as basilar sparing.  Imaging   features suggestive of COVID-19 pneumonia, though are nonspecific and can   occur with a variety of infectious and noninfectious processes.         Narrative   EXAMINATION:   CT OF THE ABDOMEN AND PELVIS WITHOUT CONTRAST, 1/16/2022 10:42 am       TECHNIQUE:   CT of the abdomen and pelvis was performed without the administration of   intravenous contrast. Multiplanar reformatted images are provided for review. Dose modulation, iterative reconstruction, and/or weight based adjustment of   the mA/kV was utilized to reduce the radiation dose to as low as reasonably   achievable.       COMPARISON:   CT of the chest from 01/08/2022, and retroperitoneal ultrasound from   01/10/2022.       HISTORY:   ORDERING SYSTEM PROVIDED HISTORY:  Retrop bleed r/o   TECHNOLOGIST PROVIDED HISTORY:   Retrop bleed r/o   Reason for Exam:  Retrop bleed r/o       FINDINGS:   Lower Chest: As demonstrated on recent CT chest, extensive and somewhat   denser confluent ground-glass airspace opacities re-identified mid-lower   lungs, sparing the bases with some associated crazy paving, air bronchograms   and bibasilar atelectatic appearing changes.  Main pulmonary artery caliber   31 mm.  Mild dilatation ascending aorta, with a maximal dimension of 41 mm.       Organs: Unopacified liver, spleen, adrenal glands and both kidneys show no   acute abnormality; without suspicious focal finding or hydronephrosis. Significant right renal cortical thinning and some scarring suspected.  No   large stone.  Probable gallbladder sludge or vicarious contrast from recent   contrast CT; no calcified stones.       GI/Bowel: Small-moderate amount of retained stool, mostly right colon with   some fluid/levels; no abnormal dilatation, marked wall thickening or   pericolonic fat stranding.  Unremarkable small bowel.  Some fluid within a   mildly distended stomach.  Small hiatus hernia.       Pelvis: Partially fluid-filled urinary bladder without wall thickening or   mass.  No significant prostatic enlargement.  Symmetric seminal vesicles.  No   pelvic fluid collection or mass.  Partially visualized approximate 10 x 15 x   5.7 cm left gluteal mass with HU measurements/appearance most suggestive of   hematoma.  No high density finding compatible with active bleeding, but   assessment limited on this examination.  Small fat containing inguinal   hernias, larger on the left.       Peritoneum/Retroperitoneum: No retroperitoneal bleed or bulky   lymphadenopathy.  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  Probable limited intimal dissection   suprarenal aorta without marked aneurysmal dilatation.  Soft tissue stranding   flanks extending into the pelvis, suggesting some degree anasarca.  Slightly   greater prominence right proximal rectus musculature       Bones/Soft Tissues: No acute abnormality.  Mild scoliosis, multilevel   degenerative changes of spine but with DDD L5-S1 and bilateral mild hip joint   degenerative findings.           Impression   Left gluteal hematoma, partially visualized on this study without obvious   active bleeding, but limited without IV contrast.  No retroperitoneal or   rectus sheath bleed.       Extensive findings in the visualized lungs compatible with known COVID-19   pneumonia; denser GGOs suggest worsening pneumonia/pneumonitis or developing   consolidation.  No pneumothorax.       Multiple additional findings, as detailed in the body of the report above.       RECOMMENDATIONS:   Consider follow-up CT pelvis including the upper thighs, if the patient does   not respond to resuscitation with blood products/fluids and other   conservative measures including localized pressure.  Findings were discussed   with Paola Chicas at 12:24 pm on 1/16/2022.             Narrative   EXAMINATION:   CT OF THE CHEST, ABDOMEN, AND PELVIS WITHOUT CONTRAST 1/21/2022 11:09 am       TECHNIQUE:   CT of the chest, abdomen and pelvis was performed without the administration   of intravenous contrast. Multiplanar reformatted images are provided for   review. Dose modulation, iterative reconstruction, and/or weight based   adjustment of the mA/kV was utilized to reduce the radiation dose to as low   as reasonably achievable.       COMPARISON:   CT abdomen and pelvis dated 01/16/2022.  CT chest dated 01/08/2022       HISTORY:   ORDERING SYSTEM PROVIDED HISTORY: blood loss anemia , gluteal hematoma and   worsening HB   TECHNOLOGIST PROVIDED HISTORY:   blood loss anemia , gluteal hematoma and worsening HB           FINDINGS:       Chest:       Mediastinum: Heart size is within normal limits.  Ascending thoracic aorta is   mildly dilated, measuring 4 cm in AP dimension, similar to the previous   study.  Main pulmonary artery is stable in caliber as well.  No mediastinal   hematoma or lymphadenopathy. Pauleen Bj is a catheter in the SVC with distal tip   in the distal SVC.       Lungs/pleura: There are small bilateral pleural effusions, which are new from   the previous study. Pauleen Bj is extensive dense airspace consolidation   throughout both lungs, with mild sparing at the bases, increased from   01/08/2022. Pauleen Bj is decreased AP dimension of the lower trachea, although   tracheobronchial tree remains patent.       Soft Tissues/Bones: There is no acute or suspicious osseous abnormality. Visualized superficial soft tissues are within normal limits.           Abdomen/Pelvis:       Organs: Limited unenhanced liver is within normal limits.  There is a tiny   amount of free fluid adjacent to the hepatic dome.  Gallbladder, spleen,   pancreas, and adrenal glands are unremarkable.       There is bilateral renal atrophy, more so on the right, unchanged.  No   hydronephrosis or perinephric fluid collection. Pauleen Bj is mild bilateral   perinephric stranding, which is unchanged and likely physiologic.       GI/Bowel: The stomach is mildly distended with air-fluid level noted.  No   abnormal bowel distention.  There is mild increased prominence of the   proximal small bowel loops.  There is mild stranding in the proximal   mesentery.  No focal pericolonic inflammation.  No free air.       Pelvis: Urinary bladder is within normal limits.  No evidence of pelvic   lymphadenopathy.       Peritoneum/Retroperitoneum: Distal aortobifemoral graft noted.  Caliber of   the abdominal aorta is unchanged.  There is moderate to severe scattered   atherosclerosis, which is unchanged.  No retroperitoneal lymphadenopathy or   hematoma.  Slit-like IVC is noted.       Bones/Soft Tissues: There is no acute or suspicious osseous abnormality.  The   hematoma previously seen in the left gluteal muscle is not as well visualized   on today's study due to isoattenuation.  There is asymmetric swelling of the   left gluteal muscle, although slightly improved when compared to 01/16/2022. AP dimension in the area of suspected hematoma is 4.4 cm (previously 5.7 cm). Transverse dimension is approximately 9.3 cm (previously 9.7 cm).  There is   stranding in the subcutaneous fat of the upper thighs bilaterally.           Impression   1.  New small bilateral pleural effusions with extensive bilateral airspace   consolidation, increased from 01/08/2022.       2.  Decreased AP dimension of the distal trachea, suggesting tracheomalacia.       3.  Previously noted hematoma in the left gluteal musculature is not as well   visualized on today's study, but appears overall slightly decreased in size.    No new areas of hematoma.       4.  Mild distention of the stomach, mild prominence of proximal small bowel   loops, and mild nonspecific stranding in the proximal mesentery.  No definite   evidence of high-grade bowel obstruction at this time.  Enteritis or early   developing obstruction are considered in the differential.         Medical Decision [id number]:     Culture, Blood 1 [[id number]] Collected: 01/09/22 9794 Order Status: Completed Specimen: Blood Updated: 01/14/22 1300    Specimen Description . BLOOD    Special Requests NOT REPORTED    Culture NO GROWTH 5 DAYS   Culture, Blood 1 [[id number]] Collected: 01/09/22 1155   Order Status: Completed Specimen: Blood Updated: 01/14/22 1300    Specimen Description . BLOOD    Special Requests NOT REPORTED    Culture NO GROWTH 5 DAYS   COVID-19, Rapid [[id number]] (Abnormal) Collected: 01/08/22 1045   Order Status: Completed Specimen: Nasopharyngeal Swab Updated: 01/08/22 1109    Specimen Description . NASOPHARYNGEAL SWAB    SARS-CoV-2, Rapid DETECTED Abnormal     Comment:        Rapid NAAT: The specimen is POSITIVE for SARS-Cov-2, the novel coronavirus associated with   COVID-19.         This test has been authorized by the FDA under an Emergency Use Authorization (EUA) for use   by authorized laboratories.         The ID NOW COVID-19 assay is designed to detect the virus that causes COVID-19 in patients   with signs and symptoms of infection who are suspected of COVID-19. An individual without symptoms of COVID-19 and who is not shedding SARS-CoV-2 virus would   expect to have a negative (not detected) result in this assay. Fact sheet for Healthcare Providers: BuildHer.es   Fact sheet for Patients: BuildHer.es           Methodology: Isothermal Nucleic Acid Amplification         Results reported to the appropriate [facility]            Medical Decision Making-Other:     Note:  · Labs, medications, radiologic studies were reviewed with personal review of films  · Large amounts of data were reviewed  · Discussed with nursing Staff, Discharge planner  · Infection Control and Prevention measures reviewed  · All prior entries were reviewed  · Administer medications as ordered  · Prognosis: Guarded  · Discharge planning reviewed      Thank you for allowing us to participate in the care of this patient.  Please call with questions. Alejandra Martinez APRN    ATTESTATION:    I have discussed the case, including pertinent history and exam findings with the APRN. I have evaluated the  History, physical findings and pictures of the patient and the key elements of the encounter have been performed by me. I have reviewed the laboratory data, other diagnostic studies and discussed them with the APRN. I have updated the medical record where necessary. I agree with the assessment, plan and orders as documented by the APRN.     Waldo Dakin, MD.

## 2022-02-03 NOTE — PLAN OF CARE
Problem: OXYGENATION/RESPIRATORY FUNCTION  Goal: Patient will maintain patent airway  2/2/2022 1938 by ZIGGY BurlesonP  Outcome: Ongoing  2/2/2022 1612 by ZIGGY DangP  Outcome: Ongoing  2/2/2022 1430 by Leonid Hernandez RN  Outcome: Ongoing  Goal: Patient will achieve/maintain normal respiratory rate/effort  Description: Respiratory rate and effort will be within normal limits for the patient  2/2/2022 1938 by ZIGGY BurlesonP  Outcome: Ongoing  2/2/2022 1612 by Abe Lawrence RCP  Outcome: Ongoing  2/2/2022 1430 by Leonid Hernandez RN  Outcome: Ongoing     Problem: MECHANICAL VENTILATION  Goal: Patient will maintain patent airway  2/2/2022 1938 by ZIGGY BurlesonP  Outcome: Ongoing  2/2/2022 1612 by ZIGGY DangP  Outcome: Ongoing  2/2/2022 1430 by Leonid Hernandez RN  Outcome: Ongoing  Goal: Oral health is maintained or improved  2/2/2022 1938 by ZIGGY BurlesonP  Outcome: Ongoing  2/2/2022 1612 by ZIGGY DangP  Outcome: Ongoing  2/2/2022 1430 by Leonid Hernandez RN  Outcome: Ongoing  Goal: ET tube will be managed safely  2/2/2022 1938 by Flavio Hernandez RCP  Outcome: Ongoing  2/2/2022 1612 by ZIGGY DangP  Outcome: Ongoing  2/2/2022 1430 by Leonid Hernandez RN  Outcome: Ongoing  Goal: Ability to express needs and understand communication  2/2/2022 1938 by Flavio Hernandez RCP  Outcome: Ongoing  2/2/2022 1612 by ZIGGY DangP  Outcome: Ongoing  2/2/2022 1430 by Leonid Hernandez RN  Outcome: Ongoing  Goal: Mobility/activity is maintained at optimum level for patient  2/2/2022 1938 by Flavio Hernandez RCP  Outcome: Ongoing  2/2/2022 1612 by Abe Lawrence RCP  Outcome: Ongoing  2/2/2022 1430 by Leonid Hernandez RN  Outcome: Ongoing     Problem: SKIN INTEGRITY  Goal: Skin integrity is maintained or improved  2/2/2022 1938 by Flavio Hernandez, RCP  Outcome: Ongoing  2/2/2022 1612 by Abe Lawrence RCP  Outcome: Ongoing  2/2/2022 1430 by Leonid Hernandez, RN  Outcome: Ongoing

## 2022-02-03 NOTE — PROGRESS NOTES
Critical Care Team - Daily Progress Note      Date and time: 2/3/2022 7:31 AM  Patient's name:  Bk Villa  Medical Record Number: [id number]  Patient's account/billing number: [de-identified]  Patient's YOB: 1940  Age: [de-identified] y.o. Date of Admission: 1/8/2022  9:49 AM  Length of stay during current admission: 26      Primary Care Physician: MOHAMUD Johnson - CNP  ICU Attending Physician: Dr. Patience Crane    Code Status: DNR-CCA    Reason for ICU admission:   Chief Complaint   Patient presents with    Shortness of Breath    Nausea & Vomiting         SUBJECTIVE:     HPI:  History was obtained from EMR due to patient being intubated patient is a 73-year-old  non- male who presented to the ER 01/08/2022 for shortness of breath, nausea and vomiting and was admitted for pneumonia due to COVID-19.  He reported having increasing shortness of breath with nausea and vomiting or 7 to 10 days before coming to the hospital. Juan Francisco Rouse was tested positive for Covid shortly after [place] time.  Subsequently patient became mildly short of breath and more notified us cocci approximately 10 feet.  Upon arrival to the emergency room, patient was hypoxic on room air with oxygen saturation less than 80%.  He was put on high flow oxygen and was started on Decadron on 1/8/2010 and received Actemra on 1/8/2022.  Patient was admitted in Covid unit.  He also received meropenem for leukocytosis and possible secondary bacterial pneumonia from 1/11/2022 to 1/17/2022.  Meropenem was restarted on 1/21/2022 because of residual dense consolidation in the lungs with air bronchograms with end date of 1/31/2022 per ID.  Remdesivir is contraindicated due to JULES.  Patient had significant hypoxia while being on high flow nasal cannula and BiPAP alternatively he required intubation and mechanical ventilation on 1/22/22.  Nephrology is following for JULES likely secondary to ischemic ATN and nephrotoxic ATN.  Cardiology is following for new onset A.fib and NSTEMI type-1 Vs. Type 2. Palliative care is following.     OVERNIGHT EVENTS:         Patient seen and examined at bedside  Hemodynamically stable  S/P 1 unit PRBC yesterday  This morning, hemoglobin is 6.9 down from 7.9  No obvious source of bleeding  FOBT not collected, no bowel movement yet  Eliquis and Plavix on hold since yesterday  Patient does have visible blood in the oral cavity, getting daily oral care  Patient is swollen, has gained 4 lb in last 24 hours    Sedation: Propofol 10 mcg/kg/hr  Pressors: None  Analgesics: Fentanyl 25 mcg/hr      Vent Settings: PRVC, RR 14, , PEEP 8, FiO2 40%  ABG: pH 7.448, pCO2 45.6, pO2 53.0, HCO3 31.5     ID: IV Merrem re started on 01/21/22 and completed on 01/31/22     Nephrology: Continue free water 300 cc Q4 hours. Continue to hold Torsemide.      Cardiology: A fib stable continue PO Amiodarone, Lopressor and Eliquis, signed off.     Palliative Care: CODE STATUS made DNR-CCA with intubation on 01/31/2022 Friday this week will be 2 weeks of intubation. Family wants to wait 1 more week of patient on ventilator before they decide about extubation. They do not want patient to get trach.        AWAKE & FOLLOWING COMMANDS:  [x] No   [] Yes    CURRENT VENTILATION STATUS:     [x] Ventilator  [] BIPAP  [] Nasal Cannula [] Room Air      SECRETIONS Amount:  [] Small [] Moderate  [] Large  [x] None  Color:     [] White [] Colored  [] Bloody    SEDATION:  RAAS Score:  [x] Propofol gtt  [] Versed gtt  [] Ativan gtt   [] No Sedation    PARALYZED:  [x] No    [] Yes    DIARRHEA:                [x] No                [] Yes  (C. Difficile status: [] positive                                                                                                                       [] negative                                                                                                                     [] pending)    VASOPRESSORS:  [x] No    [] Yes If yes -   [] Levophed       [] Dopamine     [] Vasopressin       [] Dobutamine  [] Phenylephrine         [] Epinephrine    CENTRAL LINES:     [x] No   [] Yes   (Date of Insertion:   )           If yes -     [] Right IJ     [] Left IJ [] Right Femoral [] Left Femoral                   [] Right Subclavian [] Left Subclavian       ORTIZ'S CATHETER:   [x] No   [] Yes  (Date of Insertion:   )     URINE OUTPUT:            [x] Good   [] Low              [] Anuric    Review of Systems: Unable to assess, patient intubated on sedation. OBJECTIVE:     VITAL SIGNS:  BP (!) 140/75   Pulse 70   Temp 98.8 °F (37.1 °C) (Oral)   Resp 19   Ht 5' 10\" (1.778 m)   Wt 169 lb 12.1 oz (77 kg)   SpO2 99%   BMI 24.36 kg/m²   Tmax over 24 hours:  Temp (24hrs), Av.4 °F (36.9 °C), Min:98.1 °F (36.7 °C), Max:99 °F (37.2 °C)      Patient Vitals for the past 6 hrs:   Temp Temp src Pulse Resp SpO2 Weight   22 0700   70 19 99 %    22 0600 98.8 °F (37.1 °C) Oral 65 17 98 % 169 lb 12.1 oz (77 kg)   22 0500   66 24 99 %    22 0400 98.8 °F (37.1 °C) Oral 74 19 94 %    22 0319   61 17 94 %    22 0300   63 20 94 %    22 0200   63 23 94 %          Intake/Output Summary (Last 24 hours) at 2/3/2022 0731  Last data filed at 2/3/2022 0400  Gross per 24 hour   Intake 3500.29 ml   Output 1400 ml   Net 2100.29 ml     Wt Readings from Last 2 Encounters:   22 169 lb 12.1 oz (77 kg)     Body mass index is 24.36 kg/m². PHYSICAL EXAMINATION:  Constitutional: Patient intubated and on sedation. Dried blood in the oral cavity. EENT: normocephalic, atraumatic  Neck: Supple, symmetrical, trachea midline  Respiratory: Crackles in bilateral upper lobes on auscultation  Cardiovascular: regular rate and rhythm, normal S1, S2, no murmur noted  Abdomen: soft, nontender, nondistended, no masses  Extremities:   +2 pitting edema in b/l lower extremities.      MEDICATIONS:    Scheduled Meds:   face mask  Mask Size: Medium  Additional Respiratory  Assessments  Pulse: 70  Resp: 19  SpO2: 99 %  End Tidal CO2: 47 (%)  Position: Semi-Haynes's  Humidification Source: HME  Humidification Temp: 31  Oral Care Completed?: Yes  Oral Care: Mouth suctioned  Subglottic Suction Done?: No  Cuff Pressure (cm H2O):  (mov)    ABGs:     Laboratory findings:    Complete Blood Count:   Recent Labs     02/01/22 0428 02/01/22 0428 02/02/22 0402 02/02/22  0402 02/02/22  0945 02/02/22  1811 02/03/22  0535   WBC 10.3  --  9.6  --   --   --  11.2   HGB 8.4*   < > 6.7*   < > 7.8* 7.9* 6.9*   HCT 26.9*   < > 21.2*   < > 24.5* 24.5* 21.9*     --  233  --   --   --  256    < > = values in this interval not displayed. Last 3 Blood Glucose:   Recent Labs     02/01/22 0428 02/02/22 0402 02/03/22  0535   GLUCOSE 179* 160* 191*        PT/INR:    Lab Results   Component Value Date    PROTIME 11.6 01/22/2022    INR 1.1 01/22/2022     PTT:    Lab Results   Component Value Date    APTT 25.2 01/22/2022       Comprehensive Metabolic Profile:   Recent Labs     02/01/22 0428 02/02/22  0402 02/03/22  0535   * 145* 141   K 3.8 3.5* 4.0   * 107 104   CO2 32* 30 30   BUN 95* 91* 91*   CREATININE 2.18* 2.25* 2.09*   GLUCOSE 179* 160* 191*   CALCIUM 8.8 8.1* 8.2*      Magnesium:   Lab Results   Component Value Date    MG 2.5 02/02/2022     Phosphorus:   Lab Results   Component Value Date    PHOS 5.3 01/22/2022     Ionized Calcium:   Lab Results   Component Value Date    CAION 1.21 01/22/2022        Urinalysis:     Troponin: No results for input(s): TROPONINI in the last 72 hours.     Microbiology:    Cultures during this admission:     Blood cultures:                 [] None drawn      [x] Negative             []  Positive (Details:  )  Urine Culture:                   [] None drawn      [] Negative             [x]  Positive (Details: Candida)  Sputum Culture:               [] None drawn       [x] Negative             [] Positive (Details:  )   Endotracheal aspirate:     [x] None drawn       [] Negative             []  Positive (Details:  )     Radiology/Imaging:   Chest Xray (2/3/2022):    ASSESSMENT:     Patient Active Problem List    Diagnosis Date Noted    Hypoxia     Acute distention of stomach     COVID-19     Traumatic hematoma of buttock     Atrial fibrillation with RVR ([facility] [address] 01/10/2022    Nonrheumatic aortic valve stenosis 01/09/2022    PAD (peripheral artery disease) ([facility] [address] 01/09/2022    Moderate protein-calorie malnutrition ([facility] [address] 01/09/2022    Essential hypertension 01/08/2022    Hyperlipidemia 01/08/2022    Pneumonia due to COVID-19 virus 01/08/2022    Acute hypoxemic respiratory failure due to COVID-19 ([facility] [address] 01/08/2022    Acute kidney injury ([facility] [address] 01/08/2022    Stage 3b chronic kidney disease ([facility] [address] 01/08/2022    Elevated troponin        PLAN:     1. Neurologic:   · Neuro intact  · Neuro checks per protocol  · Sedation on Propofol and Fentanyl  · Pain management: Fentanyl gtt; Roxicodone 7.5 mg q6 hours  · Patient follows commands off sedation     2. Cardiovascular:  · Hemodynamically stable, on Norvasc 10 mg daily and Lopressor 75 mg twice daily  · HR 70-80  · Echo 1/22: LVEF 55%. Severe aortic stenosis   · New onset A.fib with RVR and NSTEMI Type II Vs. Type I- Cardiology following: Continue Amiodarone 200 mg daily PO, beta blockers and Eliquis. Rate controlled in NSR on Lopressor 75 mg BID and Amiodarone. Will consider a heart cath for aortic stenosis once more medically stable     3. Pulmonary:  · Maintain oxygen sats >92%  · Pulmonary toilet  · Acute hypoxic respiratory failure secondary to COVID pneumonia  · Vent Settings: PRVC, RR 14, , PEEP 8, FiO2 40%  · ABG: pH 7.448, pCO2 45.6, pO2 53.0, HCO3 31.5  · CXR 02/02: Stable scattered pulmonary infiltrates. ET tube in distal mid trachea. · ID following: Received Meropenem from 01/11 to 01/17.  Then received another treatment from 01/21 to .         4. GI/Nutrition  · Senna daily, Dulcolax and Glycolax PRN  · Ulcer Prophylaxis: Pepcid  · Diet:ADULT TUBE FEEDING; Orogastric; Standard without Fiber; Continuous; 55; Yes; 10; Q 4 hours; 25; 30; Other (specify); per MD; Protein; 2 Protein modulars per day       5. Renal/Fluid/Electrolyte  · IV Fluids: None  · I/O: In: 3.5L/24 hours  · UOut: 1.4 L/24 hours   · JULES on CKD stage IIIb-4 (baseline Cr 2.0) secondary to ischemic ATN and nephrotoxic ATN:  BUN/Cr 91/2.09  · Positive fluid balance may benefit from more diuresis  · Nephrology following: HOLD Demadex 40 daily; add free water 300 mL q4 hours, f/u labs ordered  · Monitor electrolytes, replace PRN         6. ID  · WBC: 8.8 -> 10.3 -> 9.6 -> 11.1  · Antimicrobials: Meropenem completed on 22      7. Hematology:  · Hgb 8.1 ->8.4 -> 6.7 -> 7.9 (S/P 1 unit PRBC) -> 6.9  · Transfused 1 unit PRBC on 2022  · HOLD ELIQUIS AND PLAVIX   · Repeat H&H in 6 hours  · FOBT not collected yet     8. Endocrine:   · Glucose controlled on low dose sliding scale  · Bs-180s     9. DVT Prophylaxis  · On Eliquis 2.5 mg twice daily HELD        GI PPX: Pepcid  DVT PPX: Eliquis HELD  IVF: None  Diet: Tube feeds         Joselo Lake MD  Family Medicine Resident PGY-3  Department of Internal Medicine/Critical care  [facility] ([facility])             2/3/2022, 7:31 AM    Attending Physician Statement  I have discussed the care of Annita sEquivel, including pertinent history and exam findings,  with the resident. I have seen and examined the patient and the key elements of all parts of the encounter have been performed by me. I agree with the assessment, plan and orders as documented by the resident with additions . ARDS due to BHOPN-50 pneumonia complicated severe aortic stenosis and congestive heart failure. Continue supportive care. Spontaneous breathing trials.   Hold anticoagulation due to bleeding from oral cavity and fluctuating hemoglobin           Total critical care time caring for this patient with life threatening, unstable organ failure, including direct patient contact, management of life support systems, review of data including imaging and labs, discussions with other team members and physicians at least 27   Min so far today, excluding procedures. Treatment plan Discussed with nursing staff in detail , all questions answered . Electronically signed by Leo Gomes MD on   2/3/22 at 3:50 PM EST    Please note that this chart was generated using voice recognition Dragon dictation software. Although every effort was made to ensure the accuracy of this automated transcription, some errors in transcription may have occurred.

## 2022-02-03 NOTE — PLAN OF CARE
Problem: Gas Exchange - Impaired  Goal: Absence of hypoxia  2/3/2022 1202 by Rissa Berg RN  Outcome: Ongoing     Problem: Gas Exchange - Impaired  Goal: Promote optimal lung function  2/3/2022 1202 by Rissa Berg RN  Outcome: Ongoing     Problem: Risk for Fluid Volume Deficit  Goal: Maintain normal heart rhythm  2/3/2022 1202 by Rissa Berg RN  Outcome: Ongoing

## 2022-02-03 NOTE — CARE COORDINATION
CM following. Remains intubated, Fio2 requirements @ 40%. Noted: family does not wish to pursue trach/peg. At this time- continue with spontaneous breathing trials. Palliative/Ethics following. Robin Carrasquillo continues to follow.

## 2022-02-03 NOTE — PLAN OF CARE
Problem: Falls - Risk of:  Goal: Will remain free from falls  Description: Will remain free from falls  2/3/2022 0017 by Lynne Escalera RN  Outcome: Ongoing  2/2/2022 1430 by Galo Cr RN  Outcome: Ongoing  Goal: Absence of physical injury  Description: Absence of physical injury  2/3/2022 0017 by Lynne Escalera RN  Outcome: Ongoing  2/2/2022 1430 by Galo Cr RN  Outcome: Ongoing     Problem: Airway Clearance - Ineffective  Goal: Achieve or maintain patent airway  2/3/2022 0017 by Lynne Escalera RN  Outcome: Ongoing  2/2/2022 1430 by Galo Cr RN  Outcome: Ongoing     Problem: Gas Exchange - Impaired  Goal: Absence of hypoxia  2/3/2022 0017 by Lynne Escalera RN  Outcome: Ongoing  2/2/2022 1430 by Galo Cr RN  Outcome: Ongoing  Goal: Promote optimal lung function  2/3/2022 0017 by Lynne Escalera RN  Outcome: Ongoing  2/2/2022 1430 by Galo Cr RN  Outcome: Ongoing     Problem: Breathing Pattern - Ineffective  Goal: Ability to achieve and maintain a regular respiratory rate  2/3/2022 0017 by Lynne Escalera RN  Outcome: Ongoing  2/2/2022 1430 by Galo Cr RN  Outcome: Ongoing     Problem:  Body Temperature -  Risk of, Imbalanced  Goal: Ability to maintain a body temperature within defined limits  2/3/2022 0017 by Lynne Escaelra RN  Outcome: Ongoing  2/2/2022 1430 by Galo Cr RN  Outcome: Ongoing  Goal: Will regain or maintain usual level of consciousness  2/3/2022 0017 by Lynne Escalera RN  Outcome: Ongoing  2/2/2022 1430 by Galo Cr RN  Outcome: Ongoing  Goal: Complications related to the disease process, condition or treatment will be avoided or minimized  2/3/2022 0017 by Lynne Escalera RN  Outcome: Ongoing  2/2/2022 1430 by Galo Cr RN  Outcome: Ongoing     Problem: Nutrition Deficits  Goal: Optimize nutritional status  2/3/2022 0017 by Lynne Escalera RN  Outcome: Ongoing  2/2/2022 1430 by Galo Cr RN  Outcome: Ongoing     Problem: Risk for Fluid Volume Deficit  Goal: Maintain normal heart rhythm  2/3/2022 0017 by Luis M Brewer RN  Outcome: Ongoing  2/2/2022 1430 by Avis Ramos RN  Outcome: Ongoing  Goal: Maintain absence of muscle cramping  2/3/2022 0017 by Luis M Brewer RN  Outcome: Ongoing  2/2/2022 1430 by Avis Ramos RN  Outcome: Ongoing  Goal: Maintain normal serum potassium, sodium, calcium, phosphorus, and pH  2/3/2022 0017 by Luis M Brewer RN  Outcome: Ongoing  2/2/2022 1430 by Avis Ramos RN  Outcome: Ongoing     Problem: Loneliness or Risk for Loneliness  Goal: Demonstrate positive use of time alone when socialization is not possible  2/3/2022 0017 by Luis M Brewer RN  Outcome: Ongoing  2/2/2022 1430 by Avis Ramos RN  Outcome: Ongoing     Problem: Fatigue  Goal: Verbalize increase energy and improved vitality  2/3/2022 0017 by Luis M Brewer RN  Outcome: Ongoing  2/2/2022 1430 by Avis Ramos RN  Outcome: Ongoing     Problem: Patient Education: Go to Patient Education Activity  Goal: Patient/Family Education  2/3/2022 0017 by Luis M Brewer RN  Outcome: Ongoing  2/2/2022 1430 by Avis Ramos RN  Outcome: Ongoing     Problem: Skin Integrity:  Goal: Will show no infection signs and symptoms  Description: Will show no infection signs and symptoms  2/3/2022 0017 by Luis M Brewer RN  Outcome: Ongoing  2/2/2022 1430 by Avis Ramos RN  Outcome: Ongoing  Goal: Absence of new skin breakdown  Description: Absence of new skin breakdown  2/3/2022 0017 by Luis M Brewer RN  Outcome: Ongoing  2/2/2022 1430 by Avis Ramos RN  Outcome: Ongoing     Problem: Nutrition  Goal: Optimal nutrition therapy  2/3/2022 0017 by Luis M Brewer RN  Outcome: Ongoing  2/2/2022 1430 by Avis Ramos RN  Outcome: Ongoing     Problem: Pain:  Goal: Pain level will decrease  Description: Pain level will decrease  2/3/2022 0017 by Luis M Brewer RN  Outcome: Ongoing  2/2/2022 1430 by Karrie Porter RN  Outcome: Ongoing  Goal: Control of acute pain  Description: Control of acute pain  2/3/2022 0017 by Lanita Snellen, RN  Outcome: Ongoing  2/2/2022 1430 by Karrie Porter RN  Outcome: Ongoing  Goal: Control of chronic pain  Description: Control of chronic pain  2/3/2022 0017 by Lanita Snellen, RN  Outcome: Ongoing  2/2/2022 1430 by Karrie Porter RN  Outcome: Ongoing     Problem: OXYGENATION/RESPIRATORY FUNCTION  Goal: Patient will maintain patent airway  2/3/2022 0017 by Lanita Snellen, RN  Outcome: Ongoing  2/2/2022 1938 by Viktor Ford RCP  Outcome: Ongoing  2/2/2022 1612 by Odessa Hines RCP  Outcome: Ongoing  2/2/2022 1430 by Karrie Porter RN  Outcome: Ongoing  Goal: Patient will achieve/maintain normal respiratory rate/effort  Description: Respiratory rate and effort will be within normal limits for the patient  2/3/2022 0017 by Lanita Snellen, RN  Outcome: Ongoing  2/2/2022 1938 by Viktor Ford RCP  Outcome: Ongoing  2/2/2022 1612 by Odessa Hines RCP  Outcome: Ongoing  2/2/2022 1430 by Karrie Porter RN  Outcome: Ongoing     Problem: MECHANICAL VENTILATION  Goal: Patient will maintain patent airway  2/3/2022 0017 by Lanita Snellen, RN  Outcome: Ongoing  2/2/2022 1938 by Viktor Ford RCP  Outcome: Ongoing  2/2/2022 1612 by Odessa Hines RCP  Outcome: Ongoing  2/2/2022 1430 by Karrie Porter RN  Outcome: Ongoing  Goal: Oral health is maintained or improved  2/3/2022 0017 by Lanita Snellen, RN  Outcome: Ongoing  2/2/2022 1938 by Viktor Ford RCP  Outcome: Ongoing  2/2/2022 1612 by Odessa Hines RCP  Outcome: Ongoing  2/2/2022 1430 by Karrie Porter RN  Outcome: Ongoing  Goal: ET tube will be managed safely  2/3/2022 0017 by Lanita Snellen, RN  Outcome: Ongoing  2/2/2022 1938 by Viktor Ford RCP  Outcome: Ongoing  2/2/2022 1612 by Odessa Hines RCP  Outcome: Ongoing  2/2/2022 1430 by Karrie Porter RN  Outcome: Ongoing  Goal: Ability to express needs and understand communication  2/3/2022 0017 by Svetlana Hartley RN  Outcome: Ongoing  2/2/2022 1938 by Miri Gottlieb RCP  Outcome: Ongoing  2/2/2022 1612 by Lory Venegas RCP  Outcome: Ongoing  2/2/2022 1430 by Cecile Choudhury RN  Outcome: Ongoing  Goal: Mobility/activity is maintained at optimum level for patient  2/3/2022 0017 by Svetlana Hartley RN  Outcome: Ongoing  2/2/2022 1938 by Miri Gottlieb RCP  Outcome: Ongoing  2/2/2022 1612 by Lory Venegas RCP  Outcome: Ongoing  2/2/2022 1430 by Cecile Choudhury RN  Outcome: Ongoing     Problem: SKIN INTEGRITY  Goal: Skin integrity is maintained or improved  2/3/2022 0017 by Svetlana Hartley RN  Outcome: Ongoing  2/2/2022 1938 by Miri Gottlieb RCP  Outcome: Ongoing  2/2/2022 1612 by Lory Venegas RCP  Outcome: Ongoing  2/2/2022 1430 by Cecile Choudhury RN  Outcome: Ongoing     Problem: PAIN  Goal: STG - Patient will increase activity level  2/3/2022 0017 by Svetlana Hartley RN  Outcome: Ongoing  2/2/2022 1430 by Cecile Choudhury RN  Outcome: Ongoing     Problem: SKIN INTEGRITY  Goal: LTG - Patient will demonstrate appropriate pressure relief techniques  2/3/2022 0017 by Svetlana Hartley RN  Outcome: Ongoing  2/2/2022 1430 by Cecile Choudhury RN  Outcome: Ongoing

## 2022-02-03 NOTE — FLOWSHEET NOTE
SPIRITUAL CARE DEPARTMENT - [facility]  PROGRESS NOTE    Shift date: 02/03/2022  Shift day: Thursday   Shift # 1    Room # 5254/6844-82   Name: Sera Maguire            Age: [de-identified] y.o. Gender: male          [facility] [address] [facility]    Referral: Routine Visit    Admit Date & Time: 1/8/2022  9:49 AM    PATIENT/EVENT DESCRIPTION:  Sera Maguire is a [de-identified] y.o. male   Patient in bed sleeping and is unresponsive. Nurse Pugh said that the patient is not doing well. Ehsan Barajastle that the family is very spiritual and connected with God. Son had requested prayer for patient. Son had just left the floor. SPIRITUAL ASSESSMENT/INTERVENTION:  This  provided a comforting presence and prayed for the patient. This  spoke with Nurse Pugh and let her know of her availability to come back when the son returns to the hospital. This  told her to call and the 's would respond to the call. Nurse Pugh was appreciative of the visit. SPIRITUAL CARE FOLLOW-UP PLAN:  Chaplains will remain available to offer spiritual and emotional support as needed. Electronically signed by Supa Leong, on 2/3/2022 at 12:10 PM.  [address]  [phone]     02/03/22 1208   Encounter Summary   Services provided to: Patient   Referral/Consult From: Other    Continue Visiting   (02/03/2022)   Length of Encounter 15 minutes   Spiritual Assessment Completed Yes   Routine   Type Follow up   Assessment Sleeping; Unable to respond   Intervention Prayer;Sustaining presence/ Ministry of presence   Outcome   (Son asked for prayer-Nurse Pugh says pt is'nt well)

## 2022-02-04 NOTE — FLOWSHEET NOTE
02/04/22 0735   Provider Notification   Reason for Communication Evaluate; Review case   Provider Name Dr. Diana Emery   Provider Notification Resident   Method of Communication Face to face   Response At bedside   Notification Time [address], RN

## 2022-02-04 NOTE — PLAN OF CARE
Problem: OXYGENATION/RESPIRATORY FUNCTION  Goal: Patient will maintain patent airway  2/3/2022 2130 by Marcus Thakur RCP  Outcome: Ongoing     Problem: OXYGENATION/RESPIRATORY FUNCTION  Goal: Patient will achieve/maintain normal respiratory rate/effort  Description: Respiratory rate and effort will be within normal limits for the patient  2/3/2022 2130 by Marcus Thakur RCP  Outcome: Ongoing     Problem: MECHANICAL VENTILATION  Goal: Patient will maintain patent airway  2/3/2022 2130 by Marcus Thkaur RCP  Outcome: Ongoing     Problem: MECHANICAL VENTILATION  Goal: Oral health is maintained or improved  2/3/2022 2130 by Marcus Thakur RCP  Outcome: Ongoing     Problem: MECHANICAL VENTILATION  Goal: ET tube will be managed safely  2/3/2022 2130 by Marcus Thakur RCP  Outcome: Ongoing     Problem: MECHANICAL VENTILATION  Goal: Ability to express needs and understand communication  2/3/2022 2130 by Marcus Thakur RCP  Outcome: Ongoing     Problem: MECHANICAL VENTILATION  Goal: Mobility/activity is maintained at optimum level for patient  2/3/2022 2130 by Marcus Thakur RCP  Outcome: Ongoing     Problem: SKIN INTEGRITY  Goal: Skin integrity is maintained or improved  2/3/2022 2130 by Marcus Thakur RCP  Outcome: Ongoing

## 2022-02-04 NOTE — FLOWSHEET NOTE
02/04/22 1000   Provider Notification   Reason for Communication Evaluate; Review case   Provider Name Dr. Bladimir Duong   Provider Notification Physician   Method of Communication Face to face   Response At bedside   Notification Time [address] [place]

## 2022-02-04 NOTE — PLAN OF CARE
Problem: Falls - Risk of:  Goal: Will remain free from falls  Description: Will remain free from falls  Outcome: Ongoing  Goal: Absence of physical injury  Description: Absence of physical injury  Outcome: Ongoing     Problem: Airway Clearance - Ineffective  Goal: Achieve or maintain patent airway  Outcome: Ongoing     Problem: Gas Exchange - Impaired  Goal: Absence of hypoxia  Outcome: Ongoing  Goal: Promote optimal lung function  Outcome: Ongoing     Problem: Breathing Pattern - Ineffective  Goal: Ability to achieve and maintain a regular respiratory rate  Outcome: Ongoing     Problem:  Body Temperature -  Risk of, Imbalanced  Goal: Ability to maintain a body temperature within defined limits  Outcome: Ongoing  Goal: Will regain or maintain usual level of consciousness  Outcome: Ongoing  Goal: Complications related to the disease process, condition or treatment will be avoided or minimized  Outcome: Ongoing     Problem: Nutrition Deficits  Goal: Optimize nutritional status  Outcome: Ongoing     Problem: Risk for Fluid Volume Deficit  Goal: Maintain normal heart rhythm  Outcome: Ongoing  Goal: Maintain absence of muscle cramping  Outcome: Ongoing  Goal: Maintain normal serum potassium, sodium, calcium, phosphorus, and pH  Outcome: Ongoing     Problem: Loneliness or Risk for Loneliness  Goal: Demonstrate positive use of time alone when socialization is not possible  Outcome: Ongoing     Problem: Fatigue  Goal: Verbalize increase energy and improved vitality  Outcome: Ongoing     Problem: Patient Education: Go to Patient Education Activity  Goal: Patient/Family Education  Outcome: Ongoing     Problem: Skin Integrity:  Goal: Will show no infection signs and symptoms  Description: Will show no infection signs and symptoms  Outcome: Ongoing  Goal: Absence of new skin breakdown  Description: Absence of new skin breakdown  Outcome: Ongoing     Problem: Nutrition  Goal: Optimal nutrition therapy  Outcome: Ongoing Problem: Pain:  Goal: Pain level will decrease  Description: Pain level will decrease  Outcome: Ongoing  Goal: Control of acute pain  Description: Control of acute pain  Outcome: Ongoing  Goal: Control of chronic pain  Description: Control of chronic pain  Outcome: Ongoing     Problem: OXYGENATION/RESPIRATORY FUNCTION  Goal: Patient will maintain patent airway  2/4/2022 0643 by Joseph Singh RN  Outcome: Ongoing  2/3/2022 2130 by Becky Rossi RCP  Outcome: Ongoing  Goal: Patient will achieve/maintain normal respiratory rate/effort  Description: Respiratory rate and effort will be within normal limits for the patient  2/4/2022 0643 by Joseph Singh RN  Outcome: Ongoing  2/3/2022 2130 by Becky Rossi RCP  Outcome: Ongoing     Problem: MECHANICAL VENTILATION  Goal: Patient will maintain patent airway  2/4/2022 0643 by Joseph Singh RN  Outcome: Ongoing  2/3/2022 2130 by Becky Rossi RCP  Outcome: Ongoing  Goal: Oral health is maintained or improved  2/4/2022 0643 by Joseph Singh RN  Outcome: Ongoing  2/3/2022 2130 by Becky Rossi RCP  Outcome: Ongoing  Goal: ET tube will be managed safely  2/4/2022 0643 by Joseph Singh RN  Outcome: Ongoing  2/3/2022 2130 by Becky Rossi RCP  Outcome: Ongoing  Goal: Ability to express needs and understand communication  2/4/2022  by Joseph Singh RN  Outcome: Ongoing  2/3/2022 2130 by Becky Rossi RCP  Outcome: Ongoing  Goal: Mobility/activity is maintained at optimum level for patient  2/4/2022 0643 by Joseph Singh RN  Outcome: Ongoing  2/3/2022 2130 by Becky Rossi RCP  Outcome: Ongoing     Problem: SKIN INTEGRITY  Goal: Skin integrity is maintained or improved  2/4/2022 0643 by Joseph Singh RN  Outcome: Ongoing  2/3/2022 2130 by Becky Rossi RCP  Outcome: Ongoing     Problem: PAIN  Goal: STG - Patient will increase activity level  Outcome: Ongoing     Problem: SKIN INTEGRITY  Goal: LTG - Patient will demonstrate appropriate pressure

## 2022-02-04 NOTE — PLAN OF CARE
Problem: Gas Exchange - Impaired  Goal: Absence of hypoxia  2/4/2022 1253 by ZIGGY CasanovaP  Outcome: Completed  2/4/2022 0941 by Melisa Leach, RCP  Outcome: Ongoing  2/4/2022 0643 by Tariq Hou RN  Outcome: Ongoing  Goal: Promote optimal lung function  2/4/2022 1253 by Melisa Leach, RCP  Outcome: Completed  2/4/2022 0941 by Melisa Leach, RCP  Outcome: Ongoing  2/4/2022 0643 by Tariq Hou RN  Outcome: Ongoing     Problem: Breathing Pattern - Ineffective  Goal: Ability to achieve and maintain a regular respiratory rate  2/4/2022 1253 by ZIGGY CasanovaP  Outcome: Completed  2/4/2022 0941 by Melisa Leach, RCP  Outcome: Ongoing  2/4/2022 0643 by Tariq Hou RN  Outcome: Ongoing     Problem: OXYGENATION/RESPIRATORY FUNCTION  Goal: Patient will maintain patent airway  2/4/2022 1253 by ZIGGY CasanovaP  Outcome: Completed  2/4/2022 0941 by Melisa Leach, RCP  Outcome: Ongoing  2/4/2022 0643 by Tariq Hou RN  Outcome: Ongoing  Goal: Patient will achieve/maintain normal respiratory rate/effort  Description: Respiratory rate and effort will be within normal limits for the patient  2/4/2022 1253 by Melisa Leach, SUE  Outcome: Completed  2/4/2022 0941 by ZIGGY CasanovaP  Outcome: Ongoing  2/4/2022 0643 by Tariq Hou RN  Outcome: Ongoing     Problem: MECHANICAL VENTILATION  Goal: Patient will maintain patent airway  2/4/2022 1253 by Melisa Leach, RCP  Outcome: Completed  2/4/2022 0941 by ZIGGY CasanovaP  Outcome: Ongoing  2/4/2022 0643 by Tariq Hou RN  Outcome: Ongoing  Goal: Oral health is maintained or improved  2/4/2022 1253 by Melisa Leach RCP  Outcome: Completed  2/4/2022 0941 by ZIGGY CasanovaP  Outcome: Ongoing  2/4/2022 0643 by Tariq Hou RN  Outcome: Ongoing  Goal: ET tube will be managed safely  2/4/2022 1253 by Melisa Lecah, RCP  Outcome: Completed  2/4/2022 0941 by ZIGGY CasanovaP  Outcome: Ongoing  2/4/2022 9395 by Julieth Campos RN  Outcome: Ongoing  Goal: Ability to express needs and understand communication  [phone] by Jonny Mancera RCP  Outcome: Completed  2/4/2022 0941 by ZIGGY MachucaP  Outcome: Ongoing  2/4/2022 0643 by Julieth Campos RN  Outcome: Ongoing  Goal: Mobility/activity is maintained at optimum level for patient  2/4/2022 1253 by Jonny Mancera RCP  Outcome: Completed  2/4/2022 0941 by Jonny Mancera RCP  Outcome: Ongoing  2/4/2022 0643 by Julieth Campos RN  Outcome: Ongoing     Problem: SKIN INTEGRITY  Goal: Skin integrity is maintained or improved  2/4/2022 1253 by Jonny Mancera RCP  Outcome: Completed  2/4/2022 0941 by Jonny Mancera RCP  Outcome: Ongoing  2/4/2022 0643 by Julieth Campos RN  Outcome: Ongoing

## 2022-02-04 NOTE — FLOWSHEET NOTE
Dr. Sindy Lunsford discussed with patient's wife extubating patient. If patient goes into respiratory distress patient will not be re-intubated. Daphnie at bedside to extubate.   Pema Nolan RN

## 2022-02-04 NOTE — PROGRESS NOTES
Comprehensive Nutrition Assessment    Type and Reason for Visit:  Reassess    Nutrition Recommendations/Plan: Suggest continue current tube feeding at this time. Will continue to monitor TF tolerance/adequacy and labs; if glucose levels remain elevated, may consider change to Diabetic Formula at 45 mL/hr. Nutrition Assessment:  Pt remains on vent with Propofol at 4 mL/hr. Standard without Fiber Tube Feeding at goal rate of 55 mL/hr and tolerating well per RN. Last BM: 2/2/22. Current free water flush order= 300 mL every 8 hrs. Labs include: Na 142 mmol/L, BUN 87 mg/dL, CR 2.29 mg/dL, Glu 161-207 mg/dL. Meds reviewed/include: Humalog SS, Senokot, Propofol. Malnutrition Assessment:  Malnutrition Status:  Insufficient data    Context:  Acute Illness     Findings of the 6 clinical characteristics of malnutrition:  Energy Intake: Unsure of intake PTA; currently meeting est kcal/pro needs with tube feeding  Weight Loss:  No significant weight loss     Body Fat Loss:  Unable to assess     Muscle Mass Loss:  1 - Mild muscle mass loss Temples (temporalis)  Fluid Accumulation:  7 - Moderate to Severe Extremities,Generalized    Strength:  Not Performed    Estimated Daily Nutrient Needs:  Energy (kcal):  1700 kcal/day; Weight Used for Energy Requirements:  Admission     Protein (g):  80 g pro/day; Weight Used for Protein Requirements:  Admission (1.2)        Fluid (ml/day):  2100 mL/day; Method Used for Fluid Requirements:  ml/Kg (28)      Nutrition Related Findings:  Meds/labs reviewed.  Last BM noted: 2/2/22      Current Nutrition Therapies:    ADULT TUBE FEEDING; Orogastric; Standard without Fiber; Continuous; 55; No; 300; Q 8 hours  Current Tube Feeding (TF) Orders:  · Feeding Route: Orogastric  · Formula: Standard without Fiber  · Schedule: Continuous at 55 mL/hr   · Water Flushes: 300 mL every 8 hrs, per MD  · Current/Goal TF & Flush Orders Provides: Standard without Fiber at 55 mL/hr =1584 kcal, 73 g pro, 1082 mL free water/day. Total free water from TF and celxxqo=9499 mL/day. Additional Calorie Sources:   Propofol at 4 mL/hr =106 kcal/day    Anthropometric Measures:  · Height: 5' 10\" (177.8 cm)  · Current Body Weight: 168 lb 14 oz (76.6 kg)   · Admission Body Weight: 148 lb 2.4 oz (67.2 kg)    · Usual Body Weight: 150 lb (68 kg)     · Ideal Body Weight: 166 lbs; % Ideal Body Weight 101.7 %   · BMI: 24.2  · BMI Categories: Normal Weight (BMI 18.5-24. 9)       Nutrition Diagnosis:   · Inadequate oral intake related to impaired respiratory function as evidenced by NPO or clear liquid status due to medical condition,nutrition support - enteral nutrition    Nutrition Interventions:   Food and/or Nutrient Delivery:  Suggest continue current tube feeding at this time. Will continue to monitor TF tolerance/adequacy and labs; if glucose levels remain elevated, consider change to Diabetic Formula at 45 mL/hr. Nutrition Education/Counseling:  No recommendation at this time   Coordination of Nutrition Care:  Continue to monitor while inpatient    Goals:  Meet % of estimated nutrition needs with nutrition support/oral diet. Goal achieved. Nutrition Monitoring and Evaluation:   Behavioral-Environmental Outcomes:  None Identified   Food/Nutrient Intake Outcomes:  Enteral Nutrition Intake/Tolerance  Physical Signs/Symptoms Outcomes:  Biochemical Data,Fluid Status or Edema,Nutrition Focused Physical Findings,Skin,Weight     Discharge Planning:     Too soon to determine     Electronically signed by Jose Ramon Condon RD, LD on 2/4/22 at 12:30 PM EST    Contact: [phone]

## 2022-02-04 NOTE — FLOWSHEET NOTE
02/04/22 1015   Family/Significant Other Communication   Reason Discujssed reintubation if needed   Name Verónica   Relationship Spouse/Significant Other   Response does not wish for him to be reintubated bc not going to [facility]ach   Method of Communication Phone     Ирина Pierre RN

## 2022-02-04 NOTE — FLOWSHEET NOTE
Code status changed to [facility]. Family wants to make patient comfortable.     Medications administered per orders  Bill Trevizo RN

## 2022-02-04 NOTE — PROGRESS NOTES
[facility] [address] Patient  Today's Date and Time: 2/4/2022, 10:50 AM    Impression :     · COVID 19 Confirmed Infection  · Covid tests:  · 1/8/21: Positive  · Acute hypoxic respiratory failure  · Paroxysmal A. 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  · Essential hypertension  · Elevated inflammatory markers  · Hyperlipidemia  · Acute gluteal hematoma  · Patient has not received the Covid vaccine  · Intubated 1/22/22  · DNR-CCA    Recommendations:   · Antibiotic treatment:  · Monitor off antibiotics   · Prior antibiotics:  · Meropenem 500 mg BID IV for leukocytosis & possible secondary bacterial pneumonia 1/11/21-discontinued 1/17/21  · Meropenem Re-started 1-21-22 because of residual dense consolidation of the lungs with air bronchograms-Completed 1/31/22  · Sputum 1/25: Normal alistair  · Covid Rx:    · Remdesivir-contraindicated with JULES  · Monoclonal antibodies-out of window  · Decadron-initiated 1/8/2022  · Actemra-administered 1/8/2022  · Continue supportive care  · [facility]      Medical Decision Making/Summary/Discussion:2/4/2022     · Patient admitted with COVID 19 infection  · Isolation until 1/28/22    Infection Control Recommendations   · [place] Precautions    Antimicrobial Stewardship Recommendations       · Renal considerations  Coordination of Outpatient Care:   · Estimated Length of IV antimicrobials:TBD  · Patient will need Midline Catheter Insertion: TBD  · Patient will need PICC line Insertion: No  · Patient will need: Home IV , [place],  SNF,  LTAC:TBD  · Patient will need outpatient wound care:No    Chief complaint/reason for consultation:   · Concern for COVID infection      History of Present Illness:   Remi Coronel is a [de-identified]y.o.-year-old male who was initially admitted on 1/8/2022.  Patient seen at the request of Dr. Piter Graham:    Patient presented through ER with complaints of needing shortness of breath, cough, loss of appetite, nausea, vomiting and diarrhea over the past 7 to 10 days. He has not received the Covid vaccine and tested positive for Covid shortly after [place]. On evaluation in the ED, the patient had an SPO2 of 80% on room air and was tachypneic. A rapid Covid swab was positive. CT chest shows extensive bilateral pulmonary groundglass opacities. He was started on Decadron and given a dose of Actemra. Abnormal labs include:  · Creatinine 2.32  ·   · Troponin I 48  · WBC 17.1    Patient admitted because of concerns with COVID 19. Meropenem initiated on 1/11/2022 for worsening respiratory distress and leukocytosis    Stable chest x-ray 1/11    CRP is trending down    Hemoglobin dropped to 5.7 on 1/16/2022  Hemoglobin remained stable at this time after receiving infusions of PRBC  Left gluteal hematoma present on CT abdomen pelvis    Occult blood noted on stool  Anemia continues in the program patient required another blood transfusion on 1/21/2022  Anticoagulation on hold s/p gluteal hematoma    Impression CT Chest/Abdomen/Pelvis 1/21/22   1.  New small bilateral pleural effusions with extensive bilateral airspace   consolidation, increased from 01/08/2022.       2.  Decreased AP dimension of the distal trachea, suggesting tracheomalacia.       3.  Previously noted hematoma in the left gluteal musculature is not as well   visualized on today's study, but appears overall slightly decreased in size. No new areas of hematoma.       4.  Mild distention of the stomach, mild prominence of proximal small bowel   loops, and mild nonspecific stranding in the proximal mesentery.  No definite   evidence of high-grade bowel obstruction at this time.  Enteritis or early   developing obstruction are considered in the differential.         The patient required intubation for worsening respiratory failure on 1/22/2022.   He is currently requiring 65% FiO2 with a PEEP of 12. Propofol is being given for sedation    Candida Albicans urine 1/25/22  MRSA nares not detected    Meropenem initiated 1/22/22  Repeat cultures ordered 1/25/22-Normal alistair  Leukocytosis has resolved    CURRENT EVALUATION : 2/4/2022    Patient evaluated and examined in the ICU. Afebrile  VS stable    The patient remains on mechanical ventilation with 40% FiO2  Small amount of Propofol for sedation    Weaning trial underway again today    Anemia continues    Patient exhibiting respiratory distress. Yes  Respiratory secretions: No    Patient receiving supplemental oxygen. BiPAP & Hi-flow NC-->MV  RR: 28   02 sat:95    % FIO2: 40  PEEP: 8    NEWS Score: 0-4 Low risk group; 5-6: Medium risk group; 7 or above: High risk group  Parameters 3 2 1 0 1 2 3   Age    < 65   ? 65   RR ? 8  9-11 12-20  21-24 ? 25   O2 Sats ? 91 92-93 94-95 ? 96      Suppl O2  Yes  No      SBP ? 90  101-110 111-219   ? 220   HR ? 40  41-50 51-90  111-130 ? 131   Consciousness    Alert   Drowsiness, lethargy, or confusion   Temperature ? 35.0 C (95.0 F)  35.1-36.0 C 95.1-96.9 F 36.1-38.0 C 97.0-100.4 F 38.1-39.0 C 100.5-102.3 F ? 39.1 C ? 102.4 F      NEWS Score:   1/9/2022: 5 moderate risk    Overall Daily Picture:      Unchanged    Presence of secondary bacterial Infection:    Possible  Additional antibiotics: Meropenem 1/21/2022-completed    Labs, X rays reviewed: 2/4/2022    BUN:87  Cr:2.29    WBC:11.6  Hb:7.0  Plat:273    Absolute Neutrophils:16  Absolute Lymphocytes:0.34  Neutrophil/Lymphocyte Ratio: 53 very high risk    CRP:160-->131-->52.3-->21.5  Ferritin:804  LDH: 563    Pro Calcitonin:      Cultures:  Urine:  · 1/25: Candida albicans  Blood:  · 1/25: No growth  Sputum :  · 1/22: Gram negative rods  · 1/25: Normal alistair  MRSA Nares:   Not detected    CXR:     CAT:  1/8/21      Discussed with patient, RN, CC, IM.     I have personally reviewed the past medical history, past surgical history, medications, social history, and family history, and I have updated the database accordingly.   Past Medical History:     Past Medical History:   Diagnosis Date    Chronic lower back pain     Cobalamin deficiency     [facility]     Diabetes mellitus [facility]     Hyperlipidemia     Hypertension     Malignant neoplasm of colon [facility]     PAD (peripheral artery disease) ([facility])        Past Surgical  History:     Past Surgical History:   Procedure Laterality Date    ARTERY SURGERY      INSERT MIDLINE CATHETER  1/24/2022            Medications:      torsemide  40 mg Oral Every Other Day    amiodarone  200 mg Per G Tube Daily    amLODIPine  10 mg Per G Tube Daily    [Held by provider] apixaban  2.5 mg Per G Tube BID    atorvastatin  20 mg Per G Tube Nightly    busPIRone  10 mg Per G Tube TID    [Held by provider] clopidogrel  75 mg Per G Tube Daily    famotidine  20 mg Per G Tube Daily    metoprolol tartrate  75 mg Per G Tube BID    oxyCODONE  7.5 mg Per G Tube Q6H    senna  2 tablet Per G Tube Nightly    miconazole   Topical BID    insulin lispro  0-6 Units SubCUTAneous Q4H       Social History:     Social History     Socioeconomic History    Marital status:      Spouse name: Not on file    Number of children: Not on file    Years of education: Not on file    Highest education level: Not on file   Occupational History    Not on file   Tobacco Use    Smoking status: Former Smoker    Smokeless tobacco: Never Used   Substance and Sexual Activity    Alcohol use: Not Currently    Drug use: Never    Sexual activity: Not on file   Other Topics Concern    Not on file   Social History Narrative    Not on file     Social Determinants of Health     Financial Resource Strain:     Difficulty of Paying Living Expenses: Not on file   Food Insecurity:     Worried About Running Out of Food in the Last Year: Not on file    Lesley of Food in the Last Year: Not on file   Transportation Needs:     Lack of Transportation (Medical): Not on file    Lack of Transportation (Non-Medical): Not on file   Physical Activity:     Days of Exercise per Week: Not on file    Minutes of Exercise per Session: Not on file   Stress:     Feeling of Stress : Not on file   Social Connections:     Frequency of Communication with Friends and Family: Not on file    Frequency of Social Gatherings with Friends and Family: Not on file    Attends Mu-ism Services: Not on file    Active Member of [address] [place] [address] [employer] or Organizations: Not on file    Attends Club or Organization Meetings: Not on file    Marital Status: Not on file   Intimate Partner Violence:     Fear of Current or Ex-Partner: Not on file    Emotionally Abused: Not on file    Physically Abused: Not on file    Sexually Abused: Not on file   Housing Stability:     Unable to Pay for Housing in the Last Year: Not on file    Number of [place] in the Last Year: Not on file    Unstable Housing in the Last Year: Not on file       Family History:   History reviewed. No pertinent family history. Allergies:   Patient has no known allergies. Review of Systems:     Review of Systems   Unable to perform ROS: Intubated   Respiratory: Positive for shortness of breath. Cardiovascular: Negative. Gastrointestinal: Negative. Genitourinary: Negative. Musculoskeletal: Negative. Skin: Positive for color change. Neurological: Negative.           Physical Examination :     Patient Vitals for the past 8 hrs:   BP Temp Temp src Pulse Resp SpO2 Weight   02/04/22 0930    67 25 97 %    02/04/22 0925    74 23 99 %    02/04/22 0915    70 14 97 %    02/04/22 0900    79 26 96 %    02/04/22 0845    72 22 95 %    02/04/22 0830    71 21 97 %    02/04/22 0815    65 15 96 %    02/04/22 0800 (!) 132/55 98.6 °F (37 °C) Oral 67 18 96 %    02/04/22 0745    68 22 97 %    02/04/22 0730    67 20 (!) 67 %    02/04/22 0715    63 19 96 %    02/04/22 0700    64 17 95 %    02/04/22 0600    60 14 97 %    02/04/22 0528  97.3 °F (36.3 °C) Axillary       02/04/22 0500    62 18 98 %    02/04/22 0400 (!) 114/51   62 18 96 %    02/04/22 0329  97.5 °F (36.4 °C) Axillary       02/04/22 0326       168 lb 14 oz (76.6 kg)   02/04/22 0315    66 14 100 %    02/04/22 0300    58 17 96 %      Physical Exam  Vitals and nursing note reviewed. Constitutional:       Appearance: He is well-developed. Comments: Intubated and sedated   HENT:      Head: Normocephalic and atraumatic. Cardiovascular:      Rate and Rhythm: Normal rate. Heart sounds: Murmur heard. Pulmonary:      Effort: Respiratory distress present. Breath sounds: No wheezing. Abdominal:      General: Bowel sounds are normal.      Palpations: Abdomen is soft. There is no mass. Tenderness: There is no abdominal tenderness. Musculoskeletal:         General: Swelling (In the bilateral upper extremities especially in the forearms.) present. Normal range of motion. Cervical back: Neck supple. Lymphadenopathy:      Cervical: No cervical adenopathy. Skin:     General: Skin is dry. Findings: Bruising (There is bruising and ecchymotic skin lesions in the forearms bilaterally right worse than left) present. Neurological:      Comments: FINA-intubated and sedated         Medical Decision Making -Laboratory:   I have independently reviewed/ordered the following labs:    CBC with Differential:   Recent Labs     02/03/22  0535 02/03/22  0535 02/03/22  1149 02/04/22  0055   WBC 11.2  --   --  11.6*   HGB 6.9*   < > 7.9* 7.0*   HCT 21.9*   < > 25.1* 21.7*     --   --  273   LYMPHOPCT 8*  --   --  8*   MONOPCT 6  --   --  2    < > = values in this interval not displayed.      BMP:   Recent Labs     02/02/22  0402 02/02/22  0402 02/03/22  0535 02/04/22  0055   *   < > 141 142   K 3.5*   < > 4.0 4.2      < > 104 104   CO2 30   < > 30 30   BUN 91*   < > 91* 87*   CREATININE 2.25*   < > 2.09* 2.29*   MG 2.5  --   --   --     < > = values in this interval not displayed. Hepatic Function Panel:   No results for input(s): PROT, LABALBU, BILIDIR, IBILI, BILITOT, ALKPHOS, ALT, AST in the last 72 hours. No results for input(s): RPR in the last 72 hours. No results for input(s): HIV in the last 72 hours. No results for input(s): BC in the last 72 hours. Lab Results   Component Value Date    MUCUS 1+ 01/17/2022    RBC 2.18 02/04/2022    TRICHOMONAS NOT REPORTED 01/17/2022    WBC 11.6 02/04/2022    YEAST NOT REPORTED 01/17/2022    TURBIDITY Clear 01/17/2022     Lab Results   Component Value Date    CREATININE 2.29 02/04/2022    GLUCOSE 161 02/04/2022       Medical Decision Making-Imaging:     Narrative   EXAMINATION:   CTA OF THE CHEST 1/8/2022 10:10 am       TECHNIQUE:   CTA of the chest was performed after the administration of intravenous   contrast.  Multiplanar reformatted images are provided for review.  MIP   images are provided for review. Dose modulation, iterative reconstruction,   and/or weight based adjustment of the mA/kV was utilized to reduce the   radiation dose to as low as reasonably achievable.       COMPARISON:   None.       HISTORY:   ORDERING SYSTEM PROVIDED HISTORY: dyspnea / hypoxia   Reason for Exam: Shortness of breath       FINDINGS:   Pulmonary Arteries: Pulmonary arteries are adequately opacified for   evaluation.  No evidence of intraluminal filling defect to suggest pulmonary   embolism.  Main pulmonary artery is normal in caliber.       Mediastinum: No evidence of mediastinal lymphadenopathy.  Normal heart size. Normal caliber thoracic aorta with diffuse atherosclerosis.       Lungs/pleura: Extensive ground-glass opacification of the bilateral lung   fields with peripheral involvement slight apical as well as basilar sparing.    No effusion or pneumothorax.       Upper Abdomen: Limited images of the upper abdomen are unremarkable.       Soft Tissues/Bones: No acute bone or soft tissue abnormality.           Impression   *No evidence of pulmonary embolism. *Extensive ground-glass opacification of the bilateral lung fields with   peripheral involvement slight apical as well as basilar sparing.  Imaging   features suggestive of COVID-19 pneumonia, though are nonspecific and can   occur with a variety of infectious and noninfectious processes.         Narrative   EXAMINATION:   CT OF THE ABDOMEN AND PELVIS WITHOUT CONTRAST, 1/16/2022 10:42 am       TECHNIQUE:   CT of the abdomen and pelvis was performed without the administration of   intravenous contrast. Multiplanar reformatted images are provided for review. Dose modulation, iterative reconstruction, and/or weight based adjustment of   the mA/kV was utilized to reduce the radiation dose to as low as reasonably   achievable.       COMPARISON:   CT of the chest from 01/08/2022, and retroperitoneal ultrasound from   01/10/2022.       HISTORY:   ORDERING SYSTEM PROVIDED HISTORY:  Retrop bleed r/o   TECHNOLOGIST PROVIDED HISTORY:   Retrop bleed r/o   Reason for Exam:  Retrop bleed r/o       FINDINGS:   Lower Chest: As demonstrated on recent CT chest, extensive and somewhat   denser confluent ground-glass airspace opacities re-identified mid-lower   lungs, sparing the bases with some associated crazy paving, air bronchograms   and bibasilar atelectatic appearing changes.  Main pulmonary artery caliber   31 mm.  Mild dilatation ascending aorta, with a maximal dimension of 41 mm.       Organs: Unopacified liver, spleen, adrenal glands and both kidneys show no   acute abnormality; without suspicious focal finding or hydronephrosis.    Significant right renal cortical thinning and some scarring suspected.  No   large stone.  Probable gallbladder sludge or vicarious contrast from recent   contrast CT; no calcified stones.       GI/Bowel: Small-moderate amount of retained stool, mostly right colon with   some fluid/levels; no abnormal dilatation, marked wall thickening or   pericolonic fat stranding.  Unremarkable small bowel.  Some fluid within a   mildly distended stomach.  Small hiatus hernia.       Pelvis: Partially fluid-filled urinary bladder without wall thickening or   mass.  No significant prostatic enlargement.  Symmetric seminal vesicles.  No   pelvic fluid collection or mass.  Partially visualized approximate 10 x 15 x   5.7 cm left gluteal mass with HU measurements/appearance most suggestive of   hematoma.  No high density finding compatible with active bleeding, but   assessment limited on this examination.  Small fat containing inguinal   hernias, larger on the left.       Peritoneum/Retroperitoneum: No retroperitoneal bleed or bulky   lymphadenopathy.  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  Probable limited intimal dissection   suprarenal aorta without marked aneurysmal dilatation.  Soft tissue stranding   flanks extending into the pelvis, suggesting some degree anasarca.  Slightly   greater prominence right proximal rectus musculature       Bones/Soft Tissues: No acute abnormality.  Mild scoliosis, multilevel   degenerative changes of spine but with DDD L5-S1 and bilateral mild hip joint   degenerative findings.           Impression   Left gluteal hematoma, partially visualized on this study without obvious   active bleeding, but limited without IV contrast.  No retroperitoneal or   rectus sheath bleed.       Extensive findings in the visualized lungs compatible with known COVID-19   pneumonia; denser GGOs suggest worsening pneumonia/pneumonitis or developing   consolidation.  No pneumothorax.       Multiple additional findings, as detailed in the body of the report above.       RECOMMENDATIONS: Consider follow-up CT pelvis including the upper thighs, if the patient does   not respond to resuscitation with blood products/fluids and other   conservative measures including localized pressure.  Findings were discussed   with Cristian Hurtado at 12:24 pm on 1/16/2022.             Narrative   EXAMINATION:   CT OF THE CHEST, ABDOMEN, AND PELVIS WITHOUT CONTRAST 1/21/2022 11:09 am       TECHNIQUE:   CT of the chest, abdomen and pelvis was performed without the administration   of intravenous contrast. Multiplanar reformatted images are provided for   review. Dose modulation, iterative reconstruction, and/or weight based   adjustment of the mA/kV was utilized to reduce the radiation dose to as low   as reasonably achievable.       COMPARISON:   CT abdomen and pelvis dated 01/16/2022.  CT chest dated 01/08/2022       HISTORY:   ORDERING SYSTEM PROVIDED HISTORY: blood loss anemia , gluteal hematoma and   worsening HB   TECHNOLOGIST PROVIDED HISTORY:   blood loss anemia , gluteal hematoma and worsening HB           FINDINGS:       Chest:       Mediastinum: Heart size is within normal limits.  Ascending thoracic aorta is   mildly dilated, measuring 4 cm in AP dimension, similar to the previous   study.  Main pulmonary artery is stable in caliber as well.  No mediastinal   hematoma or lymphadenopathy. Rogene Taniya is a catheter in the SVC with distal tip   in the distal SVC.       Lungs/pleura: There are small bilateral pleural effusions, which are new from   the previous study. Rogene Taniya is extensive dense airspace consolidation   throughout both lungs, with mild sparing at the bases, increased from   01/08/2022. Rogene Taniya is decreased AP dimension of the lower trachea, although   tracheobronchial tree remains patent.       Soft Tissues/Bones: There is no acute or suspicious osseous abnormality.    Visualized superficial soft tissues are within normal limits.           Abdomen/Pelvis:       Organs: Limited unenhanced liver is within normal limits. Jane Plate is a tiny   amount of free fluid adjacent to the hepatic dome.  Gallbladder, spleen,   pancreas, and adrenal glands are unremarkable.       There is bilateral renal atrophy, more so on the right, unchanged.  No   hydronephrosis or perinephric fluid collection. Jane Plate is mild bilateral   perinephric stranding, which is unchanged and likely physiologic.       GI/Bowel: The stomach is mildly distended with air-fluid level noted.  No   abnormal bowel distention.  There is mild increased prominence of the   proximal small bowel loops.  There is mild stranding in the proximal   mesentery.  No focal pericolonic inflammation.  No free air.       Pelvis: Urinary bladder is within normal limits.  No evidence of pelvic   lymphadenopathy.       Peritoneum/Retroperitoneum: Distal aortobifemoral graft noted.  Caliber of   the abdominal aorta is unchanged.  There is moderate to severe scattered   atherosclerosis, which is unchanged.  No retroperitoneal lymphadenopathy or   hematoma.  Slit-like IVC is noted.       Bones/Soft Tissues: There is no acute or suspicious osseous abnormality.  The   hematoma previously seen in the left gluteal muscle is not as well visualized   on today's study due to isoattenuation.  There is asymmetric swelling of the   left gluteal muscle, although slightly improved when compared to 01/16/2022. AP dimension in the area of suspected hematoma is 4.4 cm (previously 5.7 cm).    Transverse dimension is approximately 9.3 cm (previously 9.7 cm).  There is   stranding in the subcutaneous fat of the upper thighs bilaterally.           Impression   1.  New small bilateral pleural effusions with extensive bilateral airspace   consolidation, increased from 01/08/2022.       2.  Decreased AP dimension of the distal trachea, suggesting tracheomalacia.       3.  Previously noted hematoma in the left gluteal musculature is not as well   visualized on today's study, but appears overall slightly decreased in size. No new areas of hematoma.       4.  Mild distention of the stomach, mild prominence of proximal small bowel   loops, and mild nonspecific stranding in the proximal mesentery.  No definite   evidence of high-grade bowel obstruction at this time.  Enteritis or early   developing obstruction are considered in the differential.         Medical Decision Sequja-[id number]:     Culture, Blood 1 [[id number]] Collected: 01/09/22 1155   Order Status: Completed Specimen: Blood Updated: 01/14/22 1300    Specimen Description . BLOOD    Special Requests NOT REPORTED    Culture NO GROWTH 5 DAYS   Culture, Blood 1 [[id number]] Collected: 01/09/22 1155   Order Status: Completed Specimen: Blood Updated: 01/14/22 1300    Specimen Description . BLOOD    Special Requests NOT REPORTED    Culture NO GROWTH 5 DAYS   COVID-19, Rapid [[id number]] (Abnormal) Collected: 01/08/22 1045   Order Status: Completed Specimen: Nasopharyngeal Swab Updated: 01/08/22 1109    Specimen Description . NASOPHARYNGEAL SWAB    SARS-CoV-2, Rapid DETECTED Abnormal     Comment:        Rapid NAAT: The specimen is POSITIVE for SARS-Cov-2, the novel coronavirus associated with   COVID-19.         This test has been authorized by the FDA under an Emergency Use Authorization (EUA) for use   by authorized laboratories.         The ID NOW COVID-19 assay is designed to detect the virus that causes COVID-19 in patients   with signs and symptoms of infection who are suspected of COVID-19. An individual without symptoms of COVID-19 and who is not shedding SARS-CoV-2 virus would   expect to have a negative (not detected) result in this assay.    Fact sheet for Healthcare Providers: Dionne   Fact sheet for Patients: [email or web address]           Methodology: Isothermal Nucleic Acid Amplification         Results reported to the appropriate [facility] Making-Other:     Note:  · Labs, medications, radiologic studies were reviewed with personal review of films  · Large amounts of data were reviewed  · Discussed with nursing Staff, Discharge planner  · Infection Control and Prevention measures reviewed  · All prior entries were reviewed  · Administer medications as ordered  · Prognosis: Guarded  · Discharge planning reviewed      Thank you for allowing us to participate in the care of this patient. Please call with questions. Alejandra Martinez, APRN    ATTESTATION:    I have discussed the case, including pertinent history and exam findings with the APRN. I have evaluated the  History, physical findings and pictures of the patient and the key elements of the encounter have been performed by me. I have reviewed the laboratory data, other diagnostic studies and discussed them with the APRN. I have updated the medical record where necessary. I agree with the assessment, plan and orders as documented by the APRN.     Waldo Dakin, MD.

## 2022-02-04 NOTE — SIGNIFICANT EVENT
I had a long discussion with the patient's family in person and POA over the phone,  in presence of the RN taking care of the patient. Patient's current clinical condition, laboratory and radiographic findings as well as recommendations of physicians consulted on the case were discussed with the patient's family in detail in simple [place]. All questions and concerns of the family were addressed, and appropriate emotional support was provided. After understanding patient's current medical condition, family decided that they wanted to discontinue the ongoing treatment but in case patient's heart stops (cardiac arrest) or the patient stops breathing (respiratory arrest), they do not want any chest compressions, insertion of a breathing tube down patient's throat for respiratory support or other similar  resuscitative measures to be performed on the patient. They requested that if such a condition arises, the patient should be made comfortable and nature should be allowed to take its course. They asked that patient's code status should be changed to [facility] (no intubation), and signed the [facility] order form in my presence, which was witnessed by Barry Patel. Will honor family's wishes, and will change patient's code status to [facility] (no intubation). Daniel Seymour MD, M.D.   Department of Internal Medicine,  [facility])             2/4/2022, 4:48 PM

## 2022-02-04 NOTE — PLAN OF CARE
Problem: Airway Clearance - Ineffective  Goal: Achieve or maintain patent airway  2/4/2022 0941 by Joseph Flaherty RCP  Outcome: Ongoing  2/4/2022 0643 by Devyn Barlow RN  Outcome: Ongoing     Problem: Gas Exchange - Impaired  Goal: Absence of hypoxia  2/4/2022 0941 by Joseph Flaherty RCP  Outcome: Ongoing  2/4/2022 0643 by Devyn Balrow RN  Outcome: Ongoing  Goal: Promote optimal lung function  2/4/2022 0941 by Joseph Flaherty RCP  Outcome: Ongoing  2/4/2022 0643 by Devyn Barlow RN  Outcome: Ongoing     Problem: Breathing Pattern - Ineffective  Goal: Ability to achieve and maintain a regular respiratory rate  2/4/2022 0941 by Joseph Flaherty RCP  Outcome: Ongoing  2/4/2022 0643 by Devyn Barlow RN  Outcome: Ongoing     Problem: OXYGENATION/RESPIRATORY FUNCTION  Goal: Patient will maintain patent airway  2/4/2022 0941 by Joseph Flaherty RCP  Outcome: Ongoing  2/4/2022 0643 by Devyn Barlow RN  Outcome: Ongoing  2/3/2022 2130 by Cara Senior RCP  Outcome: Ongoing  Goal: Patient will achieve/maintain normal respiratory rate/effort  Description: Respiratory rate and effort will be within normal limits for the patient  2/4/2022 0941 by Joseph Flaherty RCP  Outcome: Ongoing  2/4/2022 0643 by Devyn Barlow RN  Outcome: Ongoing  2/3/2022 2130 by Cara Senior RCP  Outcome: Ongoing       Problem: MECHANICAL VENTILATION  Goal: Patient will maintain patent airway  2/4/2022 0941 by Joseph Flaherty RCP  Outcome: Ongoing  2/4/2022 0643 by Devyn Barlow RN  Outcome: Ongoing  2/3/2022 2130 by Cara Sneior RCP  Outcome: Ongoing  Goal: Oral health is maintained or improved  2/4/2022 0941 by Joseph Flaherty RCP  Outcome: Ongoing  2/4/2022 0643 by Devyn Barlow RN  Outcome: Ongoing  2/3/2022 2130 by Cara Senior RCP  Outcome: Ongoing  Goal: ET tube will be managed safely  2/4/2022 0941 by Joseph Flaherty RCP  Outcome: Ongoing  2/4/2022 0643 by Devyn Barlow RN  Outcome: Ongoing  2/3/2022 2130 by Leah Noel RCP  Outcome: Ongoing  Goal: Ability to express needs and understand communication  2/4/2022 0941 by José Luis Viveros RCP  Outcome: Ongoing  2/4/2022 0643 by Jamie Cárdenas RN  Outcome: Ongoing  2/3/2022 2130 by Leah Noel RCP  Outcome: Ongoing  Goal: Mobility/activity is maintained at optimum level for patient  2/4/2022 0941 by José Luis Viveros RCP  Outcome: Ongoing  2/4/2022 0643 by Jamie Cárdenas RN  Outcome: Ongoing  2/3/2022 2130 by Leah Noel RCP  Outcome: Ongoing     Problem: SKIN INTEGRITY  Goal: Skin integrity is maintained or improved  2/4/2022 0941 by José Luis Viveros RCP  Outcome: Ongoing  2/4/2022 0643 by Jamie Cárdenas RN  Outcome: Ongoing  2/3/2022 2130 by Leah Noel RCP  Outcome: Ongoing

## 2022-02-04 NOTE — PLAN OF CARE
Critical care team - Resident sign-out to medicine service      Date and time: 2/4/2022 6:43 PM  Patient's name:  Raymond Alamo Record Number: [id number]  Patient's account/billing number: [de-identified]  Patient's YOB: 1940  Age: [de-identified] y.o. Date of Admission: 1/8/2022  9:49 AM  Length of stay during current admission: 27    Primary Care Physician: MOHAMUD Hauser CNP    Code Status: DNR-CC    Mode of physician to physician communication:        [x] Via telephone   [] In person     Date and time of sign-out: 2/4/2022 6:43 PM    Accepting Medicine team: [facility]    Accepting team's attending: Dr. Divine Maldonado    Patient's current ICU Bed:  124    Patient's assigned bed on floor:  443        [x] Med-Surg Monitored [] Step-down       [] Psychiatry ICU       [] Psych floor     Reason for ICU admission:     1. COVID-19    2. Acute kidney injury ([facility] [address]    3. Elevated troponin    4.  Hypoxia        ICU course summary:     History was obtained from EMR due to patient being intubated patient is a 50-year-old  non- male who presented to the ER 01/08/2022 for shortness of breath, nausea and vomiting and was admitted for pneumonia due to COVID-19.  He reported having increasing shortness of breath with nausea and vomiting or 7 to 10 days before coming to the hospital. Nancy Perfect was tested positive for Covid shortly after [place] time.  Subsequently patient became mildly short of breath and more notified us cocci approximately 10 feet.  Upon arrival to the emergency room, patient was hypoxic on room air with oxygen saturation less than 80%.  He was put on high flow oxygen and was started on Decadron on 1/8/2010 and received Actemra on 1/8/2022.  Patient was admitted in Covid unit.  He also received meropenem for leukocytosis and possible secondary bacterial pneumonia from 1/11/2022 to 1/17/2022.  Meropenem was restarted on 1/21/2022 because of residual dense consolidation in the lungs with air bronchograms with end date of 1/31/2022 per ID.  Remdesivir is contraindicated due to JULES.  Patient had significant hypoxia while being on high flow nasal cannula and BiPAP alternatively he required intubation and mechanical ventilation on 1/22/22.  Nephrology is following for JULES likely secondary to ischemic ATN and nephrotoxic ATN. Cardiology is following for new onset A.fib and NSTEMI type-1 Vs. Type 2. Palliative care is following.     Subsequently in the ICU, patient's family decided to change code status to [facility] and to pursue palliation with comfort measures.      Current Vitals:     /89   Pulse 67   Temp 98.6 °F (37 °C) (Oral)   Resp (!) 31   Ht 5' 10\" (1.778 m)   Wt 168 lb 14 oz (76.6 kg)   SpO2 96%   BMI 24.23 kg/m²     Consults:     PHARMACY TO DOSE MEDICATION  IP CONSULT TO INFECTIOUS DISEASES  IP CONSULT TO CARDIOLOGY  IP CONSULT TO NEPHROLOGY  IP CONSULT TO IV TEAM  IP CONSULT TO NEPHROLOGY  IP CONSULT TO VASCULAR SURGERY  IP CONSULT TO GENERAL SURGERY  IP CONSULT TO PULMONOLOGY  IP CONSULT TO GI  IP CONSULT TO DIETITIAN  IP CONSULT TO IV TEAM  IP CONSULT TO CARDIOLOGY  IP CONSULT TO CARDIOLOGY  IP CONSULT TO PALLIATIVE CARE  IP CONSULT TO PALLIATIVE CARE  IP CONSULT TO IV TEAM  IP CONSULT TO HOSPICE    Assessment:     Patient Active Problem List    Diagnosis Date Noted    Hypoxia     Acute distention of stomach     COVID-19     Traumatic hematoma of buttock     Atrial fibrillation with RVR ([address] 01/10/2022    Nonrheumatic aortic valve stenosis 01/09/2022    PAD (peripheral artery disease) ([address] 01/09/2022    Moderate protein-calorie malnutrition ([address] 01/09/2022    Essential hypertension 01/08/2022    Hyperlipidemia 01/08/2022    Pneumonia due to COVID-19 virus 01/08/2022    Acute hypoxemic respiratory failure due to COVID-19 ([address] 01/08/2022    Acute kidney injury ([address] 01/08/2022    Stage 3b chronic kidney disease ([address] 01/08/2022    Elevated troponin        Recommended Follow-up:     1. Hospice and palliative care    Above mentioned assessment and plan was discussed by me with the admitting medicine resident. The medicine team assigned to the patient by medicine admitting resident will be following up the patient from now onwards on the floor.      Lisbeth Mcfarland MD   Emergency Medicine Resident  Critical Care Service

## 2022-02-04 NOTE — PROGRESS NOTES
Critical Care Team - Daily Progress Note      Date and time: 2/4/2022 7:05 AM  Patient's name:  Bryon Santacruz  Medical Record Number: [id number]  Patient's account/billing number: [de-identified]  Patient's YOB: 1940  Age: [de-identified] y.o. Date of Admission: 1/8/2022  9:49 AM  Length of stay during current admission: 27      Primary Care Physician: Tonja Hobbs, APRN - CNP  ICU Attending Physician: Dr. Scott Posadas    Code Status: DNR-CCA    Reason for ICU admission:   Chief Complaint   Patient presents with    Shortness of Breath    Nausea & Vomiting         SUBJECTIVE:     HPI:  History was obtained from EMR due to patient being intubated patient is a 52-year-old  non- male who presented to the ER 01/08/2022 for shortness of breath, nausea and vomiting and was admitted for pneumonia due to COVID-19.  He reported having increasing shortness of breath with nausea and vomiting or 7 to 10 days before coming to the hospital. David Hennessy was tested positive for Covid shortly after [place] time.  Subsequently patient became mildly short of breath and more notified us cocci approximately 10 feet.  Upon arrival to the emergency room, patient was hypoxic on room air with oxygen saturation less than 80%.  He was put on high flow oxygen and was started on Decadron on 1/8/2010 and received Actemra on 1/8/2022.  Patient was admitted in Covid unit.  He also received meropenem for leukocytosis and possible secondary bacterial pneumonia from 1/11/2022 to 1/17/2022.  Meropenem was restarted on 1/21/2022 because of residual dense consolidation in the lungs with air bronchograms with end date of 1/31/2022 per ID.  Remdesivir is contraindicated due to JULES.  Patient had significant hypoxia while being on high flow nasal cannula and BiPAP alternatively he required intubation and mechanical ventilation on 1/22/22.  Nephrology is following for JULES likely secondary to ischemic ATN and nephrotoxic ATN.  Cardiology is following for new onset A.fib and NSTEMI type-1 Vs. Type 2. Palliative care is following.     OVERNIGHT EVENTS:         Patient seen and examined at bedside  Hemodynamically stable. Having SBT  This morning, hemoglobin is 7.0  No obvious source of bleeding  FOBT not collected, no bowel movement yet  Eliquis and Plavix on hold since 2/2/2022  Patient does have visible blood in the oral cavity, getting daily oral care    Sedation: Propofol 10 mcg/kg/hr  Pressors: None  Analgesics: Fentanyl 25 mcg/hr      Vent Settings: PRVC, RR 14, , PEEP 8, FiO2 40%  ABG: pH 7.42, pCO2 49, pO2 95, HCO3 32      ID: IV Merrem re started on 01/21/22 and completed on 01/31/22     Nephrology: Continue free water 300 cc Q4 hours. Continue Torsemide 40mg every other day.  Signed off     Cardiology: A fib stable continue PO Amiodarone, Lopressor and Eliquis, signed off.     Palliative Care: CODE STATUS made DNR-CCA with intubation on 01/31/2022        AWAKE & FOLLOWING COMMANDS:  [x] No   [] Yes    CURRENT VENTILATION STATUS:     [x] Ventilator  [] BIPAP  [] Nasal Cannula [] Room Air      SECRETIONS Amount:  [] Small [] Moderate  [] Large  [x] None  Color:     [] White [] Colored  [] Bloody    SEDATION:  RAAS Score:  [x] Propofol gtt  [] Versed gtt  [] Ativan gtt   [] No Sedation    PARALYZED:  [x] No    [] Yes    DIARRHEA:                [x] No                [] Yes  (C. Difficile status: [] positive                                                                                                                       [] negative                                                                                                                     [] pending)    VASOPRESSORS:  [x] No    [] Yes    If yes -   [] Levophed       [] Dopamine     [] Vasopressin       [] Dobutamine  [] Phenylephrine         [] Epinephrine    CENTRAL LINES:     [x] No   [] Yes   (Date of Insertion:   )           If yes -     [] Right IJ     [] Left IJ [] Right Femoral [] Left Femoral                   [] Right Subclavian [] Left Subclavian       ORTIZ'S CATHETER:   [x] No   [] Yes  (Date of Insertion:   )     URINE OUTPUT:            [x] Good   [] Low              [] Anuric    Review of Systems: Unable to assess, patient intubated on sedation. OBJECTIVE:     VITAL SIGNS:  BP (!) 114/51   Pulse 60   Temp 97.3 °F (36.3 °C) (Axillary)   Resp 14   Ht 5' 10\" (1.778 m)   Wt 168 lb 14 oz (76.6 kg)   SpO2 97%   BMI 24.23 kg/m²   Tmax over 24 hours:  Temp (24hrs), Av °F (36.7 °C), Min:97.2 °F (36.2 °C), Max:98.6 °F (37 °C)      Patient Vitals for the past 6 hrs:   BP Temp Temp src Pulse Resp SpO2 Weight   22 0600    60 14 97 %    22 0528  97.3 °F (36.3 °C) Axillary       22 0500    62 18 98 %    22 0400 (!) 114/51   62 18 96 %    22 0329  97.5 °F (36.4 °C) Axillary       22 0326       168 lb 14 oz (76.6 kg)   22 0315    66 14 100 %    22 0300    58 17 96 %    22 0211  97.9 °F (36.6 °C) Axillary       22 0200    56 19 94 %          Intake/Output Summary (Last 24 hours) at 2022 0705  Last data filed at 2022 0528  Gross per 24 hour   Intake 1529 ml   Output 1630 ml   Net -101 ml     Wt Readings from Last 2 Encounters:   22 168 lb 14 oz (76.6 kg)     Body mass index is 24.23 kg/m². PHYSICAL EXAMINATION:  Constitutional: Patient intubated and on sedation. Dried blood in the oral cavity. EENT: normocephalic, atraumatic  Neck: Supple, symmetrical, trachea midline  Respiratory: Crackles in bilateral upper lobes on auscultation  Cardiovascular: regular rate and rhythm, normal S1, S2, no murmur noted  Abdomen: soft, nontender, nondistended, no masses  Extremities:   +2 pitting edema in b/l lower extremities.      MEDICATIONS:    Scheduled Meds:   torsemide  40 mg Oral Every Other Day    amiodarone  200 mg Per G Tube Daily    amLODIPine  10 mg Per G Tube Daily    [Held by provider] apixaban  2.5 mg Per G Tube BID    atorvastatin  20 mg Per G Tube Nightly    busPIRone  10 mg Per G Tube TID    [Held by provider] clopidogrel  75 mg Per G Tube Daily    famotidine  20 mg Per G Tube Daily    metoprolol tartrate  75 mg Per G Tube BID    oxyCODONE  7.5 mg Per G Tube Q6H    senna  2 tablet Per G Tube Nightly    miconazole   Topical BID    insulin lispro  0-6 Units SubCUTAneous Q4H     Continuous Infusions:   sodium chloride      fentaNYL Stopped (02/03/22 1954)    propofol 5 mcg/kg/min (02/04/22 0044)    dextrose 100 mL/hr (01/19/22 0010)    sodium chloride 10 mL (02/02/22 0041)     PRN Meds:   sodium chloride, , PRN  hydrALAZINE, 10 mg, Q6H PRN  metoclopramide, 10 mg, Q6H PRN  bisacodyl, 10 mg, Daily PRN  sodium chloride flush, 5-40 mL, PRN  melatonin, 3 mg, Nightly PRN  sodium chloride, 1 spray, PRN  LORazepam, 0.5 mg, Q2H PRN  glucose, 15 g, PRN  dextrose, 12.5 g, PRN  glucagon (rDNA), 1 mg, PRN  dextrose, 100 mL/hr, PRN  metoprolol, 5 mg, Q6H PRN  sodium chloride flush, 10 mL, PRN  sodium chloride, 25 mL, PRN  ondansetron, 4 mg, Q8H PRN   Or  ondansetron, 4 mg, Q6H PRN  polyethylene glycol, 17 g, Daily PRN  acetaminophen, 650 mg, Q6H PRN   Or  acetaminophen, 650 mg, Q6H PRN          VENT SETTINGS (Comprehensive) (if applicable):  Vent Information  $Ventilation: $Subsequent Day  Skin Assessment: Clean, dry, & intact  Suction Catheter Diameter: 14  Equipment ID: [id number]  Equipment Changed: HME  Vent Type: Servo i  Vent Mode: PRVC  Vt Ordered: 580 mL  Rate Set: 14 bmp  Pressure Support: 6 cmH20  FiO2 : 40 %  SpO2: 97 %  SpO2/FiO2 ratio: 250  Sensitivity: 2  PEEP/CPAP: 8  I Time/ I Time %: 0.8 s  Humidification Source: HME  Humidification Temp: 31  Humidification Temp Measured: 31  Nitric Oxide/Epoprostenol In Use?: No  Mask Type: Full face mask  Mask Size: Medium  Additional Respiratory  Assessments  Pulse: 60  Resp: 14  SpO2: 97 %  End Tidal CO2: 50 (%)  Position: Semi-Haynes's  Humidification Source: HME  Humidification Temp: 31  Oral Care Completed?: Yes  Oral Care: Mouthwash,Lip moisturizer applied,Mouth swabbed,Mouth moisturizer,Mouth suctioned  Subglottic Suction Done?: No  Cuff Pressure (cm H2O):  (mov)    ABGs:     Laboratory findings:    Complete Blood Count:   Recent Labs     02/02/22  0402 02/02/22  0945 02/03/22  0535 02/03/22  1149 02/04/22  0055   WBC 9.6  --  11.2  --  11.6*   HGB 6.7*   < > 6.9* 7.9* 7.0*   HCT 21.2*   < > 21.9* 25.1* 21.7*     --  256  --  273    < > = values in this interval not displayed. Last 3 Blood Glucose:   Recent Labs     02/02/22  0402 02/03/22  0535 02/04/22  0055   GLUCOSE 160* 191* 161*        PT/INR:    Lab Results   Component Value Date    PROTIME 11.6 01/22/2022    INR 1.1 01/22/2022     PTT:    Lab Results   Component Value Date    APTT 25.2 01/22/2022       Comprehensive Metabolic Profile:   Recent Labs     02/02/22  0402 02/03/22  0535 02/04/22  0055   * 141 142   K 3.5* 4.0 4.2    104 104   CO2 30 30 30   BUN 91* 91* 87*   CREATININE 2.25* 2.09* 2.29*   GLUCOSE 160* 191* 161*   CALCIUM 8.1* 8.2* 7.8*      Magnesium:   Lab Results   Component Value Date    MG 2.5 02/02/2022     Phosphorus:   Lab Results   Component Value Date    PHOS 5.3 01/22/2022     Ionized Calcium:   Lab Results   Component Value Date    CAION 1.21 01/22/2022        Urinalysis:     Troponin: No results for input(s): TROPONINI in the last 72 hours.     Microbiology:    Cultures during this admission:     Blood cultures:                 [] None drawn      [x] Negative             []  Positive (Details:  )  Urine Culture:                   [] None drawn      [] Negative             [x]  Positive (Details: Candida)  Sputum Culture:               [] None drawn       [x] Negative             []  Positive (Details:  )   Endotracheal aspirate:     [x] None drawn       [] Negative             []  Positive (Details:  ) PRN  · Ulcer Prophylaxis: Pepcid  · Diet:ADULT TUBE FEEDING; Orogastric; Standard without Fiber; Continuous; 55; Yes; 10; Q 4 hours; 25; 30; Other (specify); per MD; Protein; 2 Protein modulars per day       5. Renal/Fluid/Electrolyte  · IV Fluids: None  · I/O: In: 1.5L/24 hours  · UOut: 1.6 L/24 hours   · JULES on CKD stage IIIb-4 (baseline Cr 2.0) secondary to ischemic ATN and nephrotoxic ATN:  BUN/Cr 91/2.09  · Nephrology signed off: Torsemide every other day; and free water 300 mL q4 hours, f/u labs ordered  · Monitor electrolytes, replace PRN         6. ID  · WBC: 8.8 -> 10.3 -> 9.6 -> 11.1-->11.6  · Antimicrobials: Meropenem completed on 22      7. Hematology:  · Hgb 8.1 ->8.4 -> 6.7 -> 7.9 (S/P 1 unit PRBC) -> 6.9-->7.0  · Transfused 1 unit PRBC on 2022  · HOLD ELIQUIS AND PLAVIX   · FOBT not collected yet     8. Endocrine:   · Glucose controlled on low dose sliding scale  · Bs-180s     9. DVT Prophylaxis  · On Eliquis 2.5 mg twice daily HELD        GI PPX: Pepcid  DVT PPX: Eliquis HELD due to bleeding  IVF: None  Diet: Tube feeds     Olga Lidia Melgar MD  Internal Medicine Resident, PGY- [address], [place]  2022, 7:19 AM    Attending Physician Statement  I have discussed the care of Frank Pritchard, including pertinent history and exam findings,  with the resident. I have seen and examined the patient and the key elements of all parts of the encounter have been performed by me. I agree with the assessment, plan and orders as documented by the resident with additions . Off sedation awake   On sbt   No bleeding   Extensive discussion with son at bedside and patient's wife and daughter over phone - explained on going medical condition and co- morbidities . Reviewed imaging and echo   Will plan extubation but wife does not wish re intubation . Will support with high flow .   If respiratory status worsens post extubation , then will discuss and proceed with [facility]    Total critical care time caring for this patient with life threatening, unstable organ failure, including direct patient contact, management of life support systems, review of data including imaging and labs, discussions with other team members and physicians at least 28   Min so far today, excluding procedures. Treatment plan Discussed with nursing staff in detail , all questions answered . Electronically signed by Arielle Goyal MD on   2/4/22 at 3:05 PM EST    Please note that this chart was generated using voice recognition Dragon dictation software. Although every effort was made to ensure the accuracy of this automated transcription, some errors in transcription may have occurred.

## 2022-02-04 NOTE — PLAN OF CARE
Nutrition Problem #1: Inadequate oral intake  Intervention: Food and/or Nutrient Delivery:  (Suggest continue current tube feeding at this time. Will continue to monitor TF tolerance/adequacy and labs; if glucose levels remain elevated, may consider change to Diabetic Formula at 45 mL/hr.)  Nutritional Goals: Meet % of estimated nutrition needs with nutrition support/oral diet.

## 2022-02-05 NOTE — DEATH NOTES
Death Pronouncement Note  Patient's Name: Caleb Carnes   Patient's YOB: 1940  MRN Number: [id number]    Admitting Provider: Miri Laboy MD  Attending Provider: Mj Root DO    Patient was examined and the following were absent: Respiratory effort    I declared the patient dead on  at 12    Preliminary Cause of Death: complications from pneumonia    Electronically signed by MOHAMUD Esquivel CNP on 2/5/22 at 12:54 AM EST

## 2022-02-05 NOTE — FLOWSHEET NOTE
[facility] Jovon Thompson 84   Patient Death Note  DEATH   Shift date: 2022    Shift day: Friday  Shift #: 3                 Room # 4508/6313-52   Name: Eufemia Edmondson            Age: [de-identified] y.o. Gender: male          Gnosticism: [address] of [employer]: Unknown  Admit Date & Time: 2022  9:49 AM     Referral: Bedside Nurse  Actual date of death: 2022 TOD: 0045       SITUATION AT DEATH:  Per report, patient's time of death was declared at 56, by Dr. Victoria Rivera. Patient's son, Felisa Roque, had been present at bedside, at time of death. IS THIS A 'S CASE? No    SPIRITUAL/EMOTIONAL INTERVENTION:   responded to page and gathered patient information from bedside nurse.  greeted patient's son and learned about patient's hospitalization. Patient's son shared that patient and family had hoped that patient would go home with hospice, however, he was \"not stable enough. \" Patient's son shared that patient \" peacefully. \" Patient's son talked about patient's love of cars and his 79 year marriage with his wife, Inge Campbell. Patient's son was grieving appropriately and coping well throughout encounter.  assisted patient's son in completing Release of Body form. Patient's son thanked  for support and care. Family Received Grief Packet? No,  will mail sympathy card and grief packet to the family. NAME AND PHONE NUMBER OF DOCTOR SIGNING DEATH CERTIFICATE:  Dr. Adri Hansen, [phone]     spoke to/left message for [facility] indicating family's choice for their services.  called  home on 2022 at 0055    Copy of COMPLETED Release of Body Form Received? Yes    Patient's belongings: Unknown     HOME:  Name: [facility]: [place], [address]  Phone Number: [phone]    NEXT OF KIN:  Name: Celeste Kumar  Relationship: Son  Street Address: [address]  City: [place]  State: [place]  Zip code: [address]   Phone

## 2022-02-05 NOTE — DISCHARGE SUMMARY
[facility]  Office: [address], DO, Alex Max, DO, Karan Pacheco, DO, Lay Gibson Blood, DO, Angel Peñaloza MD, Sharifa Lemon MD, Flaca Philip MD, Linnea Vazquez MD, Berna Pardo MD, Kevin Huertas MD, Alvina Escobedo MD, Shane Zhong, DO, Cosmo Hendrickson, DO, Brett Frazier MD,  Rhea Amaya DO, Maddy Norris MD, Enrico Florez MD, Janet Mehta MD, Dennis Rudolph MD, Teodoro Wolf MD, Cisco Saul [facility], [facility], CNP, Nahun De La Torre, CNP, Oleksandr Peña, CNS, Paula Sandoval, CNP, Mena Daniels, CNP, Camryn Mayes, CNP, Audelia Villegas, CNP, Matti Talbot, CNP, Elisabet Abernathy PA-C, Beto Hernandez, [facility], Francine Brown, [facility], Radha Bowles, CNP, Jayla Goodman, CNP, Clair Nieves, CNP, Pro Mendoza, CNP, Claudette David, CNP, Edna Zepeda, [facility]    Discharge Summary     Patient ID: Pinky Klinefelter  :  1940   MRN: [id number]     ACCOUNT:  [de-identified]   Patient's PCP: MOHAMUD Calixto CNP  Admit Date: 2022   Discharge Date: 2022     Length of Stay: 28  Code Status:  DNR-CC  Admitting Physician: Paulo Cunningham MD  Discharge Physician: MOHAMUD Joshi CNP     Active Discharge Diagnoses:     Hospital Problem Lists:  Principal Problem:    Pneumonia due to COVID-19 virus  Active Problems:    Essential hypertension    Hyperlipidemia    Acute hypoxemic respiratory failure due to COVID-19 [facility])    [facility]    Stage 3b chronic kidney disease (HCC)    Elevated troponin    Nonrheumatic aortic valve stenosis    PAD (peripheral artery disease) (HCC)    Moderate protein-calorie malnutrition (HCC)    Atrial fibrillation with RVR ([facility]    COVID-19    Traumatic hematoma of buttock    Acute distention of stomach    Hypoxia  Resolved Problems:    * No resolved hospital problems.  *      Admission Condition:  poor     Discharged Condition:     Hospital Stay: Hospital Course:  Patient presented to the ER 01/08/2022 for shortness of breath, nausea and vomiting and was admitted for pneumonia due to COVID-19.  He reported having increasing shortness of breath with nausea and vomiting or 7 to 10 days before coming to the hospital. Oscar Garcia was tested positive for Covid shortly after Ludwig time.  Upon arrival to the emergency room, patient was hypoxic on room air with oxygen saturation less than 80%.  He was put on high flow oxygen and was started on Decadron on 1/8/2010 and received Actemra on 1/8/2022.  Patient was admitted in Covid unit.  He also received meropenem for leukocytosis and possible secondary bacterial pneumonia from 1/11/2022 to 1/17/2022.  Meropenem was restarted on 1/21/2022 because of residual dense consolidation in the lungs with air bronchograms with end date of 1/31/2022 per ID.  Remdesivir is contraindicated due to JULES.  Patient had significant hypoxia while being on high flow nasal cannula and BiPAP alternatively he required intubation and mechanical ventilation on 1/22/22.  Nephrology is following for JULES likely secondary to ischemic ATN and nephrotoxic ATN and hypernatremia from free water losses. . Cardiology is following for new onset A.fib and NSTEMI type-1 Vs. Type 2. Patient received amiodarone, lopressor, Eliquis, ASA and Plavix. The patient failed to improve and code status was discussed with the family per CC. The family had decided to change the patient code status to DNR CC and the patient was terminally weaned from the ventilator.      I was called to pronounce death at [id number] am. Per nursing the patient was absent of respirations and pulse at approximately 1217 am. Family is at bedside            Significant Diagnostic Studies:   Labs / Micro:  CBC:   Lab Results   Component Value Date    WBC 11.6 02/04/2022    RBC 2.18 02/04/2022    HGB 7.0 02/04/2022    HCT 21.7 02/04/2022    MCV 99.5 02/04/2022    MCH 32.1 02/04/2022    [facility] 02/04/2022    RDW 16.7 02/04/2022     02/04/2022     BMP:    Lab Results   Component Value Date    GLUCOSE 161 02/04/2022     02/04/2022    K 4.2 02/04/2022     02/04/2022    CO2 30 02/04/2022    ANIONGAP 8 02/04/2022    BUN 87 02/04/2022    CREATININE 2.29 02/04/2022    BUNCRER NOT REPORTED 02/04/2022    CALCIUM 7.8 02/04/2022    LABGLOM 28 02/04/2022    GFRAA 33 02/04/2022    GFR      02/04/2022    GFR NOT REPORTED 02/04/2022     HFP:    Lab Results   Component Value Date    PROT 6.1 01/09/2022     PTT:   Lab Results   Component Value Date    APTT 25.2 01/22/2022     FLP:    Lab Results   Component Value Date    TRIG 177 02/01/2022     U/A:    Lab Results   Component Value Date    COLORU Yellow 01/17/2022    TURBIDITY Clear 01/17/2022    SPECGRAV 1.018 01/17/2022    HGBUR MODERATE 01/17/2022    PHUR 5.0 01/17/2022    PROTEINU TRACE 01/17/2022    GLUCOSEU 1+ 01/17/2022    KETUA NEGATIVE 01/17/2022    BILIRUBINUR NEGATIVE 01/17/2022    UROBILINOGEN Normal 01/17/2022    NITRU NEGATIVE 01/17/2022    LEUKOCYTESUR NEGATIVE 01/17/2022        Radiology:  XR CHEST (SINGLE VIEW FRONTAL)    Result Date: 2/2/2022  No significant change from prior study. Support tubes and lines as above. No interval change in scattered bilateral pulmonary opacities consistent with multifocal pneumonitis. XR CHEST PORTABLE    Result Date: 1/29/2022  Stable scattered pulmonary infiltrates. Support tubes as described above.        Consultations:    Consults:     Final Specialist Recommendations/Findings:   PHARMACY TO DOSE MEDICATION  IP CONSULT TO INFECTIOUS DISEASES  IP CONSULT TO CARDIOLOGY  IP CONSULT TO NEPHROLOGY  IP CONSULT TO IV TEAM  IP CONSULT TO NEPHROLOGY  IP CONSULT TO VASCULAR SURGERY  IP CONSULT TO GENERAL SURGERY  IP CONSULT TO PULMONOLOGY  IP CONSULT TO GI  IP CONSULT TO DIETITIAN  IP CONSULT TO IV TEAM  IP CONSULT TO CARDIOLOGY  IP CONSULT TO CARDIOLOGY  IP CONSULT TO PALLIATIVE CARE  IP CONSULT TO PALLIATIVE CARE  IP CONSULT TO IV TEAM  IP CONSULT TO HOSPICE      The patient was seen and examined on day of discharge and this discharge summary is in conjunction with any daily progress note from day of discharge. Discharge plan:     Disposition:     Physician Follow Up: none        No discharge procedures on file. Time Spent on discharge is  20 mins in patient examination, evaluation, counseling as well as medication reconciliation, prescriptions for required medications, discharge plan and follow up. Electronically signed by   MOHAMUD Dumont CNP  2022  12:56 AM      Thank you MOHAMUD Cardenas CNP for the opportunity to be involved in this patient's care.

## 2022-02-05 NOTE — FLOWSHEET NOTE
Report called to [facility] by dayshift nurse. Writer called 4C prior to transport to speak with nightshift nurse. All questions answered. Patient transported to room 443 with all personal belongings without incident. Son at bedside.

## 2022-02-05 NOTE — PROGRESS NOTES
Patient has passed away, Primary care Notified and Shannon Light from the team came and pronounced at 00:45. Family member at bedside, Nursing manager, [employer] notified, 's office notified, [employer] notified.

## 2022-02-07 ENCOUNTER — TELEPHONE (OUTPATIENT)
Dept: PULMONOLOGY | Age: 82
End: 2022-02-07

## 2022-02-07 NOTE — TELEPHONE ENCOUNTER
home is wondering if you will be signing the death certificate. Pt is cremation. Please advise. Thank you.

## 2022-02-08 NOTE — PROGRESS NOTES
Physician Progress Note      PATIENT:               Sharifa Bernal  CSN #:                  [id number]  :                       1940  ADMIT DATE:       2022 9:49 AM  DISCH DATE:        2022 2:29 AM  RESPONDING  PROVIDER #:        Edith Vega          QUERY TEXT:    Pt admitted with COVID-19 and noted to have on admission Ferritin 804, ,   .0>131.8>21.5, WBC 17.9>15.2>34.5   If possible, please document in   progress notes and discharge summary if you are evaluating and/or treating: The medical record reflects the following:  Risk Factors: Age, COVID PNA, UJLES w/TBN, Moderate malnutrition, Chronic    diastolic CHF,  CKD 4, Acure posthemorrhagic anemia,    Clinical Indicators: Labs  Ferritin 804, , .0>131.8>21.5, WBC   17.9>15.2>34.5, HR , Resp 16-39, no fever  Rm air on ER 80's>Bipap>   intubation, terminally extubated, death  12:45  Treatment: Intubation, labs, ICCU, Consults ID/Cardiology,Nephrology,GI, Gen   surgery, Pulmonology, Decadron, Actemra,  Options provided:  -- Sepsis present on admission due to COVID-19 infection  -- Covid-19 pneumonia without sepsis  -- Other - I will add my own diagnosis  -- Disagree - Not applicable / Not valid  -- Disagree - Clinically unable to determine / Unknown  -- Refer to Clinical Documentation Reviewer    PROVIDER RESPONSE TEXT:    This patient has sepsis which was present on admission due to COVID-19   infection. Query created by:  Liseth Smith on 2022 6:21 AM      Electronically signed by:  Edith Vega 2022 1:49 PM

## 2022-04-20 NOTE — PROGRESS NOTES
x4 extremities. Short term goal 3: Assess functional mobility when able.        Therapy Time   Individual Concurrent Group Co-treatment   Time In 6603         Time Out 1238         Minutes 23         Timed Code Treatment Minutes: 10 Minutes       Rosalinda Solomon PT Former smoker
